# Patient Record
Sex: FEMALE | Race: WHITE | NOT HISPANIC OR LATINO | Employment: OTHER | URBAN - METROPOLITAN AREA
[De-identification: names, ages, dates, MRNs, and addresses within clinical notes are randomized per-mention and may not be internally consistent; named-entity substitution may affect disease eponyms.]

---

## 2017-01-06 ENCOUNTER — TELEPHONE (OUTPATIENT)
Dept: ENDOCRINOLOGY | Age: 77
End: 2017-01-06

## 2017-01-06 RX ORDER — DONEPEZIL HYDROCHLORIDE 10 MG/1
10 TABLET, FILM COATED ORAL DAILY
Qty: 90 TABLET | Refills: 0 | Status: SHIPPED | OUTPATIENT
Start: 2017-01-06 | End: 2017-04-10 | Stop reason: SDUPTHER

## 2017-01-06 NOTE — TELEPHONE ENCOUNTER
----- Message from Monserrat Washington sent at 1/6/2017 11:35 AM EST -----  Contact: patient  Patient needs a  Refill of   donepezil (ARICEPT) 10 MG tablet, one tablet daily, 90 day supply  Send to   [facility]  [phone] (Phone)  [phone] (Fax)    Sent to pharmacy 01/06/2017 11:47AM

## 2017-01-19 ENCOUNTER — TELEPHONE (OUTPATIENT)
Dept: ENDOCRINOLOGY | Age: 77
End: 2017-01-19

## 2017-01-19 DIAGNOSIS — I10 ESSENTIAL HYPERTENSION: ICD-10-CM

## 2017-01-19 DIAGNOSIS — IMO0002 UNCONTROLLED TYPE 2 DIABETES MELLITUS WITH OTHER SPECIFIED COMPLICATION: ICD-10-CM

## 2017-01-19 DIAGNOSIS — E78.5 DYSLIPIDEMIA: ICD-10-CM

## 2017-01-19 DIAGNOSIS — E55.9 VITAMIN D DEFICIENCY: ICD-10-CM

## 2017-01-19 DIAGNOSIS — E79.0 HYPERURICEMIA: ICD-10-CM

## 2017-01-19 DIAGNOSIS — R41.3 IMPAIRED MEMORY: ICD-10-CM

## 2017-01-19 RX ORDER — ERGOCALCIFEROL 1.25 MG/1
50000 CAPSULE ORAL WEEKLY
Qty: 13 CAPSULE | Refills: 2 | Status: SHIPPED | OUTPATIENT
Start: 2017-01-19 | End: 2018-01-19

## 2017-01-19 NOTE — TELEPHONE ENCOUNTER
----- Message from Monserrat Washington sent at 1/19/2017 12:43 PM EST -----  Contact: patient  Patient needs a refill of   Vitamin D, one tablet once per week, 90 day supply  Lantus solo star, 25 units per night, 90 day supply  Send to [facility]  [phone] (Phone)  [phone] (Fax)    Sent to pharmacy 01/19/2017 1:07PM

## 2017-01-30 RX ORDER — BLOOD SUGAR DIAGNOSTIC
STRIP MISCELLANEOUS
Qty: 100 EACH | Refills: 5 | Status: SHIPPED | OUTPATIENT
Start: 2017-01-30 | End: 2019-03-05

## 2017-01-31 ENCOUNTER — OFFICE VISIT (OUTPATIENT)
Dept: INTERNAL MEDICINE | Facility: CLINIC | Age: 77
End: 2017-01-31

## 2017-01-31 VITALS
DIASTOLIC BLOOD PRESSURE: 71 MMHG | TEMPERATURE: 96.9 F | BODY MASS INDEX: 33.13 KG/M2 | OXYGEN SATURATION: 96 % | HEIGHT: 63 IN | SYSTOLIC BLOOD PRESSURE: 139 MMHG | WEIGHT: 187 LBS | HEART RATE: 66 BPM

## 2017-01-31 DIAGNOSIS — E78.5 DYSLIPIDEMIA: ICD-10-CM

## 2017-01-31 DIAGNOSIS — M25.562 CHRONIC PAIN OF LEFT KNEE: ICD-10-CM

## 2017-01-31 DIAGNOSIS — K59.01 SLOW TRANSIT CONSTIPATION: Primary | ICD-10-CM

## 2017-01-31 DIAGNOSIS — R41.3 IMPAIRED MEMORY: ICD-10-CM

## 2017-01-31 DIAGNOSIS — E55.9 VITAMIN D DEFICIENCY: ICD-10-CM

## 2017-01-31 DIAGNOSIS — I10 ESSENTIAL HYPERTENSION: ICD-10-CM

## 2017-01-31 DIAGNOSIS — IMO0001 IDDM (INSULIN DEPENDENT DIABETES MELLITUS): ICD-10-CM

## 2017-01-31 DIAGNOSIS — G89.29 CHRONIC PAIN OF LEFT KNEE: ICD-10-CM

## 2017-01-31 PROCEDURE — 99214 OFFICE O/P EST MOD 30 MIN: CPT | Performed by: INTERNAL MEDICINE

## 2017-01-31 PROCEDURE — G0009 ADMIN PNEUMOCOCCAL VACCINE: HCPCS | Performed by: INTERNAL MEDICINE

## 2017-01-31 PROCEDURE — 90670 PCV13 VACCINE IM: CPT | Performed by: INTERNAL MEDICINE

## 2017-01-31 NOTE — PROGRESS NOTES
Subjective   Di Macario is a 76 y.o. female.   She is here today for slow transit constipation along with chronic pain of left knee ID DM dyslipidemia hypertension vitamin D deficiency and impaired memory  History of Present Illness   She is here today for slow transit constipation along with chronic pain of left knee ID DM dyslipidemia hypertension vitamin D deficiency and impaired memory  The following portions of the patient's history were reviewed and updated as appropriate: allergies, current medications, past family history, past medical history, past social history, past surgical history and problem list.    Review of Systems   Gastrointestinal: Positive for constipation.   Musculoskeletal: Positive for arthralgias (left knee).   All other systems reviewed and are negative.      Objective   Physical Exam   Constitutional: She is oriented to person, place, and time. She appears well-developed and well-nourished. She is cooperative.   HENT:   Head: Normocephalic and atraumatic.   Right Ear: Hearing, tympanic membrane, external ear and ear canal normal.   Left Ear: Hearing, tympanic membrane, external ear and ear canal normal.   Nose: Nose normal.   Mouth/Throat: Oropharynx is clear and moist.   Eyes: Conjunctivae, EOM and lids are normal. Pupils are equal, round, and reactive to light.   Neck: Phonation normal. Neck supple. Carotid bruit is not present.   Cardiovascular: Normal rate, regular rhythm and normal heart sounds.  Exam reveals no gallop and no friction rub.    No murmur heard.  Pulmonary/Chest: Effort normal and breath sounds normal. No respiratory distress.   Abdominal: Soft. Bowel sounds are normal. She exhibits no distension and no mass. There is no hepatosplenomegaly. There is no tenderness. There is no rebound and no guarding. No hernia.   Musculoskeletal: She exhibits no edema.        Left knee: Tenderness found.   Neurological: She is alert and oriented to person, place, and time.  Coordination and gait normal.   Skin: Skin is warm and dry.   Psychiatric: She has a normal mood and affect. Her speech is normal and behavior is normal. Judgment and thought content normal.   Nursing note and vitals reviewed.      Assessment/Plan   Diagnoses and all orders for this visit:    Slow transit constipation    Chronic pain of left knee  -     Ambulatory Referral to Orthopedic Surgery    IDDM (insulin dependent diabetes mellitus)    Dyslipidemia    Essential hypertension    Vitamin D deficiency    Impaired memory    Other orders  -     Pneumococcal Conjugate Vaccine 13-Valent All      Slow transit constipation Linzess samples today  Chronic pain of left knee referral to orthopedic surgery  IDD M follow hemoglobin A 1C with yearly ophthalmology visits  Dyslipidemia follow cholesterol panel and keep LDL less than 70 with proper diet exercise medication  Hypertension well-controlled on current medication  Impaired memory follow closely  Vitamin D deficiency supplementation as needed    linzess samples

## 2017-01-31 NOTE — MR AVS SNAPSHOT
"                        Di Macario   1/31/2017 10:15 AM   Office Visit    Dept Phone:  [phone]   Encounter #:  [id number]    Provider:  Deangelo Brown MD   Department:  [facility] INTERNAL MEDICINE                Your Full Care Plan              Your Updated Medication List          This list is accurate as of: 1/31/17 11:00 AM.  Always use your most recent med list.                allopurinol 300 MG tablet   Commonly known as:  ZYLOPRIM   Take 1 tablet by mouth daily.       aspirin 81 MG tablet       benazepril 20 MG tablet   Commonly known as:  LOTENSIN   Take 1 tablet by mouth daily.       diltiaZEM 240 MG 24 hr capsule   Commonly known as:  TIAZAC   Take 1 capsule by mouth daily.       donepezil 10 MG tablet   Commonly known as:  ARICEPT   Take 1 tablet by mouth Daily.       ergocalciferol 30165 UNITS capsule   Commonly known as:  DRISDOL   Take 1 capsule by mouth 1 (One) Time Per Week.       freestyle lancets   Use 4 times a day       FREESTYLE LITE test strip   Generic drug:  glucose blood   USE 4 TIMES A DAY       hydrochlorothiazide 12.5 MG capsule   Commonly known as:  MICROZIDE   Take 1 capsule by mouth daily.       Insulin Glargine 100 UNIT/ML injection pen   Commonly known as:  LANTUS SOLOSTAR   Inject 15 Units under the skin Daily.       insulin lispro 100 UNIT/ML injection   Commonly known as:  humaLOG   Inject up to 30 units daily in 3 divided doses       Insulin Syringe-Needle U-100 31G X 5/16\" 0.3 ML misc   Use with insulin injection.       linagliptin 5 MG tablet tablet   Commonly known as:  TRADJENTA   Take 1 tablet by mouth daily.       lovastatin 20 MG tablet   Commonly known as:  MEVACOR   Take 1 tablet by mouth daily.       zonisamide 100 MG capsule   Commonly known as:  ZONEGRAN               We Performed the Following     Ambulatory Referral to Orthopedic Surgery     Pneumococcal Conjugate Vaccine 13-Valent All       You Were Diagnosed With        Codes " Comments    Slow transit constipation    -  Primary ICD-10-CM: K59.01  ICD-9-CM: 564.01     Chronic pain of left knee     ICD-10-CM: M25.562, G89.29  ICD-9-CM: 719.46, 338.29     IDDM (insulin dependent diabetes mellitus)     ICD-10-CM: E11.9, Z79.4  ICD-9-CM: 250.00, V58.67     Dyslipidemia     ICD-10-CM: E78.5  ICD-9-CM: 272.4     Essential hypertension     ICD-10-CM: I10  ICD-9-CM: 401.9     Vitamin D deficiency     ICD-10-CM: E55.9  ICD-9-CM: 268.9     Impaired memory     ICD-10-CM: R41.3  ICD-9-CM: 780.93       Instructions     None    Patient Instructions History      Upcoming Appointments     Visit Type Date Time Department    OFFICE VISIT 1/31/2017 10:15 AM [facility]    [facility] 3/7/2017 11:00 AM [facility]    OFFICE VISIT 3/21/2017  2:20 PM [facility]    [facility] 7/7/2017  9:30 AM [facility]    OFFICE VISIT 7/21/2017  9:40 AM [facility]      MyChart Signup     Our records indicate that you have declined [facility] MyChart signup. If you would like to sign up for [employer], please email [email or web address] or call [phone] to obtain an activation code.             Other Info from Your Visit           Your Appointments     Mar 07, 2017 11:00 AM EST   [facility] with [facility], [facility]   [facility] ENDOCRINOLOGY (--)    [address] [facility]. [address] [place] [id number] [phone]            Mar 21, 2017  2:20 PM EDT   Office Visit with IAIN Coombs   [facility] ENDOCRINOLOGY (--)    [address] [place] [id number] [phone]           Arrive 15 minutes prior to appointment.            Jul 07, 2017  9:30 AM EDT   [facility] with [facility], [facility]   [facility] ENDOCRINOLOGY (--)    [address]  [facility] [address]   [phone]            Jul 21, 2017  9:40 AM EDT   Office Visit with Andrews Murcia MD   [facility]  "ENDOCRINOLOGY (--)    [address] [facility]. [address] [place] [address] [phone]           Arrive 15 minutes prior to appointment.              Allergies     Other        Vital Signs     Blood Pressure Pulse Temperature Height Weight Oxygen Saturation    139/71 (BP Location: Left arm, Patient Position: Sitting, Cuff Size: Adult) 66 96.9 °F (36.1 °C) (Tympanic) 63\" (160 cm) 187 lb (84.8 kg) 96%    Body Mass Index Smoking Status                33.13 kg/m2 Never Smoker          Problems and Diagnoses Noted     Chronic pain of left knee    Dyslipidemia    High blood pressure    IDDM (insulin dependent diabetes mellitus)    Impaired memory    Constipation due to slow movement through bowels    Vitamin D deficiency      Immunizations Administered     Name Date    Pneumococcal Conjugate 13-Valent         "

## 2017-02-03 ENCOUNTER — OFFICE VISIT (OUTPATIENT)
Dept: ORTHOPEDIC SURGERY | Facility: CLINIC | Age: 77
End: 2017-02-03

## 2017-02-03 VITALS — WEIGHT: 185 LBS | HEIGHT: 63 IN | TEMPERATURE: 98.2 F | BODY MASS INDEX: 32.78 KG/M2

## 2017-02-03 DIAGNOSIS — M25.562 ACUTE PAIN OF LEFT KNEE: Primary | ICD-10-CM
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
## 2017-02-03 PROCEDURE — 20610 DRAIN/INJ JOINT/BURSA W/O US: CPT | Performed by: ORTHOPAEDIC SURGERY

## 2017-02-03 PROCEDURE — 73562 X-RAY EXAM OF KNEE 3: CPT | Performed by: ORTHOPAEDIC SURGERY

## 2017-02-03 PROCEDURE — 99204 OFFICE O/P NEW MOD 45 MIN: CPT | Performed by: ORTHOPAEDIC SURGERY

## 2017-02-03 RX ORDER — METHYLPREDNISOLONE ACETATE 80 MG/ML
80 INJECTION, SUSPENSION INTRA-ARTICULAR; INTRALESIONAL; INTRAMUSCULAR; SOFT TISSUE
Status: COMPLETED | OUTPATIENT
Start: 2017-02-03 | End: 2017-02-03

## 2017-02-03 RX ORDER — BUPIVACAINE HYDROCHLORIDE 5 MG/ML
4 INJECTION, SOLUTION PERINEURAL
Status: COMPLETED | OUTPATIENT
Start: 2017-02-03 | End: 2017-02-03

## 2017-02-03 RX ADMIN — METHYLPREDNISOLONE ACETATE 80 MG: 80 INJECTION, SUSPENSION INTRA-ARTICULAR; INTRALESIONAL; INTRAMUSCULAR; SOFT TISSUE at 14:45

## 2017-02-03 RX ADMIN — BUPIVACAINE HYDROCHLORIDE 4 ML: 5 INJECTION, SOLUTION PERINEURAL at 14:45

## 2017-02-03 NOTE — PROGRESS NOTES
"New Knee      Patient: Di Macario        YOB: 1940    Medical Record Number: [id number]        Chief Complaints: left knee pain      History of Present Illness: This is a  76 y.o. female with a history left knee pain.  She states she fell in October right onto the anterior aspect of both knees the right one really resolved the left one is still bothering her.  She has no night pain she does have swelling pain is anterior she is tried some ibuprofen.  She her pain is described as moderate intermittent aching she is pain with standing walking and running her past medical history marked for insulin dependent diabetes depression anxiety and seizures all of which are stable at present        Allergies:   Allergies   Allergen Reactions   • Other        Medications:   Home Medications:  Current Outpatient Prescriptions on File Prior to Visit   Medication Sig   • allopurinol (ZYLOPRIM) 300 MG tablet Take 1 tablet by mouth daily.   • aspirin 81 MG tablet Take  by mouth.   • benazepril (LOTENSIN) 20 MG tablet Take 1 tablet by mouth daily.   • diltiazem (TIAZAC) 240 MG 24 hr capsule Take 1 capsule by mouth daily.   • donepezil (ARICEPT) 10 MG tablet Take 1 tablet by mouth Daily.   • ergocalciferol (DRISDOL) 33618 UNITS capsule Take 1 capsule by mouth 1 (One) Time Per Week.   • FREESTYLE LITE test strip USE 4 TIMES A DAY   • hydrochlorothiazide (MICROZIDE) 12.5 MG capsule Take 1 capsule by mouth daily.   • Insulin Glargine (LANTUS SOLOSTAR) 100 UNIT/ML injection pen Inject 15 Units under the skin Daily.   • insulin lispro (HumaLOG) 100 UNIT/ML injection Inject up to 30 units daily in 3 divided doses   • Insulin Pen Needle (BD PEN NEEDLE ALYSSA U/F) 32G X 4 MM misc Use once a day   • Insulin Syringe-Needle U-100 31G X 5/16\" 0.3 ML misc Use with insulin injection.   • Lancets (FREESTYLE) lancets Use 4 times a day   • linagliptin (TRADJENTA) 5 MG tablet tablet Take 1 tablet by mouth daily.   • lovastatin " "(MEVACOR) 20 MG tablet Take 1 tablet by mouth daily.   • zonisamide (ZONEGRAN) 100 MG capsule Take  by mouth.     No current facility-administered medications on file prior to visit.      Current Medications:  Scheduled Meds:  Continuous Infusions:  No current facility-administered medications for this visit.   PRN Meds:.    Past Medical History   Diagnosis Date   • Hypertension    • Type 2 diabetes mellitus    • Vitamin D deficiency         Past Surgical History   Procedure Laterality Date   • Hysterectomy     • Bladder surgery       Prolapsed   • Breast biopsy          Social History     Occupational History   • Not on file.     Social History Main Topics   • Smoking status: Former Smoker   • Smokeless tobacco: Not on file   • Alcohol use No   • Drug use: No   • Sexual activity: Defer    Social History     Social History Narrative      History reviewed. No pertinent family history.          Review of Systems: 14 point review of system remarkable for the pertinent positives listed in the chart by the patient the remainder are negative per the patient    Review of Systems      Physical Exam: 76 y.o. female  General Appearance:    Alert, cooperative, in no acute distress                 Vitals:    02/03/17 1426   Temp: 98.2 °F (36.8 °C)   Weight: 185 lb (83.9 kg)   Height: 63\" (160 cm)      Patient is alert and read ×3 no acute distress appears her above-listed at height weight and age.  Affect is normal respiratory rate is normal unlabored. Heart rate regular rate rhythm, sclera, dentition and hearing are normal for the purpose of this exam.        Ortho Exam Physical exam of the left knee reveals no effusion, no erythema.  It mild loss of extension and full flexion  Patient has mild varus alignment.  They have mild tenderness to palpation about the medial compartment, no tenderness laterally..  The patient has a negative bounce home, negative Brenda and a stable ligamentous exam.  Quad tone is reasonable and " symmetric.  There are no overlying skin changes no lymphedema no lymphadenopathy.  There is good hip range of motion which is full symmetric and asymptomatic and a normal ankle exam.    Physical exam of the right knee reveals no effusion, no erythema.  It mild loss of extension and full flexion  Patient has mild varus alignment.  They have mild tenderness to palpation about the medial compartment, no tenderness laterally..  The patient has a negative bounce home, negative Brenda and a stable ligamentous exam.  Quad tone is reasonable and symmetric.  There are no overlying skin changes no lymphedema no lymphadenopathy.  There is good hip range of motion which is full symmetric and asymptomatic and a normal ankle exam.      Large Joint Arthrocentesis  Date/Time: 2/3/2017 2:45 PM  Consent given by: patient  Site marked: site marked  Timeout: Immediately prior to procedure a time out was called to verify the correct patient, procedure, equipment, support staff and site/side marked as required   Supporting Documentation  Indications: pain   Procedure Details  Location: knee - L knee  Needle size: 25 G  Approach: anteromedial  Medications administered: 80 mg methylPREDNISolone acetate 80 MG/ML; 4 mL bupivacaine                     Radiology:   AP, Lateral and merchant views of the left knee  were ordered/reviewed to evauateknee pain.       I have no comp films. She has medial OA bilaterally, R greater than left.  She is severe narrowing on the right moderate to severe on the left.    Assessment/Plan:  Left knee pain with degenerative changes.  I think she just irritated when she fell.  Plan is proceed with the injection as a diagnostic and therapeutic tool she fails to improve we could consider other means of testing in view the fact this was a fall.  If the right knee flares up we can certainly inject that as well.  She is an insulin-dependent diabetic we did discuss her potential for increased blood sugars and how to  address those

## 2017-02-11 DIAGNOSIS — IMO0002 UNCONTROLLED TYPE 2 DIABETES MELLITUS: ICD-10-CM

## 2017-02-11 DIAGNOSIS — I10 ESSENTIAL HYPERTENSION: ICD-10-CM

## 2017-02-11 DIAGNOSIS — E55.9 VITAMIN D DEFICIENCY: ICD-10-CM

## 2017-02-11 DIAGNOSIS — R41.3 IMPAIRED MEMORY: ICD-10-CM

## 2017-02-11 DIAGNOSIS — E78.5 DYSLIPIDEMIA: ICD-10-CM

## 2017-02-13 RX ORDER — HYDROCHLOROTHIAZIDE 12.5 MG/1
CAPSULE, GELATIN COATED ORAL
Qty: 90 CAPSULE | Refills: 2 | Status: SHIPPED | OUTPATIENT
Start: 2017-02-13 | End: 2017-11-10 | Stop reason: SDUPTHER

## 2017-02-13 RX ORDER — DILTIAZEM HYDROCHLORIDE 240 MG/1
CAPSULE, EXTENDED RELEASE ORAL
Qty: 90 CAPSULE | Refills: 2 | Status: SHIPPED | OUTPATIENT
Start: 2017-02-13 | End: 2017-10-11 | Stop reason: SDUPTHER

## 2017-03-07 ENCOUNTER — LAB (OUTPATIENT)
Dept: ENDOCRINOLOGY | Age: 77
End: 2017-03-07

## 2017-03-07 DIAGNOSIS — I10 ESSENTIAL HYPERTENSION: ICD-10-CM

## 2017-03-07 DIAGNOSIS — E55.9 VITAMIN D DEFICIENCY: ICD-10-CM

## 2017-03-07 DIAGNOSIS — E55.9 VITAMIN D DEFICIENCY: Primary | ICD-10-CM

## 2017-03-07 DIAGNOSIS — IMO0001 UNCONTROLLED TYPE 2 DIABETES MELLITUS WITHOUT COMPLICATION, WITH LONG-TERM CURRENT USE OF INSULIN: ICD-10-CM

## 2017-03-07 DIAGNOSIS — E78.5 DYSLIPIDEMIA: ICD-10-CM

## 2017-03-07 DIAGNOSIS — E79.0 HYPERURICEMIA: ICD-10-CM

## 2017-03-08 LAB
25(OH)D3+25(OH)D2 SERPL-MCNC: 43.4 NG/ML (ref 30–100)
ALBUMIN SERPL-MCNC: 4.2 G/DL (ref 3.5–5.2)
ALBUMIN/GLOB SERPL: 1.5 G/DL
ALP SERPL-CCNC: 96 U/L (ref 39–117)
ALT SERPL-CCNC: 17 U/L (ref 1–33)
AST SERPL-CCNC: 13 U/L (ref 1–32)
BILIRUB SERPL-MCNC: 0.2 MG/DL (ref 0.1–1.2)
BUN SERPL-MCNC: 32 MG/DL (ref 8–23)
BUN/CREAT SERPL: 37.2 (ref 7–25)
C PEPTIDE SERPL-MCNC: 2.7 NG/ML (ref 1.1–4.4)
CALCIUM SERPL-MCNC: 10.3 MG/DL (ref 8.6–10.5)
CHLORIDE SERPL-SCNC: 99 MMOL/L (ref 98–107)
CHOLEST SERPL-MCNC: 205 MG/DL (ref 0–200)
CO2 SERPL-SCNC: 29.7 MMOL/L (ref 22–29)
CREAT SERPL-MCNC: 0.86 MG/DL (ref 0.57–1)
GLOBULIN SER CALC-MCNC: 2.8 GM/DL
GLUCOSE SERPL-MCNC: 269 MG/DL (ref 65–99)
HBA1C MFR BLD: 8.13 % (ref 4.8–5.6)
HDLC SERPL-MCNC: 102 MG/DL (ref 40–60)
LDLC SERPL CALC-MCNC: 72 MG/DL (ref 0–100)
MICROALBUMIN UR-MCNC: 4.3 UG/ML
POTASSIUM SERPL-SCNC: 4.1 MMOL/L (ref 3.5–5.2)
PROT SERPL-MCNC: 7 G/DL (ref 6–8.5)
SODIUM SERPL-SCNC: 142 MMOL/L (ref 136–145)
T4 SERPL-MCNC: 5.86 MCG/DL (ref 4.5–11.7)
TRIGL SERPL-MCNC: 153 MG/DL (ref 0–150)
TSH SERPL DL<=0.005 MIU/L-ACNC: 2.38 MIU/ML (ref 0.27–4.2)
URATE SERPL-MCNC: 6 MG/DL (ref 2.4–5.7)
VLDLC SERPL CALC-MCNC: 30.6 MG/DL (ref 5–40)

## 2017-03-21 ENCOUNTER — OFFICE VISIT (OUTPATIENT)
Dept: ENDOCRINOLOGY | Age: 77
End: 2017-03-21

## 2017-03-21 VITALS
HEIGHT: 63 IN | WEIGHT: 190.2 LBS | DIASTOLIC BLOOD PRESSURE: 72 MMHG | SYSTOLIC BLOOD PRESSURE: 130 MMHG | BODY MASS INDEX: 33.7 KG/M2

## 2017-03-21 DIAGNOSIS — I10 ESSENTIAL HYPERTENSION: ICD-10-CM

## 2017-03-21 DIAGNOSIS — E55.9 VITAMIN D DEFICIENCY: ICD-10-CM

## 2017-03-21 DIAGNOSIS — IMO0001 IDDM (INSULIN DEPENDENT DIABETES MELLITUS): Primary | ICD-10-CM

## 2017-03-21 DIAGNOSIS — R41.3 IMPAIRED MEMORY: ICD-10-CM

## 2017-03-21 DIAGNOSIS — E78.5 DYSLIPIDEMIA: ICD-10-CM

## 2017-03-21 DIAGNOSIS — IMO0002 UNCONTROLLED TYPE 2 DIABETES MELLITUS WITH OTHER SPECIFIED COMPLICATION: ICD-10-CM

## 2017-03-21 DIAGNOSIS — E79.0 HYPERURICEMIA: ICD-10-CM

## 2017-03-21 PROCEDURE — 99214 OFFICE O/P EST MOD 30 MIN: CPT | Performed by: NURSE PRACTITIONER

## 2017-03-21 RX ORDER — SITAGLIPTIN 100 MG/1
100 TABLET, FILM COATED ORAL DAILY
Qty: 90 TABLET | Refills: 1 | Status: SHIPPED | OUTPATIENT
Start: 2017-03-21 | End: 2017-03-29 | Stop reason: SDUPTHER

## 2017-03-21 RX ORDER — PREDNISOLONE ACETATE 10 MG/ML
SUSPENSION/ DROPS OPHTHALMIC
COMMUNITY
Start: 2017-03-13 | End: 2017-12-21

## 2017-03-21 RX ORDER — OFLOXACIN 3 MG/ML
SOLUTION/ DROPS OPHTHALMIC
COMMUNITY
Start: 2017-03-13 | End: 2017-12-21

## 2017-03-21 RX ORDER — DICLOFENAC SODIUM 1 MG/ML
SOLUTION/ DROPS OPHTHALMIC
COMMUNITY
Start: 2017-03-13 | End: 2017-12-21

## 2017-03-21 RX ORDER — BENAZEPRIL HYDROCHLORIDE 20 MG/1
20 TABLET ORAL DAILY
Qty: 90 TABLET | Refills: 3 | Status: SHIPPED | OUTPATIENT
Start: 2017-03-21 | End: 2018-08-14 | Stop reason: SDUPTHER

## 2017-03-21 NOTE — PROGRESS NOTES
"Subjective   Di Macario is a 76 y.o. female is here today for follow-up.  Chief Complaint   Patient presents with   • Diabetes     has recent labs; brought BG logs; test BG 4-5XD   • hyperuricemia     Pt states is having cataract surgery in the near future   • dyslipidemia     pt poss needs a refill for tradjenta wants to discuss w you   • Vitamin D Deficiency     pt states numbness in both legs in am   • Hypertension     Visit Vitals   • /72   • Ht 63\" (160 cm)   • Wt 190 lb 3.2 oz (86.3 kg)   • BMI 33.69 kg/m2     Current Outpatient Prescriptions on File Prior to Visit   Medication Sig   • allopurinol (ZYLOPRIM) 300 MG tablet Take 1 tablet by mouth daily.   • aspirin 81 MG tablet Take  by mouth.   • benazepril (LOTENSIN) 20 MG tablet Take 1 tablet by mouth daily.   • diltiaZEM (TIAZAC) 240 MG 24 hr capsule TAKE 1 CAPSULE DAILY   • donepezil (ARICEPT) 10 MG tablet Take 1 tablet by mouth Daily.   • ergocalciferol (DRISDOL) 85807 UNITS capsule Take 1 capsule by mouth 1 (One) Time Per Week.   • FREESTYLE LITE test strip USE 4 TIMES A DAY   • hydrochlorothiazide (MICROZIDE) 12.5 MG capsule TAKE 1 CAPSULE DAILY   • Insulin Glargine (LANTUS SOLOSTAR) 100 UNIT/ML injection pen Inject 15 Units under the skin Daily.   • insulin lispro (HumaLOG) 100 UNIT/ML injection Inject up to 30 units daily in 3 divided doses   • Insulin Pen Needle (BD PEN NEEDLE ALYSSA U/F) 32G X 4 MM misc Use once a day   • Insulin Syringe-Needle U-100 31G X 5/16\" 0.3 ML misc Use with insulin injection.   • Lancets (FREESTYLE) lancets Use 4 times a day   • linagliptin (TRADJENTA) 5 MG tablet tablet Take 1 tablet by mouth daily.   • lovastatin (MEVACOR) 20 MG tablet Take 1 tablet by mouth daily.   • zonisamide (ZONEGRAN) 100 MG capsule Take  by mouth.     No current facility-administered medications on file prior to visit.      History reviewed. No pertinent family history.  Social History   Substance Use Topics   • Smoking status: Former " Smoker   • Smokeless tobacco: None   • Alcohol use No     No Active Allergies      History of Present Illness  Encounter Diagnoses   Name Primary?   • IDDM (insulin dependent diabetes mellitus) Yes   • Dyslipidemia    • Hyperuricemia    • Vitamin D deficiency    • Essential hypertension      This is a 76-year-old female patient here today for routine follow-up visit.  She is accompanied by her .  She uses a cane for mobility.  She had recent labs done which were reviewed and she was provided a copy.  She is altering her dose of Lantus on a daily basis she is either taking 15 or 20 units.  We discussed staying at the same dose of Lantus and not changing on a daily basis.  She states her insurance company is no longer wanted to cover Tradjenta.  A prescription for Januvia will be sent.  She has had no hypoglycemic reactions.  She has had no reason which to go the emergency room.  She is getting rate have cataract surgery and we discussed not taking insulin on the day of surgery.  She does have some neuropathy in both legs however she does not feel it is bad enough to be treated at this time.  The following portions of the patient's history were reviewed and updated as appropriate: allergies, current medications, past family history, past medical history, past social history, past surgical history and problem list.    Review of Systems   Constitutional: Negative for fatigue.   HENT: Negative for trouble swallowing.    Eyes: Negative for visual disturbance.   Respiratory: Negative for shortness of breath.    Cardiovascular: Negative for leg swelling.   Gastrointestinal: Negative for nausea.   Endocrine: Negative for polyphagia.   Genitourinary: Negative for urgency.   Musculoskeletal: Negative for joint swelling.   Skin: Negative for wound.   Allergic/Immunologic: Negative for immunocompromised state.   Neurological: Positive for numbness.   Hematological: Negative for adenopathy.   Psychiatric/Behavioral: The  patient is not nervous/anxious.        Objective   Physical Exam   Constitutional: She is oriented to person, place, and time. She appears well-developed and well-nourished. No distress.   HENT:   Head: Normocephalic and atraumatic.   Left Ear: External ear normal.   Nose: Nose normal.   Mouth/Throat: Oropharynx is clear and moist. No oropharyngeal exudate.   Eyes: Conjunctivae and EOM are normal. Pupils are equal, round, and reactive to light. Right eye exhibits no discharge. Left eye exhibits no discharge. No scleral icterus.   Neck: Normal range of motion. Neck supple. No JVD present. No tracheal deviation present. No thyromegaly present.   Cardiovascular: Normal rate, regular rhythm, normal heart sounds and intact distal pulses.  Exam reveals no gallop and no friction rub.    No murmur heard.  Pulmonary/Chest: Effort normal and breath sounds normal. No stridor. No respiratory distress. She has no wheezes. She has no rales. She exhibits no tenderness.   Abdominal: Soft. Bowel sounds are normal. She exhibits no distension and no mass. There is no tenderness. There is no rebound and no guarding. No hernia.   Musculoskeletal: Normal range of motion. She exhibits edema. She exhibits no tenderness or deformity.   1 + edema in lower ext.  Skin dry   Lymphadenopathy:     She has no cervical adenopathy.   Neurological: She is alert and oriented to person, place, and time. She has normal reflexes. She displays normal reflexes. No cranial nerve deficit. She exhibits normal muscle tone. Coordination normal.   Skin: Skin is warm and dry. No rash noted. She is not diaphoretic. No erythema. No pallor.   Psychiatric: She has a normal mood and affect. Her behavior is normal. Judgment and thought content normal.   Nursing note and vitals reviewed.      Results for orders placed or performed in visit on 03/07/17   T4 & TSH ([facility])   Result Value Ref Range    TSH 2.380 0.270 - 4.200 mIU/mL    T4, Total 5.86 4.50 - 11.70 mcg/dL    Uric Acid   Result Value Ref Range    Uric Acid 6.0 (H) 2.4 - 5.7 mg/dL   Comprehensive Metabolic Panel   Result Value Ref Range    Glucose 269 (H) 65 - 99 mg/dL    BUN 32 (H) 8 - 23 mg/dL    Creatinine 0.86 0.57 - 1.00 mg/dL    eGFR Non African Am 64 >60 mL/min/1.73    eGFR African Am 78 >60 mL/min/1.73    BUN/Creatinine Ratio 37.2 (H) 7.0 - 25.0    Sodium 142 136 - 145 mmol/L    Potassium 4.1 3.5 - 5.2 mmol/L    Chloride 99 98 - 107 mmol/L    Total CO2 29.7 (H) 22.0 - 29.0 mmol/L    Calcium 10.3 8.6 - 10.5 mg/dL    Total Protein 7.0 6.0 - 8.5 g/dL    Albumin 4.20 3.50 - 5.20 g/dL    Globulin 2.8 gm/dL    A/G Ratio 1.5 g/dL    Total Bilirubin 0.2 0.1 - 1.2 mg/dL    Alkaline Phosphatase 96 39 - 117 U/L    AST (SGOT) 13 1 - 32 U/L    ALT (SGPT) 17 1 - 33 U/L   C-Peptide   Result Value Ref Range    C-Peptide 2.7 1.1 - 4.4 ng/mL   Hemoglobin A1c   Result Value Ref Range    Hemoglobin A1C 8.13 (H) 4.80 - 5.60 %   Lipid Panel   Result Value Ref Range    Total Cholesterol 205 (H) 0 - 200 mg/dL    Triglycerides 153 (H) 0 - 150 mg/dL    HDL Cholesterol 102 (H) 40 - 60 mg/dL    VLDL Cholesterol 30.6 5 - 40 mg/dL    LDL Cholesterol  72 0 - 100 mg/dL   Vitamin D 25 Hydroxy   Result Value Ref Range    25 Hydroxy, Vitamin D 43.4 30.0 - 100.0 ng/mL   MicroAlbumin, Urine, Random   Result Value Ref Range    Microalbumin, Urine 4.3 Not Estab. ug/mL       Assessment/Plan   Di was seen today for diabetes, hyperuricemia, dyslipidemia, vitamin d deficiency and hypertension.    Diagnoses and all orders for this visit:    IDDM (insulin dependent diabetes mellitus)    Dyslipidemia    Hyperuricemia    Vitamin D deficiency    Essential hypertension      In summary, patient was seen and examined.  She will continue her current medications unchanged her Lantus to 20 units once daily and be consistent on that dose.  She will stop Tradjenta when she is finished with that and start Januvia 100 mg once daily.  A prescription was sent to  her mail order pharmacy.  Her labs were reviewed and she was provided a copy.  Her hemoglobin A1c is greater than 8 is not goal.   lantus 20 units once daily  Do not alter doses of lantus  Call blood sugars to office if you have any bs less than 70 or if you are waking up greater than 120 mg/dl consistently.   Continue all meds the same  Finish tradjenta 5 mg daily then start on  januvia 100 mg once daily in its place

## 2017-03-21 NOTE — PATIENT INSTRUCTIONS
lantus 20 units once daily  Do not alter doses of lantus  Call blood sugars to office if you have any bs less than 70 or if you are waking up greater than 120 mg/dl consistently.   Continue all meds the same  Finish tradjenta 5 mg daily then start on  januvia 100 mg once daily in its place

## 2017-03-29 RX ORDER — SITAGLIPTIN 100 MG/1
100 TABLET, FILM COATED ORAL DAILY
Qty: 90 TABLET | Refills: 0 | Status: SHIPPED | OUTPATIENT
Start: 2017-03-29 | End: 2017-07-21

## 2017-04-11 RX ORDER — DONEPEZIL HYDROCHLORIDE 10 MG/1
TABLET, FILM COATED ORAL
Qty: 90 TABLET | Refills: 1 | Status: SHIPPED | OUTPATIENT
Start: 2017-04-11 | End: 2017-09-14 | Stop reason: SDUPTHER

## 2017-05-03 DIAGNOSIS — E78.5 DYSLIPIDEMIA: ICD-10-CM

## 2017-05-03 DIAGNOSIS — R41.3 IMPAIRED MEMORY: ICD-10-CM

## 2017-05-03 DIAGNOSIS — I10 ESSENTIAL HYPERTENSION: ICD-10-CM

## 2017-05-03 DIAGNOSIS — E55.9 VITAMIN D DEFICIENCY: ICD-10-CM

## 2017-05-03 DIAGNOSIS — IMO0002 UNCONTROLLED TYPE 2 DIABETES MELLITUS: ICD-10-CM

## 2017-05-03 RX ORDER — LOVASTATIN 20 MG/1
TABLET ORAL
Qty: 90 TABLET | Refills: 2 | Status: SHIPPED | OUTPATIENT
Start: 2017-05-03 | End: 2017-07-21

## 2017-06-21 ENCOUNTER — APPOINTMENT (OUTPATIENT)
Dept: GENERAL RADIOLOGY | Facility: HOSPITAL | Age: 77
End: 2017-06-21

## 2017-06-21 ENCOUNTER — HOSPITAL ENCOUNTER (EMERGENCY)
Facility: HOSPITAL | Age: 77
Discharge: HOME | End: 2017-06-21
Attending: EMERGENCY MEDICINE | Admitting: EMERGENCY MEDICINE

## 2017-06-21 ENCOUNTER — APPOINTMENT (OUTPATIENT)
Dept: CT IMAGING | Facility: HOSPITAL | Age: 77
End: 2017-06-21

## 2017-06-21 VITALS
HEIGHT: 64 IN | OXYGEN SATURATION: 98 % | TEMPERATURE: 97.6 F | SYSTOLIC BLOOD PRESSURE: 167 MMHG | HEART RATE: 62 BPM | BODY MASS INDEX: 32.1 KG/M2 | RESPIRATION RATE: 16 BRPM | WEIGHT: 188 LBS | DIASTOLIC BLOOD PRESSURE: 77 MMHG

## 2017-06-21 DIAGNOSIS — S02.2XXA CLOSED FRACTURE OF NASAL BONE, INITIAL ENCOUNTER: Primary | ICD-10-CM
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
## 2017-06-21 DIAGNOSIS — S01.21XA NASAL LACERATION, INITIAL ENCOUNTER: ICD-10-CM

## 2017-06-21 DIAGNOSIS — S80.01XA CONTUSION OF RIGHT KNEE, INITIAL ENCOUNTER: ICD-10-CM

## 2017-06-21 LAB — GLUCOSE BLDC GLUCOMTR-MCNC: 157 MG/DL (ref 70–130)

## 2017-06-21 PROCEDURE — 82962 GLUCOSE BLOOD TEST: CPT

## 2017-06-21 PROCEDURE — 70486 CT MAXILLOFACIAL W/O DYE: CPT

## 2017-06-21 PROCEDURE — 99284 EMERGENCY DEPT VISIT MOD MDM: CPT

## 2017-06-21 PROCEDURE — 73562 X-RAY EXAM OF KNEE 3: CPT

## 2017-06-21 PROCEDURE — 70450 CT HEAD/BRAIN W/O DYE: CPT

## 2017-06-21 NOTE — ED PROVIDER NOTES
EMERGENCY DEPARTMENT ENCOUNTER    CHIEF COMPLAINT  Chief Complaint: Fall  History given by: Patient  History limited by: Nothing  Room Number: 29/29  PMD: Deangelo Brown MD      HPI:  Pt is a 77 y.o. female who presents to the ED via EMS s/p mechanical fall forwards while she was attempting to take her slippers off in the bathroom around 07:30AM this morning. The patient reports that she hit the bridge of her nose and right knee upon impact with the bathroom floor. She denies sustaining any LOC and she currently denies any neck pain, back pain, numbness /tingling of her extremities, nausea, vomiting or dizziness. Her Spouse notes that he was unable to assist her off of the floor so he called EMS to bring her to the ED for further evaluation. No other complaints at this time.      Duration:  Seconds  Onset: Sudden  Timing: Episodic  Location: Bridge of nose / Right knee  Radiation: None  Quality: Dull  Intensity/Severity: Moderate  Progression: No Change  Associated Symptoms: Arthralgias, laceration  Aggravating Factors: Palpation  Alleviating Factors: Rest  Previous Episodes: None  Treatment before arrival: None mentioned    PAST MEDICAL HISTORY  Active Ambulatory Problems     Diagnosis Date Noted   • IDDM (insulin dependent diabetes mellitus) 03/04/2016   • Essential hypertension 03/04/2016   • Dyslipidemia 03/04/2016   • Impaired memory 03/04/2016   • Vitamin D deficiency 03/04/2016   • Hyperuricemia 07/20/2016   • Slow transit constipation 01/31/2017   • Chronic pain of left knee 01/31/2017   • Acute pain of left knee 02/03/2017   • Fall 02/03/2017     Resolved Ambulatory Problems     Diagnosis Date Noted   • No Resolved Ambulatory Problems     Past Medical History:   Diagnosis Date   • Hypertension    • Type 2 diabetes mellitus    • Vitamin D deficiency        PAST SURGICAL HISTORY  Past Surgical History:   Procedure Laterality Date   • BLADDER SURGERY      Prolapsed   • BREAST BIOPSY     • HYSTERECTOMY          FAMILY HISTORY  History reviewed. No pertinent family history.    SOCIAL HISTORY  Social History     Social History   • Marital status:      Spouse name: N/A   • Number of children: N/A   • Years of education: N/A     Occupational History   • Not on file.     Social History Main Topics   • Smoking status: Former Smoker   • Smokeless tobacco: Not on file   • Alcohol use No   • Drug use: No   • Sexual activity: Defer     Other Topics Concern   • Not on file     Social History Narrative       ALLERGIES  Review of patient's allergies indicates no known allergies.    REVIEW OF SYSTEMS  Review of Systems   Constitutional: Negative for chills.   HENT: Negative for congestion, rhinorrhea and sore throat.    Eyes: Negative for pain.   Respiratory: Negative for cough and shortness of breath.    Cardiovascular: Negative for chest pain, palpitations and leg swelling.   Gastrointestinal: Negative for abdominal pain, diarrhea, nausea and vomiting.   Genitourinary: Negative for difficulty urinating, dysuria, flank pain and frequency.   Musculoskeletal: Positive for arthralgias (Right knee). Negative for myalgias, neck pain and neck stiffness.   Skin: Negative for rash.        Laceration   Neurological: Negative for dizziness, speech difficulty, weakness, light-headedness, numbness and headaches.   Psychiatric/Behavioral: Negative.    All other systems reviewed and are negative.      PHYSICAL EXAM  ED Triage Vitals   Temp Heart Rate Resp BP SpO2   06/21/17 0820 06/21/17 0820 06/21/17 0820 06/21/17 0820 06/21/17 0820   96.3 °F (35.7 °C) 60 18 178/71 100 %      Temp src Heart Rate Source Patient Position BP Location FiO2 (%)   06/21/17 0820 -- -- -- --   Tympanic           Physical Exam   Constitutional: She is oriented to person, place, and time and well-developed, well-nourished, and in no distress. No distress.   HENT:   Head: Normocephalic.   Very small abrasion to the bridge of her nose with tenderness and swelling  to the nose. Approximately 3mm superficial laceration.    Eyes: EOM are normal. Pupils are equal, round, and reactive to light.   Neck: Normal range of motion.   Cardiovascular: Normal rate and regular rhythm.    No murmur heard.  Pulmonary/Chest: Effort normal and breath sounds normal. No respiratory distress.   Abdominal: Soft. There is no tenderness. There is no rebound and no guarding.   Musculoskeletal: Normal range of motion. She exhibits no edema.        Right knee: She exhibits ecchymosis. Tenderness found. Medial joint line tenderness noted.        Cervical back: She exhibits no tenderness.        Thoracic back: She exhibits no tenderness.        Lumbar back: She exhibits no tenderness.   Pelvis stable.    Neurological: She is alert and oriented to person, place, and time.   Skin: Skin is warm and dry. No rash noted.   Psychiatric: Mood and affect normal.   Nursing note and vitals reviewed.      LAB RESULTS  Lab Results (last 24 hours)     Procedure Component Value Units Date/Time    POC Glucose Fingerstick [[id number]]  (Abnormal) Collected:  06/21/17 0911    Specimen:  Blood Updated:  06/21/17 0925     Glucose 157 (H) mg/dL     Narrative:       MD Notified R and V Meter: [id number] : [id number] Richard Pollack I ordered the above labs and reviewed the results    RADIOLOGY  CT Head Without Contrast   Preliminary Result       No evidence for acute traumatic intracranial pathology.       Extensive opacification of the left nasal cavity and ethmoid air cells   is seen. This presumably represents hematoma although the possibility of   underlying soft tissue lesion such as a polyp cannot be excluded.   Further evaluation could be performed with direct visual inspection of   this area. Additionally, if further evaluation for a maxillofacial   fracture is indicated, one could obtain a CT scan of the maxillofacial   region for further evaluation.       There is an air-fluid level appreciated within  the left maxillary sinus   and a deformity is seen within the left orbital floor. However, this   deformity appeared to have been present on the prior examination of   11/06/2014. Again, this could be further evaluated at time of the   maxillofacial CT.       There is some soft tissue swelling surrounding the nose.       Atrophy within the left muscles of mastication.       Right frontal convexity scalp hematoma.       These findings were discussed with Dr. Flowers on 06/21/2017 at   approximately 9:54 AM.          XR Knee 3 View Right   Final Result      CT Facial Bones Without Contrast    (Results Pending)      09:59  Reviewed CT head - Negative fracture, negative bleed. Independently viewed by me. Interpreted by radiologist. Discussed with .    10:20  Reviewed XR R Knee - AP lateral and sunrise views were obtained. There is marked narrowing of the medial compartment of the knee joint. The patellofemoral compartment also appears mildly narrowed. There is no evidence of acute fracture or subluxation. There is prominent soft tissue swelling along the anterior  aspect of the knee joint. Independently viewed by me. Interpreted by radiologist.     10:52  Reviewed CT Facial Bones - Bilateral non-displaced nasal bone fractures. Left nasal bone polyp. Independently viewed by me. Interpreted by radiologist. Discussed with .      I ordered the above noted radiological studies. Interpreted by radiologist. Discussed with radiologist ( and ). Reviewed by me in PACS.       PROCEDURES  Laceration Repair  Date/Time: 6/21/2017 10:54 AM  Performed by: LISSETTE FLOWERS  Authorized by: LISSETTE FLOWERS   Consent: Verbal consent obtained.  Consent given by: patient  Patient understanding: patient states understanding of the procedure being performed  Patient consent: the patient's understanding of the procedure matches consent given  Patient identity confirmed: verbally with patient  Body area:  head/neck  Location details: nose  Foreign bodies: no foreign bodies  Tendon involvement: none  Nerve involvement: none  Vascular damage: no  Preparation: Patient was prepped and draped in the usual sterile fashion.  Amount of cleaning: Cholra Prep.  Debridement: none  Degree of undermining: none  Skin closure: glue  Technique: simple  Approximation: close  Approximation difficulty: simple  Patient tolerance: Patient tolerated the procedure well with no immediate complications            PROGRESS AND CONSULTS  ED Course     09:07  Ordered CT head and XR R knee for further evaluation.    09:59  Ordered CT facial bones for further evaluation.     11:03  Rechecked patient and they are resting after I repaired the small laceration. Discussed the patient's pertinent imaging results, including CT Head and XR R knee both show nothing acute although the CT facial bones show a bilateral non-displaced nasal bone fracture. Discussed plan to discharge the patient home and recommended follow up with the PCP as directed. Patient agrees with the plan and all questions were addressed.     Latest vital signs   BP- 128/70 HR- 55 Temp- 96.3 °F (35.7 °C) (Tympanic) O2 sat- 100%      MEDICAL DECISION MAKING  Results were reviewed/discussed with the patient and they were also made aware of online access. Pt also made aware that some labs, such as cultures, will not be resulted during ER visit and follow up with PMD is necessary.     MDM  Number of Diagnoses or Management Options     Amount and/or Complexity of Data Reviewed  Tests in the radiology section of CPT®: reviewed and ordered (CT head - Negative fracture, negative bleed.    XR R Knee - AP lateral and sunrise views were obtained. There is marked narrowing of the medial compartment of the knee joint. The patellofemoral compartment also appears mildly narrowed. There is no evidence of acute fracture or subluxation. There is prominent soft tissue swelling along the anterior aspect  of the knee joint.    CT Facial Bones - Bilateral non-displaced nasal bone fractures. Left nasal bone polyp.    )  Discussion of test results with the performing providers: yes           DIAGNOSIS  Final diagnoses:   Closed fracture of nasal bone, initial encounter   Contusion of right knee, initial encounter   Nasal laceration, initial encounter   Fall, initial encounter       DISPOSITION  DISCHARGE    Patient discharged in stable condition.    Reviewed implications of results, diagnosis, meds, responsibility to follow up, warning signs and symptoms of possible worsening, potential complications and reasons to return to ER, including any nausea, vomiting or unusual HA.    Patient/Family voiced understanding of above instructions.    Discussed plan for discharge, as there is no emergent indication for admission.  Pt/family is agreeable and understands need for follow up and repeat testing.  Pt is aware that discharge does not mean that nothing is wrong but it indicates no emergency is present that requires admission and they must continue care with follow-up as given below or physician of their choice.     FOLLOW-UP  Deangelo Brown MD  [address] [address]  [phone]    Call in 3 days           Medication List      Notice     No changes were made to your prescriptions during this visit.        Latest Documented Vital Signs:  As of 11:11 AM  BP- 128/70 HR- 55 Temp- 96.3 °F (35.7 °C) (Tympanic) O2 sat- 100%    --  Documentation assistance provided by aaron Carroll for .  Information recorded by the scribe was done at my direction and has been verified and validated by me.        Saad Carroll  06/21/17 [id number]       Nas Sheikh MD  06/21/17 1825

## 2017-06-21 NOTE — DISCHARGE INSTRUCTIONS
Apply ice to your right knee and nose for 10-15 minutes at a time for next 24 hours.  Return to the emergency department for headache, vomiting, dizziness, vision problems, confusion, or other concern.  Follow-up with your primary care doctor in the next several days as needed.

## 2017-06-22 ENCOUNTER — TELEPHONE (OUTPATIENT)
Dept: SOCIAL WORK | Facility: HOSPITAL | Age: 77
End: 2017-06-22

## 2017-06-24 DIAGNOSIS — IMO0001 IDDM (INSULIN DEPENDENT DIABETES MELLITUS): ICD-10-CM

## 2017-06-24 DIAGNOSIS — E55.9 VITAMIN D DEFICIENCY: Primary | ICD-10-CM

## 2017-06-24 DIAGNOSIS — E79.0 HYPERURICEMIA: ICD-10-CM

## 2017-06-24 DIAGNOSIS — E78.5 DYSLIPIDEMIA: ICD-10-CM

## 2017-06-26 DIAGNOSIS — S02.2XXA CLOSED FRACTURE OF NASAL BONE, INITIAL ENCOUNTER: Primary | ICD-10-CM

## 2017-06-26 RX ORDER — BLOOD-GLUCOSE METER
KIT MISCELLANEOUS
Qty: 400 EACH | Refills: 0 | Status: SHIPPED | OUTPATIENT
Start: 2017-06-26 | End: 2017-09-24 | Stop reason: SDUPTHER

## 2017-06-30 DIAGNOSIS — I10 ESSENTIAL HYPERTENSION: ICD-10-CM

## 2017-06-30 DIAGNOSIS — IMO0002 UNCONTROLLED TYPE 2 DIABETES MELLITUS: ICD-10-CM

## 2017-06-30 DIAGNOSIS — E79.0 HYPERURICEMIA: ICD-10-CM

## 2017-06-30 DIAGNOSIS — R41.3 IMPAIRED MEMORY: ICD-10-CM

## 2017-06-30 DIAGNOSIS — E55.9 VITAMIN D DEFICIENCY: ICD-10-CM

## 2017-06-30 DIAGNOSIS — E78.5 DYSLIPIDEMIA: ICD-10-CM

## 2017-06-30 RX ORDER — ALLOPURINOL 300 MG/1
TABLET ORAL
Qty: 90 TABLET | Refills: 2 | Status: SHIPPED | OUTPATIENT
Start: 2017-06-30 | End: 2018-10-25

## 2017-07-07 ENCOUNTER — LAB (OUTPATIENT)
Dept: ENDOCRINOLOGY | Age: 77
End: 2017-07-07

## 2017-07-07 DIAGNOSIS — E79.0 HYPERURICEMIA: ICD-10-CM

## 2017-07-07 DIAGNOSIS — IMO0001 IDDM (INSULIN DEPENDENT DIABETES MELLITUS): ICD-10-CM

## 2017-07-07 DIAGNOSIS — E78.5 DYSLIPIDEMIA: ICD-10-CM

## 2017-07-07 DIAGNOSIS — E55.9 VITAMIN D DEFICIENCY: ICD-10-CM

## 2017-07-08 LAB
25(OH)D3+25(OH)D2 SERPL-MCNC: 31 NG/ML (ref 30–100)
ALBUMIN SERPL-MCNC: 4.1 G/DL (ref 3.5–5.2)
ALBUMIN/GLOB SERPL: 1.5 G/DL
ALP SERPL-CCNC: 91 U/L (ref 39–117)
ALT SERPL-CCNC: 15 U/L (ref 1–33)
AST SERPL-CCNC: 15 U/L (ref 1–32)
BILIRUB SERPL-MCNC: 0.2 MG/DL (ref 0.1–1.2)
BUN SERPL-MCNC: 23 MG/DL (ref 8–23)
BUN/CREAT SERPL: 27.4 (ref 7–25)
C PEPTIDE SERPL-MCNC: 1.2 NG/ML (ref 1.1–4.4)
CALCIUM SERPL-MCNC: 10.6 MG/DL (ref 8.6–10.5)
CHLORIDE SERPL-SCNC: 100 MMOL/L (ref 98–107)
CHOLEST SERPL-MCNC: 172 MG/DL (ref 0–200)
CO2 SERPL-SCNC: 28.3 MMOL/L (ref 22–29)
CREAT SERPL-MCNC: 0.84 MG/DL (ref 0.57–1)
GLOBULIN SER CALC-MCNC: 2.8 GM/DL
GLUCOSE SERPL-MCNC: 147 MG/DL (ref 65–99)
HBA1C MFR BLD: 8.6 % (ref 4.8–5.6)
HDLC SERPL-MCNC: 69 MG/DL (ref 40–60)
LDLC SERPL CALC-MCNC: 57 MG/DL (ref 0–100)
MICROALBUMIN UR-MCNC: 22.8 UG/ML
POTASSIUM SERPL-SCNC: 3.7 MMOL/L (ref 3.5–5.2)
PROT SERPL-MCNC: 6.9 G/DL (ref 6–8.5)
SODIUM SERPL-SCNC: 143 MMOL/L (ref 136–145)
T4 SERPL-MCNC: 6.32 MCG/DL (ref 4.5–11.7)
TRIGL SERPL-MCNC: 230 MG/DL (ref 0–150)
TSH SERPL DL<=0.005 MIU/L-ACNC: 3.47 MIU/ML (ref 0.27–4.2)
URATE SERPL-MCNC: 6.2 MG/DL (ref 2.4–5.7)
VLDLC SERPL CALC-MCNC: 46 MG/DL (ref 5–40)

## 2017-07-21 ENCOUNTER — OFFICE VISIT (OUTPATIENT)
Dept: ENDOCRINOLOGY | Age: 77
End: 2017-07-21

## 2017-07-21 VITALS
RESPIRATION RATE: 16 BRPM | HEIGHT: 63 IN | SYSTOLIC BLOOD PRESSURE: 122 MMHG | BODY MASS INDEX: 33.81 KG/M2 | WEIGHT: 190.8 LBS | DIASTOLIC BLOOD PRESSURE: 60 MMHG

## 2017-07-21 DIAGNOSIS — E78.5 DYSLIPIDEMIA: ICD-10-CM

## 2017-07-21 DIAGNOSIS — E55.9 VITAMIN D DEFICIENCY: ICD-10-CM

## 2017-07-21 DIAGNOSIS — E79.0 HYPERURICEMIA: ICD-10-CM

## 2017-07-21 DIAGNOSIS — IMO0002 UNCONTROLLED TYPE 2 DIABETES MELLITUS WITH OTHER SPECIFIED COMPLICATION: ICD-10-CM

## 2017-07-21 DIAGNOSIS — IMO0001 IDDM (INSULIN DEPENDENT DIABETES MELLITUS): Primary | ICD-10-CM

## 2017-07-21 DIAGNOSIS — R41.3 IMPAIRED MEMORY: ICD-10-CM

## 2017-07-21 DIAGNOSIS — I10 ESSENTIAL HYPERTENSION: ICD-10-CM

## 2017-07-21 PROCEDURE — 99214 OFFICE O/P EST MOD 30 MIN: CPT | Performed by: INTERNAL MEDICINE

## 2017-07-21 RX ORDER — INSULIN GLARGINE 300 U/ML
50 INJECTION, SOLUTION SUBCUTANEOUS DAILY
Qty: 15 PEN | Refills: 3 | Status: SHIPPED | OUTPATIENT
Start: 2017-07-21 | End: 2017-08-16 | Stop reason: SDUPTHER

## 2017-07-21 RX ORDER — EMPAGLIFLOZIN AND LINAGLIPTIN 25; 5 MG/1; MG/1
1 TABLET, FILM COATED ORAL DAILY
Qty: 90 TABLET | Refills: 3 | Status: SHIPPED | OUTPATIENT
Start: 2017-07-21 | End: 2017-07-26 | Stop reason: CLARIF

## 2017-07-21 RX ORDER — PRAVASTATIN SODIUM 40 MG
40 TABLET ORAL EVERY EVENING
Qty: 90 TABLET | Refills: 3 | Status: SHIPPED | OUTPATIENT
Start: 2017-07-21 | End: 2020-10-29

## 2017-07-21 NOTE — PROGRESS NOTES
"Subjective   Di Macario is a 77 y.o. female seen for follow up for DM2, HTN, vit d deficiency, lab review. Patient is checking BG 4-5 times a day. No BG readings. She has been having swelling in her right leg. She states that she was seen in the ER on 06/21 for a fall and broken nose.     History of Present Illness this is a 77-year-old female known patient with type II diabetes hypertension and dyslipidemia as well as hyperuricemia and vitamin D deficiency.  Over the course of last 6 months she has had one ER visit on June 21 of 2017 due to sustaining a fall.    /60  Resp 16  Ht 63\" (160 cm)  Wt 190 lb 12.8 oz (86.5 kg)  BMI 33.8 kg/m2     No Known Allergies    Current Outpatient Prescriptions:   •  allopurinol (ZYLOPRIM) 300 MG tablet, TAKE 1 TABLET DAILY, Disp: 90 tablet, Rfl: 2  •  aspirin 81 MG tablet, Take  by mouth., Disp: , Rfl:   •  benazepril (LOTENSIN) 20 MG tablet, Take 1 tablet by mouth Daily., Disp: 90 tablet, Rfl: 3  •  diclofenac (VOLTAREN) 0.1 % ophthalmic solution, , Disp: , Rfl:   •  diltiaZEM (TIAZAC) 240 MG 24 hr capsule, TAKE 1 CAPSULE DAILY, Disp: 90 capsule, Rfl: 2  •  donepezil (ARICEPT) 10 MG tablet, TAKE 1 TABLET DAILY, Disp: 90 tablet, Rfl: 1  •  ergocalciferol (DRISDOL) 57997 UNITS capsule, Take 1 capsule by mouth 1 (One) Time Per Week., Disp: 13 capsule, Rfl: 2  •  FREESTYLE LITE test strip, USE 4 TIMES DAILY, Disp: 400 each, Rfl: 0  •  hydrochlorothiazide (MICROZIDE) 12.5 MG capsule, TAKE 1 CAPSULE DAILY, Disp: 90 capsule, Rfl: 2  •  Insulin Glargine (LANTUS SOLOSTAR) 100 UNIT/ML injection pen, Inject 20 Units under the skin Daily., Disp: 30 mL, Rfl: 2  •  insulin lispro (humaLOG) 100 UNIT/ML injection, Inject up to 30 units daily in 3 divided doses, Disp: 9 each, Rfl: 2  •  Insulin Pen Needle (BD PEN NEEDLE ALYSSA U/F) 32G X 4 MM misc, Use once a day, Disp: 100 each, Rfl: 1  •  Insulin Syringe-Needle U-100 31G X 5/16\" 0.3 ML misc, Use with insulin injection., Disp: 100 " each, Rfl: 5  •  JANUVIA 100 MG tablet, Take 1 tablet by mouth Daily. PA has been approved, please run again, Disp: 90 tablet, Rfl: 0  •  Lancets (FREESTYLE) lancets, Use 4 times a day, Disp: 400 each, Rfl: 2  •  lovastatin (MEVACOR) 20 MG tablet, TAKE 1 TABLET DAILY, Disp: 90 tablet, Rfl: 2  •  ofloxacin (OCUFLOX) 0.3 % ophthalmic solution, , Disp: , Rfl:   •  prednisoLONE acetate (PRED FORTE) 1 % ophthalmic suspension, , Disp: , Rfl:   •  zonisamide (ZONEGRAN) 100 MG capsule, Take  by mouth., Disp: , Rfl:       The following portions of the patient's history were reviewed and updated as appropriate: allergies, current medications, past family history, past medical history, past social history, past surgical history and problem list.    Review of Systems   Constitutional: Negative.    HENT: Negative.    Eyes: Negative.    Respiratory: Negative.    Cardiovascular: Positive for leg swelling.   Gastrointestinal: Negative.    Endocrine: Negative.    Genitourinary: Negative.    Musculoskeletal: Negative.    Skin: Negative.    Allergic/Immunologic: Negative.    Neurological: Negative.    Hematological: Negative.    Psychiatric/Behavioral: Negative.        Objective   Physical Exam   Constitutional: She is oriented to person, place, and time. She appears well-developed and well-nourished. No distress.   HENT:   Head: Normocephalic and atraumatic.   Left Ear: External ear normal.   Nose: Nose normal.   Mouth/Throat: Oropharynx is clear and moist. No oropharyngeal exudate.   Eyes: Conjunctivae and EOM are normal. Pupils are equal, round, and reactive to light. Right eye exhibits no discharge. Left eye exhibits no discharge. No scleral icterus.   Neck: Normal range of motion. Neck supple. No JVD present. No tracheal deviation present. No thyromegaly present.   Cardiovascular: Normal rate, regular rhythm, normal heart sounds and intact distal pulses.  Exam reveals no gallop and no friction rub.    No murmur  heard.  Pulmonary/Chest: Effort normal and breath sounds normal. No stridor. No respiratory distress. She has no wheezes. She has no rales. She exhibits no tenderness.   Abdominal: Soft. Bowel sounds are normal. She exhibits no distension and no mass. There is no tenderness. There is no rebound and no guarding. No hernia.   Musculoskeletal: Normal range of motion. She exhibits no edema, tenderness or deformity.   Lymphadenopathy:     She has no cervical adenopathy.   Neurological: She is alert and oriented to person, place, and time. She has normal reflexes. She displays normal reflexes. No cranial nerve deficit. She exhibits normal muscle tone. Coordination normal.   Skin: Skin is warm and dry. No rash noted. She is not diaphoretic. No erythema. No pallor.   Psychiatric: She has a normal mood and affect. Her behavior is normal. Judgment and thought content normal.   Nursing note and vitals reviewed.     Results for orders placed or performed in visit on 07/07/17   Comprehensive Metabolic Panel   Result Value Ref Range    Glucose 147 (H) 65 - 99 mg/dL    BUN 23 8 - 23 mg/dL    Creatinine 0.84 0.57 - 1.00 mg/dL    eGFR Non African Am 66 >60 mL/min/1.73    eGFR African Am 79 >60 mL/min/1.73    BUN/Creatinine Ratio 27.4 (H) 7.0 - 25.0    Sodium 143 136 - 145 mmol/L    Potassium 3.7 3.5 - 5.2 mmol/L    Chloride 100 98 - 107 mmol/L    Total CO2 28.3 22.0 - 29.0 mmol/L    Calcium 10.6 (H) 8.6 - 10.5 mg/dL    Total Protein 6.9 6.0 - 8.5 g/dL    Albumin 4.10 3.50 - 5.20 g/dL    Globulin 2.8 gm/dL    A/G Ratio 1.5 g/dL    Total Bilirubin 0.2 0.1 - 1.2 mg/dL    Alkaline Phosphatase 91 39 - 117 U/L    AST (SGOT) 15 1 - 32 U/L    ALT (SGPT) 15 1 - 33 U/L   C-Peptide   Result Value Ref Range    C-Peptide 1.2 1.1 - 4.4 ng/mL   Hemoglobin A1c   Result Value Ref Range    Hemoglobin A1C 8.60 (H) 4.80 - 5.60 %   Lipid Panel   Result Value Ref Range    Total Cholesterol 172 0 - 200 mg/dL    Triglycerides 230 (H) 0 - 150 mg/dL    HDL  Cholesterol 69 (H) 40 - 60 mg/dL    VLDL Cholesterol 46 (H) 5 - 40 mg/dL    LDL Cholesterol  57 0 - 100 mg/dL   T4 & TSH ([facility])   Result Value Ref Range    TSH 3.470 0.270 - 4.200 mIU/mL    T4, Total 6.32 4.50 - 11.70 mcg/dL   Uric Acid   Result Value Ref Range    Uric Acid 6.2 (H) 2.4 - 5.7 mg/dL   Vitamin D 25 Hydroxy   Result Value Ref Range    25 Hydroxy, Vitamin D 31.0 30.0 - 100.0 ng/mL   MicroAlbumin, Urine, Random   Result Value Ref Range    Microalbumin, Urine 22.8 Not Estab. ug/mL           Assessment/Plan   Diagnoses and all orders for this visit:    IDDM (insulin dependent diabetes mellitus)  -     pravastatin (PRAVACHOL) 40 MG tablet; Take 1 tablet by mouth Every Evening.  -     GLYXAMBI 25-5 MG tablet; Take 1 tablet by mouth Daily.  -     TOUJEO SOLOSTAR 300 UNIT/ML solution pen-injector; Inject 50 Units under the skin Daily. 30 units Q AM, every 3 days if FBS  greater than 120 add2 units up to 50 units  -     T3, Free; Future  -     T4 & TSH ([facility]); Future  -     T4, Free; Future  -     Uric Acid; Future  -     Vitamin D 25 Hydroxy; Future  -     Comprehensive Metabolic Panel; Future  -     C-Peptide; Future  -     Hemoglobin A1c; Future  -     Lipid Panel; Future  -     MicroAlbumin, Urine, Random; Future    Vitamin D deficiency  -     pravastatin (PRAVACHOL) 40 MG tablet; Take 1 tablet by mouth Every Evening.  -     GLYXAMBI 25-5 MG tablet; Take 1 tablet by mouth Daily.  -     TOUJEO SOLOSTAR 300 UNIT/ML solution pen-injector; Inject 50 Units under the skin Daily. 30 units Q AM, every 3 days if FBS  greater than 120 add2 units up to 50 units  -     T3, Free; Future  -     T4 & TSH ([facility]); Future  -     T4, Free; Future  -     Uric Acid; Future  -     Vitamin D 25 Hydroxy; Future  -     Comprehensive Metabolic Panel; Future  -     C-Peptide; Future  -     Hemoglobin A1c; Future  -     Lipid Panel; Future  -     MicroAlbumin, Urine, Random; Future    Essential hypertension  -     pravastatin  (PRAVACHOL) 40 MG tablet; Take 1 tablet by mouth Every Evening.  -     GLYXAMBI 25-5 MG tablet; Take 1 tablet by mouth Daily.  -     TOUJEO SOLOSTAR 300 UNIT/ML solution pen-injector; Inject 50 Units under the skin Daily. 30 units Q AM, every 3 days if FBS  greater than 120 add2 units up to 50 units  -     T3, Free; Future  -     T4 & TSH ([facility]); Future  -     T4, Free; Future  -     Uric Acid; Future  -     Vitamin D 25 Hydroxy; Future  -     Comprehensive Metabolic Panel; Future  -     C-Peptide; Future  -     Hemoglobin A1c; Future  -     Lipid Panel; Future  -     MicroAlbumin, Urine, Random; Future    Dyslipidemia  -     pravastatin (PRAVACHOL) 40 MG tablet; Take 1 tablet by mouth Every Evening.  -     GLYXAMBI 25-5 MG tablet; Take 1 tablet by mouth Daily.  -     TOUJEO SOLOSTAR 300 UNIT/ML solution pen-injector; Inject 50 Units under the skin Daily. 30 units Q AM, every 3 days if FBS  greater than 120 add2 units up to 50 units  -     T3, Free; Future  -     T4 & TSH ([facility]); Future  -     T4, Free; Future  -     Uric Acid; Future  -     Vitamin D 25 Hydroxy; Future  -     Comprehensive Metabolic Panel; Future  -     C-Peptide; Future  -     Hemoglobin A1c; Future  -     Lipid Panel; Future  -     MicroAlbumin, Urine, Random; Future    Hyperuricemia  -     pravastatin (PRAVACHOL) 40 MG tablet; Take 1 tablet by mouth Every Evening.  -     GLYXAMBI 25-5 MG tablet; Take 1 tablet by mouth Daily.  -     TOUJEO SOLOSTAR 300 UNIT/ML solution pen-injector; Inject 50 Units under the skin Daily. 30 units Q AM, every 3 days if FBS  greater than 120 add2 units up to 50 units  -     T3, Free; Future  -     T4 & TSH ([facility]); Future  -     T4, Free; Future  -     Uric Acid; Future  -     Vitamin D 25 Hydroxy; Future  -     Comprehensive Metabolic Panel; Future  -     C-Peptide; Future  -     Hemoglobin A1c; Future  -     Lipid Panel; Future  -     MicroAlbumin, Urine, Random; Future    Uncontrolled type 2 diabetes  mellitus with other specified complication  -     pravastatin (PRAVACHOL) 40 MG tablet; Take 1 tablet by mouth Every Evening.  -     GLYXAMBI 25-5 MG tablet; Take 1 tablet by mouth Daily.  -     TOUJEO SOLOSTAR 300 UNIT/ML solution pen-injector; Inject 50 Units under the skin Daily. 30 units Q AM, every 3 days if FBS  greater than 120 add2 units up to 50 units  -     T3, Free; Future  -     T4 & TSH ([facility]); Future  -     T4, Free; Future  -     Uric Acid; Future  -     Vitamin D 25 Hydroxy; Future  -     Comprehensive Metabolic Panel; Future  -     C-Peptide; Future  -     Hemoglobin A1c; Future  -     Lipid Panel; Future  -     MicroAlbumin, Urine, Random; Future    Impaired memory  -     pravastatin (PRAVACHOL) 40 MG tablet; Take 1 tablet by mouth Every Evening.  -     GLYXAMBI 25-5 MG tablet; Take 1 tablet by mouth Daily.  -     TOUJEO SOLOSTAR 300 UNIT/ML solution pen-injector; Inject 50 Units under the skin Daily. 30 units Q AM, every 3 days if FBS  greater than 120 add2 units up to 50 units  -     T3, Free; Future  -     T4 & TSH ([facility]); Future  -     T4, Free; Future  -     Uric Acid; Future  -     Vitamin D 25 Hydroxy; Future  -     Comprehensive Metabolic Panel; Future  -     C-Peptide; Future  -     Hemoglobin A1c; Future  -     Lipid Panel; Future  -     MicroAlbumin, Urine, Random; Future             in summary I saw and examined this 77-year-old female for above-mentioned problems.  I reviewed her laboratory evaluation of 07/07/2017 and provided her and her  who was present during this office visit with a hard copy of it.  She seems to be uncomfortable and confused about her regimen and having to take 4 shots of insulin daily plus Januvia and therefore I am going to consolidate and simplify her regimen by starting care on Toujeo 30 units every morning and every 3 days if her fasting blood glucose is greater than 120 she will add 2 units to the existing dose up to 50 units daily.  Also we  will stop Januvia and NovoLog meal time and will start her on Glyxambi 10/5 mg every morning for 4 weeks and if she had no trouble will move up to 25/5 mg every morning.  I cautioned and instructed her and her  that she make sure to take plenty of liquids and cleans herself compulsively after the use of toilet.  She will continue rest of her prescriptions and will see Ms. Nida Gray in 4 months or sooner if needed with laboratory evaluation prior to each office visit.

## 2017-07-26 RX ORDER — NATEGLINIDE 120 MG/1
120 TABLET ORAL
Qty: 270 TABLET | Refills: 3 | Status: SHIPPED | OUTPATIENT
Start: 2017-07-26 | End: 2017-12-21

## 2017-07-27 ENCOUNTER — TELEPHONE (OUTPATIENT)
Dept: ENDOCRINOLOGY | Age: 77
End: 2017-07-27

## 2017-07-27 NOTE — TELEPHONE ENCOUNTER
----- Message from Andrews Murcia MD sent at 7/26/2017  4:52 PM EDT -----  None of those are alternatives they are simply cheap.  I am switching her to Starlix generic 120 mg before each meal.  ----- Message -----     From: Ailyn Alcantara MA     Sent: 7/24/2017  11:01 AM       To: Andrews Murcia MD    Patient's insurance does not cover Glyxambi. They are requesting a change to metformin and another generic medication (glimepiride, glyburide, glipizide, glipizide XL, repaglinide)

## 2017-08-16 ENCOUNTER — TELEPHONE (OUTPATIENT)
Dept: ENDOCRINOLOGY | Age: 77
End: 2017-08-16

## 2017-08-16 ENCOUNTER — OFFICE VISIT (OUTPATIENT)
Dept: INTERNAL MEDICINE | Facility: CLINIC | Age: 77
End: 2017-08-16

## 2017-08-16 VITALS
SYSTOLIC BLOOD PRESSURE: 125 MMHG | OXYGEN SATURATION: 96 % | TEMPERATURE: 97.5 F | WEIGHT: 186 LBS | HEIGHT: 63 IN | DIASTOLIC BLOOD PRESSURE: 71 MMHG | BODY MASS INDEX: 32.96 KG/M2 | HEART RATE: 65 BPM

## 2017-08-16 DIAGNOSIS — R41.3 IMPAIRED MEMORY: ICD-10-CM

## 2017-08-16 DIAGNOSIS — E55.9 VITAMIN D DEFICIENCY: ICD-10-CM

## 2017-08-16 DIAGNOSIS — IMO0001 IDDM (INSULIN DEPENDENT DIABETES MELLITUS): ICD-10-CM

## 2017-08-16 DIAGNOSIS — E79.0 HYPERURICEMIA: ICD-10-CM

## 2017-08-16 DIAGNOSIS — K59.01 SLOW TRANSIT CONSTIPATION: ICD-10-CM

## 2017-08-16 DIAGNOSIS — IMO0002 UNCONTROLLED TYPE 2 DIABETES MELLITUS WITH OTHER SPECIFIED COMPLICATION: ICD-10-CM

## 2017-08-16 DIAGNOSIS — I10 ESSENTIAL HYPERTENSION: ICD-10-CM

## 2017-08-16 DIAGNOSIS — Z00.00 MEDICARE ANNUAL WELLNESS VISIT, SUBSEQUENT: Primary | ICD-10-CM

## 2017-08-16 DIAGNOSIS — E78.5 DYSLIPIDEMIA: ICD-10-CM

## 2017-08-16 PROCEDURE — G0439 PPPS, SUBSEQ VISIT: HCPCS | Performed by: INTERNAL MEDICINE

## 2017-08-16 PROCEDURE — 99214 OFFICE O/P EST MOD 30 MIN: CPT | Performed by: INTERNAL MEDICINE

## 2017-08-16 RX ORDER — INSULIN GLARGINE 300 U/ML
50 INJECTION, SOLUTION SUBCUTANEOUS DAILY
Qty: 15 PEN | Refills: 3 | Status: SHIPPED | OUTPATIENT
Start: 2017-08-16 | End: 2017-08-17 | Stop reason: SDUPTHER

## 2017-08-16 NOTE — PROGRESS NOTES
QUICK REFERENCE INFORMATION:  The ABCs of the Annual Wellness Visit    Subsequent Medicare Wellness Visit    HEALTH RISK ASSESSMENT    1940    Recent Hospitalizations:  No hospitalization(s) within the last year..        Current Medical Providers:  Patient Care Team:  Deangelo Brown MD as PCP - General (Internal Medicine)  IAIN Coombs as PCP - Claims Attributed        Smoking Status:  History   Smoking Status   • Former Smoker   Smokeless Tobacco   • Not on file       Alcohol Consumption:  History   Alcohol Use No       Depression Screen:   PHQ-9 Depression Screening 8/16/2017   Little interest or pleasure in doing things 0   Feeling down, depressed, or hopeless 0   PHQ-9 Total Score 0       Health Habits and Functional and Cognitive Screening:  Functional & Cognitive Status 8/16/2017   Do you have difficulty preparing food and eating? No   Do you have difficulty bathing yourself? No   Do you have difficulty getting dressed? No   Do you have difficulty using the toilet? No   Do you have difficulty moving around from place to place? No   In the past year have you fallen or experienced a near fall? Yes   Do you need help using the phone?  No   Are you deaf or do you have serious difficulty hearing?  No   Do you need help with transportation? No   Do you need help shopping? No   Do you need help preparing meals?  No   Do you need help with housework?  No   Do you need help with laundry? No   Do you need help taking your medications? No   Do you need help managing money? No   Do you have difficulty concentrating, remembering or making decisions? No       Health Habits  Current Diet: Well Balanced Diet  Dental Exam: Up to date  Eye Exam: Up to date  Exercise (times per week): 1 times per week  Current Exercise Activities Include: Dancing      Does the patient have evidence of cognitive impairment? No    Aspirin use counseling: Taking ASA appropriately as indicated      Recent Lab Results:  CMP:  Lab  Results   Component Value Date     (H) 07/07/2017    BUN 23 07/07/2017    CREATININE 0.84 07/07/2017    EGFRIFNONA 66 07/07/2017    EGFRIFAFRI 79 07/07/2017    BCR 27.4 (H) 07/07/2017     07/07/2017    K 3.7 07/07/2017    CO2 28.3 07/07/2017    CALCIUM 10.6 (H) 07/07/2017    PROTENTOTREF 6.9 07/07/2017    ALBUMIN 4.10 07/07/2017    LABGLOBREF 2.8 07/07/2017    LABIL2 1.5 07/07/2017    BILITOT 0.2 07/07/2017    ALKPHOS 91 07/07/2017    AST 15 07/07/2017    ALT 15 07/07/2017     Lipid Panel:  Lab Results   Component Value Date    TRIG 230 (H) 07/07/2017    HDL 69 (H) 07/07/2017    VLDL 46 (H) 07/07/2017     HbA1c:  Lab Results   Component Value Date    HGBA1C 8.60 (H) 07/07/2017       Visual Acuity:  No exam data present    Age-appropriate Screening Schedule:  Refer to the list below for future screening recommendations based on patient's age, sex and/or medical conditions. Orders for these recommended tests are listed in the plan section. The patient has been provided with a written plan.    Health Maintenance   Topic Date Due   • TDAP/TD VACCINES (1 - Tdap) 05/18/1959   • ZOSTER VACCINE  07/21/2016   • INFLUENZA VACCINE  09/01/2017   • HEMOGLOBIN A1C  01/07/2018   • PNEUMOCOCCAL VACCINES (65+ LOW/MEDIUM RISK) (2 of 2 - PPSV23) 01/31/2018   • DIABETIC EYE EXAM  03/06/2018   • DIABETIC FOOT EXAM  07/21/2018   • MAMMOGRAM  09/28/2018        Subjective   History of Present Illness    Di Macario is a 77 y.o. female who presents for an Subsequent Wellness Visit.    The following portions of the patient's history were reviewed and updated as appropriate: allergies, current medications, past family history, past medical history, past social history, past surgical history and problem list.    Outpatient Medications Prior to Visit   Medication Sig Dispense Refill   • allopurinol (ZYLOPRIM) 300 MG tablet TAKE 1 TABLET DAILY 90 tablet 2   • aspirin 81 MG tablet Take  by mouth.     • benazepril (LOTENSIN) 20  "MG tablet Take 1 tablet by mouth Daily. 90 tablet 3   • diclofenac (VOLTAREN) 0.1 % ophthalmic solution      • diltiaZEM (TIAZAC) 240 MG 24 hr capsule TAKE 1 CAPSULE DAILY 90 capsule 2   • donepezil (ARICEPT) 10 MG tablet TAKE 1 TABLET DAILY 90 tablet 1   • ergocalciferol (DRISDOL) 92529 UNITS capsule Take 1 capsule by mouth 1 (One) Time Per Week. 13 capsule 2   • FREESTYLE LITE test strip USE 4 TIMES DAILY 400 each 0   • hydrochlorothiazide (MICROZIDE) 12.5 MG capsule TAKE 1 CAPSULE DAILY 90 capsule 2   • Insulin Pen Needle (BD PEN NEEDLE ALYSSA U/F) 32G X 4 MM misc Use once a day 100 each 1   • Insulin Syringe-Needle U-100 31G X 5/16\" 0.3 ML misc Use with insulin injection. 100 each 5   • Lancets (FREESTYLE) lancets Use 4 times a day 400 each 2   • nateglinide (STARLIX) 120 MG tablet Take 1 tablet by mouth 3 (Three) Times a Day Before Meals. 270 tablet 3   • ofloxacin (OCUFLOX) 0.3 % ophthalmic solution      • pravastatin (PRAVACHOL) 40 MG tablet Take 1 tablet by mouth Every Evening. 90 tablet 3   • prednisoLONE acetate (PRED FORTE) 1 % ophthalmic suspension      • zonisamide (ZONEGRAN) 100 MG capsule Take  by mouth.     • TOUJEO SOLOSTAR 300 UNIT/ML solution pen-injector Inject 50 Units under the skin Daily. 30 units Q AM, every 3 days if FBS  greater than 120 add2 units up to 50 units 15 pen 3     No facility-administered medications prior to visit.        Patient Active Problem List   Diagnosis   • IDDM (insulin dependent diabetes mellitus)   • Essential hypertension   • Dyslipidemia   • Impaired memory   • Vitamin D deficiency   • Hyperuricemia   • Slow transit constipation   • Chronic pain of left knee   • Acute pain of left knee   • Fall   • Medicare annual wellness visit, subsequent       Advance Care Planning:  has an advance directive - a copy has been provided and is in file    Identification of Risk Factors:  Risk factors include: weight .    Review of Systems    Compared to one year ago, the patient " "feels her physical health is worse.  Compared to one year ago, the patient feels her mental health is better.    Objective     Physical Exam    Vitals:    08/16/17 1118   BP: 125/71   BP Location: Left arm   Patient Position: Sitting   Cuff Size: Adult   Pulse: 65   Temp: 97.5 °F (36.4 °C)   TempSrc: Tympanic   SpO2: 96%   Weight: 186 lb (84.4 kg)   Height: 63\" (160 cm)   PainSc: 0-No pain       Body mass index is 32.95 kg/(m^2).  Discussed the patient's BMI with her. The BMI is above average; BMI management plan is completed.    Assessment/Plan   Patient Self-Management and Personalized Health Advice  The patient has been provided with information about: weight management and preventive services including:   · Exercise counseling provided, Nutrition counseling provided.    Visit Diagnoses:    ICD-10-CM ICD-9-CM   1. Medicare annual wellness visit, subsequent Z00.00 V70.0   2. IDDM (insulin dependent diabetes mellitus) E11.9 250.00    Z79.4 V58.67   3. Vitamin D deficiency E55.9 268.9   4. Essential hypertension I10 401.9   5. Dyslipidemia E78.5 272.4   6. Hyperuricemia E79.0 790.6   7. Uncontrolled type 2 diabetes mellitus with other specified complication E11.69     E11.65    8. Impaired memory R41.3 780.93       No orders of the defined types were placed in this encounter.      Outpatient Encounter Prescriptions as of 8/16/2017   Medication Sig Dispense Refill   • allopurinol (ZYLOPRIM) 300 MG tablet TAKE 1 TABLET DAILY 90 tablet 2   • aspirin 81 MG tablet Take  by mouth.     • benazepril (LOTENSIN) 20 MG tablet Take 1 tablet by mouth Daily. 90 tablet 3   • diclofenac (VOLTAREN) 0.1 % ophthalmic solution      • diltiaZEM (TIAZAC) 240 MG 24 hr capsule TAKE 1 CAPSULE DAILY 90 capsule 2   • donepezil (ARICEPT) 10 MG tablet TAKE 1 TABLET DAILY 90 tablet 1   • ergocalciferol (DRISDOL) 95484 UNITS capsule Take 1 capsule by mouth 1 (One) Time Per Week. 13 capsule 2   • FREESTYLE LITE test strip USE 4 TIMES DAILY 400 " "each 0   • hydrochlorothiazide (MICROZIDE) 12.5 MG capsule TAKE 1 CAPSULE DAILY 90 capsule 2   • Insulin Pen Needle (BD PEN NEEDLE ALYSSA U/F) 32G X 4 MM misc Use once a day 100 each 1   • Insulin Syringe-Needle U-100 31G X 5/16\" 0.3 ML misc Use with insulin injection. 100 each 5   • Lancets (FREESTYLE) lancets Use 4 times a day 400 each 2   • nateglinide (STARLIX) 120 MG tablet Take 1 tablet by mouth 3 (Three) Times a Day Before Meals. 270 tablet 3   • ofloxacin (OCUFLOX) 0.3 % ophthalmic solution      • pravastatin (PRAVACHOL) 40 MG tablet Take 1 tablet by mouth Every Evening. 90 tablet 3   • prednisoLONE acetate (PRED FORTE) 1 % ophthalmic suspension      • TOUJEO SOLOSTAR 300 UNIT/ML solution pen-injector Inject 50 Units under the skin Daily. 30 units Q AM, every 3 days if FBS  greater than 120 add2 units up to 50 units 15 pen 3   • zonisamide (ZONEGRAN) 100 MG capsule Take  by mouth.     • [DISCONTINUED] TOUJEO SOLOSTAR 300 UNIT/ML solution pen-injector Inject 50 Units under the skin Daily. 30 units Q AM, every 3 days if FBS  greater than 120 add2 units up to 50 units 15 pen 3     No facility-administered encounter medications on file as of 8/16/2017.        Reviewed use of high risk medication in the elderly: yes  Reviewed for potential of harmful drug interactions in the elderly: yes    Follow Up:  No Follow-up on file.     An After Visit Summary and PPPS with all of these plans were given to the patient.        "

## 2017-08-16 NOTE — PATIENT INSTRUCTIONS
Medicare Wellness  Personal Prevention Plan of Service     Date of Office Visit:  2017  Encounter Provider:  Deangelo Brown MD  Place of Service:  [facility]  Patient Name: Di Macario  :  1940    As part of the Medicare Wellness portion of your visit today, we are providing you with this personalized preventive plan of services (PPPS). This plan is based upon recommendations of the [place] Preventive Services Task Force (USPSTF) and the Advisory Committee on Immunization Practices (ACIP).    This lists the preventive care services that should be considered, and provides dates of when you are due. Items listed as completed are up-to-date and do not require any further intervention.    Health Maintenance   Topic Date Due   • TDAP/TD VACCINES (1 - Tdap) 1958   • ZOSTER VACCINE  2016   • INFLUENZA VACCINE  2017   • HEMOGLOBIN A1C  2018   • PNEUMOCOCCAL VACCINES (65+ LOW/MEDIUM RISK) (2 of 2 - PPSV23) 2018   • DIABETIC EYE EXAM  2018   • DIABETIC FOOT EXAM  2018   • MEDICARE ANNUAL WELLNESS  2018   • MAMMOGRAM  2018       No orders of the defined types were placed in this encounter.      Return in about 6 months (around 2018).

## 2017-08-17 DIAGNOSIS — IMO0002 UNCONTROLLED TYPE 2 DIABETES MELLITUS WITH OTHER SPECIFIED COMPLICATION: ICD-10-CM

## 2017-08-17 DIAGNOSIS — E55.9 VITAMIN D DEFICIENCY: ICD-10-CM

## 2017-08-17 DIAGNOSIS — E79.0 HYPERURICEMIA: ICD-10-CM

## 2017-08-17 DIAGNOSIS — R41.3 IMPAIRED MEMORY: ICD-10-CM

## 2017-08-17 DIAGNOSIS — E78.5 DYSLIPIDEMIA: ICD-10-CM

## 2017-08-17 DIAGNOSIS — I10 ESSENTIAL HYPERTENSION: ICD-10-CM

## 2017-08-17 DIAGNOSIS — IMO0001 IDDM (INSULIN DEPENDENT DIABETES MELLITUS): ICD-10-CM

## 2017-08-17 RX ORDER — INSULIN GLARGINE 300 U/ML
50 INJECTION, SOLUTION SUBCUTANEOUS DAILY
Qty: 15 PEN | Refills: 3 | Status: SHIPPED | OUTPATIENT
Start: 2017-08-17 | End: 2017-08-22 | Stop reason: SDUPTHER

## 2017-08-22 DIAGNOSIS — E78.5 DYSLIPIDEMIA: ICD-10-CM

## 2017-08-22 DIAGNOSIS — E79.0 HYPERURICEMIA: ICD-10-CM

## 2017-08-22 DIAGNOSIS — IMO0001 IDDM (INSULIN DEPENDENT DIABETES MELLITUS): ICD-10-CM

## 2017-08-22 DIAGNOSIS — I10 ESSENTIAL HYPERTENSION: ICD-10-CM

## 2017-08-22 DIAGNOSIS — IMO0002 UNCONTROLLED TYPE 2 DIABETES MELLITUS WITH OTHER SPECIFIED COMPLICATION: ICD-10-CM

## 2017-08-22 DIAGNOSIS — R41.3 IMPAIRED MEMORY: ICD-10-CM

## 2017-08-22 DIAGNOSIS — E55.9 VITAMIN D DEFICIENCY: ICD-10-CM

## 2017-08-22 RX ORDER — INSULIN GLARGINE 300 U/ML
50 INJECTION, SOLUTION SUBCUTANEOUS DAILY
Qty: 15 PEN | Refills: 3 | Status: SHIPPED | OUTPATIENT
Start: 2017-08-22 | End: 2017-09-18 | Stop reason: SDUPTHER

## 2017-08-30 NOTE — PROGRESS NOTES
Subjective   Di Macario is a 77 y.o. female.   She is here today for Medicare annual wellness visit subsequent along with ID DM vitamin D deficiency hypertension dyslipidemia hyperuricemia impaired memory and slow transit constipation  History of Present Illness   She is here today for Medicare annual visit subsequent along with NIDDM vitamin D deficiency hypertension dyslipidemia hyperuricemia impaired memory and slow transit constipation  The following portions of the patient's history were reviewed and updated as appropriate: allergies, current medications, past family history, past medical history, past social history, past surgical history and problem list.    Review of Systems   Gastrointestinal: Positive for constipation.   Psychiatric/Behavioral: Positive for decreased concentration.   All other systems reviewed and are negative.      Objective   Physical Exam   Constitutional: She is oriented to person, place, and time. Vital signs are normal. She appears well-developed and well-nourished. She is active.   HENT:   Head: Normocephalic and atraumatic.   Right Ear: Hearing, tympanic membrane, external ear and ear canal normal.   Left Ear: Hearing, tympanic membrane, external ear and ear canal normal.   Nose: Nose normal.   Mouth/Throat: Uvula is midline, oropharynx is clear and moist and mucous membranes are normal.   Eyes: Conjunctivae, EOM and lids are normal. Pupils are equal, round, and reactive to light. Right eye exhibits no discharge. Left eye exhibits no discharge.   Neck: Trachea normal, normal range of motion, full passive range of motion without pain and phonation normal. Neck supple. Carotid bruit is not present. No edema present. No thyroid mass and no thyromegaly present.   Cardiovascular: Normal rate, regular rhythm, normal heart sounds, intact distal pulses and normal pulses.  Exam reveals no gallop and no friction rub.    No murmur heard.  Pulmonary/Chest: Effort normal and breath  sounds normal. No respiratory distress. She has no wheezes. She has no rales.   Abdominal: Soft. Normal appearance, normal aorta and bowel sounds are normal. She exhibits no distension, no abdominal bruit and no mass. There is no hepatosplenomegaly. There is no tenderness. There is no rebound, no guarding and no CVA tenderness. No hernia. Hernia confirmed negative in the right inguinal area and confirmed negative in the left inguinal area.   Musculoskeletal: Normal range of motion. She exhibits no edema or tenderness.    Di had a diabetic foot exam performed today.   During the foot exam she had a monofilament test performed (no neuropathy).    Vascular Status -  Her exam exhibits right foot vasculature normal. Her exam exhibits no right foot edema. Her exam exhibits left foot vasculature normal. Her exam exhibits no left foot edema.   Skin Integrity  -  Her right foot skin is intact.     Di 's left foot skin is intact. .  Lymphadenopathy:     She has no cervical adenopathy.     She has no axillary adenopathy.        Right: No inguinal and no supraclavicular adenopathy present.        Left: No inguinal and no supraclavicular adenopathy present.   Neurological: She is alert and oriented to person, place, and time. She has normal strength. No cranial nerve deficit or sensory deficit. She exhibits normal muscle tone. She displays a negative Romberg sign. Coordination normal.   Skin: Skin is warm, dry and intact. No cyanosis. Nails show no clubbing.   Psychiatric: She has a normal mood and affect. Her speech is normal and behavior is normal. Judgment and thought content normal. Cognition and memory are normal.   Nursing note and vitals reviewed.      Assessment/Plan   Diagnoses and all orders for this visit:    Medicare annual wellness visit, subsequent    IDDM (insulin dependent diabetes mellitus)  -     Discontinue: TOUJEO SOLOSTAR 300 UNIT/ML solution pen-injector; Inject 50 Units under the skin Daily. 30  units Q AM, every 3 days if FBS  greater than 120 add2 units up to 50 units    Vitamin D deficiency  -     Discontinue: TOUJEO SOLOSTAR 300 UNIT/ML solution pen-injector; Inject 50 Units under the skin Daily. 30 units Q AM, every 3 days if FBS  greater than 120 add2 units up to 50 units    Essential hypertension  -     Discontinue: TOUJEO SOLOSTAR 300 UNIT/ML solution pen-injector; Inject 50 Units under the skin Daily. 30 units Q AM, every 3 days if FBS  greater than 120 add2 units up to 50 units    Dyslipidemia  -     Discontinue: TOUJEO SOLOSTAR 300 UNIT/ML solution pen-injector; Inject 50 Units under the skin Daily. 30 units Q AM, every 3 days if FBS  greater than 120 add2 units up to 50 units    Hyperuricemia  -     Discontinue: TOUJEO SOLOSTAR 300 UNIT/ML solution pen-injector; Inject 50 Units under the skin Daily. 30 units Q AM, every 3 days if FBS  greater than 120 add2 units up to 50 units    Uncontrolled type 2 diabetes mellitus with other specified complication  -     Discontinue: TOUJEO SOLOSTAR 300 UNIT/ML solution pen-injector; Inject 50 Units under the skin Daily. 30 units Q AM, every 3 days if FBS  greater than 120 add2 units up to 50 units    Impaired memory  -     Discontinue: TOUJEO SOLOSTAR 300 UNIT/ML solution pen-injector; Inject 50 Units under the skin Daily. 30 units Q AM, every 3 days if FBS  greater than 120 add2 units up to 50 units    Slow transit constipation      Medicare annual wellness visit subsequent follow recommendations  IDD M follow hemoglobin A1c with yearly of multiple visits  Vitamin D deficiency supplementation as needed  Hypertension well-controlled on current medication  Hyperuricemia follow uric acid level  Impaired memory supportive meds for this as well  Slow transit constipation we will try Linzess

## 2017-09-13 RX ORDER — DONEPEZIL HYDROCHLORIDE 10 MG/1
TABLET, FILM COATED ORAL
Qty: 90 TABLET | Refills: 1 | Status: CANCELLED | OUTPATIENT
Start: 2017-09-13

## 2017-09-14 RX ORDER — LANCETS 28 GAUGE
EACH MISCELLANEOUS
Qty: 400 EACH | Refills: 2 | Status: SHIPPED | OUTPATIENT
Start: 2017-09-14 | End: 2018-11-21 | Stop reason: SDUPTHER

## 2017-09-14 RX ORDER — DONEPEZIL HYDROCHLORIDE 10 MG/1
10 TABLET, FILM COATED ORAL NIGHTLY
Qty: 90 TABLET | Refills: 1 | Status: SHIPPED | OUTPATIENT
Start: 2017-09-14 | End: 2018-03-09 | Stop reason: SDUPTHER

## 2017-09-18 DIAGNOSIS — E79.0 HYPERURICEMIA: ICD-10-CM

## 2017-09-18 DIAGNOSIS — E78.5 DYSLIPIDEMIA: ICD-10-CM

## 2017-09-18 DIAGNOSIS — E55.9 VITAMIN D DEFICIENCY: ICD-10-CM

## 2017-09-18 DIAGNOSIS — R41.3 IMPAIRED MEMORY: ICD-10-CM

## 2017-09-18 DIAGNOSIS — IMO0001 IDDM (INSULIN DEPENDENT DIABETES MELLITUS): ICD-10-CM

## 2017-09-18 DIAGNOSIS — I10 ESSENTIAL HYPERTENSION: ICD-10-CM

## 2017-09-18 DIAGNOSIS — IMO0002 UNCONTROLLED TYPE 2 DIABETES MELLITUS WITH OTHER SPECIFIED COMPLICATION: ICD-10-CM

## 2017-09-18 RX ORDER — INSULIN GLARGINE 300 U/ML
30 INJECTION, SOLUTION SUBCUTANEOUS EVERY MORNING
Qty: 15 PEN | Refills: 3 | Status: SHIPPED | OUTPATIENT
Start: 2017-09-18 | End: 2018-07-03 | Stop reason: SDUPTHER

## 2017-09-25 RX ORDER — BLOOD-GLUCOSE METER
KIT MISCELLANEOUS
Qty: 400 EACH | Refills: 0 | Status: SHIPPED | OUTPATIENT
Start: 2017-09-25 | End: 2018-06-11 | Stop reason: SDUPTHER

## 2017-10-11 DIAGNOSIS — E78.5 DYSLIPIDEMIA: ICD-10-CM

## 2017-10-11 DIAGNOSIS — E55.9 VITAMIN D DEFICIENCY: ICD-10-CM

## 2017-10-11 DIAGNOSIS — R41.3 IMPAIRED MEMORY: ICD-10-CM

## 2017-10-11 DIAGNOSIS — I10 ESSENTIAL HYPERTENSION: ICD-10-CM

## 2017-10-11 DIAGNOSIS — IMO0002 UNCONTROLLED TYPE 2 DIABETES MELLITUS: ICD-10-CM

## 2017-10-12 ENCOUNTER — TRANSCRIBE ORDERS (OUTPATIENT)
Dept: ADMINISTRATIVE | Facility: HOSPITAL | Age: 77
End: 2017-10-12

## 2017-10-12 DIAGNOSIS — Z12.31 VISIT FOR SCREENING MAMMOGRAM: Primary | ICD-10-CM

## 2017-10-12 RX ORDER — DILTIAZEM HYDROCHLORIDE 240 MG/1
CAPSULE, EXTENDED RELEASE ORAL
Qty: 90 CAPSULE | Refills: 1 | Status: SHIPPED | OUTPATIENT
Start: 2017-10-12 | End: 2018-06-08 | Stop reason: SDUPTHER

## 2017-11-03 ENCOUNTER — HOSPITAL ENCOUNTER (OUTPATIENT)
Dept: MAMMOGRAPHY | Facility: HOSPITAL | Age: 77
Discharge: HOME | End: 2017-11-03
Attending: INTERNAL MEDICINE | Admitting: INTERNAL MEDICINE

## 2017-11-03 DIAGNOSIS — Z12.31 VISIT FOR SCREENING MAMMOGRAM: ICD-10-CM

## 2017-11-03 PROCEDURE — 77063 BREAST TOMOSYNTHESIS BI: CPT

## 2017-11-03 PROCEDURE — G0202 SCR MAMMO BI INCL CAD: HCPCS

## 2017-11-09 ENCOUNTER — RESULTS ENCOUNTER (OUTPATIENT)
Dept: ENDOCRINOLOGY | Age: 77
End: 2017-11-09

## 2017-11-09 DIAGNOSIS — IMO0002 UNCONTROLLED TYPE 2 DIABETES MELLITUS WITH OTHER SPECIFIED COMPLICATION: ICD-10-CM

## 2017-11-09 DIAGNOSIS — I10 ESSENTIAL HYPERTENSION: ICD-10-CM

## 2017-11-09 DIAGNOSIS — E78.5 DYSLIPIDEMIA: ICD-10-CM

## 2017-11-09 DIAGNOSIS — E79.0 HYPERURICEMIA: ICD-10-CM

## 2017-11-09 DIAGNOSIS — IMO0001 IDDM (INSULIN DEPENDENT DIABETES MELLITUS): ICD-10-CM

## 2017-11-09 DIAGNOSIS — E55.9 VITAMIN D DEFICIENCY: ICD-10-CM

## 2017-11-09 DIAGNOSIS — R41.3 IMPAIRED MEMORY: ICD-10-CM

## 2017-11-10 DIAGNOSIS — I10 ESSENTIAL HYPERTENSION: ICD-10-CM

## 2017-11-10 DIAGNOSIS — R41.3 IMPAIRED MEMORY: ICD-10-CM

## 2017-11-10 DIAGNOSIS — E78.5 DYSLIPIDEMIA: ICD-10-CM

## 2017-11-10 DIAGNOSIS — E55.9 VITAMIN D DEFICIENCY: ICD-10-CM

## 2017-11-10 DIAGNOSIS — IMO0002 UNCONTROLLED TYPE 2 DIABETES MELLITUS: ICD-10-CM

## 2017-11-10 RX ORDER — HYDROCHLOROTHIAZIDE 12.5 MG/1
CAPSULE, GELATIN COATED ORAL
Qty: 90 CAPSULE | Refills: 2 | Status: SHIPPED | OUTPATIENT
Start: 2017-11-10 | End: 2018-10-16 | Stop reason: SDUPTHER

## 2017-12-06 DIAGNOSIS — IMO0001 IDDM (INSULIN DEPENDENT DIABETES MELLITUS): Primary | ICD-10-CM

## 2017-12-06 DIAGNOSIS — E55.9 VITAMIN D DEFICIENCY: ICD-10-CM

## 2017-12-07 ENCOUNTER — LAB (OUTPATIENT)
Dept: ENDOCRINOLOGY | Age: 77
End: 2017-12-07

## 2017-12-07 DIAGNOSIS — IMO0002 UNCONTROLLED TYPE 2 DIABETES MELLITUS WITH OTHER SPECIFIED COMPLICATION: ICD-10-CM

## 2017-12-07 DIAGNOSIS — IMO0001 IDDM (INSULIN DEPENDENT DIABETES MELLITUS): ICD-10-CM

## 2017-12-07 DIAGNOSIS — E78.5 DYSLIPIDEMIA: ICD-10-CM

## 2017-12-07 DIAGNOSIS — R41.3 IMPAIRED MEMORY: ICD-10-CM

## 2017-12-07 DIAGNOSIS — E79.0 HYPERURICEMIA: ICD-10-CM

## 2017-12-07 DIAGNOSIS — E55.9 VITAMIN D DEFICIENCY: ICD-10-CM

## 2017-12-07 DIAGNOSIS — I10 ESSENTIAL HYPERTENSION: ICD-10-CM

## 2017-12-08 LAB
25(OH)D3+25(OH)D2 SERPL-MCNC: 35.4 NG/ML (ref 30–100)
ALBUMIN SERPL-MCNC: 4.6 G/DL (ref 3.5–5.2)
ALBUMIN/GLOB SERPL: 1.5 G/DL
ALP SERPL-CCNC: 110 U/L (ref 39–117)
ALT SERPL-CCNC: 18 U/L (ref 1–33)
AST SERPL-CCNC: 17 U/L (ref 1–32)
BILIRUB SERPL-MCNC: 0.2 MG/DL (ref 0.1–1.2)
BUN SERPL-MCNC: 34 MG/DL (ref 8–23)
BUN/CREAT SERPL: 34.7 (ref 7–25)
C PEPTIDE SERPL-MCNC: 2 NG/ML (ref 1.1–4.4)
CALCIUM SERPL-MCNC: 10.2 MG/DL (ref 8.6–10.5)
CHLORIDE SERPL-SCNC: 99 MMOL/L (ref 98–107)
CHOLEST SERPL-MCNC: 186 MG/DL (ref 0–200)
CO2 SERPL-SCNC: 28.7 MMOL/L (ref 22–29)
CREAT SERPL-MCNC: 0.98 MG/DL (ref 0.57–1)
GFR SERPLBLD CREATININE-BSD FMLA CKD-EPI: 55 ML/MIN/1.73
GFR SERPLBLD CREATININE-BSD FMLA CKD-EPI: 67 ML/MIN/1.73
GLOBULIN SER CALC-MCNC: 3.1 GM/DL
GLUCOSE SERPL-MCNC: 228 MG/DL (ref 65–99)
HBA1C MFR BLD: 8.4 % (ref 4.8–5.6)
HDLC SERPL-MCNC: 79 MG/DL (ref 40–60)
INTERPRETATION: NORMAL
LDLC SERPL CALC-MCNC: 67 MG/DL (ref 0–100)
Lab: NORMAL
MICROALBUMIN UR-MCNC: 24.4 UG/ML
POTASSIUM SERPL-SCNC: 4.1 MMOL/L (ref 3.5–5.2)
PROT SERPL-MCNC: 7.7 G/DL (ref 6–8.5)
SODIUM SERPL-SCNC: 142 MMOL/L (ref 136–145)
TRIGL SERPL-MCNC: 199 MG/DL (ref 0–150)
URATE SERPL-MCNC: 7.7 MG/DL (ref 2.4–5.7)
VLDLC SERPL CALC-MCNC: 39.8 MG/DL (ref 5–40)

## 2017-12-21 ENCOUNTER — OFFICE VISIT (OUTPATIENT)
Dept: ENDOCRINOLOGY | Age: 77
End: 2017-12-21

## 2017-12-21 VITALS
SYSTOLIC BLOOD PRESSURE: 130 MMHG | DIASTOLIC BLOOD PRESSURE: 68 MMHG | BODY MASS INDEX: 33.75 KG/M2 | WEIGHT: 190.5 LBS | HEIGHT: 63 IN

## 2017-12-21 DIAGNOSIS — E78.5 DYSLIPIDEMIA: ICD-10-CM

## 2017-12-21 DIAGNOSIS — E79.0 HYPERURICEMIA: ICD-10-CM

## 2017-12-21 DIAGNOSIS — R41.3 IMPAIRED MEMORY: ICD-10-CM

## 2017-12-21 DIAGNOSIS — E55.9 VITAMIN D DEFICIENCY: ICD-10-CM

## 2017-12-21 DIAGNOSIS — IMO0002 UNCONTROLLED TYPE 2 DIABETES MELLITUS WITH COMPLICATION, WITH LONG-TERM CURRENT USE OF INSULIN: Primary | ICD-10-CM

## 2017-12-21 DIAGNOSIS — I10 ESSENTIAL HYPERTENSION: ICD-10-CM

## 2017-12-21 PROCEDURE — 99214 OFFICE O/P EST MOD 30 MIN: CPT | Performed by: NURSE PRACTITIONER

## 2017-12-21 RX ORDER — GLIMEPIRIDE 4 MG/1
8 TABLET ORAL
Qty: 60 TABLET | Refills: 5 | Status: SHIPPED | OUTPATIENT
Start: 2017-12-21 | End: 2019-03-05 | Stop reason: SDDI

## 2017-12-21 NOTE — PATIENT INSTRUCTIONS
Stop nateglinide prior to each meal. Instead we are going to do glimepiride 4 mg 2 pills once daily with first meal of the day  Don't skip meals or snacks  Continue allopurinol for high uric acid  Continue insulin  Continue to monitor blood sugars. If you continue in 2-300 range please call office with readings.

## 2017-12-21 NOTE — PROGRESS NOTES
"Subjective   Di Macario is a 77 y.o. female is here today for follow-up.  Chief Complaint   Patient presents with   • Diabetes     recent labs, pt test BG 3x daily, pt brought meter   • Hyperlipidemia   • Hypertension   • Vitamin D Deficiency   • hyperuricemia     /68  Ht 160 cm (63\")  Wt 86.4 kg (190 lb 8 oz)  BMI 33.75 kg/m2  Current Outpatient Prescriptions on File Prior to Visit   Medication Sig   • allopurinol (ZYLOPRIM) 300 MG tablet TAKE 1 TABLET DAILY   • aspirin 81 MG tablet Take  by mouth.   • benazepril (LOTENSIN) 20 MG tablet Take 1 tablet by mouth Daily.   • diltiaZEM (TIAZAC) 240 MG 24 hr capsule TAKE 1 CAPSULE DAILY   • donepezil (ARICEPT) 10 MG tablet Take 1 tablet by mouth Every Night.   • ergocalciferol (DRISDOL) 39603 UNITS capsule Take 1 capsule by mouth 1 (One) Time Per Week.   • FREESTYLE LITE test strip USE FOUR TIMES A DAY   • hydrochlorothiazide (MICROZIDE) 12.5 MG capsule TAKE 1 CAPSULE DAILY   • Insulin Pen Needle (BD PEN NEEDLE ALYSSA U/F) 32G X 4 MM misc Use once a day   • Insulin Syringe-Needle U-100 31G X 5/16\" 0.3 ML misc Use with insulin injection.   • Lancets (FREESTYLE) lancets USE FOUR TIMES A DAY   • pravastatin (PRAVACHOL) 40 MG tablet Take 1 tablet by mouth Every Evening.   • TOUJEO SOLOSTAR 300 UNIT/ML solution pen-injector Inject 30 Units under the skin Every Morning. and every 3 days if FBS is greater than 120 add 2 units up to a max daily dose of 50 units   • zonisamide (ZONEGRAN) 100 MG capsule Take  by mouth.   • [DISCONTINUED] nateglinide (STARLIX) 120 MG tablet Take 1 tablet by mouth 3 (Three) Times a Day Before Meals.   • [DISCONTINUED] diclofenac (VOLTAREN) 0.1 % ophthalmic solution    • [DISCONTINUED] ofloxacin (OCUFLOX) 0.3 % ophthalmic solution    • [DISCONTINUED] prednisoLONE acetate (PRED FORTE) 1 % ophthalmic suspension      No current facility-administered medications on file prior to visit.      History reviewed. No pertinent family " history.  Social History   Substance Use Topics   • Smoking status: Former Smoker   • Smokeless tobacco: None   • Alcohol use No     No Known Allergies      History of Present Illness   Chief Complaint   Patient presents with   • Diabetes     recent labs, pt test BG 3x daily, pt brought meter   • Hyperlipidemia   • Hypertension   • Vitamin D Deficiency   • hyperuricemia     77-year-old female patient here today for routine follow-up visit for uncontrolled type 2 diabetes.  She brought her blood glucose meter which was reviewed.  She has wide blood glucose ranges from .  She states her blood sugars in the morning tend to be better in the mornings.  She is taking her insulin consistently.  She is not taking her Starlix consistently.  She states she is having difficulty remembering it.  Her meter download was reviewed and does not reflect the correct time.  Her meter was changed to reflect the current date and time.  She is a poor historian regarding her medication and she does have some memory issues.  She states she took a flu shot in September.  We discussed getting her diabetes under better control and states she cannot remember to take her Starlix regularly am going to start her on glimepiride prior to her first meal of each day.  She has been cautioned to monitor her blood sugars and not skip meals or snacks.  She does not want to change allopurinol for her elevated uric acid.  She states she is on automatic medication refill the pharmacy has sent her too many medications and she has got a surplus now of some of her medications.      The following portions of the patient's history were reviewed and updated as appropriate: allergies, current medications, past family history, past medical history, past social history, past surgical history and problem list.    Review of Systems   Constitutional: Negative for fatigue.   HENT: Negative for trouble swallowing.    Eyes: Negative for visual disturbance.    Respiratory: Negative for shortness of breath.    Cardiovascular: Negative for leg swelling.   Endocrine: Negative for polyuria.   Skin: Negative for wound.   Neurological: Negative for numbness.       Objective   Physical Exam   Constitutional: She is oriented to person, place, and time. She appears well-developed and well-nourished. No distress.   HENT:   Head: Normocephalic and atraumatic.   Left Ear: External ear normal.   Nose: Nose normal.   Mouth/Throat: Oropharynx is clear and moist. No oropharyngeal exudate.   Eyes: Conjunctivae and EOM are normal. Pupils are equal, round, and reactive to light. Right eye exhibits no discharge. Left eye exhibits no discharge. No scleral icterus.   Neck: Normal range of motion. Neck supple. No JVD present. No tracheal deviation present. No thyromegaly present.   Cardiovascular: Normal rate, regular rhythm, normal heart sounds and intact distal pulses.  Exam reveals no gallop and no friction rub.    No murmur heard.  Pulmonary/Chest: Effort normal and breath sounds normal. No stridor. No respiratory distress. She has no wheezes. She has no rales. She exhibits no tenderness.   Abdominal: Soft. Bowel sounds are normal. She exhibits no distension and no mass. There is no tenderness. There is no rebound and no guarding. No hernia.   Musculoskeletal: Normal range of motion. She exhibits edema. She exhibits no tenderness or deformity.   1 + edema in lower ext.  Skin dry   Lymphadenopathy:     She has no cervical adenopathy.   Neurological: She is alert and oriented to person, place, and time. She has normal reflexes. She displays normal reflexes. No cranial nerve deficit. She exhibits normal muscle tone. Coordination normal.   Skin: Skin is warm and dry. No rash noted. She is not diaphoretic. No erythema. No pallor.   Psychiatric: She has a normal mood and affect. Her behavior is normal. Judgment and thought content normal.   Nursing note and vitals reviewed.      Results for  orders placed or performed in visit on 12/07/17   Comprehensive Metabolic Panel   Result Value Ref Range    Glucose 228 (H) 65 - 99 mg/dL    BUN 34 (H) 8 - 23 mg/dL    Creatinine 0.98 0.57 - 1.00 mg/dL    eGFR Non African Am 55 (L) >60 mL/min/1.73    eGFR African Am 67 >60 mL/min/1.73    BUN/Creatinine Ratio 34.7 (H) 7.0 - 25.0    Sodium 142 136 - 145 mmol/L    Potassium 4.1 3.5 - 5.2 mmol/L    Chloride 99 98 - 107 mmol/L    Total CO2 28.7 22.0 - 29.0 mmol/L    Calcium 10.2 8.6 - 10.5 mg/dL    Total Protein 7.7 6.0 - 8.5 g/dL    Albumin 4.60 3.50 - 5.20 g/dL    Globulin 3.1 gm/dL    A/G Ratio 1.5 g/dL    Total Bilirubin 0.2 0.1 - 1.2 mg/dL    Alkaline Phosphatase 110 39 - 117 U/L    AST (SGOT) 17 1 - 32 U/L    ALT (SGPT) 18 1 - 33 U/L   C-Peptide   Result Value Ref Range    C-Peptide 2.0 1.1 - 4.4 ng/mL   Hemoglobin A1c   Result Value Ref Range    Hemoglobin A1C 8.40 (H) 4.80 - 5.60 %   Lipid Panel   Result Value Ref Range    Total Cholesterol 186 0 - 200 mg/dL    Triglycerides 199 (H) 0 - 150 mg/dL    HDL Cholesterol 79 (H) 40 - 60 mg/dL    VLDL Cholesterol 39.8 5 - 40 mg/dL    LDL Cholesterol  67 0 - 100 mg/dL   Uric Acid   Result Value Ref Range    Uric Acid 7.7 (H) 2.4 - 5.7 mg/dL   Vitamin D 25 Hydroxy   Result Value Ref Range    25 Hydroxy, Vitamin D 35.4 30.0 - 100.0 ng/mL   MicroAlbumin, Urine, Random   Result Value Ref Range    Microalbumin, Urine 24.4 Not Estab. ug/mL   Cardiovascular Risk Assessment   Result Value Ref Range    Interpretation Note    Diabetes Patient Education   Result Value Ref Range    PDF Image Not applicable        Assessment/Plan   Problems Addressed this Visit        Cardiovascular and Mediastinum    Essential hypertension       Digestive    Vitamin D deficiency       Other    Dyslipidemia    Impaired memory    Hyperuricemia      Other Visit Diagnoses     Uncontrolled type 2 diabetes mellitus with complication, with long-term current use of insulin    -  Primary    Relevant  Medications    glimepiride (AMARYL) 4 MG tablet        In summary, patient was seen and examined.  Her uric acid is uncontrolled.  Her hemoglobin A1c reflects poor diabetes control.  She is at risk for hypoglycemia due to her mental deficit.  She is not remembering to take her nateglinide prior to each meal.  I am going to try her on glimepiride to see if this will help get her blood sugars under better control.  She has been cautioned to monitor her blood sugars closely and to contact the office for blood sugars continue to stay too high.  She is up-to-date with her flu vaccination.  She has had no significant hypoglycemic event.  She does not want to change her allopurinol to get her uric acid under control.  Stop nateglinide prior to each meal. Instead we are going to do glimepiride 4 mg 2 pills once daily with first meal of the day  Don't skip meals or snacks  Continue allopurinol for high uric acid  Continue insulin  Continue to monitor blood sugars. If you continue in 2-300 range please call office with readings.       Current Outpatient Prescriptions:   •  allopurinol (ZYLOPRIM) 300 MG tablet, TAKE 1 TABLET DAILY, Disp: 90 tablet, Rfl: 2  •  aspirin 81 MG tablet, Take  by mouth., Disp: , Rfl:   •  benazepril (LOTENSIN) 20 MG tablet, Take 1 tablet by mouth Daily., Disp: 90 tablet, Rfl: 3  •  diltiaZEM (TIAZAC) 240 MG 24 hr capsule, TAKE 1 CAPSULE DAILY, Disp: 90 capsule, Rfl: 1  •  donepezil (ARICEPT) 10 MG tablet, Take 1 tablet by mouth Every Night., Disp: 90 tablet, Rfl: 1  •  ergocalciferol (DRISDOL) 77912 UNITS capsule, Take 1 capsule by mouth 1 (One) Time Per Week., Disp: 13 capsule, Rfl: 2  •  FREESTYLE LITE test strip, USE FOUR TIMES A DAY, Disp: 400 each, Rfl: 0  •  glimepiride (AMARYL) 4 MG tablet, Take 2 tablets by mouth Every Morning Before Breakfast., Disp: 60 tablet, Rfl: 5  •  hydrochlorothiazide (MICROZIDE) 12.5 MG capsule, TAKE 1 CAPSULE DAILY, Disp: 90 capsule, Rfl: 2  •  Insulin Pen Needle  "(BD PEN NEEDLE ALYSSA U/F) 32G X 4 MM misc, Use once a day, Disp: 100 each, Rfl: 1  •  Insulin Syringe-Needle U-100 31G X 5/16\" 0.3 ML misc, Use with insulin injection., Disp: 100 each, Rfl: 5  •  Lancets (FREESTYLE) lancets, USE FOUR TIMES A DAY, Disp: 400 each, Rfl: 2  •  Mirabegron ER (MYRBETRIQ) 25 MG tablet sustained-release 24 hour 24 hr tablet, Take 25 mg by mouth Daily., Disp: , Rfl:   •  pravastatin (PRAVACHOL) 40 MG tablet, Take 1 tablet by mouth Every Evening., Disp: 90 tablet, Rfl: 3  •  TOUJEO SOLOSTAR 300 UNIT/ML solution pen-injector, Inject 30 Units under the skin Every Morning. and every 3 days if FBS is greater than 120 add 2 units up to a max daily dose of 50 units, Disp: 15 pen, Rfl: 3  •  zonisamide (ZONEGRAN) 100 MG capsule, Take  by mouth., Disp: , Rfl:          "

## 2018-02-01 ENCOUNTER — OFFICE VISIT (OUTPATIENT)
Dept: INTERNAL MEDICINE | Facility: CLINIC | Age: 78
End: 2018-02-01

## 2018-02-01 VITALS
OXYGEN SATURATION: 94 % | WEIGHT: 192.8 LBS | SYSTOLIC BLOOD PRESSURE: 163 MMHG | BODY MASS INDEX: 34.16 KG/M2 | HEIGHT: 63 IN | TEMPERATURE: 97.8 F | HEART RATE: 77 BPM | DIASTOLIC BLOOD PRESSURE: 75 MMHG

## 2018-02-01 DIAGNOSIS — I10 ESSENTIAL HYPERTENSION: ICD-10-CM

## 2018-02-01 DIAGNOSIS — E78.5 DYSLIPIDEMIA: ICD-10-CM

## 2018-02-01 DIAGNOSIS — IMO0001 IDDM (INSULIN DEPENDENT DIABETES MELLITUS): Primary | ICD-10-CM

## 2018-02-01 DIAGNOSIS — E55.9 VITAMIN D DEFICIENCY: ICD-10-CM

## 2018-02-01 PROCEDURE — G0009 ADMIN PNEUMOCOCCAL VACCINE: HCPCS | Performed by: INTERNAL MEDICINE

## 2018-02-01 PROCEDURE — 99213 OFFICE O/P EST LOW 20 MIN: CPT | Performed by: INTERNAL MEDICINE

## 2018-02-01 PROCEDURE — 90732 PPSV23 VACC 2 YRS+ SUBQ/IM: CPT | Performed by: INTERNAL MEDICINE

## 2018-02-01 RX ORDER — ESTRADIOL 0.1 MG/G
CREAM VAGINAL
COMMUNITY
Start: 2018-01-03 | End: 2020-10-29

## 2018-02-02 LAB
ALBUMIN SERPL-MCNC: 4.3 G/DL (ref 3.5–5.2)
ALBUMIN/GLOB SERPL: 1.6 G/DL
ALP SERPL-CCNC: 107 U/L (ref 39–117)
ALT SERPL-CCNC: 14 U/L (ref 1–33)
APPEARANCE UR: CLEAR
AST SERPL-CCNC: 16 U/L (ref 1–32)
BACTERIA #/AREA URNS HPF: ABNORMAL /HPF
BASOPHILS # BLD AUTO: 0.04 10*3/MM3 (ref 0–0.2)
BASOPHILS NFR BLD AUTO: 0.5 % (ref 0–1.5)
BILIRUB SERPL-MCNC: <0.2 MG/DL (ref 0.1–1.2)
BILIRUB UR QL STRIP: NEGATIVE
BUN SERPL-MCNC: 25 MG/DL (ref 8–23)
BUN/CREAT SERPL: 28.7 (ref 7–25)
CALCIUM SERPL-MCNC: 10.2 MG/DL (ref 8.6–10.5)
CASTS URNS MICRO: ABNORMAL
CHLORIDE SERPL-SCNC: 99 MMOL/L (ref 98–107)
CO2 SERPL-SCNC: 30.6 MMOL/L (ref 22–29)
COLOR UR: YELLOW
CREAT SERPL-MCNC: 0.87 MG/DL (ref 0.57–1)
EOSINOPHIL # BLD AUTO: 0.1 10*3/MM3 (ref 0–0.7)
EOSINOPHIL NFR BLD AUTO: 1.2 % (ref 0.3–6.2)
EPI CELLS #/AREA URNS HPF: ABNORMAL /HPF
ERYTHROCYTE [DISTWIDTH] IN BLOOD BY AUTOMATED COUNT: 13.6 % (ref 11.7–13)
GFR SERPLBLD CREATININE-BSD FMLA CKD-EPI: 63 ML/MIN/1.73
GFR SERPLBLD CREATININE-BSD FMLA CKD-EPI: 76 ML/MIN/1.73
GLOBULIN SER CALC-MCNC: 2.7 GM/DL
GLUCOSE SERPL-MCNC: 261 MG/DL (ref 65–99)
GLUCOSE UR QL: (no result)
HBA1C MFR BLD: 8.3 % (ref 4.8–5.6)
HCT VFR BLD AUTO: 37 % (ref 35.6–45.5)
HGB BLD-MCNC: 11.5 G/DL (ref 11.9–15.5)
HGB UR QL STRIP: NEGATIVE
IMM GRANULOCYTES # BLD: 0.02 10*3/MM3 (ref 0–0.03)
IMM GRANULOCYTES NFR BLD: 0.2 % (ref 0–0.5)
KETONES UR QL STRIP: NEGATIVE
LEUKOCYTE ESTERASE UR QL STRIP: (no result)
LYMPHOCYTES # BLD AUTO: 1.78 10*3/MM3 (ref 0.9–4.8)
LYMPHOCYTES NFR BLD AUTO: 21.2 % (ref 19.6–45.3)
MCH RBC QN AUTO: 28.7 PG (ref 26.9–32)
MCHC RBC AUTO-ENTMCNC: 31.1 G/DL (ref 32.4–36.3)
MCV RBC AUTO: 92.3 FL (ref 80.5–98.2)
MICROALBUMIN UR-MCNC: 7.6 UG/ML
MONOCYTES # BLD AUTO: 0.54 10*3/MM3 (ref 0.2–1.2)
MONOCYTES NFR BLD AUTO: 6.4 % (ref 5–12)
NEUTROPHILS # BLD AUTO: 5.93 10*3/MM3 (ref 1.9–8.1)
NEUTROPHILS NFR BLD AUTO: 70.5 % (ref 42.7–76)
NITRITE UR QL STRIP: NEGATIVE
PH UR STRIP: 6.5 [PH] (ref 5–8)
PLATELET # BLD AUTO: 266 10*3/MM3 (ref 140–500)
POTASSIUM SERPL-SCNC: 4.3 MMOL/L (ref 3.5–5.2)
PROT SERPL-MCNC: 7 G/DL (ref 6–8.5)
PROT UR QL STRIP: NEGATIVE
RBC # BLD AUTO: 4.01 10*6/MM3 (ref 3.9–5.2)
RBC #/AREA URNS HPF: ABNORMAL /HPF
SODIUM SERPL-SCNC: 140 MMOL/L (ref 136–145)
SP GR UR: 1.02 (ref 1–1.03)
T4 FREE SERPL-MCNC: 1.08 NG/DL (ref 0.93–1.7)
TSH SERPL DL<=0.005 MIU/L-ACNC: 2.02 MIU/ML (ref 0.27–4.2)
UROBILINOGEN UR STRIP-MCNC: (no result) MG/DL
WBC # BLD AUTO: 8.41 10*3/MM3 (ref 4.5–10.7)
WBC #/AREA URNS HPF: ABNORMAL /HPF

## 2018-02-14 NOTE — PROGRESS NOTES
Subjective   Di Macario is a 77 y.o. female.   She is here today for regular follow-up for ID DM along with hypertension vitamin D deficiency dyslipidemia and she has no new complaints and everything is stable  History of Present Illness   She is here to follow-up for ID DM which is been stable on current medication along with hypertension which is stable on current medication normally and vitamin D deficiency which has been stable on supplementation as noted and dyslipidemia which is been well-controlled and tolerated on pravastatin and she has no new complaints at this time  The following portions of the patient's history were reviewed and updated as appropriate: allergies, current medications, past family history, past medical history, past social history, past surgical history and problem list.    Review of Systems   All other systems reviewed and are negative.      Objective   Physical Exam   Constitutional: She is oriented to person, place, and time. She appears well-developed and well-nourished. She is cooperative.   HENT:   Head: Normocephalic and atraumatic.   Right Ear: Hearing, tympanic membrane, external ear and ear canal normal.   Left Ear: Hearing, tympanic membrane, external ear and ear canal normal.   Nose: Nose normal.   Mouth/Throat: Uvula is midline, oropharynx is clear and moist and mucous membranes are normal.   Eyes: Conjunctivae, EOM and lids are normal. Pupils are equal, round, and reactive to light.   Neck: Phonation normal. Neck supple. Carotid bruit is not present.   Cardiovascular: Normal rate, regular rhythm and normal heart sounds.  Exam reveals no gallop and no friction rub.    No murmur heard.  Pulmonary/Chest: Effort normal and breath sounds normal. No respiratory distress.   Abdominal: Soft. Bowel sounds are normal. She exhibits no distension and no mass. There is no hepatosplenomegaly. There is no tenderness. There is no rebound and no guarding. No hernia.    Musculoskeletal: She exhibits no edema.   Neurological: She is alert and oriented to person, place, and time. Coordination and gait normal.   Skin: Skin is warm and dry.   Psychiatric: She has a normal mood and affect. Her speech is normal and behavior is normal. Judgment and thought content normal.   Nursing note and vitals reviewed.      Assessment/Plan   Diagnoses and all orders for this visit:    IDDM (insulin dependent diabetes mellitus)  -     Hemoglobin A1c  -     CBC & Differential  -     Comprehensive Metabolic Panel  -     T4, Free  -     TSH  -     Urinalysis With Microscopic - Urine, Clean Catch  -     MicroAlbumin, Urine, Random - Urine, Clean Catch    Essential hypertension  -     Hemoglobin A1c  -     CBC & Differential  -     Comprehensive Metabolic Panel  -     T4, Free  -     TSH  -     Urinalysis With Microscopic - Urine, Clean Catch  -     MicroAlbumin, Urine, Random - Urine, Clean Catch    Vitamin D deficiency  -     Hemoglobin A1c  -     CBC & Differential  -     Comprehensive Metabolic Panel  -     T4, Free  -     TSH  -     Urinalysis With Microscopic - Urine, Clean Catch  -     MicroAlbumin, Urine, Random - Urine, Clean Catch    Dyslipidemia  -     Hemoglobin A1c  -     CBC & Differential  -     Comprehensive Metabolic Panel  -     T4, Free  -     TSH  -     Urinalysis With Microscopic - Urine, Clean Catch  -     MicroAlbumin, Urine, Random - Urine, Clean Catch    Other orders  -     Pneumococcal Polysaccharide Vaccine 23-Valent Greater Than or Equal To 1yo Subcutaneous / IM  -     Microscopic Examination      Dyslipidemia stable on pravastatin at this time with no changes needed  Vitamin D deficiency actually not a problem at this time and she was barely above normal at this time so we will not count this today  Hypertension well-controlled on current medication no changes needed  Insulin-dependent diabetes has been stable on current medication we will checking the A1c with microalbumin  and regular ophthalmology visits and follow from there

## 2018-03-12 RX ORDER — DONEPEZIL HYDROCHLORIDE 10 MG/1
TABLET, FILM COATED ORAL
Qty: 90 TABLET | Refills: 1 | Status: SHIPPED | OUTPATIENT
Start: 2018-03-12 | End: 2018-10-16 | Stop reason: SDUPTHER

## 2018-06-06 DIAGNOSIS — IMO0001 IDDM (INSULIN DEPENDENT DIABETES MELLITUS): ICD-10-CM

## 2018-06-06 DIAGNOSIS — E79.0 HYPERURICEMIA: ICD-10-CM

## 2018-06-06 DIAGNOSIS — E78.5 DYSLIPIDEMIA: ICD-10-CM

## 2018-06-06 DIAGNOSIS — E55.9 VITAMIN D DEFICIENCY: Primary | ICD-10-CM

## 2018-06-08 DIAGNOSIS — IMO0002 UNCONTROLLED TYPE 2 DIABETES MELLITUS: ICD-10-CM

## 2018-06-08 DIAGNOSIS — R41.3 IMPAIRED MEMORY: ICD-10-CM

## 2018-06-08 DIAGNOSIS — E55.9 VITAMIN D DEFICIENCY: ICD-10-CM

## 2018-06-08 DIAGNOSIS — E78.5 DYSLIPIDEMIA: ICD-10-CM

## 2018-06-08 DIAGNOSIS — I10 ESSENTIAL HYPERTENSION: ICD-10-CM

## 2018-06-11 RX ORDER — DILTIAZEM HYDROCHLORIDE 240 MG/1
CAPSULE, EXTENDED RELEASE ORAL
Qty: 90 CAPSULE | Refills: 1 | Status: SHIPPED | OUTPATIENT
Start: 2018-06-11 | End: 2018-12-17 | Stop reason: SDUPTHER

## 2018-06-19 ENCOUNTER — LAB (OUTPATIENT)
Dept: ENDOCRINOLOGY | Age: 78
End: 2018-06-19

## 2018-06-19 DIAGNOSIS — E78.5 DYSLIPIDEMIA: ICD-10-CM

## 2018-06-19 DIAGNOSIS — E55.9 VITAMIN D DEFICIENCY: ICD-10-CM

## 2018-06-19 DIAGNOSIS — IMO0001 IDDM (INSULIN DEPENDENT DIABETES MELLITUS): ICD-10-CM

## 2018-06-19 DIAGNOSIS — E79.0 HYPERURICEMIA: ICD-10-CM

## 2018-06-20 LAB
25(OH)D3+25(OH)D2 SERPL-MCNC: 26.8 NG/ML (ref 30–100)
ALBUMIN SERPL-MCNC: 4.1 G/DL (ref 3.5–5.2)
ALBUMIN/GLOB SERPL: 1.4 G/DL
ALP SERPL-CCNC: 110 U/L (ref 39–117)
ALT SERPL-CCNC: 14 U/L (ref 1–33)
AST SERPL-CCNC: 14 U/L (ref 1–32)
BILIRUB SERPL-MCNC: <0.2 MG/DL (ref 0.1–1.2)
BUN SERPL-MCNC: 24 MG/DL (ref 8–23)
BUN/CREAT SERPL: 26.1 (ref 7–25)
C PEPTIDE SERPL-MCNC: 2.9 NG/ML (ref 1.1–4.4)
CALCIUM SERPL-MCNC: 10.1 MG/DL (ref 8.6–10.5)
CHLORIDE SERPL-SCNC: 98 MMOL/L (ref 98–107)
CHOLEST SERPL-MCNC: 164 MG/DL (ref 0–200)
CO2 SERPL-SCNC: 29 MMOL/L (ref 22–29)
CREAT SERPL-MCNC: 0.92 MG/DL (ref 0.57–1)
GFR SERPLBLD CREATININE-BSD FMLA CKD-EPI: 59 ML/MIN/1.73
GFR SERPLBLD CREATININE-BSD FMLA CKD-EPI: 71 ML/MIN/1.73
GLOBULIN SER CALC-MCNC: 2.9 GM/DL
GLUCOSE SERPL-MCNC: 308 MG/DL (ref 65–99)
HBA1C MFR BLD: 9 % (ref 4.8–5.6)
HDLC SERPL-MCNC: 63 MG/DL (ref 40–60)
INTERPRETATION: NORMAL
LDLC SERPL CALC-MCNC: 36 MG/DL (ref 0–100)
Lab: NORMAL
MICROALBUMIN UR-MCNC: 52.2 UG/ML
POTASSIUM SERPL-SCNC: 4.3 MMOL/L (ref 3.5–5.2)
PROT SERPL-MCNC: 7 G/DL (ref 6–8.5)
SODIUM SERPL-SCNC: 140 MMOL/L (ref 136–145)
T4 SERPL-MCNC: 6.69 MCG/DL (ref 4.5–11.7)
TRIGL SERPL-MCNC: 324 MG/DL (ref 0–150)
TSH SERPL DL<=0.005 MIU/L-ACNC: 2.54 MIU/ML (ref 0.27–4.2)
URATE SERPL-MCNC: 6.6 MG/DL (ref 2.4–5.7)
VLDLC SERPL CALC-MCNC: 64.8 MG/DL (ref 5–40)

## 2018-07-03 ENCOUNTER — OFFICE VISIT (OUTPATIENT)
Dept: ENDOCRINOLOGY | Age: 78
End: 2018-07-03

## 2018-07-03 VITALS
RESPIRATION RATE: 16 BRPM | BODY MASS INDEX: 33.59 KG/M2 | SYSTOLIC BLOOD PRESSURE: 126 MMHG | WEIGHT: 189.6 LBS | DIASTOLIC BLOOD PRESSURE: 82 MMHG

## 2018-07-03 DIAGNOSIS — IMO0001 IDDM (INSULIN DEPENDENT DIABETES MELLITUS): Primary | ICD-10-CM

## 2018-07-03 DIAGNOSIS — R41.3 IMPAIRED MEMORY: ICD-10-CM

## 2018-07-03 DIAGNOSIS — I10 ESSENTIAL HYPERTENSION: ICD-10-CM

## 2018-07-03 DIAGNOSIS — E79.0 HYPERURICEMIA: ICD-10-CM

## 2018-07-03 DIAGNOSIS — E78.5 DYSLIPIDEMIA: ICD-10-CM

## 2018-07-03 DIAGNOSIS — E55.9 VITAMIN D DEFICIENCY: ICD-10-CM

## 2018-07-03 PROCEDURE — 99214 OFFICE O/P EST MOD 30 MIN: CPT | Performed by: INTERNAL MEDICINE

## 2018-07-03 RX ORDER — INSULIN GLARGINE 300 U/ML
55 INJECTION, SOLUTION SUBCUTANEOUS EVERY MORNING
Qty: 15 PEN | Refills: 3
Start: 2018-07-03 | End: 2019-03-05

## 2018-07-03 RX ORDER — LINAGLIPTIN 5 MG/1
5 TABLET, FILM COATED ORAL DAILY
Qty: 90 TABLET | Refills: 3 | Status: SHIPPED | OUTPATIENT
Start: 2018-07-03 | End: 2019-03-05

## 2018-07-03 NOTE — PROGRESS NOTES
"Subjective   Di Macario is a 78 y.o. female seen for follow up for DM2, HTN, vit d deficiency, lab review. Patient is checking BG 3-4 times a day. No BG readings. She denies hypoglycemic episodes. She would like a diabetic foot exam due to foot pain and bunions. She denies any problems or concerns.    History of Present Illness this is a 78-year-old female known patient with type II diabetes hypertension and dyslipidemia as well as vitamin D deficiency and hyperuricemia.  Over the course of last 6 months she has had no significant health problems for which to go to the emergency room or hospital.    /82   Resp 16   Wt 86 kg (189 lb 9.6 oz)   BMI 33.59 kg/m²      No Known Allergies    Current Outpatient Prescriptions:   •  allopurinol (ZYLOPRIM) 300 MG tablet, TAKE 1 TABLET DAILY, Disp: 90 tablet, Rfl: 2  •  aspirin 81 MG tablet, Take  by mouth., Disp: , Rfl:   •  benazepril (LOTENSIN) 20 MG tablet, Take 1 tablet by mouth Daily., Disp: 90 tablet, Rfl: 3  •  diltiaZEM (TIAZAC) 240 MG 24 hr capsule, TAKE 1 CAPSULE DAILY, Disp: 90 capsule, Rfl: 1  •  donepezil (ARICEPT) 10 MG tablet, TAKE 1 TABLET EVERY NIGHT, Disp: 90 tablet, Rfl: 1  •  ESTRACE VAGINAL 0.1 MG/GM vaginal cream, , Disp: , Rfl:   •  glimepiride (AMARYL) 4 MG tablet, Take 2 tablets by mouth Every Morning Before Breakfast., Disp: 60 tablet, Rfl: 5  •  glucose blood (FREESTYLE LITE) test strip, Use to test BG 4x daily, Disp: 400 each, Rfl: 0  •  hydrochlorothiazide (MICROZIDE) 12.5 MG capsule, TAKE 1 CAPSULE DAILY, Disp: 90 capsule, Rfl: 2  •  Insulin Pen Needle (BD PEN NEEDLE ALYSSA U/F) 32G X 4 MM misc, Use once a day, Disp: 100 each, Rfl: 1  •  Insulin Syringe-Needle U-100 31G X 5/16\" 0.3 ML misc, Use with insulin injection., Disp: 100 each, Rfl: 5  •  Lancets (FREESTYLE) lancets, USE FOUR TIMES A DAY, Disp: 400 each, Rfl: 2  •  Mirabegron ER (MYRBETRIQ) 25 MG tablet sustained-release 24 hour 24 hr tablet, Take 25 mg by mouth Daily., Disp: " , Rfl:   •  pravastatin (PRAVACHOL) 40 MG tablet, Take 1 tablet by mouth Every Evening., Disp: 90 tablet, Rfl: 3  •  TOUJEO SOLOSTAR 300 UNIT/ML solution pen-injector, Inject 30 Units under the skin Every Morning. and every 3 days if FBS is greater than 120 add 2 units up to a max daily dose of 50 units, Disp: 15 pen, Rfl: 3  •  zonisamide (ZONEGRAN) 100 MG capsule, Take  by mouth., Disp: , Rfl:       The following portions of the patient's history were reviewed and updated as appropriate: allergies, current medications, past family history, past medical history, past social history, past surgical history and problem list.    Review of Systems   Constitutional: Negative.    HENT: Negative.    Eyes: Negative.    Respiratory: Negative.    Cardiovascular: Negative.    Gastrointestinal: Negative.    Endocrine: Negative.    Genitourinary: Negative.    Musculoskeletal: Negative.    Skin: Negative.    Allergic/Immunologic: Negative.    Neurological: Negative.    Hematological: Negative.    Psychiatric/Behavioral: Negative.        Objective   Physical Exam   Constitutional: She is oriented to person, place, and time. She appears well-developed and well-nourished. No distress.   HENT:   Head: Normocephalic and atraumatic.   Left Ear: External ear normal.   Nose: Nose normal.   Mouth/Throat: Oropharynx is clear and moist. No oropharyngeal exudate.   Eyes: Conjunctivae and EOM are normal. Pupils are equal, round, and reactive to light. Right eye exhibits no discharge. Left eye exhibits no discharge. No scleral icterus.   Neck: Normal range of motion. Neck supple. No JVD present. No tracheal deviation present. No thyromegaly present.   Cardiovascular: Normal rate, regular rhythm, normal heart sounds and intact distal pulses.  Exam reveals no gallop and no friction rub.    No murmur heard.  Pulmonary/Chest: Effort normal and breath sounds normal. No stridor. No respiratory distress. She has no wheezes. She has no rales. She  exhibits no tenderness.   Abdominal: Soft. Bowel sounds are normal. She exhibits no distension and no mass. There is no tenderness. There is no rebound and no guarding. No hernia.   Musculoskeletal: Normal range of motion. She exhibits no edema, tenderness or deformity.   Lymphadenopathy:     She has no cervical adenopathy.   Neurological: She is alert and oriented to person, place, and time. She has normal reflexes. She displays normal reflexes. No cranial nerve deficit or sensory deficit. She exhibits normal muscle tone. Coordination normal.   Skin: Skin is warm and dry. No rash noted. She is not diaphoretic. No erythema. No pallor.   Psychiatric: She has a normal mood and affect. Her behavior is normal. Judgment and thought content normal.   Nursing note and vitals reviewed.       Results for orders placed or performed in visit on 06/19/18   Comprehensive Metabolic Panel   Result Value Ref Range    Glucose 308 (H) 65 - 99 mg/dL    BUN 24 (H) 8 - 23 mg/dL    Creatinine 0.92 0.57 - 1.00 mg/dL    eGFR Non African Am 59 (L) >60 mL/min/1.73    eGFR African Am 71 >60 mL/min/1.73    BUN/Creatinine Ratio 26.1 (H) 7.0 - 25.0    Sodium 140 136 - 145 mmol/L    Potassium 4.3 3.5 - 5.2 mmol/L    Chloride 98 98 - 107 mmol/L    Total CO2 29.0 22.0 - 29.0 mmol/L    Calcium 10.1 8.6 - 10.5 mg/dL    Total Protein 7.0 6.0 - 8.5 g/dL    Albumin 4.10 3.50 - 5.20 g/dL    Globulin 2.9 gm/dL    A/G Ratio 1.4 g/dL    Total Bilirubin <0.2 0.1 - 1.2 mg/dL    Alkaline Phosphatase 110 39 - 117 U/L    AST (SGOT) 14 1 - 32 U/L    ALT (SGPT) 14 1 - 33 U/L   C-Peptide   Result Value Ref Range    C-Peptide 2.9 1.1 - 4.4 ng/mL   Hemoglobin A1c   Result Value Ref Range    Hemoglobin A1C 9.00 (H) 4.80 - 5.60 %   Lipid Panel   Result Value Ref Range    Total Cholesterol 164 0 - 200 mg/dL    Triglycerides 324 (H) 0 - 150 mg/dL    HDL Cholesterol 63 (H) 40 - 60 mg/dL    VLDL Cholesterol 64.8 (H) 5 - 40 mg/dL    LDL Cholesterol  36 0 - 100 mg/dL   Uric  Acid   Result Value Ref Range    Uric Acid 6.6 (H) 2.4 - 5.7 mg/dL   Vitamin D 25 Hydroxy   Result Value Ref Range    25 Hydroxy, Vitamin D 26.8 (L) 30.0 - 100.0 ng/ml   T4 & TSH ([facility])   Result Value Ref Range    TSH 2.540 0.270 - 4.200 mIU/mL    T4, Total 6.69 4.50 - 11.70 mcg/dL   MicroAlbumin, Urine, Random   Result Value Ref Range    Microalbumin, Urine 52.2 Not Estab. ug/mL   Cardiovascular Risk Assessment   Result Value Ref Range    Interpretation Note    Diabetes Patient Education   Result Value Ref Range    PDF Image Not applicable          Assessment/Plan   Diagnoses and all orders for this visit:    IDDM (insulin dependent diabetes mellitus) (CMS/[facility])  -     TOUJEO SOLOSTAR 300 UNIT/ML solution pen-injector; Inject 55 Units under the skin Every Morning. and every 3 days if FBS is greater than 120 add 2 units up to a max daily dose of 50 units  -     T4 & TSH ([facility]); Future  -     Uric Acid; Future  -     Vitamin D 25 Hydroxy; Future  -     Comprehensive Metabolic Panel; Future  -     C-Peptide; Future  -     Hemoglobin A1c; Future  -     Lipid Panel; Future  -     MicroAlbumin, Urine, Random - Urine, Clean Catch; Future  -     Ambulatory Referral to Podiatry    Essential hypertension  -     TOUJEO SOLOSTAR 300 UNIT/ML solution pen-injector; Inject 55 Units under the skin Every Morning. and every 3 days if FBS is greater than 120 add 2 units up to a max daily dose of 50 units  -     T4 & TSH ([facility]); Future  -     Uric Acid; Future  -     Vitamin D 25 Hydroxy; Future  -     Comprehensive Metabolic Panel; Future  -     C-Peptide; Future  -     Hemoglobin A1c; Future  -     Lipid Panel; Future  -     MicroAlbumin, Urine, Random - Urine, Clean Catch; Future    Vitamin D deficiency  -     TOUJEO SOLOSTAR 300 UNIT/ML solution pen-injector; Inject 55 Units under the skin Every Morning. and every 3 days if FBS is greater than 120 add 2 units up to a max daily dose of 50 units  -     T4 & TSH ([facility]);  Future  -     Uric Acid; Future  -     Vitamin D 25 Hydroxy; Future  -     Comprehensive Metabolic Panel; Future  -     C-Peptide; Future  -     Hemoglobin A1c; Future  -     Lipid Panel; Future  -     MicroAlbumin, Urine, Random - Urine, Clean Catch; Future    Dyslipidemia  -     TOUJEO SOLOSTAR 300 UNIT/ML solution pen-injector; Inject 55 Units under the skin Every Morning. and every 3 days if FBS is greater than 120 add 2 units up to a max daily dose of 50 units  -     T4 & TSH ([facility]); Future  -     Uric Acid; Future  -     Vitamin D 25 Hydroxy; Future  -     Comprehensive Metabolic Panel; Future  -     C-Peptide; Future  -     Hemoglobin A1c; Future  -     Lipid Panel; Future  -     MicroAlbumin, Urine, Random - Urine, Clean Catch; Future    Hyperuricemia  -     TOUJEO SOLOSTAR 300 UNIT/ML solution pen-injector; Inject 55 Units under the skin Every Morning. and every 3 days if FBS is greater than 120 add 2 units up to a max daily dose of 50 units  -     T4 & TSH ([facility]); Future  -     Uric Acid; Future  -     Vitamin D 25 Hydroxy; Future  -     Comprehensive Metabolic Panel; Future  -     C-Peptide; Future  -     Hemoglobin A1c; Future  -     Lipid Panel; Future  -     MicroAlbumin, Urine, Random - Urine, Clean Catch; Future    Impaired memory  -     TOUJEO SOLOSTAR 300 UNIT/ML solution pen-injector; Inject 55 Units under the skin Every Morning. and every 3 days if FBS is greater than 120 add 2 units up to a max daily dose of 50 units  -     T4 & TSH ([facility]); Future  -     Uric Acid; Future  -     Vitamin D 25 Hydroxy; Future  -     Comprehensive Metabolic Panel; Future  -     C-Peptide; Future  -     Hemoglobin A1c; Future  -     Lipid Panel; Future  -     MicroAlbumin, Urine, Random - Urine, Clean Catch; Future    Other orders  -     TRADJENTA 5 MG tablet tablet; Take 1 tablet by mouth Daily.             in summary I saw and examined this 78-year-old female for above-mentioned problems.  I reviewed her  laboratory evaluation of 06/19/2018 and provided her and her son who was present during this office visit with a hard copy of it.  Her hemoglobin A1c is 9.0 as well as high triglycerides and low vitamin D.  As her insurance is not paying for her vitamin D I recommended to purchased over-the-counter D3 5000 units and take 2 daily.  We will also refer her to see a podiatrist because of her foot problem.  We increased her insulin to 55 units every a.m. and added Tradjenta 5 mg every morning.  She will continue rest of her prescriptions and will see Ms. Nida Gray in 4 months or sooner if needed with laboratory evaluation prior to each office visit.

## 2018-08-10 ENCOUNTER — OFFICE VISIT (OUTPATIENT)
Dept: INTERNAL MEDICINE | Facility: CLINIC | Age: 78
End: 2018-08-10

## 2018-08-10 VITALS
HEART RATE: 57 BPM | TEMPERATURE: 97.8 F | DIASTOLIC BLOOD PRESSURE: 77 MMHG | RESPIRATION RATE: 15 BRPM | OXYGEN SATURATION: 95 % | WEIGHT: 188 LBS | SYSTOLIC BLOOD PRESSURE: 146 MMHG | HEIGHT: 63 IN | BODY MASS INDEX: 33.31 KG/M2

## 2018-08-10 DIAGNOSIS — E55.9 VITAMIN D DEFICIENCY: ICD-10-CM

## 2018-08-10 DIAGNOSIS — Z00.00 MEDICARE ANNUAL WELLNESS VISIT, SUBSEQUENT: Primary | ICD-10-CM

## 2018-08-10 DIAGNOSIS — I10 ESSENTIAL HYPERTENSION: ICD-10-CM

## 2018-08-10 DIAGNOSIS — E78.5 DYSLIPIDEMIA: ICD-10-CM

## 2018-08-10 DIAGNOSIS — IMO0001 IDDM (INSULIN DEPENDENT DIABETES MELLITUS): ICD-10-CM

## 2018-08-10 DIAGNOSIS — K59.01 SLOW TRANSIT CONSTIPATION: ICD-10-CM

## 2018-08-10 DIAGNOSIS — Z91.81 AT HIGH RISK FOR FALLS: ICD-10-CM

## 2018-08-10 PROCEDURE — G0439 PPPS, SUBSEQ VISIT: HCPCS | Performed by: INTERNAL MEDICINE

## 2018-08-10 PROCEDURE — 99213 OFFICE O/P EST LOW 20 MIN: CPT | Performed by: INTERNAL MEDICINE

## 2018-08-10 NOTE — PROGRESS NOTES
QUICK REFERENCE INFORMATION:  The ABCs of the Annual Wellness Visit    Subsequent Medicare Wellness Visit    HEALTH RISK ASSESSMENT    1940    Recent Hospitalizations:  No hospitalization(s) within the last year..        Current Medical Providers:  Patient Care Team:  Deangelo Brown MD as PCP - General (Internal Medicine)  Shani Nix MD as PCP - Claims Attributed        Smoking Status:  History   Smoking Status   • Former Smoker   Smokeless Tobacco   • Not on file       Alcohol Consumption:  History   Alcohol Use No       Depression Screen:   PHQ-2/PHQ-9 Depression Screening 8/10/2018   Little interest or pleasure in doing things 1   Feeling down, depressed, or hopeless 1   Trouble falling or staying asleep, or sleeping too much 0   Feeling tired or having little energy 1   Poor appetite or overeating 0   Feeling bad about yourself - or that you are a failure or have let yourself or your family down 0   Trouble concentrating on things, such as reading the newspaper or watching television 0   Moving or speaking so slowly that other people could have noticed. Or the opposite - being so fidgety or restless that you have been moving around a lot more than usual 3   Thoughts that you would be better off dead, or of hurting yourself in some way 0   Total Score 6   If you checked off any problems, how difficult have these problems made it for you to do your work, take care of things at home, or get along with other people? Somewhat difficult       Health Habits and Functional and Cognitive Screening:  Functional & Cognitive Status 8/10/2018   Do you have difficulty preparing food and eating? No   Do you have difficulty bathing yourself, getting dressed or grooming yourself? No   Do you have difficulty using the toilet? No   Do you have difficulty moving around from place to place? No   Do you have trouble with steps or getting out of a bed or a chair? No   In the past year have you fallen or experienced a  near fall? Yes   Current Diet Well Balanced Diet   Dental Exam Up to date   Eye Exam Up to date   Exercise (times per week) 1 times per week   Current Exercise Activities Include Walking   Do you need help using the phone?  No   Are you deaf or do you have serious difficulty hearing?  No   Do you need help with transportation? No   Do you need help shopping? No   Do you need help preparing meals?  Yes   Do you need help with housework?  Yes   Do you need help with laundry? Yes   Do you need help taking your medications? No   Do you need help managing money? No   Do you ever drive or ride in a car without wearing a seat belt? No   Have you felt unusual stress, anger or loneliness in the last month? Yes   Who do you live with? Spouse   If you need help, do you have trouble finding someone available to you? No   Have you been bothered in the last four weeks by sexual problems? No   Do you have difficulty concentrating, remembering or making decisions? No           Does the patient have evidence of cognitive impairment? No    Aspirin use counseling: Taking ASA appropriately as indicated      Recent Lab Results:  CMP:  Lab Results   Component Value Date     (H) 06/19/2018    BUN 24 (H) 06/19/2018    CREATININE 0.92 06/19/2018    EGFRIFNONA 59 (L) 06/19/2018    EGFRIFAFRI 71 06/19/2018    BCR 26.1 (H) 06/19/2018     06/19/2018    K 4.3 06/19/2018    CO2 29.0 06/19/2018    CALCIUM 10.1 06/19/2018    PROTENTOTREF 7.0 06/19/2018    ALBUMIN 4.10 06/19/2018    LABGLOBREF 2.9 06/19/2018    LABIL2 1.4 06/19/2018    BILITOT <0.2 06/19/2018    ALKPHOS 110 06/19/2018    AST 14 06/19/2018    ALT 14 06/19/2018     Lipid Panel:  Lab Results   Component Value Date    TRIG 324 (H) 06/19/2018    HDL 63 (H) 06/19/2018    VLDL 64.8 (H) 06/19/2018     HbA1c:  Lab Results   Component Value Date    HGBA1C 9.00 (H) 06/19/2018       Visual Acuity:  No exam data present    Age-appropriate Screening Schedule:  Refer to the list below  "for future screening recommendations based on patient's age, sex and/or medical conditions. Orders for these recommended tests are listed in the plan section. The patient has been provided with a written plan.    Health Maintenance   Topic Date Due   • TDAP/TD VACCINES (1 - Tdap) 05/18/1958   • ZOSTER VACCINE (1 of 2) 05/18/1989   • INFLUENZA VACCINE  08/01/2018   • HEMOGLOBIN A1C  12/19/2018   • URINE MICROALBUMIN  06/19/2019   • MAMMOGRAM  11/03/2019   • PNEUMOCOCCAL VACCINES (65+ LOW/MEDIUM RISK)  Completed        Subjective   History of Present Illness    Di Macario is a 78 y.o. female who presents for an Subsequent Wellness Visit.    The following portions of the patient's history were reviewed and updated as appropriate: allergies, current medications, past family history, past medical history, past social history, past surgical history and problem list.    Outpatient Medications Prior to Visit   Medication Sig Dispense Refill   • allopurinol (ZYLOPRIM) 300 MG tablet TAKE 1 TABLET DAILY 90 tablet 2   • aspirin 81 MG tablet Take  by mouth.     • benazepril (LOTENSIN) 20 MG tablet Take 1 tablet by mouth Daily. 90 tablet 3   • diltiaZEM (TIAZAC) 240 MG 24 hr capsule TAKE 1 CAPSULE DAILY 90 capsule 1   • donepezil (ARICEPT) 10 MG tablet TAKE 1 TABLET EVERY NIGHT 90 tablet 1   • ESTRACE VAGINAL 0.1 MG/GM vaginal cream      • glimepiride (AMARYL) 4 MG tablet Take 2 tablets by mouth Every Morning Before Breakfast. 60 tablet 5   • glucose blood (FREESTYLE LITE) test strip Use to test BG 4x daily 400 each 0   • hydrochlorothiazide (MICROZIDE) 12.5 MG capsule TAKE 1 CAPSULE DAILY 90 capsule 2   • Insulin Pen Needle (BD PEN NEEDLE ALYSSA U/F) 32G X 4 MM misc Use once a day 100 each 1   • Insulin Syringe-Needle U-100 31G X 5/16\" 0.3 ML misc Use with insulin injection. 100 each 5   • Lancets (FREESTYLE) lancets USE FOUR TIMES A  each 2   • Mirabegron ER (MYRBETRIQ) 25 MG tablet sustained-release 24 hour 24 hr " "tablet Take 25 mg by mouth Daily.     • TOUJEO SOLOSTAR 300 UNIT/ML solution pen-injector Inject 55 Units under the skin Every Morning. and every 3 days if FBS is greater than 120 add 2 units up to a max daily dose of 50 units 15 pen 3   • TRADJENTA 5 MG tablet tablet Take 1 tablet by mouth Daily. 90 tablet 3   • zonisamide (ZONEGRAN) 100 MG capsule Take  by mouth.       No facility-administered medications prior to visit.        Patient Active Problem List   Diagnosis   • IDDM (insulin dependent diabetes mellitus) (CMS/[facility])   • Essential hypertension   • Dyslipidemia   • Impaired memory   • Vitamin D deficiency   • Hyperuricemia   • Slow transit constipation   • Chronic pain of left knee   • Acute pain of left knee   • Fall   • Medicare annual wellness visit, subsequent       Advance Care Planning:  has an advance directive - a copy has been provided and is in file    Identification of Risk Factors:  Risk factors include: increased fall risk.    Review of Systems    Compared to one year ago, the patient feels her physical health is worse.  Compared to one year ago, the patient feels her mental health is the same.    Objective     Physical Exam    Vitals:    08/10/18 1059   BP: 146/77   BP Location: Right arm   Patient Position: Sitting   Cuff Size: Large Adult   Pulse: 57   Resp: 15   Temp: 97.8 °F (36.6 °C)   TempSrc: Oral   SpO2: 95%   Weight: 85.3 kg (188 lb)   Height: 160 cm (62.99\")   PainSc: 0-No pain       Patient's Body mass index is 33.31 kg/m². BMI is above normal parameters. Recommendations include: foot pain too much.      Assessment/Plan   Patient Self-Management and Personalized Health Advice  The patient has been provided with information about: fall prevention and preventive services including:   · Fall Risk plan of care done.    Visit Diagnoses:  No diagnosis found.    No orders of the defined types were placed in this encounter.      Outpatient Encounter Prescriptions as of 8/10/2018   Medication " "Sig Dispense Refill   • allopurinol (ZYLOPRIM) 300 MG tablet TAKE 1 TABLET DAILY 90 tablet 2   • aspirin 81 MG tablet Take  by mouth.     • benazepril (LOTENSIN) 20 MG tablet Take 1 tablet by mouth Daily. 90 tablet 3   • diltiaZEM (TIAZAC) 240 MG 24 hr capsule TAKE 1 CAPSULE DAILY 90 capsule 1   • donepezil (ARICEPT) 10 MG tablet TAKE 1 TABLET EVERY NIGHT 90 tablet 1   • ESTRACE VAGINAL 0.1 MG/GM vaginal cream      • glimepiride (AMARYL) 4 MG tablet Take 2 tablets by mouth Every Morning Before Breakfast. 60 tablet 5   • glucose blood (FREESTYLE LITE) test strip Use to test BG 4x daily 400 each 0   • hydrochlorothiazide (MICROZIDE) 12.5 MG capsule TAKE 1 CAPSULE DAILY 90 capsule 2   • Insulin Pen Needle (BD PEN NEEDLE ALYSSA U/F) 32G X 4 MM misc Use once a day 100 each 1   • Insulin Syringe-Needle U-100 31G X 5/16\" 0.3 ML misc Use with insulin injection. 100 each 5   • Lancets (FREESTYLE) lancets USE FOUR TIMES A  each 2   • Mirabegron ER (MYRBETRIQ) 25 MG tablet sustained-release 24 hour 24 hr tablet Take 25 mg by mouth Daily.     • TOUJEO SOLOSTAR 300 UNIT/ML solution pen-injector Inject 55 Units under the skin Every Morning. and every 3 days if FBS is greater than 120 add 2 units up to a max daily dose of 50 units 15 pen 3   • TRADJENTA 5 MG tablet tablet Take 1 tablet by mouth Daily. 90 tablet 3   • zonisamide (ZONEGRAN) 100 MG capsule Take  by mouth.       No facility-administered encounter medications on file as of 8/10/2018.        Reviewed use of high risk medication in the elderly: yes  Reviewed for potential of harmful drug interactions in the elderly: yes    Follow Up:  No Follow-up on file.     An After Visit Summary and PPPS with all of these plans were given to the patient.         "

## 2018-08-14 DIAGNOSIS — E78.5 DYSLIPIDEMIA: ICD-10-CM

## 2018-08-14 DIAGNOSIS — I10 ESSENTIAL HYPERTENSION: ICD-10-CM

## 2018-08-14 DIAGNOSIS — R41.3 IMPAIRED MEMORY: ICD-10-CM

## 2018-08-14 DIAGNOSIS — E55.9 VITAMIN D DEFICIENCY: ICD-10-CM

## 2018-08-15 RX ORDER — BENAZEPRIL HYDROCHLORIDE 20 MG/1
TABLET ORAL
Qty: 90 TABLET | Refills: 3 | Status: SHIPPED | OUTPATIENT
Start: 2018-08-15 | End: 2019-09-26 | Stop reason: SDUPTHER

## 2018-08-15 RX ORDER — PRAVASTATIN SODIUM 40 MG
TABLET ORAL
Qty: 90 TABLET | Refills: 3 | Status: SHIPPED | OUTPATIENT
Start: 2018-08-15 | End: 2019-09-26 | Stop reason: SDUPTHER

## 2018-08-24 NOTE — PATIENT INSTRUCTIONS
Medicare Wellness  Personal Prevention Plan of Service     Date of Office Visit:  08/10/2018  Encounter Provider:  Deangelo Brown MD  Place of Service:  [facility] INTERNAL MEDICINE  Patient Name: Di Macario  :  1940    As part of the Medicare Wellness portion of your visit today, we are providing you with this personalized preventive plan of services (PPPS). This plan is based upon recommendations of the [place] Preventive Services Task Force (USPSTF) and the Advisory Committee on Immunization Practices (ACIP).    This lists the preventive care services that should be considered, and provides dates of when you are due. Items listed as completed are up-to-date and do not require any further intervention.    Health Maintenance   Topic Date Due   • TDAP/TD VACCINES (1 - Tdap) 1958   • ZOSTER VACCINE (1 of 2) 1989   • INFLUENZA VACCINE  2018   • HEMOGLOBIN A1C  2018   • URINE MICROALBUMIN  2019   • MEDICARE ANNUAL WELLNESS  08/10/2019   • MAMMOGRAM  2019   • PNEUMOCOCCAL VACCINES (65+ LOW/MEDIUM RISK)  Completed       No orders of the defined types were placed in this encounter.      Return in about 6 months (around 2/10/2019).

## 2018-08-24 NOTE — PROGRESS NOTES
Subjective   Di Macario is a 78 y.o. female.   She is here today for Medicare annual wellness visit subsequent and she does have assistance with those things she needs help with that and she is exercising 3 days a week but she needs to reduce fall risk and she is also here for a high risk for falls hypertension NIDDM vitamin D deficiency slow transit constipation and dyslipidemia  History of Present Illness   She is here today for Medicare annual wellness visit subsequent please see recommendations and she is also here for at high risk for falls which she will work on with physical therapy and hypertension which is well-controlled on current medication with no evidence of orthostatic hypotension and NIDDM which is been fairly stable up to this point and vitamin D deficiency which has been stable on supplementation and slow transit constipation which has been stable now and dyslipidemia as well which is stable on current medication  The following portions of the patient's history were reviewed and updated as appropriate: allergies, current medications, past family history, past medical history, past social history, past surgical history and problem list.    Review of Systems   Musculoskeletal: Positive for gait problem.        She is at high risk of fall   All other systems reviewed and are negative.      Objective   Physical Exam   Constitutional: She is oriented to person, place, and time. She appears well-developed and well-nourished. She is cooperative.   HENT:   Head: Normocephalic and atraumatic.   Right Ear: Hearing, tympanic membrane, external ear and ear canal normal.   Left Ear: Hearing, tympanic membrane, external ear and ear canal normal.   Nose: Nose normal.   Mouth/Throat: Uvula is midline, oropharynx is clear and moist and mucous membranes are normal.   Eyes: Pupils are equal, round, and reactive to light. Conjunctivae, EOM and lids are normal.   Neck: Phonation normal. Neck supple. Carotid  bruit is not present.   Cardiovascular: Normal rate, regular rhythm and normal heart sounds.  Exam reveals no gallop and no friction rub.    No murmur heard.  Pulmonary/Chest: Effort normal and breath sounds normal. No respiratory distress.   Abdominal: Soft. Bowel sounds are normal. She exhibits no distension and no mass. There is no hepatosplenomegaly. There is no tenderness. There is no rebound and no guarding. No hernia.   Musculoskeletal: She exhibits no edema.   Neurological: She is alert and oriented to person, place, and time. Gait abnormal. Coordination normal.   Skin: Skin is warm and dry.   Psychiatric: She has a normal mood and affect. Her speech is normal and behavior is normal. Judgment and thought content normal.   Nursing note and vitals reviewed.      Assessment/Plan   Diagnoses and all orders for this visit:    Medicare annual wellness visit, subsequent    At high risk for falls    Essential hypertension    IDDM (insulin dependent diabetes mellitus) (CMS/[facility])    Vitamin D deficiency    Slow transit constipation    Dyslipidemia      Medicare annual wellness visit subsequent please see recommendations and her main concern is high fall risk and she will work with physical therapy on this when she is ready  At high risk for falls noted and she will work on this with proper people to help train her  Hypertension well-controlled on current medication and I do not want to tighten the numbers for risk of orthostasis  IDDM has been fairly stable and we will check labs on a regular basis  Vitamin D deficiency stable on supplementation  Slow transit constipation stable on current medication  Dyslipidemia stable on current medication

## 2018-08-28 PROBLEM — G62.9 PERIPHERAL NEUROPATHY: Status: ACTIVE | Noted: 2018-08-28

## 2018-10-16 DIAGNOSIS — R41.3 IMPAIRED MEMORY: ICD-10-CM

## 2018-10-16 DIAGNOSIS — E55.9 VITAMIN D DEFICIENCY: ICD-10-CM

## 2018-10-16 DIAGNOSIS — I10 ESSENTIAL HYPERTENSION: ICD-10-CM

## 2018-10-16 DIAGNOSIS — E78.5 DYSLIPIDEMIA: ICD-10-CM

## 2018-10-16 DIAGNOSIS — IMO0002 UNCONTROLLED TYPE 2 DIABETES MELLITUS: ICD-10-CM

## 2018-10-17 RX ORDER — HYDROCHLOROTHIAZIDE 12.5 MG/1
CAPSULE, GELATIN COATED ORAL
Qty: 90 CAPSULE | Refills: 2 | Status: SHIPPED | OUTPATIENT
Start: 2018-10-17 | End: 2019-02-12 | Stop reason: SDUPTHER

## 2018-10-17 RX ORDER — DONEPEZIL HYDROCHLORIDE 10 MG/1
TABLET, FILM COATED ORAL
Qty: 90 TABLET | Refills: 1 | Status: SHIPPED | OUTPATIENT
Start: 2018-10-17 | End: 2018-12-17 | Stop reason: SDUPTHER

## 2018-10-25 ENCOUNTER — OFFICE VISIT (OUTPATIENT)
Dept: ENDOCRINOLOGY | Age: 78
End: 2018-10-25

## 2018-10-25 VITALS
DIASTOLIC BLOOD PRESSURE: 64 MMHG | WEIGHT: 189 LBS | HEIGHT: 63 IN | SYSTOLIC BLOOD PRESSURE: 110 MMHG | BODY MASS INDEX: 33.49 KG/M2

## 2018-10-25 DIAGNOSIS — I10 ESSENTIAL HYPERTENSION: ICD-10-CM

## 2018-10-25 DIAGNOSIS — E79.0 HYPERURICEMIA: ICD-10-CM

## 2018-10-25 DIAGNOSIS — E78.5 DYSLIPIDEMIA: ICD-10-CM

## 2018-10-25 DIAGNOSIS — E11.65 UNCONTROLLED TYPE 2 DIABETES MELLITUS WITH HYPERGLYCEMIA (HCC): Primary | ICD-10-CM

## 2018-10-25 DIAGNOSIS — E55.9 VITAMIN D DEFICIENCY: ICD-10-CM

## 2018-10-25 DIAGNOSIS — G62.9 PERIPHERAL POLYNEUROPATHY: ICD-10-CM

## 2018-10-25 PROCEDURE — 99214 OFFICE O/P EST MOD 30 MIN: CPT | Performed by: NURSE PRACTITIONER

## 2018-10-25 NOTE — PROGRESS NOTES
"Subjective   Di Macario is a 78 y.o. female is here today for follow-up.  Chief Complaint   Patient presents with   • Diabetes     No recent labs, pt tests BG 3x daily, pt brought BG log   • Hyperlipidemia   • Hypertension   • Vitamin D Deficiency   • hyperuricemia     /64   Ht 160 cm (63\")   Wt 85.7 kg (189 lb)   BMI 33.48 kg/m²   Current Outpatient Prescriptions on File Prior to Visit   Medication Sig   • aspirin 81 MG tablet Take  by mouth.   • benazepril (LOTENSIN) 20 MG tablet TAKE 1 TABLET DAILY   • diltiaZEM (TIAZAC) 240 MG 24 hr capsule TAKE 1 CAPSULE DAILY   • donepezil (ARICEPT) 10 MG tablet TAKE 1 TABLET EVERY NIGHT   • ESTRACE VAGINAL 0.1 MG/GM vaginal cream    • glimepiride (AMARYL) 4 MG tablet Take 2 tablets by mouth Every Morning Before Breakfast.   • glucose blood (FREESTYLE LITE) test strip Use to test BG 4x daily   • hydrochlorothiazide (MICROZIDE) 12.5 MG capsule TAKE 1 CAPSULE DAILY   • Insulin Pen Needle (BD PEN NEEDLE ALYSSA U/F) 32G X 4 MM misc Use once a day   • Insulin Syringe-Needle U-100 31G X 5/16\" 0.3 ML misc Use with insulin injection.   • Lancets (FREESTYLE) lancets USE FOUR TIMES A DAY   • Mirabegron ER (MYRBETRIQ) 25 MG tablet sustained-release 24 hour 24 hr tablet Take 25 mg by mouth Daily.   • pravastatin (PRAVACHOL) 40 MG tablet TAKE 1 TABLET EVERY EVENING   • TOUJEO SOLOSTAR 300 UNIT/ML solution pen-injector Inject 55 Units under the skin Every Morning. and every 3 days if FBS is greater than 120 add 2 units up to a max daily dose of 50 units   • TRADJENTA 5 MG tablet tablet Take 1 tablet by mouth Daily.   • zonisamide (ZONEGRAN) 100 MG capsule Take 100 mg by mouth 2 (Two) Times a Day.   • [DISCONTINUED] allopurinol (ZYLOPRIM) 300 MG tablet TAKE 1 TABLET DAILY     No current facility-administered medications on file prior to visit.      No family history on file.  Social History   Substance Use Topics   • Smoking status: Former Smoker   • Smokeless tobacco: Not on " file   • Alcohol use No         History of Present Illness  Encounter Diagnoses   Name Primary?   • Essential hypertension    • Vitamin D deficiency    • Uncontrolled type 2 diabetes mellitus with hyperglycemia (CMS/[facility]) Yes   • Hyperuricemia    • Dyslipidemia    • Peripheral polyneuropathy    This is a 78-year-old female patient who is here today for routine follow-up visit.  She has been seen for the above diagnoses.  She is a copy by her .  She has some memory issues.  According to her  she sets up medications.  She states she is taking vitamin D and a multivitamin however she is not sure of the amount.  She was prescribed 10,000 units by Dr. Murcia at her last visit daily.  Her insulin was increased at her last visit and she was started on Tradjenta.  She has had one hypoglycemic event over the past several days a 57 at 5 AM.  She is also have a high blood sugars in the 300 range in the evenings.  Overall her blood sugars in the morning and are better than her evening blood sugars.  She does have wide fluctuations.  She has increased her insulins from 50-55.  Has not had any recent labs.  Her last glasses were reviewed and reflects her A1c was at 9.    The following portions of the patient's history were reviewed and updated as appropriate: allergies, current medications, past family history, past medical history, past social history, past surgical history and problem list.    Review of Systems   Constitutional: Negative for fatigue.   HENT: Negative for trouble swallowing.    Eyes: Negative for visual disturbance.   Respiratory: Negative for shortness of breath.    Endocrine: Negative for polyuria.   Skin: Negative for wound.   Neurological: Negative for numbness.       Objective   Physical Exam   Constitutional: She is oriented to person, place, and time. She appears well-developed and well-nourished. No distress.   HENT:   Head: Normocephalic and atraumatic.   Left Ear: External ear normal.    Nose: Nose normal.   Mouth/Throat: Oropharynx is clear and moist. No oropharyngeal exudate.   Eyes: Pupils are equal, round, and reactive to light. Conjunctivae and EOM are normal. Right eye exhibits no discharge. Left eye exhibits no discharge. No scleral icterus.   Neck: Normal range of motion. Neck supple. No JVD present. No tracheal deviation present. No thyromegaly present.   Cardiovascular: Normal rate, regular rhythm, normal heart sounds and intact distal pulses.  Exam reveals no gallop and no friction rub.    No murmur heard.  Pulmonary/Chest: Effort normal and breath sounds normal. No stridor. No respiratory distress. She has no wheezes. She has no rales. She exhibits no tenderness.   Abdominal: Soft. Bowel sounds are normal. She exhibits no distension and no mass. There is no tenderness. There is no rebound and no guarding. No hernia.   Musculoskeletal: Normal range of motion. She exhibits edema. She exhibits no tenderness or deformity.   1 + edema in lower ext.  Skin dry   Lymphadenopathy:     She has no cervical adenopathy.   Neurological: She is alert and oriented to person, place, and time. She has normal reflexes. She displays normal reflexes. No cranial nerve deficit. She exhibits normal muscle tone. Coordination normal.   Skin: Skin is warm and dry. No rash noted. She is not diaphoretic. No erythema. No pallor.   Psychiatric: She has a normal mood and affect. Her behavior is normal. Judgment and thought content normal.   Nursing note and vitals reviewed.    Results for orders placed or performed in visit on 06/19/18   Comprehensive Metabolic Panel   Result Value Ref Range    Glucose 308 (H) 65 - 99 mg/dL    BUN 24 (H) 8 - 23 mg/dL    Creatinine 0.92 0.57 - 1.00 mg/dL    eGFR Non African Am 59 (L) >60 mL/min/1.73    eGFR African Am 71 >60 mL/min/1.73    BUN/Creatinine Ratio 26.1 (H) 7.0 - 25.0    Sodium 140 136 - 145 mmol/L    Potassium 4.3 3.5 - 5.2 mmol/L    Chloride 98 98 - 107 mmol/L    Total  CO2 29.0 22.0 - 29.0 mmol/L    Calcium 10.1 8.6 - 10.5 mg/dL    Total Protein 7.0 6.0 - 8.5 g/dL    Albumin 4.10 3.50 - 5.20 g/dL    Globulin 2.9 gm/dL    A/G Ratio 1.4 g/dL    Total Bilirubin <0.2 0.1 - 1.2 mg/dL    Alkaline Phosphatase 110 39 - 117 U/L    AST (SGOT) 14 1 - 32 U/L    ALT (SGPT) 14 1 - 33 U/L   C-Peptide   Result Value Ref Range    C-Peptide 2.9 1.1 - 4.4 ng/mL   Hemoglobin A1c   Result Value Ref Range    Hemoglobin A1C 9.00 (H) 4.80 - 5.60 %   Lipid Panel   Result Value Ref Range    Total Cholesterol 164 0 - 200 mg/dL    Triglycerides 324 (H) 0 - 150 mg/dL    HDL Cholesterol 63 (H) 40 - 60 mg/dL    VLDL Cholesterol 64.8 (H) 5 - 40 mg/dL    LDL Cholesterol  36 0 - 100 mg/dL   Uric Acid   Result Value Ref Range    Uric Acid 6.6 (H) 2.4 - 5.7 mg/dL   Vitamin D 25 Hydroxy   Result Value Ref Range    25 Hydroxy, Vitamin D 26.8 (L) 30.0 - 100.0 ng/ml   T4 & TSH ([facility])   Result Value Ref Range    TSH 2.540 0.270 - 4.200 mIU/mL    T4, Total 6.69 4.50 - 11.70 mcg/dL   MicroAlbumin, Urine, Random   Result Value Ref Range    Microalbumin, Urine 52.2 Not Estab. ug/mL   Cardiovascular Risk Assessment   Result Value Ref Range    Interpretation Note    Diabetes Patient Education   Result Value Ref Range    PDF Image Not applicable          Assessment/Plan   Problems Addressed this Visit        Cardiovascular and Mediastinum    Essential hypertension    Relevant Medications    Multiple Vitamins-Minerals (MULTIVITAMIN MEN 50+ PO)    CALCIUM PO    Other Relevant Orders    Comprehensive Metabolic Panel    C-Peptide    Hemoglobin A1c    Lipid Panel    MicroAlbumin, Urine, Random - Urine, Clean Catch    T3, Free    T4, Free    Thyroid Panel With TSH    Vitamin D 25 Hydroxy       Digestive    Vitamin D deficiency    Relevant Medications    Multiple Vitamins-Minerals (MULTIVITAMIN MEN 50+ PO)    CALCIUM PO    Other Relevant Orders    Comprehensive Metabolic Panel    C-Peptide    Hemoglobin A1c    Lipid Panel     MicroAlbumin, Urine, Random - Urine, Clean Catch    T3, Free    T4, Free    Thyroid Panel With TSH    Vitamin D 25 Hydroxy       Endocrine    Uncontrolled type 2 diabetes mellitus with hyperglycemia (CMS/[facility]) - Primary    Relevant Medications    Multiple Vitamins-Minerals (MULTIVITAMIN MEN 50+ PO)    CALCIUM PO    Other Relevant Orders    Comprehensive Metabolic Panel    C-Peptide    Hemoglobin A1c    Lipid Panel    MicroAlbumin, Urine, Random - Urine, Clean Catch    T3, Free    T4, Free    Thyroid Panel With TSH    Vitamin D 25 Hydroxy       Nervous and Auditory    Peripheral neuropathy    Relevant Medications    Multiple Vitamins-Minerals (MULTIVITAMIN MEN 50+ PO)    CALCIUM PO    Other Relevant Orders    Comprehensive Metabolic Panel    C-Peptide    Hemoglobin A1c    Lipid Panel    MicroAlbumin, Urine, Random - Urine, Clean Catch    T3, Free    T4, Free    Thyroid Panel With TSH    Vitamin D 25 Hydroxy       Other    Dyslipidemia    Relevant Medications    Multiple Vitamins-Minerals (MULTIVITAMIN MEN 50+ PO)    CALCIUM PO    Other Relevant Orders    Comprehensive Metabolic Panel    C-Peptide    Hemoglobin A1c    Lipid Panel    MicroAlbumin, Urine, Random - Urine, Clean Catch    T3, Free    T4, Free    Thyroid Panel With TSH    Vitamin D 25 Hydroxy    Hyperuricemia    Relevant Medications    Multiple Vitamins-Minerals (MULTIVITAMIN MEN 50+ PO)    CALCIUM PO    Other Relevant Orders    Comprehensive Metabolic Panel    C-Peptide    Hemoglobin A1c    Lipid Panel    MicroAlbumin, Urine, Random - Urine, Clean Catch    T3, Free    T4, Free    Thyroid Panel With TSH    Vitamin D 25 Hydroxy          In summary, patient was seen and examined.  Metabolically she is stable.  She will have extensive labs done at today's visit will be notified of the results.  Her blood pressure is an satisfactory range.  She does have memory issues and is at risk for hypoglycemia.  She assures.  She is eating adequate meals.  She does have by  fluctuations with her blood sugars and has had one hypoglycemic event.  She uses a walker for mobility.  She has no complaints at today's visit.  Any changes to her medications will be pending her lab results.  She will follow-up with Dr. Murcia or myself in 4 months with labs.

## 2018-10-26 ENCOUNTER — RESULTS ENCOUNTER (OUTPATIENT)
Dept: ENDOCRINOLOGY | Age: 78
End: 2018-10-26

## 2018-10-26 DIAGNOSIS — E78.5 DYSLIPIDEMIA: ICD-10-CM

## 2018-10-26 DIAGNOSIS — R41.3 IMPAIRED MEMORY: ICD-10-CM

## 2018-10-26 DIAGNOSIS — IMO0001 IDDM (INSULIN DEPENDENT DIABETES MELLITUS): ICD-10-CM

## 2018-10-26 DIAGNOSIS — E55.9 VITAMIN D DEFICIENCY: ICD-10-CM

## 2018-10-26 DIAGNOSIS — E79.0 HYPERURICEMIA: ICD-10-CM

## 2018-10-26 DIAGNOSIS — I10 ESSENTIAL HYPERTENSION: ICD-10-CM

## 2018-10-26 LAB
25(OH)D3+25(OH)D2 SERPL-MCNC: 27.1 NG/ML (ref 30–100)
ALBUMIN SERPL-MCNC: 4.7 G/DL (ref 3.5–5.2)
ALBUMIN/GLOB SERPL: 1.5 G/DL
ALP SERPL-CCNC: 130 U/L (ref 39–117)
ALT SERPL-CCNC: 17 U/L (ref 1–33)
AST SERPL-CCNC: 18 U/L (ref 1–32)
BILIRUB SERPL-MCNC: 0.2 MG/DL (ref 0.1–1.2)
BUN SERPL-MCNC: 22 MG/DL (ref 8–23)
BUN/CREAT SERPL: 27.2 (ref 7–25)
C PEPTIDE SERPL-MCNC: 1.3 NG/ML (ref 1.1–4.4)
CALCIUM SERPL-MCNC: 11 MG/DL (ref 8.6–10.5)
CHLORIDE SERPL-SCNC: 99 MMOL/L (ref 98–107)
CHOLEST SERPL-MCNC: 181 MG/DL (ref 0–200)
CO2 SERPL-SCNC: 27.5 MMOL/L (ref 22–29)
CREAT SERPL-MCNC: 0.81 MG/DL (ref 0.57–1)
FT4I SERPL CALC-MCNC: 2.2 (ref 1.2–4.9)
GLOBULIN SER CALC-MCNC: 3.2 GM/DL
GLUCOSE SERPL-MCNC: 168 MG/DL (ref 65–99)
HBA1C MFR BLD: 9.29 % (ref 4.8–5.6)
HDLC SERPL-MCNC: 78 MG/DL (ref 40–60)
INTERPRETATION: NORMAL
LDLC SERPL CALC-MCNC: 54 MG/DL (ref 0–100)
Lab: NORMAL
MICROALBUMIN UR-MCNC: 11.1 UG/ML
POTASSIUM SERPL-SCNC: 4 MMOL/L (ref 3.5–5.2)
PROT SERPL-MCNC: 7.9 G/DL (ref 6–8.5)
SODIUM SERPL-SCNC: 141 MMOL/L (ref 136–145)
T3FREE SERPL-MCNC: 3.3 PG/ML (ref 2–4.4)
T3RU NFR SERPL: 27 % (ref 24–39)
T4 FREE SERPL-MCNC: 1.35 NG/DL (ref 0.93–1.7)
T4 SERPL-MCNC: 8.1 UG/DL (ref 4.5–12)
TRIGL SERPL-MCNC: 245 MG/DL (ref 0–150)
TSH SERPL DL<=0.005 MIU/L-ACNC: 1.94 UIU/ML (ref 0.45–4.5)
VLDLC SERPL CALC-MCNC: 49 MG/DL (ref 5–40)

## 2018-10-29 PROBLEM — R74.8 ALKALINE PHOSPHATASE ELEVATION: Status: ACTIVE | Noted: 2018-10-29

## 2018-10-29 PROBLEM — E83.52 CALCIUM BLOOD INCREASED: Status: ACTIVE | Noted: 2018-10-29

## 2018-10-29 RX ORDER — EMPAGLIFLOZIN 10 MG/1
1 TABLET, FILM COATED ORAL DAILY
Qty: 30 TABLET | Refills: 5 | Status: SHIPPED | OUTPATIENT
Start: 2018-10-29 | End: 2019-10-10

## 2018-10-29 RX ORDER — ERGOCALCIFEROL 1.25 MG/1
CAPSULE ORAL
Qty: 12 CAPSULE | Refills: 1 | Status: SHIPPED | OUTPATIENT
Start: 2018-10-29 | End: 2019-02-12 | Stop reason: SDUPTHER

## 2018-10-30 ENCOUNTER — TELEPHONE (OUTPATIENT)
Dept: ENDOCRINOLOGY | Age: 78
End: 2018-10-30

## 2018-10-30 NOTE — TELEPHONE ENCOUNTER
----- Message from IAIN Coombs sent at 10/29/2018 11:05 AM EDT -----  Med changes      Called patient on multiple occasions and left messages for patient to call the office. No return call has been received yet. Lab letter sent to patient.

## 2018-11-23 RX ORDER — LANCETS 28 GAUGE
EACH MISCELLANEOUS
Qty: 400 EACH | Refills: 1 | Status: SHIPPED | OUTPATIENT
Start: 2018-11-23 | End: 2020-09-22 | Stop reason: SDUPTHER

## 2018-12-17 DIAGNOSIS — E55.9 VITAMIN D DEFICIENCY: ICD-10-CM

## 2018-12-17 DIAGNOSIS — E78.5 DYSLIPIDEMIA: ICD-10-CM

## 2018-12-17 DIAGNOSIS — IMO0002 UNCONTROLLED TYPE 2 DIABETES MELLITUS: ICD-10-CM

## 2018-12-17 DIAGNOSIS — I10 ESSENTIAL HYPERTENSION: ICD-10-CM

## 2018-12-17 DIAGNOSIS — R41.3 IMPAIRED MEMORY: ICD-10-CM

## 2018-12-17 RX ORDER — DONEPEZIL HYDROCHLORIDE 10 MG/1
10 TABLET, FILM COATED ORAL NIGHTLY
Qty: 30 TABLET | Refills: 0 | Status: SHIPPED | OUTPATIENT
Start: 2018-12-17 | End: 2019-02-12 | Stop reason: SDUPTHER

## 2018-12-17 RX ORDER — DILTIAZEM HYDROCHLORIDE 240 MG/1
240 CAPSULE, EXTENDED RELEASE ORAL DAILY
Qty: 30 CAPSULE | Refills: 0 | Status: SHIPPED | OUTPATIENT
Start: 2018-12-17 | End: 2018-12-17 | Stop reason: SDUPTHER

## 2018-12-19 RX ORDER — DILTIAZEM HYDROCHLORIDE 240 MG/1
CAPSULE, EXTENDED RELEASE ORAL
Qty: 90 CAPSULE | Refills: 0 | Status: SHIPPED | OUTPATIENT
Start: 2018-12-19 | End: 2019-04-19 | Stop reason: SDUPTHER

## 2019-02-07 DIAGNOSIS — E79.0 HYPERURICEMIA: ICD-10-CM

## 2019-02-07 DIAGNOSIS — E55.9 VITAMIN D DEFICIENCY: Primary | ICD-10-CM

## 2019-02-07 DIAGNOSIS — E11.65 UNCONTROLLED TYPE 2 DIABETES MELLITUS WITH HYPERGLYCEMIA (HCC): ICD-10-CM

## 2019-02-12 ENCOUNTER — OFFICE VISIT (OUTPATIENT)
Dept: INTERNAL MEDICINE | Facility: CLINIC | Age: 79
End: 2019-02-12

## 2019-02-12 VITALS
HEART RATE: 53 BPM | OXYGEN SATURATION: 99 % | HEIGHT: 63 IN | DIASTOLIC BLOOD PRESSURE: 53 MMHG | SYSTOLIC BLOOD PRESSURE: 142 MMHG | TEMPERATURE: 96.5 F | WEIGHT: 167 LBS | BODY MASS INDEX: 29.59 KG/M2 | RESPIRATION RATE: 16 BRPM

## 2019-02-12 DIAGNOSIS — E55.9 VITAMIN D DEFICIENCY: ICD-10-CM

## 2019-02-12 DIAGNOSIS — I10 ESSENTIAL HYPERTENSION: ICD-10-CM

## 2019-02-12 DIAGNOSIS — E78.5 DYSLIPIDEMIA: ICD-10-CM

## 2019-02-12 DIAGNOSIS — I10 ESSENTIAL HYPERTENSION: Primary | ICD-10-CM

## 2019-02-12 DIAGNOSIS — IMO0002 UNCONTROLLED TYPE 2 DIABETES MELLITUS: ICD-10-CM

## 2019-02-12 DIAGNOSIS — M25.562 CHRONIC PAIN OF LEFT KNEE: ICD-10-CM

## 2019-02-12 DIAGNOSIS — R41.3 IMPAIRED MEMORY: ICD-10-CM

## 2019-02-12 DIAGNOSIS — Z99.89 WALKER AS AMBULATION AID: ICD-10-CM

## 2019-02-12 DIAGNOSIS — E11.65 UNCONTROLLED TYPE 2 DIABETES MELLITUS WITH HYPERGLYCEMIA (HCC): ICD-10-CM

## 2019-02-12 DIAGNOSIS — G89.29 CHRONIC PAIN OF LEFT KNEE: ICD-10-CM

## 2019-02-12 PROCEDURE — 99214 OFFICE O/P EST MOD 30 MIN: CPT | Performed by: INTERNAL MEDICINE

## 2019-02-12 RX ORDER — ERGOCALCIFEROL 1.25 MG/1
CAPSULE ORAL
Qty: 12 CAPSULE | Refills: 0 | Status: SHIPPED | OUTPATIENT
Start: 2019-02-12 | End: 2019-05-14 | Stop reason: SDUPTHER

## 2019-02-12 RX ORDER — DONEPEZIL HYDROCHLORIDE 10 MG/1
10 TABLET, FILM COATED ORAL NIGHTLY
Qty: 90 TABLET | Refills: 0 | Status: SHIPPED | OUTPATIENT
Start: 2019-02-12 | End: 2019-05-07 | Stop reason: SDUPTHER

## 2019-02-12 RX ORDER — HYDROCHLOROTHIAZIDE 12.5 MG/1
12.5 CAPSULE, GELATIN COATED ORAL DAILY
Qty: 90 CAPSULE | Refills: 0 | Status: SHIPPED | OUTPATIENT
Start: 2019-02-12 | End: 2019-05-07 | Stop reason: SDUPTHER

## 2019-02-19 ENCOUNTER — LAB (OUTPATIENT)
Dept: ENDOCRINOLOGY | Age: 79
End: 2019-02-19

## 2019-02-19 DIAGNOSIS — E79.0 HYPERURICEMIA: ICD-10-CM

## 2019-02-19 DIAGNOSIS — E11.65 UNCONTROLLED TYPE 2 DIABETES MELLITUS WITH HYPERGLYCEMIA (HCC): ICD-10-CM

## 2019-02-19 DIAGNOSIS — E55.9 VITAMIN D DEFICIENCY: ICD-10-CM

## 2019-02-20 LAB
25(OH)D3+25(OH)D2 SERPL-MCNC: 34 NG/ML (ref 30–100)
ALBUMIN SERPL-MCNC: 4.3 G/DL (ref 3.5–4.8)
ALBUMIN/GLOB SERPL: 1.5 {RATIO} (ref 1.2–2.2)
ALP SERPL-CCNC: 145 IU/L (ref 39–117)
ALT SERPL-CCNC: 15 IU/L (ref 0–32)
AST SERPL-CCNC: 16 IU/L (ref 0–40)
BILIRUB SERPL-MCNC: <0.2 MG/DL (ref 0–1.2)
BUN SERPL-MCNC: 22 MG/DL (ref 8–27)
BUN/CREAT SERPL: 26 (ref 12–28)
C PEPTIDE SERPL-MCNC: 1.3 NG/ML (ref 1.1–4.4)
CALCIUM SERPL-MCNC: 10 MG/DL (ref 8.7–10.3)
CHLORIDE SERPL-SCNC: 103 MMOL/L (ref 96–106)
CHOLEST SERPL-MCNC: 154 MG/DL (ref 100–199)
CO2 SERPL-SCNC: 24 MMOL/L (ref 20–29)
CREAT SERPL-MCNC: 0.86 MG/DL (ref 0.57–1)
GLOBULIN SER CALC-MCNC: 2.8 G/DL (ref 1.5–4.5)
GLUCOSE SERPL-MCNC: 172 MG/DL (ref 65–99)
HBA1C MFR BLD: 10.4 % (ref 4.8–5.6)
HDLC SERPL-MCNC: 76 MG/DL
INTERPRETATION: NORMAL
LDLC SERPL CALC-MCNC: 49 MG/DL (ref 0–99)
Lab: NORMAL
POTASSIUM SERPL-SCNC: 4.7 MMOL/L (ref 3.5–5.2)
PROT SERPL-MCNC: 7.1 G/DL (ref 6–8.5)
SODIUM SERPL-SCNC: 143 MMOL/L (ref 134–144)
TRIGL SERPL-MCNC: 147 MG/DL (ref 0–149)
URATE SERPL-MCNC: 5.3 MG/DL (ref 2.5–7.1)
VLDLC SERPL CALC-MCNC: 29 MG/DL (ref 5–40)

## 2019-03-01 NOTE — PROGRESS NOTES
Subjective   Di Macario is a 78 y.o. female.   She is here for regular follow-up for hypertension uncontrolled type 2 diabetes impaired memory dyslipidemia chronic pain of left knee and walker as ambulation aid  History of Present Illness   She is here for regular follow-up for hypertension which is stable on current medication uncontrolled type 2 diabetes which is getting better impaired memory which is stable at this time dyslipidemia which is stable on current medication chronic pain of left knee which is not getting better and walker as ambulation aid which she needs for her knee  The following portions of the patient's history were reviewed and updated as appropriate: allergies, current medications, past family history, past medical history, past social history, past surgical history and problem list.    Review of Systems   Constitutional: Negative for fatigue.   Genitourinary: Negative for difficulty urinating.   Musculoskeletal: Positive for arthralgias and gait problem.   Psychiatric/Behavioral: Positive for decreased concentration. Negative for dysphoric mood.   All other systems reviewed and are negative.      Objective   Physical Exam   Constitutional: She is oriented to person, place, and time. She appears well-developed and well-nourished. She is cooperative.   HENT:   Head: Normocephalic and atraumatic.   Right Ear: Hearing, tympanic membrane, external ear and ear canal normal.   Left Ear: Hearing, tympanic membrane, external ear and ear canal normal.   Nose: Nose normal.   Mouth/Throat: Uvula is midline, oropharynx is clear and moist and mucous membranes are normal.   Eyes: Conjunctivae, EOM and lids are normal. Pupils are equal, round, and reactive to light.   Neck: Phonation normal. Neck supple. Carotid bruit is not present.   Cardiovascular: Normal rate, regular rhythm and normal heart sounds. Exam reveals no gallop and no friction rub.   No murmur heard.  Pulmonary/Chest: Effort normal and  breath sounds normal. No respiratory distress.   Abdominal: Soft. Bowel sounds are normal. She exhibits no distension and no mass. There is no hepatosplenomegaly. There is no tenderness. There is no rebound and no guarding. No hernia.   Musculoskeletal: She exhibits no edema.        Left knee: Tenderness found.   Neurological: She is alert and oriented to person, place, and time. Gait abnormal. Coordination normal.   She requires a walker to assist her left knee from pain   Skin: Skin is warm and dry.   Psychiatric: She has a normal mood and affect. Her speech is normal and behavior is normal. Judgment and thought content normal. Cognition and memory are impaired.   Nursing note and vitals reviewed.      Assessment/Plan   Diagnoses and all orders for this visit:    Essential hypertension    Uncontrolled type 2 diabetes mellitus with hyperglycemia (CMS/HCC)    Impaired memory    Dyslipidemia    Chronic pain of left knee    Walker as ambulation aid      Hypertension well-controlled on current medication no changes  Uncontrolled type 2 diabetes getting better we will follow with endocrinology  Impaired memory about the same as before  Dyslipidemia stable on current medication  Chronic pain of left knee no better and she requires a walker  Walker is ambulation aid she has a gait disturbance because of her left knee pain

## 2019-03-05 ENCOUNTER — OFFICE VISIT (OUTPATIENT)
Dept: ENDOCRINOLOGY | Age: 79
End: 2019-03-05

## 2019-03-05 VITALS
SYSTOLIC BLOOD PRESSURE: 142 MMHG | WEIGHT: 189 LBS | DIASTOLIC BLOOD PRESSURE: 84 MMHG | HEIGHT: 63 IN | BODY MASS INDEX: 33.49 KG/M2

## 2019-03-05 DIAGNOSIS — R41.3 IMPAIRED MEMORY: ICD-10-CM

## 2019-03-05 DIAGNOSIS — E55.9 VITAMIN D DEFICIENCY: ICD-10-CM

## 2019-03-05 DIAGNOSIS — E78.5 DYSLIPIDEMIA: ICD-10-CM

## 2019-03-05 DIAGNOSIS — E83.52 CALCIUM BLOOD INCREASED: ICD-10-CM

## 2019-03-05 DIAGNOSIS — IMO0001 IDDM (INSULIN DEPENDENT DIABETES MELLITUS): ICD-10-CM

## 2019-03-05 DIAGNOSIS — G62.9 PERIPHERAL POLYNEUROPATHY: ICD-10-CM

## 2019-03-05 DIAGNOSIS — E11.65 UNCONTROLLED TYPE 2 DIABETES MELLITUS WITH HYPERGLYCEMIA (HCC): Primary | ICD-10-CM

## 2019-03-05 DIAGNOSIS — E79.0 HYPERURICEMIA: ICD-10-CM

## 2019-03-05 DIAGNOSIS — I10 ESSENTIAL HYPERTENSION: ICD-10-CM

## 2019-03-05 PROCEDURE — 99214 OFFICE O/P EST MOD 30 MIN: CPT | Performed by: NURSE PRACTITIONER

## 2019-03-05 RX ORDER — INSULIN GLARGINE 300 U/ML
50 INJECTION, SOLUTION SUBCUTANEOUS EVERY MORNING
Qty: 15 PEN | Refills: 3
Start: 2019-03-05 | End: 2019-10-10 | Stop reason: SDUPTHER

## 2019-03-05 NOTE — PATIENT INSTRUCTIONS
Continue toujeo 50 units and take once daily in morning  Stop glipizide   Start trulicity 0.75 mg once weekly  Stop tradjenta  Start metformin 500 mg 1 pill twice daily with food  Follow up with podiatrist for toenail trim  Call office if you have any blood sugars go less than 70 mg/dl

## 2019-03-05 NOTE — PROGRESS NOTES
"Subjective   Di Macario is a 78 y.o. female is here today for follow-up.  Chief Complaint   Patient presents with   • Diabetes     recent labs, testing BG 4 times daily, pt brought meter   • Hyperlipidemia     pt is not taking glimepiride   • Hypertension   • Vitamin D Deficiency   • dyslipidemia     /84   Ht 160 cm (63\")   Wt 85.7 kg (189 lb)   BMI 33.48 kg/m²   Current Outpatient Medications on File Prior to Visit   Medication Sig   • aspirin 81 MG tablet Take  by mouth.   • benazepril (LOTENSIN) 20 MG tablet TAKE 1 TABLET DAILY   • diltiaZEM (TIAZAC) 240 MG 24 hr capsule TAKE 1 CAPSULE DAILY   • donepezil (ARICEPT) 10 MG tablet Take 1 tablet by mouth Every Night.   • ergocalciferol (DRISDOL) 77069 units capsule Take 1 po q week   • ESTRACE VAGINAL 0.1 MG/GM vaginal cream    • glucose blood (FREESTYLE LITE) test strip Use to test BG 4x daily   • hydrochlorothiazide (MICROZIDE) 12.5 MG capsule Take 1 capsule by mouth Daily.   • Insulin Pen Needle (BD PEN NEEDLE ALYSSA U/F) 32G X 4 MM misc Use once a day   • JARDIANCE 10 MG tablet Take 1 tablet by mouth Daily.   • Lancets (FREESTYLE) lancets USE FOUR TIMES A DAY   • Multiple Vitamins-Minerals (MULTIVITAMIN MEN 50+ PO) Take 1 tablet by mouth Daily.   • pravastatin (PRAVACHOL) 40 MG tablet TAKE 1 TABLET EVERY EVENING   • TOUJEO SOLOSTAR 300 UNIT/ML solution pen-injector Inject 55 Units under the skin Every Morning. and every 3 days if FBS is greater than 120 add 2 units up to a max daily dose of 50 units   • TRADJENTA 5 MG tablet tablet Take 1 tablet by mouth Daily.   • zonisamide (ZONEGRAN) 100 MG capsule Take 100 mg by mouth 2 (Two) Times a Day.   • glimepiride (AMARYL) 4 MG tablet Take 2 tablets by mouth Every Morning Before Breakfast.   • Mirabegron ER (MYRBETRIQ) 25 MG tablet sustained-release 24 hour 24 hr tablet Take 25 mg by mouth Daily.   • [DISCONTINUED] Insulin Syringe-Needle U-100 31G X 5/16\" 0.3 ML misc Use with insulin injection.     No " current facility-administered medications on file prior to visit.      History reviewed. No pertinent family history.   Social History     Tobacco Use   • Smoking status: Former Smoker   Substance Use Topics   • Alcohol use: No   • Drug use: No     No Known Allergies      History of Present Illness  Encounter Diagnoses   Name Primary?   • Essential hypertension    • Vitamin D deficiency    • Uncontrolled type 2 diabetes mellitus with hyperglycemia (CMS/[facility]) Yes   • Peripheral polyneuropathy    • Calcium blood increased    • Hyperuricemia    78-year-old female patient here today for routine follow-up visit.  She has been seen for the above mentioned problems.  She had recent labs which were reviewed and she was provided a copy.  Her hemoglobin A1c has gone up and continues to go up each visit.  She is accompanied by her daughter.  She is a poor historian regarding her current therapies and previous medications prescribed.  She thinks she has been on metformin in the past however she is not sure.  Her medication list was reviewed and updated at today's visit.  She is stopped taking sulfonylureas on her own.  She is complaining of low blood sugars in the mornings sometimes as low as in the 60 range.  She is currently on Toujeo 55 units once daily.  She sees a podiatrist for routine foot care.  She does eat out a lot at restaurants with her  and states when she does eat out she will typically eat chicken with veggies.  She does sometimes have dessert.  She drinks diet Coke with her meals.  She is symptomatic with hyperglycemia based on her reports of increased urination as well as increased thirst.  Her calcium level is back down to normal from her last visit.  Her blood glucose download was reviewed.  Her lowest blood sugar reading according to her meter is 80 and her highest blood sugar reading has been 417.  Her levels tend to be lowest in the mornings when she wakes up and tend to be in the 2-300 range in the  afternoons and evenings.      The following portions of the patient's history were reviewed and updated as appropriate: allergies, current medications, past family history, past medical history, past social history, past surgical history and problem list.    Review of Systems   Constitutional: Negative for fatigue.   HENT: Negative for trouble swallowing.    Eyes: Negative for visual disturbance.   Cardiovascular: Negative for leg swelling.   Endocrine: Negative for polyuria.   Skin: Negative for wound.   Neurological: Negative for numbness.       Objective   Physical Exam   Constitutional: She is oriented to person, place, and time. She appears well-developed and well-nourished. No distress.   HENT:   Head: Normocephalic and atraumatic.   Left Ear: External ear normal.   Nose: Nose normal.   Mouth/Throat: Oropharynx is clear and moist. No oropharyngeal exudate.   Eyes: Conjunctivae and EOM are normal. Pupils are equal, round, and reactive to light. Right eye exhibits no discharge. Left eye exhibits no discharge. No scleral icterus.   Neck: Normal range of motion. Neck supple. No JVD present. No tracheal deviation present. No thyromegaly present.   Cardiovascular: Normal rate, regular rhythm, normal heart sounds and intact distal pulses. Exam reveals no gallop and no friction rub.   No murmur heard.  Pulmonary/Chest: Effort normal and breath sounds normal. No stridor. No respiratory distress. She has no wheezes. She has no rales. She exhibits no tenderness.   Abdominal: Soft. Bowel sounds are normal. She exhibits no distension and no mass. There is no tenderness. There is no rebound and no guarding. No hernia.   Musculoskeletal: Normal range of motion. She exhibits edema. She exhibits no tenderness or deformity.   1 + edema in lower ext.  Skin dry    Diabetic foot exam performed: Onychomycosis both feet, neuropathy present, dry flaky skin.   During the foot exam she had a monofilament test performed (Monofilament  test performed neuropathy present).  Vascular Status -  Her right foot exhibits abnormal foot edema. Her left foot exhibits abnormal foot edema.  Skin Integrity  -  Her right foot skin is intact.Her left foot skin is intact..  Lymphadenopathy:     She has no cervical adenopathy.   Neurological: She is alert and oriented to person, place, and time. She has normal reflexes. She displays normal reflexes. No cranial nerve deficit. She exhibits normal muscle tone. Coordination normal.   Skin: Skin is warm and dry. No rash noted. She is not diaphoretic. No erythema. No pallor.   Psychiatric: She has a normal mood and affect. Her behavior is normal. Judgment and thought content normal.   Nursing note and vitals reviewed.    Results for orders placed or performed in visit on 02/19/19   C-Peptide   Result Value Ref Range    C-Peptide 1.3 1.1 - 4.4 ng/mL   Uric Acid   Result Value Ref Range    Uric Acid 5.3 2.5 - 7.1 mg/dL   Hemoglobin A1c   Result Value Ref Range    Hemoglobin A1C 10.4 (H) 4.8 - 5.6 %   Vitamin D 25 Hydroxy   Result Value Ref Range    25 Hydroxy, Vitamin D 34.0 30.0 - 100.0 ng/mL   Lipid Panel   Result Value Ref Range    Total Cholesterol 154 100 - 199 mg/dL    Triglycerides 147 0 - 149 mg/dL    HDL Cholesterol 76 >39 mg/dL    VLDL Cholesterol 29 5 - 40 mg/dL    LDL Cholesterol  49 0 - 99 mg/dL   Comprehensive Metabolic Panel   Result Value Ref Range    Glucose 172 (H) 65 - 99 mg/dL    BUN 22 8 - 27 mg/dL    Creatinine 0.86 0.57 - 1.00 mg/dL    eGFR Non African Am 64 >59 mL/min/1.73    eGFR African Am 74 >59 mL/min/1.73    BUN/Creatinine Ratio 26 12 - 28    Sodium 143 134 - 144 mmol/L    Potassium 4.7 3.5 - 5.2 mmol/L    Chloride 103 96 - 106 mmol/L    Total CO2 24 20 - 29 mmol/L    Calcium 10.0 8.7 - 10.3 mg/dL    Total Protein 7.1 6.0 - 8.5 g/dL    Albumin 4.3 3.5 - 4.8 g/dL    Globulin 2.8 1.5 - 4.5 g/dL    A/G Ratio 1.5 1.2 - 2.2    Total Bilirubin <0.2 0.0 - 1.2 mg/dL    Alkaline Phosphatase 145 (H) 39  "- 117 IU/L    AST (SGOT) 16 0 - 40 IU/L    ALT (SGPT) 15 0 - 32 IU/L   Cardiovascular Risk Assessment   Result Value Ref Range    Interpretation Note    Diabetes Patient Education   Result Value Ref Range    PDF Image Not applicable          Assessment/Plan   Problems Addressed this Visit        Cardiovascular and Mediastinum    Essential hypertension       Digestive    Vitamin D deficiency       Endocrine    Uncontrolled type 2 diabetes mellitus with hyperglycemia (CMS/HCC) - Primary       Nervous and Auditory    Peripheral neuropathy       Other    Hyperuricemia    Calcium blood increased        In summary, patient was seen and examined.  Her medication list was reviewed and updated.  She has been prescribed Trulicity once weekly in place of Tradjenta which has been discontinued.  She is to stop her glipizide and start metformin 500 mg 1 pill twice daily.  She has been advised to decrease her insulin to 50 units because of symptomatic low blood sugars of 60 and 70 range in the mornings on occasion.  She is to follow-up with her podiatrist for routine nail care.  I have asked that they contact the office if she has any hypoglycemic events of blood sugars less than 70.  They were provided information regarding her current therapies and mechanism of action of the drugs.  She is to follow-up with Dr. Murcia next visit  Continue toujeo 50 units and take once daily in morning  Stop glipizide   Start trulicity 0.75 mg once weekly  Stop tradjenta  Start metformin 500 mg 1 pill twice daily with food  Follow up with podiatrist for toenail trim  Call office if you have any blood sugars go less than 70 mg/dl       Medications Discontinued During This Encounter   Medication Reason   • Insulin Syringe-Needle U-100 31G X 5/16\" 0.3 ML misc *Therapy completed   • TRADJENTA 5 MG tablet tablet    • glimepiride (AMARYL) 4 MG tablet Non-compliance   • TOUJEO SOLOSTAR 300 UNIT/ML solution pen-injector        "

## 2019-03-15 DIAGNOSIS — IMO0001 IDDM (INSULIN DEPENDENT DIABETES MELLITUS): ICD-10-CM

## 2019-03-15 DIAGNOSIS — R41.3 IMPAIRED MEMORY: ICD-10-CM

## 2019-03-15 DIAGNOSIS — E78.5 DYSLIPIDEMIA: ICD-10-CM

## 2019-03-15 DIAGNOSIS — I10 ESSENTIAL HYPERTENSION: ICD-10-CM

## 2019-03-15 DIAGNOSIS — E55.9 VITAMIN D DEFICIENCY: ICD-10-CM

## 2019-03-15 DIAGNOSIS — E79.0 HYPERURICEMIA: ICD-10-CM

## 2019-03-15 RX ORDER — INSULIN GLARGINE 300 U/ML
INJECTION, SOLUTION SUBCUTANEOUS
Qty: 22.5 ML | Refills: 3 | Status: SHIPPED | OUTPATIENT
Start: 2019-03-15 | End: 2019-10-10 | Stop reason: SDUPTHER

## 2019-04-19 ENCOUNTER — TELEPHONE (OUTPATIENT)
Dept: ENDOCRINOLOGY | Age: 79
End: 2019-04-19

## 2019-04-19 DIAGNOSIS — I10 ESSENTIAL HYPERTENSION: ICD-10-CM

## 2019-04-19 DIAGNOSIS — E78.5 DYSLIPIDEMIA: ICD-10-CM

## 2019-04-19 DIAGNOSIS — R41.3 IMPAIRED MEMORY: ICD-10-CM

## 2019-04-19 DIAGNOSIS — IMO0002 UNCONTROLLED TYPE 2 DIABETES MELLITUS: ICD-10-CM

## 2019-04-19 DIAGNOSIS — E55.9 VITAMIN D DEFICIENCY: ICD-10-CM

## 2019-04-19 RX ORDER — DILTIAZEM HYDROCHLORIDE 240 MG/1
240 CAPSULE, EXTENDED RELEASE ORAL DAILY
Qty: 90 CAPSULE | Refills: 0 | Status: SHIPPED | OUTPATIENT
Start: 2019-04-19 | End: 2019-04-19 | Stop reason: SDUPTHER

## 2019-04-19 RX ORDER — DILTIAZEM HYDROCHLORIDE 240 MG/1
240 CAPSULE, EXTENDED RELEASE ORAL DAILY
Qty: 10 CAPSULE | Refills: 0 | Status: SHIPPED | OUTPATIENT
Start: 2019-04-19 | End: 2019-04-19 | Stop reason: SDUPTHER

## 2019-04-19 RX ORDER — DILTIAZEM HYDROCHLORIDE 240 MG/1
240 CAPSULE, EXTENDED RELEASE ORAL DAILY
Qty: 10 CAPSULE | Refills: 0 | Status: SHIPPED | OUTPATIENT
Start: 2019-04-19 | End: 2019-12-16 | Stop reason: SDUPTHER

## 2019-04-19 NOTE — TELEPHONE ENCOUNTER
rx has been sent    ----- Message from Martha Farmer sent at 4/19/2019  2:05 PM EDT -----  Contact: patient   Patient said she is out of Diltiazem HCL caps, 240 mg, 1 capsule daily and requested you send script to [facility] for 90 day supply and 10 day supply sent to [facility] [phone]     Pt - [phone]

## 2019-05-07 DIAGNOSIS — R41.3 IMPAIRED MEMORY: ICD-10-CM

## 2019-05-07 DIAGNOSIS — E55.9 VITAMIN D DEFICIENCY: ICD-10-CM

## 2019-05-07 DIAGNOSIS — IMO0002 UNCONTROLLED TYPE 2 DIABETES MELLITUS: ICD-10-CM

## 2019-05-07 DIAGNOSIS — E78.5 DYSLIPIDEMIA: ICD-10-CM

## 2019-05-07 DIAGNOSIS — I10 ESSENTIAL HYPERTENSION: ICD-10-CM

## 2019-05-08 RX ORDER — DONEPEZIL HYDROCHLORIDE 10 MG/1
TABLET, FILM COATED ORAL
Qty: 90 TABLET | Refills: 0 | Status: SHIPPED | OUTPATIENT
Start: 2019-05-08 | End: 2019-07-15 | Stop reason: SDUPTHER

## 2019-05-08 RX ORDER — HYDROCHLOROTHIAZIDE 12.5 MG/1
CAPSULE, GELATIN COATED ORAL
Qty: 90 CAPSULE | Refills: 0 | Status: SHIPPED | OUTPATIENT
Start: 2019-05-08 | End: 2019-07-15 | Stop reason: SDUPTHER

## 2019-05-14 RX ORDER — ERGOCALCIFEROL 1.25 MG/1
CAPSULE ORAL
Qty: 12 CAPSULE | Refills: 0 | Status: SHIPPED | OUTPATIENT
Start: 2019-05-14 | End: 2019-10-09 | Stop reason: SDUPTHER

## 2019-06-25 DIAGNOSIS — IMO0002 UNCONTROLLED TYPE 2 DIABETES MELLITUS: ICD-10-CM

## 2019-06-25 DIAGNOSIS — R41.3 IMPAIRED MEMORY: ICD-10-CM

## 2019-06-25 DIAGNOSIS — E55.9 VITAMIN D DEFICIENCY: ICD-10-CM

## 2019-06-25 DIAGNOSIS — E78.5 DYSLIPIDEMIA: ICD-10-CM

## 2019-06-25 DIAGNOSIS — I10 ESSENTIAL HYPERTENSION: ICD-10-CM

## 2019-06-25 RX ORDER — DILTIAZEM HYDROCHLORIDE 240 MG/1
CAPSULE, EXTENDED RELEASE ORAL
Qty: 90 CAPSULE | Refills: 0 | Status: SHIPPED | OUTPATIENT
Start: 2019-06-25 | End: 2019-10-10 | Stop reason: SDUPTHER

## 2019-07-15 DIAGNOSIS — R41.3 IMPAIRED MEMORY: ICD-10-CM

## 2019-07-15 DIAGNOSIS — E55.9 VITAMIN D DEFICIENCY: ICD-10-CM

## 2019-07-15 DIAGNOSIS — E78.5 DYSLIPIDEMIA: ICD-10-CM

## 2019-07-15 DIAGNOSIS — I10 ESSENTIAL HYPERTENSION: ICD-10-CM

## 2019-07-15 DIAGNOSIS — IMO0002 UNCONTROLLED TYPE 2 DIABETES MELLITUS: ICD-10-CM

## 2019-07-15 RX ORDER — DONEPEZIL HYDROCHLORIDE 10 MG/1
TABLET, FILM COATED ORAL
Qty: 90 TABLET | Refills: 0 | Status: SHIPPED | OUTPATIENT
Start: 2019-07-15 | End: 2019-10-13 | Stop reason: SDUPTHER

## 2019-07-15 RX ORDER — HYDROCHLOROTHIAZIDE 12.5 MG/1
CAPSULE, GELATIN COATED ORAL
Qty: 90 CAPSULE | Refills: 0 | Status: SHIPPED | OUTPATIENT
Start: 2019-07-15 | End: 2019-10-13 | Stop reason: SDUPTHER

## 2019-09-26 DIAGNOSIS — E55.9 VITAMIN D DEFICIENCY: ICD-10-CM

## 2019-09-26 DIAGNOSIS — E78.5 DYSLIPIDEMIA: ICD-10-CM

## 2019-09-26 DIAGNOSIS — I10 ESSENTIAL HYPERTENSION: ICD-10-CM

## 2019-09-26 DIAGNOSIS — R41.3 IMPAIRED MEMORY: ICD-10-CM

## 2019-09-26 RX ORDER — BENAZEPRIL HYDROCHLORIDE 20 MG/1
20 TABLET ORAL DAILY
Qty: 90 TABLET | Refills: 0 | Status: SHIPPED | OUTPATIENT
Start: 2019-09-26 | End: 2020-03-27 | Stop reason: SDUPTHER

## 2019-09-26 RX ORDER — PRAVASTATIN SODIUM 40 MG
40 TABLET ORAL EVERY EVENING
Qty: 90 TABLET | Refills: 0 | Status: SHIPPED | OUTPATIENT
Start: 2019-09-26 | End: 2019-10-10 | Stop reason: SDUPTHER

## 2019-09-27 LAB
25(OH)D3+25(OH)D2 SERPL-MCNC: 22.3 NG/ML (ref 30–100)
ALBUMIN SERPL-MCNC: 4.1 G/DL (ref 3.5–5.2)
ALBUMIN/GLOB SERPL: 1.5 G/DL
ALP SERPL-CCNC: 120 U/L (ref 39–117)
ALT SERPL-CCNC: 12 U/L (ref 1–33)
AST SERPL-CCNC: 11 U/L (ref 1–32)
BILIRUB SERPL-MCNC: 0.2 MG/DL (ref 0.2–1.2)
BUN SERPL-MCNC: 22 MG/DL (ref 8–23)
BUN/CREAT SERPL: 27.2 (ref 7–25)
C PEPTIDE SERPL-MCNC: 1.7 NG/ML (ref 1.1–4.4)
CALCIUM SERPL-MCNC: 9.7 MG/DL (ref 8.6–10.5)
CHLORIDE SERPL-SCNC: 101 MMOL/L (ref 98–107)
CHOLEST SERPL-MCNC: 194 MG/DL (ref 0–200)
CO2 SERPL-SCNC: 28 MMOL/L (ref 22–29)
CREAT SERPL-MCNC: 0.81 MG/DL (ref 0.57–1)
GLOBULIN SER CALC-MCNC: 2.8 GM/DL
GLUCOSE SERPL-MCNC: 256 MG/DL (ref 65–99)
HBA1C MFR BLD: 10.4 % (ref 4.8–5.6)
HDLC SERPL-MCNC: 62 MG/DL (ref 40–60)
INTERPRETATION: NORMAL
LDLC SERPL CALC-MCNC: 100 MG/DL (ref 0–100)
Lab: NORMAL
MICROALBUMIN UR-MCNC: 69.8 UG/ML
POTASSIUM SERPL-SCNC: 4.4 MMOL/L (ref 3.5–5.2)
PROT SERPL-MCNC: 6.9 G/DL (ref 6–8.5)
SODIUM SERPL-SCNC: 141 MMOL/L (ref 136–145)
T4 SERPL-MCNC: 6.59 MCG/DL (ref 4.5–11.7)
TRIGL SERPL-MCNC: 162 MG/DL (ref 0–150)
TSH SERPL DL<=0.005 MIU/L-ACNC: 2.23 UIU/ML (ref 0.27–4.2)
URATE SERPL-MCNC: 5.9 MG/DL (ref 2.4–5.7)
VLDLC SERPL CALC-MCNC: 32.4 MG/DL

## 2019-10-08 ENCOUNTER — TELEPHONE (OUTPATIENT)
Dept: ENDOCRINOLOGY | Age: 79
End: 2019-10-08

## 2019-10-08 NOTE — TELEPHONE ENCOUNTER
LOOKING FOR THE PRESCRIPTION FOR THE NEEDLES SENT TO [facility]    [facility] SAYS IT NEEDS A  PA FOR THE NEEDLES FOR HER INSULIN

## 2019-10-09 ENCOUNTER — TELEPHONE (OUTPATIENT)
Dept: ENDOCRINOLOGY | Age: 79
End: 2019-10-09

## 2019-10-09 RX ORDER — ERGOCALCIFEROL 1.25 MG/1
CAPSULE ORAL
Qty: 12 CAPSULE | Refills: 4 | Status: CANCELLED | OUTPATIENT
Start: 2019-10-09

## 2019-10-09 RX ORDER — ERGOCALCIFEROL 1.25 MG/1
CAPSULE ORAL
Qty: 12 CAPSULE | Refills: 0 | Status: SHIPPED | OUTPATIENT
Start: 2019-10-09 | End: 2019-10-10

## 2019-10-09 NOTE — TELEPHONE ENCOUNTER
Monserrat from  and [facility]  Office called requesting last office visit notes to be faxed to them at [phone]

## 2019-10-10 ENCOUNTER — OFFICE VISIT (OUTPATIENT)
Dept: ENDOCRINOLOGY | Age: 79
End: 2019-10-10

## 2019-10-10 VITALS
SYSTOLIC BLOOD PRESSURE: 126 MMHG | BODY MASS INDEX: 32.92 KG/M2 | HEIGHT: 63 IN | WEIGHT: 185.8 LBS | DIASTOLIC BLOOD PRESSURE: 60 MMHG

## 2019-10-10 DIAGNOSIS — IMO0001 IDDM (INSULIN DEPENDENT DIABETES MELLITUS): ICD-10-CM

## 2019-10-10 DIAGNOSIS — E11.65 UNCONTROLLED TYPE 2 DIABETES MELLITUS WITH HYPERGLYCEMIA (HCC): Primary | ICD-10-CM

## 2019-10-10 DIAGNOSIS — E78.5 DYSLIPIDEMIA: ICD-10-CM

## 2019-10-10 DIAGNOSIS — E55.9 VITAMIN D DEFICIENCY: ICD-10-CM

## 2019-10-10 DIAGNOSIS — I10 ESSENTIAL HYPERTENSION: ICD-10-CM

## 2019-10-10 DIAGNOSIS — R41.3 IMPAIRED MEMORY: ICD-10-CM

## 2019-10-10 DIAGNOSIS — E79.0 HYPERURICEMIA: ICD-10-CM

## 2019-10-10 PROCEDURE — 99215 OFFICE O/P EST HI 40 MIN: CPT | Performed by: INTERNAL MEDICINE

## 2019-10-10 RX ORDER — PRAVASTATIN SODIUM 40 MG
40 TABLET ORAL EVERY EVENING
Qty: 90 TABLET | Refills: 3 | Status: SHIPPED | OUTPATIENT
Start: 2019-10-10 | End: 2020-10-29 | Stop reason: SDUPTHER

## 2019-10-10 RX ORDER — DULAGLUTIDE 1.5 MG/.5ML
1.5 INJECTION, SOLUTION SUBCUTANEOUS WEEKLY
Qty: 12 PEN | Refills: 3 | Status: SHIPPED | OUTPATIENT
Start: 2019-10-10 | End: 2020-10-29 | Stop reason: SDUPTHER

## 2019-10-10 RX ORDER — EMPAGLIFLOZIN 25 MG/1
1 TABLET, FILM COATED ORAL DAILY
Qty: 90 TABLET | Refills: 3 | Status: SHIPPED | OUTPATIENT
Start: 2019-10-10 | End: 2020-09-22 | Stop reason: SDUPTHER

## 2019-10-10 RX ORDER — INSULIN GLARGINE 300 U/ML
75 INJECTION, SOLUTION SUBCUTANEOUS EVERY MORNING
Qty: 15 PEN | Refills: 3
Start: 2019-10-10 | End: 2020-10-29

## 2019-10-10 RX ORDER — ERGOCALCIFEROL 1.25 MG/1
50000 CAPSULE ORAL 2 TIMES WEEKLY
Qty: 26 CAPSULE | Refills: 3 | Status: SHIPPED | OUTPATIENT
Start: 2019-10-10 | End: 2020-10-09

## 2019-10-10 RX ORDER — INFLUENZA A VIRUS A/MICHIGAN/45/2015 X-275 (H1N1) ANTIGEN (FORMALDEHYDE INACTIVATED), INFLUENZA A VIRUS A/SINGAPORE/INFIMH-16-0019/2016 IVR-186 (H3N2) ANTIGEN (FORMALDEHYDE INACTIVATED), AND INFLUENZA B VIRUS B/MARYLAND/15/2016 BX-69A (A B/COLORADO/6/2017-LIKE VIRUS) ANTIGEN (FORMALDEHYDE INACTIVATED) 60; 60; 60 UG/.5ML; UG/.5ML; UG/.5ML
INJECTION, SUSPENSION INTRAMUSCULAR
COMMUNITY
Start: 2019-09-30 | End: 2021-01-20

## 2019-10-10 NOTE — PROGRESS NOTES
"Subjective   Di Macario is a 79 y.o. female for follow up for DM2, HTN, vit d deficiency, lab review. Patient is checking BG 3 times a day. No BG readings. She denies hypoglycemic episode.    /60   Ht 160 cm (63\")   Wt 84.3 kg (185 lb 12.8 oz)   BMI 32.91 kg/m²     No Known Allergies      Current Outpatient Medications:   •  aspirin 81 MG tablet, Take  by mouth., Disp: , Rfl:   •  benazepril (LOTENSIN) 20 MG tablet, Take 1 tablet by mouth Daily., Disp: 90 tablet, Rfl: 0  •  diltiaZEM (TIAZAC) 240 MG 24 hr capsule, Take 1 capsule by mouth Daily., Disp: 10 capsule, Rfl: 0  •  donepezil (ARICEPT) 10 MG tablet, TAKE 1 TABLET EVERY NIGHT, Disp: 90 tablet, Rfl: 0  •  Dulaglutide (TRULICITY) 0.75 MG/0.5ML solution pen-injector, Inject 0.75 mg under the skin into the appropriate area as directed 1 (One) Time Per Week., Disp: 4 pen, Rfl: 5  •  ESTRACE VAGINAL 0.1 MG/GM vaginal cream, , Disp: , Rfl:   •  FLUZONE HIGH-DOSE 0.5 ML suspension prefilled syringe injection, , Disp: , Rfl:   •  glucose blood (FREESTYLE LITE) test strip, Use to test BG 4x daily, Disp: 400 each, Rfl: 0  •  hydrochlorothiazide (MICROZIDE) 12.5 MG capsule, TAKE 1 CAPSULE DAILY, Disp: 90 capsule, Rfl: 0  •  Insulin Pen Needle (BD PEN NEEDLE ALYSSA U/F) 32G X 4 MM misc, Use once a day, Disp: 100 each, Rfl: 0  •  JARDIANCE 10 MG tablet, Take 1 tablet by mouth Daily., Disp: 30 tablet, Rfl: 5  •  Lancets (FREESTYLE) lancets, USE FOUR TIMES A DAY, Disp: 400 each, Rfl: 1  •  metFORMIN (GLUCOPHAGE) 500 MG tablet, Take 1 tablet by mouth 2 (Two) Times a Day With Meals., Disp: 180 tablet, Rfl: 0  •  Mirabegron ER (MYRBETRIQ) 25 MG tablet sustained-release 24 hour 24 hr tablet, Take 25 mg by mouth Daily., Disp: , Rfl:   •  Multiple Vitamins-Minerals (MULTIVITAMIN MEN 50+ PO), Take 1 tablet by mouth Daily., Disp: , Rfl:   •  pravastatin (PRAVACHOL) 40 MG tablet, Take 1 tablet by mouth Every Evening., Disp: 90 tablet, Rfl: 0  •  TOUJEO SOLOSTAR 300 " UNIT/ML solution pen-injector, Inject 50 Units under the skin into the appropriate area as directed Every Morning., Disp: 15 pen, Rfl: 3  •  vitamin D (ERGOCALCIFEROL) 08531 units capsule capsule, TAKE 1 CAPSULE EVERY WEEK, Disp: 12 capsule, Rfl: 0  •  zonisamide (ZONEGRAN) 100 MG capsule, Take 100 mg by mouth 2 (Two) Times a Day., Disp: , Rfl:       History of Present Illness this is an 79-year-old female known patient with type 2 diabetes hypertension and dyslipidemia as well as vitamin D deficiency.  Over the course of last 6 months she has had no significant health problem for which to go to the ER or hospital.  She is accompanied today by her daughter who provide part of the information.  She has not tried Trulicity nor has she tried Jardiance    The following portions of the patient's history were reviewed and updated as appropriate: allergies, current medications, past family history, past medical history, past social history, past surgical history and problem list.    Review of Systems   Constitutional: Negative.    HENT: Negative.    Eyes: Negative.    Respiratory: Negative.    Cardiovascular: Negative.    Gastrointestinal: Negative.    Endocrine: Negative.    Genitourinary: Negative.    Musculoskeletal: Negative.    Skin: Negative.    Allergic/Immunologic: Negative.    Neurological: Negative.    Hematological: Negative.    Psychiatric/Behavioral: Negative.    The above review of system was reviewed and corroborated and accepted    Objective      Lab Results   Component Value Date    BUN 22 09/26/2019    CREATININE 0.81 09/26/2019    EGFRIFNONA 68 09/26/2019    EGFRIFAFRI 82 09/26/2019    BCR 27.2 (H) 09/26/2019    K 4.4 09/26/2019    CO2 28.0 09/26/2019    CALCIUM 9.7 09/26/2019    PROTENTOTREF 6.9 09/26/2019    ALBUMIN 4.10 09/26/2019    LABIL2 1.5 09/26/2019    AST 11 09/26/2019    ALT 12 09/26/2019     Lab Results   Component Value Date    HGBA1C 10.40 (H) 09/26/2019       Lab Results   Component Value  Date    CHLPL 194 09/26/2019    CHLPL 154 02/19/2019    CHLPL 181 10/25/2018     Lab Results   Component Value Date    TRIG 162 (H) 09/26/2019    TRIG 147 02/19/2019    TRIG 245 (H) 10/25/2018     Lab Results   Component Value Date    HDL 62 (H) 09/26/2019    HDL 76 02/19/2019    HDL 78 (H) 10/25/2018     Lab Results   Component Value Date     09/26/2019    LDL 49 02/19/2019    LDL 54 10/25/2018       Lab Results   Component Value Date    TSH 2.230 09/26/2019       Physical Exam   Constitutional: She is oriented to person, place, and time. She appears well-developed and well-nourished. No distress.   HENT:   Head: Normocephalic and atraumatic.   Left Ear: External ear normal.   Nose: Nose normal.   Mouth/Throat: Oropharynx is clear and moist. No oropharyngeal exudate.   Eyes: Conjunctivae and EOM are normal. Pupils are equal, round, and reactive to light. Right eye exhibits no discharge. Left eye exhibits no discharge. No scleral icterus.   Neck: Normal range of motion. Neck supple. No JVD present. No tracheal deviation present. No thyromegaly present.   Cardiovascular: Normal rate, regular rhythm, normal heart sounds and intact distal pulses. Exam reveals no gallop and no friction rub.   No murmur heard.  Pulmonary/Chest: Effort normal and breath sounds normal. No stridor. No respiratory distress. She has no wheezes. She has no rales. She exhibits no tenderness.   Abdominal: Soft. Bowel sounds are normal. She exhibits no distension and no mass. There is no tenderness. There is no rebound and no guarding. No hernia.   Musculoskeletal: Normal range of motion. She exhibits no edema, tenderness or deformity.   Lymphadenopathy:     She has no cervical adenopathy.   Neurological: She is alert and oriented to person, place, and time. She has normal reflexes. She displays normal reflexes. No cranial nerve deficit or sensory deficit. She exhibits normal muscle tone. Coordination normal.   Skin: Skin is warm and dry.  No rash noted. She is not diaphoretic. No erythema. No pallor.   Psychiatric: She has a normal mood and affect. Her behavior is normal. Judgment and thought content normal.   Nursing note and vitals reviewed.    No change in physical examination since 7/13/2018 office visit.    Assessment/Plan   Diagnoses and all orders for this visit:    Uncontrolled type 2 diabetes mellitus with hyperglycemia (CMS/[facility])  -     T4 & TSH ([facility]); Future  -     Uric Acid; Future  -     Vitamin D 25 Hydroxy; Future  -     Comprehensive Metabolic Panel; Future  -     C-Peptide; Future  -     Hemoglobin A1c; Future  -     MicroAlbumin, Urine, Random - Urine, Clean Catch; Future  -     NMR LipoProfile; Future    Essential hypertension  -     TOUJEO SOLOSTAR 300 UNIT/ML solution pen-injector injection; Inject 75 Units under the skin into the appropriate area as directed Every Morning.  -     T4 & TSH ([facility]); Future  -     Uric Acid; Future  -     Vitamin D 25 Hydroxy; Future  -     Comprehensive Metabolic Panel; Future  -     C-Peptide; Future  -     Hemoglobin A1c; Future  -     MicroAlbumin, Urine, Random - Urine, Clean Catch; Future  -     NMR LipoProfile; Future    Vitamin D deficiency  -     TOUJEO SOLOSTAR 300 UNIT/ML solution pen-injector injection; Inject 75 Units under the skin into the appropriate area as directed Every Morning.  -     T4 & TSH ([facility]); Future  -     Uric Acid; Future  -     Vitamin D 25 Hydroxy; Future  -     Comprehensive Metabolic Panel; Future  -     C-Peptide; Future  -     Hemoglobin A1c; Future  -     MicroAlbumin, Urine, Random - Urine, Clean Catch; Future  -     NMR LipoProfile; Future    Dyslipidemia  -     TOUJEO SOLOSTAR 300 UNIT/ML solution pen-injector injection; Inject 75 Units under the skin into the appropriate area as directed Every Morning.  -     T4 & TSH ([facility]); Future  -     Uric Acid; Future  -     Vitamin D 25 Hydroxy; Future  -     Comprehensive Metabolic Panel; Future  -      C-Peptide; Future  -     Hemoglobin A1c; Future  -     MicroAlbumin, Urine, Random - Urine, Clean Catch; Future  -     NMR LipoProfile; Future    Hyperuricemia  -     TOUJEO SOLOSTAR 300 UNIT/ML solution pen-injector injection; Inject 75 Units under the skin into the appropriate area as directed Every Morning.  -     T4 & TSH ([facility]); Future  -     Uric Acid; Future  -     Vitamin D 25 Hydroxy; Future  -     Comprehensive Metabolic Panel; Future  -     C-Peptide; Future  -     Hemoglobin A1c; Future  -     MicroAlbumin, Urine, Random - Urine, Clean Catch; Future  -     NMR LipoProfile; Future    IDDM (insulin dependent diabetes mellitus) (CMS/[facility])  -     TOUJEO SOLOSTAR 300 UNIT/ML solution pen-injector injection; Inject 75 Units under the skin into the appropriate area as directed Every Morning.  -     T4 & TSH ([facility]); Future  -     Uric Acid; Future  -     Vitamin D 25 Hydroxy; Future  -     Comprehensive Metabolic Panel; Future  -     C-Peptide; Future  -     Hemoglobin A1c; Future  -     MicroAlbumin, Urine, Random - Urine, Clean Catch; Future  -     NMR LipoProfile; Future    Impaired memory  -     TOUJEO SOLOSTAR 300 UNIT/ML solution pen-injector injection; Inject 75 Units under the skin into the appropriate area as directed Every Morning.  -     T4 & TSH ([facility]); Future  -     Uric Acid; Future  -     Vitamin D 25 Hydroxy; Future  -     Comprehensive Metabolic Panel; Future  -     C-Peptide; Future  -     Hemoglobin A1c; Future  -     MicroAlbumin, Urine, Random - Urine, Clean Catch; Future  -     NMR LipoProfile; Future    Other orders  -     TRULICITY 1.5 MG/0.5ML solution pen-injector; Inject 1.5 mg under the skin into the appropriate area as directed 1 (One) Time Per Week.  -     JARDIANCE 25 MG tablet; Take 25 mg by mouth Daily.  -     metFORMIN (GLUCOPHAGE) 500 MG tablet; Take 1 tablet by mouth 2 (Two) Times a Day With Meals.  -     ergocalciferol (DRISDOL) 56208 units capsule; Take 1 capsule by  mouth 2 (Two) Times a Week.  -     pravastatin (PRAVACHOL) 40 MG tablet; Take 1 tablet by mouth Every Evening.      In summary I saw and examined this 79-year-old female for above-mentioned problems.  I reviewed her laboratory evaluation of 9/26/2019 and provided her with a hard copy of it.  Her hemoglobin A1c continues to be high at 10.4 which was exactly the same as office visit of 2/19/2019.  Her vitamin D also remains low and therefore I emphasized the importance of going back to starting Trulicity at 0.75 mg weekly for 2 weeks and Jardiance 10 mg every morning for 4 weeks and then I will go ahead and increase her Trulicity to 1.5 mg once weekly and her Jardiance to 25 mg every morning and increase the dose of Toujeo to 60 units every morning and if fasting blood glucose is greater than 120 increase that dose by 2 units every 3 days up to 75 units every morning.  I assured her and her daughter that the medications that were prescribed to her on 3/5/2019 are mostly safe with rare side effects.  And finally increase the vitamin D to 50,000 units twice weekly.  This office visit lasted 40 minutes of which 25 minutes was a spent on face-to-face counseling and education as well as planning her future care moving forward.  She will see Ms. Nida Gray in 6 months or sooner if needed with laboratory evaluation prior to each office visit.

## 2019-10-13 DIAGNOSIS — I10 ESSENTIAL HYPERTENSION: ICD-10-CM

## 2019-10-13 DIAGNOSIS — R41.3 IMPAIRED MEMORY: ICD-10-CM

## 2019-10-13 DIAGNOSIS — IMO0002 UNCONTROLLED TYPE 2 DIABETES MELLITUS: ICD-10-CM

## 2019-10-13 DIAGNOSIS — E78.5 DYSLIPIDEMIA: ICD-10-CM

## 2019-10-13 DIAGNOSIS — E55.9 VITAMIN D DEFICIENCY: ICD-10-CM

## 2019-10-13 RX ORDER — HYDROCHLOROTHIAZIDE 12.5 MG/1
CAPSULE, GELATIN COATED ORAL
Qty: 90 CAPSULE | Refills: 4 | Status: SHIPPED | OUTPATIENT
Start: 2019-10-13 | End: 2020-10-29 | Stop reason: SDUPTHER

## 2019-10-13 RX ORDER — DONEPEZIL HYDROCHLORIDE 10 MG/1
TABLET, FILM COATED ORAL
Qty: 90 TABLET | Refills: 4 | Status: SHIPPED | OUTPATIENT
Start: 2019-10-13 | End: 2021-02-17

## 2019-10-14 ENCOUNTER — TELEPHONE (OUTPATIENT)
Dept: ENDOCRINOLOGY | Age: 79
End: 2019-10-14

## 2019-10-14 NOTE — TELEPHONE ENCOUNTER
Patient requested office note 10-10-19 to be  faxed to [facility], [address] [address], fax [phone], ph. [phone] for diabetic shoes    Pt - [phone]

## 2019-10-18 RX ORDER — BLOOD-GLUCOSE METER
KIT MISCELLANEOUS
Qty: 400 EACH | Refills: 0 | Status: SHIPPED | OUTPATIENT
Start: 2019-10-18 | End: 2020-03-27 | Stop reason: SDUPTHER

## 2019-10-18 RX ORDER — BLOOD-GLUCOSE METER
KIT MISCELLANEOUS
Refills: 3 | Status: CANCELLED | OUTPATIENT
Start: 2019-10-18

## 2019-12-13 ENCOUNTER — TELEPHONE (OUTPATIENT)
Dept: ENDOCRINOLOGY | Age: 79
End: 2019-12-13

## 2019-12-16 DIAGNOSIS — I10 ESSENTIAL HYPERTENSION: ICD-10-CM

## 2019-12-16 DIAGNOSIS — IMO0002 UNCONTROLLED TYPE 2 DIABETES MELLITUS: ICD-10-CM

## 2019-12-16 DIAGNOSIS — R41.3 IMPAIRED MEMORY: ICD-10-CM

## 2019-12-16 DIAGNOSIS — E55.9 VITAMIN D DEFICIENCY: ICD-10-CM

## 2019-12-16 DIAGNOSIS — E78.5 DYSLIPIDEMIA: ICD-10-CM

## 2019-12-16 RX ORDER — DILTIAZEM HYDROCHLORIDE 240 MG/1
240 CAPSULE, EXTENDED RELEASE ORAL DAILY
Qty: 10 CAPSULE | Refills: 0 | Status: SHIPPED | OUTPATIENT
Start: 2019-12-16 | End: 2020-01-10 | Stop reason: SDUPTHER

## 2020-01-09 ENCOUNTER — TELEPHONE (OUTPATIENT)
Dept: ENDOCRINOLOGY | Age: 80
End: 2020-01-09

## 2020-01-10 DIAGNOSIS — R41.3 IMPAIRED MEMORY: ICD-10-CM

## 2020-01-10 DIAGNOSIS — I10 ESSENTIAL HYPERTENSION: ICD-10-CM

## 2020-01-10 DIAGNOSIS — E78.5 DYSLIPIDEMIA: ICD-10-CM

## 2020-01-10 DIAGNOSIS — IMO0002 UNCONTROLLED TYPE 2 DIABETES MELLITUS: ICD-10-CM

## 2020-01-10 DIAGNOSIS — E55.9 VITAMIN D DEFICIENCY: ICD-10-CM

## 2020-01-10 RX ORDER — DILTIAZEM HYDROCHLORIDE 240 MG/1
240 CAPSULE, EXTENDED RELEASE ORAL DAILY
Qty: 30 CAPSULE | Refills: 0 | Status: SHIPPED | OUTPATIENT
Start: 2020-01-10 | End: 2020-03-27 | Stop reason: SDUPTHER

## 2020-03-27 DIAGNOSIS — E55.9 VITAMIN D DEFICIENCY: ICD-10-CM

## 2020-03-27 DIAGNOSIS — IMO0002 UNCONTROLLED TYPE 2 DIABETES MELLITUS: ICD-10-CM

## 2020-03-27 DIAGNOSIS — I10 ESSENTIAL HYPERTENSION: ICD-10-CM

## 2020-03-27 DIAGNOSIS — E78.5 DYSLIPIDEMIA: ICD-10-CM

## 2020-03-27 DIAGNOSIS — R41.3 IMPAIRED MEMORY: ICD-10-CM

## 2020-03-27 RX ORDER — BENAZEPRIL HYDROCHLORIDE 20 MG/1
20 TABLET ORAL DAILY
Qty: 90 TABLET | Refills: 0 | Status: SHIPPED | OUTPATIENT
Start: 2020-03-27 | End: 2020-09-22 | Stop reason: SDUPTHER

## 2020-03-27 RX ORDER — DILTIAZEM HYDROCHLORIDE 240 MG/1
240 CAPSULE, EXTENDED RELEASE ORAL DAILY
Qty: 90 CAPSULE | Refills: 0 | Status: SHIPPED | OUTPATIENT
Start: 2020-03-27 | End: 2020-10-15 | Stop reason: SDUPTHER

## 2020-03-27 RX ORDER — BLOOD-GLUCOSE METER
KIT MISCELLANEOUS
Qty: 400 EACH | Refills: 0 | Status: SHIPPED | OUTPATIENT
Start: 2020-03-27 | End: 2020-09-22 | Stop reason: SDUPTHER

## 2020-03-31 ENCOUNTER — RESULTS ENCOUNTER (OUTPATIENT)
Dept: ENDOCRINOLOGY | Age: 80
End: 2020-03-31

## 2020-03-31 DIAGNOSIS — IMO0001 IDDM (INSULIN DEPENDENT DIABETES MELLITUS): ICD-10-CM

## 2020-03-31 DIAGNOSIS — E79.0 HYPERURICEMIA: ICD-10-CM

## 2020-03-31 DIAGNOSIS — E11.65 UNCONTROLLED TYPE 2 DIABETES MELLITUS WITH HYPERGLYCEMIA (HCC): ICD-10-CM

## 2020-03-31 DIAGNOSIS — E55.9 VITAMIN D DEFICIENCY: ICD-10-CM

## 2020-03-31 DIAGNOSIS — R41.3 IMPAIRED MEMORY: ICD-10-CM

## 2020-03-31 DIAGNOSIS — E78.5 DYSLIPIDEMIA: ICD-10-CM

## 2020-03-31 DIAGNOSIS — I10 ESSENTIAL HYPERTENSION: ICD-10-CM

## 2020-03-31 LAB
25(OH)D3+25(OH)D2 SERPL-MCNC: 49.1 NG/ML (ref 30–100)
ALBUMIN SERPL-MCNC: 4.3 G/DL (ref 3.5–5.2)
ALBUMIN/GLOB SERPL: 1.4 G/DL
ALP SERPL-CCNC: 122 U/L (ref 39–117)
ALT SERPL-CCNC: 17 U/L (ref 1–33)
AST SERPL-CCNC: 14 U/L (ref 1–32)
BILIRUB SERPL-MCNC: 0.3 MG/DL (ref 0.2–1.2)
BUN SERPL-MCNC: 25 MG/DL (ref 8–23)
BUN/CREAT SERPL: 25.5 (ref 7–25)
C PEPTIDE SERPL-MCNC: 1.1 NG/ML (ref 1.1–4.4)
CALCIUM SERPL-MCNC: 10.7 MG/DL (ref 8.6–10.5)
CHLORIDE SERPL-SCNC: 99 MMOL/L (ref 98–107)
CHOLEST SERPL-MCNC: 199 MG/DL (ref 100–199)
CO2 SERPL-SCNC: 28.8 MMOL/L (ref 22–29)
CREAT SERPL-MCNC: 0.98 MG/DL (ref 0.57–1)
GLOBULIN SER CALC-MCNC: 3.1 GM/DL
GLUCOSE SERPL-MCNC: 215 MG/DL (ref 65–99)
HBA1C MFR BLD: 9.3 % (ref 4.8–5.6)
HDL SERPL-SCNC: 47.9 UMOL/L
HDLC SERPL-MCNC: 84 MG/DL
LDL SERPL QN: 21 NM
LDL SERPL-SCNC: 1013 NMOL/L
LDL SMALL SERPL-SCNC: 292 NMOL/L
LDLC SERPL CALC-MCNC: 87 MG/DL (ref 0–99)
MICROALBUMIN UR-MCNC: 26.8 UG/ML
POTASSIUM SERPL-SCNC: 4 MMOL/L (ref 3.5–5.2)
PROT SERPL-MCNC: 7.4 G/DL (ref 6–8.5)
SODIUM SERPL-SCNC: 143 MMOL/L (ref 136–145)
T4 SERPL-MCNC: 7.78 MCG/DL (ref 4.5–11.7)
TRIGL SERPL-MCNC: 139 MG/DL (ref 0–149)
TSH SERPL DL<=0.005 MIU/L-ACNC: 2.26 UIU/ML (ref 0.27–4.2)
URATE SERPL-MCNC: 7.2 MG/DL (ref 2.4–5.7)

## 2020-04-13 ENCOUNTER — TELEMEDICINE (OUTPATIENT)
Dept: ENDOCRINOLOGY | Age: 80
End: 2020-04-13

## 2020-04-13 DIAGNOSIS — I10 ESSENTIAL HYPERTENSION: ICD-10-CM

## 2020-04-13 DIAGNOSIS — E83.52 CALCIUM BLOOD INCREASED: ICD-10-CM

## 2020-04-13 DIAGNOSIS — E79.0 HYPERURICEMIA: ICD-10-CM

## 2020-04-13 DIAGNOSIS — E55.9 VITAMIN D DEFICIENCY: ICD-10-CM

## 2020-04-13 DIAGNOSIS — E11.65 UNCONTROLLED TYPE 2 DIABETES MELLITUS WITH HYPERGLYCEMIA (HCC): Primary | ICD-10-CM

## 2020-04-13 DIAGNOSIS — G62.9 PERIPHERAL POLYNEUROPATHY: ICD-10-CM

## 2020-04-13 DIAGNOSIS — E78.5 DYSLIPIDEMIA: ICD-10-CM

## 2020-04-13 PROCEDURE — 99214 OFFICE O/P EST MOD 30 MIN: CPT | Performed by: NURSE PRACTITIONER

## 2020-04-13 NOTE — PATIENT INSTRUCTIONS
jory siddiqui appt to have urine done  Follow up in 6 mo with labs   Start allopurinol 100 mg once daily to treat uric acid  Check blood sugars 2-3 times daily.  Be sure you are recording your blood sugars for review so that we can determine how to adjust your medications if needed  If you have any blood sugars less than 70 please contact the office  Low-Purine Eating Plan  A low-purine eating plan involves making food choices to limit your intake of purine. Purine is a kind of uric acid. Too much uric acid in your blood can cause certain conditions, such as gout and kidney stones. Eating a low-purine diet can help control these conditions.  What are tips for following this plan?  Reading food labels    · Avoid foods with saturated or Trans fat.  · Check the ingredient list of grains-based foods, such as bread and cereal, to make sure that they contain whole grains.  · Check the ingredient list of sauces or soups to make sure they do not contain meat or fish.  · When choosing soft drinks, check the ingredient list to make sure they do not contain high-fructose corn syrup.  Shopping  · Buy plenty of fresh fruits and vegetables.  · Avoid buying canned or fresh fish.  · Buy dairy products labeled as low-fat or nonfat.  · Avoid buying premade or processed foods. These foods are often high in fat, salt (sodium), and added sugar.  Cooking  · Use olive oil instead of butter when cooking. Oils like olive oil, canola oil, and sunflower oil contain healthy fats.  Meal planning  · Learn which foods do or do not affect you. If you find out that a food tends to cause your gout symptoms to flare up, avoid eating that food. You can enjoy foods that do not cause problems. If you have any questions about a food item, talk with your dietitian or health care provider.  · Limit foods high in fat, especially saturated fat. Fat makes it harder for your body to get rid of uric acid.  · Choose foods that are lower in fat and are lean  sources of protein.  General guidelines  · Limit alcohol intake to no more than 1 drink a day for nonpregnant women and 2 drinks a day for men. One drink equals 12 oz of beer, 5 oz of wine, or 1½ oz of hard liquor. Alcohol can affect the way your body gets rid of uric acid.  · Drink plenty of water to keep your urine clear or pale yellow. Fluids can help remove uric acid from your body.  · If directed by your health care provider, take a vitamin C supplement.  · Work with your health care provider and dietitian to develop a plan to achieve or maintain a healthy weight. Losing weight can help reduce uric acid in your blood.  What foods are recommended?  The items listed may not be a complete list. Talk with your dietitian about what dietary choices are best for you.  Foods low in purines  Foods low in purines do not need to be limited. These include:  · All fruits.  · All low-purine vegetables, pickles, and olives.  · Breads, pasta, rice, cornbread, and popcorn. Cake and other baked goods.  · All dairy foods.  · Eggs, nuts, and nut butters.  · Spices and condiments, such as salt, herbs, and vinegar.  · Plant oils, butter, and margarine.  · Water, sugar-free soft drinks, tea, coffee, and cocoa.  · Vegetable-based soups, broths, sauces, and gravies.  Foods moderate in purines  Foods moderate in purines should be limited to the amounts listed.  · ½ cup of asparagus, cauliflower, spinach, mushrooms, or green peas, each day.  · 2/3 cup uncooked oatmeal, each day.  · ¼ cup dry wheat bran or wheat germ, each day.  · 2-3 ounces of meat or poultry, each day.  · 4-6 ounces of shellfish, such as crab, lobster, oysters, or shrimp, each day.  · 1 cup cooked beans, peas, or lentils, each day.  · Soup, broths, or bouillon made from meat or fish. Limit these foods as much as possible.  What foods are not recommended?  The items listed may not be a complete list. Talk with your dietitian about what dietary choices are best for  you.  Limit your intake of foods high in purines, including:  · Beer and other alcohol.  · Meat-based gravy or sauce.  · Canned or fresh fish, such as:  ? Anchovies, sardines, herring, and tuna.  ? Mussels and scallops.  ? Codfish, trout, and anabel.  · [place]shaw.  · Organ meats, such as:  ? Liver or kidney.  ? Tripe.  ? Sweetbreads (thymus gland or pancreas).  · Wild game or goose.  · Yeast or yeast extract supplements.  · Drinks sweetened with high-fructose corn syrup.  Summary  · Eating a low-purine diet can help control conditions caused by too much uric acid in the body, such as gout or kidney stones.  · Choose low-purine foods, limit alcohol, and limit foods high in fat.  · You will learn over time which foods do or do not affect you. If you find out that a food tends to cause your gout symptoms to flare up, avoid eating that food.  This information is not intended to replace advice given to you by your health care provider. Make sure you discuss any questions you have with your health care provider.  Document Released: 04/13/2012 Document Revised: 01/31/2018 Document Reviewed: 01/31/2018  [employer] Patient Education © 2020 [employer]    Uric Acid Nephropathy    Uric acid is a chemical compound that is made when your body digests some kinds of food and also when your body breaks down dead cells. It is a waste product that is normally removed from your body by your kidneys. If you have too much uric acid in your blood, it can build up in your kidneys and cause damage (nephropathy).  There are two types of uric acid nephropathy:  · Sudden (acute) uric acid nephropathy results from a sudden buildup of uric acid.  · Long-term (chronic) uric acid nephropathy results from a slow buildup of uric acid over a long period of time.  What are the causes?  The exact cause of this condition may depend on the type of uric acid nephropathy:  · Acute uric acid nephropathy may be caused by:  ? Receiving medicines for  the treatment of cancer (chemotherapy). Use of these medicines causes rapid breakdown of cells. The cell breakdown produces excess uric acid. As uric acid builds up in your kidneys, it causes an increase of pressure and a loss of blood supply. This makes your kidneys less able to filter blood and make urine.  ? A tumor (cancer) in the body.  ? Seizures.  ? Taking medicines that can cause excess uric acid. Examples are aspirin, water pills (diuretics), and medicines that are prescribed after an organ transplant.  ? Severe diarrhea, which causes fluid loss (dehydration).  · Chronic uric acid nephropathy may happen if you have high levels of uric acid in your body on a regular basis. One reason you may have high levels of uric acid is gout. With gout, excess uric acid forms into crystals. These crystals can get stuck inside joints and cause painful swelling. They may also build up in your kidneys and cause long-term damage.  What increases the risk?  You may be more likely to develop this condition if you:  · Are male.  · Are 50 years old or older.  · Have gout.  · Eat a lot of foods that are high in certain natural chemical compounds (purines). Shellfish and red meat contain a lot of purines.  · Drink alcohol.  · Have recently had heart surgery.  What are the signs or symptoms?  Signs and symptoms depend on the type of nephropathy that you have. They may include:  · Decreased urine output.  · Nausea and vomiting.  · Lack of energy.  · Seizures.  · Blood-tinged urine.  · Pain when passing urine.  · Pain in the sides of the lower back (flank pain).  In some cases, there are no symptoms.  How is this diagnosed?  Your health care provider may suspect uric acid nephropathy from your signs and symptoms, especially if you have gout. He or she may:  · Do a physical exam.  · Order tests to confirm the diagnosis. Tests may include:  ? Blood and urine tests. This is the best way to measure high levels of uric acid.  ? Imaging  studies to check for kidney stones or kidney damage. These may include:  § X-rays.  § Ultrasound.  § CT scan.  § MRI.  How is this treated?  The goal of treatment is to lower the level of uric acid in your body and prevent kidney damage. This can be done by:  · Taking medicines that block the production of uric acid. The most commonly used medicine is allopurinol. If you are starting chemotherapy, ask your health care provider if you should start taking a medicine to prevent high uric acid.  · Starting a diet plan that lowers your intake of purines. Work with a diet and nutrition specialist (dietitian) to limit your intake of foods and drinks that increase uric acid.  · Preventing uric acid buildup. Drink plenty of water to maintain a good flow of urine and to lower the acidity of your urine. You may also need to take a medicine called bicarbonate.  · Resting the kidneys. This can be done by using a machine to clean your blood (hemodialysis), if necessary. In hemodialysis, your blood is removed, passed through a filtering machine, and then returned to your body. Several sessions of hemodialysis usually improve kidney function by removing uric acid.  Follow these instructions at home:  Eating and drinking         · Drink enough fluid to keep your urine pale yellow.  · Do not drink alcohol.  · Do not drink beverages that contain a type of sugar called fructose.  · Limit how much red meat and shellfish you eat.  · Include plenty of low-fat dairy foods in your diet.  General instructions  · Take over-the-counter and prescription medicines only as told by your health care provider.  · Maintain a healthy weight. Lose weight as directed by your health care provider.  · Keep all follow-up visits as told by your health care provider. This is important.  Contact a health care provider if you:  · Feel tired and have low energy, even when you get enough sleep.  · Have pain when passing urine.  · Have nausea or vomiting.  Get  help right away if you:  · Produce very little urine, even when you drink enough fluids.  · Have blood in your urine.  · Have a seizure.  Summary  · Uric acid is a waste product that is normally removed from your body by your kidneys. If you have too much uric acid in your blood, it can build up in your kidneys and cause damage (nephropathy).  · Sudden (acute) uric acid nephropathy results from a sudden buildup of uric acid. Long-term (chronic) uric acid nephropathy results from a slow buildup of uric acid over a long period of time. This may happen if you have gout.  · The goal of treatment is to lower the level of uric acid in your body and prevent kidney damage.  This information is not intended to replace advice given to you by your health care provider. Make sure you discuss any questions you have with your health care provider.  Document Released: 10/14/2008 Document Revised: 01/08/2019 Document Reviewed: 01/08/2019  [employer] Patient Education © 2020 [employer]  Allopurinol injection  What is this medicine?  ALLOPURINOL (al oh PURE i nole) reduces the amount of uric acid the body makes during chemotherapy. Too much uric acid in the blood can cause damage to your kidneys.  This medicine may be used for other purposes; ask your health care provider or pharmacist if you have questions.  COMMON BRAND NAME(S): Aloprim  What should I tell my health care provider before I take this medicine?  They need to know if you have any of these conditions:  -kidney disease  -liver disease  -an unusual or allergic reaction to allopurinol, other medicines, foods, dyes, or preservatives  -pregnant or trying to get pregnant  -breast feeding  How should I use this medicine?  The medicine is for infusion into a vein. It is given by a health care professional in a hospital or clinic setting.  Talk to your pediatrician regarding the use of this medicine in children. Special care may be needed.  Overdosage: If you think  you have taken too much of this medicine contact a poison control center or emergency room at once.  NOTE: This medicine is only for you. Do not share this medicine with others.  What if I miss a dose?  This does not apply.  What may interact with this medicine?  Do not take this medicine with the following medication:  -didanosine, ddI  This medicine may also interact with the following medications:  -certain antibiotics like amoxicillin, ampicillin  -certain medicines for cancer  -certain medicines for immunosuppression like azathioprine, cyclosporine, mercaptopurine  -chlorpropamide  -probenecid  -thiazide diuretics, like hydrochlorothiazide  -sulfinpyrazone  -warfarin  This list may not describe all possible interactions. Give your health care provider a list of all the medicines, herbs, non-prescription drugs, or dietary supplements you use. Also tell them if you smoke, drink alcohol, or use illegal drugs. Some items may interact with your medicine.  What should I watch for while using this medicine?  Your condition will be monitored carefully while you are receiving this medicine.  You may get drowsy or dizzy. Do not drive, use machinery, or do anything that needs mental alertness until you know how this medicine affects you. Do not stand or sit up quickly, especially if you are an older patient. This reduces the risk of dizzy or fainting spells.  Drink plenty of water while you are taking this medicine. This will help to reduce the risk of getting gout or kidney stones.  What side effects may I notice from receiving this medicine?  Side effects that you should report to your doctor or health care professional as soon as possible:  -allergic reactions like skin rash, itching or hives, swelling of the face, lips, or tongue  -breathing problems  -fever with rash, swollen lymph nodes, or swelling of the face  -joint pain  -muscle pain  -redness, blistering, peeling or loosening of the skin, including inside the  mouth  -signs and symptoms of infection like fever or chills; cough; sore throat  -signs and symptoms of kidney injury like trouble passing urine or change in the amount of urine, flank pain  -tingling, numbness in the hands or feet  -unusual bleeding or bruising  -unusually weak or tired  Side effects that usually do not require medical attention (report to your doctor or health care professional if they continue or are bothersome):  -changes in taste  -diarrhea  -drowsiness  -headache  -nausea, vomiting  -stomach upset  This list may not describe all possible side effects. Call your doctor for medical advice about side effects. You may report side effects to FDA at [phone].  Where should I keep my medicine?  This drug is given in a hospital or clinic and will not be stored at home.  NOTE: This sheet is a summary. It may not cover all possible information. If you have questions about this medicine, talk to your doctor, pharmacist, or health care provider.  © 2020 [employer] (2018-05-22 15:45:27)

## 2020-04-13 NOTE — PROGRESS NOTES
Subjective   Di Macario is a 79 y.o. female is here today for follow-up.  Chief Complaint   Patient presents with   • Diabetes     check BG3 times daily lab review   • Hypertension   • Hyperlipidemia     There were no vitals taken for this visit.  Current Outpatient Medications on File Prior to Visit   Medication Sig   • aspirin 81 MG tablet Take  by mouth.   • benazepril (LOTENSIN) 20 MG tablet Take 1 tablet by mouth Daily.   • dilTIAZem (TIAZAC) 240 MG 24 hr capsule Take 1 capsule by mouth Daily.   • donepezil (ARICEPT) 10 MG tablet TAKE 1 TABLET EVERY NIGHT   • ergocalciferol (DRISDOL) 37451 units capsule Take 1 capsule by mouth 2 (Two) Times a Week.   • ESTRACE VAGINAL 0.1 MG/GM vaginal cream    • FLUZONE HIGH-DOSE 0.5 ML suspension prefilled syringe injection    • FREESTYLE LITE test strip Use to test BG 4x daily   • hydroCHLOROthiazide (MICROZIDE) 12.5 MG capsule TAKE 1 CAPSULE DAILY   • Insulin Pen Needle (BD Pen Needle Norma U/F) 32G X 4 MM misc Use once a day   • JARDIANCE 25 MG tablet Take 25 mg by mouth Daily.   • Lancets (FREESTYLE) lancets USE FOUR TIMES A DAY   • metFORMIN (GLUCOPHAGE) 500 MG tablet Take 1 tablet by mouth 2 (Two) Times a Day With Meals.   • Mirabegron ER (MYRBETRIQ) 25 MG tablet sustained-release 24 hour 24 hr tablet Take 25 mg by mouth Daily.   • Multiple Vitamins-Minerals (MULTIVITAMIN MEN 50+ PO) Take 1 tablet by mouth Daily.   • pravastatin (PRAVACHOL) 40 MG tablet Take 1 tablet by mouth Every Evening.   • TOUJEO SOLOSTAR 300 UNIT/ML solution pen-injector injection Inject 75 Units under the skin into the appropriate area as directed Every Morning.   • TRULICITY 1.5 MG/0.5ML solution pen-injector Inject 1.5 mg under the skin into the appropriate area as directed 1 (One) Time Per Week.   • zonisamide (ZONEGRAN) 100 MG capsule Take 100 mg by mouth 2 (Two) Times a Day.     No current facility-administered medications on file prior to visit.      History reviewed. No pertinent  family history.  Social History     Tobacco Use   • Smoking status: Former Smoker   Substance Use Topics   • Alcohol use: No   • Drug use: No     No Known Allergies      History of Present Illness   Encounter Diagnoses   Name Primary?   • Essential hypertension    • Vitamin D deficiency    • Uncontrolled type 2 diabetes mellitus with hyperglycemia (CMS/[facility]) Yes   • Peripheral polyneuropathy    • Dyslipidemia    • Hyperuricemia    • Calcium blood increased    79-year-old female patient being seen today via video for the above diagnoses.  She had recent labs which were reviewed and she will be mailed a copy.  She appears physically well on video.  She is taking her medications as prescribed.  She has had blood sugar in the 300 range due to dietary indiscretions.  Her lowest blood sugar has been in the 90 range.  She states her blood sugar was 300 last night she took 35 units of NovoLog before going to bed.  She celebrated Easter yesterday and may have had some sweets.  She does not currently get any exercise.  She is incontinent of urine and is complaining of increased urination.  She will be scheduled for a lab appointment to evaluate for UTI.  She does have high uric acid we discussed starting allopurinol 100 mg once daily.  She will be given information regarding uric acid, purine, and allopurinol      The following portions of the patient's history were reviewed and updated as appropriate: allergies, current medications, past family history, past medical history, past social history, past surgical history and problem list.    Review of Systems   Constitutional: Negative for appetite change and fatigue.   Eyes: Negative for visual disturbance.   Respiratory: Negative for cough.    Cardiovascular: Negative for leg swelling.   Gastrointestinal: Negative for constipation and diarrhea.   Endocrine: Negative for cold intolerance, heat intolerance, polydipsia, polyphagia and polyuria.   Genitourinary: Negative for  frequency.   Neurological: Negative for numbness.       Objective   Physical Exam   Constitutional: She is oriented to person, place, and time. She appears well-developed and well-nourished. No distress.   HENT:   Head: Normocephalic.   Neck: Normal range of motion.   Cardiovascular: Normal rate and regular rhythm.   Pulmonary/Chest: Effort normal. No respiratory distress.   Abdominal: Soft.   Musculoskeletal: Normal range of motion. She exhibits no edema.   Unsteady gait is currently not getting any exercise is observing social distance guidelines   Neurological: She is alert and oriented to person, place, and time.   Skin: Skin is warm and dry.   Psychiatric: She has a normal mood and affect. Her behavior is normal. Judgment and thought content normal.         Assessment/Plan   Di was seen today for diabetes, hypertension and hyperlipidemia.    Diagnoses and all orders for this visit:    Uncontrolled type 2 diabetes mellitus with hyperglycemia (CMS/HCC)  -     UA / M With / Rflx Culture([facility] ONLY) - Urine, Clean Catch  -     UA / M With / Rflx Culture([facility] ONLY) - Urine, Clean Catch; Future    Essential hypertension  -     UA / M With / Rflx Culture([facility] ONLY) - Urine, Clean Catch  -     UA / M With / Rflx Culture([facility] ONLY) - Urine, Clean Catch; Future    Vitamin D deficiency  -     UA / M With / Rflx Culture([facility] ONLY) - Urine, Clean Catch  -     UA / M With / Rflx Culture([facility] ONLY) - Urine, Clean Catch; Future    Peripheral polyneuropathy  -     UA / M With / Rflx Culture([facility] ONLY) - Urine, Clean Catch  -     UA / M With / Rflx Culture([facility] ONLY) - Urine, Clean Catch; Future    Dyslipidemia  -     UA / M With / Rflx Culture([facility] ONLY) - Urine, Clean Catch  -     UA / M With / Rflx Culture([facility] ONLY) - Urine, Clean Catch; Future    Hyperuricemia  -     UA / M With / Rflx Culture([facility] ONLY) - Urine, Clean Catch  -     UA / M With / Rflx Culture([facility] ONLY) - Urine, Clean  Catch; Future    Calcium blood increased  -     UA / M With / Rflx Culture([facility] ONLY) - Urine, Clean Catch  -     UA / M With / Rflx Culture([facility] ONLY) - Urine, Clean Catch; Future      In summary patient was evaluated via video.  She will continue all her current medications with the addition of allopurinol 100 mg once daily.  Her labs were reviewed and she will be mailed a copy.  She was educated today regarding hyperuricemia.  Her A1c has improved however is not at goal.  She is having increased urination will schedule a lab appointment to have a urinalysis done.  She will follow-up in 6 months with Dr. Murcia or myself with labs prior.  She is been encouraged to reach out to the office should she have any concerns prior to the      scheudule an appt to have urine done  Follow up in 6 mo with labs   Start allopurinol 100 mg once daily to treat uric acid  Check blood sugars 2-3 times daily.  Be sure you are recording your blood sugars for review so that we can determine how to adjust your medications if needed  If you have any blood sugars less than 70 please contact the office  Low-Purine Eating Plan  A low-purine eating plan involves making food choices to limit your intake of purine. Purine is a kind of uric acid. Too much uric acid in your blood can cause certain conditions, such as gout and kidney stones. Eating a low-purine diet can help control these conditions.  What are tips for following this plan?  Reading food labels    · Avoid foods with saturated or Trans fat.  · Check the ingredient list of grains-based foods, such as bread and cereal, to make sure that they contain whole grains.  · Check the ingredient list of sauces or soups to make sure they do not contain meat or fish.  · When choosing soft drinks, check the ingredient list to make sure they do not contain high-fructose corn syrup.  Shopping  · Buy plenty of fresh fruits and vegetables.  · Avoid buying canned or fresh fish.  · Buy dairy  products labeled as low-fat or nonfat.  · Avoid buying premade or processed foods. These foods are often high in fat, salt (sodium), and added sugar.  Cooking  · Use olive oil instead of butter when cooking. Oils like olive oil, canola oil, and sunflower oil contain healthy fats.  Meal planning  · Learn which foods do or do not affect you. If you find out that a food tends to cause your gout symptoms to flare up, avoid eating that food. You can enjoy foods that do not cause problems. If you have any questions about a food item, talk with your dietitian or health care provider.  · Limit foods high in fat, especially saturated fat. Fat makes it harder for your body to get rid of uric acid.  · Choose foods that are lower in fat and are lean sources of protein.  General guidelines  · Limit alcohol intake to no more than 1 drink a day for nonpregnant women and 2 drinks a day for men. One drink equals 12 oz of beer, 5 oz of wine, or 1½ oz of hard liquor. Alcohol can affect the way your body gets rid of uric acid.  · Drink plenty of water to keep your urine clear or pale yellow. Fluids can help remove uric acid from your body.  · If directed by your health care provider, take a vitamin C supplement.  · Work with your health care provider and dietitian to develop a plan to achieve or maintain a healthy weight. Losing weight can help reduce uric acid in your blood.  What foods are recommended?  The items listed may not be a complete list. Talk with your dietitian about what dietary choices are best for you.  Foods low in purines  Foods low in purines do not need to be limited. These include:  · All fruits.  · All low-purine vegetables, pickles, and olives.  · Breads, pasta, rice, cornbread, and popcorn. Cake and other baked goods.  · All dairy foods.  · Eggs, nuts, and nut butters.  · Spices and condiments, such as salt, herbs, and vinegar.  · Plant oils, butter, and margarine.  · Water, sugar-free soft drinks, tea, coffee,  and cocoa.  · Vegetable-based soups, broths, sauces, and gravies.  Foods moderate in purines  Foods moderate in purines should be limited to the amounts listed.  · ½ cup of asparagus, cauliflower, spinach, mushrooms, or green peas, each day.  · 2/3 cup uncooked oatmeal, each day.  · ¼ cup dry wheat bran or wheat germ, each day.  · 2-3 ounces of meat or poultry, each day.  · 4-6 ounces of shellfish, such as crab, lobster, oysters, or shrimp, each day.  · 1 cup cooked beans, peas, or lentils, each day.  · Soup, broths, or bouillon made from meat or fish. Limit these foods as much as possible.  What foods are not recommended?  The items listed may not be a complete list. Talk with your dietitian about what dietary choices are best for you.  Limit your intake of foods high in purines, including:  · Beer and other alcohol.  · Meat-based gravy or sauce.  · Canned or fresh fish, such as:  ? Anchovies, sardines, herring, and tuna.  ? Mussels and scallops.  ? Codfish, trout, and anabel.  · [place]shaw.  · Organ meats, such as:  ? Liver or kidney.  ? Tripe.  ? Sweetbreads (thymus gland or pancreas).  · Wild game or goose.  · Yeast or yeast extract supplements.  · Drinks sweetened with high-fructose corn syrup.  Summary  · Eating a low-purine diet can help control conditions caused by too much uric acid in the body, such as gout or kidney stones.  · Choose low-purine foods, limit alcohol, and limit foods high in fat.  · You will learn over time which foods do or do not affect you. If you find out that a food tends to cause your gout symptoms to flare up, avoid eating that food.  This information is not intended to replace advice given to you by your health care provider. Make sure you discuss any questions you have with your health care provider.  Document Released: 04/13/2012 Document Revised: 01/31/2018 Document Reviewed: 01/31/2018  [facility] Interactive Patient Education © 2020 [employer]    Uric Acid Nephropathy    Uric acid  is a chemical compound that is made when your body digests some kinds of food and also when your body breaks down dead cells. It is a waste product that is normally removed from your body by your kidneys. If you have too much uric acid in your blood, it can build up in your kidneys and cause damage (nephropathy).  There are two types of uric acid nephropathy:  · Sudden (acute) uric acid nephropathy results from a sudden buildup of uric acid.  · Long-term (chronic) uric acid nephropathy results from a slow buildup of uric acid over a long period of time.  What are the causes?  The exact cause of this condition may depend on the type of uric acid nephropathy:  · Acute uric acid nephropathy may be caused by:  ? Receiving medicines for the treatment of cancer (chemotherapy). Use of these medicines causes rapid breakdown of cells. The cell breakdown produces excess uric acid. As uric acid builds up in your kidneys, it causes an increase of pressure and a loss of blood supply. This makes your kidneys less able to filter blood and make urine.  ? A tumor (cancer) in the body.  ? Seizures.  ? Taking medicines that can cause excess uric acid. Examples are aspirin, water pills (diuretics), and medicines that are prescribed after an organ transplant.  ? Severe diarrhea, which causes fluid loss (dehydration).  · Chronic uric acid nephropathy may happen if you have high levels of uric acid in your body on a regular basis. One reason you may have high levels of uric acid is gout. With gout, excess uric acid forms into crystals. These crystals can get stuck inside joints and cause painful swelling. They may also build up in your kidneys and cause long-term damage.  What increases the risk?  You may be more likely to develop this condition if you:  · Are male.  · Are 50 years old or older.  · Have gout.  · Eat a lot of foods that are high in certain natural chemical compounds (purines). Shellfish and red meat contain a lot of  purines.  · Drink alcohol.  · Have recently had heart surgery.  What are the signs or symptoms?  Signs and symptoms depend on the type of nephropathy that you have. They may include:  · Decreased urine output.  · Nausea and vomiting.  · Lack of energy.  · Seizures.  · Blood-tinged urine.  · Pain when passing urine.  · Pain in the sides of the lower back (flank pain).  In some cases, there are no symptoms.  How is this diagnosed?  Your health care provider may suspect uric acid nephropathy from your signs and symptoms, especially if you have gout. He or she may:  · Do a physical exam.  · Order tests to confirm the diagnosis. Tests may include:  ? Blood and urine tests. This is the best way to measure high levels of uric acid.  ? Imaging studies to check for kidney stones or kidney damage. These may include:  § X-rays.  § Ultrasound.  § CT scan.  § MRI.  How is this treated?  The goal of treatment is to lower the level of uric acid in your body and prevent kidney damage. This can be done by:  · Taking medicines that block the production of uric acid. The most commonly used medicine is allopurinol. If you are starting chemotherapy, ask your health care provider if you should start taking a medicine to prevent high uric acid.  · Starting a diet plan that lowers your intake of purines. Work with a diet and nutrition specialist (dietitian) to limit your intake of foods and drinks that increase uric acid.  · Preventing uric acid buildup. Drink plenty of water to maintain a good flow of urine and to lower the acidity of your urine. You may also need to take a medicine called bicarbonate.  · Resting the kidneys. This can be done by using a machine to clean your blood (hemodialysis), if necessary. In hemodialysis, your blood is removed, passed through a filtering machine, and then returned to your body. Several sessions of hemodialysis usually improve kidney function by removing uric acid.  Follow these instructions at  home:  Eating and drinking         · Drink enough fluid to keep your urine pale yellow.  · Do not drink alcohol.  · Do not drink beverages that contain a type of sugar called fructose.  · Limit how much red meat and shellfish you eat.  · Include plenty of low-fat dairy foods in your diet.  General instructions  · Take over-the-counter and prescription medicines only as told by your health care provider.  · Maintain a healthy weight. Lose weight as directed by your health care provider.  · Keep all follow-up visits as told by your health care provider. This is important.  Contact a health care provider if you:  · Feel tired and have low energy, even when you get enough sleep.  · Have pain when passing urine.  · Have nausea or vomiting.  Get help right away if you:  · Produce very little urine, even when you drink enough fluids.  · Have blood in your urine.  · Have a seizure.  Summary  · Uric acid is a waste product that is normally removed from your body by your kidneys. If you have too much uric acid in your blood, it can build up in your kidneys and cause damage (nephropathy).  · Sudden (acute) uric acid nephropathy results from a sudden buildup of uric acid. Long-term (chronic) uric acid nephropathy results from a slow buildup of uric acid over a long period of time. This may happen if you have gout.  · The goal of treatment is to lower the level of uric acid in your body and prevent kidney damage.  This information is not intended to replace advice given to you by your health care provider. Make sure you discuss any questions you have with your health care provider.  Document Released: 10/14/2008 Document Revised: 01/08/2019 Document Reviewed: 01/08/2019  [employer] Interactive Patient Education © 2020 [employer]  Allopurinol injection  What is this medicine?  ALLOPURINOL (al oh PURE i nole) reduces the amount of uric acid the body makes during chemotherapy. Too much uric acid in the blood can cause damage to  your kidneys.  This medicine may be used for other purposes; ask your health care provider or pharmacist if you have questions.  COMMON BRAND NAME(S): Aloprim  What should I tell my health care provider before I take this medicine?  They need to know if you have any of these conditions:  -kidney disease  -liver disease  -an unusual or allergic reaction to allopurinol, other medicines, foods, dyes, or preservatives  -pregnant or trying to get pregnant  -breast feeding  How should I use this medicine?  The medicine is for infusion into a vein. It is given by a health care professional in a hospital or clinic setting.  Talk to your pediatrician regarding the use of this medicine in children. Special care may be needed.  Overdosage: If you think you have taken too much of this medicine contact a poison control center or emergency room at once.  NOTE: This medicine is only for you. Do not share this medicine with others.  What if I miss a dose?  This does not apply.  What may interact with this medicine?  Do not take this medicine with the following medication:  -didanosine, ddI  This medicine may also interact with the following medications:  -certain antibiotics like amoxicillin, ampicillin  -certain medicines for cancer  -certain medicines for immunosuppression like azathioprine, cyclosporine, mercaptopurine  -chlorpropamide  -probenecid  -thiazide diuretics, like hydrochlorothiazide  -sulfinpyrazone  -warfarin  This list may not describe all possible interactions. Give your health care provider a list of all the medicines, herbs, non-prescription drugs, or dietary supplements you use. Also tell them if you smoke, drink alcohol, or use illegal drugs. Some items may interact with your medicine.  What should I watch for while using this medicine?  Your condition will be monitored carefully while you are receiving this medicine.  You may get drowsy or dizzy. Do not drive, use machinery, or do anything that needs mental  alertness until you know how this medicine affects you. Do not stand or sit up quickly, especially if you are an older patient. This reduces the risk of dizzy or fainting spells.  Drink plenty of water while you are taking this medicine. This will help to reduce the risk of getting gout or kidney stones.  What side effects may I notice from receiving this medicine?  Side effects that you should report to your doctor or health care professional as soon as possible:  -allergic reactions like skin rash, itching or hives, swelling of the face, lips, or tongue  -breathing problems  -fever with rash, swollen lymph nodes, or swelling of the face  -joint pain  -muscle pain  -redness, blistering, peeling or loosening of the skin, including inside the mouth  -signs and symptoms of infection like fever or chills; cough; sore throat  -signs and symptoms of kidney injury like trouble passing urine or change in the amount of urine, flank pain  -tingling, numbness in the hands or feet  -unusual bleeding or bruising  -unusually weak or tired  Side effects that usually do not require medical attention (report to your doctor or health care professional if they continue or are bothersome):  -changes in taste  -diarrhea  -drowsiness  -headache  -nausea, vomiting  -stomach upset  This list may not describe all possible side effects. Call your doctor for medical advice about side effects. You may report side effects to FDA at 4-906-FDA-7725.  Where should I keep my medicine?  This drug is given in a hospital or clinic and will not be stored at home.  NOTE: This sheet is a summary. It may not cover all possible information. If you have questions about this medicine, talk to your doctor, pharmacist, or health care provider.  © 2020 [employer] (2018-05-22 15:45:27)

## 2020-05-13 ENCOUNTER — RESULTS ENCOUNTER (OUTPATIENT)
Dept: ENDOCRINOLOGY | Age: 80
End: 2020-05-13

## 2020-05-13 DIAGNOSIS — E55.9 VITAMIN D DEFICIENCY: ICD-10-CM

## 2020-05-13 DIAGNOSIS — G62.9 PERIPHERAL POLYNEUROPATHY: ICD-10-CM

## 2020-05-13 DIAGNOSIS — E83.52 CALCIUM BLOOD INCREASED: ICD-10-CM

## 2020-05-13 DIAGNOSIS — E78.5 DYSLIPIDEMIA: ICD-10-CM

## 2020-05-13 DIAGNOSIS — E79.0 HYPERURICEMIA: ICD-10-CM

## 2020-05-13 DIAGNOSIS — E11.65 UNCONTROLLED TYPE 2 DIABETES MELLITUS WITH HYPERGLYCEMIA (HCC): ICD-10-CM

## 2020-05-13 DIAGNOSIS — I10 ESSENTIAL HYPERTENSION: ICD-10-CM

## 2020-09-22 ENCOUNTER — TELEPHONE (OUTPATIENT)
Dept: ENDOCRINOLOGY | Age: 80
End: 2020-09-22

## 2020-09-22 DIAGNOSIS — E55.9 VITAMIN D DEFICIENCY: ICD-10-CM

## 2020-09-22 DIAGNOSIS — I10 ESSENTIAL HYPERTENSION: ICD-10-CM

## 2020-09-22 DIAGNOSIS — E78.5 DYSLIPIDEMIA: ICD-10-CM

## 2020-09-22 DIAGNOSIS — R41.3 IMPAIRED MEMORY: ICD-10-CM

## 2020-09-22 RX ORDER — LANCETS 28 GAUGE
1 EACH MISCELLANEOUS 4 TIMES DAILY
Qty: 400 EACH | Refills: 1 | Status: SHIPPED | OUTPATIENT
Start: 2020-09-22 | End: 2020-10-29 | Stop reason: SDUPTHER

## 2020-09-22 RX ORDER — EMPAGLIFLOZIN 25 MG/1
1 TABLET, FILM COATED ORAL DAILY
Qty: 90 TABLET | Refills: 3 | Status: SHIPPED | OUTPATIENT
Start: 2020-09-22 | End: 2020-10-29 | Stop reason: SDUPTHER

## 2020-09-22 RX ORDER — PEN NEEDLE, DIABETIC 32GX 5/32"
NEEDLE, DISPOSABLE MISCELLANEOUS
Qty: 100 EACH | Refills: 0 | Status: SHIPPED | OUTPATIENT
Start: 2020-09-22 | End: 2020-10-29 | Stop reason: SDUPTHER

## 2020-09-22 RX ORDER — BLOOD-GLUCOSE METER
KIT MISCELLANEOUS
Qty: 400 EACH | Refills: 0 | Status: SHIPPED | OUTPATIENT
Start: 2020-09-22 | End: 2020-10-29 | Stop reason: SDUPTHER

## 2020-09-22 RX ORDER — BENAZEPRIL HYDROCHLORIDE 20 MG/1
20 TABLET ORAL DAILY
Qty: 90 TABLET | Refills: 0 | Status: SHIPPED | OUTPATIENT
Start: 2020-09-22 | End: 2020-10-29 | Stop reason: SDUPTHER

## 2020-09-22 NOTE — TELEPHONE ENCOUNTER
Patient needs refills    On tiazac cap 1 x daily  benavepill  20 mg 1 daily  jardiance 25 mg  Pen needles  Bd pen needles  Free style lite strips   Lancets      [facility]

## 2020-10-15 ENCOUNTER — LAB (OUTPATIENT)
Dept: ENDOCRINOLOGY | Age: 80
End: 2020-10-15

## 2020-10-15 DIAGNOSIS — I10 ESSENTIAL HYPERTENSION: Primary | ICD-10-CM

## 2020-10-15 DIAGNOSIS — E78.5 DYSLIPIDEMIA: ICD-10-CM

## 2020-10-15 DIAGNOSIS — E11.65 UNCONTROLLED TYPE 2 DIABETES MELLITUS WITH HYPERGLYCEMIA (HCC): ICD-10-CM

## 2020-10-15 DIAGNOSIS — E55.9 VITAMIN D DEFICIENCY: ICD-10-CM

## 2020-10-15 DIAGNOSIS — IMO0002 UNCONTROLLED TYPE 2 DIABETES MELLITUS: ICD-10-CM

## 2020-10-15 DIAGNOSIS — I10 ESSENTIAL HYPERTENSION: ICD-10-CM

## 2020-10-15 DIAGNOSIS — R41.3 IMPAIRED MEMORY: ICD-10-CM

## 2020-10-15 RX ORDER — DILTIAZEM HYDROCHLORIDE 240 MG/1
240 CAPSULE, EXTENDED RELEASE ORAL DAILY
Qty: 90 CAPSULE | Refills: 0 | Status: SHIPPED | OUTPATIENT
Start: 2020-10-15 | End: 2021-01-13 | Stop reason: ALTCHOICE

## 2020-10-16 LAB
25(OH)D3+25(OH)D2 SERPL-MCNC: 65.7 NG/ML (ref 30–100)
ALBUMIN SERPL-MCNC: 4.5 G/DL (ref 3.5–5.2)
ALBUMIN/GLOB SERPL: 1.7 G/DL
ALP SERPL-CCNC: 117 U/L (ref 39–117)
ALT SERPL-CCNC: 15 U/L (ref 1–33)
AST SERPL-CCNC: 17 U/L (ref 1–32)
BILIRUB SERPL-MCNC: 0.2 MG/DL (ref 0–1.2)
BUN SERPL-MCNC: 28 MG/DL (ref 8–23)
BUN/CREAT SERPL: 28 (ref 7–25)
C PEPTIDE SERPL-MCNC: 3.2 NG/ML (ref 1.1–4.4)
CALCIUM SERPL-MCNC: 9.9 MG/DL (ref 8.6–10.5)
CHLORIDE SERPL-SCNC: 99 MMOL/L (ref 98–107)
CHOLEST SERPL-MCNC: 180 MG/DL (ref 0–200)
CO2 SERPL-SCNC: 29.4 MMOL/L (ref 22–29)
CREAT SERPL-MCNC: 1 MG/DL (ref 0.57–1)
FT4I SERPL CALC-MCNC: 2.1 (ref 1.2–4.9)
GLOBULIN SER CALC-MCNC: 2.7 GM/DL
GLUCOSE SERPL-MCNC: 339 MG/DL (ref 65–99)
HBA1C MFR BLD: 9.7 % (ref 4.8–5.6)
HDLC SERPL-MCNC: 81 MG/DL (ref 40–60)
INTERPRETATION: NORMAL
LDLC SERPL CALC-MCNC: 74 MG/DL (ref 0–100)
Lab: NORMAL
MICROALBUMIN UR-MCNC: 23.6 UG/ML
POTASSIUM SERPL-SCNC: 4.2 MMOL/L (ref 3.5–5.2)
PROT SERPL-MCNC: 7.2 G/DL (ref 6–8.5)
SODIUM SERPL-SCNC: 141 MMOL/L (ref 136–145)
T3RU NFR SERPL: 27 % (ref 24–39)
T4 SERPL-MCNC: 7.7 UG/DL (ref 4.5–12)
TRIGL SERPL-MCNC: 147 MG/DL (ref 0–150)
TSH SERPL DL<=0.005 MIU/L-ACNC: 1.95 UIU/ML (ref 0.45–4.5)
VLDLC SERPL CALC-MCNC: 25 MG/DL (ref 5–40)

## 2020-10-29 ENCOUNTER — OFFICE VISIT (OUTPATIENT)
Dept: ENDOCRINOLOGY | Age: 80
End: 2020-10-29

## 2020-10-29 VITALS
HEIGHT: 63 IN | BODY MASS INDEX: 29.16 KG/M2 | DIASTOLIC BLOOD PRESSURE: 66 MMHG | SYSTOLIC BLOOD PRESSURE: 144 MMHG | WEIGHT: 164.6 LBS

## 2020-10-29 DIAGNOSIS — E55.9 VITAMIN D DEFICIENCY: ICD-10-CM

## 2020-10-29 DIAGNOSIS — E78.5 DYSLIPIDEMIA: ICD-10-CM

## 2020-10-29 DIAGNOSIS — I10 ESSENTIAL HYPERTENSION: ICD-10-CM

## 2020-10-29 DIAGNOSIS — E11.65 UNCONTROLLED TYPE 2 DIABETES MELLITUS WITH HYPERGLYCEMIA (HCC): Primary | ICD-10-CM

## 2020-10-29 PROBLEM — E83.52 CALCIUM BLOOD INCREASED: Status: RESOLVED | Noted: 2018-10-29 | Resolved: 2020-10-29

## 2020-10-29 PROCEDURE — 99214 OFFICE O/P EST MOD 30 MIN: CPT | Performed by: NURSE PRACTITIONER

## 2020-10-29 RX ORDER — DULAGLUTIDE 1.5 MG/.5ML
1.5 INJECTION, SOLUTION SUBCUTANEOUS WEEKLY
Qty: 12 PEN | Refills: 1 | Status: SHIPPED | OUTPATIENT
Start: 2020-10-29 | End: 2021-08-10 | Stop reason: SDUPTHER

## 2020-10-29 RX ORDER — LANCETS 28 GAUGE
1 EACH MISCELLANEOUS 4 TIMES DAILY
Qty: 400 EACH | Refills: 1 | Status: SHIPPED | OUTPATIENT
Start: 2020-10-29 | End: 2021-08-11 | Stop reason: SDUPTHER

## 2020-10-29 RX ORDER — PRAVASTATIN SODIUM 40 MG
40 TABLET ORAL EVERY EVENING
Qty: 90 TABLET | Refills: 1 | Status: SHIPPED | OUTPATIENT
Start: 2020-10-29 | End: 2021-08-27 | Stop reason: SDUPTHER

## 2020-10-29 RX ORDER — EMPAGLIFLOZIN 25 MG/1
1 TABLET, FILM COATED ORAL DAILY
Qty: 90 TABLET | Refills: 1 | Status: SHIPPED | OUTPATIENT
Start: 2020-10-29 | End: 2021-08-10 | Stop reason: SDUPTHER

## 2020-10-29 RX ORDER — BENAZEPRIL HYDROCHLORIDE 20 MG/1
20 TABLET ORAL DAILY
Qty: 90 TABLET | Refills: 1 | Status: SHIPPED | OUTPATIENT
Start: 2020-10-29 | End: 2021-01-13

## 2020-10-29 RX ORDER — INFLUENZA A VIRUS A/MICHIGAN/45/2015 X-275 (H1N1) ANTIGEN (FORMALDEHYDE INACTIVATED), INFLUENZA A VIRUS A/SINGAPORE/INFIMH-16-0019/2016 IVR-186 (H3N2) ANTIGEN (FORMALDEHYDE INACTIVATED), INFLUENZA B VIRUS B/PHUKET/3073/2013 ANTIGEN (FORMALDEHYDE INACTIVATED), AND INFLUENZA B VIRUS B/MARYLAND/15/2016 BX-69A ANTIGEN (FORMALDEHYDE INACTIVATED) 60; 60; 60; 60 UG/.7ML; UG/.7ML; UG/.7ML; UG/.7ML
INJECTION, SUSPENSION INTRAMUSCULAR
COMMUNITY
Start: 2020-10-08 | End: 2021-01-20

## 2020-10-29 RX ORDER — BLOOD-GLUCOSE METER
KIT MISCELLANEOUS
Qty: 400 EACH | Refills: 0 | Status: SHIPPED | OUTPATIENT
Start: 2020-10-29 | End: 2021-07-09 | Stop reason: SDUPTHER

## 2020-10-29 RX ORDER — PEN NEEDLE, DIABETIC 32GX 5/32"
NEEDLE, DISPOSABLE MISCELLANEOUS
Qty: 100 EACH | Refills: 0 | Status: SHIPPED | OUTPATIENT
Start: 2020-10-29 | End: 2022-02-22 | Stop reason: SDUPTHER

## 2020-10-29 RX ORDER — HYDROCHLOROTHIAZIDE 12.5 MG/1
12.5 CAPSULE, GELATIN COATED ORAL DAILY
Qty: 90 CAPSULE | Refills: 1 | Status: SHIPPED | OUTPATIENT
Start: 2020-10-29 | End: 2021-01-13

## 2020-10-29 RX ORDER — PIOGLITAZONEHYDROCHLORIDE 30 MG/1
30 TABLET ORAL DAILY
Qty: 90 TABLET | Refills: 1 | Status: SHIPPED | OUTPATIENT
Start: 2020-10-29 | End: 2021-01-08 | Stop reason: ALTCHOICE

## 2020-10-29 RX ORDER — INSULIN GLARGINE 300 U/ML
50 INJECTION, SOLUTION SUBCUTANEOUS DAILY
Qty: 15 ML | Refills: 1 | Status: SHIPPED | OUTPATIENT
Start: 2020-10-29 | End: 2021-04-27 | Stop reason: SDUPTHER

## 2020-10-29 NOTE — PROGRESS NOTES
"  Subjective    Di Macario is a 80 y.o. female. she is here today for follow-up.  Chief Complaint   Patient presents with   • Diabetes     F/U type 2 diabetes, recent labs,  checks BS BID, has readings, eye exam in dec 2020   • Hypertension   • Urinary Incontinence     getting worse     /66   Ht 160 cm (63\")   Wt 74.7 kg (164 lb 9.6 oz)   BMI 29.16 kg/m²       History of Present Illness   Encounter Diagnoses   Name Primary?   • Uncontrolled type 2 diabetes mellitus with hyperglycemia (CMS/HCC) Yes   • Peripheral polyneuropathy    • Calcium blood increased    • Hyperuricemia    • Essential hypertension    • Vitamin D deficiency      80-year-old female patient here today for uncontrolled type 2 diabetes.  She denies any problems with peripheral neuropathy.  She has a history of hypercalcemia however her recent calcium level is in normal range.  Her medication list was reviewed and updated for accuracy.  Currently she is taking 50 units of Toujeo insulin daily.  Her blood sugars vary from the low 100s to the 300s.  She denies eating any sweets or regular soft drinks.  She has reported occasional blood sugars in the 60-70 range.  Based on her age and her frailness we discussed adding pioglitazone to her regimen to help improve her overall glycemic control.    The following portions of the patient's history were reviewed and updated as appropriate:   Past Medical History:   Diagnosis Date   • Hypertension    • Peripheral neuropathy 2018   • Type 2 diabetes mellitus (CMS/HCC)    • Vitamin D deficiency      Past Surgical History:   Procedure Laterality Date   • BLADDER SURGERY      Prolapsed   • BREAST BIOPSY     • HYSTERECTOMY       No family history on file.  OB History        5    Para   5    Term   5            AB        Living           SAB        TAB        Ectopic        Molar        Multiple        Live Births                  Current Outpatient Medications   Medication Sig " Dispense Refill   • aspirin 81 MG tablet Take  by mouth.     • benazepril (LOTENSIN) 20 MG tablet Take 1 tablet by mouth Daily. 90 tablet 0   • dilTIAZem (TIAZAC) 240 MG 24 hr capsule Take 1 capsule by mouth Daily. 90 capsule 0   • donepezil (ARICEPT) 10 MG tablet TAKE 1 TABLET EVERY NIGHT 90 tablet 4   • ESTRACE VAGINAL 0.1 MG/GM vaginal cream      • FLUZONE HIGH-DOSE 0.5 ML suspension prefilled syringe injection      • FREESTYLE LITE test strip Use to test BG 4x daily 400 each 0   • hydroCHLOROthiazide (MICROZIDE) 12.5 MG capsule TAKE 1 CAPSULE DAILY 90 capsule 4   • Insulin Pen Needle (BD Pen Needle Norma U/F) 32G X 4 MM misc Use once a day 100 each 0   • Jardiance 25 MG tablet Take 25 mg by mouth Daily. 90 tablet 3   • Lancets (freestyle) lancets 1 each by Other route 4 (Four) Times a Day. Use as instructed 400 each 1   • Mirabegron ER (MYRBETRIQ) 25 MG tablet sustained-release 24 hour 24 hr tablet Take 25 mg by mouth Daily.     • Multiple Vitamins-Minerals (MULTIVITAMIN MEN 50+ PO) Take 1 tablet by mouth Daily.     • pravastatin (PRAVACHOL) 40 MG tablet Take 1 tablet by mouth Every Evening. 90 tablet 3   • TOUJEO SOLOSTAR 300 UNIT/ML solution pen-injector injection Inject 75 Units under the skin into the appropriate area as directed Every Morning. 15 pen 3   • TRULICITY 1.5 MG/0.5ML solution pen-injector Inject 1.5 mg under the skin into the appropriate area as directed 1 (One) Time Per Week. 12 pen 3   • zonisamide (ZONEGRAN) 100 MG capsule Take 100 mg by mouth 2 (Two) Times a Day.       No current facility-administered medications for this visit.      No Known Allergies  Social History     Socioeconomic History   • Marital status:      Spouse name: Not on file   • Number of children: Not on file   • Years of education: Not on file   • Highest education level: Not on file   Tobacco Use   • Smoking status: Former Smoker   Substance and Sexual Activity   • Alcohol use: No   • Drug use: No   • Sexual  "activity: Defer       Review of Systems  Review of Systems   Constitutional: Negative.    Cardiovascular: Negative.    Gastrointestinal: Positive for constipation.   Endocrine: Negative.    Neurological: Negative.    Psychiatric/Behavioral: Negative for sleep disturbance.        Objective    /66   Ht 160 cm (63\")   Wt 74.7 kg (164 lb 9.6 oz)   BMI 29.16 kg/m²     Physical Exam  Vitals signs and nursing note reviewed.   Constitutional:       General: She is not in acute distress.     Appearance: Normal appearance. She is well-developed. She is obese. She is not diaphoretic.   HENT:      Head: Normocephalic and atraumatic.      Right Ear: External ear normal.      Left Ear: External ear normal.      Nose: Nose normal.      Mouth/Throat:      Pharynx: No oropharyngeal exudate.   Eyes:      General:         Right eye: No discharge.         Left eye: No discharge.      Pupils: Pupils are equal, round, and reactive to light.   Neck:      Musculoskeletal: Full passive range of motion without pain, normal range of motion and neck supple. No edema or erythema.      Thyroid: No thyroid mass or thyromegaly.      Vascular: No carotid bruit.      Trachea: Trachea normal. No tracheal tenderness or tracheal deviation.   Cardiovascular:      Rate and Rhythm: Normal rate and regular rhythm.      Heart sounds: Normal heart sounds. No murmur. No friction rub. No gallop.    Pulmonary:      Effort: Pulmonary effort is normal. No respiratory distress.      Breath sounds: Normal breath sounds. No stridor. No wheezing or rales.   Abdominal:      General: Bowel sounds are normal. There is no distension.      Palpations: Abdomen is soft.   Musculoskeletal:         General: Swelling present. No deformity.      Comments: Trace edema bilateral lower extremities  Uses rolling walker for ambulation    Lymphadenopathy:      Cervical: No cervical adenopathy.   Skin:     General: Skin is warm and dry.      Coloration: Skin is not pale.      " Findings: No erythema or rash.      Comments: Dry flaky skin both legs   Neurological:      Mental Status: She is alert and oriented to person, place, and time.      Sensory: No sensory deficit.   Psychiatric:         Behavior: Behavior normal.         Thought Content: Thought content normal.         Judgment: Judgment normal.      Comments: Answers questions appropriately does have some memory issues         Lab Review  Sodium (mmol/L)   Date Value   10/15/2020 141   03/30/2020 143   09/26/2019 141     Potassium (mmol/L)   Date Value   10/15/2020 4.2   03/30/2020 4.0   09/26/2019 4.4     Chloride (mmol/L)   Date Value   10/15/2020 99   03/30/2020 99   09/26/2019 101     Total CO2 (mmol/L)   Date Value   10/15/2020 29.4 (H)   03/30/2020 28.8   09/26/2019 28.0     BUN (mg/dL)   Date Value   10/15/2020 28 (H)   03/30/2020 25 (H)   09/26/2019 22     Creatinine (mg/dL)   Date Value   10/15/2020 1.00   03/30/2020 0.98   09/26/2019 0.81     Hemoglobin A1C (%)   Date Value   10/15/2020 9.70 (H)   03/30/2020 9.30 (H)   09/26/2019 10.40 (H)     Triglycerides (mg/dL)   Date Value   10/15/2020 147   03/30/2020 139   09/26/2019 162 (H)     LDL Cholesterol  (mg/dL)   Date Value   09/26/2019 100   02/19/2019 49   10/25/2018 54     LDL Chol Calc (NIH) (mg/dL)   Date Value   10/15/2020 74       Assessment/Plan      1. Uncontrolled type 2 diabetes mellitus with hyperglycemia (CMS/[facility])    2. Peripheral polyneuropathy    3. Calcium blood increased    4. Hyperuricemia    5. Essential hypertension    6. Vitamin D deficiency    .    Medications prescribed:  Outpatient Encounter Medications as of 10/29/2020   Medication Sig Dispense Refill   • aspirin 81 MG tablet Take  by mouth.     • benazepril (LOTENSIN) 20 MG tablet Take 1 tablet by mouth Daily. 90 tablet 0   • dilTIAZem (TIAZAC) 240 MG 24 hr capsule Take 1 capsule by mouth Daily. 90 capsule 0   • donepezil (ARICEPT) 10 MG tablet TAKE 1 TABLET EVERY NIGHT 90 tablet 4   • ESTRACE  VAGINAL 0.1 MG/GM vaginal cream      • FLUZONE HIGH-DOSE 0.5 ML suspension prefilled syringe injection      • FREESTYLE LITE test strip Use to test BG 4x daily 400 each 0   • hydroCHLOROthiazide (MICROZIDE) 12.5 MG capsule TAKE 1 CAPSULE DAILY 90 capsule 4   • Insulin Pen Needle (BD Pen Needle Norma U/F) 32G X 4 MM misc Use once a day 100 each 0   • Jardiance 25 MG tablet Take 25 mg by mouth Daily. 90 tablet 3   • Lancets (freestyle) lancets 1 each by Other route 4 (Four) Times a Day. Use as instructed 400 each 1   • Mirabegron ER (MYRBETRIQ) 25 MG tablet sustained-release 24 hour 24 hr tablet Take 25 mg by mouth Daily.     • Multiple Vitamins-Minerals (MULTIVITAMIN MEN 50+ PO) Take 1 tablet by mouth Daily.     • pravastatin (PRAVACHOL) 40 MG tablet Take 1 tablet by mouth Every Evening. 90 tablet 3   • TOUJEO SOLOSTAR 300 UNIT/ML solution pen-injector injection Inject 75 Units under the skin into the appropriate area as directed Every Morning. 15 pen 3   • TRULICITY 1.5 MG/0.5ML solution pen-injector Inject 1.5 mg under the skin into the appropriate area as directed 1 (One) Time Per Week. 12 pen 3   • zonisamide (ZONEGRAN) 100 MG capsule Take 100 mg by mouth 2 (Two) Times a Day.       No facility-administered encounter medications on file as of 10/29/2020.        Orders placed during this encounter include:    In summary patient was seen and evaluated.  Metabolically clinically she presents stable.  She does have some memory issues.  She reports occasional hypoglycemic events.  She is currently checking her blood sugars 3-4 times daily.  Her highest blood sugars have been in the 300s her lowest was in the 100 range according to her documents.  Her blood sugar on the date of her labs was over 300.  We discussed adding pioglitazone 30 mg once daily.  She denies any history of congestive heart failure.  She does have a trace of edema in her lower extremities.  She is up-to-date on her eye exam.  She is had the flu  vaccination.  She is taking her medications as prescribed.  Her refills have been sent to her pharmacy for 90 days.  She was educated today regarding Pioglitizone asked to continue her Toujeo 50 units once daily.  She is at risk for hypoglycemia.  She will follow-up with the endocrinologist her next visit with labs.  piogliitozone 30 mg once daily  Monitor blood sugars closely  Continue all other meds the same     Pioglitazone tablets  What is this medicine?  PIOGLITAZONE (gini oh GLI ta zone) helps to treat type 2 diabetes. It helps to control blood sugar. Treatment is combined with diet and exercise.  This medicine may be used for other purposes; ask your health care provider or pharmacist if you have questions.  COMMON BRAND NAME(S): Actos  What should I tell my health care provider before I take this medicine?  They need to know if you have any of these conditions:  · bladder cancer  · diabetic ketoacidosis  · eye disease called macular edema  · heart disease  · heart failure  · kidney disease  · liver disease  · polycystic ovary syndrome  · premenopausal  · swelling of the arms, legs, or feet  · type 1 diabetes  · an unusual or allergic reaction to pioglitazone, other medicines, foods, dyes, or preservatives  · pregnant or trying to get pregnant  · breast-feeding  How should I use this medicine?  Take this medicine by mouth with a glass of water. Follow the directions on the prescription label. Take your medicine at the same time each day. Do not take more often than directed.  A special MedGuide will be given to you by the pharmacist with each prescription and refill. Be sure to read this information carefully each time.  Talk to your pediatrician regarding the use of this medicine in children. Special care may be needed.  Overdosage: If you think you have taken too much of this medicine contact a poison control center or emergency room at once.  NOTE: This medicine is only for you. Do not share this medicine  with others.  What if I miss a dose?  If you miss a dose, take it as soon as you can. If it is almost time for your next dose, take only that dose. Do not take double or extra doses.  What may interact with this medicine?  · atorvastatin  · birth control pills or other hormonal methods of birth control  · bosentan  · itraconazole  · ketoconazole  · midazolam  · nifedipine  · other medicines for diabetes, including insulin  · topiramate  Many medications may cause an increase or decrease in blood sugar, these include:  · alcohol containing beverages  · aspirin and aspirin-like drugs  · chloramphenicol  · chromium  · diuretics  · female hormones, like estrogens or progestins and birth control pills  · heart medicines  · isoniazid  · male hormones or anabolic steroids  · medicines for weight loss  · medicines for allergies, asthma, cold, or cough  · medicines for mental problems  · medicines called MAO Inhibitors like Nardil, Parnate, Marplan, Eldepryl  · niacin  · NSAIDs, medicines for pain and inflammation, like ibuprofen or naproxen  · pentamidine  · phenytoin  · probenecid  · quinolone antibiotics like ciprofloxacin, levofloxacin, ofloxacin  · some herbal dietary supplements  · steroid medicines like prednisone or cortisone  · thyroid medicine  This list may not describe all possible interactions. Give your health care provider a list of all the medicines, herbs, non-prescription drugs, or dietary supplements you use. Also tell them if you smoke, drink alcohol, or use illegal drugs. Some items may interact with your medicine.  What should I watch for while using this medicine?  Visit your doctor or health care professional for regular checks on your progress. You may need regular tests to make sure your liver is working properly.  A test called the HbA1C (A1C) will be monitored. This is a simple blood test. It measures your blood sugar control over the last 2 to 3 months. You will receive this test every 3 to 6  months.  Learn how to check your blood sugar. Learn the symptoms of low and high blood sugar and how to manage them.  Always carry a quick-source of sugar with you in case you have symptoms of low blood sugar. Examples include hard sugar candy or glucose tablets. Make sure others know that you can choke if you eat or drink when you develop serious symptoms of low blood sugar, such as seizures or unconsciousness. They must get medical help at once.  Tell your doctor or health care professional if you have high blood sugar. You might need to change the dose of your medicine. If you are sick or exercising more than usual, you might need to change the dose of your medicine.  Do not skip meals. Ask your doctor or health care professional if you should avoid alcohol. Many nonprescription cough and cold products contain sugar or alcohol. These can affect blood sugar.  This medicine may increase your risk of having certain heart problems. Get medical help right away if you have any chest pain or tightness, or pain that radiates to the jaw or down the arm, and shortness of breath. These may be signs of a serious medical condition.  This medicine may cause ovulation in premenopausal women who do not have regular monthly periods. This may increase your chances of becoming pregnant. You should not take this medicine if you become pregnant or think you may be pregnant. Talk with your doctor or health care professional about your birth control options while taking this medicine. Contact your doctor or health care professional right away if think you are pregnant.  Wear a medical ID bracelet or chain, and carry a card that describes your disease and details of your medicine and dosage times.  What side effects may I notice from receiving this medicine?  Side effects that you should report to your doctor or health care professional as soon as possible:  · allergic reactions like skin rash, itching or hives, swelling of the face,  lips, or tongue  · blood in the urine  · bone fractures  · breathing problems  · changes in vision  · dark urine  · fever, chills  · increased urination  · pain when urinating  · signs and symptoms of low blood sugar such as feeling anxious, confusion, dizziness, increased hunger, unusually weak or tired, sweating, shakiness, cold, irritable, headache, blurred vision, fast heartbeat, loss of consciousness  · sudden weight gain  · swelling of the ankles, feet, hands  · yellowing of the eyes or skin  Side effects that usually do not require medical attention (report to your doctor or health care professional if they continue or are bothersome):  · headache  · mild joint or muscle pain  · stuffy or runny nose  · sore throat  This list may not describe all possible side effects. Call your doctor for medical advice about side effects. You may report side effects to FDA at 2-379-FDA-3417.  Where should I keep my medicine?  Keep out of the reach of children.  Store at room temperature between 15 and 30 degrees C (59 and 86 degrees F). Keep container tightly closed and protect from moisture and humidity. Throw away any unused medicine after the expiration date.  NOTE: This sheet is a summary. It may not cover all possible information. If you have questions about this medicine, talk to your doctor, pharmacist, or health care provider.  ©  [employer] (2014 14:51:48)        1. Treatment changes: Pioglitazone 30 mg once daily continue same dose of Toujeo insulin.  Continue Jardiance 25 and Trulicity 1.5 weekly    2.  Education: blood sugar goals    3.  Compliance at present is estimated to be fair. Efforts to improve compliance (if necessary) will be directed at regular blood sugar monitorin times daily.    4. Follow-up: 6 months with  with labs prior     Patient was not counseled about the need to test their blood sugar prior to driving/operating a motor vehicle.

## 2020-10-29 NOTE — PATIENT INSTRUCTIONS
piogliitozone 30 mg once daily  Monitor blood sugars closely  Continue all other meds the same     Pioglitazone tablets  What is this medicine?  PIOGLITAZONE (gini oh GLI ta zone) helps to treat type 2 diabetes. It helps to control blood sugar. Treatment is combined with diet and exercise.  This medicine may be used for other purposes; ask your health care provider or pharmacist if you have questions.  COMMON BRAND NAME(S): Actos  What should I tell my health care provider before I take this medicine?  They need to know if you have any of these conditions:  · bladder cancer  · diabetic ketoacidosis  · eye disease called macular edema  · heart disease  · heart failure  · kidney disease  · liver disease  · polycystic ovary syndrome  · premenopausal  · swelling of the arms, legs, or feet  · type 1 diabetes  · an unusual or allergic reaction to pioglitazone, other medicines, foods, dyes, or preservatives  · pregnant or trying to get pregnant  · breast-feeding  How should I use this medicine?  Take this medicine by mouth with a glass of water. Follow the directions on the prescription label. Take your medicine at the same time each day. Do not take more often than directed.  A special MedGuide will be given to you by the pharmacist with each prescription and refill. Be sure to read this information carefully each time.  Talk to your pediatrician regarding the use of this medicine in children. Special care may be needed.  Overdosage: If you think you have taken too much of this medicine contact a poison control center or emergency room at once.  NOTE: This medicine is only for you. Do not share this medicine with others.  What if I miss a dose?  If you miss a dose, take it as soon as you can. If it is almost time for your next dose, take only that dose. Do not take double or extra doses.  What may interact with this medicine?  · atorvastatin  · birth control pills or other hormonal methods of birth  control  · bosentan  · itraconazole  · ketoconazole  · midazolam  · nifedipine  · other medicines for diabetes, including insulin  · topiramate  Many medications may cause an increase or decrease in blood sugar, these include:  · alcohol containing beverages  · aspirin and aspirin-like drugs  · chloramphenicol  · chromium  · diuretics  · female hormones, like estrogens or progestins and birth control pills  · heart medicines  · isoniazid  · male hormones or anabolic steroids  · medicines for weight loss  · medicines for allergies, asthma, cold, or cough  · medicines for mental problems  · medicines called MAO Inhibitors like Nardil, Parnate, Marplan, Eldepryl  · niacin  · NSAIDs, medicines for pain and inflammation, like ibuprofen or naproxen  · pentamidine  · phenytoin  · probenecid  · quinolone antibiotics like ciprofloxacin, levofloxacin, ofloxacin  · some herbal dietary supplements  · steroid medicines like prednisone or cortisone  · thyroid medicine  This list may not describe all possible interactions. Give your health care provider a list of all the medicines, herbs, non-prescription drugs, or dietary supplements you use. Also tell them if you smoke, drink alcohol, or use illegal drugs. Some items may interact with your medicine.  What should I watch for while using this medicine?  Visit your doctor or health care professional for regular checks on your progress. You may need regular tests to make sure your liver is working properly.  A test called the HbA1C (A1C) will be monitored. This is a simple blood test. It measures your blood sugar control over the last 2 to 3 months. You will receive this test every 3 to 6 months.  Learn how to check your blood sugar. Learn the symptoms of low and high blood sugar and how to manage them.  Always carry a quick-source of sugar with you in case you have symptoms of low blood sugar. Examples include hard sugar candy or glucose tablets. Make sure others know that you can  choke if you eat or drink when you develop serious symptoms of low blood sugar, such as seizures or unconsciousness. They must get medical help at once.  Tell your doctor or health care professional if you have high blood sugar. You might need to change the dose of your medicine. If you are sick or exercising more than usual, you might need to change the dose of your medicine.  Do not skip meals. Ask your doctor or health care professional if you should avoid alcohol. Many nonprescription cough and cold products contain sugar or alcohol. These can affect blood sugar.  This medicine may increase your risk of having certain heart problems. Get medical help right away if you have any chest pain or tightness, or pain that radiates to the jaw or down the arm, and shortness of breath. These may be signs of a serious medical condition.  This medicine may cause ovulation in premenopausal women who do not have regular monthly periods. This may increase your chances of becoming pregnant. You should not take this medicine if you become pregnant or think you may be pregnant. Talk with your doctor or health care professional about your birth control options while taking this medicine. Contact your doctor or health care professional right away if think you are pregnant.  Wear a medical ID bracelet or chain, and carry a card that describes your disease and details of your medicine and dosage times.  What side effects may I notice from receiving this medicine?  Side effects that you should report to your doctor or health care professional as soon as possible:  · allergic reactions like skin rash, itching or hives, swelling of the face, lips, or tongue  · blood in the urine  · bone fractures  · breathing problems  · changes in vision  · dark urine  · fever, chills  · increased urination  · pain when urinating  · signs and symptoms of low blood sugar such as feeling anxious, confusion, dizziness, increased hunger, unusually weak or  tired, sweating, shakiness, cold, irritable, headache, blurred vision, fast heartbeat, loss of consciousness  · sudden weight gain  · swelling of the ankles, feet, hands  · yellowing of the eyes or skin  Side effects that usually do not require medical attention (report to your doctor or health care professional if they continue or are bothersome):  · headache  · mild joint or muscle pain  · stuffy or runny nose  · sore throat  This list may not describe all possible side effects. Call your doctor for medical advice about side effects. You may report side effects to FDA at 8-192-FDA-0095.  Where should I keep my medicine?  Keep out of the reach of children.  Store at room temperature between 15 and 30 degrees C (59 and 86 degrees F). Keep container tightly closed and protect from moisture and humidity. Throw away any unused medicine after the expiration date.  NOTE: This sheet is a summary. It may not cover all possible information. If you have questions about this medicine, talk to your doctor, pharmacist, or health care provider.  © 2020 [employer] (2014-04-01 14:51:48)

## 2021-01-07 DIAGNOSIS — R00.9 ABNORMAL HEART RATE: Primary | ICD-10-CM

## 2021-01-08 ENCOUNTER — TELEPHONE (OUTPATIENT)
Dept: ENDOCRINOLOGY | Age: 81
End: 2021-01-08

## 2021-01-08 NOTE — TELEPHONE ENCOUNTER
SPOKE WITH PT WENT TO [facility] CALLED FOR RECORDS [phone]  PT INFORMED OF CARDIOLOGY REFERRAL

## 2021-01-08 NOTE — TELEPHONE ENCOUNTER
----- Message from IAIN Coombs sent at 1/7/2021  8:07 PM EST -----  Please find out what ER patient has gone too and see if we can get records for me to review. In the meantime I do want her to see a cardiologist and have put a referral for Dr Aguilera for eval. Please let her know.   ----- Message -----  From: Estrella Vargas MA  Sent: 1/7/2021   4:02 PM EST  To: IAIN Coombs      ----- Message -----  From: Ailyn Mitchell MA  Sent: 1/7/2021   3:32 PM EST  To: Estrella Vargas MA    Patient left a voicemail that she was seen in the ER for a fall. While at the ER she was told by the doctors that she should not be taking some of her medications with her BP medications. She was also told that while laying down her pulse was 44 for over 4 hours while in the ER and was told she needs to see someone about her heart. When she sat up to leave her pulse went to 60's but she does not know what that means. The doctor she was referred to is at [facility] and she does not want to go there. She would like to discuss all of this with Nida and see if thinks she needs to be seen by a heart doctor and if so can she refer her to someone else.

## 2021-01-08 NOTE — TELEPHONE ENCOUNTER
SPOKE WITH PT INFORMED TO STOP ACTOS NAD SEND LIST OF BS TO MAKE ANY CHANGES TO INSULIN IF NEEDED

## 2021-01-13 ENCOUNTER — OFFICE VISIT (OUTPATIENT)
Dept: CARDIOLOGY | Age: 81
End: 2021-01-13

## 2021-01-13 VITALS
HEIGHT: 63 IN | DIASTOLIC BLOOD PRESSURE: 70 MMHG | HEART RATE: 62 BPM | BODY MASS INDEX: 28.35 KG/M2 | WEIGHT: 160 LBS | SYSTOLIC BLOOD PRESSURE: 154 MMHG

## 2021-01-13 DIAGNOSIS — E78.5 HYPERLIPIDEMIA, UNSPECIFIED HYPERLIPIDEMIA TYPE: ICD-10-CM

## 2021-01-13 DIAGNOSIS — M79.89 LEG SWELLING: ICD-10-CM

## 2021-01-13 DIAGNOSIS — R00.1 BRADYCARDIA, SINUS: Primary | ICD-10-CM

## 2021-01-13 DIAGNOSIS — I10 ESSENTIAL HYPERTENSION: ICD-10-CM

## 2021-01-13 DIAGNOSIS — R06.02 SOB (SHORTNESS OF BREATH): ICD-10-CM

## 2021-01-13 PROCEDURE — 93000 ELECTROCARDIOGRAM COMPLETE: CPT | Performed by: INTERNAL MEDICINE

## 2021-01-13 PROCEDURE — 99203 OFFICE O/P NEW LOW 30 MIN: CPT | Performed by: INTERNAL MEDICINE

## 2021-01-13 RX ORDER — AMLODIPINE BESYLATE AND BENAZEPRIL HYDROCHLORIDE 5; 20 MG/1; MG/1
1 CAPSULE ORAL DAILY
Qty: 30 CAPSULE | Refills: 6 | Status: SHIPPED | OUTPATIENT
Start: 2021-01-13 | End: 2021-02-22 | Stop reason: SDUPTHER

## 2021-01-13 NOTE — PROGRESS NOTES
Subjective:        [facility]  Cardiology Consult Note    Patient Identification:  Name: Di Macario  Age: 80 y.o.  Sex: female  :  1940  MRN: [id number]             CC  In ER pt had bradycardia  Dm  htn  hyperlipidemia  Sob on exercise, walking few yards  Ankle swelling  History of Present Illness:   80-year-old female here for the cardiac evaluation as well as establishment of the care as the patient recently was seen in emergency room with the patient was found to have the bradycardia patient went to the emergency room because of a fall but no syncope    Patient has a history of the diabetes, benign essential arterial hypertension as well as hyperlipidemia well-controlled    Patient continues to complains of shortness of breath on exertion    Di Macario has been complaining of the shortness of breath mild-to-moderate in intensity with mild-to-moderate usually relieved with rest associated with anxiety and fatigue    Patient also has bilateral ankle swelling    Comorbid cardiac risk factors:     Past Medical History:  Past Medical History:   Diagnosis Date   • Hypertension    • Peripheral neuropathy 2018   • Type 2 diabetes mellitus (CMS/HCC)    • Vitamin D deficiency      Past Surgical History:  Past Surgical History:   Procedure Laterality Date   • BLADDER SURGERY      Prolapsed   • BREAST BIOPSY     • HYSTERECTOMY        Allergies:  No Known Allergies  Home Meds:  (Not in a hospital admission)    Current Meds:   [unfilled]  Social History:   Social History     Tobacco Use   • Smoking status: Former Smoker   Substance Use Topics   • Alcohol use: No      Family History:  Family History   Problem Relation Age of Onset   • Hypertension Mother    • Hypertension Father         Review of Systems    Constitutional: No weakness,fatigue, fever, rigors, chills   Eyes: No vision changes, eye pain   ENT/oropharynx: No difficulty swallowing, sore throat, epistaxis, changes in hearing    Cardiovascular: No chest pain, chest tightness, palpitations, paroxysmal nocturnal dyspnea, orthopnea, diaphoresis, dizziness / syncopal episode   Respiratory: No shortness of breath, dyspnea on exertion, cough, wheezing hemoptysis   Gastrointestinal: No abdominal pain, nausea, vomiting, diarrhea, bloody stools   Genitourinary: No hematuria, dysuria   Neurological: No headache, tremors, numbness,  one-sided weakness    Musculoskeletal:  Leg swelling   Integument: No rash, edema           Constitutional:  Heart Rate:  [62] 62  BP: (154)/(70) 154/70    Physical Exam   General:  Appears in no acute distress  Eyes: PERTL,  HEENT:  No JVD. Thyroid not visibly enlarged. No mucosal pallor or cyanosis  Respiratory: Respirations regular and unlabored at rest. BBS with good air entry in all fields. No crackles, rubs or wheezes auscultated  Cardiovascular: S1S2 Regular rate and rhythm. No murmur, rub or gallop auscultated. No carotid bruits. DP/PT pulses    . No pretibial pitting edema  Gastrointestinal: Abdomen soft, flat, non tender. Bowel sounds present. No hepatosplenomegaly. No ascites  Musculoskeletal: GARDNER x4. No abnormal movements  Extremities: No digital clubbing or cyanosis, 1+ pedal edema  Skin: Color pink. Skin warm and dry to touch. No rashes  No xanthoma  Neuro: AAO x3 CN II-XII grossly intact            ECG 12 Lead    Date/Time: 1/13/2021 2:40 PM  Performed by: Madelin Ferguson MD  Authorized by: Madelin Ferguson MD   Comparison: not compared with previous ECG   Previous ECG: no previous ECG available  Rhythm: sinus rhythm  ST Flattening: all    Clinical impression: non-specific ECG                Cardiographics  ECG:     Telemetry:    Echocardiogram:     Imaging  Chest X-ray:     Lab Review               @[email or web address]@              Assessment:/ Recommendations / Plan:   Patient Active Problem List   Diagnosis   • Essential hypertension   • Dyslipidemia   • Impaired memory   • Vitamin D deficiency    • Hyperuricemia   • Slow transit constipation   • Chronic pain of left knee   • Acute pain of left knee   • Fall   • Medicare annual wellness visit, subsequent   • At high risk for falls   • Peripheral neuropathy   • Uncontrolled type 2 diabetes mellitus with hyperglycemia (CMS/[facility])   • Alkaline phosphatase elevation   • Walker as ambulation aid                    ICD-10-CM ICD-9-CM   1. Bradycardia, sinus  R00.1 427.89   2. SOB (shortness of breath)  R06.02 786.05   3. Leg swelling  M79.89 729.81   4. Essential hypertension  I10 401.9   5. Hyperlipidemia, unspecified hyperlipidemia type  E78.5 272.4     1. Bradycardia, sinus  We will have a long-term monitoring with Zio patch    2. SOB (shortness of breath)  Multifactorial    3. Leg swelling  Di Macario has been complaining of the leg swelling, appears dependent pedal edema, significantly contributed with a venous insufficiency.    Strong recommendations of elevating the legs as well as using support hoses, along with the control of fluids and salt restriction has been explained in details      4. Essential hypertension  Blood pressure controlled    5. Hyperlipidemia, unspecified hyperlipidemia type  Continue current treatment    zio, echo, jd  Dc cardiazem  Start ;lotrel 5/20 mg po daily  See in 1 month  With bmp    Labs/tests ordered for am      Madelin Ferguson MD  1/13/2021, 14:39 EST      EMR Jania/Transcription:   Dictated utilizing Dragon dictation

## 2021-01-18 ENCOUNTER — TRANSCRIBE ORDERS (OUTPATIENT)
Dept: SLEEP MEDICINE | Facility: HOSPITAL | Age: 81
End: 2021-01-18

## 2021-01-18 ENCOUNTER — LAB (OUTPATIENT)
Dept: LAB | Facility: HOSPITAL | Age: 81
End: 2021-01-18

## 2021-01-18 DIAGNOSIS — E78.5 DYSLIPIDEMIA: ICD-10-CM

## 2021-01-18 DIAGNOSIS — E55.9 VITAMIN D DEFICIENCY: ICD-10-CM

## 2021-01-18 DIAGNOSIS — G89.29 CHRONIC PAIN OF LEFT KNEE: ICD-10-CM

## 2021-01-18 DIAGNOSIS — E79.0 HYPERURICEMIA: ICD-10-CM

## 2021-01-18 DIAGNOSIS — K59.01 SLOW TRANSIT CONSTIPATION: ICD-10-CM

## 2021-01-18 DIAGNOSIS — I10 ESSENTIAL HYPERTENSION: Primary | ICD-10-CM

## 2021-01-18 DIAGNOSIS — R41.3 IMPAIRED MEMORY: ICD-10-CM

## 2021-01-18 DIAGNOSIS — M25.562 CHRONIC PAIN OF LEFT KNEE: ICD-10-CM

## 2021-01-18 DIAGNOSIS — M25.562 ACUTE PAIN OF LEFT KNEE: ICD-10-CM

## 2021-01-18 DIAGNOSIS — Z01.818 OTHER SPECIFIED PRE-OPERATIVE EXAMINATION: ICD-10-CM

## 2021-01-18 DIAGNOSIS — Z01.818 OTHER SPECIFIED PRE-OPERATIVE EXAMINATION: Primary | ICD-10-CM

## 2021-01-18 PROCEDURE — U0004 COV-19 TEST NON-CDC HGH THRU: HCPCS

## 2021-01-18 PROCEDURE — C9803 HOPD COVID-19 SPEC COLLECT: HCPCS

## 2021-01-19 LAB — SARS-COV-2 RNA RESP QL NAA+PROBE: NOT DETECTED

## 2021-01-20 ENCOUNTER — HOSPITAL ENCOUNTER (OUTPATIENT)
Dept: CARDIOLOGY | Facility: HOSPITAL | Age: 81
Discharge: HOME | End: 2021-01-20

## 2021-01-20 VITALS
BODY MASS INDEX: 28.35 KG/M2 | DIASTOLIC BLOOD PRESSURE: 60 MMHG | HEIGHT: 63 IN | WEIGHT: 160 LBS | SYSTOLIC BLOOD PRESSURE: 132 MMHG | OXYGEN SATURATION: 100 % | HEART RATE: 44 BPM | RESPIRATION RATE: 18 BRPM

## 2021-01-20 VITALS
SYSTOLIC BLOOD PRESSURE: 177 MMHG | WEIGHT: 160 LBS | HEART RATE: 55 BPM | HEIGHT: 63 IN | BODY MASS INDEX: 28.35 KG/M2 | DIASTOLIC BLOOD PRESSURE: 70 MMHG

## 2021-01-20 DIAGNOSIS — R00.1 BRADYCARDIA, SINUS: ICD-10-CM

## 2021-01-20 DIAGNOSIS — R06.02 SOB (SHORTNESS OF BREATH): ICD-10-CM

## 2021-01-20 DIAGNOSIS — I10 ESSENTIAL HYPERTENSION: ICD-10-CM

## 2021-01-20 DIAGNOSIS — E78.5 HYPERLIPIDEMIA, UNSPECIFIED HYPERLIPIDEMIA TYPE: ICD-10-CM

## 2021-01-20 DIAGNOSIS — M79.89 LEG SWELLING: ICD-10-CM

## 2021-01-20 LAB
ANION GAP SERPL CALCULATED.3IONS-SCNC: 7.2 MMOL/L (ref 5–15)
AORTIC ARCH: 2.5 CM
ASCENDING AORTA: 2.8 CM
BH CV ECHO MEAS - ACS: 1.2 CM
BH CV ECHO MEAS - AO ARCH DIAM (PROXIMAL TRANS.): 2.5 CM
BH CV ECHO MEAS - AO MAX PG (FULL): 8.4 MMHG
BH CV ECHO MEAS - AO MAX PG: 11.3 MMHG
BH CV ECHO MEAS - AO MEAN PG (FULL): 4.1 MMHG
BH CV ECHO MEAS - AO MEAN PG: 5.7 MMHG
BH CV ECHO MEAS - AO ROOT AREA (BSA CORRECTED): 1.5
BH CV ECHO MEAS - AO ROOT AREA: 5.5 CM^2
BH CV ECHO MEAS - AO ROOT DIAM: 2.6 CM
BH CV ECHO MEAS - AO V2 MAX: 168.3 CM/SEC
BH CV ECHO MEAS - AO V2 MEAN: 111.1 CM/SEC
BH CV ECHO MEAS - AO V2 VTI: 47.5 CM
BH CV ECHO MEAS - ASC AORTA: 2.8 CM
BH CV ECHO MEAS - AVA(I,A): 1.8 CM^2
BH CV ECHO MEAS - AVA(I,D): 1.8 CM^2
BH CV ECHO MEAS - AVA(V,A): 1.6 CM^2
BH CV ECHO MEAS - AVA(V,D): 1.6 CM^2
BH CV ECHO MEAS - BSA(HAYCOCK): 1.8 M^2
BH CV ECHO MEAS - BSA: 1.8 M^2
BH CV ECHO MEAS - BZI_BMI: 28.3 KILOGRAMS/M^2
BH CV ECHO MEAS - BZI_METRIC_HEIGHT: 160 CM
BH CV ECHO MEAS - BZI_METRIC_WEIGHT: 72.6 KG
BH CV ECHO MEAS - EDV(CUBED): 34.9 ML
BH CV ECHO MEAS - EDV(MOD-SP2): 70 ML
BH CV ECHO MEAS - EDV(MOD-SP4): 63 ML
BH CV ECHO MEAS - EDV(TEICH): 43.1 ML
BH CV ECHO MEAS - EF(CUBED): 81.2 %
BH CV ECHO MEAS - EF(MOD-BP): 66.8 %
BH CV ECHO MEAS - EF(MOD-SP2): 62.9 %
BH CV ECHO MEAS - EF(MOD-SP4): 71.4 %
BH CV ECHO MEAS - EF(TEICH): 75.1 %
BH CV ECHO MEAS - ESV(CUBED): 6.6 ML
BH CV ECHO MEAS - ESV(MOD-SP2): 26 ML
BH CV ECHO MEAS - ESV(MOD-SP4): 18 ML
BH CV ECHO MEAS - ESV(TEICH): 10.7 ML
BH CV ECHO MEAS - FS: 42.7 %
BH CV ECHO MEAS - IVS/LVPW: 0.9
BH CV ECHO MEAS - IVSD: 0.88 CM
BH CV ECHO MEAS - LA DIMENSION: 4.1 CM
BH CV ECHO MEAS - LA/AO: 1.5
BH CV ECHO MEAS - LAT PEAK E' VEL: 11.7 CM/SEC
BH CV ECHO MEAS - LV DIASTOLIC VOL/BSA (35-75): 35.8 ML/M^2
BH CV ECHO MEAS - LV MASS(C)D: 84 GRAMS
BH CV ECHO MEAS - LV MASS(C)DI: 47.8 GRAMS/M^2
BH CV ECHO MEAS - LV MAX PG: 3 MMHG
BH CV ECHO MEAS - LV MEAN PG: 1.6 MMHG
BH CV ECHO MEAS - LV SYSTOLIC VOL/BSA (12-30): 10.2 ML/M^2
BH CV ECHO MEAS - LV V1 MAX: 86.3 CM/SEC
BH CV ECHO MEAS - LV V1 MEAN: 58.2 CM/SEC
BH CV ECHO MEAS - LV V1 VTI: 27.9 CM
BH CV ECHO MEAS - LVIDD: 3.3 CM
BH CV ECHO MEAS - LVIDS: 1.9 CM
BH CV ECHO MEAS - LVLD AP2: 7.6 CM
BH CV ECHO MEAS - LVLD AP4: 7.7 CM
BH CV ECHO MEAS - LVLS AP2: 6.4 CM
BH CV ECHO MEAS - LVLS AP4: 6.2 CM
BH CV ECHO MEAS - LVOT AREA (M): 3.1 CM^2
BH CV ECHO MEAS - LVOT AREA: 3.1 CM^2
BH CV ECHO MEAS - LVOT DIAM: 2 CM
BH CV ECHO MEAS - LVPWD: 0.98 CM
BH CV ECHO MEAS - MED PEAK E' VEL: 7.6 CM/SEC
BH CV ECHO MEAS - MV A DUR: 0.2 SEC
BH CV ECHO MEAS - MV A MAX VEL: 93.6 CM/SEC
BH CV ECHO MEAS - MV DEC SLOPE: 346.2 CM/SEC^2
BH CV ECHO MEAS - MV DEC TIME: 0.2 SEC
BH CV ECHO MEAS - MV E MAX VEL: 108.2 CM/SEC
BH CV ECHO MEAS - MV E/A: 1.2
BH CV ECHO MEAS - MV MAX PG: 3.8 MMHG
BH CV ECHO MEAS - MV MEAN PG: 1.2 MMHG
BH CV ECHO MEAS - MV P1/2T MAX VEL: 97.7 CM/SEC
BH CV ECHO MEAS - MV P1/2T: 82.7 MSEC
BH CV ECHO MEAS - MV V2 MAX: 97.7 CM/SEC
BH CV ECHO MEAS - MV V2 MEAN: 49.2 CM/SEC
BH CV ECHO MEAS - MV V2 VTI: 35.1 CM
BH CV ECHO MEAS - MVA P1/2T LCG: 2.3 CM^2
BH CV ECHO MEAS - MVA(P1/2T): 2.7 CM^2
BH CV ECHO MEAS - MVA(VTI): 2.5 CM^2
BH CV ECHO MEAS - PA ACC TIME: 0.11 SEC
BH CV ECHO MEAS - PA MAX PG (FULL): 2.6 MMHG
BH CV ECHO MEAS - PA MAX PG: 3.5 MMHG
BH CV ECHO MEAS - PA PR(ACCEL): 29.1 MMHG
BH CV ECHO MEAS - PA V2 MAX: 93.7 CM/SEC
BH CV ECHO MEAS - PULM A REVS DUR: 0.17 SEC
BH CV ECHO MEAS - PULM A REVS VEL: 32.6 CM/SEC
BH CV ECHO MEAS - PULM DIAS VEL: 43.5 CM/SEC
BH CV ECHO MEAS - PULM S/D: 1.3
BH CV ECHO MEAS - PULM SYS VEL: 55.9 CM/SEC
BH CV ECHO MEAS - PVA(V,A): 1.5 CM^2
BH CV ECHO MEAS - PVA(V,D): 1.5 CM^2
BH CV ECHO MEAS - QP/QS: 0.49
BH CV ECHO MEAS - RAP SYSTOLE: 3 MMHG
BH CV ECHO MEAS - RV MAX PG: 0.87 MMHG
BH CV ECHO MEAS - RV MEAN PG: 0.56 MMHG
BH CV ECHO MEAS - RV V1 MAX: 46.6 CM/SEC
BH CV ECHO MEAS - RV V1 MEAN: 35.1 CM/SEC
BH CV ECHO MEAS - RV V1 VTI: 14.4 CM
BH CV ECHO MEAS - RVOT AREA: 3 CM^2
BH CV ECHO MEAS - RVOT DIAM: 1.9 CM
BH CV ECHO MEAS - RVSP: 31 MMHG
BH CV ECHO MEAS - SI(AO): 148.4 ML/M^2
BH CV ECHO MEAS - SI(CUBED): 16.1 ML/M^2
BH CV ECHO MEAS - SI(LVOT): 49.1 ML/M^2
BH CV ECHO MEAS - SI(MOD-SP2): 25 ML/M^2
BH CV ECHO MEAS - SI(MOD-SP4): 25.6 ML/M^2
BH CV ECHO MEAS - SI(TEICH): 18.4 ML/M^2
BH CV ECHO MEAS - SV(AO): 261 ML
BH CV ECHO MEAS - SV(CUBED): 28.3 ML
BH CV ECHO MEAS - SV(LVOT): 86.3 ML
BH CV ECHO MEAS - SV(MOD-SP2): 44 ML
BH CV ECHO MEAS - SV(MOD-SP4): 45 ML
BH CV ECHO MEAS - SV(RVOT): 42.5 ML
BH CV ECHO MEAS - SV(TEICH): 32.3 ML
BH CV ECHO MEAS - TAPSE (>1.6): 2.7 CM
BH CV ECHO MEAS - TR MAX VEL: 264.7 CM/SEC
BH CV ECHO MEASUREMENTS AVERAGE E/E' RATIO: 11.21
BH CV XLRA - RV BASE: 3.2 CM
BH CV XLRA - RV LENGTH: 6 CM
BH CV XLRA - RV MID: 2.7 CM
BH CV XLRA - TDI S': 9.6 CM/SEC
BUN SERPL-MCNC: 26 MG/DL (ref 8–23)
BUN/CREAT SERPL: 37.1 (ref 7–25)
CALCIUM SPEC-SCNC: 9.5 MG/DL (ref 8.6–10.5)
CHLORIDE SERPL-SCNC: 104 MMOL/L (ref 98–107)
CO2 SERPL-SCNC: 27.8 MMOL/L (ref 22–29)
CREAT SERPL-MCNC: 0.7 MG/DL (ref 0.57–1)
GFR SERPL CREATININE-BSD FRML MDRD: 80 ML/MIN/1.73
GLUCOSE SERPL-MCNC: 248 MG/DL (ref 65–99)
LEFT ATRIUM VOLUME INDEX: 34 ML/M2
MAXIMAL PREDICTED HEART RATE: 139 BPM
POTASSIUM SERPL-SCNC: 4 MMOL/L (ref 3.5–5.2)
SINUS: 2.7 CM
SODIUM SERPL-SCNC: 139 MMOL/L (ref 136–145)
STJ: 2.3 CM
STRESS TARGET HR: 118 BPM

## 2021-01-20 PROCEDURE — 25010000002 REGADENOSON 0.4 MG/5ML SOLUTION: Performed by: INTERNAL MEDICINE

## 2021-01-20 PROCEDURE — 78452 HT MUSCLE IMAGE SPECT MULT: CPT

## 2021-01-20 PROCEDURE — 0 TECHNETIUM SESTAMIBI: Performed by: INTERNAL MEDICINE

## 2021-01-20 PROCEDURE — 93306 TTE W/DOPPLER COMPLETE: CPT

## 2021-01-20 PROCEDURE — 80048 BASIC METABOLIC PNL TOTAL CA: CPT | Performed by: INTERNAL MEDICINE

## 2021-01-20 PROCEDURE — 93018 CV STRESS TEST I&R ONLY: CPT | Performed by: INTERNAL MEDICINE

## 2021-01-20 PROCEDURE — 78452 HT MUSCLE IMAGE SPECT MULT: CPT | Performed by: INTERNAL MEDICINE

## 2021-01-20 PROCEDURE — 36415 COLL VENOUS BLD VENIPUNCTURE: CPT | Performed by: INTERNAL MEDICINE

## 2021-01-20 PROCEDURE — A9500 TC99M SESTAMIBI: HCPCS | Performed by: INTERNAL MEDICINE

## 2021-01-20 PROCEDURE — 93017 CV STRESS TEST TRACING ONLY: CPT

## 2021-01-20 PROCEDURE — 93016 CV STRESS TEST SUPVJ ONLY: CPT | Performed by: NURSE PRACTITIONER

## 2021-01-20 PROCEDURE — 93306 TTE W/DOPPLER COMPLETE: CPT | Performed by: INTERNAL MEDICINE

## 2021-01-20 RX ADMIN — TECHNETIUM TC 99M SESTAMIBI 1 DOSE: 1 INJECTION INTRAVENOUS at 08:50

## 2021-01-20 RX ADMIN — REGADENOSON 0.4 MG: 0.08 INJECTION, SOLUTION INTRAVENOUS at 10:41

## 2021-01-20 RX ADMIN — TECHNETIUM TC 99M SESTAMIBI 1 DOSE: 1 INJECTION INTRAVENOUS at 10:41

## 2021-01-21 ENCOUNTER — APPOINTMENT (OUTPATIENT)
Dept: CARDIOLOGY | Facility: HOSPITAL | Age: 81
End: 2021-01-21

## 2021-01-21 LAB
BH CV STRESS BP STAGE 1: NORMAL
BH CV STRESS COMMENTS STAGE 1: NORMAL
BH CV STRESS DOSE REGADENOSON STAGE 1: 0.4
BH CV STRESS DURATION MIN STAGE 1: 1
BH CV STRESS DURATION SEC STAGE 1: 0
BH CV STRESS HR STAGE 1: 67
BH CV STRESS O2 STAGE 1: 100
BH CV STRESS PROTOCOL 1: NORMAL
BH CV STRESS RECOVERY BP: NORMAL MMHG
BH CV STRESS RECOVERY HR: 67 BPM
BH CV STRESS RECOVERY O2: 100 %
BH CV STRESS STAGE 1: 1
MAXIMAL PREDICTED HEART RATE: 139 BPM
PERCENT MAX PREDICTED HR: 48.2 %
STRESS BASELINE BP: NORMAL MMHG
STRESS BASELINE HR: 45 BPM
STRESS O2 SAT REST: 100 %
STRESS PERCENT HR: 57 %
STRESS POST ESTIMATED WORKLOAD: 1 METS
STRESS POST EXERCISE DUR MIN: 1 MIN
STRESS POST EXERCISE DUR SEC: 0 SEC
STRESS POST O2 SAT PEAK: 100 %
STRESS POST PEAK BP: NORMAL MMHG
STRESS POST PEAK HR: 67 BPM
STRESS TARGET HR: 118 BPM

## 2021-02-12 ENCOUNTER — HOSPITAL ENCOUNTER (EMERGENCY)
Facility: HOSPITAL | Age: 81
Discharge: HOME | End: 2021-02-12
Attending: EMERGENCY MEDICINE | Admitting: EMERGENCY MEDICINE

## 2021-02-12 ENCOUNTER — APPOINTMENT (OUTPATIENT)
Dept: CT IMAGING | Facility: HOSPITAL | Age: 81
End: 2021-02-12

## 2021-02-12 VITALS
RESPIRATION RATE: 18 BRPM | HEIGHT: 63 IN | BODY MASS INDEX: 28.34 KG/M2 | TEMPERATURE: 96.6 F | DIASTOLIC BLOOD PRESSURE: 71 MMHG | OXYGEN SATURATION: 98 % | HEART RATE: 51 BPM | SYSTOLIC BLOOD PRESSURE: 159 MMHG
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
## 2021-02-12 DIAGNOSIS — S01.01XA LACERATION OF SCALP, INITIAL ENCOUNTER: ICD-10-CM

## 2021-02-12 DIAGNOSIS — S16.1XXA STRAIN OF NECK MUSCLE, INITIAL ENCOUNTER: ICD-10-CM

## 2021-02-12 DIAGNOSIS — S09.90XA INJURY OF HEAD, INITIAL ENCOUNTER: ICD-10-CM

## 2021-02-12 LAB — GLUCOSE BLDC GLUCOMTR-MCNC: 206 MG/DL (ref 70–130)

## 2021-02-12 PROCEDURE — 82962 GLUCOSE BLOOD TEST: CPT

## 2021-02-12 PROCEDURE — 25010000002 TDAP 5-2.5-18.5 LF-MCG/0.5 SUSPENSION: Performed by: PHYSICIAN ASSISTANT

## 2021-02-12 PROCEDURE — 90715 TDAP VACCINE 7 YRS/> IM: CPT | Performed by: PHYSICIAN ASSISTANT

## 2021-02-12 PROCEDURE — 90471 IMMUNIZATION ADMIN: CPT | Performed by: PHYSICIAN ASSISTANT

## 2021-02-12 PROCEDURE — 72125 CT NECK SPINE W/O DYE: CPT

## 2021-02-12 PROCEDURE — 70450 CT HEAD/BRAIN W/O DYE: CPT

## 2021-02-12 PROCEDURE — 99283 EMERGENCY DEPT VISIT LOW MDM: CPT

## 2021-02-12 RX ADMIN — TETANUS TOXOID, REDUCED DIPHTHERIA TOXOID AND ACELLULAR PERTUSSIS VACCINE, ADSORBED 0.5 ML: 5; 2.5; 8; 8; 2.5 SUSPENSION INTRAMUSCULAR at 04:15

## 2021-02-12 NOTE — ED PROVIDER NOTES
EMERGENCY DEPARTMENT ENCOUNTER    Room Number:  07/07  Date of encounter:  2/12/2021  PCP: Nida Gray APRN  Historian: Patient      HPI:  Chief Complaint: Fall with head injury and neck pain  A complete HPI/ROS/PMH/PSH/SH/FH are unobtainable due to: Nothing    Context: Di Macario is a 80 y.o. female who presents to the ED c/o fall with head injury and neck pain that occurred approximately 2-1/2 hours prior to arrival.  Patient states she had just gotten up out of her chair where she had been sleeping in her living room.  She states on the way she tripped over a threshold of the doorway causing her to fall and hit her head wall.  She denies LOC, nausea, vomiting associated with the injury, but does report she hit the wall with a significant force.  She does endorse some mild neck pain associated with the injury denies any numbness or tingling in her upper extremities.  She denies associated chest pain, palpitations, syncope, near syncope associated with the fall.  She denies being on anticoagulant or antiplatelet therapy.  The daughter states she thinks the patient's Tdap is within 5 years.    Reviewing the patient's chart she has a history of essential hypertension, dementia, dyslipidemia, sinus bradycardia, uncontrolled diabetes mellitus type 2, and unsteady gait requiring a walker as an aid.    PAST MEDICAL HISTORY  Active Ambulatory Problems     Diagnosis Date Noted   • Essential hypertension 03/04/2016   • Dyslipidemia 03/04/2016   • Impaired memory 03/04/2016   • Vitamin D deficiency 03/04/2016   • Hyperuricemia 07/20/2016   • Slow transit constipation 01/31/2017   • Chronic pain of left knee 01/31/2017   • Acute pain of left knee 02/03/2017   • Fall 02/03/2017   • Medicare annual wellness visit, subsequent 08/16/2017   • At high risk for falls 08/10/2018   • Peripheral neuropathy 08/28/2018   • Uncontrolled type 2 diabetes mellitus with hyperglycemia (CMS/[facility]) 10/25/2018   • Alkaline phosphatase  elevation 10/29/2018   • Walker as ambulation aid 02/12/2019   • Bradycardia, sinus 01/13/2021   • SOB (shortness of breath) 01/13/2021   • Leg swelling 01/13/2021   • Hyperlipidemia 01/13/2021     Resolved Ambulatory Problems     Diagnosis Date Noted   • Calcium blood increased 10/29/2018     Past Medical History:   Diagnosis Date   • Dementia (CMS/HCC)    • Hypertension    • Type 2 diabetes mellitus (CMS/HCC)          PAST SURGICAL HISTORY  Past Surgical History:   Procedure Laterality Date   • BLADDER SURGERY      Prolapsed   • BREAST BIOPSY     • HYSTERECTOMY           FAMILY HISTORY  Family History   Problem Relation Age of Onset   • Hypertension Mother    • Hypertension Father          SOCIAL HISTORY  Social History     Socioeconomic History   • Marital status:      Spouse name: Not on file   • Number of children: Not on file   • Years of education: Not on file   • Highest education level: Not on file   Tobacco Use   • Smoking status: Former Smoker   • Smokeless tobacco: Never Used   Substance and Sexual Activity   • Alcohol use: No   • Drug use: No   • Sexual activity: Defer     Birth control/protection: Surgical         ALLERGIES  Patient has no known allergies.        REVIEW OF SYSTEMS  Review of Systems   Constitutional: Negative for chills, fatigue and fever.   Eyes: Negative.    Respiratory: Negative for cough and shortness of breath.    Cardiovascular: Negative for chest pain, palpitations and leg swelling.   Gastrointestinal: Negative for abdominal pain.   Genitourinary: Negative for dysuria and flank pain.   Musculoskeletal: Negative.    Skin:        Scalp laceration   Neurological: Positive for headaches. Negative for dizziness and numbness.        All systems reviewed and negative except for those discussed in HPI.       PHYSICAL EXAM    I have reviewed the triage vital signs and nursing notes.    ED Triage Vitals [02/12/21 0223]   Temp Heart Rate Resp BP SpO2   96.6 °F (35.9 °C) 70 16 -- 99  %      Temp src Heart Rate Source Patient Position BP Location FiO2 (%)   Tympanic Monitor -- -- --       Physical Exam  GENERAL: No acute distress  HENT: nares patent, 2 large hematomas right parietal and right occipital regions of the scalp.  There is a small 2 centimeter  laceration that appears to be bleeding  EYES: no scleral icterus, PERRL   CV: regular rhythm, regular rate, no rubs murmurs gallops, no unilateral pitting edema or swelling, palpable pedal pulses bilaterally  RESPIRATORY: normal effort  ABDOMEN: soft  MUSCULOSKELETAL: no deformity, no bony tenderness.  There is a small bruise in the fatty tissue surrounding the right tricep  NEURO: alert and oriented x3, moves all extremities, follows commands  SKIN: Centimeter laceration to the scalp        LAB RESULTS  Recent Results (from the past 24 hour(s))   POC Glucose Once    Collection Time: 02/12/21  3:00 AM    Specimen: Blood   Result Value Ref Range    Glucose 206 (H) 70 - 130 mg/dL       Ordered the above labs and independently reviewed the results.        RADIOLOGY  Ct Head Without Contrast    Result Date: 2/12/2021  CT HEAD WITHOUT CONTRAST  HISTORY: Trauma. Pain  COMPARISON: 06/21/2017  TECHNIQUE: Axial CT imaging was obtained through the brain. No IV contrast was administered.  FINDINGS: No acute intracranial hemorrhage is seen. There is diffuse atrophy. There is periventricular and deep white matter microangiopathic change. There is no midline shift or mass effect. No calvarial fracture is seen. Mucosal thickening is noted within the ethmoid sinuses. There is a right posterior parietal soft tissue hematoma. This measures at least 4.1 x 1.3 cm. Atrophy of the left muscles of mastication is again noted.      No acute intracranial findings.  Radiation dose reduction techniques were utilized, including automated exposure control and exposure modulation based on body size.  This report was finalized on 2/12/2021 3:10 AM by Dr. Elham Valdez M.D.       Ct Cervical Spine Without Contrast    Result Date: 2/12/2021  CT OF THE CERVICAL SPINE  HISTORY: Fall with neck pain  COMPARISON: None available.  TECHNIQUE: Axial CT imaging was obtained through the cervical spine. Coronal and sagittal reformatted images were obtained.  FINDINGS: No acute fracture or subluxation of the cervical spine is identified. There is retrolisthesis of C4 on C5. There is near bony ankylosis at C5-C6. Additional severe intervertebral disc space narrowing is noted at C4-C5.  C2-C3: There is no significant canal stenosis or neural foraminal narrowing. C3-C4: Disc osteophyte complex results in some narrowing of the canal. There is bilateral neural foraminal narrowing. C4-C5: Disc osteophyte complex results in mild narrowing of the canal. There is bilateral neural foraminal narrowing. C5-C6: Left paracentral disc osteophyte complex results in severe canal narrowing. There is moderate neural foraminal narrowing on the right and severe narrowing on the left. C6-C7: Disc osteophyte complex results in mild narrowing of the canal. There is mild bilateral neural foraminal narrowing. C7-T1: There is no significant neural foraminal narrowing or canal stenosis.  Images through the lung apices demonstrate biapical scarring. Small nodule is seen arising from the posterior aspect of the left lobe of the thyroid gland. There is no prevertebral soft tissue swelling.      No acute fracture or subluxation identified.  Radiation dose reduction techniques were utilized, including automated exposure control and exposure modulation based on body size.  This report was finalized on 2/12/2021 3:16 AM by Dr. Elham Valdez M.D.        I ordered the above noted radiological studies. Reviewed by me and discussed with radiologist.  See dictation for official radiology interpretation.      PROCEDURES    Laceration Repair    Date/Time: 2/12/2021 6:31 AM  Performed by: Dave Cooley III, PA  Authorized by:  Antonio Hernandez MD     Consent:     Consent obtained:  Verbal    Consent given by:  Patient    Risks discussed:  Infection and pain    Alternatives discussed:  No treatment  Anesthesia (see MAR for exact dosages):     Anesthesia method:  Local infiltration    Local anesthetic:  Lidocaine 1% WITH epi  Laceration details:     Location:  Scalp    Length (cm):  2  Repair type:     Repair type:  Simple  Pre-procedure details:     Preparation:  Patient was prepped and draped in usual sterile fashion and imaging obtained to evaluate for foreign bodies  Treatment:     Area cleansed with:  Hibiclens and saline    Amount of cleaning:  Standard    Irrigation solution:  Sterile saline    Irrigation method:  Pressure wash  Skin repair:     Repair method:  Sutures    Suture size:  4-0    Suture material:  Nylon    Suture technique:  Simple interrupted    Number of sutures:  3  Approximation:     Approximation:  Close  Post-procedure details:     Patient tolerance of procedure:  Tolerated well, no immediate complications          MEDICATIONS GIVEN IN ER    Medications   Tdap (BOOSTRIX) injection 0.5 mL (0.5 mL Intramuscular Given 2/12/21 0415)         PROGRESS, DATA ANALYSIS, CONSULTS, AND MEDICAL DECISION MAKING    All labs have been independently reviewed by me.  All radiology studies have been reviewed by me and discussed with radiologist dictating the report.   EKG's independently viewed and interpreted by me.  Discussion below represents my analysis of pertinent findings related to patient's condition, differential diagnosis, treatment plan and final disposition.    DDx includes but is not limited to: Head injury with intercranial hemorrhage, head injury without intercranial hemorrhage, skull fracture, C-spine fracture, cervical strain, laceration.  CT head without contrast and CT C-spine without contrast show no acute findings.  Patient ambulates well prior to discharge.  Her 2 cm scalp laceration has been closed with  sutures.  We will have her to follow-up with her PCP in 7 days to have those removed.  Give her close head injury instructions and have her return to the ER with any further concerns.  Please see procedures for suturing details         Patient was placed in face mask in first look. Patient was wearing facemask when I entered the room and throughout our encounter. I wore full protective equipment throughout this patient encounter including a face mask, eye shield, gown and gloves. Hand hygiene was performed before donning protective equipment and after removal when leaving the room.    AS OF 06:32 EST VITALS:    BP - 159/71  HR - 51  TEMP - 96.6 °F (35.9 °C) (Tympanic)  O2 SATS - 98%        DIAGNOSIS  Final diagnoses:   Fall, initial encounter   Injury of head, initial encounter   Strain of neck muscle, initial encounter   Laceration of scalp, initial encounter         DISPOSITION  DISCHARGE    Patient discharged in stable condition.    Reviewed implications of results, diagnosis, meds, responsibility to follow up, warning signs and symptoms of possible worsening, potential complications and reasons to return to ER.    Patient/Family voiced understanding of above instructions.    Discussed plan for discharge, as there is no emergent indication for admission. Patient referred to primary care provider for BP management due to today's BP. Pt/family is agreeable and understands need for follow up and repeat testing.  Pt is aware that discharge does not mean that nothing is wrong but it indicates no emergency is present that requires admission and they must continue care with follow-up as given below or physician of their choice.     FOLLOW-UP  No follow-up provider specified.       Medication List      No changes were made to your prescriptions during this visit.                Dave Cooley III, PA  02/12/21 9172

## 2021-02-12 NOTE — DISCHARGE INSTRUCTIONS
Keep laceration clean and dry, apply antibiotic ointment once or twice daily, watch carefully for signs of infection, sutures need to be removed in 7 days. Return to care if any complications.     Other reasons to return to the ER would be should you develop a headache, unexplained nausea, vomiting, visual disturbances, or should you just not feel yourself, or have any further concerns.

## 2021-02-12 NOTE — ED NOTES
Pt to triage from home with c/o fall 1 hr prior to arrival - tripped over threshold.  Hit head when she fell - pt denies blood thinners.  Denies LOC. Pt provided with mask in triage.  Triage personnel wore appropriate PPE, mask, gloves and goggles         Marie Ivy, RN  02/12/21 9006

## 2021-02-12 NOTE — ED PROVIDER NOTES
MD ATTESTATION NOTE    The CHELSEA and I have discussed this patient's history, physical exam, and treatment plan.  I have reviewed the documentation and personally had a face to face interaction with the patient. I affirm the documentation and agree with the treatment and plan.  The attached note describes my personal findings.      Di Macario is a 80 y.o. female who presents to the ED c/o tripping over the threshold in her house and striking her head.  No loss of consciousness.      On exam:  No acute distress, normocephalic contusion to posterior scalp, supple nontender, regular rate and rhythm, clear to auscultation bilaterally, soft nontender nondistended, moving all extremities well    Labs  Recent Results (from the past 24 hour(s))   POC Glucose Once    Collection Time: 02/12/21  3:00 AM    Specimen: Blood   Result Value Ref Range    Glucose 206 (H) 70 - 130 mg/dL       Radiology  Ct Head Without Contrast    Result Date: 2/12/2021  CT HEAD WITHOUT CONTRAST  HISTORY: Trauma. Pain  COMPARISON: 06/21/2017  TECHNIQUE: Axial CT imaging was obtained through the brain. No IV contrast was administered.  FINDINGS: No acute intracranial hemorrhage is seen. There is diffuse atrophy. There is periventricular and deep white matter microangiopathic change. There is no midline shift or mass effect. No calvarial fracture is seen. Mucosal thickening is noted within the ethmoid sinuses. There is a right posterior parietal soft tissue hematoma. This measures at least 4.1 x 1.3 cm. Atrophy of the left muscles of mastication is again noted.      No acute intracranial findings.  Radiation dose reduction techniques were utilized, including automated exposure control and exposure modulation based on body size.  This report was finalized on 2/12/2021 3:10 AM by Dr. Elham Valdez M.D.      Ct Cervical Spine Without Contrast    Result Date: 2/12/2021  CT OF THE CERVICAL SPINE  HISTORY: Fall with neck pain  COMPARISON: None  available.  TECHNIQUE: Axial CT imaging was obtained through the cervical spine. Coronal and sagittal reformatted images were obtained.  FINDINGS: No acute fracture or subluxation of the cervical spine is identified. There is retrolisthesis of C4 on C5. There is near bony ankylosis at C5-C6. Additional severe intervertebral disc space narrowing is noted at C4-C5.  C2-C3: There is no significant canal stenosis or neural foraminal narrowing. C3-C4: Disc osteophyte complex results in some narrowing of the canal. There is bilateral neural foraminal narrowing. C4-C5: Disc osteophyte complex results in mild narrowing of the canal. There is bilateral neural foraminal narrowing. C5-C6: Left paracentral disc osteophyte complex results in severe canal narrowing. There is moderate neural foraminal narrowing on the right and severe narrowing on the left. C6-C7: Disc osteophyte complex results in mild narrowing of the canal. There is mild bilateral neural foraminal narrowing. C7-T1: There is no significant neural foraminal narrowing or canal stenosis.  Images through the lung apices demonstrate biapical scarring. Small nodule is seen arising from the posterior aspect of the left lobe of the thyroid gland. There is no prevertebral soft tissue swelling.      No acute fracture or subluxation identified.  Radiation dose reduction techniques were utilized, including automated exposure control and exposure modulation based on body size.  This report was finalized on 2/12/2021 3:16 AM by Dr. Elham Valdez M.D.        Medical Decision Making:           PPE: Both the patient and I wore a surgical mask throughout the entire patient encounter. I wore protective goggles.     Diagnosis  Final diagnoses:   Fall, initial encounter   Injury of head, initial encounter   Strain of neck muscle, initial encounter   Laceration of scalp, initial encounter        Antonio Hernandez MD  02/12/21 0677

## 2021-02-17 RX ORDER — DONEPEZIL HYDROCHLORIDE 10 MG/1
TABLET, FILM COATED ORAL
Qty: 90 TABLET | Refills: 0 | Status: SHIPPED | OUTPATIENT
Start: 2021-02-17 | End: 2021-08-27 | Stop reason: SDUPTHER

## 2021-02-18 ENCOUNTER — TELEMEDICINE (OUTPATIENT)
Dept: CARDIOLOGY | Facility: CLINIC | Age: 81
End: 2021-02-18

## 2021-02-18 VITALS — BODY MASS INDEX: 28.17 KG/M2 | WEIGHT: 159 LBS | HEIGHT: 63 IN

## 2021-02-18 DIAGNOSIS — E78.5 HYPERLIPIDEMIA, UNSPECIFIED HYPERLIPIDEMIA TYPE: ICD-10-CM

## 2021-02-18 DIAGNOSIS — E78.5 DYSLIPIDEMIA: ICD-10-CM

## 2021-02-18 DIAGNOSIS — R00.1 BRADYCARDIA, SINUS: ICD-10-CM

## 2021-02-18 DIAGNOSIS — I10 ESSENTIAL HYPERTENSION: Primary | ICD-10-CM

## 2021-02-18 PROCEDURE — 99213 OFFICE O/P EST LOW 20 MIN: CPT | Performed by: INTERNAL MEDICINE

## 2021-02-18 NOTE — PROGRESS NOTES
RESULTS   Subjective:        Di Macario is a 80 y.o. female who here for follow up    CC  sss  HPI  80-year-old female with a sick sinus syndrome with a history of the benign essential arterial hypertension dyslipidemia here for the follow-up had a Holter monitor done shows severe bradycardia and significant pauses continues to complains of the weakness and fatigue     Problems Addressed this Visit        Cardiac and Vasculature    Essential hypertension - Primary    Dyslipidemia    Bradycardia, sinus    Hyperlipidemia      Diagnoses       Codes Comments    Essential hypertension    -  Primary ICD-10-CM: I10  ICD-9-CM: 401.9     Dyslipidemia     ICD-10-CM: E78.5  ICD-9-CM: 272.4     Bradycardia, sinus     ICD-10-CM: R00.1  ICD-9-CM: 427.89     Hyperlipidemia, unspecified hyperlipidemia type     ICD-10-CM: E78.5  ICD-9-CM: 272.4       Interpretation Summary       · Findings consistent with a normal ECG stress test.  · Myocardial perfusion imaging indicates a normal myocardial perfusion study with no evidence of ischemia.  · Impressions are consistent with a low risk study.     Interpretation Summary    · Calculated left ventricular EF = 66.8% Estimated left ventricular EF was in agreement with the calculated left ventricular EF.  · Left ventricular diastolic function was normal.  · Calculated left ventricular EF = 66.8%  · There is no evidence of pericardial effusion     Interpretation Summary    · An abnormal monitor study.  · SEVER BRADYCARDIA WITH PAUSE UPTO 5 SECONDS       .    The following portions of the patient's history were reviewed and updated as appropriate: allergies, current medications, past family history, past medical history, past social history, past surgical history and problem list.    Past Medical History:   Diagnosis Date   • Dementia (CMS/[facility])     states better with medication    • Hyperlipidemia 1/13/2021   • Hypertension    • Peripheral neuropathy 8/28/2018   • Type 2 diabetes  "mellitus (CMS/HCC)    • Vitamin D deficiency      reports that she has quit smoking. She has never used smokeless tobacco. She reports that she does not drink alcohol or use drugs.   Family History   Problem Relation Age of Onset   • Hypertension Mother    • Hypertension Father        Review of Systems  Constitutional: No wt loss, fever, fatigue  Gastrointestinal: No nausea, abdominal pain  Behavioral/Psych: No insomnia or anxiety   Cardiovascular weakness and fatigue  Objective:       Physical Exam  video  Ht 160 cm (63\")   Wt 72.1 kg (159 lb)   BMI 28.17 kg/m²   General appearance: No acute changes   Neck: Trachea midline; NECK, supple, no thyromegaly or lymphadenopathy   Lungs: Breathing normally  CV: No obvious abnormality  Abdomen: Not distended  Extremities: No deformity , normal color , no peripheral edema   Skin: Normal temperature, turgor and texture; no rash, ulcers          Procedures      Echocardiogram:        Current Outpatient Medications:   •  amLODIPine-benazepril (Lotrel) 5-20 MG per capsule, Take 1 capsule by mouth Daily., Disp: 30 capsule, Rfl: 6  •  donepezil (ARICEPT) 10 MG tablet, TAKE 1 TABLET EVERY NIGHT, Disp: 90 tablet, Rfl: 0  •  FREESTYLE LITE test strip, Use to test BG 4x daily, Disp: 400 each, Rfl: 0  •  Insulin Pen Needle (BD Pen Needle Norma U/F) 32G X 4 MM misc, Use once a day, Disp: 100 each, Rfl: 0  •  Jardiance 25 MG tablet, Take 25 mg by mouth Daily., Disp: 90 tablet, Rfl: 1  •  Lancets (freestyle) lancets, 1 each by Other route 4 (Four) Times a Day. Use as instructed, Disp: 400 each, Rfl: 1  •  pravastatin (PRAVACHOL) 40 MG tablet, Take 1 tablet by mouth Every Evening., Disp: 90 tablet, Rfl: 1  •  Trulicity 1.5 MG/0.5ML solution pen-injector, Inject 1.5 mg under the skin into the appropriate area as directed 1 (One) Time Per Week., Disp: 12 pen, Rfl: 1  •  zonisamide (ZONEGRAN) 100 MG capsule, Take 100 mg by mouth 2 (Two) Times a Day., Disp: , Rfl:   •  Insulin Glargine, 2 " Unit Dial, (Harpal Scruggs) 300 UNIT/ML solution pen-injector injection, Inject 50 Units under the skin into the appropriate area as directed Daily for 90 days., Disp: 15 mL, Rfl: 1   Assessment:        Patient Active Problem List   Diagnosis   • Essential hypertension   • Dyslipidemia   • Impaired memory   • Vitamin D deficiency   • Hyperuricemia   • Slow transit constipation   • Chronic pain of left knee   • Acute pain of left knee   • Fall   • Medicare annual wellness visit, subsequent   • At high risk for falls   • Peripheral neuropathy   • Uncontrolled type 2 diabetes mellitus with hyperglycemia (CMS/[facility])   • Alkaline phosphatase elevation   • Walker as ambulation aid   • Bradycardia, sinus   • SOB (shortness of breath)   • Leg swelling   • Hyperlipidemia               Plan:            ICD-10-CM ICD-9-CM   1. Essential hypertension  I10 401.9   2. Dyslipidemia  E78.5 272.4   3. Bradycardia, sinus  R00.1 427.89   4. Hyperlipidemia, unspecified hyperlipidemia type  E78.5 272.4     1. Essential hypertension  The blood pressures under control    2. Dyslipidemia  Continue current treatment    3. Bradycardia, sinus  Long-term monitoring showed severe bradycardia and significant pauses will need pacemaker implantation    4. Hyperlipidemia, unspecified hyperlipidemia type  Continue current treatment     Needs pacemaker, bring pt to office to discuss    This patient has consented to a telehealth visit via video. The visit was scheduled as a video visit to comply with patient safety concerns in accordance with CDC recommendations.  All vitals recorded within this visit are reported by the patient.  I spent  15 minutes in total including but not limited to the 15 minutes spent in direct conversation with this patient.     COUNSELING:    Di Sorto was given to patient for the following topics: diagnostic results, risk factor reductions, impressions, risks and benefits of treatment options and  importance of treatment compliance .       SMOKING COUNSELING:    [unfilled]    Dictated using Dragon dictation

## 2021-02-19 ENCOUNTER — TELEPHONE (OUTPATIENT)
Dept: ENDOCRINOLOGY | Age: 81
End: 2021-02-19

## 2021-02-22 ENCOUNTER — OFFICE VISIT (OUTPATIENT)
Dept: CARDIOLOGY | Facility: CLINIC | Age: 81
End: 2021-02-22

## 2021-02-22 VITALS
HEART RATE: 56 BPM | WEIGHT: 162 LBS | SYSTOLIC BLOOD PRESSURE: 153 MMHG | BODY MASS INDEX: 28.7 KG/M2 | DIASTOLIC BLOOD PRESSURE: 71 MMHG | HEIGHT: 63 IN

## 2021-02-22 DIAGNOSIS — R00.1 BRADYCARDIA, SINUS: Primary | ICD-10-CM

## 2021-02-22 DIAGNOSIS — R06.02 SHORTNESS OF BREATH: ICD-10-CM

## 2021-02-22 PROCEDURE — 99214 OFFICE O/P EST MOD 30 MIN: CPT | Performed by: INTERNAL MEDICINE

## 2021-02-22 RX ORDER — AMLODIPINE BESYLATE AND BENAZEPRIL HYDROCHLORIDE 5; 20 MG/1; MG/1
1 CAPSULE ORAL DAILY
Qty: 90 CAPSULE | Refills: 3 | Status: SHIPPED | OUTPATIENT
Start: 2021-02-22 | End: 2022-06-09 | Stop reason: HOSPADM

## 2021-02-22 NOTE — PROGRESS NOTES
"FOLLOW UP TO DISCUSS PACEMAKER   Subjective:        Di Macario is a 80 y.o. female who here for follow up    CC  SSS  HPI  80-year-old male with sick sinus syndrome recently monitor showed severe bradycardia continues to complains of weakness dizziness gradually worsening     Problems Addressed this Visit        Other    Bradycardia, sinus - Primary    Relevant Orders    Case Request Cath Lab: Pacemaker DC new (Completed)    aPTT    Protime-INR    CBC & Differential    Basic Metabolic Panel    CBC Auto Differential    Shortness of breath    Relevant Orders    aPTT      Diagnoses       Codes Comments    Bradycardia, sinus    -  Primary ICD-10-CM: R00.1  ICD-9-CM: 427.89     Shortness of breath      ICD-10-CM: R06.02  ICD-9-CM: 786.05         .    The following portions of the patient's history were reviewed and updated as appropriate: allergies, current medications, past family history, past medical history, past social history, past surgical history and problem list.    Past Medical History:   Diagnosis Date   • Dementia (CMS/[facility])     states better with medication    • Hyperlipidemia 1/13/2021   • Hypertension    • Peripheral neuropathy 8/28/2018   • Type 2 diabetes mellitus (CMS/[facility])    • Vitamin D deficiency      reports that she has quit smoking. She has never used smokeless tobacco. She reports that she does not drink alcohol or use drugs.   Family History   Problem Relation Age of Onset   • Hypertension Mother    • Hypertension Father        Review of Systems  Constitutional: No wt loss, fever, fatigue  Gastrointestinal: No nausea, abdominal pain  Behavioral/Psych: No insomnia or anxiety   Cardiovascular weakness and dizziness  Objective:       Physical Exam  /71 (BP Location: Left arm, Patient Position: Sitting)   Pulse 56   Ht 160 cm (63\")   Wt 73.5 kg (162 lb)   BMI 28.70 kg/m²   General appearance: No acute changes   Neck: Trachea midline; NECK, supple, no thyromegaly or lymphadenopathy "   Lungs: Normal size and shape, normal breath sounds, equal distribution of air, no rales and rhonchi   CV: S1-S2 regular, no murmurs, no rub, no gallop   Abdomen: Soft, non-tender; no masses , no abnormal abdominal sounds   Extremities: No deformity , normal color , no peripheral edema   Skin: Normal temperature, turgor and texture; no rash, ulcers            Procedures      Echocardiogram:        Current Outpatient Medications:   •  amLODIPine-benazepril (Lotrel) 5-20 MG per capsule, Take 1 capsule by mouth Daily., Disp: 30 capsule, Rfl: 6  •  donepezil (ARICEPT) 10 MG tablet, TAKE 1 TABLET EVERY NIGHT, Disp: 90 tablet, Rfl: 0  •  FREESTYLE LITE test strip, Use to test BG 4x daily, Disp: 400 each, Rfl: 0  •  Insulin Pen Needle (BD Pen Needle Norma U/F) 32G X 4 MM misc, Use once a day, Disp: 100 each, Rfl: 0  •  Jardiance 25 MG tablet, Take 25 mg by mouth Daily., Disp: 90 tablet, Rfl: 1  •  Lancets (freestyle) lancets, 1 each by Other route 4 (Four) Times a Day. Use as instructed, Disp: 400 each, Rfl: 1  •  pravastatin (PRAVACHOL) 40 MG tablet, Take 1 tablet by mouth Every Evening., Disp: 90 tablet, Rfl: 1  •  Trulicity 1.5 MG/0.5ML solution pen-injector, Inject 1.5 mg under the skin into the appropriate area as directed 1 (One) Time Per Week., Disp: 12 pen, Rfl: 1  •  zonisamide (ZONEGRAN) 100 MG capsule, Take 100 mg by mouth 2 (Two) Times a Day., Disp: , Rfl:   •  Insulin Glargine, 2 Unit Dial, (Toujeo Max SoloStar) 300 UNIT/ML solution pen-injector injection, Inject 50 Units under the skin into the appropriate area as directed Daily for 90 days., Disp: 15 mL, Rfl: 1   Assessment:        Patient Active Problem List   Diagnosis   • Essential hypertension   • Dyslipidemia   • Impaired memory   • Vitamin D deficiency   • Hyperuricemia   • Slow transit constipation   • Chronic pain of left knee   • Acute pain of left knee   • Fall   • Medicare annual wellness visit, subsequent   • At high risk for falls   • Peripheral  neuropathy   • Uncontrolled type 2 diabetes mellitus with hyperglycemia (CMS/HCC)   • Alkaline phosphatase elevation   • Walker as ambulation aid   • Bradycardia, sinus   • SOB (shortness of breath)   • Leg swelling   • Hyperlipidemia               Plan:            ICD-10-CM ICD-9-CM   1. Bradycardia, sinus  R00.1 427.89   2. Shortness of breath   R06.02 786.05     1. Bradycardia, sinus  We will proceed with a pacemaker implantation, procedure risk and options have been explained  - Case Request Cath Lab: Pacemaker DC new  - aPTT  - Protime-INR  - CBC & Differential  - Basic Metabolic Panel  - CBC Auto Differential    2. Shortness of breath   Multifactorial  - aPTT       PROCEDURE, OF PACEMAKER SCHEDULED  COUNSELING:    Di Sorto was given to patient for the following topics: diagnostic results, risk factor reductions, impressions, risks and benefits of treatment options and importance of treatment compliance .       SMOKING COUNSELING:    [unfilled]    Dictated using Dragon dictation

## 2021-02-25 ENCOUNTER — TRANSCRIBE ORDERS (OUTPATIENT)
Dept: SLEEP MEDICINE | Facility: HOSPITAL | Age: 81
End: 2021-02-25

## 2021-02-25 DIAGNOSIS — Z01.818 OTHER SPECIFIED PRE-OPERATIVE EXAMINATION: Primary | ICD-10-CM

## 2021-02-25 RX ORDER — DILTIAZEM HYDROCHLORIDE 240 MG/1
240 CAPSULE, COATED, EXTENDED RELEASE ORAL DAILY
Qty: 90 CAPSULE | Refills: 1 | Status: SHIPPED | OUTPATIENT
Start: 2021-02-25 | End: 2021-03-03

## 2021-02-26 ENCOUNTER — TELEPHONE (OUTPATIENT)
Dept: ENDOCRINOLOGY | Age: 81
End: 2021-02-26

## 2021-02-26 ENCOUNTER — HOSPITAL ENCOUNTER (OUTPATIENT)
Dept: CARDIOLOGY | Facility: HOSPITAL | Age: 81
Discharge: HOME | End: 2021-02-26
Admitting: INTERNAL MEDICINE

## 2021-02-26 LAB
ANION GAP SERPL CALCULATED.3IONS-SCNC: 11.7 MMOL/L (ref 5–15)
APTT PPP: 37.3 SECONDS (ref 22.7–35.4)
BASOPHILS # BLD AUTO: 0.03 10*3/MM3 (ref 0–0.2)
BASOPHILS NFR BLD AUTO: 0.5 % (ref 0–1.5)
BUN SERPL-MCNC: 23 MG/DL (ref 8–23)
BUN/CREAT SERPL: 34.3 (ref 7–25)
CALCIUM SPEC-SCNC: 9.4 MG/DL (ref 8.6–10.5)
CHLORIDE SERPL-SCNC: 101 MMOL/L (ref 98–107)
CO2 SERPL-SCNC: 29.3 MMOL/L (ref 22–29)
CREAT SERPL-MCNC: 0.67 MG/DL (ref 0.57–1)
DEPRECATED RDW RBC AUTO: 43.3 FL (ref 37–54)
EOSINOPHIL # BLD AUTO: 0.04 10*3/MM3 (ref 0–0.4)
EOSINOPHIL NFR BLD AUTO: 0.7 % (ref 0.3–6.2)
ERYTHROCYTE [DISTWIDTH] IN BLOOD BY AUTOMATED COUNT: 13.9 % (ref 12.3–15.4)
GFR SERPL CREATININE-BSD FRML MDRD: 84 ML/MIN/1.73
GLUCOSE SERPL-MCNC: 233 MG/DL (ref 65–99)
HCT VFR BLD AUTO: 42.3 % (ref 34–46.6)
HGB BLD-MCNC: 13.6 G/DL (ref 12–15.9)
IMM GRANULOCYTES # BLD AUTO: 0.01 10*3/MM3 (ref 0–0.05)
IMM GRANULOCYTES NFR BLD AUTO: 0.2 % (ref 0–0.5)
INR PPP: 0.98 (ref 0.9–1.1)
LYMPHOCYTES # BLD AUTO: 1.22 10*3/MM3 (ref 0.7–3.1)
LYMPHOCYTES NFR BLD AUTO: 21 % (ref 19.6–45.3)
MCH RBC QN AUTO: 28.8 PG (ref 26.6–33)
MCHC RBC AUTO-ENTMCNC: 32.2 G/DL (ref 31.5–35.7)
MCV RBC AUTO: 89.6 FL (ref 79–97)
MONOCYTES # BLD AUTO: 0.4 10*3/MM3 (ref 0.1–0.9)
MONOCYTES NFR BLD AUTO: 6.9 % (ref 5–12)
NEUTROPHILS NFR BLD AUTO: 4.12 10*3/MM3 (ref 1.7–7)
NEUTROPHILS NFR BLD AUTO: 70.7 % (ref 42.7–76)
NRBC BLD AUTO-RTO: 0 /100 WBC (ref 0–0.2)
PLATELET # BLD AUTO: 227 10*3/MM3 (ref 140–450)
PMV BLD AUTO: 11.7 FL (ref 6–12)
POTASSIUM SERPL-SCNC: 4.4 MMOL/L (ref 3.5–5.2)
PROTHROMBIN TIME: 12.8 SECONDS (ref 11.7–14.2)
RBC # BLD AUTO: 4.72 10*6/MM3 (ref 3.77–5.28)
SODIUM SERPL-SCNC: 142 MMOL/L (ref 136–145)
WBC # BLD AUTO: 5.82 10*3/MM3 (ref 3.4–10.8)

## 2021-02-26 PROCEDURE — 85025 COMPLETE CBC W/AUTO DIFF WBC: CPT | Performed by: INTERNAL MEDICINE

## 2021-02-26 PROCEDURE — 80048 BASIC METABOLIC PNL TOTAL CA: CPT | Performed by: INTERNAL MEDICINE

## 2021-02-26 PROCEDURE — 36415 COLL VENOUS BLD VENIPUNCTURE: CPT | Performed by: INTERNAL MEDICINE

## 2021-02-26 PROCEDURE — 85610 PROTHROMBIN TIME: CPT | Performed by: INTERNAL MEDICINE

## 2021-02-26 PROCEDURE — 85730 THROMBOPLASTIN TIME PARTIAL: CPT | Performed by: INTERNAL MEDICINE

## 2021-03-01 ENCOUNTER — APPOINTMENT (OUTPATIENT)
Dept: CT IMAGING | Facility: HOSPITAL | Age: 81
End: 2021-03-01

## 2021-03-01 ENCOUNTER — HOSPITAL ENCOUNTER (EMERGENCY)
Facility: HOSPITAL | Age: 81
Discharge: HOME | End: 2021-03-01
Attending: EMERGENCY MEDICINE | Admitting: EMERGENCY MEDICINE

## 2021-03-01 VITALS
TEMPERATURE: 96.5 F | RESPIRATION RATE: 18 BRPM | SYSTOLIC BLOOD PRESSURE: 137 MMHG | DIASTOLIC BLOOD PRESSURE: 82 MMHG | HEART RATE: 65 BPM | OXYGEN SATURATION: 100 %

## 2021-03-01 DIAGNOSIS — K57.90 DIVERTICULOSIS: ICD-10-CM

## 2021-03-01 DIAGNOSIS — K59.00 CONSTIPATION, UNSPECIFIED CONSTIPATION TYPE: Primary | ICD-10-CM

## 2021-03-01 DIAGNOSIS — R10.30 LOWER ABDOMINAL PAIN: ICD-10-CM

## 2021-03-01 LAB
ALBUMIN SERPL-MCNC: 4.1 G/DL (ref 3.5–5.2)
ALBUMIN/GLOB SERPL: 1.1 G/DL
ALP SERPL-CCNC: 157 U/L (ref 39–117)
ALT SERPL W P-5'-P-CCNC: 21 U/L (ref 1–33)
ANION GAP SERPL CALCULATED.3IONS-SCNC: 13.2 MMOL/L (ref 5–15)
AST SERPL-CCNC: 23 U/L (ref 1–32)
BASOPHILS # BLD AUTO: 0.05 10*3/MM3 (ref 0–0.2)
BASOPHILS NFR BLD AUTO: 0.6 % (ref 0–1.5)
BILIRUB SERPL-MCNC: 0.2 MG/DL (ref 0–1.2)
BILIRUB UR QL STRIP: NEGATIVE
BUN SERPL-MCNC: 26 MG/DL (ref 8–23)
BUN/CREAT SERPL: 38.8 (ref 7–25)
CALCIUM SPEC-SCNC: 9.8 MG/DL (ref 8.6–10.5)
CHLORIDE SERPL-SCNC: 105 MMOL/L (ref 98–107)
CLARITY UR: CLEAR
CO2 SERPL-SCNC: 23.8 MMOL/L (ref 22–29)
COLOR UR: YELLOW
CREAT SERPL-MCNC: 0.67 MG/DL (ref 0.57–1)
DEPRECATED RDW RBC AUTO: 43.9 FL (ref 37–54)
EOSINOPHIL # BLD AUTO: 0.03 10*3/MM3 (ref 0–0.4)
EOSINOPHIL NFR BLD AUTO: 0.4 % (ref 0.3–6.2)
ERYTHROCYTE [DISTWIDTH] IN BLOOD BY AUTOMATED COUNT: 13.8 % (ref 12.3–15.4)
GFR SERPL CREATININE-BSD FRML MDRD: 84 ML/MIN/1.73
GLOBULIN UR ELPH-MCNC: 3.7 GM/DL
GLUCOSE BLDC GLUCOMTR-MCNC: 134 MG/DL (ref 70–130)
GLUCOSE SERPL-MCNC: 139 MG/DL (ref 65–99)
GLUCOSE UR STRIP-MCNC: ABNORMAL MG/DL
HCT VFR BLD AUTO: 43.1 % (ref 34–46.6)
HGB BLD-MCNC: 14 G/DL (ref 12–15.9)
HGB UR QL STRIP.AUTO: NEGATIVE
IMM GRANULOCYTES # BLD AUTO: 0.02 10*3/MM3 (ref 0–0.05)
IMM GRANULOCYTES NFR BLD AUTO: 0.2 % (ref 0–0.5)
KETONES UR QL STRIP: ABNORMAL
LEUKOCYTE ESTERASE UR QL STRIP.AUTO: NEGATIVE
LIPASE SERPL-CCNC: 163 U/L (ref 13–60)
LYMPHOCYTES # BLD AUTO: 2.16 10*3/MM3 (ref 0.7–3.1)
LYMPHOCYTES NFR BLD AUTO: 25.8 % (ref 19.6–45.3)
MCH RBC QN AUTO: 28.5 PG (ref 26.6–33)
MCHC RBC AUTO-ENTMCNC: 32.5 G/DL (ref 31.5–35.7)
MCV RBC AUTO: 87.6 FL (ref 79–97)
MONOCYTES # BLD AUTO: 0.56 10*3/MM3 (ref 0.1–0.9)
MONOCYTES NFR BLD AUTO: 6.7 % (ref 5–12)
NEUTROPHILS NFR BLD AUTO: 5.55 10*3/MM3 (ref 1.7–7)
NEUTROPHILS NFR BLD AUTO: 66.3 % (ref 42.7–76)
NITRITE UR QL STRIP: NEGATIVE
NRBC BLD AUTO-RTO: 0 /100 WBC (ref 0–0.2)
PH UR STRIP.AUTO: 5.5 [PH] (ref 5–8)
PLATELET # BLD AUTO: 279 10*3/MM3 (ref 140–450)
PMV BLD AUTO: 11.5 FL (ref 6–12)
POTASSIUM SERPL-SCNC: 4.2 MMOL/L (ref 3.5–5.2)
PROT SERPL-MCNC: 7.8 G/DL (ref 6–8.5)
PROT UR QL STRIP: NEGATIVE
QT INTERVAL: 478 MS
RBC # BLD AUTO: 4.92 10*6/MM3 (ref 3.77–5.28)
SODIUM SERPL-SCNC: 142 MMOL/L (ref 136–145)
SP GR UR STRIP: 1.03 (ref 1–1.03)
UROBILINOGEN UR QL STRIP: ABNORMAL
WBC # BLD AUTO: 8.37 10*3/MM3 (ref 3.4–10.8)

## 2021-03-01 PROCEDURE — 74177 CT ABD & PELVIS W/CONTRAST: CPT

## 2021-03-01 PROCEDURE — 93010 ELECTROCARDIOGRAM REPORT: CPT | Performed by: INTERNAL MEDICINE

## 2021-03-01 PROCEDURE — 25010000002 IOPAMIDOL 61 % SOLUTION: Performed by: EMERGENCY MEDICINE

## 2021-03-01 PROCEDURE — 25010000002 ONDANSETRON PER 1 MG: Performed by: EMERGENCY MEDICINE

## 2021-03-01 PROCEDURE — 85025 COMPLETE CBC W/AUTO DIFF WBC: CPT | Performed by: EMERGENCY MEDICINE

## 2021-03-01 PROCEDURE — 96375 TX/PRO/DX INJ NEW DRUG ADDON: CPT

## 2021-03-01 PROCEDURE — 82962 GLUCOSE BLOOD TEST: CPT

## 2021-03-01 PROCEDURE — 25010000002 MORPHINE PER 10 MG: Performed by: EMERGENCY MEDICINE

## 2021-03-01 PROCEDURE — 83690 ASSAY OF LIPASE: CPT | Performed by: EMERGENCY MEDICINE

## 2021-03-01 PROCEDURE — 96361 HYDRATE IV INFUSION ADD-ON: CPT

## 2021-03-01 PROCEDURE — 96376 TX/PRO/DX INJ SAME DRUG ADON: CPT

## 2021-03-01 PROCEDURE — 81003 URINALYSIS AUTO W/O SCOPE: CPT | Performed by: EMERGENCY MEDICINE

## 2021-03-01 PROCEDURE — 93005 ELECTROCARDIOGRAM TRACING: CPT | Performed by: EMERGENCY MEDICINE

## 2021-03-01 PROCEDURE — 96374 THER/PROPH/DIAG INJ IV PUSH: CPT

## 2021-03-01 PROCEDURE — 99285 EMERGENCY DEPT VISIT HI MDM: CPT

## 2021-03-01 PROCEDURE — 80053 COMPREHEN METABOLIC PANEL: CPT | Performed by: EMERGENCY MEDICINE

## 2021-03-01 RX ORDER — AMOXICILLIN 250 MG
1 CAPSULE ORAL DAILY
Qty: 30 TABLET | Refills: 0 | Status: SHIPPED | OUTPATIENT
Start: 2021-03-01 | End: 2021-03-31

## 2021-03-01 RX ORDER — MORPHINE SULFATE 2 MG/ML
2 INJECTION, SOLUTION INTRAMUSCULAR; INTRAVENOUS ONCE AS NEEDED
Status: COMPLETED | OUTPATIENT
Start: 2021-03-01 | End: 2021-03-01

## 2021-03-01 RX ORDER — ONDANSETRON 2 MG/ML
8 INJECTION INTRAMUSCULAR; INTRAVENOUS ONCE
Status: COMPLETED | OUTPATIENT
Start: 2021-03-01 | End: 2021-03-01

## 2021-03-01 RX ORDER — POLYETHYLENE GLYCOL 3350 17 G/17G
17 POWDER, FOR SOLUTION ORAL DAILY
Qty: 507 G | Refills: 0 | Status: SHIPPED | OUTPATIENT
Start: 2021-03-01 | End: 2021-09-23

## 2021-03-01 RX ORDER — MORPHINE SULFATE 2 MG/ML
2 INJECTION, SOLUTION INTRAMUSCULAR; INTRAVENOUS ONCE
Status: COMPLETED | OUTPATIENT
Start: 2021-03-01 | End: 2021-03-01

## 2021-03-01 RX ORDER — BISACODYL 10 MG
10 SUPPOSITORY, RECTAL RECTAL DAILY PRN
Qty: 28 SUPPOSITORY | Refills: 0 | Status: SHIPPED | OUTPATIENT
Start: 2021-03-01 | End: 2021-09-23

## 2021-03-01 RX ADMIN — MORPHINE SULFATE 2 MG: 2 INJECTION, SOLUTION INTRAMUSCULAR; INTRAVENOUS at 14:09

## 2021-03-01 RX ADMIN — MORPHINE SULFATE 2 MG: 2 INJECTION, SOLUTION INTRAMUSCULAR; INTRAVENOUS at 12:29

## 2021-03-01 RX ADMIN — ONDANSETRON 8 MG: 2 INJECTION INTRAMUSCULAR; INTRAVENOUS at 12:29

## 2021-03-01 RX ADMIN — SODIUM CHLORIDE 500 ML: 9 INJECTION, SOLUTION INTRAVENOUS at 12:32

## 2021-03-01 RX ADMIN — MINERAL OIL 200 ML: 1000 SOLUTION ORAL at 17:44

## 2021-03-01 RX ADMIN — IOPAMIDOL 85 ML: 612 INJECTION, SOLUTION INTRAVENOUS at 14:25

## 2021-03-01 NOTE — ED PROVIDER NOTES
EMERGENCY DEPARTMENT ENCOUNTER  Patient was placed in face mask in first look and the following protective measures were taken unless additional measures were taken and documented below in the ED course. Patient was wearing facemask when I entered the room and throughout our encounter. I wore full protective equipment throughout this patient encounter including a face mask, and gloves. Hand hygiene was performed before donning protective equipment and after removal when leaving the room.    Room Number:  30/30  Date of encounter:  3/1/2021  PCP: Nida Gray APRN    HPI:  Context: Di Macario is a 80 y.o. female who presents to the ED c/o chief complaint of abdominal pain.  Patient complains of abdominal pain that began this morning, unable to say a time but patient reports that she woke up and had symptom-free interval.  Patient complains of generalized abdominal pain, dull, no radiation of pain, no aggravating or ameliorating factors.  Patient reports that she has been nauseous but had no emesis.  Patient reports that she has not ate anything today but has taken some glucose pills as she is a diabetic.  Patient reports that she felt the urge to defecate this morning but was unable to, actually had to manually remove some stool.  Patient reports history of chronic constipation, for the last several years.  Patient reports last bowel movement prior to this was yesterday.  Patient reports she has been having regular bowel movements.  Patient does complain of abdominal distention, believes that she is not passing flatus.  No urinary symptoms, no fever or systemic symptoms.  Patient denies any prior abdominal surgeries, no history of bowel obstructions.    MEDICAL HISTORY REVIEW  Reviewed in EPIC    PAST MEDICAL HISTORY  Active Ambulatory Problems     Diagnosis Date Noted   • Essential hypertension 03/04/2016   • Dyslipidemia 03/04/2016   • Impaired memory 03/04/2016   • Vitamin D deficiency 03/04/2016   •  Hyperuricemia 07/20/2016   • Slow transit constipation 01/31/2017   • Chronic pain of left knee 01/31/2017   • Acute pain of left knee 02/03/2017   • Fall 02/03/2017   • Medicare annual wellness visit, subsequent 08/16/2017   • At high risk for falls 08/10/2018   • Peripheral neuropathy 08/28/2018   • Uncontrolled type 2 diabetes mellitus with hyperglycemia (CMS/HCC) 10/25/2018   • Alkaline phosphatase elevation 10/29/2018   • Walker as ambulation aid 02/12/2019   • Bradycardia, sinus 01/13/2021   • Shortness of breath 01/13/2021   • Leg swelling 01/13/2021   • Hyperlipidemia 01/13/2021     Resolved Ambulatory Problems     Diagnosis Date Noted   • Calcium blood increased 10/29/2018     Past Medical History:   Diagnosis Date   • Dementia (CMS/[facility])    • Hypertension    • Type 2 diabetes mellitus (CMS/[facility])        PAST SURGICAL HISTORY  Past Surgical History:   Procedure Laterality Date   • BLADDER SURGERY      Prolapsed   • BREAST BIOPSY     • HYSTERECTOMY         FAMILY HISTORY  Family History   Problem Relation Age of Onset   • Hypertension Mother    • Hypertension Father        SOCIAL HISTORY  Social History     Socioeconomic History   • Marital status:      Spouse name: Not on file   • Number of children: Not on file   • Years of education: Not on file   • Highest education level: Not on file   Tobacco Use   • Smoking status: Former Smoker   • Smokeless tobacco: Never Used   Substance and Sexual Activity   • Alcohol use: No   • Drug use: No   • Sexual activity: Defer     Birth control/protection: Surgical       ALLERGIES  Patient has no known allergies.    The patient's allergies have been reviewed    REVIEW OF SYSTEMS  All systems reviewed and negative except for those discussed in HPI.     PHYSICAL EXAM  I have reviewed the triage vital signs and nursing notes.  ED Triage Vitals [03/01/21 1207]   Temp Heart Rate Resp BP SpO2   96.5 °F (35.8 °C) 62 16 136/78 100 %      Temp src Heart Rate Source Patient  Position BP Location FiO2 (%)   Tympanic Monitor Lying -- --       General: No acute distress  HENT: NCAT, PERRL, Nares patent  Eyes: no scleral icterus  Neck: trachea midline, no ROM limitations  CV: regular rhythm, regular rate  Respiratory: normal effort, CTAB  Abdomen: soft, nondistended, hypoactive bowel sounds, suprapubic tenderness to palpation, no rebound tenderness, no guarding or rigidity  : deferred  Musculoskeletal: no deformity  Neuro: alert, moves all extremities, follows commands  Skin: warm, dry    LAB RESULTS  Recent Results (from the past 24 hour(s))   Comprehensive Metabolic Panel    Collection Time: 03/01/21 12:25 PM    Specimen: Blood   Result Value Ref Range    Glucose 139 (H) 65 - 99 mg/dL    BUN 26 (H) 8 - 23 mg/dL    Creatinine 0.67 0.57 - 1.00 mg/dL    Sodium 142 136 - 145 mmol/L    Potassium 4.2 3.5 - 5.2 mmol/L    Chloride 105 98 - 107 mmol/L    CO2 23.8 22.0 - 29.0 mmol/L    Calcium 9.8 8.6 - 10.5 mg/dL    Total Protein 7.8 6.0 - 8.5 g/dL    Albumin 4.10 3.50 - 5.20 g/dL    ALT (SGPT) 21 1 - 33 U/L    AST (SGOT) 23 1 - 32 U/L    Alkaline Phosphatase 157 (H) 39 - 117 U/L    Total Bilirubin 0.2 0.0 - 1.2 mg/dL    eGFR Non African Amer 84 >60 mL/min/1.73    Globulin 3.7 gm/dL    A/G Ratio 1.1 g/dL    BUN/Creatinine Ratio 38.8 (H) 7.0 - 25.0    Anion Gap 13.2 5.0 - 15.0 mmol/L   Lipase    Collection Time: 03/01/21 12:25 PM    Specimen: Blood   Result Value Ref Range    Lipase 163 (H) 13 - 60 U/L   CBC Auto Differential    Collection Time: 03/01/21 12:25 PM    Specimen: Blood   Result Value Ref Range    WBC 8.37 3.40 - 10.80 10*3/mm3    RBC 4.92 3.77 - 5.28 10*6/mm3    Hemoglobin 14.0 12.0 - 15.9 g/dL    Hematocrit 43.1 34.0 - 46.6 %    MCV 87.6 79.0 - 97.0 fL    MCH 28.5 26.6 - 33.0 pg    MCHC 32.5 31.5 - 35.7 g/dL    RDW 13.8 12.3 - 15.4 %    RDW-SD 43.9 37.0 - 54.0 fl    MPV 11.5 6.0 - 12.0 fL    Platelets 279 140 - 450 10*3/mm3    Neutrophil % 66.3 42.7 - 76.0 %    Lymphocyte % 25.8  19.6 - 45.3 %    Monocyte % 6.7 5.0 - 12.0 %    Eosinophil % 0.4 0.3 - 6.2 %    Basophil % 0.6 0.0 - 1.5 %    Immature Grans % 0.2 0.0 - 0.5 %    Neutrophils, Absolute 5.55 1.70 - 7.00 10*3/mm3    Lymphocytes, Absolute 2.16 0.70 - 3.10 10*3/mm3    Monocytes, Absolute 0.56 0.10 - 0.90 10*3/mm3    Eosinophils, Absolute 0.03 0.00 - 0.40 10*3/mm3    Basophils, Absolute 0.05 0.00 - 0.20 10*3/mm3    Immature Grans, Absolute 0.02 0.00 - 0.05 10*3/mm3    nRBC 0.0 0.0 - 0.2 /100 WBC   ECG 12 Lead    Collection Time: 03/01/21 12:50 PM   Result Value Ref Range    QT Interval 478 ms   POC Glucose Once    Collection Time: 03/01/21 12:56 PM    Specimen: Blood   Result Value Ref Range    Glucose 134 (H) 70 - 130 mg/dL   Urinalysis With Microscopic If Indicated (No Culture) - Urine, Clean Catch    Collection Time: 03/01/21  1:45 PM    Specimen: Urine, Clean Catch   Result Value Ref Range    Color, UA Yellow Yellow, Straw    Appearance, UA Clear Clear    pH, UA 5.5 5.0 - 8.0    Specific Gravity, UA 1.028 1.005 - 1.030    Glucose, UA >=1000 mg/dL (3+) (A) Negative    Ketones, UA 15 mg/dL (1+) (A) Negative    Bilirubin, UA Negative Negative    Blood, UA Negative Negative    Protein, UA Negative Negative    Leuk Esterase, UA Negative Negative    Nitrite, UA Negative Negative    Urobilinogen, UA 0.2 E.U./dL 0.2 - 1.0 E.U./dL       I ordered the above labs and reviewed the results.    RADIOLOGY  Ct Abdomen Pelvis With Contrast    Result Date: 3/1/2021  CT ABDOMEN PELVIS W CONTRAST-  Radiation dose reduction techniques were utilized, including automated exposure control and exposure modulation based on body size.  CLINICAL INFORMATION: Generalized abdominal pain with nausea, evaluate for bowel obstruction, 80-year-old female.  FINDINGS: Extensive diverticulosis of the colon without overt diverticulitis. Small bowel caliber is normal. Overall small bowel appearance within normal limits. The appendix is normal. Stomach is collapsed, overall  contour within normal limits.  There is a tiny amount of sludge or tiny gallstones within the dependent portion of an otherwise normal-appearing gallbladder. No biliary duct dilatation or pancreatic abnormality. The liver and spleen have a satisfactory appearance, no adrenal abnormality seen. 2 mm calculus within the lower pole of the right kidney. Renal cortical pattern of enhancement is symmetric and satisfactory. No suspicious mass, no obstructive uropathy. Ureters are normal in course and caliber to the urinary bladder which is typical in appearance. Vaginal cuff is typical post hysterectomy. Diameter of the aorta is within normal limits.  CONCLUSION: 1. Extensive diverticulosis of the colon. 2. Tiny nonobstructing right renal calculus. 3. Sludge versus tiny gallstones along the dependent portion of the gallbladder.  Findings of this report called to Dr. Pickens in the emergency room at the time of completion 3:05 PM.   This report was finalized on 3/1/2021 3:15 PM by Dr. Teja Mckeon M.D.        I ordered the above noted radiological studies. I reviewed the images and results. I agree with the radiologist interpretation.    PROCEDURES  Procedures    MEDICATIONS GIVEN IN ER  Medications   morphine injection 2 mg (2 mg Intravenous Given 3/1/21 1229)   ondansetron (ZOFRAN) injection 8 mg (8 mg Intravenous Given 3/1/21 1229)   sodium chloride 0.9 % bolus 500 mL (0 mL Intravenous Stopped 3/1/21 1532)   morphine injection 2 mg (2 mg Intravenous Given 3/1/21 1409)   iopamidol (ISOVUE-300) 61 % injection 100 mL (85 mL Intravenous Given by Other 3/1/21 1425)   mineral oil-magnesium hydroxide-glycerin-sorbitol (MMGS) enema (200 mL Rectal Given 3/1/21 7474)       PROGRESS, DATA ANALYSIS, CONSULTS, AND MEDICAL DECISION MAKING  A complete history and physical exam have been performed.  All available laboratory and imaging results have been reviewed by myself prior to disposition.    MDM  After the initial H&P, I discussed  pertinent information from history and physical exam with patient/family.  Discussed differential diagnosis.  Discussed plan for ED evaluation/work-up/treatment.  All questions answered.  Patient/family is agreeable with plan.  ED Course as of Mar 01 1820   Mon Mar 01, 2021   1218 My differential diagnosis for abdominal pain includes but is not limited to:  Gastritis, gastroenteritis, peptic ulcer disease, GERD, esophageal perforation, acute appendicitis, mesenteric adenitis, Meckel's diverticulum, epiploic appendagitis, diverticulitis, colon cancer, ulcerative colitis, Crohn's disease, intussusception, small bowel obstruction, adhesions, ischemic bowel, perforated viscus, ileus, obstipation, biliary colic, cholecystitis, cholelithiasis, Bulmaro-Raúl Lexx, hepatitis, pancreatitis, common bile duct obstruction, cholangitis, bile leak, splenic trauma, splenic rupture, splenic infarction, splenic abscess, abdominal abscess, ascites, spontaneous bacterial peritonitis, hernia, UTI, cystitis, prostatitis, ureterolithiasis, urinary obstruction, ovarian cyst, torsion, pregnancy, ectopic pregnancy, PID, pelvic abscess, mittelschmerz, endometriosis, AAA, myocardial infarction, pneumonia, cancer, porphyria, DKA, medications, sickle cell, viral syndrome, zoster        [JG]   1218 Obtain lab work, giving IV fluids, morphine for pain, Zofran for nausea, obtaining CT imaging to rule out bowel obstruction and other intra-abdominal pathology.    [JG]   1252 EKG independently viewed and contemporaneously interpreted by ED physician. Time: 1250.  Rate 60.  Interpretation: Normal sinus rhythm, normal axis, first-degree AV block, normal QRS, no acute ST change.    [JG]   1312 EKG independently viewed and contemporaneously interpreted by ED physician. Time: 1304.  Rate 50.  Interpretation: Atrial fibrillation, normal axis, inferior Q waves, no ST elevation, no ST depression, T wave inversion and high lateral and anterior lateral leads.  T  wave inversion is unchanged from prior, only new finding from prior is new inferior Q waves, compared to EKG performed on 9/4/2018.    [JG]   1504 Phone call with radiologist.  I had previously reviewed the images. I discussed the patient, imaging, and their interpretation.  CT abdomen pelvis shows diverticulosis but no signs of diverticulitis, no signs of colitis.  See dictated report for final interpretation.        [JG]   1522 Patient reassessed, patient reports resolution of abdominal pain.  Patient current complaint is that she is uncomfortable in bed, adjusted her bed several times until we found a comfortable position.  Discussed ED work-up and results to present.  Discussed unremarkable CT imaging other than diverticulosis, discussed plan for enema.  Patient is agreeable with plan, no questions or concerns.    [JG]   1655 Family now at bedside, came and updated them on patient ED work-up and results to present.  Discussed plan for enema, currently pending enema from pharmacy.  Patient denies any abdominal pain at present, currently requesting something to eat and drink.  Discussed she is fine to eat and drink, plan for enema and then discharged home.  Patient and family agreeable with plan.    [JG]   1819 Patient received smog enema with subsequent bowel movement and symptom relief.  Patient is well-appearing and currently without complaint.  Discharged with primary care and GI follow-up.    [JG]   1820 The patient was reexamined.  They have had symptomatic improvement during their ED stay.  I discussed today's findings with the patient, explaining the pertinent positives and negatives from today's visit, and the plan of care.  Discussed plan for discharge as there is no emergent indication for admission.  Discussed limitation of the ED work-up and that this is to rule out life-threatening emergencies but that they could require further testing as determined by their primary care and or any referred  specialist patient is agreeable and understands need for follow-up and repeat exam/testing.  Patient is aware that discharge does not mean there is nothing wrong, indicates no emergency is present, and that they must continue their care with their primary care physician and/or any referred specialist.  They were given appropriate follow-up with their primary care physician and/or specialist.  I had an extensive discussion on the expected clinical course and return precautions.  Patient understands to return to the emergency department for continuation, worsening, or new symptoms.  I answered any of the patient's questions. Patient was discharged home in a stable condition.        [JG]      ED Course User Index  [JG] Ryan Pickens MD       AS OF 18:20 EST VITALS:    BP - 122/83  HR - 62  TEMP - 96.5 °F (35.8 °C) (Tympanic)  O2 SATS - 100%    DIAGNOSIS  Final diagnoses:   Constipation, unspecified constipation type   Lower abdominal pain   Diverticulosis         DISPOSITION  DISCHARGE    Patient discharged in stable condition.    Reviewed implications of results, diagnosis, meds, responsibility to follow up, warning signs and symptoms of possible worsening, potential complications and reasons to return to ER.    Patient/Family voiced understanding of above instructions.    Discussed plan for discharge, as there is no emergent indication for admission. Patient referred to primary care provider for BP management due to today's BP. Pt/family is agreeable and understands need for follow up and repeat testing.  Pt is aware that discharge does not mean that nothing is wrong but it indicates no emergency is present that requires admission and they must continue care with follow-up as given below or physician of their choice.     FOLLOW-UP  Nida Gray, APRN  [address] [id number] [phone]    Schedule an appointment as soon as possible for a visit in 2 days  even if well    [facility]  [facility]  [address]  [address]  [facility] [address] [phone]  Schedule an appointment as soon as possible for a visit in 2 days  for further evaluation/management of your constipation         Medication List      New Prescriptions    bisacodyl 10 MG suppository  Commonly known as: Dulcolax  Insert 1 suppository into the rectum Daily As Needed for Constipation.     polyethylene glycol 17 GM/SCOOP powder  Commonly known as: MIRALAX  Take 17 g by mouth Daily.     sennosides-docusate 8.6-50 MG per tablet  Commonly known as: PERICOLACE  Take 1 tablet by mouth Daily for 30 days.           Where to Get Your Medications      These medications were sent to [place] [address] [address] [facility] [address]  [address]  - [phone]  - [phone] [address] [place] [id number]    Phone: [phone]   · bisacodyl 10 MG suppository  · polyethylene glycol 17 GM/SCOOP powder  · sennosides-docusate 8.6-50 MG per tablet          Ryan Pickens MD  03/01/21 4705

## 2021-03-01 NOTE — ED NOTES
Called pharmacy for update on enema, still making it. MD aware of delay.      Laura Blanchard, JOSÉ MIGUEL  03/01/21 9914       Laura Blanchard RN  03/01/21 5281

## 2021-03-01 NOTE — ED TRIAGE NOTES
"Pt arrives via EMS from home. States that 1 hour ago she started to have lower abdominal pain. Reports no BM for the last 2 days. States that she \"pulled some stool out\" this morning. Pt complains of nausea with no vomiting. Patient masked at arrival and triage staff wore all appropriate PPE during entire encounter with patient.     "

## 2021-03-01 NOTE — ED NOTES
"Pt states she had a BM yesterday and it was normal but today she started having generalized abdominal pain. Pt reports she \"dug some out\" this morning. Pain states she has not had anything to eat today. Also reports some nausea today. This rn wearing mask and goggles. Pt wearing mask during encounter.        Laura Blanchard, RN  03/01/21 1218    "

## 2021-03-01 NOTE — ED NOTES
Pt has commode in room and call light within reach.      Laura Blanchard, JOSÉ MIGUEL  03/01/21 8343

## 2021-03-03 ENCOUNTER — LAB (OUTPATIENT)
Dept: LAB | Facility: HOSPITAL | Age: 81
End: 2021-03-03

## 2021-03-03 ENCOUNTER — TELEPHONE (OUTPATIENT)
Dept: ENDOCRINOLOGY | Age: 81
End: 2021-03-03

## 2021-03-03 DIAGNOSIS — Z23 IMMUNIZATION DUE: ICD-10-CM

## 2021-03-03 DIAGNOSIS — Z01.818 OTHER SPECIFIED PRE-OPERATIVE EXAMINATION: ICD-10-CM

## 2021-03-03 PROCEDURE — U0004 COV-19 TEST NON-CDC HGH THRU: HCPCS

## 2021-03-03 PROCEDURE — C9803 HOPD COVID-19 SPEC COLLECT: HCPCS

## 2021-03-03 PROCEDURE — U0005 INFEC AGEN DETEC AMPLI PROBE: HCPCS

## 2021-03-04 ENCOUNTER — TELEPHONE (OUTPATIENT)
Dept: CARDIOLOGY | Facility: CLINIC | Age: 81
End: 2021-03-04

## 2021-03-04 LAB — SARS-COV-2 RNA RESP QL NAA+PROBE: NOT DETECTED

## 2021-03-04 NOTE — TELEPHONE ENCOUNTER
----- Message from Di Macario sent at 3/3/2021 12:51 PM EST -----  Regarding: Non-Urgent Medical Question  Contact: [phone]  Greetings Dr. Ferguson,  I was told to stop taking multiple vitamins with minerals on 13 Jan 2021.   Are there any brands of non mineral vitamins you can recommend?     Are there certain minerals I need to avoid in my diet, such as iodine, iron, zinc, calcium, and potassium?     I want to optimize my immune system with vitamin supplements but haven't taken anything since 13 Jan 2021 per your instructions.

## 2021-03-05 ENCOUNTER — APPOINTMENT (OUTPATIENT)
Dept: GENERAL RADIOLOGY | Facility: HOSPITAL | Age: 81
End: 2021-03-05

## 2021-03-05 ENCOUNTER — HOSPITAL ENCOUNTER (OUTPATIENT)
Facility: HOSPITAL | Age: 81
Setting detail: HOSPITAL OUTPATIENT SURGERY
Discharge: HOME | End: 2021-03-05
Attending: INTERNAL MEDICINE | Admitting: INTERNAL MEDICINE

## 2021-03-05 VITALS
HEIGHT: 63 IN | BODY MASS INDEX: 28.35 KG/M2 | RESPIRATION RATE: 16 BRPM | WEIGHT: 160 LBS | TEMPERATURE: 97.9 F | HEART RATE: 60 BPM | OXYGEN SATURATION: 95 % | DIASTOLIC BLOOD PRESSURE: 60 MMHG | SYSTOLIC BLOOD PRESSURE: 145 MMHG

## 2021-03-05 DIAGNOSIS — R00.1 BRADYCARDIA, SINUS: ICD-10-CM

## 2021-03-05 LAB
GLUCOSE BLDC GLUCOMTR-MCNC: 164 MG/DL (ref 70–130)
GLUCOSE BLDC GLUCOMTR-MCNC: 188 MG/DL (ref 70–130)

## 2021-03-05 PROCEDURE — 25010000002 VANCOMYCIN PER 500 MG: Performed by: INTERNAL MEDICINE

## 2021-03-05 PROCEDURE — 99152 MOD SED SAME PHYS/QHP 5/>YRS: CPT | Performed by: INTERNAL MEDICINE

## 2021-03-05 PROCEDURE — 99153 MOD SED SAME PHYS/QHP EA: CPT | Performed by: INTERNAL MEDICINE

## 2021-03-05 PROCEDURE — 25010000003 LIDOCAINE 1 % SOLUTION: Performed by: INTERNAL MEDICINE

## 2021-03-05 PROCEDURE — 25010000002 MIDAZOLAM PER 1 MG: Performed by: INTERNAL MEDICINE

## 2021-03-05 PROCEDURE — 71045 X-RAY EXAM CHEST 1 VIEW: CPT

## 2021-03-05 PROCEDURE — C1898 LEAD, PMKR, OTHER THAN TRANS: HCPCS | Performed by: INTERNAL MEDICINE

## 2021-03-05 PROCEDURE — 82962 GLUCOSE BLOOD TEST: CPT

## 2021-03-05 PROCEDURE — 25010000003 CEFAZOLIN IN DEXTROSE 2-4 GM/100ML-% SOLUTION: Performed by: INTERNAL MEDICINE

## 2021-03-05 PROCEDURE — 33208 INSRT HEART PM ATRIAL & VENT: CPT | Performed by: INTERNAL MEDICINE

## 2021-03-05 PROCEDURE — 0 IOPAMIDOL PER 1 ML: Performed by: INTERNAL MEDICINE

## 2021-03-05 PROCEDURE — C1785 PMKR, DUAL, RATE-RESP: HCPCS | Performed by: INTERNAL MEDICINE

## 2021-03-05 PROCEDURE — 25010000002 FENTANYL CITRATE (PF) 100 MCG/2ML SOLUTION: Performed by: INTERNAL MEDICINE

## 2021-03-05 PROCEDURE — C1894 INTRO/SHEATH, NON-LASER: HCPCS | Performed by: INTERNAL MEDICINE

## 2021-03-05 DEVICE — PACE/SENSE LEAD
Type: IMPLANTABLE DEVICE | Status: FUNCTIONAL
Brand: INGEVITY™+

## 2021-03-05 DEVICE — IMPLANTABLE DEVICE: Type: IMPLANTABLE DEVICE | Status: FUNCTIONAL

## 2021-03-05 DEVICE — PACEMAKER
Type: IMPLANTABLE DEVICE | Status: FUNCTIONAL
Brand: ACCOLADE™ MRI DR

## 2021-03-05 RX ORDER — SODIUM CHLORIDE 0.9 % (FLUSH) 0.9 %
3 SYRINGE (ML) INJECTION EVERY 12 HOURS SCHEDULED
Status: DISCONTINUED | OUTPATIENT
Start: 2021-03-05 | End: 2021-03-05 | Stop reason: HOSPADM

## 2021-03-05 RX ORDER — SODIUM CHLORIDE 0.9 % (FLUSH) 0.9 %
3 SYRINGE (ML) INJECTION EVERY 12 HOURS SCHEDULED
Status: CANCELLED | OUTPATIENT
Start: 2021-03-05

## 2021-03-05 RX ORDER — LIDOCAINE HYDROCHLORIDE 10 MG/ML
0.1 INJECTION, SOLUTION EPIDURAL; INFILTRATION; INTRACAUDAL; PERINEURAL ONCE AS NEEDED
Status: DISCONTINUED | OUTPATIENT
Start: 2021-03-05 | End: 2021-03-05 | Stop reason: HOSPADM

## 2021-03-05 RX ORDER — SODIUM CHLORIDE 0.9 % (FLUSH) 0.9 %
10 SYRINGE (ML) INJECTION AS NEEDED
Status: DISCONTINUED | OUTPATIENT
Start: 2021-03-05 | End: 2021-03-05 | Stop reason: HOSPADM

## 2021-03-05 RX ORDER — CEFAZOLIN SODIUM 2 G/100ML
2 INJECTION, SOLUTION INTRAVENOUS ONCE
Status: COMPLETED | OUTPATIENT
Start: 2021-03-05 | End: 2021-03-05

## 2021-03-05 RX ORDER — MIDAZOLAM HYDROCHLORIDE 1 MG/ML
INJECTION INTRAMUSCULAR; INTRAVENOUS AS NEEDED
Status: DISCONTINUED | OUTPATIENT
Start: 2021-03-05 | End: 2021-03-05 | Stop reason: HOSPADM

## 2021-03-05 RX ORDER — CEFAZOLIN SODIUM 2 G/100ML
INJECTION, SOLUTION INTRAVENOUS CONTINUOUS PRN
Status: COMPLETED | OUTPATIENT
Start: 2021-03-05 | End: 2021-03-05

## 2021-03-05 RX ORDER — FENTANYL CITRATE 50 UG/ML
INJECTION, SOLUTION INTRAMUSCULAR; INTRAVENOUS AS NEEDED
Status: DISCONTINUED | OUTPATIENT
Start: 2021-03-05 | End: 2021-03-05 | Stop reason: HOSPADM

## 2021-03-05 RX ORDER — VANCOMYCIN HYDROCHLORIDE 1 G/200ML
INJECTION, SOLUTION INTRAVENOUS CONTINUOUS PRN
Status: COMPLETED | OUTPATIENT
Start: 2021-03-05 | End: 2021-03-05

## 2021-03-05 RX ORDER — SODIUM CHLORIDE 0.9 % (FLUSH) 0.9 %
10 SYRINGE (ML) INJECTION AS NEEDED
Status: CANCELLED | OUTPATIENT
Start: 2021-03-05

## 2021-03-05 RX ORDER — SODIUM CHLORIDE 9 MG/ML
75 INJECTION, SOLUTION INTRAVENOUS CONTINUOUS
Status: DISCONTINUED | OUTPATIENT
Start: 2021-03-05 | End: 2021-03-05 | Stop reason: HOSPADM

## 2021-03-05 RX ORDER — LIDOCAINE HYDROCHLORIDE 10 MG/ML
INJECTION, SOLUTION INFILTRATION; PERINEURAL AS NEEDED
Status: DISCONTINUED | OUTPATIENT
Start: 2021-03-05 | End: 2021-03-05 | Stop reason: HOSPADM

## 2021-03-05 RX ADMIN — CEFAZOLIN SODIUM 2 G: 2 INJECTION, SOLUTION INTRAVENOUS at 14:38

## 2021-03-05 RX ADMIN — SODIUM CHLORIDE 75 ML/HR: 9 INJECTION, SOLUTION INTRAVENOUS at 08:38

## 2021-03-05 NOTE — PERIOPERATIVE NURSING NOTE
Dr Ferguson bedside speaking with pt & dtr. Order rec'd for sling to left arm & to have PPM see pt to check PPM prior to discharge.  Debra notified.

## 2021-03-24 ENCOUNTER — HOSPITAL ENCOUNTER (EMERGENCY)
Facility: HOSPITAL | Age: 81
Discharge: HOME | End: 2021-03-24
Attending: EMERGENCY MEDICINE | Admitting: EMERGENCY MEDICINE

## 2021-03-24 VITALS
HEART RATE: 61 BPM | DIASTOLIC BLOOD PRESSURE: 76 MMHG | OXYGEN SATURATION: 99 % | BODY MASS INDEX: 28.35 KG/M2 | WEIGHT: 160 LBS | RESPIRATION RATE: 18 BRPM | SYSTOLIC BLOOD PRESSURE: 144 MMHG | HEIGHT: 63 IN | TEMPERATURE: 98.3 F

## 2021-03-24 DIAGNOSIS — E11.649 HYPOGLYCEMIA ASSOCIATED WITH DIABETES (HCC): Primary | ICD-10-CM

## 2021-03-24 LAB
ALBUMIN SERPL-MCNC: 3.6 G/DL (ref 3.5–5.2)
ALBUMIN/GLOB SERPL: 1.4 G/DL
ALP SERPL-CCNC: 122 U/L (ref 39–117)
ALT SERPL W P-5'-P-CCNC: 20 U/L (ref 1–33)
ANION GAP SERPL CALCULATED.3IONS-SCNC: 6.5 MMOL/L (ref 5–15)
AST SERPL-CCNC: 19 U/L (ref 1–32)
BACTERIA UR QL AUTO: ABNORMAL /HPF
BASOPHILS # BLD AUTO: 0.03 10*3/MM3 (ref 0–0.2)
BASOPHILS NFR BLD AUTO: 0.6 % (ref 0–1.5)
BILIRUB SERPL-MCNC: <0.2 MG/DL (ref 0–1.2)
BILIRUB UR QL STRIP: NEGATIVE
BUN SERPL-MCNC: 22 MG/DL (ref 8–23)
BUN/CREAT SERPL: 35.5 (ref 7–25)
CALCIUM SPEC-SCNC: 8.6 MG/DL (ref 8.6–10.5)
CHLORIDE SERPL-SCNC: 105 MMOL/L (ref 98–107)
CLARITY UR: ABNORMAL
CO2 SERPL-SCNC: 28.5 MMOL/L (ref 22–29)
COLOR UR: ABNORMAL
CREAT SERPL-MCNC: 0.62 MG/DL (ref 0.57–1)
DEPRECATED RDW RBC AUTO: 43.3 FL (ref 37–54)
EOSINOPHIL # BLD AUTO: 0.04 10*3/MM3 (ref 0–0.4)
EOSINOPHIL NFR BLD AUTO: 0.7 % (ref 0.3–6.2)
ERYTHROCYTE [DISTWIDTH] IN BLOOD BY AUTOMATED COUNT: 13.6 % (ref 12.3–15.4)
GFR SERPL CREATININE-BSD FRML MDRD: 92 ML/MIN/1.73
GLOBULIN UR ELPH-MCNC: 2.5 GM/DL
GLUCOSE BLDC GLUCOMTR-MCNC: 152 MG/DL (ref 70–130)
GLUCOSE BLDC GLUCOMTR-MCNC: 184 MG/DL (ref 70–130)
GLUCOSE BLDC GLUCOMTR-MCNC: 74 MG/DL (ref 70–130)
GLUCOSE SERPL-MCNC: 139 MG/DL (ref 65–99)
GLUCOSE UR STRIP-MCNC: NEGATIVE MG/DL
HCT VFR BLD AUTO: 38.2 % (ref 34–46.6)
HGB BLD-MCNC: 12.1 G/DL (ref 12–15.9)
HGB UR QL STRIP.AUTO: NEGATIVE
HOLD SPECIMEN: NORMAL
HOLD SPECIMEN: NORMAL
HYALINE CASTS UR QL AUTO: ABNORMAL /LPF
IMM GRANULOCYTES # BLD AUTO: 0.02 10*3/MM3 (ref 0–0.05)
IMM GRANULOCYTES NFR BLD AUTO: 0.4 % (ref 0–0.5)
KETONES UR QL STRIP: ABNORMAL
LEUKOCYTE ESTERASE UR QL STRIP.AUTO: ABNORMAL
LYMPHOCYTES # BLD AUTO: 1.43 10*3/MM3 (ref 0.7–3.1)
LYMPHOCYTES NFR BLD AUTO: 26.6 % (ref 19.6–45.3)
MCH RBC QN AUTO: 27.7 PG (ref 26.6–33)
MCHC RBC AUTO-ENTMCNC: 31.7 G/DL (ref 31.5–35.7)
MCV RBC AUTO: 87.4 FL (ref 79–97)
MONOCYTES # BLD AUTO: 0.37 10*3/MM3 (ref 0.1–0.9)
MONOCYTES NFR BLD AUTO: 6.9 % (ref 5–12)
NEUTROPHILS NFR BLD AUTO: 3.48 10*3/MM3 (ref 1.7–7)
NEUTROPHILS NFR BLD AUTO: 64.8 % (ref 42.7–76)
NITRITE UR QL STRIP: NEGATIVE
NRBC BLD AUTO-RTO: 0 /100 WBC (ref 0–0.2)
PH UR STRIP.AUTO: <=5 [PH] (ref 5–8)
PLATELET # BLD AUTO: 206 10*3/MM3 (ref 140–450)
PMV BLD AUTO: 11.5 FL (ref 6–12)
POTASSIUM SERPL-SCNC: 4 MMOL/L (ref 3.5–5.2)
PROT SERPL-MCNC: 6.1 G/DL (ref 6–8.5)
PROT UR QL STRIP: NEGATIVE
RBC # BLD AUTO: 4.37 10*6/MM3 (ref 3.77–5.28)
RBC # UR: ABNORMAL /HPF
REF LAB TEST METHOD: ABNORMAL
SODIUM SERPL-SCNC: 140 MMOL/L (ref 136–145)
SP GR UR STRIP: 1.02 (ref 1–1.03)
SQUAMOUS #/AREA URNS HPF: ABNORMAL /HPF
UROBILINOGEN UR QL STRIP: ABNORMAL
WBC # BLD AUTO: 5.37 10*3/MM3 (ref 3.4–10.8)
WBC UR QL AUTO: ABNORMAL /HPF
WHOLE BLOOD HOLD SPECIMEN: NORMAL
WHOLE BLOOD HOLD SPECIMEN: NORMAL

## 2021-03-24 PROCEDURE — 81001 URINALYSIS AUTO W/SCOPE: CPT | Performed by: EMERGENCY MEDICINE

## 2021-03-24 PROCEDURE — 99284 EMERGENCY DEPT VISIT MOD MDM: CPT

## 2021-03-24 PROCEDURE — 80053 COMPREHEN METABOLIC PANEL: CPT | Performed by: EMERGENCY MEDICINE

## 2021-03-24 PROCEDURE — 82962 GLUCOSE BLOOD TEST: CPT

## 2021-03-24 PROCEDURE — 85025 COMPLETE CBC W/AUTO DIFF WBC: CPT | Performed by: EMERGENCY MEDICINE

## 2021-03-24 PROCEDURE — P9612 CATHETERIZE FOR URINE SPEC: HCPCS

## 2021-03-24 NOTE — ED TRIAGE NOTES
Pt arrived from home with complaints of a glucose of 30. Pt was found unconscious by . Pt was given 250ML of D10 and is now awake.     Pt was wearing a mask during assessment.  This RN wore appropriate PPE

## 2021-03-24 NOTE — DISCHARGE INSTRUCTIONS
Eat lunch when you get home.  Check your blood sugar every 2 hours until you go to bed tonight.  Return to the emergency department for worsening symptoms or other concern.

## 2021-03-24 NOTE — ED PROVIDER NOTES
EMERGENCY DEPARTMENT ENCOUNTER    Room Number:  15/15  Date of encounter:  3/24/2021  PCP: Nida Gray APRN  Historian: Patient and EMS    I used full protective equipment while examining this patient.  This includes face mask, gloves and protective eyewear.  I washed my hands before entering the room and immediately upon leaving the room.  Patient was wearing a surgical mask.      HPI:  Chief Complaint: Hypoglycemia  A complete HPI/ROS/PMH/PSH/SH/FH are unobtainable due to: None    Context: Di Macario is a 80 y.o. female who presents to the ED by EMS c/o hypoglycemia.  EMS states that the patient was found unconscious by her  just prior to arrival.  Glucose was 30.  Patient was given 250 mL of D10 by EMS and her symptoms improved.  She takes Toujeo every morning and Trulicity once a week.  Jardiance is listed on her medication list but she states she has no longer taking this.  She says she normally wakes up at 8:30 AM but did not wake up when her alarm went off this morning.  She reports not sleeping well last night.  Denies recent illness, cough, fever, shortness of breath, nausea, vomiting, diarrhea, or dysuria.      PAST MEDICAL HISTORY  Active Ambulatory Problems     Diagnosis Date Noted   • Essential hypertension 03/04/2016   • Dyslipidemia 03/04/2016   • Impaired memory 03/04/2016   • Vitamin D deficiency 03/04/2016   • Hyperuricemia 07/20/2016   • Slow transit constipation 01/31/2017   • Chronic pain of left knee 01/31/2017   • Acute pain of left knee 02/03/2017   • Fall 02/03/2017   • Medicare annual wellness visit, subsequent 08/16/2017   • At high risk for falls 08/10/2018   • Peripheral neuropathy 08/28/2018   • Uncontrolled type 2 diabetes mellitus with hyperglycemia (CMS/[facility]) 10/25/2018   • Alkaline phosphatase elevation 10/29/2018   • Walker as ambulation aid 02/12/2019   • Bradycardia, sinus 01/13/2021   • Shortness of breath 01/13/2021   • Leg swelling 01/13/2021   •  Hyperlipidemia 01/13/2021     Resolved Ambulatory Problems     Diagnosis Date Noted   • Calcium blood increased 10/29/2018     Past Medical History:   Diagnosis Date   • Dementia (CMS/HCC)    • Hypertension    • Type 2 diabetes mellitus (CMS/HCC)          PAST SURGICAL HISTORY  Past Surgical History:   Procedure Laterality Date   • ANKLE SURGERY Right    • BLADDER SURGERY      Prolapsed   • BREAST BIOPSY     • CARDIAC ELECTROPHYSIOLOGY PROCEDURE N/A 3/5/2021    Procedure: Pacemaker DC new BOSTON;  Surgeon: Madelin Ferguson MD;  Location: [facility] INVASIVE LOCATION;  Service: Cardiology;  Laterality: N/A;   • HYSTERECTOMY           FAMILY HISTORY  Family History   Problem Relation Age of Onset   • Hypertension Mother    • Hypertension Father          SOCIAL HISTORY  Social History     Socioeconomic History   • Marital status:      Spouse name: Not on file   • Number of children: Not on file   • Years of education: Not on file   • Highest education level: Not on file   Tobacco Use   • Smoking status: Former Smoker   • Smokeless tobacco: Never Used   Substance and Sexual Activity   • Alcohol use: Yes     Comment: RARELY   • Drug use: No   • Sexual activity: Defer     Birth control/protection: Surgical         ALLERGIES  Patient has no known allergies.       REVIEW OF SYSTEMS  Review of Systems      All systems have been reviewed and are negative except as as discussed in the HPI    PHYSICAL EXAM    I have reviewed the triage vital signs and nursing notes.    ED Triage Vitals   Temp Heart Rate Resp BP SpO2   03/24/21 0927 03/24/21 0911 03/24/21 0911 03/24/21 0928 03/24/21 0911   97 °F (36.1 °C) 60 16 162/68 99 %      Temp src Heart Rate Source Patient Position BP Location FiO2 (%)   -- 03/24/21 0911 -- -- --    Monitor          Physical Exam  GENERAL: Awake, alert, oriented x3  HENT: NCAT, nares patent, moist mucous membranes  NECK: supple  EYES: Extraocular muscles intact, no scleral icterus  CV:  regular rhythm, regular rate  RESPIRATORY: normal effort, clear to auscultation bilaterally  ABDOMEN: soft, nontender  MUSCULOSKELETAL: Extremities are nontender and without obvious deformity.  There is normal range of motion in all extremities.    NEURO: Strength, sensation, and coordination are grossly intact.  Speech and mentation are unremarkable.  No facial droop.  SKIN: warm, dry, no rash  PSYCH: Normal mood and affect      LAB RESULTS  Recent Results (from the past 24 hour(s))   POC Glucose Once    Collection Time: 03/24/21  9:20 AM    Specimen: Blood   Result Value Ref Range    Glucose 152 (H) 70 - 130 mg/dL   Light Blue Top    Collection Time: 03/24/21  9:28 AM   Result Value Ref Range    Extra Tube hold for add-on    Green Top (Gel)    Collection Time: 03/24/21  9:28 AM   Result Value Ref Range    Extra Tube Hold for add-ons.    Lavender Top    Collection Time: 03/24/21  9:28 AM   Result Value Ref Range    Extra Tube hold for add-on    Gold Top - SST    Collection Time: 03/24/21  9:28 AM   Result Value Ref Range    Extra Tube Hold for add-ons.    Comprehensive Metabolic Panel    Collection Time: 03/24/21  9:28 AM    Specimen: Blood   Result Value Ref Range    Glucose 139 (H) 65 - 99 mg/dL    BUN 22 8 - 23 mg/dL    Creatinine 0.62 0.57 - 1.00 mg/dL    Sodium 140 136 - 145 mmol/L    Potassium 4.0 3.5 - 5.2 mmol/L    Chloride 105 98 - 107 mmol/L    CO2 28.5 22.0 - 29.0 mmol/L    Calcium 8.6 8.6 - 10.5 mg/dL    Total Protein 6.1 6.0 - 8.5 g/dL    Albumin 3.60 3.50 - 5.20 g/dL    ALT (SGPT) 20 1 - 33 U/L    AST (SGOT) 19 1 - 32 U/L    Alkaline Phosphatase 122 (H) 39 - 117 U/L    Total Bilirubin <0.2 0.0 - 1.2 mg/dL    eGFR Non African Amer 92 >60 mL/min/1.73    Globulin 2.5 gm/dL    A/G Ratio 1.4 g/dL    BUN/Creatinine Ratio 35.5 (H) 7.0 - 25.0    Anion Gap 6.5 5.0 - 15.0 mmol/L   CBC Auto Differential    Collection Time: 03/24/21  9:28 AM    Specimen: Blood   Result Value Ref Range    WBC 5.37 3.40 - 10.80  10*3/mm3    RBC 4.37 3.77 - 5.28 10*6/mm3    Hemoglobin 12.1 12.0 - 15.9 g/dL    Hematocrit 38.2 34.0 - 46.6 %    MCV 87.4 79.0 - 97.0 fL    MCH 27.7 26.6 - 33.0 pg    MCHC 31.7 31.5 - 35.7 g/dL    RDW 13.6 12.3 - 15.4 %    RDW-SD 43.3 37.0 - 54.0 fl    MPV 11.5 6.0 - 12.0 fL    Platelets 206 140 - 450 10*3/mm3    Neutrophil % 64.8 42.7 - 76.0 %    Lymphocyte % 26.6 19.6 - 45.3 %    Monocyte % 6.9 5.0 - 12.0 %    Eosinophil % 0.7 0.3 - 6.2 %    Basophil % 0.6 0.0 - 1.5 %    Immature Grans % 0.4 0.0 - 0.5 %    Neutrophils, Absolute 3.48 1.70 - 7.00 10*3/mm3    Lymphocytes, Absolute 1.43 0.70 - 3.10 10*3/mm3    Monocytes, Absolute 0.37 0.10 - 0.90 10*3/mm3    Eosinophils, Absolute 0.04 0.00 - 0.40 10*3/mm3    Basophils, Absolute 0.03 0.00 - 0.20 10*3/mm3    Immature Grans, Absolute 0.02 0.00 - 0.05 10*3/mm3    nRBC 0.0 0.0 - 0.2 /100 WBC   Urinalysis With Microscopic If Indicated (No Culture) - Urine, Catheter    Collection Time: 03/24/21 10:01 AM    Specimen: Urine, Catheter   Result Value Ref Range    Color, UA Orange (A) Yellow, Straw    Appearance, UA Cloudy (A) Clear    pH, UA <=5.0 5.0 - 8.0    Specific Gravity, UA 1.025 1.005 - 1.030    Glucose, UA Negative Negative    Ketones, UA Trace (A) Negative    Bilirubin, UA Negative Negative    Blood, UA Negative Negative    Protein, UA Negative Negative    Leuk Esterase, UA Small (1+) (A) Negative    Nitrite, UA Negative Negative    Urobilinogen, UA 0.2 E.U./dL 0.2 - 1.0 E.U./dL   Urinalysis, Microscopic Only - Urine, Catheter    Collection Time: 03/24/21 10:01 AM    Specimen: Urine, Catheter   Result Value Ref Range    RBC, UA None Seen None Seen, 0-2 /HPF    WBC, UA 6-12 (A) None Seen, 0-2 /HPF    Bacteria, UA 2+ (A) None Seen /HPF    Squamous Epithelial Cells, UA 3-6 (A) None Seen, 0-2 /HPF    Hyaline Casts, UA 3-6 None Seen /LPF    Methodology Manual Light Microscopy    POC Glucose Once    Collection Time: 03/24/21 10:34 AM    Specimen: Blood   Result Value Ref  Range    Glucose 74 70 - 130 mg/dL   POC Glucose Once    Collection Time: 03/24/21 12:06 PM    Specimen: Blood   Result Value Ref Range    Glucose 184 (H) 70 - 130 mg/dL       Ordered the above labs and independently reviewed the results.      RADIOLOGY  No Radiology Exams Resulted Within Past 24 Hours    I ordered the above noted radiological studies. Reviewed by me and discussed with radiologist.  See dictation for official radiology interpretation.      PROCEDURES  Procedures      MEDICATIONS GIVEN IN ER    Medications - No data to display      PROGRESS, DATA ANALYSIS, CONSULTS, AND MEDICAL DECISION MAKING    All labs have been independently reviewed by me.  All radiology studies have been reviewed by me and discussed with radiologist dictating the report.   EKG's independently viewed and interpreted by me.  I have reviewed the nurse's notes, vital signs, past medical history, and medication list.  Discussion below represents my analysis of pertinent findings related to patient's condition, differential diagnosis, treatment plan and final disposition.      ED Course as of Mar 24 1505   Wed Mar 24, 2021   0933 Glucose(!): 152 [WH]   0935 Old records reviewed. Patient had a dual-chamber pacemaker placed on 3/5/2021 by Dr. Ferguson..  She was last seen here in the ED on 3/1/2021 for abdominal pain and constipation.    [WH]   1040 Patient has not yet been given anything to eat.  She will be given a sandwich.   Glucose: 74 [WH]   1228 Glucose(!): 184 [WH]   1331 Test results discussed with the patient.  She is resting comfortably.  Patient ate a sandwich.  Patient will be discharged.    [WH]      ED Course User Index  [] Nas Sheikh MD       AS OF 15:05 EDT VITALS:    BP - 144/76  HR - 61  TEMP - 98.3 °F (36.8 °C)  O2 SATS - 99%      DIAGNOSIS  Final diagnoses:   Hypoglycemia associated with diabetes (CMS/[facility])         DISPOSITION  Discharge    DISCHARGE    Patient discharged in stable  condition.    Reviewed implications of results, diagnosis, meds, responsibility to follow up, warning signs and symptoms of possible worsening, potential complications and reasons to return to ER, including low blood sugar or other concern..    Patient/Family voiced understanding of above instructions.    Discussed plan for discharge, as there is no emergent indication for admission. Patient referred to primary care provider for BP management due to today's BP. Pt/family is agreeable and understands need for follow up and repeat testing.  Pt is aware that discharge does not mean that nothing is wrong but it indicates no emergency is present that requires admission and they must continue care with follow-up as given below or physician of their choice.     FOLLOW-UP  Nida Gray, APRN  [address] [address]  [phone]    Schedule an appointment as soon as possible for a visit            Medication List      No changes were made to your prescriptions during this visit.           Dictated utilizing Dragon dictation:  Much of this encounter note is an electronic transcription/translation of spoken language to printed text. The electronic translation of spoken language may permit erroneous, or at times, nonsensical words or phrases to be inadvertently transcribed; Although I have reviewed the note for such errors, some may still exist.     Nas Sheikh MD  03/24/21 3226

## 2021-03-24 NOTE — PROGRESS NOTES
"HOSPITAL FOLLOW UP WITH DEVICE CHECK   Subjective:        Di Macario is a 80 y.o. female who here for follow up    CC  Post pacer  HPI  80-year-old female with a benign essential arterial hypertension, hyperlipidemia cardiac pacemaker here for the follow-up denies any chest pains or tightness in the chest no heaviness or the pressure sensation pacemaker has been healing well     Problems Addressed this Visit        Cardiac and Vasculature    Essential hypertension - Primary    Hyperlipidemia    Presence of cardiac pacemaker       Symptoms and Signs    Leg swelling      Diagnoses       Codes Comments    Essential hypertension    -  Primary ICD-10-CM: I10  ICD-9-CM: 401.9     Hyperlipidemia, unspecified hyperlipidemia type     ICD-10-CM: E78.5  ICD-9-CM: 272.4     Leg swelling     ICD-10-CM: M79.89  ICD-9-CM: 729.81     Presence of cardiac pacemaker     ICD-10-CM: Z95.0  ICD-9-CM: V45.01         .    The following portions of the patient's history were reviewed and updated as appropriate: allergies, current medications, past family history, past medical history, past social history, past surgical history and problem list.    Past Medical History:   Diagnosis Date   • Dementia (CMS/HCC)     states better with medication    • Hyperlipidemia 1/13/2021   • Hypertension    • Peripheral neuropathy 8/28/2018   • Type 2 diabetes mellitus (CMS/HCC)    • Vitamin D deficiency      reports that she has quit smoking. She has never used smokeless tobacco. She reports current alcohol use. She reports that she does not use drugs.   Family History   Problem Relation Age of Onset   • Hypertension Mother    • Hypertension Father        Review of Systems  Constitutional: No wt loss, fever, fatigue  Gastrointestinal: No nausea, abdominal pain  Behavioral/Psych: No insomnia or anxiety   Cardiovascular no chest pains or tightness in the chest  Objective:       Physical Exam  /71   Pulse 68   Ht 160 cm (63\")   Wt 71.2 kg " (157 lb)   BMI 27.81 kg/m²   General appearance: No acute changes   Neck: Trachea midline; NECK, supple, no thyromegaly or lymphadenopathy   Lungs: Normal size and shape, normal breath sounds, equal distribution of air, no rales and rhonchi   CV: S1-S2 regular, no murmurs, no rub, no gallop   Abdomen: Soft, non-tender; no masses , no abnormal abdominal sounds   Extremities: No deformity , normal color , no peripheral edema   Skin: Normal temperature, turgor and texture; no rash, ulcers          Procedures      Echocardiogram:        Current Outpatient Medications:   •  amLODIPine-benazepril (Lotrel) 5-20 MG per capsule, Take 1 capsule by mouth Daily., Disp: 90 capsule, Rfl: 3  •  bisacodyl (Dulcolax) 10 MG suppository, Insert 1 suppository into the rectum Daily As Needed for Constipation., Disp: 28 suppository, Rfl: 0  •  donepezil (ARICEPT) 10 MG tablet, TAKE 1 TABLET EVERY NIGHT, Disp: 90 tablet, Rfl: 0  •  Insulin Glargine, 2 Unit Dial, (Toujeo Max SoloStar) 300 UNIT/ML solution pen-injector injection, Inject 50 Units under the skin into the appropriate area as directed Daily for 90 days., Disp: 15 mL, Rfl: 1  •  Insulin Pen Needle (BD Pen Needle Norma U/F) 32G X 4 MM misc, Use once a day, Disp: 100 each, Rfl: 0  •  Jardiance 25 MG tablet, Take 25 mg by mouth Daily., Disp: 90 tablet, Rfl: 1  •  polyethylene glycol (MIRALAX) 17 GM/SCOOP powder, Take 17 g by mouth Daily., Disp: 507 g, Rfl: 0  •  pravastatin (PRAVACHOL) 40 MG tablet, Take 1 tablet by mouth Every Evening., Disp: 90 tablet, Rfl: 1  •  sennosides-docusate (senna-docusate sodium) 8.6-50 MG per tablet, Take 1 tablet by mouth Daily for 30 days., Disp: 30 tablet, Rfl: 0  •  Trulicity 1.5 MG/0.5ML solution pen-injector, Inject 1.5 mg under the skin into the appropriate area as directed 1 (One) Time Per Week., Disp: 12 pen, Rfl: 1  •  FREESTYLE LITE test strip, Use to test BG 4x daily, Disp: 400 each, Rfl: 0  •  Lancets (freestyle) lancets, 1 each by Other  route 4 (Four) Times a Day. Use as instructed, Disp: 400 each, Rfl: 1   Assessment:        Patient Active Problem List   Diagnosis   • Essential hypertension   • Dyslipidemia   • Impaired memory   • Vitamin D deficiency   • Hyperuricemia   • Slow transit constipation   • Chronic pain of left knee   • Acute pain of left knee   • Fall   • Medicare annual wellness visit, subsequent   • At high risk for falls   • Peripheral neuropathy   • Uncontrolled type 2 diabetes mellitus with hyperglycemia (CMS/[facility])   • Alkaline phosphatase elevation   • Walker as ambulation aid   • Bradycardia, sinus   • Shortness of breath   • Leg swelling   • Hyperlipidemia               Plan:            ICD-10-CM ICD-9-CM   1. Essential hypertension  I10 401.9   2. Hyperlipidemia, unspecified hyperlipidemia type  E78.5 272.4   3. Leg swelling  M79.89 729.81   4. Presence of cardiac pacemaker  Z95.0 V45.01     1. Essential hypertension  Blood pressure under control    2. Hyperlipidemia, unspecified hyperlipidemia type  Continue current treatment    3. Leg swelling  Di Macario has been complaining of the leg swelling, appears dependent pedal edema, significantly contributed with a venous insufficiency.    Strong recommendations of elevating the legs as well as using support hoses, along with the control of fluids and salt restriction has been explained in details      4. Presence of cardiac pacemaker  Pacemaker functioning normally       Pacer healing well    6 month  COUNSELING:    Di Sorto was given to patient for the following topics: diagnostic results, risk factor reductions, impressions, risks and benefits of treatment options and importance of treatment compliance .       SMOKING COUNSELING:    [unfilled]    Dictated using Dragon dictation

## 2021-03-25 ENCOUNTER — OFFICE VISIT (OUTPATIENT)
Dept: CARDIOLOGY | Facility: CLINIC | Age: 81
End: 2021-03-25

## 2021-03-25 ENCOUNTER — CLINICAL SUPPORT NO REQUIREMENTS (OUTPATIENT)
Dept: CARDIOLOGY | Facility: CLINIC | Age: 81
End: 2021-03-25

## 2021-03-25 VITALS
SYSTOLIC BLOOD PRESSURE: 127 MMHG | HEART RATE: 68 BPM | BODY MASS INDEX: 27.82 KG/M2 | HEIGHT: 63 IN | WEIGHT: 157 LBS | DIASTOLIC BLOOD PRESSURE: 71 MMHG

## 2021-03-25 DIAGNOSIS — Z95.0 PRESENCE OF CARDIAC PACEMAKER: ICD-10-CM

## 2021-03-25 DIAGNOSIS — I10 ESSENTIAL HYPERTENSION: Primary | ICD-10-CM

## 2021-03-25 DIAGNOSIS — E78.5 HYPERLIPIDEMIA, UNSPECIFIED HYPERLIPIDEMIA TYPE: ICD-10-CM

## 2021-03-25 DIAGNOSIS — M79.89 LEG SWELLING: ICD-10-CM

## 2021-03-25 DIAGNOSIS — Z95.0 PRESENCE OF CARDIAC PACEMAKER: Primary | ICD-10-CM

## 2021-03-25 PROCEDURE — 93288 INTERROG EVL PM/LDLS PM IP: CPT | Performed by: INTERNAL MEDICINE

## 2021-03-25 PROCEDURE — 99213 OFFICE O/P EST LOW 20 MIN: CPT | Performed by: INTERNAL MEDICINE

## 2021-03-26 PROBLEM — Z95.0 PRESENCE OF CARDIAC PACEMAKER: Status: ACTIVE | Noted: 2021-03-26

## 2021-04-08 DIAGNOSIS — E78.5 DYSLIPIDEMIA: ICD-10-CM

## 2021-04-08 DIAGNOSIS — E11.65 UNCONTROLLED TYPE 2 DIABETES MELLITUS WITH HYPERGLYCEMIA (HCC): ICD-10-CM

## 2021-04-08 DIAGNOSIS — I10 ESSENTIAL HYPERTENSION: Primary | ICD-10-CM

## 2021-04-22 ENCOUNTER — HOSPITAL ENCOUNTER (EMERGENCY)
Facility: HOSPITAL | Age: 81
Discharge: HOME | End: 2021-04-23
Attending: EMERGENCY MEDICINE | Admitting: EMERGENCY MEDICINE

## 2021-04-22 ENCOUNTER — APPOINTMENT (OUTPATIENT)
Dept: CT IMAGING | Facility: HOSPITAL | Age: 81
End: 2021-04-22

## 2021-04-22 DIAGNOSIS — S16.1XXA STRAIN OF NECK MUSCLE, INITIAL ENCOUNTER: ICD-10-CM

## 2021-04-22 DIAGNOSIS — L03.116 CELLULITIS OF LEFT LEG: ICD-10-CM

## 2021-04-22 DIAGNOSIS — S00.83XA CONTUSION OF FOREHEAD, INITIAL ENCOUNTER: ICD-10-CM
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
## 2021-04-22 DIAGNOSIS — S01.01XA LACERATION OF SCALP, INITIAL ENCOUNTER: ICD-10-CM

## 2021-04-22 PROCEDURE — 99283 EMERGENCY DEPT VISIT LOW MDM: CPT

## 2021-04-22 PROCEDURE — 72125 CT NECK SPINE W/O DYE: CPT

## 2021-04-22 PROCEDURE — 70450 CT HEAD/BRAIN W/O DYE: CPT

## 2021-04-22 RX ORDER — ACETAMINOPHEN 500 MG
1000 TABLET ORAL ONCE
Status: COMPLETED | OUTPATIENT
Start: 2021-04-22 | End: 2021-04-22

## 2021-04-22 RX ORDER — LIDOCAINE HYDROCHLORIDE AND EPINEPHRINE 10; 10 MG/ML; UG/ML
10 INJECTION, SOLUTION INFILTRATION; PERINEURAL ONCE
Status: COMPLETED | OUTPATIENT
Start: 2021-04-22 | End: 2021-04-22

## 2021-04-22 RX ADMIN — LIDOCAINE HYDROCHLORIDE,EPINEPHRINE BITARTRATE 10 ML: 10; .01 INJECTION, SOLUTION INFILTRATION; PERINEURAL at 22:42

## 2021-04-22 RX ADMIN — ACETAMINOPHEN 1000 MG: 500 TABLET ORAL at 22:34

## 2021-04-23 VITALS
SYSTOLIC BLOOD PRESSURE: 135 MMHG | DIASTOLIC BLOOD PRESSURE: 75 MMHG | RESPIRATION RATE: 16 BRPM | HEART RATE: 60 BPM | TEMPERATURE: 98.4 F | OXYGEN SATURATION: 100 %

## 2021-04-23 RX ORDER — CEPHALEXIN 500 MG/1
500 CAPSULE ORAL 3 TIMES DAILY
Qty: 21 CAPSULE | Refills: 0 | Status: SHIPPED | OUTPATIENT
Start: 2021-04-23 | End: 2021-04-27 | Stop reason: SDUPTHER

## 2021-04-23 NOTE — ED PROVIDER NOTES
EMERGENCY DEPARTMENT ENCOUNTER    Room Number:  02/02  Date of encounter:  4/23/2021  PCP: Provider, No Known  Historian: Patient, family      I used full protective equipment while examining this patient.  This includes face mask, gloves and protective eyewear.  I washed my hands before entering the room and immediately upon leaving the room      HPI:  Chief Complaint: Fall  A complete HPI/ROS/PMH/PSH/SH/FH are unobtainable due to: Nothing    Context: Di Macario is a 80 y.o. female who presents to the ED c/o injury sustained in a mechanical fall just prior to arrival.  Patient was reportedly walking without her walker, when she lost her balance and fell.  Patient hit her head on the floor.  Patient has a contusion to her left forehead, as well as a laceration to occipital scalp.  She denies any neck pain, loss of consciousness, nausea, vomiting.  Patient denies any prior dizziness, chest pain, palpitations to her fall.  Patient has been able to ambulate since the fall.  She states her tetanus shot is up-to-date.    In addition patient has wounds to the left lower leg which have been present for several weeks.  Family states actually look improved over the last week.  Patient is a diabetic.    Review of Medical Records  I reviewed patient's last ER visit from 3/24/2021.  Patient was seen in the ER for hypoglycemia.    PAST MEDICAL HISTORY  Active Ambulatory Problems     Diagnosis Date Noted   • Essential hypertension 03/04/2016   • Dyslipidemia 03/04/2016   • Impaired memory 03/04/2016   • Vitamin D deficiency 03/04/2016   • Hyperuricemia 07/20/2016   • Slow transit constipation 01/31/2017   • Chronic pain of left knee 01/31/2017   • Acute pain of left knee 02/03/2017   • Fall 02/03/2017   • Medicare annual wellness visit, subsequent 08/16/2017   • At high risk for falls 08/10/2018   • Peripheral neuropathy 08/28/2018   • Uncontrolled type 2 diabetes mellitus with hyperglycemia (CMS/[facility]) 10/25/2018   •  Alkaline phosphatase elevation 10/29/2018   • Walker as ambulation aid 02/12/2019   • Bradycardia, sinus 01/13/2021   • Shortness of breath 01/13/2021   • Leg swelling 01/13/2021   • Hyperlipidemia 01/13/2021   • Presence of cardiac pacemaker 03/26/2021     Resolved Ambulatory Problems     Diagnosis Date Noted   • Calcium blood increased 10/29/2018     Past Medical History:   Diagnosis Date   • Dementia (CMS/HCC)    • Hypertension    • Type 2 diabetes mellitus (CMS/HCC)          PAST SURGICAL HISTORY  Past Surgical History:   Procedure Laterality Date   • ANKLE SURGERY Right    • BLADDER SURGERY      Prolapsed   • BREAST BIOPSY     • CARDIAC ELECTROPHYSIOLOGY PROCEDURE N/A 3/5/2021    Procedure: Pacemaker DC jae CAMPBELL;  Surgeon: Madelin Ferguson MD;  Location: [facility] INVASIVE LOCATION;  Service: Cardiology;  Laterality: N/A;   • HYSTERECTOMY           FAMILY HISTORY  Family History   Problem Relation Age of Onset   • Hypertension Mother    • Hypertension Father          SOCIAL HISTORY  Social History     Socioeconomic History   • Marital status:      Spouse name: Not on file   • Number of children: Not on file   • Years of education: Not on file   • Highest education level: Not on file   Tobacco Use   • Smoking status: Former Smoker   • Smokeless tobacco: Never Used   Vaping Use   • Vaping Use: Never used   Substance and Sexual Activity   • Alcohol use: Yes     Comment: RARELY   • Drug use: No   • Sexual activity: Defer     Birth control/protection: Surgical         ALLERGIES  Patient has no known allergies.        REVIEW OF SYSTEMS  All systems reviewed and negative except for those discussed in HPI.       PHYSICAL EXAM    I have reviewed the triage vital signs and nursing notes.    ED Triage Vitals [04/22/21 2036]   Temp Heart Rate Resp BP SpO2   98.4 °F (36.9 °C) 60 16 -- 99 %      Temp src Heart Rate Source Patient Position BP Location FiO2 (%)   Tympanic Monitor -- -- --       Physical  Exam  GENERAL: Alert, oriented, not distressed  HENT: Hematoma to left forehead.  2.2 cm laceration to occipital scalp.  No vertebral tenderness or step-off.  EYES: no scleral icterus, EOMI  CV: regular rhythm, regular rate, no murmur  RESPIRATORY: normal effort, CTA  ABDOMEN: soft, nontender  MUSCULOSKELETAL: no deformity, FROM, no calf swelling or tenderness  NEURO: alert, moves all extremities, follows commands  SKIN: Multiple open wounds to left lateral leg.  Mild serous drainage.  No concerning erythema.  No lymphangitis.        RADIOLOGY  CT Head Without Contrast    Result Date: 4/22/2021  CT HEAD WITHOUT CONTRAST  HISTORY: Fall. Patient is on aspirin.  COMPARISON: 02/12/2021  TECHNIQUE: Axial CT imaging was obtained through the brain. No IV contrast was administered.  FINDINGS: No acute intracranial hemorrhage is seen. There is mild atrophy. There is periventricular and deep white matter microangiopathic change, again relatively mild. There is no midline shift or mass effect. There is a left frontal soft tissue hematoma, measuring at least 4.6 x 1.0 cm. No calvarial fracture is seen. There is mucosal thickening identified within the ethmoid sinuses. There is mild partial opacification of right mastoid air cells.      No acute intracranial findings.  Radiation dose reduction techniques were utilized, including automated exposure control and exposure modulation based on body size.  This report was finalized on 4/22/2021 11:37 PM by Dr. Elham Valdez M.D.      CT Cervical Spine Without Contrast    Result Date: 4/22/2021  CT OF THE CERVICAL SPINE  HISTORY: Fall with neck stiffness  COMPARISON: 02/12/2021  TECHNIQUE: Axial CT imaging was obtained through the cervical spine. Coronal and sagittal reformatted images were obtained.  FINDINGS: No acute fracture or subluxation of the cervical spine is identified. Intervertebral disc space narrowing is noted at multiple levels, but is most significant at C5-C6. There  is no prevertebral soft tissue swelling. There is retrolisthesis of C3 on C4 and C4 on C5, unchanged. There is mild partial opacification of the mastoid air cells bilaterally. This is stable on the left, but has increased on the right. Please see the report from 02/12/2021 for full description of degenerative findings. No acute abnormalities are seen at the lung apices.      No acute fracture or subluxation identified.  Radiation dose reduction techniques were utilized, including automated exposure control and exposure modulation based on body size.  This report was finalized on 4/22/2021 11:48 PM by Dr. Elham Valdez M.D.        I ordered the above noted radiological studies. Reviewed by me and discussed with radiologist.  See dictation for official radiology interpretation.      PROCEDURES    Laceration Repair    Date/Time: 4/23/2021 12:05 AM  Performed by: Varun Foster PA  Authorized by: Ryan Pickens MD     Consent:     Consent obtained:  Verbal    Consent given by:  Patient  Anesthesia (see MAR for exact dosages):     Anesthesia method:  Local infiltration    Local anesthetic:  Lidocaine 1% WITH epi  Laceration details:     Location:  Scalp    Scalp location:  Occipital    Length (cm):  2.2  Repair type:     Repair type:  Simple  Exploration:     Hemostasis achieved with:  Epinephrine  Treatment:     Area cleansed with:  Hibiclens    Amount of cleaning:  Standard    Irrigation solution:  Sterile saline  Skin repair:     Repair method:  Staples    Number of staples:  5  Approximation:     Approximation:  Close  Post-procedure details:     Dressing:  Antibiotic ointment    Patient tolerance of procedure:  Tolerated well, no immediate complications          MEDICATIONS GIVEN IN ER    Medications   lidocaine-EPINEPHrine (XYLOCAINE W/EPI) 1 %-1:031930 injection 10 mL (10 mL Injection Given by Other 4/22/21 2242)   acetaminophen (TYLENOL) tablet 1,000 mg (1,000 mg Oral Given 4/22/21 2234)         PROGRESS,  DATA ANALYSIS, CONSULTS, AND MEDICAL DECISION MAKING    All labs have been independently reviewed by me.  All radiology studies have been reviewed by me and discussed with radiologist dictating the report.   EKG's independently viewed and interpreted by me.  Discussion below represents my analysis of pertinent findings related to patient's condition, differential diagnosis, treatment plan and final disposition.    I have discussed case with Dr. Pickens, emergency room physician.  He has performed his own bedside examination and agrees with treatment plan.    ED Course as of Apr 23 0006   Thu Apr 22, 2021 2122 Patient presents with head injury after mechanical fall just prior to arrival.  Patient is not anticoagulated.  She has no neuro deficits.  Plan obtain head and cervical spine CT scan.  Patient will need wound closure to scalp laceration.    [EE]   Fri Apr 23, 2021   0005 No acute findings of the cervical head and C-spine.  Wound has been closed with staples.  Patient has stable vitals.  She has no neuro deficits.  Patient does have chronic appearing wounds to left leg.  We will cover with antibiotics and refer to outpatient wound care.  Patient and family are in agreement with this treatment plan.    [EE]      ED Course User Index  [EE] Varun Foster PA       AS OF 00:06 EDT VITALS:    BP -    HR - 60  TEMP - 98.4 °F (36.9 °C) (Tympanic)  O2 SATS - 99%        DIAGNOSIS  Final diagnoses:   Fall, initial encounter   Laceration of scalp, initial encounter   Strain of neck muscle, initial encounter   Contusion of forehead, initial encounter   Cellulitis of left leg         DISPOSITION  Discharged           Varun Foster PA  04/23/21 0006

## 2021-04-23 NOTE — ED NOTES
Reports standing level fall d/t losing balance. Denies LOC. Bruising noted to L forehead and hematoma to L back of head. Denies blood thinners.    Masked upon arrival. This nurse wore appropriate PPE during all interactions with this patient.       Nata Sim, RN  04/22/21 2033

## 2021-04-23 NOTE — ED PROVIDER NOTES
MD ATTESTATION NOTE  Patient was placed in face mask in first look and the following protective measures were taken unless additional measures were taken and documented below in the ED course. Patient was wearing facemask when I entered the room and throughout our encounter. I wore full protective equipment throughout this patient encounter including a face mask, and gloves. Hand hygiene was performed before donning protective equipment and after removal when leaving the room.    The CHELSEA and I have discussed this patient's history, physical exam, and treatment plan. I have reviewed the documentation and personally had a face to face interaction with the patient. I affirm the CHELSEA documentation and agree with their diagnostics, findings, treatment, plan, and disposition.  The attached note describes my personal findings.    Di Macario is a 80 y.o. female who presents to the ED c/o was head injury.  Patient was walking at home when she lost her balance, fell forward and struck her forehead on the ground, also struck the back of her head although she is unsure on what.  She complains of throbbing frontal headache, denies any loss consciousness, no visual disturbances, no nausea vomiting.  Patient denies any neck pain, no radicular pain, no weakness or numbness.  Patient did sustain small skin injury to the back of her head, reports was bleeding earlier, bleeding now controlled.  Patient denies any other injuries occurred in the fall.  Patient only other complaint is that she had loose bowel movement this morning but denies any nausea vomiting, no abdominal pain, no fever shakes chills or night sweats, no chest pain or shortness of breath.    On exam:  General: NAD  Head: Contusion on left forehead, small 1 cm linear laceration on occiput, no crepitus or deformity, no raccoon eyes, no khalil sign  ENT: Facial anesthesia, visual acuity grossly normal, extraocular motion intact, pupils equal and round reactive to  light, moist mucous membranes  Neck: Supple, trachea midline.Cervical spine: No step-offs or deformities, no midline tenderness, full range of motion.  Cardiac: regular rate and rhythm  Lungs: Clear to auscultation bilaterally  Abdomen: Soft, nontender, no rebound tenderness/guarding/rigidity  : Deferred to CHELSEA  Extremities: Moves all extremities well, no peripheral edema  Neuro: Alert and oriented, no facial droop, speech clear, no dysarthria or aphasia, moves all extremities well, sensation intact light touch all extremities, no focal deficits  Skin: Warm, dry.    Medical Decision Making:  After the initial H&P, I discussed pertinent information from history and physical exam with patient/family.  Discussed differential diagnosis.  Discussed plan for ED evaluation/work-up/treatment.  All questions answered.  Patient/family is agreeable with plan.    ED Course as of Apr 23 0251   Thu Apr 22, 2021 2122 Patient presents with head injury after mechanical fall just prior to arrival.  Patient is not anticoagulated.  She has no neuro deficits.  Plan obtain head and cervical spine CT scan.  Patient will need wound closure to scalp laceration.    [EE]   Fri Apr 23, 2021   0005 No acute findings of the cervical head and C-spine.  Wound has been closed with staples.  Patient has stable vitals.  She has no neuro deficits.  Patient does have chronic appearing wounds to left leg.  We will cover with antibiotics and refer to outpatient wound care.  Patient and family are in agreement with this treatment plan.    [EE]      ED Course User Index  [EE] Varun Foster, PA       Diagnosis  Final diagnoses:   Fall, initial encounter   Laceration of scalp, initial encounter   Strain of neck muscle, initial encounter   Contusion of forehead, initial encounter   Cellulitis of left leg        Ryan Pickens MD  04/23/21 0252

## 2021-04-27 ENCOUNTER — OFFICE VISIT (OUTPATIENT)
Dept: INTERNAL MEDICINE | Facility: CLINIC | Age: 81
End: 2021-04-27

## 2021-04-27 DIAGNOSIS — I10 ESSENTIAL HYPERTENSION: ICD-10-CM

## 2021-04-27 DIAGNOSIS — L03.90 CELLULITIS, UNSPECIFIED CELLULITIS SITE: ICD-10-CM

## 2021-04-27 DIAGNOSIS — E55.9 VITAMIN D DEFICIENCY: Primary | ICD-10-CM

## 2021-04-27 DIAGNOSIS — E11.65 UNCONTROLLED TYPE 2 DIABETES MELLITUS WITH HYPERGLYCEMIA (HCC): ICD-10-CM

## 2021-04-27 DIAGNOSIS — E78.5 HYPERLIPIDEMIA, UNSPECIFIED HYPERLIPIDEMIA TYPE: ICD-10-CM

## 2021-04-27 PROBLEM — Z00.00 MEDICARE ANNUAL WELLNESS VISIT, SUBSEQUENT: Status: RESOLVED | Noted: 2017-08-16 | Resolved: 2021-04-27

## 2021-04-27 PROCEDURE — 99214 OFFICE O/P EST MOD 30 MIN: CPT | Performed by: PHYSICIAN ASSISTANT

## 2021-04-27 RX ORDER — INSULIN GLARGINE 300 U/ML
35 INJECTION, SOLUTION SUBCUTANEOUS DAILY
Qty: 15 ML | Refills: 1 | Status: SHIPPED | OUTPATIENT
Start: 2021-04-27 | End: 2021-08-05 | Stop reason: SDUPTHER

## 2021-04-27 RX ORDER — CEPHALEXIN 500 MG/1
500 CAPSULE ORAL 3 TIMES DAILY
Qty: 9 CAPSULE | Refills: 0 | Status: SHIPPED | OUTPATIENT
Start: 2021-04-27 | End: 2021-04-30

## 2021-04-27 NOTE — PROGRESS NOTES
Subjective   Chief Complaint   Patient presents with   • Diabetes     est care        History of Present Illness     Pt is here today with her daughter to establish care as a new patient. She has DM and has been seeing endocrinology. Her sugars have been very low in the AM. They have been running in the 70s. A few days ago she had some numbers in the 50s. She had decreased her insulin from 50 units to 40 units a week ago and is still having hypoglycemia. She fell and hit her head 5 days ago and has 5 staples in the posterior aspect of her head.     She also has cellulitis in the left calf and is going to wound care in the next few days. She was discharged on keflex.     Patient Active Problem List   Diagnosis   • Essential hypertension   • Impaired memory   • Vitamin D deficiency   • Hyperuricemia   • Slow transit constipation   • Chronic pain of left knee   • Fall   • At high risk for falls   • Peripheral neuropathy   • Uncontrolled type 2 diabetes mellitus with hyperglycemia (CMS/HCC)   • Alkaline phosphatase elevation   • Walker as ambulation aid   • Bradycardia, sinus   • Shortness of breath   • Leg swelling   • Hyperlipidemia   • Presence of cardiac pacemaker       No Known Allergies    Current Outpatient Medications on File Prior to Visit   Medication Sig Dispense Refill   • amLODIPine-benazepril (Lotrel) 5-20 MG per capsule Take 1 capsule by mouth Daily. 90 capsule 3   • bisacodyl (Dulcolax) 10 MG suppository Insert 1 suppository into the rectum Daily As Needed for Constipation. 28 suppository 0   • donepezil (ARICEPT) 10 MG tablet TAKE 1 TABLET EVERY NIGHT 90 tablet 0   • FREESTYLE LITE test strip Use to test BG 4x daily 400 each 0   • Insulin Pen Needle (BD Pen Needle Norma U/F) 32G X 4 MM misc Use once a day 100 each 0   • Jardiance 25 MG tablet Take 25 mg by mouth Daily. 90 tablet 1   • Lancets (freestyle) lancets 1 each by Other route 4 (Four) Times a Day. Use as instructed 400 each 1   • polyethylene  glycol (MIRALAX) 17 GM/SCOOP powder Take 17 g by mouth Daily. 507 g 0   • pravastatin (PRAVACHOL) 40 MG tablet Take 1 tablet by mouth Every Evening. 90 tablet 1   • Trulicity 1.5 MG/0.5ML solution pen-injector Inject 1.5 mg under the skin into the appropriate area as directed 1 (One) Time Per Week. 12 pen 1     No current facility-administered medications on file prior to visit.       Past Medical History:   Diagnosis Date   • Dementia (CMS/HCC)     states better with medication    • Hyperlipidemia 1/13/2021   • Peripheral neuropathy 8/28/2018   • Vitamin D deficiency        Family History   Problem Relation Age of Onset   • Hypertension Mother    • Hypertension Father        Social History     Socioeconomic History   • Marital status:      Spouse name: Not on file   • Number of children: Not on file   • Years of education: Not on file   • Highest education level: Not on file   Tobacco Use   • Smoking status: Former Smoker   • Smokeless tobacco: Never Used   Vaping Use   • Vaping Use: Never used   Substance and Sexual Activity   • Alcohol use: Yes     Comment: RARELY   • Drug use: No   • Sexual activity: Defer     Birth control/protection: Surgical       Past Surgical History:   Procedure Laterality Date   • ANKLE SURGERY Right    • BLADDER SURGERY      Prolapsed   • BREAST BIOPSY     • CARDIAC ELECTROPHYSIOLOGY PROCEDURE N/A 3/5/2021    Procedure: Pacemaker DC new Victor;  Surgeon: Madelin Ferguson MD;  Location: [facility] INVASIVE LOCATION;  Service: Cardiology;  Laterality: N/A;   • HYSTERECTOMY           The following portions of the patient's history were reviewed and updated as appropriate: problem list, allergies, current medications, past medical history, past family history, past social history and past surgical history.    ROS    Immunization History   Administered Date(s) Administered   • COVID-19 (PFIZER) 02/02/2021, 02/23/2021   • Fluzone High Dose =>65 Years ([employer] ONLY) 11/07/2016,  "09/30/2019   • Influenza, Unspecified 09/16/2019, 10/08/2020   • Pneumococcal Conjugate 13-Valent (PCV13) 01/31/2017   • Pneumococcal Polysaccharide (PPSV23) 02/01/2018   • Tdap 02/12/2021       Objective   Vitals:    04/27/21 1527 04/30/21 1617   BP:  130/74   Temp: 97.2 °F (36.2 °C)    Weight: 73 kg (161 lb)    Height: 160 cm (63\")      Body mass index is 28.52 kg/m².  Physical Exam  Vitals reviewed.   Constitutional:       Appearance: She is well-developed.   HENT:      Head: Normocephalic and atraumatic.   Cardiovascular:      Rate and Rhythm: Normal rate and regular rhythm.      Heart sounds: Normal heart sounds, S1 normal and S2 normal.   Pulmonary:      Effort: Pulmonary effort is normal.      Breath sounds: Normal breath sounds.   Skin:     General: Skin is warm.      Comments: 5 staples in the posterior scalp. 1 cm stage 1 ulcer in LLE. No warmth.    Neurological:      Mental Status: She is alert.   Psychiatric:         Behavior: Behavior normal.         Assessment/Plan   Diagnoses and all orders for this visit:    1. Vitamin D deficiency (Primary)    2. Uncontrolled type 2 diabetes mellitus with hyperglycemia (CMS/[facility])    3. Hyperlipidemia, unspecified hyperlipidemia type    4. Essential hypertension    5. Cellulitis, unspecified cellulitis site  -     cephalexin (KEFLEX) 500 MG capsule; Take 1 capsule by mouth 3 (Three) Times a Day for 3 days.  Dispense: 9 capsule; Refill: 0    Other orders  -     Insulin Glargine, 2 Unit Dial, (Toujeo Max SoloStar) 300 UNIT/ML solution pen-injector injection; Inject 35 Units under the skin into the appropriate area as directed Daily for 90 days.  Dispense: 15 mL; Refill: 1    Decrease insulin to 35 units daily, has fup with endo in a week. Her staples are not ready to be removed, they can come out in the next 3 days. I have given her daughter the staple remover and demonstrated how to use it. Will extend her keflex until she sees wound care.     Return in about 4 months " (around 8/27/2021).

## 2021-04-29 ENCOUNTER — HOSPITAL ENCOUNTER (OUTPATIENT)
Dept: CARDIOLOGY | Facility: HOSPITAL | Age: 81
Discharge: HOME | End: 2021-04-29

## 2021-04-29 ENCOUNTER — LAB REQUISITION (OUTPATIENT)
Dept: LAB | Facility: HOSPITAL | Age: 81
End: 2021-04-29

## 2021-04-29 ENCOUNTER — OFFICE VISIT (OUTPATIENT)
Dept: WOUND CARE | Facility: HOSPITAL | Age: 81
End: 2021-04-29

## 2021-04-29 ENCOUNTER — TRANSCRIBE ORDERS (OUTPATIENT)
Dept: WOUND CARE | Facility: HOSPITAL | Age: 81
End: 2021-04-29

## 2021-04-29 DIAGNOSIS — M79.662 PAIN IN LEFT LOWER LEG: Primary | ICD-10-CM

## 2021-04-29 DIAGNOSIS — Z00.00 ENCOUNTER FOR GENERAL ADULT MEDICAL EXAMINATION WITHOUT ABNORMAL FINDINGS: ICD-10-CM

## 2021-04-29 DIAGNOSIS — M79.662 PAIN IN LEFT LOWER LEG: ICD-10-CM

## 2021-04-29 LAB
BH CV LOW VAS LEFT POPLITEAL SPONT: 1
BH CV LOWER VASCULAR LEFT COMMON FEMORAL AUGMENT: NORMAL
BH CV LOWER VASCULAR LEFT COMMON FEMORAL COMPETENT: NORMAL
BH CV LOWER VASCULAR LEFT COMMON FEMORAL COMPRESS: NORMAL
BH CV LOWER VASCULAR LEFT COMMON FEMORAL PHASIC: NORMAL
BH CV LOWER VASCULAR LEFT COMMON FEMORAL SPONT: NORMAL
BH CV LOWER VASCULAR LEFT DISTAL FEMORAL COMPRESS: NORMAL
BH CV LOWER VASCULAR LEFT GASTRONEMIUS COMPRESS: NORMAL
BH CV LOWER VASCULAR LEFT GREATER SAPH AK COMPRESS: NORMAL
BH CV LOWER VASCULAR LEFT GREATER SAPH BK COMPRESS: NORMAL
BH CV LOWER VASCULAR LEFT LESSER SAPH COMPRESS: NORMAL
BH CV LOWER VASCULAR LEFT MID FEMORAL AUGMENT: NORMAL
BH CV LOWER VASCULAR LEFT MID FEMORAL COMPETENT: NORMAL
BH CV LOWER VASCULAR LEFT MID FEMORAL COMPRESS: NORMAL
BH CV LOWER VASCULAR LEFT MID FEMORAL PHASIC: NORMAL
BH CV LOWER VASCULAR LEFT MID FEMORAL SPONT: NORMAL
BH CV LOWER VASCULAR LEFT PERONEAL COMPRESS: NORMAL
BH CV LOWER VASCULAR LEFT POPLITEAL AUGMENT: NORMAL
BH CV LOWER VASCULAR LEFT POPLITEAL COMPETENT: NORMAL
BH CV LOWER VASCULAR LEFT POPLITEAL COMPRESS: NORMAL
BH CV LOWER VASCULAR LEFT POPLITEAL PHASIC: NORMAL
BH CV LOWER VASCULAR LEFT POPLITEAL SPONT: NORMAL
BH CV LOWER VASCULAR LEFT POSTERIOR TIBIAL COMPRESS: NORMAL
BH CV LOWER VASCULAR LEFT PROFUNDA FEMORAL COMPRESS: NORMAL
BH CV LOWER VASCULAR LEFT PROXIMAL FEMORAL COMPRESS: NORMAL
BH CV LOWER VASCULAR LEFT SAPHENOFEMORAL JUNCTION COMPRESS: NORMAL
BH CV LOWER VASCULAR RIGHT COMMON FEMORAL AUGMENT: NORMAL
BH CV LOWER VASCULAR RIGHT COMMON FEMORAL COMPETENT: NORMAL
BH CV LOWER VASCULAR RIGHT COMMON FEMORAL COMPRESS: NORMAL
BH CV LOWER VASCULAR RIGHT COMMON FEMORAL PHASIC: NORMAL
BH CV LOWER VASCULAR RIGHT COMMON FEMORAL SPONT: NORMAL
BH CV POP FLUID COLLECT LEFT: 1
MAXIMAL PREDICTED HEART RATE: 139 BPM
STRESS TARGET HR: 118 BPM

## 2021-04-29 PROCEDURE — G0463 HOSPITAL OUTPT CLINIC VISIT: HCPCS

## 2021-04-29 PROCEDURE — 87205 SMEAR GRAM STAIN: CPT | Performed by: NURSE PRACTITIONER

## 2021-04-29 PROCEDURE — 87186 SC STD MICRODIL/AGAR DIL: CPT | Performed by: NURSE PRACTITIONER

## 2021-04-29 PROCEDURE — 87147 CULTURE TYPE IMMUNOLOGIC: CPT | Performed by: NURSE PRACTITIONER

## 2021-04-29 PROCEDURE — 87070 CULTURE OTHR SPECIMN AEROBIC: CPT | Performed by: NURSE PRACTITIONER

## 2021-04-29 PROCEDURE — 97602 WOUND(S) CARE NON-SELECTIVE: CPT

## 2021-04-29 PROCEDURE — 87075 CULTR BACTERIA EXCEPT BLOOD: CPT | Performed by: NURSE PRACTITIONER

## 2021-04-29 PROCEDURE — 93971 EXTREMITY STUDY: CPT

## 2021-04-30 VITALS
BODY MASS INDEX: 28.53 KG/M2 | WEIGHT: 161 LBS | SYSTOLIC BLOOD PRESSURE: 130 MMHG | DIASTOLIC BLOOD PRESSURE: 74 MMHG | TEMPERATURE: 97.2 F | HEIGHT: 63 IN

## 2021-05-02 LAB
BACTERIA SPEC AEROBE CULT: ABNORMAL
BACTERIA SPEC AEROBE CULT: ABNORMAL
GRAM STN SPEC: ABNORMAL

## 2021-05-04 LAB — BACTERIA SPEC ANAEROBE CULT: NORMAL

## 2021-05-06 ENCOUNTER — OFFICE VISIT (OUTPATIENT)
Dept: ENDOCRINOLOGY | Age: 81
End: 2021-05-06

## 2021-05-06 ENCOUNTER — MEDICATION THERAPY MANAGEMENT (OUTPATIENT)
Dept: PHARMACY | Facility: HOSPITAL | Age: 81
End: 2021-05-06

## 2021-05-06 ENCOUNTER — TELEPHONE (OUTPATIENT)
Dept: ENDOCRINOLOGY | Age: 81
End: 2021-05-06

## 2021-05-06 VITALS
DIASTOLIC BLOOD PRESSURE: 74 MMHG | HEIGHT: 63 IN | WEIGHT: 160.8 LBS | BODY MASS INDEX: 28.49 KG/M2 | OXYGEN SATURATION: 98 % | SYSTOLIC BLOOD PRESSURE: 138 MMHG

## 2021-05-06 DIAGNOSIS — E11.65 TYPE 2 DIABETES MELLITUS WITH HYPERGLYCEMIA, WITH LONG-TERM CURRENT USE OF INSULIN (HCC): Primary | ICD-10-CM

## 2021-05-06 DIAGNOSIS — E16.2 HYPOGLYCEMIA: ICD-10-CM

## 2021-05-06 DIAGNOSIS — Z79.4 TYPE 2 DIABETES MELLITUS WITH HYPERGLYCEMIA, WITH LONG-TERM CURRENT USE OF INSULIN (HCC): Primary | ICD-10-CM

## 2021-05-06 PROCEDURE — 99214 OFFICE O/P EST MOD 30 MIN: CPT | Performed by: NURSE PRACTITIONER

## 2021-05-06 RX ORDER — DOXYCYCLINE HYCLATE 100 MG/1
100 CAPSULE ORAL 2 TIMES DAILY
COMMUNITY
Start: 2021-05-03 | End: 2021-05-17

## 2021-05-06 RX ORDER — ACETAMINOPHEN 325 MG/1
650 TABLET ORAL EVERY 6 HOURS PRN
COMMUNITY

## 2021-05-06 RX ORDER — GLUCAGON INJECTION, SOLUTION 1 MG/.2ML
1 INJECTION, SOLUTION SUBCUTANEOUS AS NEEDED
Qty: 0.4 ML | Refills: 1 | Status: SHIPPED | OUTPATIENT
Start: 2021-05-06 | End: 2022-07-12 | Stop reason: SDUPTHER

## 2021-05-06 RX ORDER — MULTIPLE VITAMINS W/ MINERALS TAB 9MG-400MCG
1 TAB ORAL DAILY
COMMUNITY
End: 2022-08-15

## 2021-05-06 NOTE — PROGRESS NOTES
MTM-DSM Pharmacy Visit [facility] Endocrinology    Di Macario is a 80 y.o. female seen by Endocrinology and assessed by the [facility]ication Management Clinic Pharmacist at [facility].  This was an initial MTM visit for Di Macario.    Di Macario was introduced to the [facility] and her allergies, PMH, and medications were reviewed.       Past Medical History:   Diagnosis Date   • Dementia (CMS/HCC)     states better with medication    • Hyperlipidemia 1/13/2021   • Peripheral neuropathy 8/28/2018   • Vitamin D deficiency      Social History     Socioeconomic History   • Marital status:      Spouse name: Not on file   • Number of children: Not on file   • Years of education: Not on file   • Highest education level: Not on file   Tobacco Use   • Smoking status: Former Smoker   • Smokeless tobacco: Never Used   Vaping Use   • Vaping Use: Never used   Substance and Sexual Activity   • Alcohol use: Yes     Comment: RARELY   • Drug use: No   • Sexual activity: Defer     Birth control/protection: Surgical       Medication Allergies:  Patient has no known allergies.      Current Outpatient Medications:   •  acetaminophen (TYLENOL) 325 MG tablet, Take 650 mg by mouth Every 6 (Six) Hours As Needed for Mild Pain  or Moderate Pain ., Disp: , Rfl:   •  amLODIPine-benazepril (Lotrel) 5-20 MG per capsule, Take 1 capsule by mouth Daily., Disp: 90 capsule, Rfl: 3  •  bisacodyl (Dulcolax) 10 MG suppository, Insert 1 suppository into the rectum Daily As Needed for Constipation., Disp: 28 suppository, Rfl: 0  •  donepezil (ARICEPT) 10 MG tablet, TAKE 1 TABLET EVERY NIGHT, Disp: 90 tablet, Rfl: 0  •  doxycycline (VIBRAMYCIN) 100 MG capsule, Take 100 mg by mouth 2 (Two) Times a Day., Disp: , Rfl:   •  FREESTYLE LITE test strip, Use to test BG 4x daily, Disp: 400 each, Rfl: 0  •  Glucagon (Gvoke HypoPen 2-Pack) 1 MG/0.2ML solution auto-injector, Inject 1 mg under  the skin into the appropriate area as directed As Needed (hypoglycemia)., Disp: 0.4 mL, Rfl: 1  •  Insulin Glargine, 2 Unit Dial, (Toujeo Melvin SoloStar) 300 UNIT/ML solution pen-injector injection, Inject 35 Units under the skin into the appropriate area as directed Daily for 90 days., Disp: 15 mL, Rfl: 1  •  Insulin Pen Needle (BD Pen Needle Norma U/F) 32G X 4 MM misc, Use once a day, Disp: 100 each, Rfl: 0  •  Jardiance 25 MG tablet, Take 25 mg by mouth Daily., Disp: 90 tablet, Rfl: 1  •  Lancets (freestyle) lancets, 1 each by Other route 4 (Four) Times a Day. Use as instructed, Disp: 400 each, Rfl: 1  •  multivitamin with minerals (MULTIVITAMIN ADULT PO), Take 1 tablet by mouth Daily., Disp: , Rfl:   •  polyethylene glycol (MIRALAX) 17 GM/SCOOP powder, Take 17 g by mouth Daily., Disp: 507 g, Rfl: 0  •  pravastatin (PRAVACHOL) 40 MG tablet, Take 1 tablet by mouth Every Evening., Disp: 90 tablet, Rfl: 1  •  Trulicity 1.5 MG/0.5ML solution pen-injector, Inject 1.5 mg under the skin into the appropriate area as directed 1 (One) Time Per Week., Disp: 12 pen, Rfl: 1      Labs:  There were no vitals filed for this visit.  There were no vitals filed for this visit.  Lab Results   Component Value Date    HGBA1C 9.60 (H) 04/15/2021     Lab Results   Component Value Date    GLUCOSE 139 (H) 03/24/2021    CALCIUM 10.4 04/15/2021     04/15/2021    K 4.8 04/15/2021    CO2 30.9 (H) 04/15/2021     04/15/2021    BUN 30 (H) 04/15/2021    CREATININE 0.67 04/15/2021    EGFRIFAFRI 102 04/15/2021    EGFRIFNONA 84 04/15/2021    BCR 44.8 (H) 04/15/2021    ANIONGAP 6.5 03/24/2021     Lab Results   Component Value Date    CHLPL 188 04/15/2021    TRIG 92 04/15/2021     (H) 04/15/2021    LDL 50 04/15/2021         Drug-Drug Interactions:   No significant DDIs were noted        Assessment / Plan:  1. The patient was provided medication education via verbal information. Patient expressed understanding and had no further  questions at this time.  2. Patient recently reported hypoglycemia to her PCP and her insulin glargine was decreased to 35 units.  She also recently fell at home, but she denied symptoms on hypoglycemia at that time.  Discussed hypoglycemia with patient and daughter, especially since she is on long-acting insulin, SGLT-2i and GLP-1a.  3. Has MSSA cellulitis/wound from her fall for which she is on doxycycline, provided education regarding doxycycline as well ( from dairy and calcium-containing products, take with food on stomach to alleviate GI upset, and to use sunscreen/stay in shade when lamberto outside, etc.).  4. Contact information for the pharmacy team was provided to the patient.        Cassie De Luna, PharmD  5/6/2021  14:43 EDT

## 2021-05-06 NOTE — PROGRESS NOTES
"Chief Complaint  Diabetes (type2 diabetes: check blood sugar 3x times )    Subjective          Di Macario presents to [facility] ENDOCRINOLOGY  History of Present Illness  toujeo was decreased from 50u to 35u from her PCP r/t fasting hypoglycemia  It has improved some. Her blood sugar was 81 this morning  Reports Jardiance was stopped by her cardiologist when her pacemaker was placed  Recent falls but denies hypoglycemic events      Type 2 dm  Today in clinic pt reports being on   Checks BG - 4x/day, average   Dm retinopathy - ,Last eye exam - 11/2020  Dm nephropathy - denies  Dm neuropathy - yes, sees podiatry q4 months   CAD - sick sinus syndrome, has pacemaker   CVA - denies  Episodes of hypoglycemia - yes, can be as low as 40 to 50s in the morning  Pt uses a rollator. weight has been stable.   Pt tries to follow DM diet for most part.   On Ace inb.  Lab Results   Component Value Date    HGBA1C 9.60 (H) 04/15/2021               Objective   Vital Signs:   /74 (BP Location: Right arm, Patient Position: Sitting, Cuff Size: Adult)   Ht 160 cm (62.99\")   Wt 72.9 kg (160 lb 12.8 oz)   SpO2 98%   BMI 28.49 kg/m²     Physical Exam  Vitals reviewed.   Constitutional:       General: She is not in acute distress.  HENT:      Head: Normocephalic and atraumatic.   Cardiovascular:      Rate and Rhythm: Normal rate and regular rhythm.   Pulmonary:      Effort: Pulmonary effort is normal. No respiratory distress.   Musculoskeletal:         General: No signs of injury. Normal range of motion.      Cervical back: Normal range of motion and neck supple.   Skin:     General: Skin is warm and dry.   Neurological:      Mental Status: She is alert and oriented to person, place, and time. Mental status is at baseline.   Psychiatric:         Mood and Affect: Mood normal.         Behavior: Behavior normal.         Thought Content: Thought content normal.         Judgment: Judgment normal.      "   Result Review :   The following data was reviewed by: IAIN Smith on 05/06/2021:  Common labs    Common Labsle 3/1/21 3/1/21 3/24/21 3/24/21 4/15/21 4/15/21 4/15/21 4/15/21    1225 1225 0928 0928 1018 1018 1018 1018   Glucose  139 (A)  139 (A)       Glucose      100 (A)     BUN  26 (A)  22  30 (A)     Creatinine  0.67  0.62  0.67     eGFR Non  Am  84  92  84     eGFR African Am      102     Sodium  142  140  143     Potassium  4.2  4.0  4.8     Chloride  105  105  106     Calcium  9.8  8.6  10.4     Total Protein      6.9     Albumin  4.10  3.60  4.20     Total Bilirubin  0.2  <0.2  <0.2     Alkaline Phosphatase  157 (A)  122 (A)  167 (A)     AST (SGOT)  23  19  17     ALT (SGPT)  21  20  19     WBC 8.37  5.37        Hemoglobin 14.0  12.1        Hematocrit 43.1  38.2        Platelets 279  206        Total Cholesterol       188    Triglycerides       92    HDL Cholesterol       122 (A)    LDL Cholesterol        50    Hemoglobin A1C     9.60 (A)      Microalbumin, Urine        19.4   (A) Abnormal value       Comments are available for some flowsheets but are not being displayed.                     Assessment and Plan    Diagnoses and all orders for this visit:    1. Type 2 diabetes mellitus with hyperglycemia, with long-term current use of insulin (CMS/[facility]) (Primary)    2. Hypoglycemia    Other orders  -     Glucagon (Gvoke HypoPen 2-Pack) 1 MG/0.2ML solution auto-injector; Inject 1 mg under the skin into the appropriate area as directed As Needed (hypoglycemia).  Dispense: 0.4 mL; Refill: 1        Follow Up   Return in about 3 months (around 8/6/2021).   Place alejandra sensor, review in 2 weeks, consider rapid with meals and lower basal dose   Decrease toujeo to 25units since she is only in the 80s waking up and high risk og falls  High risk of complications given her hyper and hypoglycemia    Healthy bedtime snack  Avoid skipping meals  We will stay off Jardiance for now as it is unclear why the  cardiologist stopped it  Continue Trulicity once weekly  Labs same day as visit     Patient was given instructions and counseling regarding her condition or for health maintenance advice. Please see specific information pulled into the AVS if appropriate.     IAIN Smith

## 2021-05-07 ENCOUNTER — TELEPHONE (OUTPATIENT)
Dept: ENDOCRINOLOGY | Age: 81
End: 2021-05-07

## 2021-05-13 ENCOUNTER — TRANSCRIBE ORDERS (OUTPATIENT)
Dept: ADMINISTRATIVE | Facility: HOSPITAL | Age: 81
End: 2021-05-13

## 2021-05-13 ENCOUNTER — OFFICE VISIT (OUTPATIENT)
Dept: WOUND CARE | Facility: HOSPITAL | Age: 81
End: 2021-05-13

## 2021-05-13 DIAGNOSIS — E11.59 TYPE 2 DIABETES MELLITUS WITH OTHER CIRCULATORY COMPLICATIONS (HCC): Primary | ICD-10-CM

## 2021-05-13 PROCEDURE — G0463 HOSPITAL OUTPT CLINIC VISIT: HCPCS

## 2021-05-17 ENCOUNTER — APPOINTMENT (OUTPATIENT)
Dept: CARDIOLOGY | Facility: HOSPITAL | Age: 81
End: 2021-05-17

## 2021-05-20 ENCOUNTER — OFFICE VISIT (OUTPATIENT)
Dept: WOUND CARE | Facility: HOSPITAL | Age: 81
End: 2021-05-20

## 2021-05-20 PROCEDURE — G0463 HOSPITAL OUTPT CLINIC VISIT: HCPCS

## 2021-05-21 ENCOUNTER — HOSPITAL ENCOUNTER (OUTPATIENT)
Dept: CARDIOLOGY | Facility: HOSPITAL | Age: 81
Discharge: HOME | End: 2021-05-21
Admitting: NURSE PRACTITIONER

## 2021-05-21 DIAGNOSIS — E11.59 TYPE 2 DIABETES MELLITUS WITH OTHER CIRCULATORY COMPLICATIONS (HCC): ICD-10-CM

## 2021-05-21 LAB
BH CV LOWER ARTERIAL LEFT ABI RATIO: 1.14
BH CV LOWER ARTERIAL LEFT DORSALIS PEDIS SYS MAX: 159 MMHG
BH CV LOWER ARTERIAL RIGHT GREAT TOE SYS MAX: 157 MMHG
BH CV LOWER ARTERIAL RIGHT TBI RATIO: 1.13
UPPER ARTERIAL LEFT ARM BRACHIAL SYS MAX: 139 MMHG
UPPER ARTERIAL RIGHT ARM BRACHIAL SYS MAX: 137 MMHG

## 2021-05-21 PROCEDURE — 93922 UPR/L XTREMITY ART 2 LEVELS: CPT

## 2021-05-27 ENCOUNTER — OFFICE VISIT (OUTPATIENT)
Dept: WOUND CARE | Facility: HOSPITAL | Age: 81
End: 2021-05-27

## 2021-06-03 ENCOUNTER — OFFICE VISIT (OUTPATIENT)
Dept: WOUND CARE | Facility: HOSPITAL | Age: 81
End: 2021-06-03

## 2021-06-10 ENCOUNTER — OFFICE VISIT (OUTPATIENT)
Dept: WOUND CARE | Facility: HOSPITAL | Age: 81
End: 2021-06-10

## 2021-06-10 PROCEDURE — G0463 HOSPITAL OUTPT CLINIC VISIT: HCPCS

## 2021-06-14 PROCEDURE — 93294 REM INTERROG EVL PM/LDLS PM: CPT | Performed by: INTERNAL MEDICINE

## 2021-06-14 PROCEDURE — 93296 REM INTERROG EVL PM/IDS: CPT | Performed by: INTERNAL MEDICINE

## 2021-06-17 ENCOUNTER — OFFICE VISIT (OUTPATIENT)
Dept: WOUND CARE | Facility: HOSPITAL | Age: 81
End: 2021-06-17

## 2021-06-24 ENCOUNTER — APPOINTMENT (OUTPATIENT)
Dept: WOUND CARE | Facility: HOSPITAL | Age: 81
End: 2021-06-24

## 2021-07-01 ENCOUNTER — APPOINTMENT (OUTPATIENT)
Dept: WOUND CARE | Facility: HOSPITAL | Age: 81
End: 2021-07-01

## 2021-07-02 ENCOUNTER — OFFICE VISIT (OUTPATIENT)
Dept: WOUND CARE | Facility: HOSPITAL | Age: 81
End: 2021-07-02

## 2021-07-12 RX ORDER — BLOOD-GLUCOSE METER
KIT MISCELLANEOUS
Qty: 400 EACH | Refills: 0 | Status: SHIPPED | OUTPATIENT
Start: 2021-07-12 | End: 2021-11-02 | Stop reason: HOSPADM

## 2021-07-16 ENCOUNTER — OFFICE VISIT (OUTPATIENT)
Dept: WOUND CARE | Facility: HOSPITAL | Age: 81
End: 2021-07-16

## 2021-07-30 ENCOUNTER — OFFICE VISIT (OUTPATIENT)
Dept: WOUND CARE | Facility: HOSPITAL | Age: 81
End: 2021-07-30

## 2021-08-05 ENCOUNTER — OFFICE VISIT (OUTPATIENT)
Dept: ENDOCRINOLOGY | Age: 81
End: 2021-08-05

## 2021-08-05 VITALS
WEIGHT: 153.2 LBS | SYSTOLIC BLOOD PRESSURE: 126 MMHG | BODY MASS INDEX: 27.14 KG/M2 | HEIGHT: 63 IN | DIASTOLIC BLOOD PRESSURE: 68 MMHG

## 2021-08-05 DIAGNOSIS — E16.2 HYPOGLYCEMIA: ICD-10-CM

## 2021-08-05 DIAGNOSIS — E78.2 MIXED HYPERLIPIDEMIA: ICD-10-CM

## 2021-08-05 DIAGNOSIS — G62.9 PERIPHERAL POLYNEUROPATHY: ICD-10-CM

## 2021-08-05 DIAGNOSIS — Z79.4 TYPE 2 DIABETES MELLITUS WITH HYPERGLYCEMIA, WITH LONG-TERM CURRENT USE OF INSULIN (HCC): Primary | ICD-10-CM

## 2021-08-05 DIAGNOSIS — E11.65 TYPE 2 DIABETES MELLITUS WITH HYPERGLYCEMIA, WITH LONG-TERM CURRENT USE OF INSULIN (HCC): Primary | ICD-10-CM

## 2021-08-05 PROCEDURE — 99214 OFFICE O/P EST MOD 30 MIN: CPT | Performed by: NURSE PRACTITIONER

## 2021-08-05 RX ORDER — INSULIN GLARGINE 300 U/ML
45 INJECTION, SOLUTION SUBCUTANEOUS DAILY
Qty: 15 ML | Refills: 1 | Status: SHIPPED | OUTPATIENT
Start: 2021-08-05 | End: 2021-11-02 | Stop reason: HOSPADM

## 2021-08-05 RX ORDER — INSULIN GLARGINE 300 U/ML
35 INJECTION, SOLUTION SUBCUTANEOUS DAILY
Qty: 15 ML | Refills: 1 | Status: CANCELLED | OUTPATIENT
Start: 2021-08-05 | End: 2021-11-03

## 2021-08-05 RX ORDER — ZONISAMIDE 100 MG
CAPSULE ORAL
Status: ON HOLD | COMMUNITY
Start: 2021-07-21 | End: 2021-11-02 | Stop reason: SDUPTHER

## 2021-08-05 NOTE — PROGRESS NOTES
"Chief Complaint  Diabetes and Med Refill    Subjective          Di Macario presents to [facility] ENDOCRINOLOGY  History of Present Illness       this week so she is not able to focus very well during this visit  Daughter is here with patient today     Type 2 dm  Today in clinic pt reports being on Toujeo 40u daily, off Jardiance and trulcity bc \"it wasn't working\"   Checks BG - 4x/day, average   Dm retinopathy - ,Last eye exam - 2020  Dm nephropathy - proteinuria 2021  Dm neuropathy - yes, sees podiatry q4 months   CAD - sick sinus syndrome, has pacemaker   CVA - denies  Episodes of hypoglycemia - rare   Pt uses a rollator. weight has been stable.   Pt tries to follow DM diet for most part.   On Ace inb.    Hyperlipidemia  Pravastatin 40 mg daily  Lab Results   Component Value Date    CHLPL 188 04/15/2021    TRIG 92 04/15/2021     (H) 04/15/2021    LDL 50 04/15/2021         Objective   Vital Signs:   /68 (BP Location: Right arm, Patient Position: Sitting, Cuff Size: Adult)   Ht 160 cm (62.99\")   Wt 69.5 kg (153 lb 3.2 oz)   BMI 27.15 kg/m²     Physical Exam  Vitals reviewed.   Constitutional:       General: She is not in acute distress.  HENT:      Head: Normocephalic and atraumatic.   Cardiovascular:      Rate and Rhythm: Normal rate and regular rhythm.   Pulmonary:      Effort: Pulmonary effort is normal. No respiratory distress.   Musculoskeletal:         General: No signs of injury. Normal range of motion.      Cervical back: Normal range of motion and neck supple.   Skin:     General: Skin is warm and dry.   Neurological:      Mental Status: She is alert and oriented to person, place, and time. Mental status is at baseline.   Psychiatric:         Mood and Affect: Mood normal.         Behavior: Behavior normal.         Thought Content: Thought content normal.         Judgment: Judgment normal.          Result Review :   The following data was " reviewed by: IAIN Smith on 08/05/2021:  Common labs    Common Labsle 3/1/21 3/1/21 3/24/21 3/24/21 4/15/21 4/15/21 4/15/21 4/15/21    1225 1225 0928 0928 1018 1018 1018 1018   Glucose  139 (A)  139 (A)       Glucose      100 (A)     BUN  26 (A)  22  30 (A)     Creatinine  0.67  0.62  0.67     eGFR Non  Am  84  92  84     eGFR African Am      102     Sodium  142  140  143     Potassium  4.2  4.0  4.8     Chloride  105  105  106     Calcium  9.8  8.6  10.4     Total Protein      6.9     Albumin  4.10  3.60  4.20     Total Bilirubin  0.2  <0.2  <0.2     Alkaline Phosphatase  157 (A)  122 (A)  167 (A)     AST (SGOT)  23  19  17     ALT (SGPT)  21  20  19     WBC 8.37  5.37        Hemoglobin 14.0  12.1        Hematocrit 43.1  38.2        Platelets 279  206        Total Cholesterol       188    Triglycerides       92    HDL Cholesterol       122 (A)    LDL Cholesterol        50    Hemoglobin A1C     9.60 (A)      Microalbumin, Urine        19.4   (A) Abnormal value       Comments are available for some flowsheets but are not being displayed.                     Assessment and Plan {CC Problem List  Visit Diagnosis   ROS  Review (Popup)  BeFunky Maintenance  Quality  BestPractice  Medications  SmartSets  SnapShot Encounters  Media :23}   Diagnoses and all orders for this visit:    1. Type 2 diabetes mellitus with hyperglycemia, with long-term current use of insulin (CMS/[facility]) (Primary)  -     Hemoglobin A1c  -     Basic Metabolic Panel  -     Microalbumin / Creatinine Urine Ratio - Urine, Clean Catch    2. Hypoglycemia  -     Hemoglobin A1c  -     Basic Metabolic Panel  -     Microalbumin / Creatinine Urine Ratio - Urine, Clean Catch    3. Peripheral polyneuropathy  -     Hemoglobin A1c  -     Basic Metabolic Panel  -     Microalbumin / Creatinine Urine Ratio - Urine, Clean Catch    4. Mixed hyperlipidemia  -     Hemoglobin A1c  -     Basic Metabolic Panel  -     Microalbumin / Creatinine Urine  Ratio - Urine, Clean Catch    Other orders  -     Insulin Glargine, 2 Unit Dial, (Toujeo Max SoloStar) 300 UNIT/ML solution pen-injector injection; Inject 45 Units under the skin into the appropriate area as directed Daily.  Dispense: 15 mL; Refill: 1  -     Continuous Blood Gluc Sensor (FreeStyle Aguila 2 Sensor) misc; 1 Device Every 14 (Fourteen) Days.  Dispense: 2 each; Refill: 5        Follow Up   No follow-ups on file.   Will see Dr. Jacobs in December  She does not want to take jardiance bc it raised her blood sugars despite education and cardiac indications  Patient lost her  this week and she is overwhelmed today waiting for the  tomorrow   Gave sliding scale instructions as she has rapid insulin at home to use:  Sliding scale   120-140 1 units  140-160 2 units  160-200 3units  > 4 units   Continue toujeo at 40u daily  She is going to consider taking trulicity regularly  Daughter at visit today will touch base once the  is over and the patient able to focus on her diabetes management- currently she needs time to grieve her    Will try to continue the aguila  On statin and ace  BS this AM was >250.  Higher risk of complications given her history and hyperglycemia    Patient was given instructions and counseling regarding her condition or for health maintenance advice. Please see specific information pulled into the AVS if appropriate.     IAIN Smith

## 2021-08-06 LAB
ALBUMIN/CREAT UR: 61 MG/G CREAT (ref 0–29)
BUN SERPL-MCNC: 24 MG/DL (ref 8–23)
BUN/CREAT SERPL: 26.7 (ref 7–25)
CALCIUM SERPL-MCNC: 9.9 MG/DL (ref 8.6–10.5)
CHLORIDE SERPL-SCNC: 100 MMOL/L (ref 98–107)
CO2 SERPL-SCNC: 28 MMOL/L (ref 22–29)
CREAT SERPL-MCNC: 0.9 MG/DL (ref 0.57–1)
CREAT UR-MCNC: 106.6 MG/DL
GLUCOSE SERPL-MCNC: 430 MG/DL (ref 65–99)
HBA1C MFR BLD: 13 % (ref 4.8–5.6)
MICROALBUMIN UR-MCNC: 65.5 UG/ML
POTASSIUM SERPL-SCNC: 4.5 MMOL/L (ref 3.5–5.2)
SODIUM SERPL-SCNC: 137 MMOL/L (ref 136–145)

## 2021-08-06 NOTE — PROGRESS NOTES
Please call the patient next week- today is her husbands .    Glucose is very high or worrisome for complications. Would recommend restarting the trulicity and jardiance and continuing the sliding scale insulin

## 2021-08-10 ENCOUNTER — TELEPHONE (OUTPATIENT)
Dept: ENDOCRINOLOGY | Age: 81
End: 2021-08-10

## 2021-08-10 RX ORDER — DULAGLUTIDE 1.5 MG/.5ML
1.5 INJECTION, SOLUTION SUBCUTANEOUS WEEKLY
Qty: 12 PEN | Refills: 1 | Status: SHIPPED | OUTPATIENT
Start: 2021-08-10 | End: 2022-03-17 | Stop reason: SDUPTHER

## 2021-08-10 RX ORDER — INSULIN GLARGINE 300 U/ML
45 INJECTION, SOLUTION SUBCUTANEOUS DAILY
Qty: 15 ML | Refills: 1 | Status: CANCELLED | OUTPATIENT
Start: 2021-08-10

## 2021-08-10 RX ORDER — EMPAGLIFLOZIN 25 MG/1
25 TABLET, FILM COATED ORAL DAILY
Qty: 90 TABLET | Refills: 1 | Status: SHIPPED | OUTPATIENT
Start: 2021-08-10 | End: 2021-10-06 | Stop reason: SDUPTHER

## 2021-08-10 NOTE — TELEPHONE ENCOUNTER
[facility]    They need clarification on the Trujeo. The directions said to inject 45 units but it can only be used in even numbers.    Phone: [phone]    Reference: [id number]

## 2021-08-10 NOTE — TELEPHONE ENCOUNTER
Increase Toujeo max of 46 units daily.  Mimi mentioned a sliding scale using a short acting insulin during patient's last visit.  However, patient does not have any short-acting insulin on medication list and it may be too complicated for this patient.  Send in blood sugars next week for Mimi to review.

## 2021-08-10 NOTE — TELEPHONE ENCOUNTER
----- Message from Di Macario sent at 8/10/2021  4:00 PM EDT -----  Regarding: Prescription Question  Contact: [phone]  I don't see an order for insulin and syringes for new sliding scale application. Has that been sent to [facility]?

## 2021-08-12 RX ORDER — DONEPEZIL HYDROCHLORIDE 10 MG/1
10 TABLET, FILM COATED ORAL NIGHTLY
Qty: 90 TABLET | Refills: 0 | OUTPATIENT
Start: 2021-08-12

## 2021-08-12 RX ORDER — LANCETS 28 GAUGE
1 EACH MISCELLANEOUS 4 TIMES DAILY
Qty: 400 EACH | Refills: 1 | Status: SHIPPED | OUTPATIENT
Start: 2021-08-12

## 2021-08-13 ENCOUNTER — OFFICE VISIT (OUTPATIENT)
Dept: WOUND CARE | Facility: HOSPITAL | Age: 81
End: 2021-08-13

## 2021-08-27 ENCOUNTER — OFFICE VISIT (OUTPATIENT)
Dept: WOUND CARE | Facility: HOSPITAL | Age: 81
End: 2021-08-27

## 2021-08-27 ENCOUNTER — OFFICE VISIT (OUTPATIENT)
Dept: INTERNAL MEDICINE | Facility: CLINIC | Age: 81
End: 2021-08-27

## 2021-08-27 VITALS
DIASTOLIC BLOOD PRESSURE: 50 MMHG | WEIGHT: 153 LBS | HEIGHT: 63 IN | SYSTOLIC BLOOD PRESSURE: 100 MMHG | TEMPERATURE: 97.1 F | BODY MASS INDEX: 27.11 KG/M2

## 2021-08-27 DIAGNOSIS — E78.5 HYPERLIPIDEMIA, UNSPECIFIED HYPERLIPIDEMIA TYPE: ICD-10-CM

## 2021-08-27 DIAGNOSIS — R41.3 IMPAIRED MEMORY: Primary | ICD-10-CM

## 2021-08-27 PROCEDURE — G0463 HOSPITAL OUTPT CLINIC VISIT: HCPCS

## 2021-08-27 PROCEDURE — 99213 OFFICE O/P EST LOW 20 MIN: CPT | Performed by: PHYSICIAN ASSISTANT

## 2021-08-27 RX ORDER — DONEPEZIL HYDROCHLORIDE 10 MG/1
10 TABLET, FILM COATED ORAL NIGHTLY
Qty: 90 TABLET | Refills: 3 | Status: SHIPPED | OUTPATIENT
Start: 2021-08-27 | End: 2022-03-01 | Stop reason: SDUPTHER

## 2021-08-27 RX ORDER — PRAVASTATIN SODIUM 40 MG
40 TABLET ORAL EVERY EVENING
Qty: 90 TABLET | Refills: 3 | Status: SHIPPED | OUTPATIENT
Start: 2021-08-27 | End: 2022-03-01 | Stop reason: SDUPTHER

## 2021-08-27 NOTE — PROGRESS NOTES
Subjective   Chief Complaint   Patient presents with   • Hypertension     f/u        History of Present Illness     Pt here with her daughter for fup on her htn. They will be seeing endocrinology soon. She has not had any further episodes of hypoglycemia. No dizziness, cp, raquel, or falls.      Patient Active Problem List   Diagnosis   • Essential hypertension   • Impaired memory   • Vitamin D deficiency   • Hyperuricemia   • Slow transit constipation   • Chronic pain of left knee   • Fall   • At high risk for falls   • Peripheral neuropathy   • Uncontrolled type 2 diabetes mellitus with hyperglycemia (CMS/HCC)   • Alkaline phosphatase elevation   • Walker as ambulation aid   • Bradycardia, sinus   • Shortness of breath   • Leg swelling   • Hyperlipidemia   • Presence of cardiac pacemaker       No Known Allergies    Current Outpatient Medications on File Prior to Visit   Medication Sig Dispense Refill   • acetaminophen (TYLENOL) 325 MG tablet Take 650 mg by mouth Every 6 (Six) Hours As Needed for Mild Pain  or Moderate Pain .     • amLODIPine-benazepril (Lotrel) 5-20 MG per capsule Take 1 capsule by mouth Daily. 90 capsule 3   • bisacodyl (Dulcolax) 10 MG suppository Insert 1 suppository into the rectum Daily As Needed for Constipation. 28 suppository 0   • Continuous Blood Gluc Sensor (FreeStyle Aguila 2 Sensor) misc 1 Device Every 14 (Fourteen) Days. 2 each 5   • FREESTYLE LITE test strip Use to test BG 4x daily 400 each 0   • Glucagon (Gvoke HypoPen 2-Pack) 1 MG/0.2ML solution auto-injector Inject 1 mg under the skin into the appropriate area as directed As Needed (hypoglycemia). 0.4 mL 1   • Insulin Glargine, 2 Unit Dial, (Toujeo Max SoloStar) 300 UNIT/ML solution pen-injector injection Inject 45 Units under the skin into the appropriate area as directed Daily. 15 mL 1   • Insulin Pen Needle (BD Pen Needle Norma U/F) 32G X 4 MM misc Use once a day 100 each 0   • Jardiance 25 MG tablet tablet Take 1 tablet by mouth  Daily. 90 tablet 1   • Lancets (freestyle) lancets 1 each by Other route 4 (Four) Times a Day. Use as instructed 400 each 1   • multivitamin with minerals (MULTIVITAMIN ADULT PO) Take 1 tablet by mouth Daily.     • polyethylene glycol (MIRALAX) 17 GM/SCOOP powder Take 17 g by mouth Daily. 507 g 0   • Trulicity 1.5 MG/0.5ML solution pen-injector Inject 1.5 mg under the skin into the appropriate area as directed 1 (One) Time Per Week. 12 pen 1   • Zonegran 100 MG capsule        No current facility-administered medications on file prior to visit.       Past Medical History:   Diagnosis Date   • Dementia (CMS/HCC)     states better with medication    • Hyperlipidemia 1/13/2021   • Peripheral neuropathy 8/28/2018   • Vitamin D deficiency        Family History   Problem Relation Age of Onset   • Hypertension Mother    • Hypertension Father        Social History     Socioeconomic History   • Marital status:      Spouse name: Not on file   • Number of children: Not on file   • Years of education: Not on file   • Highest education level: Not on file   Tobacco Use   • Smoking status: Former Smoker   • Smokeless tobacco: Never Used   Vaping Use   • Vaping Use: Never used   Substance and Sexual Activity   • Alcohol use: Yes     Comment: RARELY   • Drug use: No   • Sexual activity: Defer     Birth control/protection: Surgical       Past Surgical History:   Procedure Laterality Date   • ANKLE SURGERY Right    • BLADDER SURGERY      Prolapsed   • BREAST BIOPSY     • CARDIAC ELECTROPHYSIOLOGY PROCEDURE N/A 3/5/2021    Procedure: Pacemaker RENÉ CAMPBELL;  Surgeon: Madelin Ferguson MD;  Location: [facility] INVASIVE LOCATION;  Service: Cardiology;  Laterality: N/A;   • HYSTERECTOMY           The following portions of the patient's history were reviewed and updated as appropriate: problem list, allergies, current medications, past medical history, past family history, past social history and past surgical  "history.    Review of Systems   Cardiovascular: Negative for chest pain and dyspnea on exertion.   Musculoskeletal: Negative for falls.   Neurological: Negative for dizziness.       Immunization History   Administered Date(s) Administered   • COVID-19 (PFIZER) 02/02/2021, 02/23/2021   • Fluzone High Dose =>65 Years ([employer] ONLY) 11/07/2016, 09/30/2019   • Influenza, Unspecified 09/16/2019, 10/08/2020   • Pneumococcal Conjugate 13-Valent (PCV13) 01/31/2017   • Pneumococcal Polysaccharide (PPSV23) 02/01/2018   • Tdap 02/12/2021       Objective   Vitals:    08/27/21 1107 08/27/21 1120   BP:  100/50   Temp: 97.1 °F (36.2 °C)    Weight: 69.4 kg (153 lb)    Height: 160 cm (63\")      Body mass index is 27.1 kg/m².  Physical Exam  Vitals reviewed.   Constitutional:       Appearance: She is well-developed.   HENT:      Head: Normocephalic and atraumatic.   Cardiovascular:      Rate and Rhythm: Normal rate and regular rhythm.      Heart sounds: Normal heart sounds, S1 normal and S2 normal.   Pulmonary:      Effort: Pulmonary effort is normal.      Breath sounds: Normal breath sounds.   Musculoskeletal:      Right lower leg: No edema.      Left lower leg: No edema.   Skin:     General: Skin is warm.   Neurological:      Mental Status: She is alert.   Psychiatric:         Behavior: Behavior normal.           Assessment/Plan   Diagnoses and all orders for this visit:    1. Impaired memory (Primary)  -     donepezil (ARICEPT) 10 MG tablet; Take 1 tablet by mouth Every Night.  Dispense: 90 tablet; Refill: 3    2. Hyperlipidemia, unspecified hyperlipidemia type  -     pravastatin (PRAVACHOL) 40 MG tablet; Take 1 tablet by mouth Every Evening.  Dispense: 90 tablet; Refill: 3    BP is low, would like to have it checked several times over the next week at home. Paperwork for VA benefits completed and given to patient and her daughter.     Return in about 6 months (around 2/27/2022).           Answers for HPI/ROS submitted by the " patient on 8/24/2021  Please describe your symptoms.: Following up from previous appointments.  Do need VA forms aid and attendance  we uploaded to the [employer] ask a question to be completed for my VA claims for benefits.  My  passed away at the [facility] on 28 July 2021.  Have you had these symptoms before?: No  How long have you been having these symptoms?: Greater than 2 weeks  Please list any medications you are currently taking for this condition.: VAForm aid and attendance evaluation  Please describe any probable cause for these symptoms. : I am entitled to va benefits and must have a Dr evaluation.  What is the primary reason for your visit?: Other

## 2021-09-23 ENCOUNTER — CLINICAL SUPPORT NO REQUIREMENTS (OUTPATIENT)
Dept: CARDIOLOGY | Facility: CLINIC | Age: 81
End: 2021-09-23

## 2021-09-23 ENCOUNTER — OFFICE VISIT (OUTPATIENT)
Dept: CARDIOLOGY | Facility: CLINIC | Age: 81
End: 2021-09-23

## 2021-09-23 VITALS
SYSTOLIC BLOOD PRESSURE: 106 MMHG | WEIGHT: 151 LBS | HEIGHT: 63 IN | DIASTOLIC BLOOD PRESSURE: 68 MMHG | HEART RATE: 68 BPM | BODY MASS INDEX: 26.75 KG/M2

## 2021-09-23 DIAGNOSIS — Z95.0 PRESENCE OF CARDIAC PACEMAKER: Primary | ICD-10-CM

## 2021-09-23 DIAGNOSIS — R00.1 BRADYCARDIA, SINUS: Primary | ICD-10-CM

## 2021-09-23 DIAGNOSIS — Z95.0 PRESENCE OF CARDIAC PACEMAKER: ICD-10-CM

## 2021-09-23 DIAGNOSIS — E78.5 HYPERLIPIDEMIA, UNSPECIFIED HYPERLIPIDEMIA TYPE: ICD-10-CM

## 2021-09-23 DIAGNOSIS — I10 ESSENTIAL HYPERTENSION: ICD-10-CM

## 2021-09-23 PROCEDURE — 99213 OFFICE O/P EST LOW 20 MIN: CPT | Performed by: INTERNAL MEDICINE

## 2021-09-23 PROCEDURE — 93280 PM DEVICE PROGR EVAL DUAL: CPT | Performed by: INTERNAL MEDICINE

## 2021-09-23 NOTE — PROGRESS NOTES
"6 MONTH FOLLOW UP   Subjective:        Di Macario is a 81 y.o. female who here for follow up    CC  bp follow up  Wt loose  HPI  81-year-old female has significant weight loss recently and the blood pressure is at the lower end denies any dizziness or lightheadedness denies any chest pains or tightness in the chest     Problems Addressed this Visit        Cardiac and Vasculature    Essential hypertension    Bradycardia, sinus - Primary    Hyperlipidemia    Presence of cardiac pacemaker      Diagnoses       Codes Comments    Bradycardia, sinus    -  Primary ICD-10-CM: R00.1  ICD-9-CM: 427.89     Hyperlipidemia, unspecified hyperlipidemia type     ICD-10-CM: E78.5  ICD-9-CM: 272.4     Presence of cardiac pacemaker     ICD-10-CM: Z95.0  ICD-9-CM: V45.01     Essential hypertension     ICD-10-CM: I10  ICD-9-CM: 401.9         .    The following portions of the patient's history were reviewed and updated as appropriate: allergies, current medications, past family history, past medical history, past social history, past surgical history and problem list.    Past Medical History:   Diagnosis Date   • Dementia (CMS/HCC)     states better with medication    • Hyperlipidemia 1/13/2021   • Peripheral neuropathy 8/28/2018   • Vitamin D deficiency      reports that she has quit smoking. She has never used smokeless tobacco. She reports current alcohol use. She reports that she does not use drugs.   Family History   Problem Relation Age of Onset   • Hypertension Mother    • Hypertension Father        Review of Systems  Constitutional: No wt loss, fever, fatigue  Gastrointestinal: No nausea, abdominal pain  Behavioral/Psych: No insomnia or anxiety   Cardiovascular no chest pains or tightness in the chest  Objective:       Physical Exam  /68   Pulse 68   Ht 160 cm (63\")   Wt 68.5 kg (151 lb)   BMI 26.75 kg/m²   General appearance: No acute changes   Neck: Trachea midline; NECK, supple, no thyromegaly or " lymphadenopathy   Lungs: Normal size and shape, normal breath sounds, equal distribution of air, no rales and rhonchi   CV: S1-S2 regular, no murmurs, no rub, no gallop   Abdomen: Soft, non-tender; no masses , no abnormal abdominal sounds   Extremities: No deformity , normal color , no peripheral edema   Skin: Normal temperature, turgor and texture; no rash, ulcers          Procedures      Echocardiogram:        Current Outpatient Medications:   •  acetaminophen (TYLENOL) 325 MG tablet, Take 650 mg by mouth Every 6 (Six) Hours As Needed for Mild Pain  or Moderate Pain ., Disp: , Rfl:   •  amLODIPine-benazepril (Lotrel) 5-20 MG per capsule, Take 1 capsule by mouth Daily., Disp: 90 capsule, Rfl: 3  •  Continuous Blood Gluc Sensor (FreeStyle Aguila 2 Sensor) misc, 1 Device Every 14 (Fourteen) Days., Disp: 2 each, Rfl: 5  •  donepezil (ARICEPT) 10 MG tablet, Take 1 tablet by mouth Every Night., Disp: 90 tablet, Rfl: 3  •  FREESTYLE LITE test strip, Use to test BG 4x daily, Disp: 400 each, Rfl: 0  •  Glucagon (Gvoke HypoPen 2-Pack) 1 MG/0.2ML solution auto-injector, Inject 1 mg under the skin into the appropriate area as directed As Needed (hypoglycemia)., Disp: 0.4 mL, Rfl: 1  •  Insulin Glargine, 2 Unit Dial, (Toujeo Max SoloStar) 300 UNIT/ML solution pen-injector injection, Inject 45 Units under the skin into the appropriate area as directed Daily., Disp: 15 mL, Rfl: 1  •  Insulin Pen Needle (BD Pen Needle Norma U/F) 32G X 4 MM misc, Use once a day, Disp: 100 each, Rfl: 0  •  Jardiance 25 MG tablet tablet, Take 1 tablet by mouth Daily., Disp: 90 tablet, Rfl: 1  •  Lancets (freestyle) lancets, 1 each by Other route 4 (Four) Times a Day. Use as instructed, Disp: 400 each, Rfl: 1  •  multivitamin with minerals (MULTIVITAMIN ADULT PO), Take 1 tablet by mouth Daily., Disp: , Rfl:   •  pravastatin (PRAVACHOL) 40 MG tablet, Take 1 tablet by mouth Every Evening., Disp: 90 tablet, Rfl: 3  •  Trulicity 1.5 MG/0.5ML solution  pen-injector, Inject 1.5 mg under the skin into the appropriate area as directed 1 (One) Time Per Week., Disp: 12 pen, Rfl: 1  •  Zonegran 100 MG capsule, , Disp: , Rfl:    Assessment:        Patient Active Problem List   Diagnosis   • Essential hypertension   • Impaired memory   • Vitamin D deficiency   • Hyperuricemia   • Slow transit constipation   • Chronic pain of left knee   • Fall   • At high risk for falls   • Peripheral neuropathy   • Uncontrolled type 2 diabetes mellitus with hyperglycemia (CMS/HCC)   • Alkaline phosphatase elevation   • Walker as ambulation aid   • Bradycardia, sinus   • Shortness of breath   • Leg swelling   • Hyperlipidemia   • Presence of cardiac pacemaker               Plan:            ICD-10-CM ICD-9-CM   1. Bradycardia, sinus  R00.1 427.89   2. Hyperlipidemia, unspecified hyperlipidemia type  E78.5 272.4   3. Presence of cardiac pacemaker  Z95.0 V45.01   4. Essential hypertension  I10 401.9     1. Bradycardia, sinus  Resolved with the pacemaker    2. Hyperlipidemia, unspecified hyperlipidemia type  Continue current treatment    3. Presence of cardiac pacemaker  Functioning normally    4. Essential hypertension  Blood pressure under control     1 yr  COUNSELING:    Di Sorto was given to patient for the following topics: diagnostic results, risk factor reductions, impressions, risks and benefits of treatment options and importance of treatment compliance .       SMOKING COUNSELING:    [unfilled]    Dictated using Dragon dictation

## 2021-09-29 ENCOUNTER — IMMUNIZATION (OUTPATIENT)
Dept: VACCINE CLINIC | Facility: HOSPITAL | Age: 81
End: 2021-09-29

## 2021-09-29 PROCEDURE — 0003A: CPT | Performed by: INTERNAL MEDICINE

## 2021-09-29 PROCEDURE — 0001A: CPT | Performed by: INTERNAL MEDICINE

## 2021-09-29 PROCEDURE — 91300 HC SARSCOV02 VAC 30MCG/0.3ML IM: CPT | Performed by: INTERNAL MEDICINE

## 2021-10-06 RX ORDER — EMPAGLIFLOZIN 25 MG/1
25 TABLET, FILM COATED ORAL DAILY
Qty: 90 TABLET | Refills: 1 | Status: SHIPPED | OUTPATIENT
Start: 2021-10-06 | End: 2022-03-01 | Stop reason: SDUPTHER

## 2021-10-19 ENCOUNTER — CLINICAL SUPPORT (OUTPATIENT)
Dept: INTERNAL MEDICINE | Facility: CLINIC | Age: 81
End: 2021-10-19

## 2021-10-19 DIAGNOSIS — Z23 NEEDS FLU SHOT: Primary | ICD-10-CM

## 2021-10-19 PROCEDURE — G0008 ADMIN INFLUENZA VIRUS VAC: HCPCS | Performed by: PHYSICIAN ASSISTANT

## 2021-10-19 PROCEDURE — 90662 IIV NO PRSV INCREASED AG IM: CPT | Performed by: PHYSICIAN ASSISTANT

## 2021-10-29 ENCOUNTER — APPOINTMENT (OUTPATIENT)
Dept: CT IMAGING | Facility: HOSPITAL | Age: 81
End: 2021-10-29

## 2021-10-29 ENCOUNTER — APPOINTMENT (OUTPATIENT)
Dept: GENERAL RADIOLOGY | Facility: HOSPITAL | Age: 81
End: 2021-10-29

## 2021-10-29 ENCOUNTER — HOSPITAL ENCOUNTER (OUTPATIENT)
Facility: HOSPITAL | Age: 81
Setting detail: OBSERVATION
Discharge: NURSING, REHAB OR LONG-TERM CARE | End: 2021-11-02
Attending: EMERGENCY MEDICINE | Admitting: HOSPITALIST

## 2021-10-29 DIAGNOSIS — Z91.81 AT HIGH RISK FOR FALLS: ICD-10-CM

## 2021-10-29 DIAGNOSIS — H91.93 BILATERAL HEARING LOSS, UNSPECIFIED HEARING LOSS TYPE: Primary | ICD-10-CM

## 2021-10-29 DIAGNOSIS — S01.512A LACERATION OF TONGUE, INITIAL ENCOUNTER: ICD-10-CM

## 2021-10-29 DIAGNOSIS — S42.291A HUMERAL HEAD FRACTURE, RIGHT, CLOSED, INITIAL ENCOUNTER: Primary | ICD-10-CM

## 2021-10-29 DIAGNOSIS — S09.90XA INJURY OF HEAD, INITIAL ENCOUNTER: ICD-10-CM

## 2021-10-29 PROBLEM — E11.9 TYPE 2 DIABETES MELLITUS, WITH LONG-TERM CURRENT USE OF INSULIN: Status: ACTIVE | Noted: 2021-10-29

## 2021-10-29 PROBLEM — Z79.4 TYPE 2 DIABETES MELLITUS, WITH LONG-TERM CURRENT USE OF INSULIN: Status: ACTIVE | Noted: 2021-10-29

## 2021-10-29 LAB
ALBUMIN SERPL-MCNC: 3.9 G/DL (ref 3.5–5.2)
ALBUMIN/GLOB SERPL: 1.3 G/DL
ALP SERPL-CCNC: 150 U/L (ref 39–117)
ALT SERPL W P-5'-P-CCNC: 21 U/L (ref 1–33)
ANION GAP SERPL CALCULATED.3IONS-SCNC: 8.9 MMOL/L (ref 5–15)
AST SERPL-CCNC: 23 U/L (ref 1–32)
BASOPHILS # BLD AUTO: 0.03 10*3/MM3 (ref 0–0.2)
BASOPHILS NFR BLD AUTO: 0.3 % (ref 0–1.5)
BILIRUB SERPL-MCNC: <0.2 MG/DL (ref 0–1.2)
BUN SERPL-MCNC: 27 MG/DL (ref 8–23)
BUN/CREAT SERPL: 37.5 (ref 7–25)
CALCIUM SPEC-SCNC: 9.4 MG/DL (ref 8.6–10.5)
CHLORIDE SERPL-SCNC: 107 MMOL/L (ref 98–107)
CO2 SERPL-SCNC: 24.1 MMOL/L (ref 22–29)
CREAT SERPL-MCNC: 0.72 MG/DL (ref 0.57–1)
DEPRECATED RDW RBC AUTO: 44.9 FL (ref 37–54)
EOSINOPHIL # BLD AUTO: 0.03 10*3/MM3 (ref 0–0.4)
EOSINOPHIL NFR BLD AUTO: 0.3 % (ref 0.3–6.2)
ERYTHROCYTE [DISTWIDTH] IN BLOOD BY AUTOMATED COUNT: 13.8 % (ref 12.3–15.4)
GFR SERPL CREATININE-BSD FRML MDRD: 78 ML/MIN/1.73
GLOBULIN UR ELPH-MCNC: 2.9 GM/DL
GLUCOSE BLDC GLUCOMTR-MCNC: 132 MG/DL (ref 70–130)
GLUCOSE BLDC GLUCOMTR-MCNC: 226 MG/DL (ref 70–130)
GLUCOSE BLDC GLUCOMTR-MCNC: 378 MG/DL (ref 70–130)
GLUCOSE SERPL-MCNC: 326 MG/DL (ref 65–99)
HCT VFR BLD AUTO: 36.7 % (ref 34–46.6)
HGB BLD-MCNC: 12.1 G/DL (ref 12–15.9)
IMM GRANULOCYTES # BLD AUTO: 0.05 10*3/MM3 (ref 0–0.05)
IMM GRANULOCYTES NFR BLD AUTO: 0.4 % (ref 0–0.5)
LYMPHOCYTES # BLD AUTO: 1.21 10*3/MM3 (ref 0.7–3.1)
LYMPHOCYTES NFR BLD AUTO: 10.4 % (ref 19.6–45.3)
MCH RBC QN AUTO: 29.4 PG (ref 26.6–33)
MCHC RBC AUTO-ENTMCNC: 33 G/DL (ref 31.5–35.7)
MCV RBC AUTO: 89.1 FL (ref 79–97)
MONOCYTES # BLD AUTO: 0.63 10*3/MM3 (ref 0.1–0.9)
MONOCYTES NFR BLD AUTO: 5.4 % (ref 5–12)
NEUTROPHILS NFR BLD AUTO: 83.2 % (ref 42.7–76)
NEUTROPHILS NFR BLD AUTO: 9.73 10*3/MM3 (ref 1.7–7)
NRBC BLD AUTO-RTO: 0 /100 WBC (ref 0–0.2)
PLATELET # BLD AUTO: 186 10*3/MM3 (ref 140–450)
PMV BLD AUTO: 11.7 FL (ref 6–12)
POTASSIUM SERPL-SCNC: 5.3 MMOL/L (ref 3.5–5.2)
PROT SERPL-MCNC: 6.8 G/DL (ref 6–8.5)
RBC # BLD AUTO: 4.12 10*6/MM3 (ref 3.77–5.28)
SARS-COV-2 RNA RESP QL NAA+PROBE: NOT DETECTED
SODIUM SERPL-SCNC: 140 MMOL/L (ref 136–145)
WBC # BLD AUTO: 11.68 10*3/MM3 (ref 3.4–10.8)

## 2021-10-29 PROCEDURE — 70486 CT MAXILLOFACIAL W/O DYE: CPT

## 2021-10-29 PROCEDURE — 80053 COMPREHEN METABOLIC PANEL: CPT | Performed by: EMERGENCY MEDICINE

## 2021-10-29 PROCEDURE — C9803 HOPD COVID-19 SPEC COLLECT: HCPCS

## 2021-10-29 PROCEDURE — G0378 HOSPITAL OBSERVATION PER HR: HCPCS

## 2021-10-29 PROCEDURE — 82962 GLUCOSE BLOOD TEST: CPT

## 2021-10-29 PROCEDURE — 85025 COMPLETE CBC W/AUTO DIFF WBC: CPT | Performed by: EMERGENCY MEDICINE

## 2021-10-29 PROCEDURE — 63710000001 INSULIN GLARGINE PER 5 UNITS: Performed by: HOSPITALIST

## 2021-10-29 PROCEDURE — 72125 CT NECK SPINE W/O DYE: CPT

## 2021-10-29 PROCEDURE — 73060 X-RAY EXAM OF HUMERUS: CPT

## 2021-10-29 PROCEDURE — 99284 EMERGENCY DEPT VISIT MOD MDM: CPT

## 2021-10-29 PROCEDURE — 63710000001 INSULIN LISPRO (HUMAN) PER 5 UNITS: Performed by: NURSE PRACTITIONER

## 2021-10-29 PROCEDURE — 70450 CT HEAD/BRAIN W/O DYE: CPT

## 2021-10-29 PROCEDURE — 73030 X-RAY EXAM OF SHOULDER: CPT

## 2021-10-29 PROCEDURE — U0005 INFEC AGEN DETEC AMPLI PROBE: HCPCS | Performed by: EMERGENCY MEDICINE

## 2021-10-29 PROCEDURE — 25010000002 ONDANSETRON PER 1 MG: Performed by: EMERGENCY MEDICINE

## 2021-10-29 PROCEDURE — 25010000002 MORPHINE PER 10 MG: Performed by: EMERGENCY MEDICINE

## 2021-10-29 PROCEDURE — 96375 TX/PRO/DX INJ NEW DRUG ADDON: CPT

## 2021-10-29 PROCEDURE — 96374 THER/PROPH/DIAG INJ IV PUSH: CPT

## 2021-10-29 PROCEDURE — U0003 INFECTIOUS AGENT DETECTION BY NUCLEIC ACID (DNA OR RNA); SEVERE ACUTE RESPIRATORY SYNDROME CORONAVIRUS 2 (SARS-COV-2) (CORONAVIRUS DISEASE [COVID-19]), AMPLIFIED PROBE TECHNIQUE, MAKING USE OF HIGH THROUGHPUT TECHNOLOGIES AS DESCRIBED BY CMS-2020-01-R: HCPCS | Performed by: EMERGENCY MEDICINE

## 2021-10-29 PROCEDURE — 96376 TX/PRO/DX INJ SAME DRUG ADON: CPT

## 2021-10-29 PROCEDURE — 73560 X-RAY EXAM OF KNEE 1 OR 2: CPT

## 2021-10-29 RX ORDER — ACETAMINOPHEN 650 MG/1
650 SUPPOSITORY RECTAL EVERY 4 HOURS PRN
Status: DISCONTINUED | OUTPATIENT
Start: 2021-10-29 | End: 2021-11-02 | Stop reason: HOSPADM

## 2021-10-29 RX ORDER — ONDANSETRON 2 MG/ML
4 INJECTION INTRAMUSCULAR; INTRAVENOUS EVERY 6 HOURS PRN
Status: DISCONTINUED | OUTPATIENT
Start: 2021-10-29 | End: 2021-11-02 | Stop reason: HOSPADM

## 2021-10-29 RX ORDER — ACETAMINOPHEN 160 MG/5ML
650 SOLUTION ORAL EVERY 4 HOURS PRN
Status: DISCONTINUED | OUTPATIENT
Start: 2021-10-29 | End: 2021-11-02 | Stop reason: HOSPADM

## 2021-10-29 RX ORDER — ONDANSETRON 4 MG/1
4 TABLET, FILM COATED ORAL EVERY 6 HOURS PRN
Status: DISCONTINUED | OUTPATIENT
Start: 2021-10-29 | End: 2021-11-02 | Stop reason: HOSPADM

## 2021-10-29 RX ORDER — CALCIUM CARBONATE 200(500)MG
2 TABLET,CHEWABLE ORAL 2 TIMES DAILY PRN
Status: DISCONTINUED | OUTPATIENT
Start: 2021-10-29 | End: 2021-11-02 | Stop reason: HOSPADM

## 2021-10-29 RX ORDER — MORPHINE SULFATE 2 MG/ML
4 INJECTION, SOLUTION INTRAMUSCULAR; INTRAVENOUS ONCE
Status: COMPLETED | OUTPATIENT
Start: 2021-10-29 | End: 2021-10-29

## 2021-10-29 RX ORDER — SODIUM CHLORIDE 0.9 % (FLUSH) 0.9 %
10 SYRINGE (ML) INJECTION EVERY 12 HOURS SCHEDULED
Status: DISCONTINUED | OUTPATIENT
Start: 2021-10-29 | End: 2021-11-02 | Stop reason: HOSPADM

## 2021-10-29 RX ORDER — ONDANSETRON 2 MG/ML
4 INJECTION INTRAMUSCULAR; INTRAVENOUS ONCE
Status: COMPLETED | OUTPATIENT
Start: 2021-10-29 | End: 2021-10-29

## 2021-10-29 RX ORDER — HYDROCODONE BITARTRATE AND ACETAMINOPHEN 7.5; 325 MG/1; MG/1
1 TABLET ORAL EVERY 6 HOURS PRN
Status: DISCONTINUED | OUTPATIENT
Start: 2021-10-29 | End: 2021-10-30

## 2021-10-29 RX ORDER — INSULIN LISPRO 100 [IU]/ML
0-9 INJECTION, SOLUTION INTRAVENOUS; SUBCUTANEOUS
Status: DISCONTINUED | OUTPATIENT
Start: 2021-10-29 | End: 2021-11-01

## 2021-10-29 RX ORDER — PRAVASTATIN SODIUM 40 MG
40 TABLET ORAL EVERY EVENING
Status: DISCONTINUED | OUTPATIENT
Start: 2021-10-29 | End: 2021-11-02 | Stop reason: HOSPADM

## 2021-10-29 RX ORDER — DONEPEZIL HYDROCHLORIDE 10 MG/1
10 TABLET, FILM COATED ORAL NIGHTLY
Status: DISCONTINUED | OUTPATIENT
Start: 2021-10-29 | End: 2021-11-02 | Stop reason: HOSPADM

## 2021-10-29 RX ORDER — SODIUM CHLORIDE 0.9 % (FLUSH) 0.9 %
10 SYRINGE (ML) INJECTION AS NEEDED
Status: DISCONTINUED | OUTPATIENT
Start: 2021-10-29 | End: 2021-11-02 | Stop reason: HOSPADM

## 2021-10-29 RX ORDER — OXYCODONE HYDROCHLORIDE AND ACETAMINOPHEN 5; 325 MG/1; MG/1
1 TABLET ORAL ONCE
Status: COMPLETED | OUTPATIENT
Start: 2021-10-29 | End: 2021-10-29

## 2021-10-29 RX ORDER — NICOTINE POLACRILEX 4 MG
15 LOZENGE BUCCAL
Status: DISCONTINUED | OUTPATIENT
Start: 2021-10-29 | End: 2021-11-02 | Stop reason: HOSPADM

## 2021-10-29 RX ORDER — DEXTROSE MONOHYDRATE 25 G/50ML
25 INJECTION, SOLUTION INTRAVENOUS
Status: DISCONTINUED | OUTPATIENT
Start: 2021-10-29 | End: 2021-11-02 | Stop reason: HOSPADM

## 2021-10-29 RX ORDER — INSULIN GLARGINE 100 [IU]/ML
20 INJECTION, SOLUTION SUBCUTANEOUS DAILY
Status: DISCONTINUED | OUTPATIENT
Start: 2021-10-29 | End: 2021-11-02 | Stop reason: HOSPADM

## 2021-10-29 RX ORDER — ACETAMINOPHEN 325 MG/1
650 TABLET ORAL EVERY 4 HOURS PRN
Status: DISCONTINUED | OUTPATIENT
Start: 2021-10-29 | End: 2021-11-02 | Stop reason: HOSPADM

## 2021-10-29 RX ORDER — BUPIVACAINE HYDROCHLORIDE 5 MG/ML
10 INJECTION, SOLUTION EPIDURAL; INTRACAUDAL ONCE
Status: COMPLETED | OUTPATIENT
Start: 2021-10-29 | End: 2021-10-29

## 2021-10-29 RX ADMIN — OXYCODONE AND ACETAMINOPHEN 1 TABLET: 5; 325 TABLET ORAL at 03:57

## 2021-10-29 RX ADMIN — DONEPEZIL HYDROCHLORIDE 10 MG: 10 TABLET, FILM COATED ORAL at 20:16

## 2021-10-29 RX ADMIN — HYDROCODONE BITARTRATE AND ACETAMINOPHEN 1 TABLET: 7.5; 325 TABLET ORAL at 20:21

## 2021-10-29 RX ADMIN — INSULIN LISPRO 7 UNITS: 100 INJECTION, SOLUTION INTRAVENOUS; SUBCUTANEOUS at 08:48

## 2021-10-29 RX ADMIN — INSULIN GLARGINE 20 UNITS: 100 INJECTION, SOLUTION SUBCUTANEOUS at 20:33

## 2021-10-29 RX ADMIN — MORPHINE SULFATE 4 MG: 2 INJECTION, SOLUTION INTRAMUSCULAR; INTRAVENOUS at 02:47

## 2021-10-29 RX ADMIN — EMPAGLIFLOZIN 25 MG: 25 TABLET, FILM COATED ORAL at 20:16

## 2021-10-29 RX ADMIN — BUPIVACAINE HYDROCHLORIDE 10 ML: 5 INJECTION, SOLUTION EPIDURAL; INTRACAUDAL at 03:25

## 2021-10-29 RX ADMIN — ONDANSETRON 4 MG: 2 INJECTION INTRAMUSCULAR; INTRAVENOUS at 00:54

## 2021-10-29 RX ADMIN — SODIUM CHLORIDE, PRESERVATIVE FREE 10 ML: 5 INJECTION INTRAVENOUS at 20:17

## 2021-10-29 RX ADMIN — INSULIN LISPRO 8 UNITS: 100 INJECTION, SOLUTION INTRAVENOUS; SUBCUTANEOUS at 11:45

## 2021-10-29 RX ADMIN — SODIUM CHLORIDE 1000 ML: 9 INJECTION, SOLUTION INTRAVENOUS at 04:42

## 2021-10-29 RX ADMIN — MORPHINE SULFATE 4 MG: 2 INJECTION, SOLUTION INTRAMUSCULAR; INTRAVENOUS at 00:54

## 2021-10-29 RX ADMIN — SODIUM CHLORIDE, PRESERVATIVE FREE 10 ML: 5 INJECTION INTRAVENOUS at 08:48

## 2021-10-29 RX ADMIN — PRAVASTATIN SODIUM 40 MG: 40 TABLET ORAL at 20:17

## 2021-10-29 RX ADMIN — HYDROCODONE BITARTRATE AND ACETAMINOPHEN 1 TABLET: 7.5; 325 TABLET ORAL at 13:41

## 2021-10-29 RX ADMIN — INSULIN LISPRO 4 UNITS: 100 INJECTION, SOLUTION INTRAVENOUS; SUBCUTANEOUS at 20:32

## 2021-10-30 LAB
ANION GAP SERPL CALCULATED.3IONS-SCNC: 9.6 MMOL/L (ref 5–15)
BUN SERPL-MCNC: 27 MG/DL (ref 8–23)
BUN/CREAT SERPL: 39.7 (ref 7–25)
CALCIUM SPEC-SCNC: 8.8 MG/DL (ref 8.6–10.5)
CHLORIDE SERPL-SCNC: 109 MMOL/L (ref 98–107)
CO2 SERPL-SCNC: 22.4 MMOL/L (ref 22–29)
CREAT SERPL-MCNC: 0.68 MG/DL (ref 0.57–1)
DEPRECATED RDW RBC AUTO: 43.9 FL (ref 37–54)
ERYTHROCYTE [DISTWIDTH] IN BLOOD BY AUTOMATED COUNT: 13.6 % (ref 12.3–15.4)
GFR SERPL CREATININE-BSD FRML MDRD: 83 ML/MIN/1.73
GLUCOSE BLDC GLUCOMTR-MCNC: 114 MG/DL (ref 70–130)
GLUCOSE BLDC GLUCOMTR-MCNC: 161 MG/DL (ref 70–130)
GLUCOSE BLDC GLUCOMTR-MCNC: 197 MG/DL (ref 70–130)
GLUCOSE BLDC GLUCOMTR-MCNC: 294 MG/DL (ref 70–130)
GLUCOSE SERPL-MCNC: 123 MG/DL (ref 65–99)
HBA1C MFR BLD: 10.33 % (ref 4.8–5.6)
HCT VFR BLD AUTO: 30.7 % (ref 34–46.6)
HGB BLD-MCNC: 10.1 G/DL (ref 12–15.9)
MCH RBC QN AUTO: 29.1 PG (ref 26.6–33)
MCHC RBC AUTO-ENTMCNC: 32.9 G/DL (ref 31.5–35.7)
MCV RBC AUTO: 88.5 FL (ref 79–97)
PLATELET # BLD AUTO: 166 10*3/MM3 (ref 140–450)
PMV BLD AUTO: 11.6 FL (ref 6–12)
POTASSIUM SERPL-SCNC: 4.1 MMOL/L (ref 3.5–5.2)
RBC # BLD AUTO: 3.47 10*6/MM3 (ref 3.77–5.28)
SODIUM SERPL-SCNC: 141 MMOL/L (ref 136–145)
WBC # BLD AUTO: 8.81 10*3/MM3 (ref 3.4–10.8)

## 2021-10-30 PROCEDURE — 97162 PT EVAL MOD COMPLEX 30 MIN: CPT

## 2021-10-30 PROCEDURE — G0378 HOSPITAL OBSERVATION PER HR: HCPCS

## 2021-10-30 PROCEDURE — 63710000001 INSULIN GLARGINE PER 5 UNITS: Performed by: HOSPITALIST

## 2021-10-30 PROCEDURE — 83036 HEMOGLOBIN GLYCOSYLATED A1C: CPT | Performed by: NURSE PRACTITIONER

## 2021-10-30 PROCEDURE — 97110 THERAPEUTIC EXERCISES: CPT

## 2021-10-30 PROCEDURE — 82962 GLUCOSE BLOOD TEST: CPT

## 2021-10-30 PROCEDURE — 63710000001 INSULIN LISPRO (HUMAN) PER 5 UNITS: Performed by: NURSE PRACTITIONER

## 2021-10-30 PROCEDURE — 80048 BASIC METABOLIC PNL TOTAL CA: CPT | Performed by: NURSE PRACTITIONER

## 2021-10-30 PROCEDURE — 97166 OT EVAL MOD COMPLEX 45 MIN: CPT

## 2021-10-30 PROCEDURE — 85027 COMPLETE CBC AUTOMATED: CPT | Performed by: NURSE PRACTITIONER

## 2021-10-30 RX ORDER — HYDROCODONE BITARTRATE AND ACETAMINOPHEN 5; 325 MG/1; MG/1
1 TABLET ORAL EVERY 6 HOURS PRN
Status: DISCONTINUED | OUTPATIENT
Start: 2021-10-30 | End: 2021-11-02 | Stop reason: HOSPADM

## 2021-10-30 RX ADMIN — HYDROCODONE BITARTRATE AND ACETAMINOPHEN 1 TABLET: 5; 325 TABLET ORAL at 15:20

## 2021-10-30 RX ADMIN — DONEPEZIL HYDROCHLORIDE 10 MG: 10 TABLET, FILM COATED ORAL at 21:22

## 2021-10-30 RX ADMIN — HYDROCODONE BITARTRATE AND ACETAMINOPHEN 1 TABLET: 7.5; 325 TABLET ORAL at 08:45

## 2021-10-30 RX ADMIN — SODIUM CHLORIDE, PRESERVATIVE FREE 10 ML: 5 INJECTION INTRAVENOUS at 08:46

## 2021-10-30 RX ADMIN — INSULIN LISPRO 2 UNITS: 100 INJECTION, SOLUTION INTRAVENOUS; SUBCUTANEOUS at 17:56

## 2021-10-30 RX ADMIN — HYDROCODONE BITARTRATE AND ACETAMINOPHEN 1 TABLET: 7.5; 325 TABLET ORAL at 02:23

## 2021-10-30 RX ADMIN — LISINOPRIL: 20 TABLET ORAL at 08:45

## 2021-10-30 RX ADMIN — PRAVASTATIN SODIUM 40 MG: 40 TABLET ORAL at 17:56

## 2021-10-30 RX ADMIN — INSULIN LISPRO 2 UNITS: 100 INJECTION, SOLUTION INTRAVENOUS; SUBCUTANEOUS at 21:22

## 2021-10-30 RX ADMIN — INSULIN LISPRO 6 UNITS: 100 INJECTION, SOLUTION INTRAVENOUS; SUBCUTANEOUS at 12:08

## 2021-10-30 RX ADMIN — EMPAGLIFLOZIN 25 MG: 25 TABLET, FILM COATED ORAL at 12:08

## 2021-10-30 RX ADMIN — INSULIN GLARGINE 20 UNITS: 100 INJECTION, SOLUTION SUBCUTANEOUS at 08:45

## 2021-10-31 LAB
GLUCOSE BLDC GLUCOMTR-MCNC: 104 MG/DL (ref 70–130)
GLUCOSE BLDC GLUCOMTR-MCNC: 170 MG/DL (ref 70–130)
GLUCOSE BLDC GLUCOMTR-MCNC: 219 MG/DL (ref 70–130)
GLUCOSE BLDC GLUCOMTR-MCNC: 234 MG/DL (ref 70–130)
GLUCOSE BLDC GLUCOMTR-MCNC: >599 MG/DL (ref 70–130)

## 2021-10-31 PROCEDURE — 82962 GLUCOSE BLOOD TEST: CPT

## 2021-10-31 PROCEDURE — 63710000001 INSULIN GLARGINE PER 5 UNITS: Performed by: HOSPITALIST

## 2021-10-31 PROCEDURE — G0378 HOSPITAL OBSERVATION PER HR: HCPCS

## 2021-10-31 PROCEDURE — 63710000001 INSULIN LISPRO (HUMAN) PER 5 UNITS: Performed by: NURSE PRACTITIONER

## 2021-10-31 PROCEDURE — 97110 THERAPEUTIC EXERCISES: CPT

## 2021-10-31 RX ADMIN — LISINOPRIL: 20 TABLET ORAL at 10:14

## 2021-10-31 RX ADMIN — INSULIN LISPRO 2 UNITS: 100 INJECTION, SOLUTION INTRAVENOUS; SUBCUTANEOUS at 17:52

## 2021-10-31 RX ADMIN — HYDROCODONE BITARTRATE AND ACETAMINOPHEN 1 TABLET: 5; 325 TABLET ORAL at 20:02

## 2021-10-31 RX ADMIN — INSULIN LISPRO 4 UNITS: 100 INJECTION, SOLUTION INTRAVENOUS; SUBCUTANEOUS at 13:17

## 2021-10-31 RX ADMIN — INSULIN GLARGINE 20 UNITS: 100 INJECTION, SOLUTION SUBCUTANEOUS at 10:14

## 2021-10-31 RX ADMIN — INSULIN LISPRO 4 UNITS: 100 INJECTION, SOLUTION INTRAVENOUS; SUBCUTANEOUS at 21:45

## 2021-10-31 RX ADMIN — PRAVASTATIN SODIUM 40 MG: 40 TABLET ORAL at 20:02

## 2021-10-31 RX ADMIN — HYDROCODONE BITARTRATE AND ACETAMINOPHEN 1 TABLET: 5; 325 TABLET ORAL at 10:14

## 2021-10-31 RX ADMIN — EMPAGLIFLOZIN 25 MG: 25 TABLET, FILM COATED ORAL at 10:14

## 2021-10-31 RX ADMIN — SODIUM CHLORIDE, PRESERVATIVE FREE 10 ML: 5 INJECTION INTRAVENOUS at 10:15

## 2021-10-31 RX ADMIN — DONEPEZIL HYDROCHLORIDE 10 MG: 10 TABLET, FILM COATED ORAL at 20:02

## 2021-10-31 RX ADMIN — SODIUM CHLORIDE, PRESERVATIVE FREE 10 ML: 5 INJECTION INTRAVENOUS at 20:02

## 2021-11-01 LAB
GLUCOSE BLDC GLUCOMTR-MCNC: 173 MG/DL (ref 70–130)
GLUCOSE BLDC GLUCOMTR-MCNC: 245 MG/DL (ref 70–130)
GLUCOSE BLDC GLUCOMTR-MCNC: 265 MG/DL (ref 70–130)
GLUCOSE BLDC GLUCOMTR-MCNC: 86 MG/DL (ref 70–130)

## 2021-11-01 PROCEDURE — 97110 THERAPEUTIC EXERCISES: CPT

## 2021-11-01 PROCEDURE — 82962 GLUCOSE BLOOD TEST: CPT

## 2021-11-01 PROCEDURE — 63710000001 INSULIN LISPRO (HUMAN) PER 5 UNITS: Performed by: HOSPITALIST

## 2021-11-01 PROCEDURE — 97535 SELF CARE MNGMENT TRAINING: CPT

## 2021-11-01 PROCEDURE — G0378 HOSPITAL OBSERVATION PER HR: HCPCS

## 2021-11-01 RX ORDER — INSULIN LISPRO 100 [IU]/ML
0-7 INJECTION, SOLUTION INTRAVENOUS; SUBCUTANEOUS
Status: DISCONTINUED | OUTPATIENT
Start: 2021-11-01 | End: 2021-11-02 | Stop reason: HOSPADM

## 2021-11-01 RX ADMIN — EMPAGLIFLOZIN 25 MG: 25 TABLET, FILM COATED ORAL at 12:21

## 2021-11-01 RX ADMIN — HYDROCODONE BITARTRATE AND ACETAMINOPHEN 1 TABLET: 5; 325 TABLET ORAL at 12:25

## 2021-11-01 RX ADMIN — PRAVASTATIN SODIUM 40 MG: 40 TABLET ORAL at 17:47

## 2021-11-01 RX ADMIN — HYDROCODONE BITARTRATE AND ACETAMINOPHEN 1 TABLET: 5; 325 TABLET ORAL at 06:33

## 2021-11-01 RX ADMIN — LISINOPRIL: 20 TABLET ORAL at 09:56

## 2021-11-01 RX ADMIN — HYDROCODONE BITARTRATE AND ACETAMINOPHEN 1 TABLET: 5; 325 TABLET ORAL at 17:47

## 2021-11-01 RX ADMIN — INSULIN LISPRO 2 UNITS: 100 INJECTION, SOLUTION INTRAVENOUS; SUBCUTANEOUS at 20:44

## 2021-11-01 RX ADMIN — INSULIN LISPRO 3 UNITS: 100 INJECTION, SOLUTION INTRAVENOUS; SUBCUTANEOUS at 17:47

## 2021-11-01 RX ADMIN — DONEPEZIL HYDROCHLORIDE 10 MG: 10 TABLET, FILM COATED ORAL at 20:37

## 2021-11-01 RX ADMIN — SODIUM CHLORIDE, PRESERVATIVE FREE 10 ML: 5 INJECTION INTRAVENOUS at 09:58

## 2021-11-01 NOTE — PLAN OF CARE
Goal Outcome Evaluation:  Plan of Care Reviewed With: (P) patient        Progress: (P) improving  Outcome Summary: (P) Pt demonstrated slight improvements in amb distance (100 ft), but required handheld assist x 2 because of significant unsteadines and LOB. Pt exhibited forward flexed posture, decreased gait speed, instances of gait scissoring and difficulty in sequencing, and impaired functional mobility, including decreased balance and coordination. Pt is appropriate candidate for skilled PT interventions at [facility] due amount of assistance needed and deficits addressed along with decreased strength (UE and LE) and endurance.    Pt and PT team wore appropriate PPE during treatment session.

## 2021-11-01 NOTE — CASE MANAGEMENT/SOCIAL WORK
Continued Stay Note  [facility]     Patient Name: Di Macario  MRN: [id number]  Today's Date: 11/1/2021    Admit Date: 10/29/2021     Discharge Plan     Row Name 11/01/21 1530       Plan    Plan [facility] SNF -- Referral Pending.    Patient/Family in Agreement with Plan yes    Plan Comments Spoke with [facility]en who is unable to accept due to no bed availability at this time. Spoke with Leilani/Toy who is unable to accept to [facility], but may have a SNF bed today at the [facility]. She will look into their availability and f/u with CCP.    Row Name 11/01/21 1313       Plan    Plan Pending SNF referrals    Plan Comments CCP met with patient at bedside. Patient states she is interested in [facility] for SNF but did not have any other SNF options. Patient gave CCP permission to speak with her daughter, Blossom. CCP made outbound call to patient's daughter and reviewed Medicare ratings. Patient's daughter requested referreals to [facility], [facility], and the [facility]. CCP placed referrals in [facility]. CCP will follow for pending SNF referrals and assist as needed. Patsy Quiles CSW               Discharge Codes    No documentation.               Expected Discharge Date and Time     Expected Discharge Date Expected Discharge Time    Nov 2, 2021             Carolyn Jaeger RN

## 2021-11-01 NOTE — NURSING NOTE
"Diabetes Education  Assessment/Teaching    Patient Name:  Di Macario  YOB: 1940  MRN: [id number]  Admit Date:  10/29/2021      Assessment Date:  11/1/2021    Most Recent Value   General Information     Referral From: Database. Meet with 82 y/o at bedside to attempt to assess needs for DM ed.    Height 160 cm (63\")   Height Method Stated   Weight 68.8 kg (151 lb 10.8 oz)   Weight Method Bed scale   Diabetes History    What type of diabetes do you have? Type 2   Length of Diabetes Diagnosis -- unknown. pt not able to tell me at this time.   Do you test your blood sugar at home? yes   Who performs the test? pt   Have you had low blood sugar? (<70mg/dl) yes   Education Preferences    Barriers to Learning -- pt presents w/poss memory impairment vs acuity post-op.   Assessment Topics    Problem Solving - Assessment Needs education -re increased risk for SSI w/high BGs post-op.   Healthy Coping - Assessment Competent   Monitoring - Assessment Competent   DM Goals             Most Recent Value   DM Education Needs    Meter Has own   Problem Solving Hyperglycemia -advise pt call MD for repeat high BGs upon dc d/t increased risk for SSI.   Healthy Coping Appropriate   Discharge Plan Facility   Patient Response Needs reinforcement      Other Comments:  Plan at this time is dc to Skilled nursing facility.   Electronically signed by:  Saray Silva RN, BSN, [facility]  11/01/21 14:22 EDT  "

## 2021-11-01 NOTE — CASE MANAGEMENT/SOCIAL WORK
Continued Stay Note  [facility]     Patient Name: Di Macario  MRN: [id number]  Today's Date: 11/1/2021    Admit Date: 10/29/2021     Discharge Plan     Row Name 11/01/21 1313       Plan    Plan Pending SNF referrals    Plan Comments CCP met with patient at bedside. Patient states she is interested in [facility] for SNF but did not have any other SNF options. Patient gave CCP permission to speak with her daughter, Blossom. CCP made outbound call to patient's daughter and reviewed Medicare ratings. Patient's daughter requested referreals to [facility], [facility], and the [facility]. CCP placed referrals in [facility]. CCP will follow for pending SNF referrals and assist as needed. Patsy HICKMAN               Discharge Codes    No documentation.               Expected Discharge Date and Time     Expected Discharge Date Expected Discharge Time    Nov 2, 2021             MARYLOU Trevino

## 2021-11-01 NOTE — PLAN OF CARE
Goal Outcome Evaluation:  Plan of Care Reviewed With: patient        Progress: improving  Outcome Summary: Patient had a stable night. Vitals stable. Pain controlled with pain meds. voiding per purewick. Will continue to monitor.

## 2021-11-01 NOTE — PLAN OF CARE
Goal Outcome Evaluation:  Plan of Care Reviewed With: patient        Progress: improving  Outcome Summary: up in chair today, oral pain meds still requested today, plans for rehab placement tomorrow, vss, assist x 1 to bathroom, BM today.

## 2021-11-01 NOTE — DISCHARGE PLACEMENT REQUEST
"Reji Macario (81 y.o. Female)             Date of Birth Social Security Number Address Home Phone MRN    1940  [address] [address] [phone] [id number]    L[employer]-day Marital Status             Orthodoxy        Admission Date Admission Type Admitting Provider Attending Provider Department, Room/Bed    10/29/21 Emergency Peewee Costa MD Nguyen, Minh Loc, MD [address], P794/1    Discharge Date Discharge Disposition Discharge Destination                         Attending Provider: Peewee Costa MD    Allergies: No Known Allergies    Isolation: None   Infection: None   Code Status: CPR   Advance Care Planning Activity    Ht: 160 cm (63\")   Wt: 68.8 kg (151 lb 10.8 oz)    Admission Cmt: None   Principal Problem: Humeral head fracture, right, closed, initial encounter [S42.291A]                 Active Insurance as of 10/29/2021     Primary Coverage     Payor Plan Insurance Group Employer/Plan Group    MEDICARE MEDICARE A & B      Payor Plan Address Payor Plan Phone Number Payor Plan Fax Number Effective Dates    [address] [phone]  5/1/2004 - None Entered    [facility] [id number]       Subscriber Name Subscriber Birth Date Member ID       REJI MACARIO 1940 [id number]           Secondary Coverage     Payor Plan Insurance Group Employer/Plan Group     FOR LIFE  FOR LIFE  SUP       Payor Plan Address Payor Plan Phone Number Payor Plan Fax Number Effective Dates    [address] [phone]  2/1/2018 - None Entered    [facility] [address]       Subscriber Name Subscriber Birth Date Member ID       REJI MACARIO 1940 [id number]                 Emergency Contacts      (Rel.) Home Phone Work Phone Mobile Phone    AMBER MACARIO (Daughter) [phone] -- [phone]    ANITHA MACARIO (Spouse) [phone] -- [phone]    MARIZOLPHIL (Daughter) [phone] -- [phone]    " DANISH MILLER (Daughter) [phone] -- [phone]

## 2021-11-01 NOTE — THERAPY TREATMENT NOTE
Patient Name: Di Macario  : 1940    MRN: [id number]                              Today's Date: 2021       Admit Date: 10/29/2021    Visit Dx:     ICD-10-CM ICD-9-CM   1. Humeral head fracture, right, closed, initial encounter  S42.291A 812.09   2. At high risk for falls  Z91.81 V15.88   3. Injury of head, initial encounter  S09.90XA 959.01   4. Laceration of tongue, initial encounter  S01.512A 873.64     Patient Active Problem List   Diagnosis   • Essential hypertension   • Impaired memory   • Vitamin D deficiency   • Hyperuricemia   • Slow transit constipation   • Chronic pain of left knee   • Fall   • At high risk for falls   • Peripheral neuropathy   • Uncontrolled type 2 diabetes mellitus with hyperglycemia (HCC)   • Alkaline phosphatase elevation   • Walker as ambulation aid   • Bradycardia, sinus   • Shortness of breath   • Leg swelling   • Hyperlipidemia   • Presence of cardiac pacemaker   • Humeral head fracture, right, closed, initial encounter   • Type 2 diabetes mellitus, with long-term current use of insulin (HCC)     Past Medical History:   Diagnosis Date   • Dementia (HCC)     states better with medication    • Diabetes mellitus (HCC)    • Hyperlipidemia 2021   • Peripheral neuropathy 2018   • Vitamin D deficiency      Past Surgical History:   Procedure Laterality Date   • ANKLE SURGERY Right    • BLADDER SURGERY      Prolapsed   • BREAST BIOPSY     • CARDIAC ELECTROPHYSIOLOGY PROCEDURE N/A 3/5/2021    Procedure: Pacemaker DC [place];  Surgeon: Madelin Ferguson MD;  Location: [facility] INVASIVE LOCATION;  Service: Cardiology;  Laterality: N/A;   • HYSTERECTOMY        General Information     Row Name 21 1130          Physical Therapy Time and Intention    Document Type therapy note (daily note) (P)   -CLYDE     Mode of Treatment individual therapy; physical therapy (P)   -CLYDE     Row Name 21 1130          General Information    Patient Profile Reviewed  yes (P)   -CLYDE     Existing Precautions/Restrictions fall (P)   -CLYDE     Row Name 11/01/21 1130          Cognition    Orientation Status (Cognition) oriented x 3 (P)   -CLYDE     Row Name 11/01/21 1130          Safety Issues, Functional Mobility    Safety Issues Affecting Function (Mobility) sequencing abilities; safety precaution awareness (P)   -CLYDE     Impairments Affecting Function (Mobility) balance; coordination; endurance/activity tolerance; strength; pain; postural/trunk control (P)   -CLYDE           User Key  (r) = Recorded By, (t) = Taken By, (c) = Cosigned By    Initials Name Provider Type    Christopher Perez, PT Student PT Student               Mobility     Row Name 11/01/21 1132          Bed Mobility    Comment (Bed Mobility) Pt in chair upon arrival (P)   -CLYDE     Row Name 11/01/21 1132          Sit-Stand Transfer    Sit-Stand Amite (Transfers) verbal cues; minimum assist (75% patient effort) (P)   -CLYDE     Assistive Device (Sit-Stand Transfers) -- (P)   Normally utilizes rollator  -CLYDE     Row Name 11/01/21 1132          Gait/Stairs (Locomotion)    Amite Level (Gait) verbal cues; minimum assist (75% patient effort); 2 person assist (P)   LOB, unsteady  -CLYDE     Assistive Device (Gait) -- (P)   Handheld assist x 2  -CLYDE     Distance in Feet (Gait) 100 ft (P)   -CLYDE     Deviations/Abnormal Patterns (Gait) base of support, narrow; festinating/shuffling; gait speed decreased; stride length decreased; scissoring (P)   -CLYDE     Comment (Gait/Stairs) Pt required HHA x 2 for balance during amb, LOB/unsteadiness noted (P)   -CLYDE           User Key  (r) = Recorded By, (t) = Taken By, (c) = Cosigned By    Initials Name Provider Type    Christopher Preez, PT Student PT Student               Obj/Interventions     Row Name 11/01/21 1136          Balance    Balance Assessment sitting static balance; sitting dynamic balance; standing static balance; standing dynamic balance (P)   -CLYDE     Static Sitting Balance mild  impairment (P)   -CLYDE     Dynamic Sitting Balance mild impairment (P)   -CLYDE     Static Standing Balance moderate impairment; supported (P)   -CLYDE     Dynamic Standing Balance moderate impairment; supported (P)   -CLYDE           User Key  (r) = Recorded By, (t) = Taken By, (c) = Cosigned By    Initials Name Provider Type    Christopher Perez, PT Student PT Student               Goals/Plan    No documentation.                Clinical Impression     Row Name 11/01/21 1137          Pain    Additional Documentation Pain Scale: Numbers Pre/Post-Treatment (Group) (P)   -CLYDE     Row Name 11/01/21 1137          Pain Scale: Numbers Pre/Post-Treatment    Pretreatment Pain Rating 8/10 (P)   -CLYDE     Pain Intervention(s) Medication (See MAR); Repositioned (P)   -CLYDE     Row Name 11/01/21 1137          Plan of Care Review    Plan of Care Reviewed With patient (P)   -CLYDE     Progress improving (P)   -CLYDE     Outcome Summary Pt demonstrated slight improvements in amb distance (100 ft), but required handheld assist x 2 because of significant unsteadines and LOB. Pt exhibited forward flexed posture, decreased gait speed, instances of gait scissoring and difficulty in sequencing, and impaired functional mobility, including decreased balance and coordination. Pt is appropriate candidate for skilled PT interventions at [facility] due amount of assistance needed and deficits addressed along with decreased strength (UE and LE) and endurance. (P)   -CLYDE     Row Name 11/01/21 1137          Positioning and Restraints    Pre-Treatment Position sitting in chair/recliner (P)   -CLYDE     Post Treatment Position chair (P)   -CLYDE     In Chair reclined; call light within reach; encouraged to call for assist; exit alarm on; RUE elevated (P)   -CLYDE           User Key  (r) = Recorded By, (t) = Taken By, (c) = Cosigned By    Initials Name Provider Type    Christopher Perez, PT Student PT Student               Outcome Measures     Row Name 11/01/21 1144          How much help  from another person do you currently need...    Turning from your back to your side while in flat bed without using bedrails? 3 (P)   -CLYDE     Moving from lying on back to sitting on the side of a flat bed without bedrails? 2 (P)   -CLYDE     Moving to and from a bed to a chair (including a wheelchair)? 2 (P)   -CLYDE     Standing up from a chair using your arms (e.g., wheelchair, bedside chair)? 3 (P)   -CLYDE     Climbing 3-5 steps with a railing? 2 (P)   -CLYDE     To walk in hospital room? 2 (P)   -CLYDE     AM-PAC 6 Clicks Score (PT) 14 (P)   -CLYDE     Row Name 11/01/21 1144          Functional Assessment    Outcome Measure Options AM-PAC 6 Clicks Basic Mobility (PT) (P)   -CLYDE           User Key  (r) = Recorded By, (t) = Taken By, (c) = Cosigned By    Initials Name Provider Type    CLYDE Christopher Danielle, PT Student PT Student                             Physical Therapy Education                 Title: PT OT SLP Therapies (In Progress)     Topic: Physical Therapy (In Progress)     Point: Mobility training (In Progress)     Learning Progress Summary           Patient Acceptance, E,D, NR by CLYDE at 11/1/2021 1145    Acceptance, E,D, NR by  at 10/31/2021 1111    Acceptance, E,D, NR by PC at 10/30/2021 1621                   Point: Home exercise program (Not Started)     Learner Progress:  Not documented in this visit.          Point: Body mechanics (In Progress)     Learning Progress Summary           Patient Acceptance, E,D, NR by CLYDE at 11/1/2021 1145    Acceptance, E,D, NR by PC at 10/31/2021 1111    Acceptance, E,D, NR by PC at 10/30/2021 1621                   Point: Precautions (In Progress)     Learning Progress Summary           Patient Acceptance, E,D, NR by PC at 10/31/2021 1111    Acceptance, E,D, NR by PC at 10/30/2021 1621                               User Key     Initials Effective Dates Name Provider Type Discipline    PC 06/16/21 -  Fabiola Boo, PT Physical Therapist PT    CLYDE 10/25/21 -  Christopher Danielle, PT Student PT  Student PT              PT Recommendation and Plan     Plan of Care Reviewed With: (P) patient  Progress: (P) improving  Outcome Summary: (P) Pt demonstrated slight improvements in amb distance (100 ft), but required handheld assist x 2 because of significant unsteadines and LOB. Pt exhibited forward flexed posture, decreased gait speed, instances of gait scissoring and difficulty in sequencing, and impaired functional mobility, including decreased balance and coordination. Pt is appropriate candidate for skilled PT interventions at [facility] due amount of assistance needed and deficits addressed along with decreased strength (UE and LE) and endurance.     Time Calculation:    PT Charges     Row Name 11/01/21 1149             Time Calculation    Start Time 1116 (P)   -CLYDE      Stop Time 1126 (P)   -CLYDE      Time Calculation (min) 10 min (P)   -CLYDE      PT Received On 11/01/21 (P)   -CLYDE      PT - Next Appointment 11/02/21 (P)   -CLYDE              Time Calculation- PT    Total Timed Code Minutes- PT 10 minute(s) (P)   -CLYDE              Timed Charges    88693 - PT Therapeutic Exercise Minutes 10 (P)   -CLYDE              Total Minutes    Timed Charges Total Minutes 10 (P)   -CLYDE       Total Minutes 10 (P)   -CLYDE            User Key  (r) = Recorded By, (t) = Taken By, (c) = Cosigned By    Initials Name Provider Type    Christopher Perez, PT Student PT Student              Therapy Charges for Today     Code Description Service Date Service Provider Modifiers Qty    14118495878 HC PT THER PROC EA 15 MIN 11/1/2021 Christopher Danielle, PT Student GP 1          PT G-Codes  Outcome Measure Options: (P) AM-PAC 6 Clicks Basic Mobility (PT)  AM-PAC 6 Clicks Score (PT): (P) 14  AM-PAC 6 Clicks Score (OT): 10    Christopher Danielle PT Student  11/1/2021

## 2021-11-01 NOTE — PROGRESS NOTES
Name: Di Macario ADMIT: 10/29/2021   : 1940  PCP: Lily Yepez PA-C    MRN: [id number] LOS: 0 days   AGE/SEX: 81 y.o. female  ROOM: [facility]     Subjective   Subjective   No new complaints. BM this morning.    Review of Systems   Constitutional: Negative for fever.   Respiratory: Negative for cough and shortness of breath.    Cardiovascular: Negative for chest pain.   Gastrointestinal: Negative for abdominal pain.   Musculoskeletal: Positive for arthralgias.      Objective   Objective   Vital Signs  Temp:  [96.9 °F (36.1 °C)-98.5 °F (36.9 °C)] 98.5 °F (36.9 °C)  Heart Rate:  [60-65] 60  Resp:  [16-18] 18  BP: (110-150)/(52-65) 119/62  SpO2:  [97 %-100 %] 97 %  on   ;   Device (Oxygen Therapy): room air  Body mass index is 26.87 kg/m².    Physical Exam  Constitutional:       General: She is not in acute distress.     Appearance: Normal appearance. She is not toxic-appearing.   HENT:      Head: Normocephalic and atraumatic.   Cardiovascular:      Rate and Rhythm: Normal rate and regular rhythm.   Pulmonary:      Effort: Pulmonary effort is normal. No respiratory distress.      Breath sounds: Normal breath sounds. No wheezing or rhonchi.   Abdominal:      General: Bowel sounds are normal. There is no distension.      Palpations: Abdomen is soft.      Tenderness: There is no abdominal tenderness. There is no guarding or rebound.   Musculoskeletal:         General: No swelling.      Right lower leg: No edema.      Left lower leg: No edema.      Comments: RUE sling   Skin:     General: Skin is warm and dry.   Neurological:      General: No focal deficit present.      Mental Status: She is alert and oriented to person, place, and time.   Psychiatric:         Mood and Affect: Mood normal.         Behavior: Behavior normal.     Results Review  I reviewed the patient's new clinical results.  Results from last 7 days   Lab Units 10/30/21  0530 10/29/21  0249   WBC 10*3/mm3 8.81 11.68*   HEMOGLOBIN g/dL  10.1* 12.1   PLATELETS 10*3/mm3 166 186     Results from last 7 days   Lab Units 10/30/21  0530 10/29/21  0249   SODIUM mmol/L 141 140   POTASSIUM mmol/L 4.1 5.3*   CHLORIDE mmol/L 109* 107   CO2 mmol/L 22.4 24.1   BUN mg/dL 27* 27*   CREATININE mg/dL 0.68 0.72   GLUCOSE mg/dL 123* 326*     Lab Results   Component Value Date    ANIONGAP 9.6 10/30/2021     Estimated Creatinine Clearance: 50.5 mL/min (by C-G formula based on SCr of 0.68 mg/dL).    Results from last 7 days   Lab Units 10/29/21  0249   ALBUMIN g/dL 3.90   BILIRUBIN mg/dL <0.2   ALK PHOS U/L 150*   AST (SGOT) U/L 23   ALT (SGPT) U/L 21     Results from last 7 days   Lab Units 10/30/21  0530 10/29/21  0249   CALCIUM mg/dL 8.8 9.4   ALBUMIN g/dL  --  3.90       Hemoglobin A1C   Date/Time Value Ref Range Status   10/30/2021 0530 10.33 (H) 4.80 - 5.60 % Final     Glucose   Date/Time Value Ref Range Status   11/01/2021 0602 86 70 - 130 mg/dL Final     Comment:     Meter: [id number] : [id number] Allan Leti NA   10/31/2021 2127 219 (H) 70 - 130 mg/dL Final     Comment:     Meter: [id number] : [id number] Allan Leti NA   10/31/2021 2111 >599 (C) 70 - 130 mg/dL Final     Comment:     RN Notified R and V Meter: [id number] : [id number] Allan Leti NA   10/31/2021 1617 170 (H) 70 - 130 mg/dL Final     Comment:     Meter: [id number] : [id number] [place] April NA   10/31/2021 1054 234 (H) 70 - 130 mg/dL Final     Comment:     Meter: [id number] : [id number] [place] April NA   10/31/2021 0604 104 70 - 130 mg/dL Final     Comment:     Meter: [id number] : [id number] Jocelyne BLUM   10/30/2021 2115 197 (H) 70 - 130 mg/dL Final     Comment:     Meter: [id number] : [id number] Jocelyne BLUM       Scheduled Meds  amLODIPine-lisinopril 5-20 mg combo dose, , Oral, Q24H  donepezil, 10 mg, Oral, Nightly  empagliflozin, 25 mg, Oral, Daily  insulin glargine, 20 Units, Subcutaneous, Daily  insulin lispro, 0-9 Units,  Subcutaneous, 4x Daily With Meals & Nightly  pravastatin, 40 mg, Oral, Q PM  sodium chloride, 10 mL, Intravenous, Q12H    Continuous Infusions   PRN Meds  •  acetaminophen **OR** acetaminophen **OR** acetaminophen  •  calcium carbonate  •  dextrose  •  dextrose  •  glucagon (human recombinant)  •  HYDROcodone-acetaminophen  •  ondansetron **OR** ondansetron  •  [COMPLETED] Insert peripheral IV **AND** sodium chloride  •  [COMPLETED] Insert peripheral IV **AND** sodium chloride  •  sodium chloride     Diet  Diet Regular     Assessment/Plan     Active Hospital Problems    Diagnosis  POA   • **Humeral head fracture, right, closed, initial encounter [S42.291A]  Yes   • Type 2 diabetes mellitus, with long-term current use of insulin (HCC) [E11.9, Z79.4]  Not Applicable   • Hyperlipidemia [E78.5]  Yes   • Essential hypertension [I10]  Yes      Resolved Hospital Problems   No resolved problems to display.     81 y.o. female admitted with Humeral head fracture, right, closed, initial encounter.    Right humeral head fracture  Nonoperative treatment  Nonweightbearing and pendulum exercises okay  PT evaluate for discharge needs  Follow-up orthopedic surgery 3 weeks  Grogginess improved after reduction in pain med  Hypertension controlled  Diabetes mellitus type 2  A1c 10 (13 two months ago)  Glucose okay this morning continue to monitor. She had high glucose yesterday. Switch to low-dose to avoid hypoglycemia  Hyperlipidemia  SCDs for DVT prophylaxis  Full code  Discussed with patient and CCP  Anticipate discharge to SNU facility timing yet to be determined. She lives at home by herself family stays with her at night.    Peewee Costa MD  [facility]  11/01/21  09:24 EDT    I wore protective equipment throughout this patient encounter including a face mask, gloves, and protective eyewear.  Hand hygiene was performed before donning protective equipment and after removal when leaving the room.

## 2021-11-01 NOTE — PLAN OF CARE
Goal Outcome Evaluation:  Plan of Care Reviewed With: patient        Progress: improving  Outcome Summary: Patient ambulating short distances with walker and x1 assist. VSS and voiding function is intact. Pain is managed with po meds. Plan for patient to d/c to snf, CCP following and awaiting facility choices from patient and family.

## 2021-11-01 NOTE — THERAPY TREATMENT NOTE
Patient Name: Di Macario  : 1940    MRN: [id number]                              Today's Date: 2021       Admit Date: 10/29/2021    Visit Dx:     ICD-10-CM ICD-9-CM   1. Humeral head fracture, right, closed, initial encounter  S42.291A 812.09   2. At high risk for falls  Z91.81 V15.88   3. Injury of head, initial encounter  S09.90XA 959.01   4. Laceration of tongue, initial encounter  S01.512A 873.64     Patient Active Problem List   Diagnosis   • Essential hypertension   • Impaired memory   • Vitamin D deficiency   • Hyperuricemia   • Slow transit constipation   • Chronic pain of left knee   • Fall   • At high risk for falls   • Peripheral neuropathy   • Uncontrolled type 2 diabetes mellitus with hyperglycemia (HCC)   • Alkaline phosphatase elevation   • Walker as ambulation aid   • Bradycardia, sinus   • Shortness of breath   • Leg swelling   • Hyperlipidemia   • Presence of cardiac pacemaker   • Humeral head fracture, right, closed, initial encounter   • Type 2 diabetes mellitus, with long-term current use of insulin (HCC)     Past Medical History:   Diagnosis Date   • Dementia (HCC)     states better with medication    • Diabetes mellitus (HCC)    • Hyperlipidemia 2021   • Peripheral neuropathy 2018   • Vitamin D deficiency      Past Surgical History:   Procedure Laterality Date   • ANKLE SURGERY Right    • BLADDER SURGERY      Prolapsed   • BREAST BIOPSY     • CARDIAC ELECTROPHYSIOLOGY PROCEDURE N/A 3/5/2021    Procedure: Pacemaker DC [place];  Surgeon: Madelin Ferguson MD;  Location: [facility] INVASIVE LOCATION;  Service: Cardiology;  Laterality: N/A;   • HYSTERECTOMY        General Information     Row Name 21 1443          OT Time and Intention    Document Type therapy note (daily note)  -LE     Mode of Treatment individual therapy; occupational therapy  -LE     Row Name 21 1443          General Information    Existing Precautions/Restrictions fall  per  ortho notes: pendulum ex OK  -     Row Name 11/01/21 1443          Cognition    Orientation Status (Cognition) oriented to; person; place; situation  -           User Key  (r) = Recorded By, (t) = Taken By, (c) = Cosigned By    Initials Name Provider Type    Stacey Snow OTR Occupational Therapist                 Mobility/ADL's     Row Name 11/01/21 1444          Bed Mobility    Supine-Sit [place] (Bed Mobility) minimum assist (75% patient effort); verbal cues  -LE     Assistive Device (Bed Mobility) bed rails; head of bed elevated  -     Row Name 11/01/21 1444          Transfers    Transfers toilet transfer; sit-stand transfer; bed-chair transfer  -LE     Comment (Transfers) HHA for xfers.  -LE     Bed-Chair [place] (Transfers) minimum assist (75% patient effort); verbal cues  -LE     Sit-Stand [place] (Transfers) minimum assist (75% patient effort); verbal cues  -LE     [place] Level (Toilet Transfer) minimum assist (75% patient effort); verbal cues  -LE     Assistive Device (Toilet Transfer) grab bars/safety frame  -     Row Name 11/01/21 1444          Toilet Transfer    Type (Toilet Transfer) stand pivot/stand step  -     Row Name 11/01/21 1444          Functional Mobility    Functional Mobility- Ind. Level minimum assist (75% patient effort)  -     Functional Mobility-Distance (Feet) --  bed to bathroom to chair.  -[facility] Name 11/01/21 1444          Activities of Daily Living    BADL Assessment/Intervention toileting; grooming; lower body dressing  -[facility] Name 11/01/21 1444          Mobility    Right Upper Extremity (Weight-bearing Status) non weight-bearing (NWB)  in sling. OT adjusts sling and applies swath which help keep sling from sliding and pt reports UE feels more supported.ed pt to adjsut sling if slides and presses at wrist crease.  -     Row Name 11/01/21 1444          Lower Body Dressing Assessment/Training    [place] Level (Lower Body Dressing)  don; socks; dependent (less than 25% patient effort)  -ISABELA     Position (Lower Body Dressing) edge of bed sitting  -ISABELA     Comment (Lower Body Dressing) pt unable to leg cross to reach feet and pain limits bending over to reach feet.  -     Row Name 11/01/21 1444          Upper Body Dressing Assessment/Training    Comment (Upper Body Dressing) OT adjust sling  -     Row Name 11/01/21 1444          Toileting Assessment/Training    [place] Level (Toileting) perform perineal hygiene; adjust/manage clothing; moderate assist (50% patient effort)  -ISABELA     Comment (Toileting) pt does hygeine with set up.  OT assist hike/lower brief.  -ISABELA           User Key  (r) = Recorded By, (t) = Taken By, (c) = Cosigned By    Initials Name Provider Type    Stacey Snow OTR Occupational Therapist               Obj/Interventions     Row Name 11/01/21 1447          Range of Motion Comprehensive    Comment, General Range of Motion R UE in sling.  fingers, wrist and forearm 2/3.  min/mod edema noted.  -     Row Name 11/01/21 1447          Shoulder (Therapeutic Exercise)    Shoulder Pendulum Exercises (Therapeutic Exercise) sitting; 15 seconds duration  attempt pendulum.  pt sits EOB, dangles R UE to side with OT support, unable to sway on hips for pendulum movement.  -     Row Name 11/01/21 1447          Elbow/Forearm (Therapeutic Exercise)    Elbow/Forearm (Therapeutic Exercise) AROM (active range of motion)  -ISABELA     Elbow/Forearm AROM (Therapeutic Exercise) right; 10 repetitions; extension; flexion  -ISABELA     Elbow/Forearm AAROM (Therapeutic Exercise) right; flexion; extension; 10 repetitions  -[facility] Name 11/01/21 1447          Wrist (Therapeutic Exercise)    Wrist (Therapeutic Exercise) AROM (active range of motion)  -ISABELA     Wrist AROM (Therapeutic Exercise) right; flexion; extension; 10 repetitions  -     Row Name 11/01/21 1447          Hand (Therapeutic Exercise)    Hand (Therapeutic Exercise) AROM (active range of  motion)  -LE     Hand AROM/AAROM (Therapeutic Exercise) right; finger flexion; finger extension; 10 repetitions  -     Row Name 11/01/21 1447          Balance    Balance Assessment sitting static balance; standing static balance; standing dynamic balance  -LE     Static Sitting Balance WFL  -LE     Dynamic Standing Balance mild impairment  close SBA/CGA.  mild posterior lean when first stand.  cues for posture and trunk activaiton.  -     Row Name 11/01/21 1447          Therapeutic Exercise    Therapeutic Exercise shoulder; elbow/forearm; wrist; hand  -LE           User Key  (r) = Recorded By, (t) = Taken By, (c) = Cosigned By    Initials Name Provider Type    Stacey Snow OTR Occupational Therapist               Goals/Plan    No documentation.                Clinical Impression     Row Name 11/01/21 1457          Pain Scale: Numbers Pre/Post-Treatment    Pretreatment Pain Rating 8/10  -LE     Posttreatment Pain Rating 8/10  -LE     Pain Location - Side Right  -LE     Pain Location - Orientation upper  -LE     Pain Location extremity  -LE     Pain Intervention(s) Repositioned; Rest; Emotional support  -     Row Name 11/01/21 1645          Plan of Care Review    Progress improving  -LE     Outcome Summary Pt seen by OT with focus on ambulation to bathroom with hand held assist and toileting with max A. Pt completes light R UE ex but pain prohibits pendulum ex.  -     Row Name 11/01/21 1450          Vital Signs    O2 Delivery Pre Treatment room air  -LE     O2 Delivery Intra Treatment room air  -LE     O2 Delivery Post Treatment room air  -LE     Pre Patient Position Supine  -LE     Intra Patient Position Standing  -LE     Post Patient Position Sitting  -LE     Row Name 11/01/21 7074          Positioning and Restraints    Pre-Treatment Position in bed  -LE     Post Treatment Position chair  -LE     In Chair reclined; call light within reach; encouraged to call for assist; exit alarm on; RUE elevated  -LE            User Key  (r) = Recorded By, (t) = Taken By, (c) = Cosigned By    Initials Name Provider Type    Stacey Snow OTR Occupational Therapist               Outcome Measures     Row Name 11/01/21 1451          How much help from another is currently needed...    Putting on and taking off regular lower body clothing? 1  -LE     Bathing (including washing, rinsing, and drying) 2  -LE     Toileting (which includes using toilet bed pan or urinal) 2  -LE     Putting on and taking off regular upper body clothing 2  -LE     Taking care of personal grooming (such as brushing teeth) 3  -LE     Eating meals 3  -LE     AM-PAC 6 Clicks Score (OT) 13  -LE     Row Name 11/01/21 1144          How much help from another person do you currently need...    Turning from your back to your side while in flat bed without using bedrails? 3  -EJ (r) CLYDE (t) EJ (c)     Moving from lying on back to sitting on the side of a flat bed without bedrails? 2  -EJ (r) CLYDE (t) EJ (c)     Moving to and from a bed to a chair (including a wheelchair)? 2  -EJ (r) CLYDE (t) EJ (c)     Standing up from a chair using your arms (e.g., wheelchair, bedside chair)? 3  -EJ (r) CLYDE (t) EJ (c)     Climbing 3-5 steps with a railing? 2  -EJ (r) CLYDE (t) EJ (c)     To walk in hospital room? 2  -EJ (r) CLYDE (t) EJ (c)     AM-PAC 6 Clicks Score (PT) 14  -EJ (r) CLYDE (t)     Row Name 11/01/21 1451 11/01/21 1144       Functional Assessment    Outcome Measure Options AM-PAC 6 Clicks Daily Activity (OT)  -LE AM-PAC 6 Clicks Basic Mobility (PT)  -EJ (r) CLYDE (t) EJ (c)          User Key  (r) = Recorded By, (t) = Taken By, (c) = Cosigned By    Initials Name Provider Type    Stacey Snow, OTR Occupational Therapist    Mile Valera, PT Physical Therapist    Christopher Perez, PT Student PT Student                Occupational Therapy Education                 Title: PT OT SLP Therapies (In Progress)     Topic: Occupational Therapy (In Progress)     Point: ADL training  (Done)     Description:   Instruct learner(s) on proper safety adaptation and remediation techniques during self care or transfers.   Instruct in proper use of assistive devices.              Learning Progress Summary           Patient Acceptance, E, VU,DU by  at 10/30/2021 1309    Comment: The role of OT, RUE precautions, sling wearing schedule, HEP                   Point: Home exercise program (Done)     Description:   Instruct learner(s) on appropriate technique for monitoring, assisting and/or progressing therapeutic exercises/activities.              Learning Progress Summary           Patient Acceptance, E, VU,DU by BL at 10/30/2021 1309    Comment: The role of OT, RUE precautions, sling wearing schedule, HEP                   Point: Precautions (Done)     Description:   Instruct learner(s) on prescribed precautions during self-care and functional transfers.              Learning Progress Summary           Patient Acceptance, E, VU,DU by  at 10/30/2021 1309    Comment: The role of OT, RUE precautions, sling wearing schedule, HEP                   Point: Body mechanics (Not Started)     Description:   Instruct learner(s) on proper positioning and spine alignment during self-care, functional mobility activities and/or exercises.              Learner Progress:  Not documented in this visit.                      User Key     Initials Effective Dates Name Provider Type Discipline     01/05/21 -  Alexa Garcia OT Occupational Therapist OT              OT Recommendation and Plan     Plan of Care Review  Progress: improving  Outcome Summary: Pt seen by OT with focus on ambulation to bathroom with hand held assist and toileting with max A. Pt completes light R UE ex but pain prohibits pendulum ex.     Time Calculation:    Time Calculation- OT     Row Name 11/01/21 1452             Time Calculation- OT    OT Start Time 1410  -LE      OT Stop Time 1433  -LE      OT Time Calculation (min) 23 min  -LE      Total  Timed Code Minutes- OT 23 minute(s)  -LE      OT Received On 11/01/21  -LE      OT - Next Appointment 11/02/21  -LE              Timed Charges    38886 - OT Therapeutic Exercise Minutes 10  -LE      47264 - OT Self Care/Mgmt Minutes 13  -LE              Total Minutes    Timed Charges Total Minutes 23  -LE       Total Minutes 23  -LE            User Key  (r) = Recorded By, (t) = Taken By, (c) = Cosigned By    Initials Name Provider Type    Stacey Snow OTR Occupational Therapist              Therapy Charges for Today     Code Description Service Date Service Provider Modifiers Qty    [id number]  OT THER PROC EA 15 MIN 11/1/2021 Stacey Foster OTR GO 1    [id number] HC OT SELF CARE/MGMT/TRAIN EA 15 MIN 11/1/2021 Stacey Foster OTR GO 1               PRISCILLA Nova  11/1/2021

## 2021-11-01 NOTE — PLAN OF CARE
Goal Outcome Evaluation:           Progress: improving  Outcome Summary: Pt seen by OT with focus on ambulation to bathroom with hand held assist and toileting with max A. Pt completes light R UE ex but pain prohibits pendulum ex.        Patient was placed in a face mask intermittently during this therapy encounter. Therapist used appropriate personal protective equipment including surgical mask, eye shield and gloves during the entire therapy session. Hand hygiene was completed before and after therapy session. Patient is not in enhanced droplet precautions.      ,epp;

## 2021-11-01 NOTE — CASE MANAGEMENT/SOCIAL WORK
Continued Stay Note  [facility]     Patient Name: Di Macario  MRN: [id number]  Today's Date: 11/1/2021    Admit Date: 10/29/2021     Discharge Plan     Row Name 11/01/21 1615       Plan    Plan [facility] -- Accepted.    Patient/Family in Agreement with Plan yes    Plan Comments Spoke with César who has accepted the pt and will have a SNF bed for the pt tomorrow at the [facility]. Updated the pt's daughter/Blossom who's agreeable. No other needs identified.    Row Name 11/01/21 1530       Plan    Plan [facility] SNF -- Referral Pending.    Patient/Family in Agreement with Plan yes    Plan Comments Spoke with Myke who is unable to accept due to no bed availability at this time. Spoke with César who is unable to accept to [facility], but may have a SNF bed today at the [facility]. She will look into their availability and f/u with CCP.               Discharge Codes    No documentation.               Expected Discharge Date and Time     Expected Discharge Date Expected Discharge Time    Nov 2, 2021             Carolyn Jaeger RN

## 2021-11-02 VITALS
SYSTOLIC BLOOD PRESSURE: 127 MMHG | OXYGEN SATURATION: 97 % | BODY MASS INDEX: 26.88 KG/M2 | WEIGHT: 151.68 LBS | DIASTOLIC BLOOD PRESSURE: 68 MMHG | HEIGHT: 63 IN | TEMPERATURE: 97.8 F | RESPIRATION RATE: 16 BRPM | HEART RATE: 67 BPM

## 2021-11-02 LAB
GLUCOSE BLDC GLUCOMTR-MCNC: 160 MG/DL (ref 70–130)
GLUCOSE BLDC GLUCOMTR-MCNC: 371 MG/DL (ref 70–130)

## 2021-11-02 PROCEDURE — 63710000001 INSULIN GLARGINE PER 5 UNITS: Performed by: HOSPITALIST

## 2021-11-02 PROCEDURE — 63710000001 INSULIN LISPRO (HUMAN) PER 5 UNITS: Performed by: HOSPITALIST

## 2021-11-02 PROCEDURE — G0378 HOSPITAL OBSERVATION PER HR: HCPCS

## 2021-11-02 PROCEDURE — 82962 GLUCOSE BLOOD TEST: CPT

## 2021-11-02 RX ORDER — HYDROCODONE BITARTRATE AND ACETAMINOPHEN 5; 325 MG/1; MG/1
1 TABLET ORAL EVERY 6 HOURS PRN
Qty: 10 TABLET | Refills: 0 | Status: SHIPPED | OUTPATIENT
Start: 2021-11-02 | End: 2021-11-06

## 2021-11-02 RX ORDER — ZONISAMIDE 100 MG
200 CAPSULE ORAL NIGHTLY
Start: 2021-11-02

## 2021-11-02 RX ORDER — INSULIN GLARGINE 100 [IU]/ML
20 INJECTION, SOLUTION SUBCUTANEOUS DAILY
Refills: 12
Start: 2021-11-03 | End: 2022-02-22 | Stop reason: SDUPTHER

## 2021-11-02 RX ADMIN — EMPAGLIFLOZIN 25 MG: 25 TABLET, FILM COATED ORAL at 09:07

## 2021-11-02 RX ADMIN — HYDROCODONE BITARTRATE AND ACETAMINOPHEN 1 TABLET: 5; 325 TABLET ORAL at 13:38

## 2021-11-02 RX ADMIN — INSULIN LISPRO 6 UNITS: 100 INJECTION, SOLUTION INTRAVENOUS; SUBCUTANEOUS at 11:53

## 2021-11-02 RX ADMIN — SODIUM CHLORIDE, PRESERVATIVE FREE 10 ML: 5 INJECTION INTRAVENOUS at 09:08

## 2021-11-02 RX ADMIN — LISINOPRIL: 20 TABLET ORAL at 09:07

## 2021-11-02 RX ADMIN — INSULIN GLARGINE 20 UNITS: 100 INJECTION, SOLUTION SUBCUTANEOUS at 09:04

## 2021-11-02 NOTE — PLAN OF CARE
Goal Outcome Evaluation:  Plan of Care Reviewed With: patient        Progress: improving  Outcome Summary: patient resting comfortably between care, voiding without difficulty, no complaints of pain at this time, educated on b/p monitoring

## 2021-11-02 NOTE — CASE MANAGEMENT/SOCIAL WORK
Continued Stay Note  [facility]     Patient Name: Di Macario  MRN: [id number]  Today's Date: 11/2/2021    Admit Date: 10/29/2021     Discharge Plan     Row Name 11/02/21 0950       Plan    Plan [facility] -- Accepted.    Patient/Family in Agreement with Plan yes    Plan Comments Spoke with César who has a SNF bed for the patient at the [facility] today. Updated Dr. Costa who plans to d/c today. Scheduled a [facility] for today at 1300 (spoke with Natividad). Updated the deandrenet, her daughter/Jacki Cerrato RN and César who are all agreeable. No other needs identified.    Final Discharge Disposition Code 03 - skilled nursing facility (SNF)    Final Note Patient's discharging to the [facility].               Discharge Codes    No documentation.               Expected Discharge Date and Time     Expected Discharge Date Expected Discharge Time    Nov 2, 2021             Carolyn Jaeger RN

## 2021-11-02 NOTE — DISCHARGE INSTRUCTIONS
Humerus Fracture Treated With Immobilization    The humerus is the large bone in the upper arm. A broken (fractured) humerus is often treated by wearing a cast, splint, or sling (immobilization). This holds the broken pieces in place so they can heal.  What are the causes?  This condition may be caused by:  · A fall.  · A hard, direct hit to the arm.  · A car accident.  What increases the risk?  You are more likely to develop this condition if:  · You are elderly.  · You have a disease that makes the bones thin and weak.  What are the signs or symptoms?  · Pain.  · Swelling.  · Bruising.  · Not being able to move your arm normally.  How is this treated?  Treatment involves wearing a cast, splint, or sling until your arm heals enough for you to begin range-of-motion exercises. You may also be prescribed pain medicine.  Follow these instructions at home:  If you have a cast:  · Do not stick anything inside the cast to scratch your skin.  · Check the skin around the cast every day. Tell your doctor if you have any concerns.  · You may put lotion on dry skin around the edges of the cast. Do not put lotion on the skin under the cast.  · Keep the cast clean and dry.  If you have a splint or sling:  · Wear the splint or sling as told by your doctor. Remove it only as told by your doctor.  · Loosen the splint or sling if your fingers:  ? Tingle.  ? Become numb.  ? Turn cold and blue.  · Keep the splint or sling clean and dry.  Bathing  · Do not take baths, swim, or use a hot tub until your doctor says that you can. Ask your doctor if you may take showers. You may only be allowed to take sponge baths.  · If your cast, splint, or sling is not waterproof:  ? Do not let it get wet.  ? Cover it with a watertight covering when you take a bath or shower.  · If you have a sling, remove it for bathing only if your doctor says this is okay.  Managing pain, stiffness, and swelling    · If told, put ice on the injured area.  ? If you  have a removable splint or sling, remove it as told by your doctor.  ? Put ice in a plastic bag.  ? Place a towel between your skin and the bag or between your cast and the bag.  ? Leave the ice on for 20 minutes, 2-3 times a day.  · Move your fingers often.  · Raise (elevate) the injured area above the level of your heart while you are sitting or lying down.    Driving  · Do not drive or use heavy machinery while taking prescription pain medicine.  · Do not drive while wearing a cast, splint, or sling on an arm that you use for driving.  Activity  · Return to your normal activities as told by your doctor. Ask your doctor what activities are safe for you.  · Do not lift anything until your doctor says that it is safe.  · Do range-of-motion exercises only as told by your doctor.  General instructions  · Do not put pressure on any part of the cast or splint until it is fully hardened. This may take many hours.  · Do not use any products that contain nicotine or tobacco, such as cigarettes, e-cigarettes, and chewing tobacco. These can delay bone healing. If you need help quitting, ask your doctor.  · Take over-the-counter and prescription medicines only as told by your doctor.  · Ask your doctor if the medicine you are taking can cause trouble pooping (constipation). You may need to take steps to prevent or treat trouble pooping:  ? Drink enough fluid to keep your pee (urine) pale yellow.  ? Take over-the-counter or prescription medicines.  ? Eat foods that are high in fiber. These include beans, whole grains, and fresh fruits and vegetables.  ? Limit foods that are high in fat and sugar. These include fried or sweet foods.  · Keep all follow-up visits as told by your doctor. This is important.  Contact a doctor if:  · You have any new pain, swelling, or bruising.  · Your pain, swelling, and bruising do not get better.  · Your cast, splint, or sling becomes loose or damaged.  Get help right away if:  · Your skin or  fingers on your injured arm turn blue or gray.  · Your arm is cold or numb.  · You have very bad pain in your injured arm.  Summary  · The humerus is the large bone in the upper arm.  · A broken humerus is often treated by wearing a cast, splint, or sling.  · Wear a splint or sling as told by your doctor. Remove it only as told by your doctor.  · Move your fingers often.  This information is not intended to replace advice given to you by your health care provider. Make sure you discuss any questions you have with your health care provider.  Document Revised: 08/19/2019 Document Reviewed: 08/19/2019  Elsevier Patient Education © 2021 [employer]

## 2021-11-02 NOTE — DISCHARGE SUMMARY
[facility]    Date of Discharge:  11/2/2021    PCP: Lily Yepez PA-C    Discharge Diagnosis:   Active Hospital Problems    Diagnosis  POA   • **Humeral head fracture, right, closed, initial encounter [S42.535A]  Yes   • Type 2 diabetes mellitus, with long-term current use of insulin (HCC) [E11.9, Z79.4]  Not Applicable   • Hyperlipidemia [E78.5]  Yes   • Essential hypertension [I10]  Yes      Resolved Hospital Problems   No resolved problems to display.          Consults     Date and Time Order Name Status Description    10/29/2021  7:10 AM Inpatient Orthopedic Surgery Consult Completed     10/29/2021  6:45 AM Inpatient Orthopedic Surgery Consult Completed     10/29/2021  2:29 AM [facility] (on-call MD unless specified) Details Completed         Hospital Course  81 y.o. female lives by herself tripped over her walker had a mechanical fall and right humerus fracture. Orthopedic surgery recommended nonoperative treatment. Nonweightbearing and pendulum exercises okay. They would like to see her in 3 weeks. Pain control was achieved. Patient was felt to be appropriate candidate for skilled PT at skilled nursing facility due to amount of assistance needed and deficits along with decreased strength and endurance.    She is on an attenuated dose of long-acting insulin as well as correctional insulin. Will need to be monitored closely and doses adjusted appropriately. Her A1c was 10 this admission. It was 13 two months ago.    Condition on Discharge: Stable for discharge. Patient would like to go to rehab today.     Temp:  [96.9 °F (36.1 °C)-98.4 °F (36.9 °C)] 97.8 °F (36.6 °C)  Heart Rate:  [60-63] 60  Resp:  [16-18] 16  BP: (105-126)/(59-67) 123/65  Body mass index is 26.87 kg/m².    Physical Exam  Constitutional:       General: She is not in acute distress.     Appearance: Normal appearance. She is not toxic-appearing.   HENT:      Head: Normocephalic and atraumatic.    Cardiovascular:      Rate and Rhythm: Normal rate and regular rhythm.   Pulmonary:      Effort: Pulmonary effort is normal. No respiratory distress.      Breath sounds: Normal breath sounds. No wheezing or rhonchi.   Abdominal:      General: Bowel sounds are normal. There is no distension.      Palpations: Abdomen is soft.      Tenderness: There is no abdominal tenderness. There is no guarding or rebound.   Musculoskeletal:         General: No swelling.      Right lower leg: No edema.      Left lower leg: No edema.      Comments: Right upper extremity sling   Skin:     General: Skin is warm and dry.   Neurological:      General: No focal deficit present.      Mental Status: She is alert and oriented to person, place, and time.   Psychiatric:         Mood and Affect: Mood normal.         Behavior: Behavior normal.     While in the room and during my examination of the patient I wore gloves, mask, eye protection.  I washed my hands before and after this patient encounter.     Disposition: Skilled Nursing Facility (DC - External)       Discharge Medications      New Medications      Instructions Start Date   HYDROcodone-acetaminophen 5-325 MG per tablet  Commonly known as: NORCO   1 tablet, Oral, Every 6 Hours PRN      insulin glargine 100 UNIT/ML injection  Commonly known as: LANWOODY SEMGLEE  Replaces: Toujeo Max SoloStar 300 UNIT/ML solution pen-injector injection   20 Units, Subcutaneous, Daily   Start Date: November 3, 2021        Changes to Medications      Instructions Start Date   Zonegran 100 MG capsule  Generic drug: zonisamide  What changed:   · how much to take  · how to take this  · when to take this   200 mg, Oral, Nightly         Continue These Medications      Instructions Start Date   acetaminophen 325 MG tablet  Commonly known as: TYLENOL   650 mg, Oral, Every 6 Hours PRN      amLODIPine-benazepril 5-20 MG per capsule  Commonly known as: Lotrel   1 capsule, Oral, Daily      BD Pen Needle Norma U/F 32G  X 4 MM misc  Generic drug: Insulin Pen Needle   Use once a day      donepezil 10 MG tablet  Commonly known as: ARICEPT   10 mg, Oral, Nightly      freestyle lancets   1 each, Other, 4 Times Daily, Use as instructed      FreeStyle Aguila 2 Sensor misc   1 Device, Does not apply, Every 14 Days      Gvoke HypoPen 2-Pack 1 MG/0.2ML solution auto-injector  Generic drug: Glucagon   1 mg, Subcutaneous, As Needed      Jardiance 25 MG tablet tablet  Generic drug: empagliflozin   25 mg, Oral, Daily      multivitamin with minerals tablet tablet   1 tablet, Oral, Daily      pravastatin 40 MG tablet  Commonly known as: PRAVACHOL   40 mg, Oral, Every Evening      Trulicity 1.5 MG/0.5ML solution pen-injector  Generic drug: Dulaglutide   1.5 mg, Subcutaneous, Weekly         Stop These Medications    FREESTYLE LITE test strip  Generic drug: glucose blood     Toujeo Max SoloStar 300 UNIT/ML solution pen-injector injection  Generic drug: Insulin Glargine (2 Unit Dial)  Replaced by: insulin glargine 100 UNIT/ML injection           Diet Instructions     Diet: Regular, Consistent Carbohydrate      Discharge Diet:  Regular  Consistent Carbohydrate            Activity Instructions     Activity as Tolerated      Right Upper Extremity: Non Weight Bearing and pendulum exercises okay         Additional Instructions for the Follow-ups that You Need to Schedule     Call MD for problems / concerns.   As directed         Contact information for follow-up providers     Pankaj Cohen MD Follow up in 3 week(s).    Specialty: Orthopedic Surgery  Contact information:  [address] [facility] [address]  [place] [address]  [phone]             Lily Yepez PAKlausC .    Specialties: Physician Assistant, Internal Medicine, Family Medicine  Contact information:  [address]  [facility] [address]  [place] [address]  [phone]                   Contact information for after-discharge care     Destination     [place] [facility] [place] .    Service: Skilled  Nursing  Contact information:  [address]   [facility] [address]  [phone]                               Peewee Costa MD  11/02/21  10:00 EDT    Discharge time spent greater than 30 minutes.

## 2021-11-02 NOTE — PLAN OF CARE
Administratively Closed by Health Information Management    Dishcarged to rehab today, iv removed.

## 2021-11-29 ENCOUNTER — TELEPHONE (OUTPATIENT)
Dept: INTERNAL MEDICINE | Facility: CLINIC | Age: 81
End: 2021-11-29

## 2021-11-29 NOTE — TELEPHONE ENCOUNTER
Patient is leaving skilled nursing and is needing a verbal home health order, please call Shellie Singh with [facility] at [phone]

## 2021-12-01 ENCOUNTER — TRANSCRIBE ORDERS (OUTPATIENT)
Dept: HOME HEALTH SERVICES | Facility: HOME HEALTHCARE | Age: 81
End: 2021-12-01

## 2021-12-01 ENCOUNTER — HOME HEALTH ADMISSION (OUTPATIENT)
Dept: HOME HEALTH SERVICES | Facility: HOME HEALTHCARE | Age: 81
End: 2021-12-01

## 2021-12-01 DIAGNOSIS — S42.411A CLOSED SUPRACONDYLAR FRACTURE OF RIGHT HUMERUS, INITIAL ENCOUNTER: Primary | ICD-10-CM

## 2021-12-03 ENCOUNTER — HOME CARE VISIT (OUTPATIENT)
Dept: HOME HEALTH SERVICES | Facility: HOME HEALTHCARE | Age: 81
End: 2021-12-03

## 2021-12-03 VITALS
SYSTOLIC BLOOD PRESSURE: 104 MMHG | RESPIRATION RATE: 18 BRPM | TEMPERATURE: 97 F | DIASTOLIC BLOOD PRESSURE: 64 MMHG | OXYGEN SATURATION: 98 % | HEART RATE: 68 BPM

## 2021-12-03 PROCEDURE — G0151 HHCP-SERV OF PT,EA 15 MIN: HCPCS

## 2021-12-03 NOTE — HOME HEALTH
"SUBJECTIVE: \"I'm doing ok. Glad to be home.\"    DIAGNOSIS: Right Humeral head fracture due to fall at home    PAST MEDICAL HX: Type 2 diabetes mellitus with long-term current use of insulin and neuropathies bilateral feet, Hyperlipidemia, HTN, Pacemaker, Mild dementia    PRIOR LEVEL OF FUNCTION: independent with rollator in her home; spouse had passed away in July so daughters had been staying with her at night as she didn't want to be alone      PLAN FOR NEXT VISIT: Continue 2wk2 next week with need for clarification from ortho what she is allowed to do with right arm in terms of ROM/exercises and if can use rollator vs quad cane with NWB right arm, gait safety with device as allowed by MD, HEP instruction and patient family education as needed"

## 2021-12-04 NOTE — CASE COMMUNICATION
[facility] note: Patient is 80y/o female with right closed humeral proximal fx [id number] and hx of DM with insulin use with bilateral leg neuropathies and hx of pacemaker who has just returned home from subacute rehab at [facility]. PT is needed 1wk1, 2wk2 for gait safety with devices as needed checking with MD if can return to using rollator yet as still NWB right arm, HEP training for strengthening and balance, check with MD what exercis es are allowed for right shoulder with PT or OT to teach as needed, and patient family education as needed. OT is also ordered for ADL assessment and treatment as needed, right shoulder exercises as allowed by MD as noted above for PT to check. Prior functional level was independent living at home with rollator past 2 years but became  back in July with daughters taking turns staying with her at night as she did not want to be toñito e after spouse passed away. Currently daughters staying 24/7 with patient. WIll not be homebound very long so will probably need outpatient therapy after home care discharge depending on what MD allows her to do for exercises with right shoulder as still healing and returns to Dr Choen in January. Dr Shannon Ordaz is primary MD/plan of care doctor per Shellie Caro as Lily ARMAS listed from same office is on maternity leave.

## 2021-12-06 ENCOUNTER — HOME CARE VISIT (OUTPATIENT)
Dept: HOME HEALTH SERVICES | Facility: HOME HEALTHCARE | Age: 81
End: 2021-12-06

## 2021-12-06 ENCOUNTER — TELEPHONE (OUTPATIENT)
Dept: INTERNAL MEDICINE | Facility: CLINIC | Age: 81
End: 2021-12-06

## 2021-12-06 VITALS
DIASTOLIC BLOOD PRESSURE: 64 MMHG | OXYGEN SATURATION: 99 % | SYSTOLIC BLOOD PRESSURE: 118 MMHG | TEMPERATURE: 97.1 F | HEART RATE: 70 BPM | RESPIRATION RATE: 18 BRPM

## 2021-12-06 DIAGNOSIS — E11.65 UNCONTROLLED TYPE 2 DIABETES MELLITUS WITH HYPERGLYCEMIA (HCC): ICD-10-CM

## 2021-12-06 DIAGNOSIS — M79.89 LEG SWELLING: ICD-10-CM

## 2021-12-06 DIAGNOSIS — E79.0 HYPERURICEMIA: Primary | ICD-10-CM

## 2021-12-06 DIAGNOSIS — I10 ESSENTIAL HYPERTENSION: ICD-10-CM

## 2021-12-06 DIAGNOSIS — E78.5 HYPERLIPIDEMIA, UNSPECIFIED HYPERLIPIDEMIA TYPE: ICD-10-CM

## 2021-12-06 DIAGNOSIS — Z79.4 TYPE 2 DIABETES MELLITUS WITH HYPERGLYCEMIA, WITH LONG-TERM CURRENT USE OF INSULIN (HCC): ICD-10-CM

## 2021-12-06 DIAGNOSIS — E11.65 TYPE 2 DIABETES MELLITUS WITH HYPERGLYCEMIA, WITH LONG-TERM CURRENT USE OF INSULIN (HCC): ICD-10-CM

## 2021-12-06 PROCEDURE — G0152 HHCP-SERV OF OT,EA 15 MIN: HCPCS

## 2021-12-06 NOTE — TELEPHONE ENCOUNTER
Caller: REGGIE    Relationship: [facility]     Best call back number: [phone]    What orders are you requesting (i.e. lab or imaging): OCCUPATIONAL THERAPY    In what timeframe would the patient need to come in: N/A    Where will you receive your lab/imaging services: AT HOME    Additional notes: REGGIE IS WANTING VERBAL ORDERS TO CONTINUE WORKING WITH THE PATIENT.   2 TIMES A WEEK FOR 2 WEEKS  1 TIME A WEEK FOR 1 WEEK

## 2021-12-07 ENCOUNTER — HOME CARE VISIT (OUTPATIENT)
Dept: HOME HEALTH SERVICES | Facility: HOME HEALTHCARE | Age: 81
End: 2021-12-07

## 2021-12-07 VITALS
OXYGEN SATURATION: 99 % | TEMPERATURE: 96.8 F | DIASTOLIC BLOOD PRESSURE: 66 MMHG | SYSTOLIC BLOOD PRESSURE: 120 MMHG | HEART RATE: 74 BPM | RESPIRATION RATE: 18 BRPM

## 2021-12-07 LAB
ALBUMIN SERPL-MCNC: 4 G/DL (ref 3.6–4.6)
ALBUMIN/CREAT UR: 19 MG/G CREAT (ref 0–29)
ALBUMIN/GLOB SERPL: 1.6 {RATIO} (ref 1.2–2.2)
ALP SERPL-CCNC: 155 IU/L (ref 44–121)
ALT SERPL-CCNC: 11 IU/L (ref 0–32)
AST SERPL-CCNC: 12 IU/L (ref 0–40)
BILIRUB SERPL-MCNC: 0.2 MG/DL (ref 0–1.2)
BUN SERPL-MCNC: 22 MG/DL (ref 8–27)
BUN/CREAT SERPL: 28 (ref 12–28)
CALCIUM SERPL-MCNC: 10 MG/DL (ref 8.7–10.3)
CHLORIDE SERPL-SCNC: 102 MMOL/L (ref 96–106)
CHOLEST SERPL-MCNC: 153 MG/DL (ref 100–199)
CO2 SERPL-SCNC: 25 MMOL/L (ref 20–29)
CREAT SERPL-MCNC: 0.78 MG/DL (ref 0.57–1)
CREAT UR-MCNC: 99.8 MG/DL
GLOBULIN SER CALC-MCNC: 2.5 G/DL (ref 1.5–4.5)
GLUCOSE SERPL-MCNC: 193 MG/DL (ref 65–99)
HBA1C MFR BLD: 8.4 % (ref 4.8–5.6)
HDLC SERPL-MCNC: 68 MG/DL
IMP & REVIEW OF LAB RESULTS: NORMAL
LDLC SERPL CALC-MCNC: 64 MG/DL (ref 0–99)
MICROALBUMIN UR-MCNC: 18.9 UG/ML
POTASSIUM SERPL-SCNC: 4.6 MMOL/L (ref 3.5–5.2)
PROT SERPL-MCNC: 6.5 G/DL (ref 6–8.5)
SODIUM SERPL-SCNC: 140 MMOL/L (ref 134–144)
T4 FREE SERPL-MCNC: 1.39 NG/DL (ref 0.82–1.77)
TRIGL SERPL-MCNC: 119 MG/DL (ref 0–149)
TSH SERPL DL<=0.005 MIU/L-ACNC: 2.49 UIU/ML (ref 0.45–4.5)
URATE SERPL-MCNC: 4.8 MG/DL (ref 3.1–7.9)
VLDLC SERPL CALC-MCNC: 21 MG/DL (ref 5–40)

## 2021-12-07 PROCEDURE — G0151 HHCP-SERV OF PT,EA 15 MIN: HCPCS

## 2021-12-07 NOTE — HOME HEALTH
"Subjective: \"I am doing pretty well.\"    Falls reported: none    Medication changes: none      Plan for next visit: review standing exercises for strengthening and add balance activities as tolerated, gait training with quad cane thru her home and assess on wheelchair ramp and reassess on 2 steps into laundry room, and patient family education as needed"

## 2021-12-07 NOTE — HOME HEALTH
"CURRENT SITUATION: 10-29-21, was at home walking w/rollator, fell and sustained a 3-part [facility] Was [facility] to [facility] and completed her rehab (x3 wks) and is now home w/HH PT/OT. Also, has DM-II, diabetic polyneuropathy, HTN.  PMHx: unspecified dementia, fall hx (>1 yr between most recent and last fall), hx of smoker, longer term use of insulin and oral hypoglycemic medications.    CHIEF COMPLAINT: arm pain  SUBJECTIVE: \"I'm not supposed to use my right arm.\" Agreeable to OT evaluation.    SOCIAL/ENVIRONMENT: Pt lives alone, recently , has 4 daughters, 3 live local, 1 in [place] but currently here to stay w/her 25/7 till after [place]. Hired help do IADLs: laundry (in garage w/large step to area), cleaning, meals/order in food.  PATIENT'S/CAREGIVER'S GOAL: \"Get back to using my arm\"    PLAN FOR NEXT VISIT: ADLs; shower, dressing."

## 2021-12-08 ENCOUNTER — HOME CARE VISIT (OUTPATIENT)
Dept: HOME HEALTH SERVICES | Facility: HOME HEALTHCARE | Age: 81
End: 2021-12-08

## 2021-12-08 VITALS
RESPIRATION RATE: 18 BRPM | HEART RATE: 62 BPM | TEMPERATURE: 97 F | DIASTOLIC BLOOD PRESSURE: 58 MMHG | OXYGEN SATURATION: 99 % | SYSTOLIC BLOOD PRESSURE: 120 MMHG

## 2021-12-08 PROCEDURE — G0151 HHCP-SERV OF PT,EA 15 MIN: HCPCS

## 2021-12-08 NOTE — HOME HEALTH
"Subjective: \"I'm doing better everyday, I think.\"    Falls reported: none    Medication changes: none      Plan for next visit: gait training with quad cane including ramp to get out of home, reinstruct HEP adding further balance activities as tolerated with HEP handout review, and patient family education as needed including diabetic feet management"

## 2021-12-10 ENCOUNTER — HOME CARE VISIT (OUTPATIENT)
Dept: HOME HEALTH SERVICES | Facility: HOME HEALTHCARE | Age: 81
End: 2021-12-10

## 2021-12-10 VITALS
TEMPERATURE: 97.9 F | SYSTOLIC BLOOD PRESSURE: 110 MMHG | DIASTOLIC BLOOD PRESSURE: 60 MMHG | OXYGEN SATURATION: 99 % | RESPIRATION RATE: 17 BRPM | HEART RATE: 61 BPM

## 2021-12-10 PROCEDURE — G0152 HHCP-SERV OF OT,EA 15 MIN: HCPCS

## 2021-12-10 NOTE — HOME HEALTH
"SUBJECTIVE: \"I remembered you were coming.\" No falls reported.     PLAN FOR NEXT VISIT: ADL training."

## 2021-12-14 ENCOUNTER — HOME CARE VISIT (OUTPATIENT)
Dept: HOME HEALTH SERVICES | Facility: HOME HEALTHCARE | Age: 81
End: 2021-12-14

## 2021-12-14 VITALS
HEART RATE: 60 BPM | SYSTOLIC BLOOD PRESSURE: 122 MMHG | DIASTOLIC BLOOD PRESSURE: 64 MMHG | RESPIRATION RATE: 18 BRPM | OXYGEN SATURATION: 98 % | TEMPERATURE: 97.4 F

## 2021-12-14 PROCEDURE — G0152 HHCP-SERV OF OT,EA 15 MIN: HCPCS

## 2021-12-14 PROCEDURE — G0151 HHCP-SERV OF PT,EA 15 MIN: HCPCS

## 2021-12-14 NOTE — HOME HEALTH
"Subjective: \"I'm doing good with the rollator or the quad cane.\"    Falls reported: none    Medication changes: none"

## 2021-12-15 ENCOUNTER — OFFICE VISIT (OUTPATIENT)
Dept: ENDOCRINOLOGY | Age: 81
End: 2021-12-15

## 2021-12-15 VITALS
OXYGEN SATURATION: 99 % | DIASTOLIC BLOOD PRESSURE: 60 MMHG | SYSTOLIC BLOOD PRESSURE: 120 MMHG | HEART RATE: 62 BPM | HEIGHT: 63 IN | BODY MASS INDEX: 26.87 KG/M2

## 2021-12-15 DIAGNOSIS — E11.65 TYPE 2 DIABETES MELLITUS WITH HYPERGLYCEMIA, WITH LONG-TERM CURRENT USE OF INSULIN (HCC): Primary | ICD-10-CM

## 2021-12-15 DIAGNOSIS — Z79.4 TYPE 2 DIABETES MELLITUS WITH HYPERGLYCEMIA, WITH LONG-TERM CURRENT USE OF INSULIN (HCC): Primary | ICD-10-CM

## 2021-12-15 PROCEDURE — 99214 OFFICE O/P EST MOD 30 MIN: CPT | Performed by: INTERNAL MEDICINE

## 2021-12-15 NOTE — PATIENT INSTRUCTIONS
Take insulin Lantus 20 units daily after breakfast   Decrease dose by 2 units if morning glucose is less than 80.  Repeat until morning glucose is 80-1 30  Take the Jardiance 25 mg daily bedtime  Etc. Trulicity 1.5 mg weekly    HemaGlobin A1c has improved consistently and significantly since infection in August has improved from hemoglobin A1c of 13% down to 8.4%.  Please continue eating 3 meals a day.  And taking your medications.  If you start having frequent urine infections then we will worried that the Jardiance might be causing that and we will need to stop it so let this office know

## 2021-12-15 NOTE — PROGRESS NOTES
"Chief Complaint  Diabetes (type 2) and Hyperglycemia    Subjective          Di Macario presents to [facility] ENDOCR[facility]  History of Present Illness  81-year-old  female presents for management of  type 2 diabetes with peripheral neuropathy vitamin D deficiency. She was at [facility] rehabilitation for a fractured right upper arm for about 3 weeks following a fall tripping over walker in the home mid October 2021.   #Type 2 diabetes:/has been historically severely uncontrolled with hemoglobin A1c as high as 13% summer 2021.  Patient recalls that she had venous leg ulcers on the left lateral inferior lower leg.Now healed.   Record review: 7/30/2021 at wound clinic patient has a venous leg ulcer on the left lateral inferior lower leg measuring 0.4 cm x 0.3 cm 5.1 cm.  There is a second wound on the left lateral superior lower leg.  It measures 1.7 cm x 1.3 cm x 0.1 cm.      Current insulin: Lantus 20 units daily after breakfast   Jardiance 25 mg daily bedtime [no SE of UTI or fungal infections]  Trulicity 1.5 mg weekly [no abdominal pain, no nausea, no rash, no fatigue on day of injection]  No CGM  Fingerstick glucose bid. In am glucose low 80 mg/dl and sometimes 70 .  On average 100-135    Past Medical History:   Diagnosis Date   • Dementia (HCC)     states better with medication    • Diabetes mellitus (HCC)    • Hyperlipidemia 1/13/2021   • Peripheral neuropathy 8/28/2018   • Vitamin D deficiency      Objective   Vital Signs:   /60   Pulse 62   Ht 160 cm (63\")   SpO2 99%   BMI 26.87 kg/m²     Physical Exam  Vitals and nursing note reviewed.   HENT:      Head: Normocephalic.      Mouth/Throat:      Mouth: Mucous membranes are moist.   Cardiovascular:      Rate and Rhythm: Normal rate.      Pulses: Normal pulses.      Heart sounds: Normal heart sounds.   Pulmonary:      Effort: Pulmonary effort is normal.      Breath sounds: Normal breath sounds. No wheezing or rhonchi. "   Abdominal:      General: Bowel sounds are normal.      Palpations: Abdomen is soft.   Musculoskeletal:         General: No swelling. Normal range of motion.      Cervical back: Normal range of motion and neck supple.   Skin:     General: Skin is warm and dry.   Neurological:      Mental Status: She is alert and oriented to person, place, and time. Mental status is at baseline.   Psychiatric:         Mood and Affect: Mood normal.         Behavior: Behavior normal.         Judgment: Judgment normal.        Result Review :   The following data was reviewed by: Lelo Pond MD on 12/15/2021:   0 Result Notes     1 HM Topic    Component   Ref Range & Units 10 d ago   (12/6/21) 1 mo ago   (10/30/21) 4 mo ago   (8/5/21) 8 mo ago   (4/15/21) 1 yr ago   (10/15/20) 1 yr ago   (3/30/20) 2 yr ago   (9/26/19)   Hemoglobin A1C   4.8 - 5.6 % 8.4 High   10.33 High  R  13.00 High  R, CM  9.60 High  R, CM  9.70 High  R, CM  9.30 High  R, CM  10.40 High  R, CM               Component      Latest Ref Rng & Units 12/6/2021   Triglycerides      0 - 149 mg/dL 119     Component      Latest Ref Rng & Units 12/6/2021   Glucose      65 - 99 mg/dL 193 (H)   BUN      8 - 27 mg/dL 22   Creatinine      0.57 - 1.00 mg/dL 0.78   eGFR Non African Am      >59 mL/min/1.73 71   eGFR African Am      >59 mL/min/1.73 82   BUN/Creatinine Ratio      12 - 28 28   Sodium      134 - 144 mmol/L 140   Potassium      3.5 - 5.2 mmol/L 4.6   Chloride      96 - 106 mmol/L 102   CO2      20 - 29 mmol/L 25   Calcium      8.7 - 10.3 mg/dL 10.0   Total Protein      6.0 - 8.5 g/dL 6.5   Albumin      3.6 - 4.6 g/dL 4.0   Globulin      1.5 - 4.5 g/dL 2.5   A/G Ratio      1.2 - 2.2 1.6   Total Bilirubin      0.0 - 1.2 mg/dL 0.2   Alkaline Phosphatase      44 - 121 IU/L 155 (H)   AST (SGOT)      0 - 40 IU/L 12   ALT (SGPT)      0 - 32 IU/L 11   Total Cholesterol      100 - 199 mg/dL 153   Triglycerides      0 - 149 mg/dL 119   HDL Cholesterol      >39 mg/dL 68    VLDL Cholesterol Bryan      5 - 40 mg/dL 21   LDL Cholesterol       0 - 99 mg/dL 64   Creatinine, Urine      Not Estab. mg/dL 99.8   Microalbumin, Urine      Not Estab. ug/mL 18.9   Microalbumin/Creatinine Ratio      0 - 29 mg/g creat 19   Hemoglobin A1C      4.8 - 5.6 % 8.4 (H)   TSH Baseline      0.450 - 4.500 uIU/mL 2.490   Free T4      0.82 - 1.77 ng/dL 1.39   Uric Acid      3.1 - 7.9 mg/dL 4.8        Assessment and Plan    Diagnoses and all orders for this visit:    1. Type 2 diabetes mellitus with hyperglycemia, with long-term current use of insulin ([facility]) (Primary)    PLAN    Take insulin Lantus 20 units daily after breakfast   Decrease dose by 2 units if morning glucose is less than 80.  Repeat until morning glucose is 80-1 30  Take the Jardiance 25 mg daily bedtime  Etc. Trulicity 1.5 mg weekly    HemaGlobin A1c has improved consistently and significantly since infection in August has improved from hemoglobin A1c of 13% down to 8.4%.  Please continue eating 3 meals a day.  And taking your medications.  If you start having frequent urine infections then we will worried that the Jardiance might be causing that and we will need to stop it so let this office know.      I spent 30  minutes caring for Di on this date of service. This time includes time spent by me in the following activities:reviewing tests, obtaining and/or reviewing a separately obtained history, performing a medically appropriate examination and/or evaluation , counseling and educating the patient/family/caregiver, ordering medications, tests, or procedures, referring and communicating with other health care professionals , documenting information in the medical record, independently interpreting results and communicating that information with the patient/family/caregiver and care coordination  Follow Up   Return in about 3 months (around 3/15/2022).  Patient was given instructions and counseling regarding her condition or for health  maintenance advice. Please see specific information pulled into the AVS if appropriate.

## 2021-12-16 VITALS
TEMPERATURE: 97.4 F | DIASTOLIC BLOOD PRESSURE: 60 MMHG | OXYGEN SATURATION: 98 % | HEART RATE: 70 BPM | RESPIRATION RATE: 18 BRPM | SYSTOLIC BLOOD PRESSURE: 124 MMHG

## 2021-12-16 NOTE — ASSESSMENT & PLAN NOTE
PLAN    Take insulin Lantus 20 units daily after breakfast   Decrease dose by 2 units if morning glucose is less than 80.  Repeat until morning glucose is 80-1 30  Take the Jardiance 25 mg daily bedtime  Etc. Trulicity 1.5 mg weekly    HemaGlobin A1c has improved consistently and significantly since infection in August has improved from hemoglobin A1c of 13% down to 8.4%.  Please continue eating 3 meals a day.  And taking your medications.  If you start having frequent urine infections then we will worried that the Jardiance might be causing that and we will need to stop it so let this office know

## 2021-12-16 NOTE — HOME HEALTH
"SUBJECTIVE: \"I don't know what happened to the home folder.\" \"My arm is feeling better.\" No falls reported.     PLAN FOR NEXT VISIT: shower assist."

## 2021-12-17 ENCOUNTER — HOME CARE VISIT (OUTPATIENT)
Dept: HOME HEALTH SERVICES | Facility: HOME HEALTHCARE | Age: 81
End: 2021-12-17

## 2021-12-17 VITALS
DIASTOLIC BLOOD PRESSURE: 62 MMHG | HEART RATE: 60 BPM | TEMPERATURE: 97.4 F | SYSTOLIC BLOOD PRESSURE: 118 MMHG | OXYGEN SATURATION: 99 % | RESPIRATION RATE: 18 BRPM

## 2021-12-17 PROCEDURE — G0152 HHCP-SERV OF OT,EA 15 MIN: HCPCS

## 2021-12-17 NOTE — HOME HEALTH
"SUBJECTIVE: \"My doctor appt yesterday went well. All my numbers, labs are good.\" No falls reported. Informed pt of agency d/c next week.    PLAN FOR NEXT VISIT: [facility] d/c"

## 2021-12-23 ENCOUNTER — HOME CARE VISIT (OUTPATIENT)
Dept: HOME HEALTH SERVICES | Facility: HOME HEALTHCARE | Age: 81
End: 2021-12-23

## 2021-12-23 VITALS
RESPIRATION RATE: 18 BRPM | OXYGEN SATURATION: 98 % | HEART RATE: 76 BPM | DIASTOLIC BLOOD PRESSURE: 64 MMHG | SYSTOLIC BLOOD PRESSURE: 112 MMHG | TEMPERATURE: 97.5 F

## 2021-12-23 PROCEDURE — G0152 HHCP-SERV OF OT,EA 15 MIN: HCPCS

## 2022-02-15 DIAGNOSIS — E11.65 UNCONTROLLED TYPE 2 DIABETES MELLITUS WITH HYPERGLYCEMIA: Primary | ICD-10-CM

## 2022-02-15 NOTE — TELEPHONE ENCOUNTER
----- Message from Di Macario sent at 2/15/2022  2:44 PM EST -----  Regarding: FreeStyle Lite Test Strips need a prescription  Greetings Team,  I should have called before I ran out but thought we had a current prescription online with [facility] to re-order.  I need someone to contact [facility] my mail order pharmacy to send an updated prescription for FreeStyle Lite Test Strips.  [phone]  Cost without a prescription is $58.00 plus sales tax for 50 testing strips.    90 day supply is $34.00 at [facility] and it is $114.00 at all other pharmacy stores (with a prescription) in [place] for 90 days.    Thank you in advance for your help!

## 2022-02-16 RX ORDER — BLOOD-GLUCOSE METER
KIT MISCELLANEOUS
Qty: 200 EACH | Refills: 3 | Status: SHIPPED | OUTPATIENT
Start: 2022-02-16

## 2022-02-22 RX ORDER — PEN NEEDLE, DIABETIC 32GX 5/32"
NEEDLE, DISPOSABLE MISCELLANEOUS
Qty: 100 EACH | Refills: 3 | Status: SHIPPED | OUTPATIENT
Start: 2022-02-22 | End: 2022-02-23 | Stop reason: SDUPTHER

## 2022-02-22 RX ORDER — INSULIN GLARGINE 100 [IU]/ML
20 INJECTION, SOLUTION SUBCUTANEOUS DAILY
Qty: 30 ML | Refills: 1 | Status: SHIPPED | OUTPATIENT
Start: 2022-02-22 | End: 2022-03-17 | Stop reason: SDUPTHER

## 2022-02-23 RX ORDER — PEN NEEDLE, DIABETIC 32GX 5/32"
NEEDLE, DISPOSABLE MISCELLANEOUS
Qty: 100 EACH | Refills: 3 | Status: SHIPPED | OUTPATIENT
Start: 2022-02-23 | End: 2023-03-21 | Stop reason: SDUPTHER

## 2022-03-01 ENCOUNTER — HOME HEALTH ADMISSION (OUTPATIENT)
Dept: HOME HEALTH SERVICES | Facility: HOME HEALTHCARE | Age: 82
End: 2022-03-01

## 2022-03-01 ENCOUNTER — OFFICE VISIT (OUTPATIENT)
Dept: INTERNAL MEDICINE | Facility: CLINIC | Age: 82
End: 2022-03-01

## 2022-03-01 VITALS
TEMPERATURE: 96.9 F | HEIGHT: 63 IN | SYSTOLIC BLOOD PRESSURE: 118 MMHG | WEIGHT: 147.2 LBS | DIASTOLIC BLOOD PRESSURE: 70 MMHG | BODY MASS INDEX: 26.08 KG/M2

## 2022-03-01 DIAGNOSIS — E78.5 HYPERLIPIDEMIA, UNSPECIFIED HYPERLIPIDEMIA TYPE: ICD-10-CM

## 2022-03-01 DIAGNOSIS — S42.309B: Primary | ICD-10-CM

## 2022-03-01 DIAGNOSIS — R41.3 IMPAIRED MEMORY: ICD-10-CM

## 2022-03-01 PROCEDURE — 99214 OFFICE O/P EST MOD 30 MIN: CPT | Performed by: PHYSICIAN ASSISTANT

## 2022-03-01 RX ORDER — DONEPEZIL HYDROCHLORIDE 10 MG/1
10 TABLET, FILM COATED ORAL NIGHTLY
Qty: 90 TABLET | Refills: 3 | Status: SHIPPED | OUTPATIENT
Start: 2022-03-01 | End: 2022-11-13 | Stop reason: SDUPTHER

## 2022-03-01 RX ORDER — EMPAGLIFLOZIN 25 MG/1
25 TABLET, FILM COATED ORAL DAILY
Qty: 90 TABLET | Refills: 1 | Status: SHIPPED | OUTPATIENT
Start: 2022-03-01 | End: 2022-03-17

## 2022-03-01 RX ORDER — PRAVASTATIN SODIUM 40 MG
40 TABLET ORAL EVERY EVENING
Qty: 90 TABLET | Refills: 3 | Status: SHIPPED | OUTPATIENT
Start: 2022-03-01 | End: 2022-11-13 | Stop reason: SDUPTHER

## 2022-03-01 NOTE — PROGRESS NOTES
Subjective   Chief Complaint   Patient presents with   • Hypertension       History of Present Illness     Pt is here today for fup on her HTN. Her memory is worsening per her daughters. She is continuing to live alone, her daughters are staying with her at night and on the weekends rotating. They are trying to come as often as possible during the day. . She has not had any hypoglycemic episodes. She has had a few falls, had humerus fracture.      Patient Active Problem List   Diagnosis   • Essential hypertension   • Impaired memory   • Vitamin D deficiency   • Hyperuricemia   • Slow transit constipation   • Chronic pain of left knee   • Fall   • At high risk for falls   • Peripheral neuropathy   • Uncontrolled type 2 diabetes mellitus with hyperglycemia (HCC)   • Alkaline phosphatase elevation   • Walker as ambulation aid   • Bradycardia, sinus   • Shortness of breath   • Leg swelling   • Hyperlipidemia   • Presence of cardiac pacemaker   • Humeral head fracture, right, closed, initial encounter   • Type 2 diabetes mellitus, with long-term current use of insulin (HCC)       No Known Allergies    Current Outpatient Medications on File Prior to Visit   Medication Sig Dispense Refill   • acetaminophen (TYLENOL) 325 MG tablet Take 650 mg by mouth Every 6 (Six) Hours As Needed for Mild Pain  or Moderate Pain .     • amLODIPine-benazepril (Lotrel) 5-20 MG per capsule Take 1 capsule by mouth Daily. 90 capsule 3   • Continuous Blood Gluc Sensor (FreeStyle Aguila 2 Sensor) misc 1 Device Every 14 (Fourteen) Days. 2 each 5   • Glucagon (Gvoke HypoPen 2-Pack) 1 MG/0.2ML solution auto-injector Inject 1 mg under the skin into the appropriate area as directed As Needed (hypoglycemia). 0.4 mL 1   • glucose blood (FREESTYLE LITE) test strip Bid e11.65 200 each 3   • insulin glargine (LANTUS, SEMGLEE) 100 UNIT/ML injection Inject 20 Units under the skin into the appropriate area as directed Daily. 30 mL 1   • Insulin Pen Needle (BD  Pen Needle Norma U/F) 32G X 4 MM misc Use once a day 100 each 3   • Lancets (freestyle) lancets 1 each by Other route 4 (Four) Times a Day. Use as instructed 400 each 1   • multivitamin with minerals (MULTIVITAMIN ADULT PO) Take 1 tablet by mouth Daily.     • Trulicity 1.5 MG/0.5ML solution pen-injector Inject 1.5 mg under the skin into the appropriate area as directed 1 (One) Time Per Week. 12 pen 1   • Zonegran 100 MG capsule Take 2 capsules by mouth Every Night.       No current facility-administered medications on file prior to visit.       Past Medical History:   Diagnosis Date   • Dementia (HCC)     states better with medication    • Diabetes mellitus (HCC)    • Hyperlipidemia 1/13/2021   • Peripheral neuropathy 8/28/2018   • Vitamin D deficiency        Family History   Problem Relation Age of Onset   • Hypertension Mother    • Hypertension Father        Social History     Socioeconomic History   • Marital status:    Tobacco Use   • Smoking status: Former Smoker   • Smokeless tobacco: Never Used   Vaping Use   • Vaping Use: Never used   Substance and Sexual Activity   • Alcohol use: Yes     Comment: RARELY   • Drug use: No   • Sexual activity: Defer     Birth control/protection: Surgical       Past Surgical History:   Procedure Laterality Date   • ANKLE SURGERY Right    • BLADDER SURGERY      Prolapsed   • BREAST BIOPSY     • CARDIAC ELECTROPHYSIOLOGY PROCEDURE N/A 3/5/2021    Procedure: Pacemaker DC jae Melstone;  Surgeon: Madelin Ferguson MD;  Location:  INVASIVE LOCATION;  Service: Cardiology;  Laterality: N/A;   • HYSTERECTOMY           The following portions of the patient's history were reviewed and updated as appropriate: problem list, allergies, current medications, past medical history, past family history, past social history and past surgical history.    ROS    See HPI    Immunization History   Administered Date(s) Administered   • COVID-19 (PFIZER) PURPLE CAP 02/02/2021,  "02/23/2021, 09/29/2021   • Fluzone High Dose =>65 Years ([facility] ONLY) 11/07/2016, 09/30/2019   • Fluzone High-Dose 65+yrs 10/19/2021   • Influenza, Unspecified 09/16/2019, 10/08/2020   • Pneumococcal Conjugate 13-Valent (PCV13) 01/31/2017   • Pneumococcal Polysaccharide (PPSV23) 02/01/2018   • Tdap 02/12/2021       Objective   Vitals:    03/01/22 1035 03/01/22 1058   BP:  118/70   Temp: 96.9 °F (36.1 °C)    Weight: 66.8 kg (147 lb 3.2 oz)    Height: 160 cm (62.99\")      Body mass index is 26.08 kg/m².  Physical Exam  Vitals reviewed.   Constitutional:       Appearance: She is well-developed.   HENT:      Head: Normocephalic and atraumatic.   Cardiovascular:      Rate and Rhythm: Normal rate and regular rhythm.      Heart sounds: Normal heart sounds, S1 normal and S2 normal.   Pulmonary:      Effort: Pulmonary effort is normal.      Breath sounds: Normal breath sounds.   Skin:     General: Skin is warm.   Neurological:      Mental Status: She is alert.   Psychiatric:         Behavior: Behavior normal.           Assessment/Plan   Diagnoses and all orders for this visit:    1. Hyperlipidemia, unspecified hyperlipidemia type  -     pravastatin (PRAVACHOL) 40 MG tablet; Take 1 tablet by mouth Every Evening.  Dispense: 90 tablet; Refill: 3    2. Impaired memory  -     donepezil (ARICEPT) 10 MG tablet; Take 1 tablet by mouth Every Night.  Dispense: 90 tablet; Refill: 3    Other orders  -     Jardiance 25 MG tablet tablet; Take 1 tablet by mouth Daily.  Dispense: 90 tablet; Refill: 1    BP is controlled. Refilled meds today. Discussed with patient that I would like to send out home health and PT to help with strengthening.     Return in about 4 months (around 7/1/2022) for Medicare Wellness.           Answers for HPI/ROS submitted by the patient on 3/1/2022  What is the primary reason for your visit?: Physical      "

## 2022-03-14 PROCEDURE — 93296 REM INTERROG EVL PM/IDS: CPT | Performed by: INTERNAL MEDICINE

## 2022-03-14 PROCEDURE — 93294 REM INTERROG EVL PM/LDLS PM: CPT | Performed by: INTERNAL MEDICINE

## 2022-03-17 ENCOUNTER — OFFICE VISIT (OUTPATIENT)
Dept: ENDOCRINOLOGY | Age: 82
End: 2022-03-17

## 2022-03-17 VITALS
RESPIRATION RATE: 20 BRPM | DIASTOLIC BLOOD PRESSURE: 69 MMHG | HEART RATE: 75 BPM | HEIGHT: 63 IN | OXYGEN SATURATION: 100 % | SYSTOLIC BLOOD PRESSURE: 121 MMHG | WEIGHT: 144.2 LBS | BODY MASS INDEX: 25.55 KG/M2

## 2022-03-17 DIAGNOSIS — R41.3 IMPAIRED MEMORY: ICD-10-CM

## 2022-03-17 DIAGNOSIS — E78.2 MIXED HYPERLIPIDEMIA: ICD-10-CM

## 2022-03-17 DIAGNOSIS — E11.65 UNCONTROLLED TYPE 2 DIABETES MELLITUS WITH HYPERGLYCEMIA: Primary | ICD-10-CM

## 2022-03-17 DIAGNOSIS — L60.9 NAIL ABNORMALITIES: ICD-10-CM

## 2022-03-17 PROCEDURE — 99215 OFFICE O/P EST HI 40 MIN: CPT | Performed by: INTERNAL MEDICINE

## 2022-03-17 RX ORDER — PROCHLORPERAZINE 25 MG/1
1 SUPPOSITORY RECTAL ONCE
Qty: 1 EACH | Refills: 0
Start: 2022-03-17 | End: 2022-03-17

## 2022-03-17 RX ORDER — INSULIN GLARGINE 100 [IU]/ML
26 INJECTION, SOLUTION SUBCUTANEOUS DAILY
Qty: 30 ML | Refills: 2 | Status: SHIPPED | OUTPATIENT
Start: 2022-03-17 | End: 2022-06-09 | Stop reason: HOSPADM

## 2022-03-17 RX ORDER — PROCHLORPERAZINE 25 MG/1
SUPPOSITORY RECTAL
Qty: 9 EACH | Refills: 4
Start: 2022-03-17

## 2022-03-17 RX ORDER — DULAGLUTIDE 3 MG/.5ML
3 INJECTION, SOLUTION SUBCUTANEOUS WEEKLY
Qty: 6 ML | Refills: 2 | Status: SHIPPED | OUTPATIENT
Start: 2022-03-17 | End: 2022-11-28 | Stop reason: DRUGHIGH

## 2022-03-17 RX ORDER — PROCHLORPERAZINE 25 MG/1
1 SUPPOSITORY RECTAL
Qty: 1 EACH | Refills: 4
Start: 2022-03-17 | End: 2022-08-15

## 2022-03-17 NOTE — PROGRESS NOTES
Chief Complaint  Diabetes (Type 2/) and Hyperglycemia    Subjective          Di MCKINLEY Renaldo presents with daughter Carolyn Vargas still present to [facility] END[facility]CR[facility]OLOG[facility]  History of Present Illness  Duration of diabetes:  Since last visit : over last 3 months glucose worse. She was in inpatient rehabilitation until last visit in December.. She was eating their food an exercise then.  Daughter suspects that is why hemoglobin A1c was so much better than.  Now at home. Eating her own breakfast waffle and berries and sausage and orange juice and applesauce and banana.  She cannot remember what she eats for lunch but she does frequently get solids.  Dinner specifics not recalled.  Monitoring glucose:  CGM use: A sample freestyle alejandra placed last visit.  After 3 days it fell off.  The ability to alarm when hypoglycemic or hyperglycemia was very accurate attractive to patient and her granddaughter.  Her abdomen does not have the thin frail skin of the extremities.  She is willing to try a Dexcom and to be placed in the abdomen.  Thus ordered today.  Glucometer use:  How often patient is testing: Twice  In the last month lowest glucose: 108  In the last month highest glucose: 409 mg/DL at time of blood draw.  Symptoms:  Hypoglycemia: Shaky sweaty no loss of consciousness.  No recent symptoms.  Hyperglycemia: Polyuria polydipsia.  She does not notice any changes.  Peripheral neuropathy symptoms of paresthesias burning numbness tingling:  Vision changes: None  Skin changes or ulcers: None  Polyuria: Present  Polydipsia: Present  Gastroparesis symptoms: No early satiety  Prevention:  Last eye examination March 14, 2022  Last podiatry visit scheduled for next week.    History of heart disease:  Has a pacemaker.  History of kidney disease: None  History of stroke: None    History of hypertension on  channel blocker and ACE inhibitor  History of hyperlipidemia on no agents.  No known drug allergies.   "Encourage limiting saturated fats.      Current Outpatient Medications on File Prior to Visit   Medication Sig Dispense Refill   • acetaminophen (TYLENOL) 325 MG tablet Take 650 mg by mouth Every 6 (Six) Hours As Needed for Mild Pain  or Moderate Pain .     • amLODIPine-benazepril (Lotrel) 5-20 MG per capsule Take 1 capsule by mouth Daily. 90 capsule 3   • donepezil (ARICEPT) 10 MG tablet Take 1 tablet by mouth Every Night. 90 tablet 3   • Glucagon (Gvoke HypoPen 2-Pack) 1 MG/0.2ML solution auto-injector Inject 1 mg under the skin into the appropriate area as directed As Needed (hypoglycemia). 0.4 mL 1   • glucose blood (FREESTYLE LITE) test strip Bid e11.65 200 each 3   • Insulin Pen Needle (BD Pen Needle Norma U/F) 32G X 4 MM misc Use once a day 100 each 3   • Lancets (freestyle) lancets 1 each by Other route 4 (Four) Times a Day. Use as instructed 400 each 1   • multivitamin with minerals tablet tablet Take 1 tablet by mouth Daily.     • pravastatin (PRAVACHOL) 40 MG tablet Take 1 tablet by mouth Every Evening. 90 tablet 3   • Zonegran 100 MG capsule Take 2 capsules by mouth Every Night.     • [DISCONTINUED] insulin glargine (LANTUS, SEMGLEE) 100 UNIT/ML injection Inject 20 Units under the skin into the appropriate area as directed Daily. 30 mL 1   • [DISCONTINUED] Jardiance 25 MG tablet tablet Take 1 tablet by mouth Daily. 90 tablet 1   • [DISCONTINUED] Trulicity 1.5 MG/0.5ML solution pen-injector Inject 1.5 mg under the skin into the appropriate area as directed 1 (One) Time Per Week. 12 pen 1   • [DISCONTINUED] Continuous Blood Gluc Sensor (FreeStyle Aguila 2 Sensor) misc 1 Device Every 14 (Fourteen) Days. 2 each 5     No current facility-administered medications on file prior to visit.     Objective   Vital Signs:   /69   Pulse 75   Resp 20   Ht 160 cm (63\")   Wt 65.4 kg (144 lb 3.2 oz)   SpO2 100%   BMI 25.54 kg/m²     Physical Exam  Vitals and nursing note reviewed.   HENT:      Head: " Normocephalic.      Mouth/Throat:      Mouth: Mucous membranes are moist.   Musculoskeletal:         General: No swelling. Normal range of motion.   Feet:      Right foot:      Toenail Condition: Right toenails are abnormally thick.      Comments: Great toes red. Seems shoe too tight. Will see podiatrist next week. Thick yellow toenails.   Skin:     General: Skin is warm and dry.   Neurological:      Mental Status: She is alert and oriented to person, place, and time. Mental status is at baseline.   Psychiatric:         Mood and Affect: Mood normal.         Behavior: Behavior normal.        Result Review :   The following data was reviewed by: Lelo Pond MD on 03/17/2022:        Component      Latest Ref Rng & Units 12/6/2021 3/8/2022   Glucose      65 - 99 mg/dL  409 (H)   BUN      8 - 27 mg/dL  28 (H)   Creatinine      0.57 - 1.00 mg/dL  0.97   EGFR Result      >59 mL/min/1.73  58 (L)   BUN/Creatinine Ratio      12 - 28  29 (H)   Sodium      134 - 144 mmol/L  138   Potassium      3.5 - 5.2 mmol/L  4.5   Chloride      96 - 106 mmol/L  97   CO2      20 - 29 mmol/L  28   Calcium      8.7 - 10.3 mg/dL  10.0   Total Protein      6.0 - 8.5 g/dL  7.3   Albumin      3.6 - 4.6 g/dL  4.4   Globulin      1.5 - 4.5 g/dL  2.9   A/G Ratio      1.2 - 2.2  1.5   Total Bilirubin      0.0 - 1.2 mg/dL  0.2   Alkaline Phosphatase      44 - 121 IU/L  153 (H)   AST (SGOT)      0 - 40 IU/L  10   ALT (SGPT)      0 - 32 IU/L  7   Total Cholesterol      100 - 199 mg/dL  205 (H)   Triglycerides      0 - 149 mg/dL  154 (H)   HDL Cholesterol      >39 mg/dL  78   VLDL Cholesterol Bryan      5 - 40 mg/dL  26   LDL Cholesterol       0 - 99 mg/dL  101 (H)   Creatinine, Urine      Not Estab. mg/dL  38.7   Microalbumin, Urine      Not Estab. ug/mL  6.1   Microalbumin/Creatinine Ratio      0 - 29 mg/g creat  16   Hemoglobin A1C      4.8 - 5.6 % 8.4 (H) 11.0 (H)   TSH Baseline      0.450 - 4.500 uIU/mL  1.910   Free T4      0.82 - 1.77  ng/dL  1.27   Uric Acid      3.1 - 7.9 mg/dL  6.3   Interpretation        Note     Assessment and Plan  Diagnoses and all orders for this visit:    1. Uncontrolled type 2 diabetes mellitus with hyperglycemia (HCC) (Primary)  -     Continuous Blood Gluc  (Dexcom G6 ) device; 1 Device 1 (One) Time for 1 dose. Dispense 1 Dexcom G6  (NDC #[address]-85). Check 6 times a day. Dx: E 11.65, sent through supply store  Dispense: 1 each; Refill: 0  -     Continuous Blood Gluc Sensor (Dexcom G6 Sensor); Dipsense Dexcom G6 Sensors, to replace every 10 days, 3 sensors per 30 day period (NDC #[address]-11). Check 6 times a day. Dx: E 11.65 sent to supply store  Dispense: 9 each; Refill: 4  -     Continuous Blood Gluc Transmit (Dexcom G6 Transmitter) misc; 1 each Every 3 (Three) Months. Dispense 1 Dexcom G6 Transmitter for each 3 month period (NDC #[address]-92). Check 6 times a day. Dx: E 11.65 sent to a supply store  Dispense: 1 each; Refill: 4  -     Dulaglutide (Trulicity) 3 MG/0.5ML solution pen-injector; Inject 0.5 mL under the skin into the appropriate area as directed 1 (One) Time Per Week. Replaces 1.5 mg weekly dose  Dispense: 6 mL; Refill: 2  -     insulin glargine (LANTUS, SEMGLEE) 100 UNIT/ML injection; Inject 26 Units under the skin into the appropriate area as directed Daily. Patient will call when needs.  Dispense: 30 mL; Refill: 2  -     Hemoglobin A1c; Future  -     Comprehensive Metabolic Panel; Future    2. Nail abnormalities  Assessment & Plan:  Bilateral severe nail thickening and yellowing of great toes and adjacent digits.  Daughter indicates they have a podiatry appointment next week.      3. Impaired memory    4. Mixed hyperlipidemia    I spent 45 minutes face to face caring for Di accompanied by daughter Carolyn on this date of service. This time includes time spent by me in the following activities:obtaining and/or reviewing a separately obtained history, performing a  medically appropriate examination and/or evaluation , counseling and educating the patient/family/caregiver and ordering medications, tests, or procedures.  Lifestyle modification options reviewed for bettered structured meals.  Limiting carbohydrates at each meal discussed.  Limit fruits to 1 per meal and 1 per snack x3 meals and 3 snacks.  Options of treating hyperglycemia reviewed.  Patient agreed to increasing GLP-1 agent and declined bolus insulin dosing premeals.  Risk and benefits of a continuous glucose monitor reviewed.  Options of CGM is reviewed.  Skin dryness and thin texture interfered with adhesion on the extremities.  However her abdomen has skin that would be more appropriate for placing sensors.  Questions regarding sensors answered.      Follow Up   Return in about 6 weeks (around 4/28/2022).  Patient was given instructions and counseling regarding her condition or for health maintenance advice. Please see specific information pulled into the AVS if appropriate.

## 2022-03-17 NOTE — PATIENT INSTRUCTIONS
81-year-old  female with type 2 diabetes uncontrolled and worse since December 2022.  HG A1c has increased from 8.4% to 11% likely due to change in environment with less structure from the rehabilitation center to   home.    Take Lantus 26 units daily, this is an increase from 20 mg  Take Trulicity 3 mg every 7 days, this is an increase from 1.5 mg every 7-day injections  Stop Jardiance 25 mg tablets as such high hemoglobin A1c of greater than 9% has significant increased risk of DKA.    Monitor glucose closely: A Dexcom continuous glucose monitor ordered from [employer].  MEN signed.  Staff members placed a sample Dexcom sensor on abdomen and discussed how it functions.    Lifestyle modifications reviewed.  Hypertriglyceridemia with mild LDL cholesterol ovation 101 and mild triglyceride elevation 154.  Low saturated fat diet recommended.  Example  Hypercholesterolemia  meal planning  Nutrition counseling  1. Improve glucose control  2. Limit saturated fats to less than 30% at each meal [Use Whit lowery to read product labels]  3. Limit or avoid the following fats: Joseph, butter, lard, coconut. Substitute with turkey joseph, other vegetable oils, margarine  Limit or avoid marbling/fat on beef. Avoid hamburgers. Substitute with turkey/chicken/venison/vegetarians/salmon burgers.  Limit dairy: Milk cheese butter.  Limit eggs to 3 a week. Substitute with unlimited egg whites.  Limit bagels, biscuits, croissants: Substitute with wheat bread, English muffin, ramona pockets, tortilla

## 2022-03-17 NOTE — ASSESSMENT & PLAN NOTE
Bilateral severe nail thickening and yellowing of great toes and adjacent digits.  Daughter indicates they have a podiatry appointment next week.

## 2022-03-24 ENCOUNTER — CLINICAL SUPPORT NO REQUIREMENTS (OUTPATIENT)
Dept: CARDIOLOGY | Facility: CLINIC | Age: 82
End: 2022-03-24

## 2022-03-24 DIAGNOSIS — Z95.0 PRESENCE OF CARDIAC PACEMAKER: Primary | ICD-10-CM

## 2022-03-24 PROCEDURE — 93280 PM DEVICE PROGR EVAL DUAL: CPT | Performed by: INTERNAL MEDICINE

## 2022-03-29 ENCOUNTER — PATIENT MESSAGE (OUTPATIENT)
Dept: ENDOCRINOLOGY | Age: 82
End: 2022-03-29

## 2022-04-01 ENCOUNTER — TELEPHONE (OUTPATIENT)
Dept: CARDIOLOGY | Facility: CLINIC | Age: 82
End: 2022-04-01

## 2022-04-01 NOTE — TELEPHONE ENCOUNTER
----- Message from Di Macario sent at 3/29/2022  6:08 PM EDT -----  Regarding: Di Macario consolidate Sept visits 22 and 29  [facility]  I just realized that you have me scheduled on two different days in the same week.  Can we move my visit to do both the Pacemaker in Office on the same day I see Dr Ferguson on the 22 Sept 2022 at 11:30?    My Family has to pick me up and drive me to all my appointments, it would be useful if they didn't have to miss 2 days of work when we can do the 2 visits on the same day.      Spoke with the patient moved her appointment to 9/29/2022 for pcm check and office visit.

## 2022-04-07 ENCOUNTER — OFFICE VISIT (OUTPATIENT)
Dept: ENDOCRINOLOGY | Age: 82
End: 2022-04-07

## 2022-04-07 ENCOUNTER — TELEPHONE (OUTPATIENT)
Dept: ENDOCRINOLOGY | Age: 82
End: 2022-04-07

## 2022-04-07 VITALS
WEIGHT: 145.8 LBS | OXYGEN SATURATION: 98 % | HEART RATE: 75 BPM | DIASTOLIC BLOOD PRESSURE: 49 MMHG | HEIGHT: 63 IN | BODY MASS INDEX: 25.83 KG/M2 | SYSTOLIC BLOOD PRESSURE: 95 MMHG

## 2022-04-07 DIAGNOSIS — Z79.4 TYPE 2 DIABETES MELLITUS WITH HYPERGLYCEMIA, WITH LONG-TERM CURRENT USE OF INSULIN: ICD-10-CM

## 2022-04-07 DIAGNOSIS — E11.65 TYPE 2 DIABETES MELLITUS WITH HYPERGLYCEMIA, WITH LONG-TERM CURRENT USE OF INSULIN: ICD-10-CM

## 2022-04-07 DIAGNOSIS — E11.65 UNCONTROLLED TYPE 2 DIABETES MELLITUS WITH HYPERGLYCEMIA: Primary | ICD-10-CM

## 2022-04-07 PROCEDURE — 99214 OFFICE O/P EST MOD 30 MIN: CPT | Performed by: INTERNAL MEDICINE

## 2022-04-07 RX ORDER — PROCHLORPERAZINE 25 MG/1
1 SUPPOSITORY RECTAL ONCE
Qty: 1 EACH | Refills: 1 | Status: SHIPPED | OUTPATIENT
Start: 2022-04-07 | End: 2022-04-07

## 2022-04-11 DIAGNOSIS — T07.XXXA MULTIPLE WOUNDS: Primary | ICD-10-CM

## 2022-04-11 NOTE — TELEPHONE ENCOUNTER
From: Di Macario  To: Lelo Pond MD  Sent: 3/29/2022 6:32 PM EDT  Subject: Di Macario podiatrist Office sent request for approval Diabetic shoes    [facility] Dr Wilson sent a request for approval for Diabetic shoes on 29 March 2022.  FYI we had issues in the past getting this coordinated with the Endocrinologist office.  Let us know if you need any information regarding the request from the Podiatrist.  They are located at [address] [place] [address]  Phone [phone]    Thank you so much!!

## 2022-04-22 ENCOUNTER — OFFICE VISIT (OUTPATIENT)
Dept: WOUND CARE | Facility: HOSPITAL | Age: 82
End: 2022-04-22

## 2022-04-22 PROCEDURE — G0463 HOSPITAL OUTPT CLINIC VISIT: HCPCS

## 2022-04-22 PROCEDURE — 97602 WOUND(S) CARE NON-SELECTIVE: CPT

## 2022-04-29 ENCOUNTER — OFFICE VISIT (OUTPATIENT)
Dept: WOUND CARE | Facility: HOSPITAL | Age: 82
End: 2022-04-29

## 2022-04-29 PROCEDURE — G0463 HOSPITAL OUTPT CLINIC VISIT: HCPCS

## 2022-05-10 ENCOUNTER — TELEPHONE (OUTPATIENT)
Dept: ENDOCRINOLOGY | Age: 82
End: 2022-05-10

## 2022-05-11 ENCOUNTER — TELEPHONE (OUTPATIENT)
Dept: ENDOCRINOLOGY | Age: 82
End: 2022-05-11

## 2022-05-11 NOTE — TELEPHONE ENCOUNTER
5/11 called and sw pt told her they have been trying to get in touch with her gave them her daughter Carson # to call [phone]

## 2022-05-11 NOTE — TELEPHONE ENCOUNTER
DONYA FROM [facility] CALLED NEEDS CHART NOTES FROM 4/11/2022 TO BE SIGNED AND DATED AND FAXED BACK [phone]

## 2022-05-12 ENCOUNTER — TELEPHONE (OUTPATIENT)
Dept: ENDOCRINOLOGY | Age: 82
End: 2022-05-12

## 2022-05-13 ENCOUNTER — OFFICE VISIT (OUTPATIENT)
Dept: WOUND CARE | Facility: HOSPITAL | Age: 82
End: 2022-05-13

## 2022-05-13 ENCOUNTER — TELEPHONE (OUTPATIENT)
Dept: ENDOCRINOLOGY | Age: 82
End: 2022-05-13

## 2022-05-13 PROCEDURE — G0463 HOSPITAL OUTPT CLINIC VISIT: HCPCS

## 2022-05-16 ENCOUNTER — TELEPHONE (OUTPATIENT)
Dept: ENDOCRINOLOGY | Age: 82
End: 2022-05-16

## 2022-05-23 ENCOUNTER — TELEPHONE (OUTPATIENT)
Dept: CARDIOLOGY | Facility: CLINIC | Age: 82
End: 2022-05-23

## 2022-05-23 NOTE — TELEPHONE ENCOUNTER
----- Message from Estrella Smith sent at 5/19/2022 10:55 AM EDT -----  Regarding: Weight gain  Had a 5 Lbs gain in the past week (149 lb). No swelling, lungs are clear. She will have another visit tomorrow and they will continue to monitor.  Andra - Rowan [phone]    [facility] to discuss further.

## 2022-05-26 ENCOUNTER — PATIENT MESSAGE (OUTPATIENT)
Dept: ENDOCRINOLOGY | Age: 82
End: 2022-05-26

## 2022-05-31 ENCOUNTER — TELEPHONE (OUTPATIENT)
Dept: ENDOCRINOLOGY | Age: 82
End: 2022-05-31

## 2022-06-01 ENCOUNTER — APPOINTMENT (OUTPATIENT)
Dept: CT IMAGING | Facility: HOSPITAL | Age: 82
End: 2022-06-01

## 2022-06-01 ENCOUNTER — HOSPITAL ENCOUNTER (INPATIENT)
Facility: HOSPITAL | Age: 82
LOS: 8 days | Discharge: NURSING, REHAB OR LONG-TERM CARE | End: 2022-06-09
Attending: EMERGENCY MEDICINE | Admitting: HOSPITALIST

## 2022-06-01 ENCOUNTER — APPOINTMENT (OUTPATIENT)
Dept: GENERAL RADIOLOGY | Facility: HOSPITAL | Age: 82
End: 2022-06-01

## 2022-06-01 DIAGNOSIS — J96.01 ACUTE HYPOXEMIC RESPIRATORY FAILURE: ICD-10-CM

## 2022-06-01 DIAGNOSIS — K22.9 ESOPHAGEAL ABNORMALITY: Primary | ICD-10-CM

## 2022-06-01 DIAGNOSIS — J06.9 VIRAL URI WITH COUGH: ICD-10-CM

## 2022-06-01 PROBLEM — J18.1 LUNG CONSOLIDATION (HCC): Status: ACTIVE | Noted: 2022-06-01

## 2022-06-01 PROBLEM — F03.90 DEMENTIA WITHOUT BEHAVIORAL DISTURBANCE (HCC): Status: ACTIVE | Noted: 2022-06-01

## 2022-06-01 LAB
ALBUMIN SERPL-MCNC: 3.6 G/DL (ref 3.5–5.2)
ALBUMIN/GLOB SERPL: 1.3 G/DL
ALP SERPL-CCNC: 128 U/L (ref 39–117)
ALT SERPL W P-5'-P-CCNC: 16 U/L (ref 1–33)
ANION GAP SERPL CALCULATED.3IONS-SCNC: 9 MMOL/L (ref 5–15)
AST SERPL-CCNC: 14 U/L (ref 1–32)
B PARAPERT DNA SPEC QL NAA+PROBE: NOT DETECTED
B PERT DNA SPEC QL NAA+PROBE: NOT DETECTED
BACTERIA UR QL AUTO: ABNORMAL /HPF
BASOPHILS # BLD AUTO: 0.02 10*3/MM3 (ref 0–0.2)
BASOPHILS NFR BLD AUTO: 0.4 % (ref 0–1.5)
BILIRUB SERPL-MCNC: <0.2 MG/DL (ref 0–1.2)
BILIRUB UR QL STRIP: NEGATIVE
BUN SERPL-MCNC: 21 MG/DL (ref 8–23)
BUN/CREAT SERPL: 36.8 (ref 7–25)
C PNEUM DNA NPH QL NAA+NON-PROBE: NOT DETECTED
CALCIUM SPEC-SCNC: 9.2 MG/DL (ref 8.6–10.5)
CHLORIDE SERPL-SCNC: 106 MMOL/L (ref 98–107)
CLARITY UR: ABNORMAL
CO2 SERPL-SCNC: 25 MMOL/L (ref 22–29)
COLOR UR: YELLOW
CREAT SERPL-MCNC: 0.57 MG/DL (ref 0.57–1)
DEPRECATED RDW RBC AUTO: 46.8 FL (ref 37–54)
EGFRCR SERPLBLD CKD-EPI 2021: 90.3 ML/MIN/1.73
EOSINOPHIL # BLD AUTO: 0.02 10*3/MM3 (ref 0–0.4)
EOSINOPHIL NFR BLD AUTO: 0.4 % (ref 0.3–6.2)
ERYTHROCYTE [DISTWIDTH] IN BLOOD BY AUTOMATED COUNT: 14.6 % (ref 12.3–15.4)
FLUAV SUBTYP SPEC NAA+PROBE: NOT DETECTED
FLUBV RNA ISLT QL NAA+PROBE: NOT DETECTED
GLOBULIN UR ELPH-MCNC: 2.7 GM/DL
GLUCOSE BLDC GLUCOMTR-MCNC: 126 MG/DL (ref 70–130)
GLUCOSE BLDC GLUCOMTR-MCNC: 138 MG/DL (ref 70–130)
GLUCOSE BLDC GLUCOMTR-MCNC: 185 MG/DL (ref 70–130)
GLUCOSE BLDC GLUCOMTR-MCNC: 78 MG/DL (ref 70–130)
GLUCOSE SERPL-MCNC: 124 MG/DL (ref 65–99)
GLUCOSE UR STRIP-MCNC: NEGATIVE MG/DL
HADV DNA SPEC NAA+PROBE: NOT DETECTED
HCOV 229E RNA SPEC QL NAA+PROBE: NOT DETECTED
HCOV HKU1 RNA SPEC QL NAA+PROBE: NOT DETECTED
HCOV NL63 RNA SPEC QL NAA+PROBE: NOT DETECTED
HCOV OC43 RNA SPEC QL NAA+PROBE: NOT DETECTED
HCT VFR BLD AUTO: 34.4 % (ref 34–46.6)
HGB BLD-MCNC: 11.2 G/DL (ref 12–15.9)
HGB UR QL STRIP.AUTO: ABNORMAL
HMPV RNA NPH QL NAA+NON-PROBE: NOT DETECTED
HPIV1 RNA ISLT QL NAA+PROBE: NOT DETECTED
HPIV2 RNA SPEC QL NAA+PROBE: NOT DETECTED
HPIV3 RNA NPH QL NAA+PROBE: NOT DETECTED
HPIV4 P GENE NPH QL NAA+PROBE: NOT DETECTED
HYALINE CASTS UR QL AUTO: ABNORMAL /LPF
IMM GRANULOCYTES # BLD AUTO: 0.01 10*3/MM3 (ref 0–0.05)
IMM GRANULOCYTES NFR BLD AUTO: 0.2 % (ref 0–0.5)
KETONES UR QL STRIP: NEGATIVE
LEUKOCYTE ESTERASE UR QL STRIP.AUTO: ABNORMAL
LIPASE SERPL-CCNC: 59 U/L (ref 13–60)
LYMPHOCYTES # BLD AUTO: 1.34 10*3/MM3 (ref 0.7–3.1)
LYMPHOCYTES NFR BLD AUTO: 27.4 % (ref 19.6–45.3)
M PNEUMO IGG SER IA-ACNC: NOT DETECTED
MCH RBC QN AUTO: 28.6 PG (ref 26.6–33)
MCHC RBC AUTO-ENTMCNC: 32.6 G/DL (ref 31.5–35.7)
MCV RBC AUTO: 87.8 FL (ref 79–97)
MONOCYTES # BLD AUTO: 0.26 10*3/MM3 (ref 0.1–0.9)
MONOCYTES NFR BLD AUTO: 5.3 % (ref 5–12)
NEUTROPHILS NFR BLD AUTO: 3.24 10*3/MM3 (ref 1.7–7)
NEUTROPHILS NFR BLD AUTO: 66.3 % (ref 42.7–76)
NITRITE UR QL STRIP: NEGATIVE
NRBC BLD AUTO-RTO: 0 /100 WBC (ref 0–0.2)
NT-PROBNP SERPL-MCNC: 178 PG/ML (ref 0–1800)
PH UR STRIP.AUTO: 8 [PH] (ref 5–8)
PLATELET # BLD AUTO: 214 10*3/MM3 (ref 140–450)
PMV BLD AUTO: 10.5 FL (ref 6–12)
POTASSIUM SERPL-SCNC: 3.9 MMOL/L (ref 3.5–5.2)
PROCALCITONIN SERPL-MCNC: 0.08 NG/ML (ref 0–0.25)
PROT SERPL-MCNC: 6.3 G/DL (ref 6–8.5)
PROT UR QL STRIP: NEGATIVE
QT INTERVAL: 493 MS
RBC # BLD AUTO: 3.92 10*6/MM3 (ref 3.77–5.28)
RBC # UR STRIP: ABNORMAL /HPF
REF LAB TEST METHOD: ABNORMAL
RHINOVIRUS RNA SPEC NAA+PROBE: NOT DETECTED
RSV RNA NPH QL NAA+NON-PROBE: NOT DETECTED
SARS-COV-2 RNA NPH QL NAA+NON-PROBE: NOT DETECTED
SODIUM SERPL-SCNC: 140 MMOL/L (ref 136–145)
SP GR UR STRIP: 1.02 (ref 1–1.03)
SQUAMOUS #/AREA URNS HPF: ABNORMAL /HPF
TROPONIN T SERPL-MCNC: <0.01 NG/ML (ref 0–0.03)
UROBILINOGEN UR QL STRIP: ABNORMAL
WBC # UR STRIP: ABNORMAL /HPF
WBC NRBC COR # BLD: 4.89 10*3/MM3 (ref 3.4–10.8)

## 2022-06-01 PROCEDURE — 63710000001 INSULIN LISPRO (HUMAN) PER 5 UNITS: Performed by: NURSE PRACTITIONER

## 2022-06-01 PROCEDURE — 94799 UNLISTED PULMONARY SVC/PX: CPT

## 2022-06-01 PROCEDURE — 71275 CT ANGIOGRAPHY CHEST: CPT

## 2022-06-01 PROCEDURE — 85025 COMPLETE CBC W/AUTO DIFF WBC: CPT | Performed by: PHYSICIAN ASSISTANT

## 2022-06-01 PROCEDURE — 93005 ELECTROCARDIOGRAM TRACING: CPT | Performed by: PHYSICIAN ASSISTANT

## 2022-06-01 PROCEDURE — 0202U NFCT DS 22 TRGT SARS-COV-2: CPT | Performed by: PHYSICIAN ASSISTANT

## 2022-06-01 PROCEDURE — 80053 COMPREHEN METABOLIC PANEL: CPT | Performed by: PHYSICIAN ASSISTANT

## 2022-06-01 PROCEDURE — 94640 AIRWAY INHALATION TREATMENT: CPT

## 2022-06-01 PROCEDURE — 99285 EMERGENCY DEPT VISIT HI MDM: CPT

## 2022-06-01 PROCEDURE — 84145 PROCALCITONIN (PCT): CPT | Performed by: NURSE PRACTITIONER

## 2022-06-01 PROCEDURE — 94664 DEMO&/EVAL PT USE INHALER: CPT

## 2022-06-01 PROCEDURE — 94760 N-INVAS EAR/PLS OXIMETRY 1: CPT

## 2022-06-01 PROCEDURE — 99221 1ST HOSP IP/OBS SF/LOW 40: CPT | Performed by: PHYSICIAN ASSISTANT

## 2022-06-01 PROCEDURE — 71045 X-RAY EXAM CHEST 1 VIEW: CPT

## 2022-06-01 PROCEDURE — 84484 ASSAY OF TROPONIN QUANT: CPT | Performed by: PHYSICIAN ASSISTANT

## 2022-06-01 PROCEDURE — 83880 ASSAY OF NATRIURETIC PEPTIDE: CPT | Performed by: PHYSICIAN ASSISTANT

## 2022-06-01 PROCEDURE — 25010000002 ENOXAPARIN PER 10 MG: Performed by: NURSE PRACTITIONER

## 2022-06-01 PROCEDURE — 82962 GLUCOSE BLOOD TEST: CPT

## 2022-06-01 PROCEDURE — 0 IOPAMIDOL PER 1 ML: Performed by: EMERGENCY MEDICINE

## 2022-06-01 PROCEDURE — 81001 URINALYSIS AUTO W/SCOPE: CPT | Performed by: PHYSICIAN ASSISTANT

## 2022-06-01 PROCEDURE — 93010 ELECTROCARDIOGRAM REPORT: CPT | Performed by: INTERNAL MEDICINE

## 2022-06-01 PROCEDURE — 83690 ASSAY OF LIPASE: CPT | Performed by: PHYSICIAN ASSISTANT

## 2022-06-01 PROCEDURE — 94761 N-INVAS EAR/PLS OXIMETRY MLT: CPT

## 2022-06-01 RX ORDER — FAMOTIDINE 20 MG/1
40 TABLET, FILM COATED ORAL
Status: DISCONTINUED | OUTPATIENT
Start: 2022-06-01 | End: 2022-06-06

## 2022-06-01 RX ORDER — ACETAMINOPHEN 325 MG/1
650 TABLET ORAL EVERY 4 HOURS PRN
Status: DISCONTINUED | OUTPATIENT
Start: 2022-06-01 | End: 2022-06-09 | Stop reason: HOSPADM

## 2022-06-01 RX ORDER — ENOXAPARIN SODIUM 100 MG/ML
40 INJECTION SUBCUTANEOUS EVERY 24 HOURS
Status: DISCONTINUED | OUTPATIENT
Start: 2022-06-01 | End: 2022-06-09 | Stop reason: HOSPADM

## 2022-06-01 RX ORDER — GUAIFENESIN 600 MG/1
1200 TABLET, EXTENDED RELEASE ORAL EVERY 12 HOURS SCHEDULED
Status: DISCONTINUED | OUTPATIENT
Start: 2022-06-01 | End: 2022-06-09 | Stop reason: HOSPADM

## 2022-06-01 RX ORDER — SODIUM CHLORIDE FOR INHALATION 7 %
4 VIAL, NEBULIZER (ML) INHALATION
Status: DISCONTINUED | OUTPATIENT
Start: 2022-06-01 | End: 2022-06-09 | Stop reason: HOSPADM

## 2022-06-01 RX ORDER — NICOTINE POLACRILEX 4 MG
15 LOZENGE BUCCAL
Status: DISCONTINUED | OUTPATIENT
Start: 2022-06-01 | End: 2022-06-09 | Stop reason: HOSPADM

## 2022-06-01 RX ORDER — ACETAMINOPHEN 650 MG/1
650 SUPPOSITORY RECTAL EVERY 4 HOURS PRN
Status: DISCONTINUED | OUTPATIENT
Start: 2022-06-01 | End: 2022-06-09 | Stop reason: HOSPADM

## 2022-06-01 RX ORDER — ONDANSETRON 2 MG/ML
4 INJECTION INTRAMUSCULAR; INTRAVENOUS EVERY 6 HOURS PRN
Status: DISCONTINUED | OUTPATIENT
Start: 2022-06-01 | End: 2022-06-09 | Stop reason: HOSPADM

## 2022-06-01 RX ORDER — DONEPEZIL HYDROCHLORIDE 10 MG/1
10 TABLET, FILM COATED ORAL NIGHTLY
Status: DISCONTINUED | OUTPATIENT
Start: 2022-06-01 | End: 2022-06-09 | Stop reason: HOSPADM

## 2022-06-01 RX ORDER — INSULIN LISPRO 100 [IU]/ML
0-9 INJECTION, SOLUTION INTRAVENOUS; SUBCUTANEOUS
Status: DISCONTINUED | OUTPATIENT
Start: 2022-06-01 | End: 2022-06-07

## 2022-06-01 RX ORDER — DEXTROSE MONOHYDRATE 25 G/50ML
25 INJECTION, SOLUTION INTRAVENOUS
Status: DISCONTINUED | OUTPATIENT
Start: 2022-06-01 | End: 2022-06-09 | Stop reason: HOSPADM

## 2022-06-01 RX ORDER — IPRATROPIUM BROMIDE AND ALBUTEROL SULFATE 2.5; .5 MG/3ML; MG/3ML
3 SOLUTION RESPIRATORY (INHALATION)
Status: DISCONTINUED | OUTPATIENT
Start: 2022-06-01 | End: 2022-06-09 | Stop reason: HOSPADM

## 2022-06-01 RX ORDER — ONDANSETRON 4 MG/1
4 TABLET, FILM COATED ORAL EVERY 6 HOURS PRN
Status: DISCONTINUED | OUTPATIENT
Start: 2022-06-01 | End: 2022-06-09 | Stop reason: HOSPADM

## 2022-06-01 RX ORDER — ACETAMINOPHEN 160 MG/5ML
650 SOLUTION ORAL EVERY 4 HOURS PRN
Status: DISCONTINUED | OUTPATIENT
Start: 2022-06-01 | End: 2022-06-09 | Stop reason: HOSPADM

## 2022-06-01 RX ORDER — ZONISAMIDE 100 MG/1
200 CAPSULE ORAL NIGHTLY
Status: DISCONTINUED | OUTPATIENT
Start: 2022-06-01 | End: 2022-06-09 | Stop reason: HOSPADM

## 2022-06-01 RX ORDER — PRAVASTATIN SODIUM 40 MG
40 TABLET ORAL EVERY EVENING
Status: DISCONTINUED | OUTPATIENT
Start: 2022-06-01 | End: 2022-06-09 | Stop reason: HOSPADM

## 2022-06-01 RX ORDER — NITROGLYCERIN 0.4 MG/1
0.4 TABLET SUBLINGUAL
Status: DISCONTINUED | OUTPATIENT
Start: 2022-06-01 | End: 2022-06-09 | Stop reason: HOSPADM

## 2022-06-01 RX ADMIN — LISINOPRIL: 20 TABLET ORAL at 19:06

## 2022-06-01 RX ADMIN — IPRATROPIUM BROMIDE AND ALBUTEROL SULFATE 3 ML: 2.5; .5 SOLUTION RESPIRATORY (INHALATION) at 20:10

## 2022-06-01 RX ADMIN — ENOXAPARIN SODIUM 40 MG: 100 INJECTION SUBCUTANEOUS at 16:32

## 2022-06-01 RX ADMIN — FAMOTIDINE 40 MG: 20 TABLET ORAL at 16:32

## 2022-06-01 RX ADMIN — GUAIFENESIN 1200 MG: 600 TABLET, EXTENDED RELEASE ORAL at 16:32

## 2022-06-01 RX ADMIN — PRAVASTATIN SODIUM 40 MG: 40 TABLET ORAL at 20:23

## 2022-06-01 RX ADMIN — SODIUM CHLORIDE SOLN NEBU 7% 4 ML: 7 NEBU SOLN at 20:11

## 2022-06-01 RX ADMIN — ZONISAMIDE 200 MG: 100 CAPSULE ORAL at 20:23

## 2022-06-01 RX ADMIN — IPRATROPIUM BROMIDE AND ALBUTEROL SULFATE 3 ML: 2.5; .5 SOLUTION RESPIRATORY (INHALATION) at 15:02

## 2022-06-01 RX ADMIN — INSULIN LISPRO 2 UNITS: 100 INJECTION, SOLUTION INTRAVENOUS; SUBCUTANEOUS at 16:36

## 2022-06-01 RX ADMIN — IOPAMIDOL 90 ML: 755 INJECTION, SOLUTION INTRAVENOUS at 10:27

## 2022-06-01 RX ADMIN — DONEPEZIL HYDROCHLORIDE 10 MG: 10 TABLET, FILM COATED ORAL at 20:22

## 2022-06-01 NOTE — ED NOTES
Nursing report ED to floor  Di Macario  82 y.o.  female    HPI :   Chief Complaint   Patient presents with   • Vomiting       Admitting doctor:   Suresh Wang MD    Admitting diagnosis:   The primary encounter diagnosis was Acute hypoxemic respiratory failure (HCC). A diagnosis of Viral URI with cough was also pertinent to this visit.    Code status:   Current Code Status     Date Active Code Status Order ID Comments User Context       6/1/2022 1241 CPR (Attempt to Resuscitate) [id number]  Cassie Roy APRN ED     Advance Care Planning Activity      Questions for Current Code Status     Question Answer    Code Status (Patient has no pulse and is not breathing) CPR (Attempt to Resuscitate)    Medical Interventions (Patient has pulse or is breathing) Full Support          Allergies:   Patient has no known allergies.    Intake and Output  No intake or output data in the 24 hours ending 06/01/22 1428    Weight:       06/01/22  0557   Weight: 65.8 kg (145 lb)       Most recent vitals:   Vitals:    06/01/22 1011 06/01/22 1041 06/01/22 1211 06/01/22 1341   BP: 122/59 141/60 140/64 142/81   Pulse: 61 69 63 74   Resp:       Temp:       SpO2: 92%  99% 96%   Weight:       Height:           Active LDAs/IV Access:   Lines, Drains & Airways     Active LDAs     Name Placement date Placement time Site Days    Peripheral IV 06/01/22 0607 Left Antecubital 06/01/22  0607  Antecubital  less than 1                Labs (abnormal labs have a star):   Labs Reviewed   COMPREHENSIVE METABOLIC PANEL - Abnormal; Notable for the following components:       Result Value    Glucose 124 (*)     Alkaline Phosphatase 128 (*)     BUN/Creatinine Ratio 36.8 (*)     All other components within normal limits    Narrative:     GFR Normal >60  Chronic Kidney Disease <60  Kidney Failure <15     URINALYSIS W/ MICROSCOPIC IF INDICATED (NO CULTURE) - Abnormal; Notable for the following components:    Appearance, UA Turbid (*)      Blood, UA Trace (*)     Leuk Esterase, UA Moderate (2+) (*)     All other components within normal limits   CBC WITH AUTO DIFFERENTIAL - Abnormal; Notable for the following components:    Hemoglobin 11.2 (*)     All other components within normal limits   URINALYSIS, MICROSCOPIC ONLY - Abnormal; Notable for the following components:    RBC, UA 6-12 (*)     WBC, UA Too Numerous to Count (*)     All other components within normal limits   RESPIRATORY PANEL PCR W/ COVID-19 (SARS-COV-2) KINGA/FLIP/ASHA/PAD/COR/MAD/SISI IN-HOUSE, NP SWAB IN UT/VTP, 3-4 HR TAT - Normal    Narrative:     In the setting of a positive respiratory panel with a viral infection PLUS a negative procalcitonin without other underlying concern for bacterial infection, consider observing off antibiotics or discontinuation of antibiotics and continue supportive care. If the respiratory panel is positive for atypical bacterial infection (Bordetella pertussis, Chlamydophila pneumoniae, or Mycoplasma pneumoniae), consider antibiotic de-escalation to target atypical bacterial infection.   LIPASE - Normal   TROPONIN (IN-HOUSE) - Normal    Narrative:     Troponin T Reference Range:  <= 0.03 ng/mL-   Negative for AMI  >0.03 ng/mL-     Abnormal for myocardial necrosis.  Clinicians would have to utilize clinical acumen, EKG, Troponin and serial changes to determine if it is an Acute Myocardial Infarction or myocardial injury due to an underlying chronic condition.       Results may be falsely decreased if patient taking Biotin.     BNP (IN-HOUSE) - Normal    Narrative:     Among patients with dyspnea, NT-proBNP is highly sensitive for the detection of acute congestive heart failure. In addition NT-proBNP of <300 pg/ml effectively rules out acute congestive heart failure with 99% negative predictive value.    Results may be falsely decreased if patient taking Biotin.     PROCALCITONIN - Normal    Narrative:     As a Marker for Sepsis (Non-Neonates):    1. <0.5  "ng/mL represents a low risk of severe sepsis and/or septic shock.  2. >2 ng/mL represents a high risk of severe sepsis and/or septic shock.    As a Marker for Lower Respiratory Tract Infections that require antibiotic therapy:    PCT on Admission    Antibiotic Therapy       6-12 Hrs later    >0.5                Strongly Recommended  >0.25 - <0.5        Recommended   0.1 - 0.25          Discouraged              Remeasure/reassess PCT  <0.1                Strongly Discouraged     Remeasure/reassess PCT    As 28 day mortality risk marker: \"Change in Procalcitonin Result\" (>80% or <=80%) if Day 0 (or Day 1) and Day 4 values are available. Refer to [email or web address]    Change in PCT <=80%  A decrease of PCT levels below or equal to 80% defines a positive change in PCT test result representing a higher risk for 28-day all-cause mortality of patients diagnosed with severe sepsis for septic shock.    Change in PCT >80%  A decrease of PCT levels of more than 80% defines a negative change in PCT result representing a lower risk for 28-day all-cause mortality of patients diagnosed with severe sepsis or septic shock.      POCT GLUCOSE FINGERSTICK - Normal   URINALYSIS W/ CULTURE IF INDICATED   POCT GLUCOSE FINGERSTICK   POCT GLUCOSE FINGERSTICK   POCT GLUCOSE FINGERSTICK   CBC AND DIFFERENTIAL    Narrative:     The following orders were created for panel order CBC & Differential.  Procedure                               Abnormality         Status                     ---------                               -----------         ------                     CBC Auto Differential[[id number]]        Abnormal            Final result                 Please view results for these tests on the individual orders.       EKG:   ECG 12 Lead   Final Result   HEART RATE= 63  bpm   RR Interval= 952  ms   WV Interval= 271  ms   P Horizontal Axis= 227  deg   P Front Axis= -64  deg   QRSD Interval= 160  ms   QT Interval= 493  ms "   QRS Axis= -62  deg   T Wave Axis= 104  deg   - ABNORMAL ECG -   Atrial-ventricular dual-paced rhythm   When compared with ECG of 01-Mar-2021 12:50:57,   Ventricular pacing is new   Electronically Signed By: Rj Duvall ([facility]) 01-Jun-2022 12:07:04   Date and Time of Study: 2022-06-01 06:11:49      ECG 12 Lead    (Results Pending)       Meds given in ED:   Medications   nitroglycerin (NITROSTAT) SL tablet 0.4 mg (has no administration in time range)   acetaminophen (TYLENOL) tablet 650 mg (has no administration in time range)     Or   acetaminophen (TYLENOL) 160 MG/5ML solution 650 mg (has no administration in time range)     Or   acetaminophen (TYLENOL) suppository 650 mg (has no administration in time range)   ondansetron (ZOFRAN) tablet 4 mg (has no administration in time range)     Or   ondansetron (ZOFRAN) injection 4 mg (has no administration in time range)   guaiFENesin (MUCINEX) 12 hr tablet 1,200 mg (has no administration in time range)   ipratropium-albuterol (DUO-NEB) nebulizer solution 3 mL (has no administration in time range)   dextrose (GLUTOSE) oral gel 15 g (has no administration in time range)   dextrose (D50W) (25 g/50 mL) IV injection 25 g (has no administration in time range)   glucagon (human recombinant) (GLUCAGEN DIAGNOSTIC) injection 1 mg (has no administration in time range)   insulin lispro (ADMELOG) injection 0-9 Units (has no administration in time range)   sodium chloride 7 % nebulizer solution nebulizer solution 4 mL (has no administration in time range)   Enoxaparin Sodium (LOVENOX) syringe 40 mg (has no administration in time range)   iopamidol (ISOVUE-370) 76 % injection 100 mL (90 mL Intravenous Given by Other 6/1/22 1027)       Imaging results:  No radiology results for the last day    Ambulatory status:   - Up x's 1 assist    Social issues:   Social History     Socioeconomic History   • Marital status:    Tobacco Use   • Smoking status: Former Smoker   • Smokeless  tobacco: Never Used   Vaping Use   • Vaping Use: Never used   Substance and Sexual Activity   • Alcohol use: Yes     Comment: RARELY   • Drug use: No   • Sexual activity: Defer     Birth control/protection: Surgical       NIH Stroke Scale:        Nursing report ED to floor:

## 2022-06-01 NOTE — ED NOTES
Patient from home via EMS reporting one episode of vomiting at 0400, followed by some coughing. Patient denies abdominal pain.

## 2022-06-01 NOTE — H&P
Patient Name:  Di Macario  YOB: 1940  MRN:  [id number]  Admit Date:  6/1/2022  Patient Care Team:  Lily Yepez PA-C as PCP - General (Physician Assistant)  Madelin Ferguson MD as Consulting Physician (Cardiology)  Gigi Kaiser DO as Consulting Physician (Neurology)      Subjective   History Present Illness     Chief Complaint   Patient presents with   • Vomiting       Ms. Macario is a 82 y.o. female former smoker with a history of polio, dementia, DM, HLD, pacemaker that presents to [facility] complaining of dyspnea. She woke up around 4 a.m. w/large amount of mucous in the back of her throat causing difficulty breathing. She began coughing and had posttusive emesis. She continued to feel short of breath and EMS was called. She had been feeling overall well prior except for intermittent diarrhea over the past couple of weeks, was supposed to see her PCP today. In ED she was hypoxic w/ambulation, sats down to 85% on room air. She denies recent fever, chest pain, wheezing, abd pain, dysuria. She reports she was diagnosed with polio at age 4 with symptoms of dysphagia that improved with therapy. She has had intermittent episodes over a couple of years where she has some difficulty swallowing/keeping food down.    Afebrile. HR controlled. BP stable. Sats stable on O2 2L NC. . Procal 0.08. WBC 4.89, Hgb 11.2. Trop neg. Lipase 59. Gluc 129,  Alk phos 128; remaining chem panel wnl. RVP neg. UA 2+ leuk est, neg nitrite, 6-12 RBC, TNTC wBC, neg bact. CXR: no active disease. CTA chest: bronchial thickening and areas of mucous plugging; no PE, AA, or dissection; cardiomegaly; abnormal esophagus with distal dysmotility vs obstructing lesion; consolidation mostly RLL    Review of Systems   Constitutional: Negative for chills and fever.   HENT: Positive for congestion and trouble swallowing.    Respiratory: Positive for cough and shortness of breath.    Cardiovascular:  Negative for chest pain.   Gastrointestinal: Positive for diarrhea.   Genitourinary: Negative for dysuria.   Musculoskeletal: Negative for arthralgias and myalgias.   Skin: Positive for wound.   Neurological: Negative for headaches.   Psychiatric/Behavioral: Negative for sleep disturbance.        Personal History     Past Medical History:   Diagnosis Date   • Dementia (HCC)     states better with medication    • Diabetes mellitus (HCC)    • Hyperlipidemia 1/13/2021   • Lung consolidation (HCC) 6/1/2022   • Peripheral neuropathy 8/28/2018   • Vitamin D deficiency      Past Surgical History:   Procedure Laterality Date   • ANKLE SURGERY Right    • BLADDER SURGERY      Prolapsed   • BREAST BIOPSY     • CARDIAC ELECTROPHYSIOLOGY PROCEDURE N/A 3/5/2021    Procedure: Pacemaker DC new [place];  Surgeon: Madelin Ferguson MD;  Location: [facility] INVASIVE LOCATION;  Service: Cardiology;  Laterality: N/A;   • HYSTERECTOMY       Family History   Problem Relation Age of Onset   • Hypertension Mother    • Hypertension Father      Social History     Tobacco Use   • Smoking status: Former Smoker   • Smokeless tobacco: Never Used   Vaping Use   • Vaping Use: Never used   Substance Use Topics   • Alcohol use: Yes     Comment: RARELY   • Drug use: No     No current facility-administered medications on file prior to encounter.     Current Outpatient Medications on File Prior to Encounter   Medication Sig Dispense Refill   • acetaminophen (TYLENOL) 325 MG tablet Take 650 mg by mouth Every 6 (Six) Hours As Needed for Mild Pain  or Moderate Pain .     • amLODIPine-benazepril (Lotrel) 5-20 MG per capsule Take 1 capsule by mouth Daily. 90 capsule 3   • Continuous Blood Gluc Sensor (Dexcom G6 Sensor) Dipsense Dexcom G6 Sensors, to replace every 10 days, 3 sensors per 30 day period (NDC #[address]-65). Check 6 times a day. Dx: E 11.65 sent to supply store 9 each 4   • Continuous Blood Gluc Transmit (Dexcom G6 Transmitter) misc 1 each  Every 3 (Three) Months. Dispense 1 Dexcom G6 Transmitter for each 3 month period (NDC #[address]-67). Check 6 times a day. Dx: E 11.65 sent to a supply store 1 each 4   • donepezil (ARICEPT) 10 MG tablet Take 1 tablet by mouth Every Night. 90 tablet 3   • Dulaglutide (Trulicity) 3 MG/0.5ML solution pen-injector Inject 0.5 mL under the skin into the appropriate area as directed 1 (One) Time Per Week. Replaces 1.5 mg weekly dose 6 mL 2   • Glucagon (Gvoke HypoPen 2-Pack) 1 MG/0.2ML solution auto-injector Inject 1 mg under the skin into the appropriate area as directed As Needed (hypoglycemia). 0.4 mL 1   • glucose blood (FREESTYLE LITE) test strip Bid e11.65 200 each 3   • insulin glargine (LANTUS, SEMGLEE) 100 UNIT/ML injection Inject 26 Units under the skin into the appropriate area as directed Daily. Patient will call when needs. 30 mL 2   • Insulin Pen Needle (BD Pen Needle Norma U/F) 32G X 4 MM misc Use once a day 100 each 3   • Lancets (freestyle) lancets 1 each by Other route 4 (Four) Times a Day. Use as instructed 400 each 1   • multivitamin with minerals tablet tablet Take 1 tablet by mouth Daily.     • pravastatin (PRAVACHOL) 40 MG tablet Take 1 tablet by mouth Every Evening. 90 tablet 3   • Zonegran 100 MG capsule Take 2 capsules by mouth Every Night.       No Known Allergies    Objective    Objective     Vital Signs  Temp:  [98 °F (36.7 °C)] 98 °F (36.7 °C)  Heart Rate:  [60-78] 78  Resp:  [20] 20  BP: (101-159)/(52-81) 127/52  SpO2:  [91 %-100 %] 100 %  on  Flow (L/min):  [2] 2;   Device (Oxygen Therapy): nasal cannula  Body mass index is 25.69 kg/m².    Physical Exam  Vitals and nursing note reviewed.   Constitutional:       General: She is not in acute distress.     Appearance: She is ill-appearing.   HENT:      Head: Normocephalic.      Mouth/Throat:      Mouth: Mucous membranes are dry.   Eyes:      Conjunctiva/sclera: Conjunctivae normal.   Cardiovascular:      Rate and Rhythm: Normal rate and  regular rhythm.   Pulmonary:      Effort: Pulmonary effort is normal. No respiratory distress.      Breath sounds: Rales present.   Abdominal:      General: Bowel sounds are normal.      Palpations: Abdomen is soft.   Musculoskeletal:      Cervical back: Neck supple.      Right lower leg: Edema present.      Left lower leg: Edema present.      Comments: 1+   Skin:     General: Skin is warm and dry.      Comments: Wound lower lt leg, dressing CDI; not assessed    Neurological:      Mental Status: She is alert. Mental status is at baseline.   Psychiatric:         Mood and Affect: Mood normal.         Behavior: Behavior normal.         Results Review:  I reviewed the patient's new clinical results.  I reviewed the patient's new imaging results and agree with the interpretation.  I reviewed the patient's other test results and agree with the interpretation  I personally viewed and interpreted the patient's EKG/Telemetry data  Discussed with ED provider.    Lab Results (last 24 hours)     Procedure Component Value Units Date/Time    CBC & Differential [[id number]]  (Abnormal) Collected: 06/01/22 0604    Specimen: Blood Updated: 06/01/22 0616    Narrative:      The following orders were created for panel order CBC & Differential.  Procedure                               Abnormality         Status                     ---------                               -----------         ------                     CBC Auto Differential[[id number]]        Abnormal            Final result                 Please view results for these tests on the individual orders.    Comprehensive Metabolic Panel [[id number]]  (Abnormal) Collected: 06/01/22 0604    Specimen: Blood Updated: 06/01/22 0633     Glucose 124 mg/dL      BUN 21 mg/dL      Creatinine 0.57 mg/dL      Sodium 140 mmol/L      Potassium 3.9 mmol/L      Chloride 106 mmol/L      CO2 25.0 mmol/L      Calcium 9.2 mg/dL      Total Protein 6.3 g/dL      Albumin 3.60 g/dL      ALT (SGPT) 16  U/L      AST (SGOT) 14 U/L      Alkaline Phosphatase 128 U/L      Total Bilirubin <0.2 mg/dL      Globulin 2.7 gm/dL      A/G Ratio 1.3 g/dL      BUN/Creatinine Ratio 36.8     Anion Gap 9.0 mmol/L      eGFR 90.3 mL/min/1.73      Comment: National Kidney Foundation and American Society of Nephrology (ASN) Task Force recommended calculation based on the Chronic Kidney Disease Epidemiology Collaboration (CKD-EPI) equation refit without adjustment for race.       Narrative:      GFR Normal >60  Chronic Kidney Disease <60  Kidney Failure <15      Lipase [[id number]]  (Normal) Collected: 06/01/22 0604    Specimen: Blood Updated: 06/01/22 0633     Lipase 59 U/L     Troponin [[id number]]  (Normal) Collected: 06/01/22 0604    Specimen: Blood Updated: 06/01/22 0633     Troponin T <0.010 ng/mL     Narrative:      Troponin T Reference Range:  <= 0.03 ng/mL-   Negative for AMI  >0.03 ng/mL-     Abnormal for myocardial necrosis.  Clinicians would have to utilize clinical acumen, EKG, Troponin and serial changes to determine if it is an Acute Myocardial Infarction or myocardial injury due to an underlying chronic condition.       Results may be falsely decreased if patient taking Biotin.      CBC Auto Differential [[id number]]  (Abnormal) Collected: 06/01/22 0604    Specimen: Blood Updated: 06/01/22 0616     WBC 4.89 10*3/mm3      RBC 3.92 10*6/mm3      Hemoglobin 11.2 g/dL      Hematocrit 34.4 %      MCV 87.8 fL      MCH 28.6 pg      MCHC 32.6 g/dL      RDW 14.6 %      RDW-SD 46.8 fl      MPV 10.5 fL      Platelets 214 10*3/mm3      Neutrophil % 66.3 %      Lymphocyte % 27.4 %      Monocyte % 5.3 %      Eosinophil % 0.4 %      Basophil % 0.4 %      Immature Grans % 0.2 %      Neutrophils, Absolute 3.24 10*3/mm3      Lymphocytes, Absolute 1.34 10*3/mm3      Monocytes, Absolute 0.26 10*3/mm3      Eosinophils, Absolute 0.02 10*3/mm3      Basophils, Absolute 0.02 10*3/mm3      Immature Grans, Absolute 0.01 10*3/mm3      nRBC 0.0 /100  "WBC     BNP [[id number]]  (Normal) Collected: 06/01/22 0604    Specimen: Blood Updated: 06/01/22 0709     proBNP 178.0 pg/mL     Narrative:      Among patients with dyspnea, NT-proBNP is highly sensitive for the detection of acute congestive heart failure. In addition NT-proBNP of <300 pg/ml effectively rules out acute congestive heart failure with 99% negative predictive value.    Results may be falsely decreased if patient taking Biotin.      Procalcitonin [[id number]]  (Normal) Collected: 06/01/22 0604    Specimen: Blood Updated: 06/01/22 1359     Procalcitonin 0.08 ng/mL     Narrative:      As a Marker for Sepsis (Non-Neonates):    1. <0.5 ng/mL represents a low risk of severe sepsis and/or septic shock.  2. >2 ng/mL represents a high risk of severe sepsis and/or septic shock.    As a Marker for Lower Respiratory Tract Infections that require antibiotic therapy:    PCT on Admission    Antibiotic Therapy       6-12 Hrs later    >0.5                Strongly Recommended  >0.25 - <0.5        Recommended   0.1 - 0.25          Discouraged              Remeasure/reassess PCT  <0.1                Strongly Discouraged     Remeasure/reassess PCT    As 28 day mortality risk marker: \"Change in Procalcitonin Result\" (>80% or <=80%) if Day 0 (or Day 1) and Day 4 values are available. Refer to [email or web address]://[email or web address]-pct-[email or web address]    Change in PCT <=80%  A decrease of PCT levels below or equal to 80% defines a positive change in PCT test result representing a higher risk for 28-day all-cause mortality of patients diagnosed with severe sepsis for septic shock.    Change in PCT >80%  A decrease of PCT levels of more than 80% defines a negative change in PCT result representing a lower risk for 28-day all-cause mortality of patients diagnosed with severe sepsis or septic shock.       Respiratory Panel PCR w/COVID-19(SARS-CoV-2) [facility]/FLIP/ASHA/PAD/COR/MAD/SISI In-House, NP Swab in UTM/VTM, 3-4 HR TAT - Swab, Nasopharynx [[id number]]  " (Normal) Collected: 06/01/22 0644    Specimen: Swab from Nasopharynx Updated: 06/01/22 0743     ADENOVIRUS, PCR Not Detected     Coronavirus 229E Not Detected     Coronavirus HKU1 Not Detected     Coronavirus NL63 Not Detected     Coronavirus OC43 Not Detected     COVID19 Not Detected     Human Metapneumovirus Not Detected     Human Rhinovirus/Enterovirus Not Detected     Influenza A PCR Not Detected     Influenza B PCR Not Detected     Parainfluenza Virus 1 Not Detected     Parainfluenza Virus 2 Not Detected     Parainfluenza Virus 3 Not Detected     Parainfluenza Virus 4 Not Detected     RSV, PCR Not Detected     Bordetella pertussis pcr Not Detected     Bordetella parapertussis PCR Not Detected     Chlamydophila pneumoniae PCR Not Detected     Mycoplasma pneumo by PCR Not Detected    Narrative:      In the setting of a positive respiratory panel with a viral infection PLUS a negative procalcitonin without other underlying concern for bacterial infection, consider observing off antibiotics or discontinuation of antibiotics and continue supportive care. If the respiratory panel is positive for atypical bacterial infection (Bordetella pertussis, Chlamydophila pneumoniae, or Mycoplasma pneumoniae), consider antibiotic de-escalation to target atypical bacterial infection.    Urinalysis With Microscopic If Indicated (No Culture) - Urine, Clean Catch [[id number]]  (Abnormal) Collected: 06/01/22 0816    Specimen: Urine, Clean Catch Updated: 06/01/22 0838     Color, UA Yellow     Appearance, UA Turbid     pH, UA 8.0     Specific Gravity, UA 1.018     Glucose, UA Negative     Ketones, UA Negative     Bilirubin, UA Negative     Blood, UA Trace     Protein, UA Negative     Leuk Esterase, UA Moderate (2+)     Nitrite, UA Negative     Urobilinogen, UA 0.2 E.U./dL    Urinalysis, Microscopic Only - Urine, Clean Catch [[id number]]  (Abnormal) Collected: 06/01/22 0816    Specimen: Urine, Clean Catch Updated: 06/01/22 0838     RBC,  UA 6-12 /HPF      WBC, UA Too Numerous to Count /HPF      Bacteria, UA None Seen /HPF      Squamous Epithelial Cells, UA 0-2 /HPF      Hyaline Casts, UA 3-6 /LPF      Methodology Automated Microscopy    POC Glucose Once [[id number]]  (Normal) Collected: 06/01/22 0921    Specimen: Blood Updated: 06/01/22 0928     Glucose 126 mg/dL      Comment: Meter: [id number] : [id number] Magan Cariner ERT       POC Glucose Once [[id number]]  (Abnormal) Collected: 06/01/22 1433    Specimen: Blood Updated: 06/01/22 1439     Glucose 138 mg/dL      Comment: RN Notified R and V Meter: [id number] : [id number] Magan Cariner ERT             Imaging Results (Last 24 Hours)     Procedure Component Value Units Date/Time    CT Angiogram Chest [[id number]] Collected: 06/01/22 1042     Updated: 06/01/22 1056    Narrative:      CT ANGIOGRAM CHEST-     Radiation dose reduction techniques were utilized, including automated  exposure control and exposure modulation based on body size.     Clinical: New onset shortness of breath, congestion, cough, rule out  pulmonary embolus     CT scan of the chest performed with intravenous contrast, pulmonary  embolus protocol to include coronal, sagittal and three-dimensional  reconstruction.     FINDINGS: No pulmonary embolus. Atherosclerotic calcification of a  normal diameter aorta. There is coronary artery calcification with  cardiac enlargement, pacer leads within the right heart. No pericardial  effusion is demonstrated.     Proximal and mid esophagus mildly distended, portion containing an  air-fluid level consistent with distal dysmotility or obstruction, the  distal esophagus is collapsed, wall cannot be well evaluated.     Multifocal areas of groundglass infiltrate demonstrated within both  lungs having a basilar predominance. Associated interstitial  infiltrates, with some nodular areas of consolidation, most pronounced  in the right lower lobe. Suspect atypical pneumonia or aspiration.  Covid  pneumonia not excluded.     Bilateral areas of bronchial wall thickening with mucous plugging most  pronounced in the right lower lobe.     No pleural effusion or pulmonary edema. Few mildly enlarged mediastinal  and hilar lymph nodes seen. There is an old right humerus fracture.     CONCLUSION:  1. Airspace disease with bronchial wall thickening and areas of mucous  plugging as described above.  2. No pulmonary embolus, aortic aneurysm or dissection.  3. Cardiomegaly.  4. Abnormal esophagus, the appearance suggests distal dysmotility or  obstructing lesion.              This report was finalized on 6/1/2022 10:53 AM by Dr. Teja Mckeon M.D.       XR Chest 1 View [[id number]] Collected: 06/01/22 0654     Updated: 06/01/22 0658    Narrative:      XR CHEST 1 VW-clinical: Shortness of breath     COMPARISON 03/05/2021     FINDINGS: The cardiomediastinal silhouette is stable, transvenous  pacemaker in position as before. Lungs clear. No effusion or edema  identified. Healing right shoulder fracture     CONCLUSION: No active disease of the chest     This report was finalized on 6/1/2022 6:55 AM by Dr. Teja Mckeon M.D.             Results for orders placed in visit on 01/13/21    Adult Transthoracic Echo Complete W/ Cont if Necessary Per Protocol    Interpretation Summary  · Calculated left ventricular EF = 66.8% Estimated left ventricular EF was in agreement with the calculated left ventricular EF.  · Left ventricular diastolic function was normal.  · Calculated left ventricular EF = 66.8%  · There is no evidence of pericardial effusion. .      ECG 12 Lead   Final Result   HEART RATE= 63  bpm   RR Interval= 952  ms   [place] Interval= 271  ms   P Horizontal Axis= 227  deg   P Front Axis= -64  deg   QRSD Interval= 160  ms   QT Interval= 493  ms   QRS Axis= -62  deg   T Wave Axis= 104  deg   - ABNORMAL ECG -   Atrial-ventricular dual-paced rhythm   When compared with ECG of 01-Mar-2021 12:50:57,   Ventricular pacing  is new   Electronically Signed By: Rj Duvall ([facility]) 01-Jun-2022 12:07:04   Date and Time of Study: 2022-06-01 06:11:49      ECG 12 Lead    (Results Pending)        Assessment/Plan     Active Hospital Problems    Diagnosis  POA   • **Acute hypoxemic respiratory failure (HCC) [J96.01]  Yes   • Esophageal abnormality [K22.9]  Yes   • Dementia without behavioral disturbance (HCC) [F03.90]  Yes   • Lung consolidation (HCC) [J18.1]  Yes   • Type 2 diabetes mellitus, with long-term current use of insulin (HCC) [E11.9, Z79.4]  Not Applicable   • Presence of cardiac pacemaker [Z95.0]  Yes   • Essential hypertension [I10]  Yes      Resolved Hospital Problems   No resolved problems to display.       Ms. Macario is a 82 y.o. female with a history of polio, dementia, DM, HLD, pacemaker who is admitted for acute respiratory failure w/hypoxia, esophagus abnormality on CT w/dysphagia    -Pulmonary toileting. Wean O2 as tolerated. No leukocytosis or fever, procal wnl. Noted UA; culture pending; denies urinary symptoms.  Monitor off antibiotics for now. Consider Pulmonology consult if no improvement in respiratory symptoms  -Abnormal esophagus on CT w/dysphagia. GI consult. Keep NPO for now  -Continue aricept for dementia; no behavioral disturbance  -Monitor BG trends. Correctional scale insulin. Hold hold regimen while NPO. Hypoglycemia protocol. Check A1C  -BP stable. Denies chest pain, CHF symptoms  -Wound POA lower lt leg. Wound RN consult  -Home meds pending reconciliation      · I discussed the patient's findings and my recommendations with patient and family.    VTE Prophylaxis - Pharmacy to dose Lovenox.  Code Status - Full code.       IAIN Acharya  [facility]  06/01/22  15:33 EDT

## 2022-06-01 NOTE — CONSULTS
"[facility]  Initial Inpatient Consult Note    Referring Provider: A     Reason for Consultation: Dysphagia for solids/liquids and Abnormal esophagus on CT     Subjective     History of present illness:      Thank you for allowing us to participate in the care of this patient.  82 y.o. female unknown to our service with hx of dementia, DM, HLP, Peripheral neuropathy who presented to the ED with sudden onset shortness of air and cough with 1 episode of posttussive emesis.  Emesis was \"purple\" in color.  She had just had a [place] peppermint armaan.  He is being admitted for acute hypoxemic respiratory failure when her O2 levels dropped particularly with movement.  Underwent CTA chest which showed abnormal esophagus, the appearance suggests distal dysmotility or obstructing lesion.  Patient reports occasional trouble swallowing of foods and even less occasional pills ongoing for years.  Typically no trouble with liquids.  Her daughter states that she will get choked on foods at time.  She is never had to vomit up food that got stuck.  She denies dyspepsia, GERD, abdominal pain, melena, hematochezia.  She does report loose stools about 4/day for the last 2 weeks.  Recently took Imodium which has helped.  She had a small bowel movement immediately prior to my arrival.    6/1 labs show CMP with elevated ALP at 128, this is improved from March ALP at 153.  Normal lipase and CBC with mildly low hemoglobin 11.2 otherwise normal.    She has a family history of colon cancer in her father.  Her daughter was at bedside reports that she had a large polyp that required surgical removal as it was too large to remove the abnormal biopsy.  She states it was benign.  Patient used to smoke but quit in the 70s.  She has no personal history of cancer.    The patient states that she never had a colonoscopy but her daughter believes she did have 1 in her 60s.      Past Medical History:  Past Medical History: "   Diagnosis Date   • Dementia (HCC)     states better with medication    • Diabetes mellitus (HCC)    • Hyperlipidemia 1/13/2021   • Peripheral neuropathy 8/28/2018   • Vitamin D deficiency      Past Surgical History:  Past Surgical History:   Procedure Laterality Date   • ANKLE SURGERY Right    • BLADDER SURGERY      Prolapsed   • BREAST BIOPSY     • CARDIAC ELECTROPHYSIOLOGY PROCEDURE N/A 3/5/2021    Procedure: Pacemaker DC new B[place];  Surgeon: Madelin Ferguson MD;  Location: [facility] INVASIVE LOCATION;  Service: Cardiology;  Laterality: N/A;   • HYSTERECTOMY        Social History:   Social History     Tobacco Use   • Smoking status: Former Smoker   • Smokeless tobacco: Never Used   Substance Use Topics   • Alcohol use: Yes     Comment: RARELY      Family History:  Family History   Problem Relation Age of Onset   • Hypertension Mother    • Hypertension Father        Home Meds:  (Not in a hospital admission)    Current Meds:   enoxaparin, 40 mg, Subcutaneous, Q24H  guaiFENesin, 1,200 mg, Oral, Q12H  insulin lispro, 0-9 Units, Subcutaneous, TID AC  ipratropium-albuterol, 3 mL, Nebulization, 4x Daily - RT  sodium chloride, 4 mL, Nebulization, BID - RT      Allergies:  No Known Allergies  Review of Systems  General ROS: positive for  - fatigue  negative for - fever  Psychological ROS: negative for - hallucinations or suicidal ideation  ENT ROS: negative for - nasal discharge or sore throat: Positive for congestion  Respiratory ROS: positive for - cough and shortness of breath  Cardiovascular ROS: negative for - chest pain   Gastrointestinal ROS: positive for - diarrhea and swallowing difficulty/pain  negative for - abdominal pain, blood in stools, heartburn or melena  Genito-Urinary ROS: no dysuria, trouble voiding, or hematuria  Musculoskeletal ROS: negative for - joint pain or muscle pain  Neurological ROS: no TIA or stroke symptoms  Dermatological ROS: negative for pruritus and rash    Objective     Vital  Signs  Temp:  [98 °F (36.7 °C)] 98 °F (36.7 °C)  Heart Rate:  [60-74] 74  Resp:  [20] 20  BP: (101-159)/(57-81) 142/81  Physical Exam:   Constitutional:    Alert, cooperative, in no acute distress    Eyes:          conjunctivae and sclerae normal, no icterus    HENT:   Atraumatic, normal hearing, mucosa moist    Neck:   Trachea midline, supple    Lungs:      Rales present bilaterally, on O2 via nasal cannula, receiving breathing treatment    Heart:    Regular rhythm and normal rate, no MGR, no abdominal bruits    Abdomen:     Soft, nondistended, nontender; normal bowel sounds , no organomegaly    Extremities:   no  erythema of bilateral lower extremities; 1+ edema bilaterally   Skin:   No bruising, rash, or pallor; bandages on left lower extremity   Neurologic:  No tremors, no asterixis    Psychiatric:  normal mood and affect; A&O x3--some memory problems with historical events.  Daughter assists with history.     Results Review:   I reviewed the patient's new clinical results.    Results from last 7 days   Lab Units 06/01/22  0604   WBC 10*3/mm3 4.89   HEMOGLOBIN g/dL 11.2*   HEMATOCRIT % 34.4   PLATELETS 10*3/mm3 214     Results from last 7 days   Lab Units 06/01/22  0604   SODIUM mmol/L 140   POTASSIUM mmol/L 3.9   CHLORIDE mmol/L 106   CO2 mmol/L 25.0   BUN mg/dL 21   CREATININE mg/dL 0.57   CALCIUM mg/dL 9.2   BILIRUBIN mg/dL <0.2   ALK PHOS U/L 128*   ALT (SGPT) U/L 16   AST (SGOT) U/L 14   GLUCOSE mg/dL 124*         Lab Results   Lab Value Date/Time    LIPASE 59 06/01/2022 0604    LIPASE 163 (H) 03/01/2021 1225       Radiology:  CT Angiogram Chest   Final Result      XR Chest 1 View   Final Result          Assessment & Plan   Patient Active Problem List   Diagnosis   • Essential hypertension   • Impaired memory   • Vitamin D deficiency   • Hyperuricemia   • Slow transit constipation   • Chronic pain of left knee   • Fall   • At high risk for falls   • Peripheral neuropathy   • Uncontrolled type 2 diabetes  mellitus with hyperglycemia (HCC)   • Alkaline phosphatase elevation   • Walker as ambulation aid   • Bradycardia, sinus   • Shortness of breath   • Leg swelling   • Hyperlipidemia   • Presence of cardiac pacemaker   • Humeral head fracture, right, closed, initial encounter   • Type 2 diabetes mellitus, with long-term current use of insulin (HCC)   • Nail abnormalities   • Acute hypoxemic respiratory failure (HCC)       Assessment:  1. Abnormal Esophagus on CTA of chest: distal dysmotility vs obstructive lesion  2. Dysphagia, intermittent to solid foods and pills for years  3. Choking on foods, intermittently  4. Acute hypoxemic respiratory failure  5. Elevated ALP since Dec 2021, some improvement since then    Plan:  · She will need EGD once improved clinically from a pulmonary standpoint.  Discussed with patient and her daughter.  · Start Pepcid 40 mg in the evening.  · Recommend RUQ US for further assessment of elevated ALP  · Would recommend speech evaluation for possible aspiration given current status and reports of intermittently choking on foods.  This may need to be done once respiratory status improves some.  · Thank you for the consult, our service will follow along.      I discussed the patients findings and my recommendations with patient and family.    Dragon dictation used throughout this note.            Shanita Hendrix PA-C  [facility]  [address] [place] [address]  Office: [phone]

## 2022-06-01 NOTE — ED PROVIDER NOTES
MD ATTESTATION NOTE    The CHELSEA and I have discussed this patient's history, physical exam, and treatment plan.  I have reviewed the documentation and personally had a face to face interaction with the patient. I affirm the documentation and agree with the treatment and plan.  The attached note describes my personal findings.      I provided a substantive portion of the care of the patient.  I personally performed the physical exam in its entirety, and below are my findings.  For this patient encounter, the patient wore surgical mask, I wore full protective PPE including N95 and eye protection.      Brief HPI: 82-year-old female presents from home for evaluation of new onset shortness of breath with some congestion and coughing.  This began around 4:00 in the morning.  She also has had some nausea symptoms this morning.  As I evaluate her, she is complaining of some chills.  Her daughter believes she may have had a fever earlier this morning also.    PHYSICAL EXAM  ED Triage Vitals   Temp Heart Rate Resp BP SpO2   06/01/22 0458 06/01/22 0456 06/01/22 0456 06/01/22 0456 06/01/22 0456   98 °F (36.7 °C) 60 20 159/72 93 %      Temp src Heart Rate Source Patient Position BP Location FiO2 (%)   -- -- -- -- --                GENERAL: Pleasant, elderly lady.  No acute distress  HENT: nares patent, normocephalic, atraumatic  EYES: no scleral icterus, EOMI  CV: regular rhythm, normal rate  RESPIRATORY: normal effort at rest.  Diminished breath sounds in the bases with some congestion and rhonchi noted scattered  ABDOMEN: soft, mild tenderness in all 4 quadrants.  No peritoneal features.  MUSCULOSKELETAL: no deformity  NEURO: alert, moves all extremities, follows commands  PSYCH:  calm, cooperative  SKIN: warm, dry    Vital signs and nursing notes reviewed.        Plan: Presumed viral process contributing to bronchitis and respiratory distress symptoms with hypoxia on exertion.  Plan to admit for continued supportive and  respiratory care.       Lui Russell MD  06/01/22 7604

## 2022-06-01 NOTE — ED PROVIDER NOTES
EMERGENCY DEPARTMENT ENCOUNTER    Room Number:  05/05  Date seen:  6/1/2022  Time seen: 06:35 EDT  PCP: Lily Yepez PA-C  Historian: patient      HPI:  Chief Complaint: shortness of breath    A complete HPI/ROS/PMH/PSH/SH/FH are unobtainable due to: none    Context: Di Macario is a 82 y.o. female who presents to the ED for evaluation of an episode of shortness of breath that woke her from sleep around 4 AM today.  She states she woke up with a lot of mucus in the back of her throat, had trouble clearing it, had a severe coughing episode with an episode of posttussive emesis.  Continues to feel short of breath with severe coughing and EMS was called.  She arrives via ambulance for further evaluation and treatment.  All of the symptoms started acutely about 2 hours ago.  She had not been feeling ill otherwise.  Denies feeling short of breath now, states she still feels that she is having some postnasal drainage.  Denies any nasal congestion rhinorrhea fever chills body aches sore throat or chest pain as well as any abdominal pain or urinary symptoms.  No history of lung disease.  She incidentally reports she has been having diarrhea intermittently for the last couple weeks, no diarrhea in the last 24 hours.  Has an appointment with her PCP today at 11 AM.        PAST MEDICAL HISTORY  Active Ambulatory Problems     Diagnosis Date Noted   • Essential hypertension 03/04/2016   • Impaired memory 03/04/2016   • Vitamin D deficiency 03/04/2016   • Hyperuricemia 07/20/2016   • Slow transit constipation 01/31/2017   • Chronic pain of left knee 01/31/2017   • Fall 02/03/2017   • At high risk for falls 08/10/2018   • Peripheral neuropathy 08/28/2018   • Uncontrolled type 2 diabetes mellitus with hyperglycemia (HCC) 10/25/2018   • Alkaline phosphatase elevation 10/29/2018   • Walker as ambulation aid 02/12/2019   • Bradycardia, sinus 01/13/2021   • Shortness of breath 01/13/2021   • Leg swelling 01/13/2021   •  Hyperlipidemia 01/13/2021   • Presence of cardiac pacemaker 03/26/2021   • Humeral head fracture, right, closed, initial encounter 10/29/2021   • Type 2 diabetes mellitus, with long-term current use of insulin (HCC) 10/29/2021   • Nail abnormalities 03/17/2022     Resolved Ambulatory Problems     Diagnosis Date Noted   • Medicare annual wellness visit, subsequent 08/16/2017   • Calcium blood increased 10/29/2018     Past Medical History:   Diagnosis Date   • Dementia (HCC)    • Diabetes mellitus (HCC)          PAST SURGICAL HISTORY  Past Surgical History:   Procedure Laterality Date   • ANKLE SURGERY Right    • BLADDER SURGERY      Prolapsed   • BREAST BIOPSY     • CARDIAC ELECTROPHYSIOLOGY PROCEDURE N/A 3/5/2021    Procedure: Pacemaker DC jae CAMPBELL;  Surgeon: Madelin Ferguson MD;  Location: [facility] INVASIVE LOCATION;  Service: Cardiology;  Laterality: N/A;   • HYSTERECTOMY           FAMILY HISTORY  Family History   Problem Relation Age of Onset   • Hypertension Mother    • Hypertension Father          SOCIAL HISTORY  Social History     Socioeconomic History   • Marital status:    Tobacco Use   • Smoking status: Former Smoker   • Smokeless tobacco: Never Used   Vaping Use   • Vaping Use: Never used   Substance and Sexual Activity   • Alcohol use: Yes     Comment: RARELY   • Drug use: No   • Sexual activity: Defer     Birth control/protection: Surgical         ALLERGIES  Patient has no known allergies.        REVIEW OF SYSTEMS  Review of Systems     All systems reviewed and negative except for those discussed in HPI.       PHYSICAL EXAM  ED Triage Vitals   Temp Heart Rate Resp BP SpO2   06/01/22 0458 06/01/22 0456 06/01/22 0456 06/01/22 0456 06/01/22 0456   98 °F (36.7 °C) 60 20 159/72 93 %      Temp src Heart Rate Source Patient Position BP Location FiO2 (%)   -- -- -- -- --                GENERAL: not distressed  HENT: atraumatic  EYES: no scleral icterus  CV: regular rhythm, regular rate, 1+  bilateral lower extremity edema.  RESPIRATORY: normal effort mild bibasilar crackles, oxygen is 95% on room air  ABDOMEN: soft, nontender nondistended normal bowel sounds no guarding or rigidity  MUSCULOSKELETAL: no deformity  NEURO: alert, moves all extremities, follows commands  SKIN: warm, dry    Vital signs and nursing notes reviewed.          LAB RESULTS  Recent Results (from the past 24 hour(s))   Comprehensive Metabolic Panel    Collection Time: 06/01/22  6:04 AM    Specimen: Blood   Result Value Ref Range    Glucose 124 (H) 65 - 99 mg/dL    BUN 21 8 - 23 mg/dL    Creatinine 0.57 0.57 - 1.00 mg/dL    Sodium 140 136 - 145 mmol/L    Potassium 3.9 3.5 - 5.2 mmol/L    Chloride 106 98 - 107 mmol/L    CO2 25.0 22.0 - 29.0 mmol/L    Calcium 9.2 8.6 - 10.5 mg/dL    Total Protein 6.3 6.0 - 8.5 g/dL    Albumin 3.60 3.50 - 5.20 g/dL    ALT (SGPT) 16 1 - 33 U/L    AST (SGOT) 14 1 - 32 U/L    Alkaline Phosphatase 128 (H) 39 - 117 U/L    Total Bilirubin <0.2 0.0 - 1.2 mg/dL    Globulin 2.7 gm/dL    A/G Ratio 1.3 g/dL    BUN/Creatinine Ratio 36.8 (H) 7.0 - 25.0    Anion Gap 9.0 5.0 - 15.0 mmol/L    eGFR 90.3 >60.0 mL/min/1.73   Lipase    Collection Time: 06/01/22  6:04 AM    Specimen: Blood   Result Value Ref Range    Lipase 59 13 - 60 U/L   Troponin    Collection Time: 06/01/22  6:04 AM    Specimen: Blood   Result Value Ref Range    Troponin T <0.010 0.000 - 0.030 ng/mL   CBC Auto Differential    Collection Time: 06/01/22  6:04 AM    Specimen: Blood   Result Value Ref Range    WBC 4.89 3.40 - 10.80 10*3/mm3    RBC 3.92 3.77 - 5.28 10*6/mm3    Hemoglobin 11.2 (L) 12.0 - 15.9 g/dL    Hematocrit 34.4 34.0 - 46.6 %    MCV 87.8 79.0 - 97.0 fL    MCH 28.6 26.6 - 33.0 pg    MCHC 32.6 31.5 - 35.7 g/dL    RDW 14.6 12.3 - 15.4 %    RDW-SD 46.8 37.0 - 54.0 fl    MPV 10.5 6.0 - 12.0 fL    Platelets 214 140 - 450 10*3/mm3    Neutrophil % 66.3 42.7 - 76.0 %    Lymphocyte % 27.4 19.6 - 45.3 %    Monocyte % 5.3 5.0 - 12.0 %    Eosinophil  % 0.4 0.3 - 6.2 %    Basophil % 0.4 0.0 - 1.5 %    Immature Grans % 0.2 0.0 - 0.5 %    Neutrophils, Absolute 3.24 1.70 - 7.00 10*3/mm3    Lymphocytes, Absolute 1.34 0.70 - 3.10 10*3/mm3    Monocytes, Absolute 0.26 0.10 - 0.90 10*3/mm3    Eosinophils, Absolute 0.02 0.00 - 0.40 10*3/mm3    Basophils, Absolute 0.02 0.00 - 0.20 10*3/mm3    Immature Grans, Absolute 0.01 0.00 - 0.05 10*3/mm3    nRBC 0.0 0.0 - 0.2 /100 WBC   BNP    Collection Time: 06/01/22  6:04 AM    Specimen: Blood   Result Value Ref Range    proBNP 178.0 0.0 - 1,800.0 pg/mL   ECG 12 Lead    Collection Time: 06/01/22  6:11 AM   Result Value Ref Range    QT Interval 493 ms   Respiratory Panel PCR w/COVID-19(SARS-CoV-2) [facility]/FLIP/ASHA/PAD/[facility]I In-House, NP Swab in UTM/VTM, 3-4 HR TAT - Swab, Nasopharynx    Collection Time: 06/01/22  6:44 AM    Specimen: Nasopharynx; Swab   Result Value Ref Range    ADENOVIRUS, PCR Not Detected Not Detected    Coronavirus 229E Not Detected Not Detected    Coronavirus HKU1 Not Detected Not Detected    Coronavirus NL63 Not Detected Not Detected    Coronavirus OC43 Not Detected Not Detected    COVID19 Not Detected Not Detected - Ref. Range    Human Metapneumovirus Not Detected Not Detected    Human Rhinovirus/Enterovirus Not Detected Not Detected    Influenza A PCR Not Detected Not Detected    Influenza B PCR Not Detected Not Detected    Parainfluenza Virus 1 Not Detected Not Detected    Parainfluenza Virus 2 Not Detected Not Detected    Parainfluenza Virus 3 Not Detected Not Detected    Parainfluenza Virus 4 Not Detected Not Detected    RSV, PCR Not Detected Not Detected    Bordetella pertussis pcr Not Detected Not Detected    Bordetella parapertussis PCR Not Detected Not Detected    Chlamydophila pneumoniae PCR Not Detected Not Detected    Mycoplasma pneumo by PCR Not Detected Not Detected   Urinalysis With Microscopic If Indicated (No Culture) - Urine, Clean Catch    Collection Time: 06/01/22  8:16 AM    Specimen:  Urine, Clean Catch   Result Value Ref Range    Color, UA Yellow Yellow, Straw    Appearance, UA Turbid (A) Clear    pH, UA 8.0 5.0 - 8.0    Specific Gravity, UA 1.018 1.005 - 1.030    Glucose, UA Negative Negative    Ketones, UA Negative Negative    Bilirubin, UA Negative Negative    Blood, UA Trace (A) Negative    Protein, UA Negative Negative    Leuk Esterase, UA Moderate (2+) (A) Negative    Nitrite, UA Negative Negative    Urobilinogen, UA 0.2 E.U./dL 0.2 - 1.0 E.U./dL   Urinalysis, Microscopic Only - Urine, Clean Catch    Collection Time: 06/01/22  8:16 AM    Specimen: Urine, Clean Catch   Result Value Ref Range    RBC, UA 6-12 (A) None Seen, 0-2 /HPF    WBC, UA Too Numerous to Count (A) None Seen, 0-2 /HPF    Bacteria, UA None Seen None Seen /HPF    Squamous Epithelial Cells, UA 0-2 None Seen, 0-2 /HPF    Hyaline Casts, UA 3-6 None Seen /LPF    Methodology Automated Microscopy    POC Glucose Once    Collection Time: 06/01/22  9:21 AM    Specimen: Blood   Result Value Ref Range    Glucose 126 70 - 130 mg/dL       Ordered the above labs and independently reviewed the results.        RADIOLOGY  XR Chest 1 View    Result Date: 6/1/2022  Narrative: XR CHEST 1 VW-clinical: Shortness of breath  COMPARISON 03/05/2021  FINDINGS: The cardiomediastinal silhouette is stable, transvenous pacemaker in position as before. Lungs clear. No effusion or edema identified. Healing right shoulder fracture  CONCLUSION: No active disease of the chest  This report was finalized on 6/1/2022 6:55 AM by Dr. Teja Mckeon M.D.      CT Angiogram Chest    Result Date: 6/1/2022  Narrative: CT ANGIOGRAM CHEST-  Radiation dose reduction techniques were utilized, including automated exposure control and exposure modulation based on body size.  Clinical: New onset shortness of breath, congestion, cough, rule out pulmonary embolus  CT scan of the chest performed with intravenous contrast, pulmonary embolus protocol to include coronal, sagittal  and three-dimensional reconstruction.  FINDINGS: No pulmonary embolus. Atherosclerotic calcification of a normal diameter aorta. There is coronary artery calcification with cardiac enlargement, pacer leads within the right heart. No pericardial effusion is demonstrated.  Proximal and mid esophagus mildly distended, portion containing an air-fluid level consistent with distal dysmotility or obstruction, the distal esophagus is collapsed, wall cannot be well evaluated.  Multifocal areas of groundglass infiltrate demonstrated within both lungs having a basilar predominance. Associated interstitial infiltrates, with some nodular areas of consolidation, most pronounced in the right lower lobe. Suspect atypical pneumonia or aspiration. Covid pneumonia not excluded.  Bilateral areas of bronchial wall thickening with mucous plugging most pronounced in the right lower lobe.  No pleural effusion or pulmonary edema. Few mildly enlarged mediastinal and hilar lymph nodes seen. There is an old right humerus fracture.  CONCLUSION: 1. Airspace disease with bronchial wall thickening and areas of mucous plugging as described above. 2. No pulmonary embolus, aortic aneurysm or dissection. 3. Cardiomegaly. 4. Abnormal esophagus, the appearance suggests distal dysmotility or obstructing lesion.     This report was finalized on 6/1/2022 10:53 AM by Dr. Teja Mckeon M.D.          I ordered the above noted radiological studies. Reviewed by me and discussed with radiologist.  See dictation for official radiology interpretation.    PROCEDURES  Procedures        MEDICATIONS GIVEN IN ER  Medications   iopamidol (ISOVUE-370) 76 % injection 100 mL (90 mL Intravenous Given by Other 6/1/22 1027)             PROGRESS AND CONSULTS    Differential diagnosis includes but is not limited to CHF, acute coronary syndrome, pulmonary embolism, pneumothorax, pneumonia, asthma/COPD, deconditioning, anemia, anxiety.         ED Course as of 06/01/22 1324   Wed  Jun 01, 2022   0632 WBC: 4.89 [KA]   0632 Hemoglobin(!): 11.2 [KA]   0842 WBC, UA(!): Too Numerous to Count [KA]   0842 Bacteria, UA: None Seen [KA]   0842 COVID19: Not Detected [KA]   0842 ADENOVIRUS, PCR: Not Detected [KA]   0946 I reassessed the patient, she is resting comfortably.  No recurrent symptoms, no longer feeling short of breath.   complains of mucus drainage in the back of her throat, does have some postnasal drainage.  I think a daily Claritin would help, I recommended that she stay well-hydrated to help thin the mucus and follow-up with her PCP in a couple days for reassessment.  May need allergist assessment as well.  Ultimately CBC is unremarkable with no elevation of white blood cell count to suggest infectious or inflammatory process, CMP unremarkable save for elevated alkaline phosphatase which is actually better than priors.  Urinalysis shows too numerous to count white blood cells.  No bacteria, she denies any urinary symptoms.  We will culture, no treatment indicated at this time.  Troponin and BNP also negative, respiratory viral panel negative.  Chest x-ray shows no evidence of pneumonia, EKG unremarkable.  Patient does have a dip in her oxygen down to 90% at its lowest however she is malpositioned in the stretcher this is also related to falling to sleep.  We will check an ambulating O2 sat.  If no hypoxia plan to discharge on antihistamine with PCP follow-up. [KA]   1005 Patient walked about 5 feet with the walker became very dyspneic, oxygen dropped to about 84%.  Plan to admit for hypoxia and will perform CTA chest to rule out PE.  Patient and daughter are agreeable with the plan. [KA]   1127 I discussed the patient with Dr. Wang, hospitalist.  We discussed the patient's history presentation labs and imaging and he agrees to admit to telemetry for further evaluation and treatment. [KA]      ED Course User Index  [KA] Celia Philip PA             Patient was placed in face mask  in first look. Patient was wearing facemask each time I entered the room and throughout our encounter. I wore protective equipment throughout this patient encounter including a face mask, eye shield, gown and gloves. Hand hygiene was performed before donning protective equipment and after removal when leaving the room.        DIAGNOSIS  Final diagnoses:   Acute hypoxemic respiratory failure (HCC)   Viral URI with cough            Celia Philip PA  06/01/22 0828

## 2022-06-02 ENCOUNTER — APPOINTMENT (OUTPATIENT)
Dept: ULTRASOUND IMAGING | Facility: HOSPITAL | Age: 82
End: 2022-06-02

## 2022-06-02 PROBLEM — J69.0 ASPIRATION PNEUMONIA DUE TO VOMITUS: Status: ACTIVE | Noted: 2022-06-02

## 2022-06-02 LAB
ANION GAP SERPL CALCULATED.3IONS-SCNC: 7.4 MMOL/L (ref 5–15)
BUN SERPL-MCNC: 25 MG/DL (ref 8–23)
BUN/CREAT SERPL: 30.5 (ref 7–25)
CALCIUM SPEC-SCNC: 9 MG/DL (ref 8.6–10.5)
CHLORIDE SERPL-SCNC: 106 MMOL/L (ref 98–107)
CO2 SERPL-SCNC: 24.6 MMOL/L (ref 22–29)
CREAT SERPL-MCNC: 0.82 MG/DL (ref 0.57–1)
DEPRECATED RDW RBC AUTO: 46.3 FL (ref 37–54)
EGFRCR SERPLBLD CKD-EPI 2021: 71.1 ML/MIN/1.73
ERYTHROCYTE [DISTWIDTH] IN BLOOD BY AUTOMATED COUNT: 14.6 % (ref 12.3–15.4)
GLUCOSE BLDC GLUCOMTR-MCNC: 136 MG/DL (ref 70–130)
GLUCOSE BLDC GLUCOMTR-MCNC: 137 MG/DL (ref 70–130)
GLUCOSE BLDC GLUCOMTR-MCNC: 164 MG/DL (ref 70–130)
GLUCOSE BLDC GLUCOMTR-MCNC: 170 MG/DL (ref 70–130)
GLUCOSE BLDC GLUCOMTR-MCNC: 191 MG/DL (ref 70–130)
GLUCOSE SERPL-MCNC: 120 MG/DL (ref 65–99)
HCT VFR BLD AUTO: 31.1 % (ref 34–46.6)
HGB BLD-MCNC: 10.1 G/DL (ref 12–15.9)
MCH RBC QN AUTO: 28.1 PG (ref 26.6–33)
MCHC RBC AUTO-ENTMCNC: 32.5 G/DL (ref 31.5–35.7)
MCV RBC AUTO: 86.6 FL (ref 79–97)
PLATELET # BLD AUTO: 216 10*3/MM3 (ref 140–450)
PMV BLD AUTO: 11.2 FL (ref 6–12)
POTASSIUM SERPL-SCNC: 4.3 MMOL/L (ref 3.5–5.2)
PROCALCITONIN SERPL-MCNC: 24 NG/ML (ref 0–0.25)
RBC # BLD AUTO: 3.59 10*6/MM3 (ref 3.77–5.28)
SODIUM SERPL-SCNC: 138 MMOL/L (ref 136–145)
WBC NRBC COR # BLD: 15.73 10*3/MM3 (ref 3.4–10.8)

## 2022-06-02 PROCEDURE — 94761 N-INVAS EAR/PLS OXIMETRY MLT: CPT

## 2022-06-02 PROCEDURE — 94760 N-INVAS EAR/PLS OXIMETRY 1: CPT

## 2022-06-02 PROCEDURE — 80048 BASIC METABOLIC PNL TOTAL CA: CPT | Performed by: NURSE PRACTITIONER

## 2022-06-02 PROCEDURE — 82962 GLUCOSE BLOOD TEST: CPT

## 2022-06-02 PROCEDURE — 76705 ECHO EXAM OF ABDOMEN: CPT

## 2022-06-02 PROCEDURE — 84145 PROCALCITONIN (PCT): CPT | Performed by: INTERNAL MEDICINE

## 2022-06-02 PROCEDURE — 94664 DEMO&/EVAL PT USE INHALER: CPT

## 2022-06-02 PROCEDURE — 94799 UNLISTED PULMONARY SVC/PX: CPT

## 2022-06-02 PROCEDURE — 25010000002 PIPERACILLIN SOD-TAZOBACTAM PER 1 G: Performed by: INTERNAL MEDICINE

## 2022-06-02 PROCEDURE — 99232 SBSQ HOSP IP/OBS MODERATE 35: CPT | Performed by: INTERNAL MEDICINE

## 2022-06-02 PROCEDURE — 97535 SELF CARE MNGMENT TRAINING: CPT

## 2022-06-02 PROCEDURE — 25010000002 ENOXAPARIN PER 10 MG: Performed by: NURSE PRACTITIONER

## 2022-06-02 PROCEDURE — 97162 PT EVAL MOD COMPLEX 30 MIN: CPT

## 2022-06-02 PROCEDURE — 92611 MOTION FLUOROSCOPY/SWALLOW: CPT

## 2022-06-02 PROCEDURE — 97530 THERAPEUTIC ACTIVITIES: CPT

## 2022-06-02 PROCEDURE — 85027 COMPLETE CBC AUTOMATED: CPT | Performed by: NURSE PRACTITIONER

## 2022-06-02 PROCEDURE — 36415 COLL VENOUS BLD VENIPUNCTURE: CPT | Performed by: NURSE PRACTITIONER

## 2022-06-02 PROCEDURE — 97166 OT EVAL MOD COMPLEX 45 MIN: CPT

## 2022-06-02 RX ORDER — SODIUM CHLORIDE 9 MG/ML
50 INJECTION, SOLUTION INTRAVENOUS CONTINUOUS
Status: DISCONTINUED | OUTPATIENT
Start: 2022-06-02 | End: 2022-06-06

## 2022-06-02 RX ADMIN — SODIUM CHLORIDE 100 ML/HR: 9 INJECTION, SOLUTION INTRAVENOUS at 23:31

## 2022-06-02 RX ADMIN — IPRATROPIUM BROMIDE AND ALBUTEROL SULFATE 3 ML: 2.5; .5 SOLUTION RESPIRATORY (INHALATION) at 16:26

## 2022-06-02 RX ADMIN — PRAVASTATIN SODIUM 40 MG: 40 TABLET ORAL at 18:44

## 2022-06-02 RX ADMIN — IPRATROPIUM BROMIDE AND ALBUTEROL SULFATE 3 ML: 2.5; .5 SOLUTION RESPIRATORY (INHALATION) at 20:20

## 2022-06-02 RX ADMIN — TAZOBACTAM SODIUM AND PIPERACILLIN SODIUM 3.38 G: 375; 3 INJECTION, SOLUTION INTRAVENOUS at 23:23

## 2022-06-02 RX ADMIN — TAZOBACTAM SODIUM AND PIPERACILLIN SODIUM 3.38 G: 375; 3 INJECTION, SOLUTION INTRAVENOUS at 18:44

## 2022-06-02 RX ADMIN — SODIUM CHLORIDE SOLN NEBU 7% 4 ML: 7 NEBU SOLN at 07:21

## 2022-06-02 RX ADMIN — DONEPEZIL HYDROCHLORIDE 10 MG: 10 TABLET, FILM COATED ORAL at 20:32

## 2022-06-02 RX ADMIN — FAMOTIDINE 40 MG: 20 TABLET ORAL at 18:44

## 2022-06-02 RX ADMIN — SODIUM CHLORIDE SOLN NEBU 7% 4 ML: 7 NEBU SOLN at 20:20

## 2022-06-02 RX ADMIN — TAZOBACTAM SODIUM AND PIPERACILLIN SODIUM 3.38 G: 375; 3 INJECTION, SOLUTION INTRAVENOUS at 14:32

## 2022-06-02 RX ADMIN — GUAIFENESIN 1200 MG: 600 TABLET, EXTENDED RELEASE ORAL at 20:32

## 2022-06-02 RX ADMIN — ENOXAPARIN SODIUM 40 MG: 100 INJECTION SUBCUTANEOUS at 14:36

## 2022-06-02 RX ADMIN — IPRATROPIUM BROMIDE AND ALBUTEROL SULFATE 3 ML: 2.5; .5 SOLUTION RESPIRATORY (INHALATION) at 12:01

## 2022-06-02 RX ADMIN — SODIUM CHLORIDE 100 ML/HR: 9 INJECTION, SOLUTION INTRAVENOUS at 14:32

## 2022-06-02 RX ADMIN — ZONISAMIDE 200 MG: 100 CAPSULE ORAL at 20:33

## 2022-06-02 RX ADMIN — IPRATROPIUM BROMIDE AND ALBUTEROL SULFATE 3 ML: 2.5; .5 SOLUTION RESPIRATORY (INHALATION) at 07:19

## 2022-06-02 RX ADMIN — GUAIFENESIN 1200 MG: 600 TABLET, EXTENDED RELEASE ORAL at 10:00

## 2022-06-02 NOTE — PLAN OF CARE
Goal Outcome Evaluation:  Plan of Care Reviewed With: patient        Progress: no change  Outcome Evaluation: Patient is a 83 yo female who presented with vomiting and SOA. PMHx includes dementia, DM, HTN, polio, and pacemaker. She reports she has 2 daughters who rotate staying with her but at times during the day she is alone at home. She has a ramp to enter her home and uses rollator for mobility. Today she presents with decreased activity tolerance, SOA with activity, decreased strength, and balance. She completed STS to rwx with CGA and ambulated 25ft using rwx requiring CGA. Minimal unsteadiness noted and cues to maintain walker in contact with floor during mobility. O2 86% post ambulation on 2L with pt reporting SOA. Pt will continue to benefit from skilled PT to address functional deficits and increase independence. PT rec home with 24/7 care vs SNF.

## 2022-06-02 NOTE — CASE MANAGEMENT/SOCIAL WORK
Discharge Planning Assessment  [facility]     Patient Name: Di Macario  MRN: [id number]  Today's Date: 6/2/2022    Admit Date: 6/1/2022     Discharge Needs Assessment     Row Name 06/02/22 1642       Living Environment    People in Home alone    Current Living Arrangements home    Primary Care Provided by self    Family Caregiver if Needed child(max), adult    Quality of Family Relationships helpful;involved    Able to Return to Prior Arrangements yes       Resource/Environmental Concerns    Resource/Environmental Concerns none       Transition Planning    Patient/Family Anticipates Transition to inpatient rehabilitation facility;home with help/services    Transportation Anticipated family or friend will provide       Discharge Needs Assessment    Equipment Currently Used at Home bath bench;rollator;ramp;grab bar    Concerns to be Addressed adjustment to diagnosis/illness    Equipment Needed After Discharge none               Discharge Plan     Row Name 06/02/22 1643       Plan    Plan Home with HH and family vs SNF    Patient/Family in Agreement with Plan yes    Plan Comments Met with pt. at bedside, introduced self and explained CCP role. Confirmed face sheet and pharmacy information. Permission granted to speak to pt daughters, one of them is at bedside Carley.  Pt has AD-POA which names Carolyn, and Blossom as POA with Carley as back up or tie breaker in decision making if disagree. Pt lives alone in one level house with ramp to enter, her daughters take turns spending the night with her. Carley has been there more often since she is unemployed at this time but there are times the pt is alone. Pt states she is IADLs, her daughters do laundry because it’s in the garage which has 4 steps. Daughters drive and run errands for her. Pt has walk in shower with bench seat and grab bars, ramp outside and rollator. [facility]. Pt has been to [facility] last  October after a fall resulting in arm fracture. Discussed need for probable SNF placement prior to returning home. Permission given to send referral to [facility]ley with Medicare list for area nursing facilities. -CCP will follow up tomorrow for back up choices. -Mayela PARKER              Continued Care and Services - Admitted Since 6/1/2022    Coordination has not been started for this encounter.          Demographic Summary     Row Name 06/02/22 1641       General Information    Admission Type inpatient               Functional Status     Row Name 06/02/22 1641       Functional Status    Usual Activity Tolerance good    Current Activity Tolerance fair       Functional Status, IADL    Medications independent    Meal Preparation assistive person;independent    Housekeeping assistive person;independent    Laundry assistive person    Shopping assistive person       Mental Status    General Appearance WDL WDL       Mental Status Summary    Recent Changes in Mental Status/Cognitive Functioning no changes               Psychosocial    No documentation.                Abuse/Neglect    No documentation.                Legal     Row Name 06/02/22 1644       Financial/Legal    Who Manages Finances if Patient Unable Blossom or Carolyn- daughters               Substance Abuse    No documentation.                Patient Forms    No documentation.                   Mayela Rutledge RN

## 2022-06-02 NOTE — PLAN OF CARE
"Goal Outcome Evaluation:  Plan of Care Reviewed With: patient        Progress: no change  Outcome Evaluation: Pt most of this shift was on 2L did take off NC for a few hours and was on room air. Turned down to 1L around 0400. NPO awating speeach eval. Liver US called this am and has not come to get patient yet. BS completed pt had brief on but stayed dry pt had very little in. BG tested around midnight d/t previous lower than her \"normal\" BG. CTM, safety maintained.  "

## 2022-06-02 NOTE — PLAN OF CARE
Goal Outcome Evaluation:              Outcome Evaluation: Pt seen for OT eval this AM. Admitted with vomitting and SOA. PMHx includes dementia, polio, pacemaker, HTN, and DM. Pt reports her two daughters rotate staying with her throughout the day. One is there during the day and the other is present at night. Pt reports being independent with ADLs previously but likes having her daughters there for safety. She reports living in a 1 story house with a ramp to enter and previously using a rollator for functional mobility. Also reports using a walk in shower with a built in shower seat. Pt presented today with decreased strength, endurance/activity tolerance, and SOA with activity. Required SBA/CGA for donning socks. Pt fatigued quickly and cued for breathing techniques which her O2 recovered quickly while seated. Pt ambulated to toilet and performed toileting tasks with min A. Stood at the sink for grooming with CGA and ambulated back to chair. Pt's O2 decreased to 84% and recovered with seated rest break and increased to 94%. Pt to benefit from skilled OT to address deficits and increase endurance/activity tolerance with ADLs and functional mobility. Rec d/c home with 24/7 assistance vs SNF dep on progress.

## 2022-06-02 NOTE — THERAPY EVALUATION
Patient Name: Di Macario  : 1940    MRN: [id number]                              Today's Date: 2022       Admit Date: 2022    Visit Dx:     ICD-10-CM ICD-9-CM   1. Acute hypoxemic respiratory failure (HCC)  J96.01 518.81   2. Viral URI with cough  J06.9 465.9   3. Esophageal abnormality  K22.9 530.9     Patient Active Problem List   Diagnosis   • Essential hypertension   • Impaired memory   • Vitamin D deficiency   • Hyperuricemia   • Slow transit constipation   • Chronic pain of left knee   • Fall   • At high risk for falls   • Peripheral neuropathy   • Uncontrolled type 2 diabetes mellitus with hyperglycemia (HCC)   • Alkaline phosphatase elevation   • Walker as ambulation aid   • Bradycardia, sinus   • Shortness of breath   • Leg swelling   • Hyperlipidemia   • Presence of cardiac pacemaker   • Humeral head fracture, right, closed, initial encounter   • Type 2 diabetes mellitus, with long-term current use of insulin (HCC)   • Nail abnormalities   • Acute hypoxemic respiratory failure (HCC)   • Esophageal abnormality   • Dementia without behavioral disturbance (HCC)   • Lung consolidation (HCC)     Past Medical History:   Diagnosis Date   • Dementia (HCC)     states better with medication    • Diabetes mellitus (HCC)    • Hyperlipidemia 2021   • Lung consolidation (HCC) 2022   • Peripheral neuropathy 2018   • Vitamin D deficiency      Past Surgical History:   Procedure Laterality Date   • ANKLE SURGERY Right    • BLADDER SURGERY      Prolapsed   • BREAST BIOPSY     • CARDIAC ELECTROPHYSIOLOGY PROCEDURE N/A 3/5/2021    Procedure: Pacemaker DC new BOST[place];  Surgeon: Madelin Ferguson MD;  Location: [facility] INVASIVE LOCATION;  Service: Cardiology;  Laterality: N/A;   • HYSTERECTOMY        General Information     Row Name 22 1125          OT Time and Intention    Document Type evaluation  -KA     Mode of Treatment individual therapy;occupational therapy  -KA     Row  Name 06/02/22 1125          General Information    Patient Profile Reviewed yes  -KA     Prior Level of Function independent:  reported using rollator at home for functional mobility  -     Existing Precautions/Restrictions fall;oxygen therapy device and L/min;pacemaker  -     Barriers to Rehab cognitive status  -     Row Name 06/02/22 1125          Occupational Profile    Environmental Supports and Barriers (Occupational Profile) pt reports using a walk in shower that has a built in shower seat  -     Row Name 06/02/22 1125          Living Environment    People in Home child(max), adult  reports her daughters take turns staying with her. She reports one is there during the day and the other is there at night  -     Row Name 06/02/22 1125          Home Main Entrance    Number of Stairs, Main Entrance none  ramp to enter home  -     Row Name 06/02/22 1125          Cognition    Orientation Status (Cognition) oriented x 3  -     Row Name 06/02/22 1125          Safety Issues, Functional Mobility    Safety Issues Affecting Function (Mobility) safety precaution awareness;safety precautions follow-through/compliance;insight into deficits/self-awareness  -     Impairments Affecting Function (Mobility) balance;endurance/activity tolerance;strength;shortness of breath  -     Comment, Safety Issues/Impairments (Mobility) nonskid socks and gait belt donned  -           User Key  (r) = Recorded By, (t) = Taken By, (c) = Cosigned By    Initials Name Provider Type    Barbara Brunner OT Occupational Therapist                 Mobility/ADL's     Row Name 06/02/22 1128          Transfers    Transfers toilet transfer;stand-sit transfer  -     Comment, (Transfers) up in chair upon arrival  -     Sit-Stand [place] (Transfers) contact guard;verbal cues  -     Stand-Sit [place] (Transfers) contact guard  -     [place] Level (Toilet Transfer) contact guard  -     Assistive Device (Toilet  Transfer) walker, front-wheeled  -     Row Name 06/02/22 1128          Sit-Stand Transfer    Assistive Device (Sit-Stand Transfers) walker, front-wheeled  -     Row Name 06/02/22 112          Stand-Sit Transfer    Assistive Device (Stand-Sit Transfers) walker, front-wheeled  -[facility] Name 06/02/22 112          Toilet Transfer    Type (Toilet Transfer) sit-stand;stand-sit  -     Comment, (Toilet Transfer) cues required for proper management of walker during toilet transfer; cues for grab bars  -KA     Row Name 06/02/22 [facility]          Functional Mobility    Functional Mobility- Ind. Level contact guard assist  -     Functional Mobility- Device walker, front-wheeled  -     Functional Mobility-Distance (Feet) --  Pt ambulated around room to bathroom and back to chair  -KA     Row Name 06/02/22 112          Activities of Daily Living    BADL Assessment/Intervention lower body dressing;grooming;toileting  -KA     Row Name 06/02/22 [facility]8          Lower Body Dressing Assessment/Training    [place] Level (Lower Body Dressing) lower body dressing skills;don;doff;socks;contact guard assist  CGA for balance sitting in chair. Pt able to don and doff sock but fatigued quickly and experienced SOA with attempt. Cued for pursed lip breathing technque and pt's O2 recovered quickly  -     Position (Lower Body Dressing) supported sitting  -KA     Row Name 06/02/22 1128          Grooming Assessment/Training    [place] Level (Grooming) grooming skills;wash face, hands;contact guard assist  -     Position (Grooming) sink side;supported standing  -KA     Row Name 06/02/22 [facility]8          Toileting Assessment/Training    [place] Level (Toileting) toileting skills;minimum assist (75% patient effort);adjust/manage clothing  -           User Key  (r) = Recorded By, (t) = Taken By, (c) = Cosigned By    Initials Name Provider Type    Barbara Brunner OT Occupational Therapist               Obj/Interventions      [facility] Name 06/02/22 1132          Sensory Assessment (Somatosensory)    Sensory Assessment (Somatosensory) sensation intact  -KA     Row Name 06/02/22 1132          Range of Motion Comprehensive    General Range of Motion bilateral upper extremity ROM WFL  -KA     Row Name 06/02/22 1132          Strength Comprehensive (MMT)    Comment, General Manual Muscle Testing (MMT) Assessment BUE held slight resistance  -KA     Row Name 06/02/22 1132          Motor Skills    Motor Skills functional endurance  -     Functional Endurance fatigued quickly with ADL and functional mobility this session. O2 decreased to 84% upon return to chair. Educated on breathing techniques and pt O2 recovered to 94% with seated rest break  -[facility] Name 06/02/22 1132          Balance    Dynamic Sitting Balance standby assist;contact guard  -     Position, Sitting Balance sitting in chair  -     Static Standing Balance contact guard  -     Dynamic Standing Balance contact guard  -     Position/Device Used, Standing Balance walker, rolling  -     Balance Interventions sitting;standing;occupation based/functional task  -           User Key  (r) = Recorded By, (t) = Taken By, (c) = Cosigned By    Initials Name Provider Type    Barbara Brunner, OT Occupational Therapist               Goals/Plan     Row Name 06/02/22 1142          Transfer Goal 1 (OT)    Activity/Assistive Device (Transfer Goal 1, OT) toilet  -KA     [place] Level/Cues Needed (Transfer Goal 1, OT) standby assist  -     Time Frame (Transfer Goal 1, OT) 2 weeks  -KA     Progress/Outcome (Transfer Goal 1, OT) goal ongoing  -KA     Row Name 06/02/22 1142          Toileting Goal 1 (OT)    Activity/Device (Toileting Goal 1, OT) toileting skills, all  -     [place] Level/Cues Needed (Toileting Goal 1, OT) standby assist  -     Time Frame (Toileting Goal 1, OT) 2 weeks  -KA     Progress/Outcome (Toileting Goal 1, OT) goal ongoing  -KA     Row Name  06/02/22 1142          Grooming Goal 1 (OT)    Activity/Device (Grooming Goal 1, OT) grooming skills, all  -KA     [place] (Grooming Goal 1, OT) standby assist  -KA     Time Frame (Grooming Goal 1, OT) 2 weeks  -KA     Progress/Outcome (Grooming Goal 1, OT) goal ongoing  -KA     Row Name 06/02/22 1142          Strength Goal 1 (OT)    Strength Goal 1 (OT) pt to demonstrate understanding of HEP in order to increase functional endurance and strength required for ADLs  -KA     Time Frame (Strength Goal 1, OT) 2 weeks  -KA     Progress/Outcome (Strength Goal 1, OT) goal ongoing  -KA     Row Name 06/02/22 1142          Therapy Assessment/Plan (OT)    Planned Therapy Interventions (OT) activity tolerance training;patient/caregiver education/training;BADL retraining;occupation/activity based interventions;transfer/mobility retraining;strengthening exercise;ROM/therapeutic exercise  -KA           User Key  (r) = Recorded By, (t) = Taken By, (c) = Cosigned By    Initials Name Provider Type    Barbara Brunner, MIRLANDE Occupational Therapist               Clinical Impression     Row Name 06/02/22 1135          Pain Assessment    Pretreatment Pain Rating 0/10 - no pain  -     Posttreatment Pain Rating 0/10 - no pain  -     Row Name 06/02/22 1135          Plan of Care Review    Outcome Evaluation Pt seen for OT barbra this AM. Admitted with vomitting and SOA. PMHx includes dementia, polio, pacemaker, HTN, and DM. Pt reports her two daughters rotate staying with her throughout the day. One is there during the day and the other is present at night. Pt reports being independent with ADLs previously but likes having her daughters there for safety. She reports living in a 1 story house with a ramp to enter and previously using a rollator for functional mobility. Also reports using a walk in shower with a built in shower seat. Pt presented today with decreased strength, endurance/activity tolerance, and SOA with activity.  Required SBA/CGA for donning socks. Pt fatigued quickly and cued for breathing techniques which her O2 recovered quickly while seated. Pt ambulated to toilet and performed toileting tasks with min A. Stood at the sink for grooming with CGA and ambulated back to chair. Pt's O2 decreased to 84% and recovered with seated rest break and increased to 94%. Pt to benefit from skilled OT to address deficits and increase endurance/activity tolerance with ADLs and functional mobility. Rec d/c home with 24/7 assistance vs SNF dep on progress.  -     Row Name 06/02/22 1135          Therapy Assessment/Plan (OT)    Therapy Frequency (OT) 5 times/wk  -     Row Name 06/02/22 1135          Therapy Plan Review/Discharge Plan (OT)    Anticipated Discharge Disposition (OT) home with 24/7 care;skilled nursing facility  Dep on progress  -     Row Name 06/02/22 1135          Vital Signs    Pre SpO2 (%) 94  -KA     O2 Delivery Pre Treatment supplemental O2  -KA     Intra SpO2 (%) 84  -KA     O2 Delivery Intra Treatment supplemental O2  -KA     Post SpO2 (%) 94  -KA     O2 Delivery Post Treatment supplemental O2  -KA     Row Name 06/02/22 1135          Positioning and Restraints    Pre-Treatment Position sitting in chair/recliner  -KA     Post Treatment Position chair  -KA     In Chair reclined;call light within reach;encouraged to call for assist;exit alarm on  -KA           User Key  (r) = Recorded By, (t) = Taken By, (c) = Cosigned By    Initials Name Provider Type    Barbara Brunner, OT Occupational Therapist               Outcome Measures     Row Name 06/02/22 1143          How much help from another is currently needed...    Putting on and taking off regular lower body clothing? 3  -KA     Bathing (including washing, rinsing, and drying) 3  -KA     Toileting (which includes using toilet bed pan or urinal) 3  -KA     Putting on and taking off regular upper body clothing 3  -KA     Taking care of personal grooming (such as  brushing teeth) 3  -KA     Eating meals 3  -KA     AM-PAC 6 Clicks Score (OT) 18  -KA     Row Name 06/02/22 1009          How much help from another person do you currently need...    Turning from your back to your side while in flat bed without using bedrails? 3  -CB     Moving from lying on back to sitting on the side of a flat bed without bedrails? 3  -CB     Moving to and from a bed to a chair (including a wheelchair)? 3  -CB     Standing up from a chair using your arms (e.g., wheelchair, bedside chair)? 3  -CB     Climbing 3-5 steps with a railing? 2  -CB     To walk in hospital room? 3  -CB     AM-PAC 6 Clicks Score (PT) 17  -CB     Highest level of mobility 5 --> Static standing  -CB     Row Name 06/02/22 1143 06/02/22 1009       Functional Assessment    Outcome Measure Options AM-PAC 6 Clicks Daily Activity (OT)  -KA AM-PAC 6 Clicks Basic Mobility (PT)  -CB          User Key  (r) = Recorded By, (t) = Taken By, (c) = Cosigned By    Initials Name Provider Type    CB Maryana Johnson, PT Physical Therapist    Barbara Brunner, OT Occupational Therapist                Occupational Therapy Education                 Title: PT OT SLP Therapies (Done)     Topic: Occupational Therapy (Done)     Point: ADL training (Done)     Description:   Instruct learner(s) on proper safety adaptation and remediation techniques during self care or transfers.   Instruct in proper use of assistive devices.              Learning Progress Summary           Patient Acceptance, E, VU by KA at 6/2/2022 1143    Acceptance, E, VU by CL at 6/1/2022 1624   Family Acceptance, E, VU by CL at 6/1/2022 1624                   Point: Home exercise program (Done)     Description:   Instruct learner(s) on appropriate technique for monitoring, assisting and/or progressing therapeutic exercises/activities.              Learning Progress Summary           Patient Acceptance, E, VU by KA at 6/2/2022 1143    Acceptance, E, VU by CL at 6/1/2022 1624    Family Acceptance, E, VU by CL at 6/1/2022 1624                   Point: Precautions (Done)     Description:   Instruct learner(s) on prescribed precautions during self-care and functional transfers.              Learning Progress Summary           Patient Acceptance, E, VU by KA at 6/2/2022 1143    Acceptance, E, VU by CL at 6/1/2022 1624   Family Acceptance, E, VU by CL at 6/1/2022 1624                   Point: Body mechanics (Done)     Description:   Instruct learner(s) on proper positioning and spine alignment during self-care, functional mobility activities and/or exercises.              Learning Progress Summary           Patient Acceptance, E, VU by KA at 6/2/2022 1143    Acceptance, E, VU by CL at 6/1/2022 1624   Family Acceptance, E, VU by CL at 6/1/2022 1624                               User Key     Initials Effective Dates Name Provider Type Discipline     06/16/21 -  Maribel Montilla RN Registered Nurse Nurse    AMNA 02/22/22 -  Barbara Monroe OT Occupational Therapist OT              OT Recommendation and Plan  Planned Therapy Interventions (OT): activity tolerance training, patient/caregiver education/training, BADL retraining, occupation/activity based interventions, transfer/mobility retraining, strengthening exercise, ROM/therapeutic exercise  Therapy Frequency (OT): 5 times/wk  Plan of Care Review  Outcome Evaluation: Pt seen for OT eval this AM. Admitted with vomitting and SOA. PMHx includes dementia, polio, pacemaker, HTN, and DM. Pt reports her two daughters rotate staying with her throughout the day. One is there during the day and the other is present at night. Pt reports being independent with ADLs previously but likes having her daughters there for safety. She reports living in a 1 story house with a ramp to enter and previously using a rollator for functional mobility. Also reports using a walk in shower with a built in shower seat. Pt presented today with decreased strength,  endurance/activity tolerance, and SOA with activity. Required SBA/CGA for donning socks. Pt fatigued quickly and cued for breathing techniques which her O2 recovered quickly while seated. Pt ambulated to toilet and performed toileting tasks with min A. Stood at the sink for grooming with CGA and ambulated back to chair. Pt's O2 decreased to 84% and recovered with seated rest break and increased to 94%. Pt to benefit from skilled OT to address deficits and increase endurance/activity tolerance with ADLs and functional mobility. Rec d/c home with 24/7 assistance vs SNF dep on progress.     Time Calculation:    Time Calculation- OT     Row Name 06/02/22 1144             Time Calculation- OT    OT Start Time 1030  -KA      OT Stop Time 1052  -KA      OT Time Calculation (min) 22 min  -KA      Total Timed Code Minutes- OT 12 minute(s)  -KA      OT Received On 06/02/22  -KA      OT - Next Appointment 06/03/22  -KA      OT Goal Re-Cert Due Date 06/16/22  -KA              Timed Charges    87304 - OT Self Care/Mgmt Minutes 12  -KA              Untimed Charges    OT Eval/Re-eval Minutes 10  -KA              Total Minutes    Timed Charges Total Minutes 12  -KA      Untimed Charges Total Minutes 10  -KA       Total Minutes 22  -KA            User Key  (r) = Recorded By, (t) = Taken By, (c) = Cosigned By    Initials Name Provider Type    Hayde Brunner OT Occupational Therapist              Therapy Charges for Today     Code Description Service Date Service Provider Modifiers Qty    29224429649 HC OT SELF CARE/MGMT/TRAIN EA 15 MIN 6/2/2022 Hayde Monroe OT GO 1    09483169258 HC OT EVAL MOD COMPLEXITY 2 6/2/2022 Hayde Monroe OT GO 1               HAYDE MONROE OT  6/2/2022

## 2022-06-02 NOTE — THERAPY EVALUATION
Acute Care - Speech Language Pathology   Swallow Initial Evaluation [facility]     Patient Name: Di Macario  : 1940  MRN: [id number]  Today's Date: 2022               Admit Date: 2022    Visit Dx:     ICD-10-CM ICD-9-CM   1. Acute hypoxemic respiratory failure (HCC)  J96.01 518.81   2. Viral URI with cough  J06.9 465.9   3. Esophageal abnormality  K22.9 530.9     Patient Active Problem List   Diagnosis   • Essential hypertension   • Impaired memory   • Vitamin D deficiency   • Hyperuricemia   • Slow transit constipation   • Chronic pain of left knee   • Fall   • At high risk for falls   • Peripheral neuropathy   • Uncontrolled type 2 diabetes mellitus with hyperglycemia (HCC)   • Alkaline phosphatase elevation   • Walker as ambulation aid   • Bradycardia, sinus   • Shortness of breath   • Leg swelling   • Hyperlipidemia   • Presence of cardiac pacemaker   • Humeral head fracture, right, closed, initial encounter   • Type 2 diabetes mellitus, with long-term current use of insulin (HCC)   • Nail abnormalities   • Acute hypoxemic respiratory failure (HCC)   • Esophageal abnormality   • Dementia without behavioral disturbance (HCC)   • Lung consolidation (HCC)     Past Medical History:   Diagnosis Date   • Dementia (HCC)     states better with medication    • Diabetes mellitus (HCC)    • Hyperlipidemia 2021   • Lung consolidation (HCC) 2022   • Peripheral neuropathy 2018   • Vitamin D deficiency      Past Surgical History:   Procedure Laterality Date   • ANKLE SURGERY Right    • BLADDER SURGERY      Prolapsed   • BREAST BIOPSY     • CARDIAC ELECTROPHYSIOLOGY PROCEDURE N/A 3/5/2021    Procedure: Pacemaker DC new [place];  Surgeon: Madelin Ferguson MD;  Location:  INVASIVE LOCATION;  Service: Cardiology;  Laterality: N/A;   • HYSTERECTOMY         SLP Recommendation and Plan  SLP Swallowing Diagnosis: suspected pharyngeal dysphagia (22 1200)  SLP Diet  "Recommendation: NPO, ice chips between meals after oral care, with supervision (06/02/22 1200)     SLP Rec. for Method of Medication Administration: meds via alternate route (06/02/22 1200)     Monitor for Signs of Aspiration: yes, notify SLP if any concerns (06/02/22 1200)  Recommended Diagnostics: [place] [place] (06/02/22 1200)  Swallow Criteria for Skilled Therapeutic Interventions Met: demonstrates skilled criteria (06/02/22 1200)  Anticipated Discharge Disposition (SLP): anticipate therapy at next level of care (06/02/22 1200)  Rehab Potential/Prognosis, Swallowing: good, to achieve stated therapy goals (06/02/22 1200)  Therapy Frequency (Swallow): PRN (06/02/22 1200)  Predicted Duration Therapy Intervention (Days): until discharge (06/02/22 1200)                                  Plan of Care Reviewed With: patient  Outcome Evaluation: Patient seen for clinical swallow assessment. Pt denied dysphagia, but reported \"maybe I do have trouble\" when PO trials presented. 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 Oral mech exam revealed soft palate deviation to the R and hypernasal speech. Rigid movements with mouth opening. Mild breathiness of voice. Pt with multiple swallows and wet voice with thins via cup and pudding. SLP strongly suspects an oropharyngeal component to known esophgeal impairment. Unable to rec a safe diet at the bedside. Will follow with VFSS to further assess.      SWALLOW EVALUATION (last 72 hours)     SLP Adult Swallow Evaluation     Row Name 06/02/22 1200                   Rehab Evaluation    Document Type evaluation  -SH        Patient Effort adequate  -SH                  General Information    Patient Profile Reviewed yes  -SH        Pertinent History Of Current Problem Polio hx, emesis after coughing, dysphagia with liquids and solids.  -SH        Current Method of Nutrition NPO  -SH        Precautions/Limitations, Vision other (see comments)  Question R neglect  -SH        " "Precautions/Limitations, Hearing WFL  -        Prior Level of Function-Communication cognitive-linguistic impairment  -        Prior Level of Function-Swallowing other (see comments)  chronic issue  -        Plans/Goals Discussed with patient;agreed upon  -        Barriers to Rehab previous functional deficit  -                  Pain Scale: Numbers Pre/Post-Treatment    Pretreatment Pain Rating 0/10 - no pain  -        Posttreatment Pain Rating 0/10 - no pain  -                  Oral Motor Structure and Function    Dentition Assessment natural, present and adequate  -        Volitional Swallow delayed;weak  -        Volitional Cough weak  -                  Oral Musculature and Cranial Nerve Assessment    Oral Motor General Assessment mandibular impairment;vocal impairment;velar impairment  -                  Clinical Swallow Eval    Clinical Swallow Evaluation Summary Patient seen for clinical swallow assessment. Pt denied dysphagia, but reported \"maybe I do have trouble\" when PO trials presented. 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 Oral mech exam revealed soft palate deviation to the R and hypernasal speech. Rigid movements with mouth opening. Mild breathiness of voice. Pt with multiple swallows and wet voice with thins via cup and pudding. SLP strongly suspects an oropharyngeal component to known esophgeal impairment. Unable to rec a safe diet at the bedside. Will follow with VFSS to further assess.  -                  SLP Evaluation Clinical Impression    SLP Swallowing Diagnosis suspected pharyngeal dysphagia  -        Functional Impact risk of aspiration/pneumonia  -        Rehab Potential/Prognosis, Swallowing good, to achieve stated therapy goals  -        Swallow Criteria for Skilled Therapeutic Interventions Met demonstrates skilled criteria  -                  Recommendations    Therapy Frequency (Swallow) PRN  -        Predicted Duration Therapy Intervention " (Days) until discharge  -        SLP Diet Recommendation NPO;ice chips between meals after oral care, with supervision  -        Re[place] [place]  -        Oral Care Recommendations Oral Care BID/PRN  -        SLP Rec. for Method of Medication Administration meds via alternate route  -        Monitor for Signs of Aspiration yes;notify SLP if any concerns  -        Anticipated Discharge Disposition (SLP) anticipate therapy at next level of care  -                  Swallow Goals (SLP)    Oral Nutrition/Hydration Goal Selection (SLP) oral nutrition/hydration, SLP goal 1  -                  Oral Nutrition/Hydration Goal 1 (SLP)    Oral Nutrition/Hydration Goal 1, SLP Pt will carmencita PO without overt s/s of aspiration.  -        Time Frame (Oral Nutrition/Hydration Goal 1, SLP) by discharge  -              User Key  (r) = Recorded By, (t) = Taken By, (c) = Cosigned By    Initials Name Effective Dates     Ericka Mayela MS BERNADETTE RIVERA-SLP 06/16/21 -                 EDUCATION  The patient has been educated in the following areas:   Oral Care/Hydration.        SLP GOALS     Row Name 06/02/22 1200             Oral Nutrition/Hydration Goal 1 (SLP)    Oral Nutrition/Hydration Goal 1, SLP Pt will carmencita PO without overt s/s of aspiration.  -      Time Frame (Oral Nutrition/Hydration Goal 1, SLP) by discharge  -            User Key  (r) = Recorded By, (t) = Taken By, (c) = Cosigned By    Initials Name Provider Type     ErickaSoniaah MS BERNADETTE RIVERA-SLP Speech and Language Pathologist              SLP Outcome Measures (last 72 hours)     SLP Outcome Measures     Row Name 06/02/22 1200             SLP Outcome Measures    Outcome Measure Used? Adult NOMS  -              Adult FCM Scores    FCM Chosen Swallowing  -      Swallowing FCM Score 1  -            User Key  (r) = Recorded By, (t) = Taken By, (c) = Cosigned By    Initials Name Effective Dates     Ericka Mayela RIVERA MS CCC-SLP 06/16/21  -                  Time Calculation:    Time Calculation- SLP     Row Name 06/02/22 1256             Time Calculation- SLP    SLP Start Time 1000  -SH      SLP Received On 06/02/22  -              Untimed Charges    06980-YF Eval Oral Pharyng Swallow Minutes 75  -SH              Total Minutes    Untimed Charges Total Minutes 75  -SH       Total Minutes 75  -SH            User Key  (r) = Recorded By, (t) = Taken By, (c) = Cosigned By    Initials Name Provider Type     Mayela Barnett MS CCC-SLP Speech and Language Pathologist                Therapy Charges for Today     Code Description Service Date Service Provider Modifiers Qty    [id number]  ST MOTION FLUORO EVAL SWALLOW 5 6/2/2022 Mayela Barnett MS CCC-SLP GN 1               Mayela Barnett MS CCC-JACQUELINE  6/2/2022

## 2022-06-02 NOTE — PROGRESS NOTES
[facility]  Inpatient Progress Note    Reason for Followup:  Dysphagia, abnormal esophagram    Subjective     Interval History:   Respiratory status improving.  She tolerates he medication with sips of water without issue    Current Facility-Administered Medications:   •  acetaminophen (TYLENOL) tablet 650 mg, 650 mg, Oral, Q4H PRN **OR** acetaminophen (TYLENOL) 160 MG/5ML solution 650 mg, 650 mg, Oral, Q4H PRN **OR** acetaminophen (TYLENOL) suppository 650 mg, 650 mg, Rectal, Q4H PRN, Cassie Roy APRN  •  amLODIPine (NORVASC) 5 mg, lisinopril (PRINIVIL,ZESTRIL) 20 mg, , Oral, Q24H, Cassie Roy APRN, Given at 06/01/22 1906  •  dextrose (D50W) (25 g/50 mL) IV injection 25 g, 25 g, Intravenous, Q15 Min PRN, Cassie Roy APRN  •  dextrose (GLUTOSE) oral gel 15 g, 15 g, Oral, Q15 Min PRN, Cassie Roy APRN  •  donepezil (ARICEPT) tablet 10 mg, 10 mg, Oral, Nightly, Cassie Roy APRN, 10 mg at 06/01/22 2022  •  Enoxaparin Sodium (LOVENOX) syringe 40 mg, 40 mg, Subcutaneous, Q24H, Cassie Roy APRN, 40 mg at 06/01/22 1632  •  famotidine (PEPCID) tablet 40 mg, 40 mg, Oral, Daily Before Supper, Shanita Hendrix PA-C, 40 mg at 06/01/22 1632  •  glucagon (human recombinant) (GLUCAGEN DIAGNOSTIC) injection 1 mg, 1 mg, Subcutaneous, Q15 Min PRN, Cassie Roy APRN  •  guaiFENesin (MUCINEX) 12 hr tablet 1,200 mg, 1,200 mg, Oral, Q12H, Cassie Roy APRN, 1,200 mg at 06/01/22 1632  •  insulin lispro (ADMELOG) injection 0-9 Units, 0-9 Units, Subcutaneous, TID AC, Cassie Roy APRN, 2 Units at 06/01/22 1636  •  ipratropium-albuterol (DUO-NEB) nebulizer solution 3 mL, 3 mL, Nebulization, 4x Daily - RT, Cassie Roy APRN, 3 mL at 06/02/22 0719  •  nitroglycerin (NITROSTAT) SL tablet 0.4 mg, 0.4 mg, Sublingual, Q5 Min PRN, Cassie Roy APRN  •  ondansetron (ZOFRAN) tablet 4 mg, 4 mg, Oral, Q6H PRN  **OR** ondansetron (ZOFRAN) injection 4 mg, 4 mg, Intravenous, Q6H PRN, Cassie Roy APRN  •  pravastatin (PRAVACHOL) tablet 40 mg, 40 mg, Oral, Q PM, Cassie Roy APRN, 40 mg at 06/01/22 2023  •  sodium chloride 7 % nebulizer solution nebulizer solution 4 mL, 4 mL, Nebulization, BID - RT, Suresh Wang MD, 4 mL at 06/02/22 0721  •  zonisamide (ZONEGRAN) capsule 200 mg, 200 mg, Oral, Nightly, Cassie Roy APRN, 200 mg at 06/01/22 2023  Review of Systems:    The following systems were reviewed and negative;  gastrointestinal    Objective     Vital Signs  Temp:  [97.6 °F (36.4 °C)-98.2 °F (36.8 °C)] 98.2 °F (36.8 °C)  Heart Rate:  [61-79] 69  Resp:  [16-20] 16  BP: ()/(42-81) 97/42  Body mass index is 25.46 kg/m².  No intake or output data in the 24 hours ending 06/02/22 0735  No intake/output data recorded.     Physical Exam:   General: patient awake, alert and cooperative   Abdomen: soft, nontender, nondistended; normal bowel sounds   Rectal: deferred   Extremities: no rash or edema   Psychiatric: Normal mood and behavior; memory intact     Results Review:     I reviewed the patient's new clinical results.  I reviewed the patient's new imaging results and agree with the interpretation.    Results from last 7 days   Lab Units 06/02/22  0438 06/01/22  0604   WBC 10*3/mm3 15.73* 4.89   HEMOGLOBIN g/dL 10.1* 11.2*   HEMATOCRIT % 31.1* 34.4   PLATELETS 10*3/mm3 216 214     Results from last 7 days   Lab Units 06/02/22  0438 06/01/22  0604   SODIUM mmol/L 138 140   POTASSIUM mmol/L 4.3 3.9   CHLORIDE mmol/L 106 106   CO2 mmol/L 24.6 25.0   BUN mg/dL 25* 21   CREATININE mg/dL 0.82 0.57   CALCIUM mg/dL 9.0 9.2   BILIRUBIN mg/dL  --  <0.2   ALK PHOS U/L  --  128*   ALT (SGPT) U/L  --  16   AST (SGOT) U/L  --  14   GLUCOSE mg/dL 120* 124*         Lab Results   Lab Value Date/Time    LIPASE 59 06/01/2022 0604    LIPASE 163 (H) 03/01/2021 1225         Assessment & Plan    Assessment:   1.  Dysphagia - long standing, intermittent  2.  Abnormal appearance of esophagus on CT scan  3.  Acute hypoxic respiratory failure    All problems new to me today    Plan:   Recommend EGD once cleared from primary team.  Speech pathology consult pending as well.   Continue Pepcid 40mg/day    I discussed the patient's findings and my recommendations with patient.          Rj Negro M.D.  [facility]  [address] [place] [address]  Office: [phone]

## 2022-06-02 NOTE — PLAN OF CARE
"Goal Outcome Evaluation:  Plan of Care Reviewed With: patient           Outcome Evaluation: Patient seen for clinical swallow assessment. Pt denied dysphagia, but reported \"maybe I do have trouble\" when PO trials presented. 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 Oral mech exam revealed soft palate deviation to the R and hypernasal speech. Rigid movements with mouth opening. Mild breathiness of voice. Pt with multiple swallows and wet voice with thins via cup and pudding. SLP strongly suspects an oropharyngeal component to known esophgeal impairment. Unable to rec a safe diet at the bedside. Will follow with VFSS to further assess.    Patient was not wearing a face mask during this therapy encounter. Therapist used appropriate personal protective equipment including mask, eye protection and gloves.  Mask used was standard procedure mask. Appropriate PPE was worn during the entire therapy session. Hand hygiene was completed before and after therapy session. Patient is not in enhanced droplet precautions.             "

## 2022-06-02 NOTE — THERAPY EVALUATION
Patient Name: Di Macario  : 1940    MRN: [id number]                              Today's Date: 2022       Admit Date: 2022    Visit Dx:     ICD-10-CM ICD-9-CM   1. Acute hypoxemic respiratory failure (HCC)  J96.01 518.81   2. Viral URI with cough  J06.9 465.9   3. Esophageal abnormality  K22.9 530.9     Patient Active Problem List   Diagnosis   • Essential hypertension   • Impaired memory   • Vitamin D deficiency   • Hyperuricemia   • Slow transit constipation   • Chronic pain of left knee   • Fall   • At high risk for falls   • Peripheral neuropathy   • Uncontrolled type 2 diabetes mellitus with hyperglycemia (HCC)   • Alkaline phosphatase elevation   • Walker as ambulation aid   • Bradycardia, sinus   • Shortness of breath   • Leg swelling   • Hyperlipidemia   • Presence of cardiac pacemaker   • Humeral head fracture, right, closed, initial encounter   • Type 2 diabetes mellitus, with long-term current use of insulin (HCC)   • Nail abnormalities   • Acute hypoxemic respiratory failure (HCC)   • Esophageal abnormality   • Dementia without behavioral disturbance (HCC)   • Lung consolidation (HCC)     Past Medical History:   Diagnosis Date   • Dementia (HCC)     states better with medication    • Diabetes mellitus (HCC)    • Hyperlipidemia 2021   • Lung consolidation (HCC) 2022   • Peripheral neuropathy 2018   • Vitamin D deficiency      Past Surgical History:   Procedure Laterality Date   • ANKLE SURGERY Right    • BLADDER SURGERY      Prolapsed   • BREAST BIOPSY     • CARDIAC ELECTROPHYSIOLOGY PROCEDURE N/A 3/5/2021    Procedure: Pacemaker DC new [place];  Surgeon: Madelin Ferguson MD;  Location: [facility] INVASIVE LOCATION;  Service: Cardiology;  Laterality: N/A;   • HYSTERECTOMY        General Information     Row Name 22 1002          Physical Therapy Time and Intention    Document Type evaluation  -CB     Mode of Treatment individual therapy;physical therapy   -CB     Row Name 06/02/22 1002          General Information    Patient Profile Reviewed yes  -CB     Prior Level of Function independent:;gait;transfer;bed mobility  rollator at baseline  -CB     Existing Precautions/Restrictions fall;oxygen therapy device and L/min;pacemaker  -CB     Barriers to Rehab cognitive status  -CB     Row Name 06/02/22 1002          Living Environment    People in Home child(max), adult  -CB     Row Name 06/02/22 1002          Home Main Entrance    Number of Stairs, Main Entrance none  ramp to enter home  -CB     Row Name 06/02/22 1002          Cognition    Orientation Status (Cognition) oriented x 3  -CB     Row Name 06/02/22 1002          Safety Issues, Functional Mobility    Safety Issues Affecting Function (Mobility) insight into deficits/self-awareness;safety precaution awareness;safety precautions follow-through/compliance  -CB     Impairments Affecting Function (Mobility) balance;endurance/activity tolerance;strength;shortness of breath  -CB           User Key  (r) = Recorded By, (t) = Taken By, (c) = Cosigned By    Initials Name Provider Type    CB Maryana Johnson, PT Physical Therapist               Mobility     Row Name 06/02/22 1003          Bed Mobility    Bed Mobility supine-sit  -CB     Supine-Sit Door (Bed Mobility) contact guard;verbal cues  -CB     Row Name 06/02/22 1003          Sit-Stand Transfer    Sit-Stand Door (Transfers) contact guard;verbal cues  -CB     Assistive Device (Sit-Stand Transfers) walker, front-wheeled  -CB     Row Name 06/02/22 1003          Gait/Stairs (Locomotion)    Door Level (Gait) contact guard  -CB     Assistive Device (Gait) walker, front-wheeled  -CB     Distance in Feet (Gait) 25ft  -CB     Deviations/Abnormal Patterns (Gait) gait speed decreased;stride length decreased  -CB     Comment, (Gait/Stairs) cues to not lift rwx during ambulation and SOA post ambulation with O2 86%; minimal unsteadiness on her feet but no  overt LOB  -CB           User Key  (r) = Recorded By, (t) = Taken By, (c) = Cosigned By    Initials Name Provider Type    Maryana Ledezma PT Physical Therapist               Obj/Interventions     Row Name 06/02/22 1004          Range of Motion Comprehensive    General Range of Motion bilateral lower extremity ROM WFL  -CB     Row Name 06/02/22 1004          Strength Comprehensive (MMT)    Comment, General Manual Muscle Testing (MMT) Assessment generalized LE weakness  -CB     Row Name 06/02/22 1004          Balance    Balance Assessment sitting static balance;sitting dynamic balance;standing static balance;standing dynamic balance  -CB     Static Sitting Balance standby assist  -CB     Dynamic Sitting Balance standby assist  -CB     Position, Sitting Balance sitting edge of bed  -CB     Static Standing Balance contact guard  -CB     Dynamic Standing Balance contact guard  -CB     Position/Device Used, Standing Balance supported;walker, rolling  -CB     Balance Interventions sitting;sit to stand;standing;supported;dynamic;static;minimal challenge  -CB     Row Name 06/02/22 1004          Sensory Assessment (Somatosensory)    Sensory Assessment (Somatosensory) sensation intact  -CB           User Key  (r) = Recorded By, (t) = Taken By, (c) = Cosigned By    Initials Name Provider Type    Maryana Ledezma PT Physical Therapist               Goals/Plan     Row Name 06/02/22 1008          Bed Mobility Goal 1 (PT)    Activity/Assistive Device (Bed Mobility Goal 1, PT) bed mobility activities, all  -CB     [place] Level/Cues Needed (Bed Mobility Goal 1, PT) modified independence  -CB     Time Frame (Bed Mobility Goal 1, PT) long term goal (LTG);1 week  -CB     Row Name 06/02/22 1008          Transfer Goal 1 (PT)    Activity/Assistive Device (Transfer Goal 1, PT) sit-to-stand/stand-to-sit;bed-to-chair/chair-to-bed  -CB     [place] Level/Cues Needed (Transfer Goal 1, PT) standby assist  -CB     Time Frame  (Transfer Goal 1, PT) long term goal (LTG);1 week  -CB     Row Name 06/02/22 1008          Gait Training Goal 1 (PT)    Activity/Assistive Device (Gait Training Goal 1, PT) gait (walking locomotion);walker, rolling  -CB     [place] Level (Gait Training Goal 1, PT) standby assist  -CB     Distance (Gait Training Goal 1, PT) 75ft  -CB     Time Frame (Gait Training Goal 1, PT) long term goal (LTG);1 week  -CB     Row Name 06/02/22 1008          Therapy Assessment/Plan (PT)    Planned Therapy Interventions (PT) balance training;bed mobility training;gait training;home exercise program;patient/family education;strengthening;transfer training  -CB           User Key  (r) = Recorded By, (t) = Taken By, (c) = Cosigned By    Initials Name Provider Type    Maryana Ledezma, PT Physical Therapist               Clinical Impression     Row Name 06/02/22 1005          Pain    Pretreatment Pain Rating 0/10 - no pain  -CB     Posttreatment Pain Rating 0/10 - no pain  -CB     Row Name 06/02/22 1005          Plan of Care Review    Plan of Care Reviewed With patient  -CB     Progress no change  -CB     Outcome Evaluation Patient is a 81 yo female who presented with vomiting and SOA. PMHx includes dementia, DM, HTN, polio, and pacemaker. She reports she has 2 daughters who rotate staying with her but at times during the day she is alone at home. She has a ramp to enter her home and uses rollator for mobility. Today she presents with decreased activity tolerance, SOA with activity, decreased strength, and balance. She completed STS to rwx with CGA and ambulated 25ft using rwx requiring CGA. Minimal unsteadiness noted and cues to maintain walker in contact with floor during mobility. O2 86% post ambulation on 2L with pt reporting SOA. Pt will continue to benefit from skilled PT to address functional deficits and increase independence. PT rec home with 24/7 care vs SNF.  -CB     Row Name 06/02/22 1005          Therapy  Assessment/Plan (PT)    Rehab Potential (PT) good, to achieve stated therapy goals  -CB     Criteria for Skilled Interventions Met (PT) yes  -CB     Therapy Frequency (PT) 5 times/wk  -CB     Row Name 06/02/22 1005          Vital Signs    Pre SpO2 (%) 94  -CB     O2 Delivery Pre Treatment supplemental O2  -CB     Intra SpO2 (%) 86  -CB     O2 Delivery Intra Treatment supplemental O2  -CB     Post SpO2 (%) 94  -CB     O2 Delivery Post Treatment supplemental O2  -CB     Recovery Time 30sec  -CB     Row Name 06/02/22 1005          Positioning and Restraints    Pre-Treatment Position in bed  -CB     Post Treatment Position chair  -CB     In Chair notified nsg;reclined;call light within reach;encouraged to call for assist;exit alarm on;legs elevated  -CB           User Key  (r) = Recorded By, (t) = Taken By, (c) = Cosigned By    Initials Name Provider Type    Maryana Ledezma PT Physical Therapist               Outcome Measures     Row Name 06/02/22 1009          How much help from another person do you currently need...    Turning from your back to your side while in flat bed without using bedrails? 3  -CB     Moving from lying on back to sitting on the side of a flat bed without bedrails? 3  -CB     Moving to and from a bed to a chair (including a wheelchair)? 3  -CB     Standing up from a chair using your arms (e.g., wheelchair, bedside chair)? 3  -CB     Climbing 3-5 steps with a railing? 2  -CB     To walk in hospital room? 3  -CB     AM-PAC 6 Clicks Score (PT) 17  -CB     Highest level of mobility 5 --> Static standing  -CB     Row Name 06/02/22 1009          Functional Assessment    Outcome Measure Options AM-PAC 6 Clicks Basic Mobility (PT)  -CB           User Key  (r) = Recorded By, (t) = Taken By, (c) = Cosigned By    Initials Name Provider Type    Maryana Ledezma PT Physical Therapist                             Physical Therapy Education                 Title: PT OT SLP Therapies (Done)     Topic:  Physical Therapy (Done)     Point: Mobility training (Done)     Learning Progress Summary           Patient Acceptance, E,TB, VU,NR by CB at 6/2/2022 1009    Acceptance, E, VU by CL at 6/1/2022 1624   Family Acceptance, E, VU by CL at 6/1/2022 1624                   Point: Home exercise program (Done)     Learning Progress Summary           Patient Acceptance, E, VU by CL at 6/1/2022 1624   Family Acceptance, E, VU by CL at 6/1/2022 1624                   Point: Body mechanics (Done)     Learning Progress Summary           Patient Acceptance, E, VU by CL at 6/1/2022 1624   Family Acceptance, E, VU by CL at 6/1/2022 1624                   Point: Precautions (Done)     Learning Progress Summary           Patient Acceptance, E,TB, VU,NR by CB at 6/2/2022 1009    Acceptance, E, VU by CL at 6/1/2022 1624   Family Acceptance, E, VU by CL at 6/1/2022 1624                               User Key     Initials Effective Dates Name Provider Type Discipline    CL 06/16/21 -  Maribel Montilla, RN Registered Nurse Nurse     10/22/21 -  Maryana Johnson, JOY Physical Therapist PT              PT Recommendation and Plan  Planned Therapy Interventions (PT): balance training, bed mobility training, gait training, home exercise program, patient/family education, strengthening, transfer training  Plan of Care Reviewed With: patient  Progress: no change  Outcome Evaluation: Patient is a 81 yo female who presented with vomiting and SOA. PMHx includes dementia, DM, HTN, polio, and pacemaker. She reports she has 2 daughters who rotate staying with her but at times during the day she is alone at home. She has a ramp to enter her home and uses rollator for mobility. Today she presents with decreased activity tolerance, SOA with activity, decreased strength, and balance. She completed STS to rwx with CGA and ambulated 25ft using rwx requiring CGA. Minimal unsteadiness noted and cues to maintain walker in contact with floor during mobility. O2  86% post ambulation on 2L with pt reporting SOA. Pt will continue to benefit from skilled PT to address functional deficits and increase independence. PT rec home with 24/7 care vs SNF.     Time Calculation:    PT Charges     Row Name 06/02/22 1010             Time Calculation    Start Time 0932  -CB      Stop Time 0948  -CB      Time Calculation (min) 16 min  -CB      PT Received On 06/02/22  -CB      PT - Next Appointment 06/03/22  -CB              Time Calculation- PT    Total Timed Code Minutes- PT 8 minute(s)  -CB              Timed Charges    27319 - PT Therapeutic Activity Minutes 8  -CB              Total Minutes    Timed Charges Total Minutes 8  -CB       Total Minutes 8  -CB            User Key  (r) = Recorded By, (t) = Taken By, (c) = Cosigned By    Initials Name Provider Type    CB Maryana Johnson, PT Physical Therapist              Therapy Charges for Today     Code Description Service Date Service Provider Modifiers Qty    79441895745 HC PT THERAPEUTIC ACT EA 15 MIN 6/2/2022 Maryana Johnson, PT GP 1    20734677464 HC PT EVAL MOD COMPLEXITY 3 6/2/2022 Maryana Johnson, PT GP 1          PT G-Codes  Outcome Measure Options: AM-PAC 6 Clicks Basic Mobility (PT)  AM-PAC 6 Clicks Score (PT): 17    Maryana Johnson PT  6/2/2022

## 2022-06-02 NOTE — DISCHARGE PLACEMENT REQUEST
"Reji Macario (82 y.o. Female)             Date of Birth   1940    Social Security Number       Address   [address] [place] [address]    Home Phone   [phone]    MRN   [id number]       Christian   Anglican    Marital Status                               Admission Date   6/1/22    Admission Type   Emergency    Admitting Provider   Suresh Wang MD    Attending Provider   Suresh Wang MD    Department, Room/Bed   [address], [address]       Discharge Date       Discharge Disposition       Discharge Destination                               Attending Provider: Suresh Wang MD    Allergies: No Known Allergies    Isolation: None   Infection: None   Code Status: CPR   Advance Care Planning Activity    Ht: 160 cm (63\")   Wt: 65.2 kg (143 lb 11.8 oz)    Admission Cmt: None   Principal Problem: Aspiration pneumonia due to vomitus (HCC) [J69.0]                 Active Insurance as of 6/1/2022     Primary Coverage     Payor Plan Insurance Group Employer/Plan Group    MEDICARE MEDICARE A & B      Payor Plan Address Payor Plan Phone Number Payor Plan Fax Number Effective Dates    [address] [phone]  5/1/2004 - None Entered    [facility] [id number]       Subscriber Name Subscriber Birth Date Member ID       REJI MACARIO 1940 [id number]           Secondary Coverage     Payor Plan Insurance Group Employer/Plan Group     FOR LIFE  FOR LIFE  SUP       Payor Plan Address Payor Plan Phone Number Payor Plan Fax Number Effective Dates    [address] [phone]  2/1/2018 - None Entered    [facility] [address]       Subscriber Name Subscriber Birth Date Member ID       REJI MACARIO 1940 [id number]                 Emergency Contacts      (Rel.) Home Phone Work Phone Mobile Phone    ANGELAKlausRANCHOPHIL (Daughter) [phone]    AMBER MACARIO " (Daughter) [phone] -- [phone]    DANISH MILLER (Daughter) [phone] -- [phone]

## 2022-06-03 ENCOUNTER — APPOINTMENT (OUTPATIENT)
Dept: GENERAL RADIOLOGY | Facility: HOSPITAL | Age: 82
End: 2022-06-03

## 2022-06-03 LAB
ANION GAP SERPL CALCULATED.3IONS-SCNC: 12 MMOL/L (ref 5–15)
BUN SERPL-MCNC: 24 MG/DL (ref 8–23)
BUN/CREAT SERPL: 34.3 (ref 7–25)
CALCIUM SPEC-SCNC: 8.9 MG/DL (ref 8.6–10.5)
CHLORIDE SERPL-SCNC: 107 MMOL/L (ref 98–107)
CO2 SERPL-SCNC: 22 MMOL/L (ref 22–29)
CREAT SERPL-MCNC: 0.7 MG/DL (ref 0.57–1)
DEPRECATED RDW RBC AUTO: 46.4 FL (ref 37–54)
EGFRCR SERPLBLD CKD-EPI 2021: 85.9 ML/MIN/1.73
ERYTHROCYTE [DISTWIDTH] IN BLOOD BY AUTOMATED COUNT: 14.5 % (ref 12.3–15.4)
GLUCOSE BLDC GLUCOMTR-MCNC: 144 MG/DL (ref 70–130)
GLUCOSE BLDC GLUCOMTR-MCNC: 152 MG/DL (ref 70–130)
GLUCOSE BLDC GLUCOMTR-MCNC: 154 MG/DL (ref 70–130)
GLUCOSE BLDC GLUCOMTR-MCNC: 225 MG/DL (ref 70–130)
GLUCOSE SERPL-MCNC: 134 MG/DL (ref 65–99)
HCT VFR BLD AUTO: 31.3 % (ref 34–46.6)
HGB BLD-MCNC: 10.2 G/DL (ref 12–15.9)
MCH RBC QN AUTO: 28.6 PG (ref 26.6–33)
MCHC RBC AUTO-ENTMCNC: 32.6 G/DL (ref 31.5–35.7)
MCV RBC AUTO: 87.7 FL (ref 79–97)
PLATELET # BLD AUTO: 211 10*3/MM3 (ref 140–450)
PMV BLD AUTO: 10.9 FL (ref 6–12)
POTASSIUM SERPL-SCNC: 3.6 MMOL/L (ref 3.5–5.2)
RBC # BLD AUTO: 3.57 10*6/MM3 (ref 3.77–5.28)
SODIUM SERPL-SCNC: 141 MMOL/L (ref 136–145)
WBC NRBC COR # BLD: 12.27 10*3/MM3 (ref 3.4–10.8)

## 2022-06-03 PROCEDURE — 97530 THERAPEUTIC ACTIVITIES: CPT

## 2022-06-03 PROCEDURE — 94761 N-INVAS EAR/PLS OXIMETRY MLT: CPT

## 2022-06-03 PROCEDURE — 97110 THERAPEUTIC EXERCISES: CPT

## 2022-06-03 PROCEDURE — 63710000001 INSULIN LISPRO (HUMAN) PER 5 UNITS: Performed by: NURSE PRACTITIONER

## 2022-06-03 PROCEDURE — 80048 BASIC METABOLIC PNL TOTAL CA: CPT | Performed by: INTERNAL MEDICINE

## 2022-06-03 PROCEDURE — 94799 UNLISTED PULMONARY SVC/PX: CPT

## 2022-06-03 PROCEDURE — 74230 X-RAY XM SWLNG FUNCJ C+: CPT

## 2022-06-03 PROCEDURE — 25010000002 ENOXAPARIN PER 10 MG: Performed by: NURSE PRACTITIONER

## 2022-06-03 PROCEDURE — 92611 MOTION FLUOROSCOPY/SWALLOW: CPT

## 2022-06-03 PROCEDURE — 94760 N-INVAS EAR/PLS OXIMETRY 1: CPT

## 2022-06-03 PROCEDURE — 99232 SBSQ HOSP IP/OBS MODERATE 35: CPT | Performed by: INTERNAL MEDICINE

## 2022-06-03 PROCEDURE — 63710000001 ONDANSETRON PER 8 MG: Performed by: NURSE PRACTITIONER

## 2022-06-03 PROCEDURE — 25010000002 PIPERACILLIN SOD-TAZOBACTAM PER 1 G: Performed by: INTERNAL MEDICINE

## 2022-06-03 PROCEDURE — 82962 GLUCOSE BLOOD TEST: CPT

## 2022-06-03 PROCEDURE — 85027 COMPLETE CBC AUTOMATED: CPT | Performed by: INTERNAL MEDICINE

## 2022-06-03 RX ADMIN — ZONISAMIDE 200 MG: 100 CAPSULE ORAL at 20:22

## 2022-06-03 RX ADMIN — GUAIFENESIN 1200 MG: 600 TABLET, EXTENDED RELEASE ORAL at 09:42

## 2022-06-03 RX ADMIN — SODIUM CHLORIDE 100 ML/HR: 9 INJECTION, SOLUTION INTRAVENOUS at 14:10

## 2022-06-03 RX ADMIN — IPRATROPIUM BROMIDE AND ALBUTEROL SULFATE 3 ML: 2.5; .5 SOLUTION RESPIRATORY (INHALATION) at 07:57

## 2022-06-03 RX ADMIN — FAMOTIDINE 40 MG: 20 TABLET ORAL at 18:41

## 2022-06-03 RX ADMIN — SODIUM CHLORIDE SOLN NEBU 7% 4 ML: 7 NEBU SOLN at 20:53

## 2022-06-03 RX ADMIN — GUAIFENESIN 1200 MG: 600 TABLET, EXTENDED RELEASE ORAL at 20:25

## 2022-06-03 RX ADMIN — ENOXAPARIN SODIUM 40 MG: 100 INJECTION SUBCUTANEOUS at 14:10

## 2022-06-03 RX ADMIN — INSULIN LISPRO 4 UNITS: 100 INJECTION, SOLUTION INTRAVENOUS; SUBCUTANEOUS at 18:41

## 2022-06-03 RX ADMIN — TAZOBACTAM SODIUM AND PIPERACILLIN SODIUM 3.38 G: 375; 3 INJECTION, SOLUTION INTRAVENOUS at 09:42

## 2022-06-03 RX ADMIN — IPRATROPIUM BROMIDE AND ALBUTEROL SULFATE 3 ML: 2.5; .5 SOLUTION RESPIRATORY (INHALATION) at 20:53

## 2022-06-03 RX ADMIN — BARIUM SULFATE 50 ML: 400 SUSPENSION ORAL at 10:27

## 2022-06-03 RX ADMIN — IPRATROPIUM BROMIDE AND ALBUTEROL SULFATE 3 ML: 2.5; .5 SOLUTION RESPIRATORY (INHALATION) at 15:29

## 2022-06-03 RX ADMIN — IPRATROPIUM BROMIDE AND ALBUTEROL SULFATE 3 ML: 2.5; .5 SOLUTION RESPIRATORY (INHALATION) at 11:01

## 2022-06-03 RX ADMIN — BARIUM SULFATE 55 ML: 0.81 POWDER, FOR SUSPENSION ORAL at 10:27

## 2022-06-03 RX ADMIN — ONDANSETRON HYDROCHLORIDE 4 MG: 4 TABLET, FILM COATED ORAL at 18:41

## 2022-06-03 RX ADMIN — SODIUM CHLORIDE SOLN NEBU 7% 4 ML: 7 NEBU SOLN at 08:01

## 2022-06-03 RX ADMIN — DONEPEZIL HYDROCHLORIDE 10 MG: 10 TABLET, FILM COATED ORAL at 20:25

## 2022-06-03 RX ADMIN — TAZOBACTAM SODIUM AND PIPERACILLIN SODIUM 3.38 G: 375; 3 INJECTION, SOLUTION INTRAVENOUS at 18:42

## 2022-06-03 RX ADMIN — BARIUM SULFATE 135 ML: 980 POWDER, FOR SUSPENSION ORAL at 10:27

## 2022-06-03 RX ADMIN — PRAVASTATIN SODIUM 40 MG: 40 TABLET ORAL at 18:42

## 2022-06-03 NOTE — CASE MANAGEMENT/SOCIAL WORK
Continued Stay Note  [facility]     Patient Name: Di Macario  MRN: [id number]  Today's Date: 6/3/2022    Admit Date: 6/1/2022     Discharge Plan     Row Name 06/03/22 1405       Plan    Plan SNF    Patient/Family in Agreement with Plan yes    Plan Comments Spoke with bartolo Howe and she gave secondary choices for SNF of [facility] and [facility], referrals sent. Spoke with [facility] and she thinks they will have a bed available for her Monday. CCP will f/u Monday.               Discharge Codes    No documentation.               Expected Discharge Date and Time     Expected Discharge Date Expected Discharge Time    Jun 6, 2022             Mayela Rutledge RN

## 2022-06-03 NOTE — MBS/VFSS/FEES
Acute Care - Speech Language Pathology   Swallow Initial Evaluation [facility]     Patient Name: Di Macario  : 1940  MRN: [id number]  Today's Date: 6/3/2022               Admit Date: 2022    Visit Dx:     ICD-10-CM ICD-9-CM   1. Acute hypoxemic respiratory failure (HCC)  J96.01 518.81   2. Viral URI with cough  J06.9 465.9   3. Esophageal abnormality  K22.9 530.9     Patient Active Problem List   Diagnosis   • Essential hypertension   • Impaired memory   • Vitamin D deficiency   • Hyperuricemia   • Slow transit constipation   • Chronic pain of left knee   • Fall   • At high risk for falls   • Peripheral neuropathy   • Uncontrolled type 2 diabetes mellitus with hyperglycemia (HCC)   • Alkaline phosphatase elevation   • Walker as ambulation aid   • Bradycardia, sinus   • Shortness of breath   • Leg swelling   • Hyperlipidemia   • Presence of cardiac pacemaker   • Humeral head fracture, right, closed, initial encounter   • Type 2 diabetes mellitus, with long-term current use of insulin (HCC)   • Nail abnormalities   • Acute hypoxemic respiratory failure (HCC)   • Esophageal abnormality   • Dementia without behavioral disturbance (HCC)   • Lung consolidation (HCC)   • Aspiration pneumonia due to vomitus (HCC)     Past Medical History:   Diagnosis Date   • Dementia (HCC)     states better with medication    • Diabetes mellitus (HCC)    • Hyperlipidemia 2021   • Lung consolidation (HCC) 2022   • Peripheral neuropathy 2018   • Vitamin D deficiency      Past Surgical History:   Procedure Laterality Date   • ANKLE SURGERY Right    • BLADDER SURGERY      Prolapsed   • BREAST BIOPSY     • CARDIAC ELECTROPHYSIOLOGY PROCEDURE N/A 3/5/2021    Procedure: Pacemaker RENÉ CAMPBELL;  Surgeon: Madelin Ferguson MD;  Location: [facility] INVASIVE LOCATION;  Service: Cardiology;  Laterality: N/A;   • HYSTERECTOMY         SLP Recommendation and Plan  SLP Swallowing Diagnosis: mod-severe,  "pharyngeal dysphagia (06/03/22 0930)  SLP Diet Recommendation: nectar thick liquids (06/03/22 0930)  Recommended Precautions and Strategies: upright posture during/after eating, small bites of food and sips of liquid (06/03/22 0930)  SLP Rec. for Method of Medication Administration: meds via alternate route (06/03/22 0930)     Monitor for Signs of Aspiration: yes, notify SLP if any concerns (06/03/22 0930)  Recommended Diagnostics: reassess via clinical swallow evaluation, reassess via [place]FSS [place] [place] reassess via FEES (06/03/22 0930)  Swallow Criteria for Skilled Therapeutic Interventions Met: demonstrates skilled criteria (06/03/22 0930)  Anticipated Discharge Disposition (SLP): anticipate therapy at next level of care (06/03/22 0930)  Rehab Potential/Prognosis, Swallowing: good, to achieve stated therapy goals (06/03/22 0930)  Therapy Frequency (Swallow): PRN (06/03/22 0930)  Predicted Duration Therapy Intervention (Days): until discharge (06/03/22 0930)                                  Plan of Care Reviewed With: patient  Outcome Evaluation: VFSS completed. Full report pending. Pt exhibited weak hyolaryngeal elevation, nearly absent epiglottic deflection, reduced soft palate elevation, and incomplete upper esophageal sphincter opening. Pt is not interested in a feeding tube at this time, but aspiration risk is high. SLP recs nectar clear liquids, straws okay, with multiple swallows. Pt exhibited significant pharyngeal residue with puree, it is not appropriate to advance diet at the bedside at this time. 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      SWALLOW EVALUATION (last 72 hours)     SLP Adult Swallow Evaluation     Row Name 06/03/22 0930 06/02/22 1200          Document Type evaluation  -SH evaluation  -    Patient Effort adequate  " "-SH adequate  -                Patient Profile Reviewed yes  -SH yes  -    Pertinent History Of Current Problem GI work up in progress, hx polio with residual paralysis/paresis  - Polio hx, emesis after coughing, dysphagia with liquids and solids.  -    Current Method of Nutrition NPO  -SH NPO  -SH    Precautions/Limitations, Vision -- other (see comments)  Question R neglect  -    Precautions/Limitations, Hearing WFL  -SH WFL  -    Prior Level of Function-Communication cognitive-linguistic impairment  -SH cognitive-linguistic impairment  -    Prior Level of Function-Swallowing -- other (see comments)  chronic issue  -    Plans/Goals Discussed with patient;agreed upon  - patient;agreed upon  -    Barriers to Rehab previous functional deficit  - previous functional deficit  -                Pretreatment Pain Rating 0/10 - no pain  -SH 0/10 - no pain  -    Posttreatment Pain Rating -- 0/10 - no pain  -                Dentition Assessment -- natural, present and adequate  -    Volitional Swallow -- delayed;weak  -    Volitional Cough -- weak  -                Oral Motor General Assessment -- mandibular impairment;vocal impairment;velar impairment  -                Clinical Swallow Evaluation Summary -- Patient seen for clinical swallow assessment. Pt denied dysphagia, but reported \"maybe I do have trouble\" when PO trials presented. 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 Oral mech exam revealed soft palate deviation to the R and hypernasal speech. Rigid movements with mouth opening. Mild breathiness of voice. Pt with multiple swallows and wet voice with thins via cup and pudding. SLP strongly suspects an oropharyngeal component to known esophgeal impairment. Unable to rec a safe diet at the bedside. Will follow with VFSS to further assess.  -                VFSS Summary Patient presents with moderate-severe pharyngeal dysphagia characterized by weak hyolaryngeal elevation, " nearly absent epiglottic deflection, reduced soft palate elevation, and incomplete upper esophageal sphincter opening. Baseline shadowing made it challenging to rule out trace posterior aspiration. Slight spill past the valleculae with nectar via spoon and cup without penetration. Transient, shallow penetration with nectar via straw. Mild pyriform residue post swallow, patient able to partially clear with a cued second swallow. Transient penetration during the swallow with thins via cup. Moderate residue post swallow at the valleculae. Patient unable to clear. Trace, non transient penetration during the swallow with thins via straw. Normal swallow initiation with puree, but moderate pharyngeal residue post swallow d/t poor pharyngeal constriction. Munching mastication with mech soft. Spill past the valleculae before the swallow. Moderate-severe pharyngeal residue post swallow with mech soft. Compensatory strategies not appropriate at this time d/t pt difficulty recalling novel information.  -SH --                Summary Statement Radiologist present: Dr. Mckeon. Penetration observed with thins and nectar. Moderate-severe pharyngeal residue post swallow with solids. Pt at risk for aspiration from residue.  - --                SLP Swallowing Diagnosis mod-severe;pharyngeal dysphagia  - suspected pharyngeal dysphagia  -    Functional Impact risk of aspiration/pneumonia  - risk of aspiration/pneumonia  -    Rehab Potential/Prognosis, Swallowing good, to achieve stated therapy goals  - good, to achieve stated therapy goals  -    Swallow Criteria for Skilled Therapeutic Interventions Met demonstrates skilled criteria  - demonstrates skilled criteria  -                Therapy Frequency (Swallow) PRN  -SH PRN  -    Predicted Duration Therapy Intervention (Days) until discharge  - until discharge  -    SLP Diet Recommendation nectar thick liquids  -SH NPO;ice chips between meals after oral care, with  supervision  -    Recommended Diagnostics reassess via clinical swallow evaluation;reassess via VFSS (MBS);reassess via FEES  - reassess via VFSS (MBS)  -    Recommended Precautions and Strategies upright posture during/after eating;small bites of food and sips of liquid  - --    Oral Care Recommendations Oral Care BID/PRN  - Oral Care BID/PRN  -    SLP Rec. for Method of Medication Administration meds via alternate route  - meds via alternate route  -    Monitor for Signs of Aspiration yes;notify SLP if any concerns  - yes;notify SLP if any concerns  -    Anticipated Discharge Disposition (SLP) anticipate therapy at next level of care  - anticipate therapy at next level of care  -                Oral Nutrition/Hydration Goal Selection (SLP) oral nutrition/hydration, SLP goal 1  - oral nutrition/hydration, SLP goal 1  -                Oral Nutrition/Hydration Goal 1, SLP Pt will carmencita PO without overt s/s of aspiration.  -SH Pt will carmencita PO without overt s/s of aspiration.  -    Time Frame (Oral Nutrition/Hydration Goal 1, SLP) by discharge  - by discharge  -    Progress/Outcomes (Oral Nutrition/Hydration Goal 1, SLP) progress slower than expected  - --          User Key  (r) = Recorded By, (t) = Taken By, (c) = Cosigned By    Initials Name Effective Dates     Mayela Barnett MS CCC-SLP 06/16/21 -                 EDUCATION  The patient has been educated in the following areas:   Dysphagia (Swallowing Impairment).        SLP GOALS     Row Name 06/03/22 0930 06/02/22 1200          Oral Nutrition/Hydration Goal 1 (SLP)    Oral Nutrition/Hydration Goal 1, SLP Pt will carmencita PO without overt s/s of aspiration.  -SH Pt will carmencita PO without overt s/s of aspiration.  -     Time Frame (Oral Nutrition/Hydration Goal 1, SLP) by discharge  - by discharge  -     Progress/Outcomes (Oral Nutrition/Hydration Goal 1, SLP) progress slower than expected  - --           User Key  (r) = Recorded By,  (t) = Taken By, (c) = Cosigned By    Initials Name Provider Type    Mayela Esparza, MS CCC-SLP Speech and Language Pathologist              SLP Outcome Measures (last 72 hours)     SLP Outcome Measures     Row Name 06/03/22 1300 06/02/22 1200          SLP Outcome Measures    Outcome Measure Used? Adult NOMS  -SH Adult NOMS  -SH            Adult FCM Scores    FCM Chosen Swallowing  -SH Swallowing  -SH     Swallowing FCM Score 3  -SH 1  -SH           User Key  (r) = Recorded By, (t) = Taken By, (c) = Cosigned By    Initials Name Effective Dates     Mayela Barnett, MS CCC-SLP 06/16/21 -                  Time Calculation:    Time Calculation- SLP     Row Name 06/03/22 1314             Time Calculation- SLP    SLP Start Time 0930  -      SLP Received On 06/03/22  -              Untimed Charges    82972-BA Motion Fluoro Eval Swallow Minutes 75  -SH              Total Minutes    Untimed Charges Total Minutes 75  -SH       Total Minutes 75  -SH            User Key  (r) = Recorded By, (t) = Taken By, (c) = Cosigned By    Initials Name Provider Type    Mayela Esparza, MS CCC-SLP Speech and Language Pathologist                Therapy Charges for Today     Code Description Service Date Service Provider Modifiers Qty    20374759415 HC ST MOTION FLUORO EVAL SWALLOW 5 6/2/2022 Ericka Mayelashay RIVERA MS CCC-SLP GN 1    10125714194 HC ST MOTION FLUORO EVAL SWALLOW 5 6/3/2022 Mayela Barnett MS CCC-SLP GN 1               Mayela Barnett MS CCC-SLP  6/3/2022

## 2022-06-03 NOTE — PLAN OF CARE
"Goal Outcome Evaluation:  Plan of Care Reviewed With: patient           Outcome Evaluation: VFSS completed. Full report pending. Pt exhibited weak hyolaryngeal elevation, nearly absent epiglottic deflection, reduced soft palate elevation, and incomplete upper esophageal sphincter opening. Pt is not interested in a feeding tube at this time, but aspiration risk is high. SLP recs nectar clear liquids, straws okay, with multiple swallows. Pt exhibited significant pharyngeal residue with puree, it is not appropriate to advance diet at the bedside at this time. 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     Discussed results with patient's daughter/POA, Carolyn, over the phone.     Patient was not wearing a face mask during this therapy encounter. Therapist used appropriate personal protective equipment including mask, eye protection and gloves.  Mask used was standard procedure mask. Appropriate PPE was worn during the entire therapy session. Hand hygiene was completed before and after therapy session. Patient is not in enhanced droplet precautions.             "

## 2022-06-03 NOTE — PLAN OF CARE
Goal Outcome Evaluation:  Plan of Care Reviewed With: patient        Progress: improving  Outcome Evaluation: Patient participated with PT and increased overall functioanl mobility. She completed multiple STS to rwx requiring CGA with max VC for UE placement for safety. She increased ambulation distance to 60ft using rwx requiring CGA-Alexia and cues to navigate obstacles in hallway as she has poor safety awareness. She also completed seated ther ex and standing marching in place x20 reps with CGA. Will continue to progress pt as she tolerates. PT continue to rec home with 24/7 care vs SNF as pt would really like to return to home setting.

## 2022-06-03 NOTE — THERAPY TREATMENT NOTE
Patient Name: Di Macario  : 1940    MRN: [id number]                              Today's Date: 6/3/2022       Admit Date: 2022    Visit Dx:     ICD-10-CM ICD-9-CM   1. Acute hypoxemic respiratory failure (HCC)  J96.01 518.81   2. Viral URI with cough  J06.9 465.9   3. Esophageal abnormality  K22.9 530.9     Patient Active Problem List   Diagnosis   • Essential hypertension   • Impaired memory   • Vitamin D deficiency   • Hyperuricemia   • Slow transit constipation   • Chronic pain of left knee   • Fall   • At high risk for falls   • Peripheral neuropathy   • Uncontrolled type 2 diabetes mellitus with hyperglycemia (HCC)   • Alkaline phosphatase elevation   • Walker as ambulation aid   • Bradycardia, sinus   • Shortness of breath   • Leg swelling   • Hyperlipidemia   • Presence of cardiac pacemaker   • Humeral head fracture, right, closed, initial encounter   • Type 2 diabetes mellitus, with long-term current use of insulin (HCC)   • Nail abnormalities   • Acute hypoxemic respiratory failure (HCC)   • Esophageal abnormality   • Dementia without behavioral disturbance (HCC)   • Lung consolidation (HCC)   • Aspiration pneumonia due to vomitus (HCC)     Past Medical History:   Diagnosis Date   • Dementia (HCC)     states better with medication    • Diabetes mellitus (HCC)    • Hyperlipidemia 2021   • Lung consolidation (HCC) 2022   • Peripheral neuropathy 2018   • Vitamin D deficiency      Past Surgical History:   Procedure Laterality Date   • ANKLE SURGERY Right    • BLADDER SURGERY      Prolapsed   • BREAST BIOPSY     • CARDIAC ELECTROPHYSIOLOGY PROCEDURE N/A 3/5/2021    Procedure: Pacemaker DC new [place];  Surgeon: Madelin Ferguson MD;  Location: [facility] INVASIVE LOCATION;  Service: Cardiology;  Laterality: N/A;   • HYSTERECTOMY        General Information     Row Name 22 1155          Physical Therapy Time and Intention    Document Type therapy note (daily note)  -DELVIN      Mode of Treatment individual therapy;physical therapy  -CB     Row Name 06/03/22 1155          General Information    Patient Profile Reviewed yes  -CB     Existing Precautions/Restrictions fall;oxygen therapy device and L/min;pacemaker  -CB     Row Name 06/03/22 1155          Cognition    Orientation Status (Cognition) oriented x 3  -CB           User Key  (r) = Recorded By, (t) = Taken By, (c) = Cosigned By    Initials Name Provider Type    CB Maryana Johnson, PT Physical Therapist               Mobility     Row Name 06/03/22 1156          Bed Mobility    Bed Mobility supine-sit  -CB     Supine-Sit [place] (Bed Mobility) contact guard;verbal cues  -CB     Row Name 06/03/22 1156          Sit-Stand Transfer    Sit-Stand [place] (Transfers) contact guard;verbal cues  -CB     Assistive Device (Sit-Stand Transfers) walker, front-wheeled  -CB     Comment, (Sit-Stand Transfer) STSx6 with max cues for UE placement  -CB     Row Name 06/03/22 1156          Gait/Stairs (Locomotion)    [place] Level (Gait) contact guard;minimum assist (75% patient effort)  -CB     Assistive Device (Gait) walker, front-wheeled  -CB     Distance in Feet (Gait) 60ft  -CB     Deviations/Abnormal Patterns (Gait) gait speed decreased;stride length decreased  -CB     Comment, (Gait/Stairs) pt with poor safety awareness during ambulation and requires cues to navigate obstacles in hallway and room. Pt ambulate don RA with O2 88% post ambulation  -CB           User Key  (r) = Recorded By, (t) = Taken By, (c) = Cosigned By    Initials Name Provider Type    CB Maryana Johnson, PT Physical Therapist               Obj/Interventions     Row Name 06/03/22 1157          Motor Skills    Therapeutic Exercise --  seated AP, LAQ, marching x20 reps with rest breaks as needed  -CB     Row Name 06/03/22 1157          Balance    Balance Assessment standing static balance;standing dynamic balance  -CB     Static Standing Balance contact guard  -CB      Dynamic Standing Balance contact guard;minimal assist  -CB     Position/Device Used, Standing Balance supported;walker, rolling  -CB     Balance Interventions sitting;standing;sit to stand;supported;static;dynamic;minimal challenge  -CB     Comment, Balance standing marching in place x20 reps with CGA  -CB           User Key  (r) = Recorded By, (t) = Taken By, (c) = Cosigned By    Initials Name Provider Type    CB Maryana Johnson, PT Physical Therapist               Goals/Plan    No documentation.                Clinical Impression     Row Name 06/03/22 1157          Pain    Pretreatment Pain Rating 0/10 - no pain  -CB     Posttreatment Pain Rating 0/10 - no pain  -CB     Row Name 06/03/22 1157          Plan of Care Review    Plan of Care Reviewed With patient  -CB     Progress improving  -CB     Outcome Evaluation Patient participated with PT and increased overall functioanl mobility. She completed multiple STS to rwx requiring CGA with max VC for UE placement for safety. She increased ambulation distance to 60ft using rwx requiring CGA-Alexia and cues to navigate obstacles in hallway as she has poor safety awareness. She also completed seated ther ex and standing marching in place x20 reps with CGA. Will continue to progress pt as she tolerates. PT continue to rec home with 24/7 care vs SNF as pt would really like to return to home setting.  -CB     Row Name 06/03/22 1157          Vital Signs    Pre SpO2 (%) 96  -CB     O2 Delivery Pre Treatment supplemental O2  -CB     Intra SpO2 (%) 88  -CB     O2 Delivery Intra Treatment room air  -CB     Post SpO2 (%) 95  -CB     O2 Delivery Post Treatment supplemental O2  -CB     Row Name 06/03/22 1157          Positioning and Restraints    Pre-Treatment Position in bed  -CB     Post Treatment Position chair  -CB     In Chair notified nsg;reclined;call light within reach;encouraged to call for assist;exit alarm on;legs elevated  -CB           User Key  (r) = Recorded By, (t) =  Taken By, (c) = Cosigned By    Initials Name Provider Type    Maryana Ledezma, PT Physical Therapist               Outcome Measures     Row Name 06/03/22 1200          How much help from another person do you currently need...    Turning from your back to your side while in flat bed without using bedrails? 3  -CB     Moving from lying on back to sitting on the side of a flat bed without bedrails? 3  -CB     Moving to and from a bed to a chair (including a wheelchair)? 3  -CB     Standing up from a chair using your arms (e.g., wheelchair, bedside chair)? 3  -CB     Climbing 3-5 steps with a railing? 2  -CB     To walk in hospital room? 3  -CB     AM-PAC 6 Clicks Score (PT) 17  -CB     Highest level of mobility 5 --> Static standing  -CB     Row Name 06/03/22 1200          Functional Assessment    Outcome Measure Options AM-PAC 6 Clicks Basic Mobility (PT)  -CB           User Key  (r) = Recorded By, (t) = Taken By, (c) = Cosigned By    Initials Name Provider Type    Maryana Ledezma, PT Physical Therapist                             Physical Therapy Education                 Title: PT OT SLP Therapies (Done)     Topic: Physical Therapy (Done)     Point: Mobility training (Done)     Learning Progress Summary           Patient Acceptance, E,TB, VU,NR by CB at 6/3/2022 1200    Acceptance, E,TB, VU,NR by CB at 6/2/2022 1009    Acceptance, E, VU by CL at 6/1/2022 1624   Family Acceptance, E, VU by CL at 6/1/2022 1624                   Point: Home exercise program (Done)     Learning Progress Summary           Patient Acceptance, E,TB, VU,NR by CB at 6/3/2022 1200    Acceptance, E, VU by CL at 6/1/2022 1624   Family Acceptance, E, VU by CL at 6/1/2022 1624                   Point: Body mechanics (Done)     Learning Progress Summary           Patient Acceptance, E,TB, VU,NR by CB at 6/3/2022 1200    Acceptance, E, VU by CL at 6/1/2022 1624   Family Acceptance, E, VU by CL at 6/1/2022 1624                   Point:  Precautions (Done)     Learning Progress Summary           Patient Acceptance, E,TB, VU,NR by CB at 6/2/2022 1009    Acceptance, E, VU by CL at 6/1/2022 1624   Family Acceptance, E, VU by CL at 6/1/2022 1624                               User Key     Initials Effective Dates Name Provider Type Discipline    CL 06/16/21 -  Maribel Montilla, RN Registered Nurse Nurse    CB 10/22/21 -  Maryana Johnson, PT Physical Therapist PT              PT Recommendation and Plan  Planned Therapy Interventions (PT): balance training, bed mobility training, gait training, home exercise program, patient/family education, strengthening, transfer training  Plan of Care Reviewed With: patient  Progress: improving  Outcome Evaluation: Patient participated with PT and increased overall functioanl mobility. She completed multiple STS to rwx requiring CGA with max VC for UE placement for safety. She increased ambulation distance to 60ft using rwx requiring CGA-Alexia and cues to navigate obstacles in hallway as she has poor safety awareness. She also completed seated ther ex and standing marching in place x20 reps with CGA. Will continue to progress pt as she tolerates. PT continue to rec home with 24/7 care vs SNF as pt would really like to return to home setting.     Time Calculation:    PT Charges     Row Name 06/03/22 1200             Time Calculation    Start Time 1108  -CB      Stop Time 1131  -CB      Time Calculation (min) 23 min  -CB      PT Received On 06/03/22  -CB      PT - Next Appointment 06/04/22  -CB              Time Calculation- PT    Total Timed Code Minutes- PT 23 minute(s)  -CB              Timed Charges    35187 - PT Therapeutic Exercise Minutes 8  -CB      59136 - Gait Training Minutes  7  -CB      76145 - PT Therapeutic Activity Minutes 8  -CB              Total Minutes    Timed Charges Total Minutes 23  -CB       Total Minutes 23  -CB            User Key  (r) = Recorded By, (t) = Taken By, (c) = Cosigned By    Initials  Name Provider Type    CB Maryana Johnson, PT Physical Therapist              Therapy Charges for Today     Code Description Service Date Service Provider Modifiers Qty    41143388800 HC PT THERAPEUTIC ACT EA 15 MIN 6/2/2022 Maryana Johnson, PT GP 1    83047885397 HC PT EVAL MOD COMPLEXITY 3 6/2/2022 Maryana Johnson, PT GP 1    42894226285 HC PT THER PROC EA 15 MIN 6/3/2022 Maryana Johnson, PT GP 1    30285992117 HC PT THERAPEUTIC ACT EA 15 MIN 6/3/2022 Maryana Johnson, PT GP 1          PT G-Codes  Outcome Measure Options: AM-PAC 6 Clicks Basic Mobility (PT)  AM-PAC 6 Clicks Score (PT): 17  AM-PAC 6 Clicks Score (OT): 18    Maryana Johnson PT  6/3/2022

## 2022-06-03 NOTE — PROGRESS NOTES
[facility]  Inpatient Progress Note    Reason for Followup:  Dysphagia, abnormal esophagram    Subjective     Interval History:   She feels more congested this morning    Current Facility-Administered Medications:   •  acetaminophen (TYLENOL) tablet 650 mg, 650 mg, Oral, Q4H PRN **OR** acetaminophen (TYLENOL) 160 MG/5ML solution 650 mg, 650 mg, Oral, Q4H PRN **OR** acetaminophen (TYLENOL) suppository 650 mg, 650 mg, Rectal, Q4H PRN, Cassie Roy APRN  •  dextrose (D50W) (25 g/50 mL) IV injection 25 g, 25 g, Intravenous, Q15 Min PRN, Cassie Roy APRN  •  dextrose (GLUTOSE) oral gel 15 g, 15 g, Oral, Q15 Min PRN, Cassie Roy APRN  •  donepezil (ARICEPT) tablet 10 mg, 10 mg, Oral, Nightly, Cassie Roy APRN, 10 mg at 06/02/22 2032  •  Enoxaparin Sodium (LOVENOX) syringe 40 mg, 40 mg, Subcutaneous, Q24H, Cassie Roy APRN, 40 mg at 06/02/22 1436  •  famotidine (PEPCID) tablet 40 mg, 40 mg, Oral, Daily Before Supper, Shanita Hendrix PA-C, 40 mg at 06/02/22 1844  •  glucagon (human recombinant) (GLUCAGEN DIAGNOSTIC) injection 1 mg, 1 mg, Subcutaneous, Q15 Min PRN, Cassie Roy APRN  •  guaiFENesin (MUCINEX) 12 hr tablet 1,200 mg, 1,200 mg, Oral, Q12H, Cassie Roy APRN, 1,200 mg at 06/02/22 2032  •  insulin lispro (ADMELOG) injection 0-9 Units, 0-9 Units, Subcutaneous, TID AC, Cassie Ryo APRN, 2 Units at 06/01/22 1636  •  ipratropium-albuterol (DUO-NEB) nebulizer solution 3 mL, 3 mL, Nebulization, 4x Daily - RT, Cassie Roy APRN, 3 mL at 06/03/22 0757  •  nitroglycerin (NITROSTAT) SL tablet 0.4 mg, 0.4 mg, Sublingual, Q5 Min PRN, Cassie Roy APRN  •  ondansetron (ZOFRAN) tablet 4 mg, 4 mg, Oral, Q6H PRN **OR** ondansetron (ZOFRAN) injection 4 mg, 4 mg, Intravenous, Q6H PRN, Cassie Roy APRN  •  piperacillin-tazobactam (ZOSYN) 3.375 g in iso-osmotic dextrose 50 ml (premix),  3.375 g, Intravenous, Q8H, Suresh Wang MD, 3.375 g at 06/02/22 2323  •  pravastatin (PRAVACHOL) tablet 40 mg, 40 mg, Oral, Q PM, Cassie Roy APRN, 40 mg at 06/02/22 1844  •  sodium chloride 0.9 % infusion, 100 mL/hr, Intravenous, Continuous, Suresh Wang MD, Last Rate: 100 mL/hr at 06/02/22 2331, 100 mL/hr at 06/02/22 2331  •  sodium chloride 7 % nebulizer solution nebulizer solution 4 mL, 4 mL, Nebulization, BID - RT, Suresh Wang MD, 4 mL at 06/03/22 0801  •  zonisamide (ZONEGRAN) capsule 200 mg, 200 mg, Oral, Nightly, Cassie Roy APRN, 200 mg at 06/02/22 2033  Review of Systems:   GI: negative  Respiratory: cough, congestion, SOA    Objective     Vital Signs  Temp:  [97.2 °F (36.2 °C)-98.8 °F (37.1 °C)] 98.3 °F (36.8 °C)  Heart Rate:  [63-87] 76  Resp:  [16-20] 20  BP: ()/(42-69) 126/54  Body mass index is 25.9 kg/m².    Intake/Output Summary (Last 24 hours) at 6/3/2022 0928  Last data filed at 6/3/2022 0905  Gross per 24 hour   Intake 1010 ml   Output 0 ml   Net 1010 ml     No intake/output data recorded.     Physical Exam:   General: patient awake, alert and cooperative   Pulm: coarse BS with some expiratory wheezing   Abdomen: soft, nontender, nondistended; normal bowel sounds   Rectal: deferred   Extremities: no rash or edema   Psychiatric: Normal mood and behavior; memory intact     Results Review:     I reviewed the patient's new clinical results.  I reviewed the patient's new imaging results and agree with the interpretation.    Results from last 7 days   Lab Units 06/03/22  0404 06/02/22  0438 06/01/22  0604   WBC 10*3/mm3 12.27* 15.73* 4.89   HEMOGLOBIN g/dL 10.2* 10.1* 11.2*   HEMATOCRIT % 31.3* 31.1* 34.4   PLATELETS 10*3/mm3 211 216 214     Results from last 7 days   Lab Units 06/03/22  0404 06/02/22  0438 06/01/22  0604   SODIUM mmol/L 141 138 140   POTASSIUM mmol/L 3.6 4.3 3.9   CHLORIDE mmol/L 107 106 106   CO2 mmol/L 22.0 24.6  25.0   BUN mg/dL 24* 25* 21   CREATININE mg/dL 0.70 0.82 0.57   CALCIUM mg/dL 8.9 9.0 9.2   BILIRUBIN mg/dL  --   --  <0.2   ALK PHOS U/L  --   --  128*   ALT (SGPT) U/L  --   --  16   AST (SGOT) U/L  --   --  14   GLUCOSE mg/dL 134* 120* 124*         Lab Results   Lab Value Date/Time    LIPASE 59 06/01/2022 0604    LIPASE 163 (H) 03/01/2021 1225         Assessment & Plan   Assessment:   1.  Dysphagia - long standing, intermittent  2.  Abnormal appearance of esophagus on CT scan  3.  Acute hypoxic respiratory failure  4.  Pneuomonia    Plan:   Recommend EGD once cleared from primary team.  She seems a little worse today with regards to pulmonary status, probably not ready for EGD.  Plans for VFSS per speech, can probably start some form of liquid diet later today based on those results  Continue Pepcid 40mg/day    I discussed the patient's findings and my recommendations with patient.          Rj Negro M.D.  [facility]  [address] [place] [address]  Office: [phone]

## 2022-06-04 LAB
ANION GAP SERPL CALCULATED.3IONS-SCNC: 13 MMOL/L (ref 5–15)
BASOPHILS # BLD AUTO: 0.04 10*3/MM3 (ref 0–0.2)
BASOPHILS NFR BLD AUTO: 0.3 % (ref 0–1.5)
BUN SERPL-MCNC: 20 MG/DL (ref 8–23)
BUN/CREAT SERPL: 31.7 (ref 7–25)
CALCIUM SPEC-SCNC: 9 MG/DL (ref 8.6–10.5)
CHLORIDE SERPL-SCNC: 112 MMOL/L (ref 98–107)
CO2 SERPL-SCNC: 19 MMOL/L (ref 22–29)
CREAT SERPL-MCNC: 0.63 MG/DL (ref 0.57–1)
DEPRECATED RDW RBC AUTO: 47.2 FL (ref 37–54)
EGFRCR SERPLBLD CKD-EPI 2021: 88.1 ML/MIN/1.73
EOSINOPHIL # BLD AUTO: 0.11 10*3/MM3 (ref 0–0.4)
EOSINOPHIL NFR BLD AUTO: 0.9 % (ref 0.3–6.2)
ERYTHROCYTE [DISTWIDTH] IN BLOOD BY AUTOMATED COUNT: 14.6 % (ref 12.3–15.4)
GGT SERPL-CCNC: 33 U/L (ref 5–36)
GLUCOSE BLDC GLUCOMTR-MCNC: 123 MG/DL (ref 70–130)
GLUCOSE BLDC GLUCOMTR-MCNC: 139 MG/DL (ref 70–130)
GLUCOSE BLDC GLUCOMTR-MCNC: 148 MG/DL (ref 70–130)
GLUCOSE BLDC GLUCOMTR-MCNC: 224 MG/DL (ref 70–130)
GLUCOSE SERPL-MCNC: 120 MG/DL (ref 65–99)
HCT VFR BLD AUTO: 29.5 % (ref 34–46.6)
HGB BLD-MCNC: 9.5 G/DL (ref 12–15.9)
IMM GRANULOCYTES # BLD AUTO: 0.09 10*3/MM3 (ref 0–0.05)
IMM GRANULOCYTES NFR BLD AUTO: 0.8 % (ref 0–0.5)
LYMPHOCYTES # BLD AUTO: 0.73 10*3/MM3 (ref 0.7–3.1)
LYMPHOCYTES NFR BLD AUTO: 6.2 % (ref 19.6–45.3)
MCH RBC QN AUTO: 28.3 PG (ref 26.6–33)
MCHC RBC AUTO-ENTMCNC: 32.2 G/DL (ref 31.5–35.7)
MCV RBC AUTO: 87.8 FL (ref 79–97)
MONOCYTES # BLD AUTO: 0.67 10*3/MM3 (ref 0.1–0.9)
MONOCYTES NFR BLD AUTO: 5.7 % (ref 5–12)
NEUTROPHILS NFR BLD AUTO: 10.2 10*3/MM3 (ref 1.7–7)
NEUTROPHILS NFR BLD AUTO: 86.1 % (ref 42.7–76)
NRBC BLD AUTO-RTO: 0 /100 WBC (ref 0–0.2)
PLATELET # BLD AUTO: 209 10*3/MM3 (ref 140–450)
PMV BLD AUTO: 10.7 FL (ref 6–12)
POTASSIUM SERPL-SCNC: 3.5 MMOL/L (ref 3.5–5.2)
PROCALCITONIN SERPL-MCNC: 5.75 NG/ML (ref 0–0.25)
RBC # BLD AUTO: 3.36 10*6/MM3 (ref 3.77–5.28)
SODIUM SERPL-SCNC: 144 MMOL/L (ref 136–145)
WBC NRBC COR # BLD: 11.84 10*3/MM3 (ref 3.4–10.8)

## 2022-06-04 PROCEDURE — 84145 PROCALCITONIN (PCT): CPT | Performed by: HOSPITALIST

## 2022-06-04 PROCEDURE — 25010000002 PIPERACILLIN SOD-TAZOBACTAM PER 1 G: Performed by: INTERNAL MEDICINE

## 2022-06-04 PROCEDURE — 94799 UNLISTED PULMONARY SVC/PX: CPT

## 2022-06-04 PROCEDURE — 82977 ASSAY OF GGT: CPT | Performed by: INTERNAL MEDICINE

## 2022-06-04 PROCEDURE — 94664 DEMO&/EVAL PT USE INHALER: CPT

## 2022-06-04 PROCEDURE — 63710000001 INSULIN LISPRO (HUMAN) PER 5 UNITS: Performed by: NURSE PRACTITIONER

## 2022-06-04 PROCEDURE — 25010000002 ENOXAPARIN PER 10 MG: Performed by: NURSE PRACTITIONER

## 2022-06-04 PROCEDURE — 94761 N-INVAS EAR/PLS OXIMETRY MLT: CPT

## 2022-06-04 PROCEDURE — 99232 SBSQ HOSP IP/OBS MODERATE 35: CPT | Performed by: INTERNAL MEDICINE

## 2022-06-04 PROCEDURE — 80048 BASIC METABOLIC PNL TOTAL CA: CPT | Performed by: HOSPITALIST

## 2022-06-04 PROCEDURE — 94760 N-INVAS EAR/PLS OXIMETRY 1: CPT

## 2022-06-04 PROCEDURE — 85025 COMPLETE CBC W/AUTO DIFF WBC: CPT | Performed by: HOSPITALIST

## 2022-06-04 PROCEDURE — 94669 MECHANICAL CHEST WALL OSCILL: CPT

## 2022-06-04 PROCEDURE — 82962 GLUCOSE BLOOD TEST: CPT

## 2022-06-04 RX ADMIN — PRAVASTATIN SODIUM 40 MG: 40 TABLET ORAL at 17:44

## 2022-06-04 RX ADMIN — FAMOTIDINE 40 MG: 20 TABLET ORAL at 17:44

## 2022-06-04 RX ADMIN — TAZOBACTAM SODIUM AND PIPERACILLIN SODIUM 3.38 G: 375; 3 INJECTION, SOLUTION INTRAVENOUS at 17:44

## 2022-06-04 RX ADMIN — SODIUM CHLORIDE 50 ML/HR: 9 INJECTION, SOLUTION INTRAVENOUS at 17:45

## 2022-06-04 RX ADMIN — GUAIFENESIN 1200 MG: 600 TABLET, EXTENDED RELEASE ORAL at 14:42

## 2022-06-04 RX ADMIN — GUAIFENESIN 1200 MG: 600 TABLET, EXTENDED RELEASE ORAL at 21:05

## 2022-06-04 RX ADMIN — ENOXAPARIN SODIUM 40 MG: 100 INJECTION SUBCUTANEOUS at 14:38

## 2022-06-04 RX ADMIN — DONEPEZIL HYDROCHLORIDE 10 MG: 10 TABLET, FILM COATED ORAL at 21:05

## 2022-06-04 RX ADMIN — TAZOBACTAM SODIUM AND PIPERACILLIN SODIUM 3.38 G: 375; 3 INJECTION, SOLUTION INTRAVENOUS at 08:01

## 2022-06-04 RX ADMIN — INSULIN LISPRO 4 UNITS: 100 INJECTION, SOLUTION INTRAVENOUS; SUBCUTANEOUS at 13:21

## 2022-06-04 RX ADMIN — IPRATROPIUM BROMIDE AND ALBUTEROL SULFATE 3 ML: 2.5; .5 SOLUTION RESPIRATORY (INHALATION) at 11:32

## 2022-06-04 RX ADMIN — IPRATROPIUM BROMIDE AND ALBUTEROL SULFATE 3 ML: 2.5; .5 SOLUTION RESPIRATORY (INHALATION) at 20:26

## 2022-06-04 RX ADMIN — ZONISAMIDE 200 MG: 100 CAPSULE ORAL at 21:05

## 2022-06-04 RX ADMIN — TAZOBACTAM SODIUM AND PIPERACILLIN SODIUM 3.38 G: 375; 3 INJECTION, SOLUTION INTRAVENOUS at 23:39

## 2022-06-04 RX ADMIN — SODIUM CHLORIDE SOLN NEBU 7% 4 ML: 7 NEBU SOLN at 20:33

## 2022-06-04 RX ADMIN — SODIUM CHLORIDE SOLN NEBU 7% 4 ML: 7 NEBU SOLN at 07:14

## 2022-06-04 RX ADMIN — TAZOBACTAM SODIUM AND PIPERACILLIN SODIUM 3.38 G: 375; 3 INJECTION, SOLUTION INTRAVENOUS at 00:28

## 2022-06-04 RX ADMIN — IPRATROPIUM BROMIDE AND ALBUTEROL SULFATE 3 ML: 2.5; .5 SOLUTION RESPIRATORY (INHALATION) at 07:15

## 2022-06-04 RX ADMIN — SODIUM CHLORIDE 100 ML/HR: 9 INJECTION, SOLUTION INTRAVENOUS at 00:28

## 2022-06-04 NOTE — PLAN OF CARE
Goal Outcome Evaluation:  Plan of Care Reviewed With: patient        Progress: no change  Outcome Evaluation: Pt wearing 3L NC to keep sats up while sleeping. Continues on zosyn and IVF. Pulmonary consulted to see today. Pt confused at times.

## 2022-06-04 NOTE — PROGRESS NOTES
"DAILY PROGRESS NOTE  [facility]    Patient Identification:  Name: Di Macario  Age: 82 y.o.  Sex: female  :  1940  MRN: [id number]         Primary Care Physician: Lily Yepez PA-C      Subjective  Patient with no new complaints.    Objective:  General Appearance:  Comfortable, well-appearing, in no acute distress and not in pain.    Vital signs: (most recent): Blood pressure 140/64, pulse 60, temperature 97.7 °F (36.5 °C), temperature source Oral, resp. rate 18, height 160 cm (63\"), weight 66.2 kg (146 lb), SpO2 98 %.    Lungs:  Normal effort and normal respiratory rate.  She is not in respiratory distress.  No stridor.  There are rales and rhonchi.  No decreased breath sounds or wheezes.  (Basilar rales.  Loose rhonchi throughout.  She has a wet cough and again does not appear to be adequately clearing her secretions.)  Heart: Normal rate.  Regular rhythm.    Extremities: There is no dependent edema.    Neurological: Patient is alert.  (Oriented to person and place.  Pleasant and cooperative.).    Skin:  Warm and dry.                Vital signs in last 24 hours:  Temp:  [97.3 °F (36.3 °C)-98.1 °F (36.7 °C)] 97.7 °F (36.5 °C)  Heart Rate:  [59-68] 60  Resp:  [16-26] 18  BP: (117-140)/(54-64) 140/64    Intake/Output:    Intake/Output Summary (Last 24 hours) at 2022 1559  Last data filed at 2022 1323  Gross per 24 hour   Intake 680 ml   Output --   Net 680 ml         Results from last 7 days   Lab Units 22  0405 22  0404 22  0438 22  0604   WBC 10*3/mm3 11.84* 12.27* 15.73* 4.89   HEMOGLOBIN g/dL 9.5* 10.2* 10.1* 11.2*   PLATELETS 10*3/mm3 209 211 216 214     Results from last 7 days   Lab Units 22  0405 22  0404 22  0438 22  0604   SODIUM mmol/L 144 141 138 140   POTASSIUM mmol/L 3.5 3.6 4.3 3.9   CHLORIDE mmol/L 112* 107 106 106   CO2 mmol/L 19.0* 22.0 24.6 25.0   BUN mg/dL 20 24* 25* 21   CREATININE mg/dL 0.63 0.70 0.82 0.57 "   GLUCOSE mg/dL 120* 134* 120* 124*   Estimated Creatinine Clearance: 61.8 mL/min (by C-G formula based on SCr of 0.63 mg/dL).  Results from last 7 days   Lab Units 06/04/22  0405 06/03/22  0404 06/02/22  0438 06/01/22  0604   CALCIUM mg/dL 9.0 8.9 9.0 9.2   ALBUMIN g/dL  --   --   --  3.60     Results from last 7 days   Lab Units 06/01/22  0604   ALBUMIN g/dL 3.60   BILIRUBIN mg/dL <0.2   ALK PHOS U/L 128*   AST (SGOT) U/L 14   ALT (SGPT) U/L 16       Assessment:  Aspiration pneumonia/pneumonitis: Continue Zosyn, pulmonary toiletry.  Acute hypoxic respiratory failure: Secondary to above  Mucous plugging: Pulmonary toiletry.  Pulmonary consult appreciated.  Dysphagia: Modified diet.  Esophageal abnormality: Narrowing versus dysmotility.  Contributing to further aspiration risk.  GI input appreciated.  Dementia:  Diabetes type 2: Continue close monitoring sliding scale insulin.  Hypertension: Continue present regime.  Presence of cardiac pacer:      Plan:  Please see above.  Spoke with the patient as well as her daughter at bedside and a second daughter on speaker phone.  They reiterate they are interested in speaking to palliative care.  They are also interested in pursuing the endoscopy on Monday and then making an informed decision.  The patient will continue with a modified diet for the time being.  She appears to have moderate oral intake so we will turn down her IV fluid rate.    Efe Obregon MD  6/4/2022  15:59 EDT

## 2022-06-04 NOTE — PROGRESS NOTES
"DAILY PROGRESS NOTE  [facility]    Patient Identification:  Name: Di Macario  Age: 82 y.o.  Sex: female  :  1940  MRN: [id number]         Primary Care Physician: Lily Yepez PA-C      Subjective  Patient complains of feeling congested.    Objective:  General Appearance:  Comfortable, well-appearing, in no acute distress and not in pain.    Vital signs: (most recent): Blood pressure 129/54, pulse 68, temperature 97.3 °F (36.3 °C), temperature source Oral, resp. rate 18, height 160 cm (63\"), weight 66.3 kg (146 lb 3.2 oz), SpO2 91 %.    Lungs:  Normal effort and normal respiratory rate.  She is not in respiratory distress.  No stridor.  There are rales and rhonchi.  No decreased breath sounds or wheezes.  (Rales and rhonchi throughout.  Most pronounced right lower posterior lung fields.   Patient is persistently coughing but does not seem to be adequately clearing her secretions.)  Heart: Normal rate.  Regular rhythm.    Extremities: There is no dependent edema.    Neurological: Patient is alert.  (Sleeping on entering the room.  Does awaken reasonably easily.  Conversant and cooperated with exam.  Oriented to person and the fact that she is in a hospital.).    Skin:  Warm and dry.                Vital signs in last 24 hours:  Temp:  [97.3 °F (36.3 °C)-98.3 °F (36.8 °C)] 97.3 °F (36.3 °C)  Heart Rate:  [60-76] 68  Resp:  [16-20] 18  BP: (112-143)/(46-92) 129/54    Intake/Output:    Intake/Output Summary (Last 24 hours) at 6/3/2022 2013  Last data filed at 6/3/2022 1705  Gross per 24 hour   Intake 1350 ml   Output 0 ml   Net 1350 ml         Results from last 7 days   Lab Units 22  0404 22  0438 22  0604   WBC 10*3/mm3 12.27* 15.73* 4.89   HEMOGLOBIN g/dL 10.2* 10.1* 11.2*   PLATELETS 10*3/mm3 211 216 214     Results from last 7 days   Lab Units 22  0404 22  0438 22  0604   SODIUM mmol/L 141 138 140   POTASSIUM mmol/L 3.6 4.3 3.9   CHLORIDE mmol/L " 107 106 106   CO2 mmol/L 22.0 24.6 25.0   BUN mg/dL 24* 25* 21   CREATININE mg/dL 0.70 0.82 0.57   GLUCOSE mg/dL 134* 120* 124*   Estimated Creatinine Clearance: 55.8 mL/min (by C-G formula based on SCr of 0.7 mg/dL).  Results from last 7 days   Lab Units 06/03/22  0404 06/02/22  0438 06/01/22  0604   CALCIUM mg/dL 8.9 9.0 9.2   ALBUMIN g/dL  --   --  3.60     Results from last 7 days   Lab Units 06/01/22  0604   ALBUMIN g/dL 3.60   BILIRUBIN mg/dL <0.2   ALK PHOS U/L 128*   AST (SGOT) U/L 14   ALT (SGPT) U/L 16       Assessment:  Aspiration pneumonia/pneumonitis: Continue Zosyn, pulmonary toiletry.  Acute hypoxic respiratory failure: Secondary to above  Mucous plugging: Pulmonary toiletry.  Pulmonary consult.  Dysphagia: Modified diet.  Esophageal abnormality: Narrowing versus dysmotility.  Contributing to further aspiration risk.  GI input appreciated.  Dementia:  Diabetes type 2: Continue close monitoring sliding scale insulin.  Hypertension: Continue present regime.  Presence of cardiac pacer:    All problems new to this examiner.    Plan:  Please see above.    Efe Obregon MD  6/3/2022  20:13 EDT

## 2022-06-04 NOTE — CONSULTS
Patient Care Team:  Lily Yepez PA-C as PCP - General (Physician Assistant)  Madelin Ferguson MD as Consulting Physician (Cardiology)  Gigi Kaiser DO as Consulting Physician (Neurology)      Subjective     Patient is a 82 y.o. female.  We were consulted for aspiration pneumonia and mucous plugging.  Patient was admitted back on  after presenting to the emergency room with new onset of shortness of breath, cough and congestion that began about 4 AM in the morning associated with an episode of emesis.  Patient has a history of dementia, diabetes, peripheral neuropathy.  She had a CTA there was abnormal appearing esophagus and possible obstructing lesion.  Patient is sitting up in bed she clearly gets easily confused but she is very pleasant.  Her daughter is at bedside and there is another daughter that was there on the phone while I was talking to them and just apparently talked with Dr. Benoit and normally that they are not interested in any type of feeding tube.  The patient herself confirms that.  They were interested in palliative care discussion.  The patient does not like the dysphagia diet and she wants regular food and liquid and her family's inclination is to allow her to have what she wants not place feeding tube and possibly move towards palliative care.  Apparently she has been having a little bit of problems with swallowing and may be they all have not paid some much attention to it over the last few months.  She had a history of polio as a child and it affected her swallowing at that time and she has never been perfectly normal.  She is also been having more memory problems.  She is having trouble coughing up secretions not just swallowing.  She really has not had any lung problems prior to this and she was never smoker.  Apparently her health really started declining after her   a little over a year ago they had been  for 59 years.      Review of  Systems:  She not having any pain, chest pain no palpitations.  She is not nauseated.  No dysuria.  She is not coughing up any blood.  She is now having melena hematochezia or hematuria or easy bleeding or bruising      History  Past Medical History:   Diagnosis Date   • Dementia (HCC)     states better with medication    • Diabetes mellitus (HCC)    • Hyperlipidemia 1/13/2021   • Lung consolidation (HCC) 6/1/2022   • Peripheral neuropathy 8/28/2018   • Vitamin D deficiency      Past Surgical History:   Procedure Laterality Date   • ANKLE SURGERY Right    • BLADDER SURGERY      Prolapsed   • BREAST BIOPSY     • CARDIAC ELECTROPHYSIOLOGY PROCEDURE N/A 3/5/2021    Procedure: Pacemaker DC new [place];  Surgeon: Madelin Ferguson MD;  Location: [facility] INVASIVE LOC[facility];  Service: Cardiology;  Laterality: N/A;   • HYSTERECTOMY       Social History     Socioeconomic History   • Marital status:    Tobacco Use   • Smoking status: Former Smoker   • Smokeless tobacco: Never Used   Vaping Use   • Vaping Use: Never used   Substance and Sexual Activity   • Alcohol use: Yes     Comment: RARELY   • Drug use: No   • Sexual activity: Defer     Birth control/protection: Surgical     Family History   Problem Relation Age of Onset   • Hypertension Mother    • Hypertension Father          Allergies:  Patient has no known allergies.    Medications:  Prior to Admission medications    Medication Sig Start Date End Date Taking? Authorizing Provider   acetaminophen (TYLENOL) 325 MG tablet Take 650 mg by mouth Every 6 (Six) Hours As Needed for Mild Pain  or Moderate Pain .   Yes Provider, MD Julio   amLODIPine-benazepril (Lotrel) 5-20 MG per capsule Take 1 capsule by mouth Daily. 2/22/21  Yes Madelin Ferguson MD   Continuous Blood Gluc Sensor (Dexcom G6 Sensor) Dipsense Dexcom G6 Sensors, to replace every 10 days, 3 sensors per 30 day period ([facility]C #[address]-99). Check 6 times a day. Dx: E 11.65 sent to supply  store 3/17/22  Yes Lelo Pond MD   Continuous Blood Gluc Transmit (Dexcom G6 Transmitter) misc 1 each Every 3 (Three) Months. Dispense 1 Dexcom G6 Transmitter for each 3 month period (NDC #[address]-61). Check 6 times a day. Dx: E 11.65 sent to a supply store 3/17/22  Yes Lelo Pond MD   donepezil (ARICEPT) 10 MG tablet Take 1 tablet by mouth Every Night. 3/1/22  Yes Lily Yepez PA-C   Dulaglutide (Trulicity) 3 MG/0.5ML solution pen-injector Inject 0.5 mL under the skin into the appropriate area as directed 1 (One) Time Per Week. Replaces 1.5 mg weekly dose 3/17/22  Yes Lelo Pond MD   Glucagon (Gvoke HypoPen 2-Pack) 1 MG/0.2ML solution auto-injector Inject 1 mg under the skin into the appropriate area as directed As Needed (hypoglycemia). 5/6/21  Yes Mimi Ascencio APRN   glucose blood (FREESTYLE LITE) test strip Bid e11.65 2/16/22  Yes Lelo Pond MD   insulin glargine (LANTUS, SEMGLEE) 100 UNIT/ML injection Inject 26 Units under the skin into the appropriate area as directed Daily. Patient will call when needs. 3/17/22  Yes Lelo Pond MD   Insulin Pen Needle (BD Pen Needle Norma U/F) 32G X 4 MM misc Use once a day 2/23/22  Yes Lelo Pond MD   Lancets (freestyle) lancets 1 each by Other route 4 (Four) Times a Day. Use as instructed 8/12/21  Yes Mimi Ascencio APRN   multivitamin with minerals tablet tablet Take 1 tablet by mouth Daily.   Yes ProviderJulio MD   pravastatin (PRAVACHOL) 40 MG tablet Take 1 tablet by mouth Every Evening. 3/1/22  Yes Lily Yepez PA-C   Zonegran 100 MG capsule Take 2 capsules by mouth Every Night. 11/2/21  Yes Peewee Costa MD     donepezil, 10 mg, Oral, Nightly  enoxaparin, 40 mg, Subcutaneous, Q24H  famotidine, 40 mg, Oral, Daily Before Supper  guaiFENesin, 1,200 mg, Oral, Q12H  insulin lispro, 0-9 Units, Subcutaneous, TID AC  ipratropium-albuterol, 3 mL, Nebulization, 4x Daily -  "RT  piperacillin-tazobactam, 3.375 g, Intravenous, Q8H  pravastatin, 40 mg, Oral, Q PM  sodium chloride, 4 mL, Nebulization, BID - RT  zonisamide, 200 mg, Oral, Nightly      sodium chloride, 100 mL/hr, Last Rate: 100 mL/hr (06/04/22 0028)        Objective     Vital Signs  Vital Sign Min/Max for last 24 hours  Temp  Min: 97.3 °F (36.3 °C)  Max: 98.3 °F (36.8 °C)   BP  Min: 117/61  Max: 143/92   Pulse  Min: 59  Max: 68   Resp  Min: 16  Max: 26   SpO2  Min: 91 %  Max: 100 %   Flow (L/min)  Min: 2  Max: 3   Weight  Min: 66.2 kg (146 lb)  Max: 66.2 kg (146 lb)       Intake/Output Summary (Last 24 hours) at 6/4/2022 1005  Last data filed at 6/4/2022 0922  Gross per 24 hour   Intake 580 ml   Output --   Net 580 ml     I/O this shift:  In: 240 [P.O.:240]  Out: -   Last Weight and Admission Weight        06/04/22  0615   Weight: 66.2 kg (146 lb)     Flowsheet Rows    Flowsheet Row First Filed Value   Admission Height 160 cm (63\") Documented at 06/01/2022 0557   Admission Weight 65.8 kg (145 lb) Documented at 06/01/2022 0557          Body mass index is 25.86 kg/m².           Physical Exam:  General Appearance: Elderly white female she is resting in bed on 3 L nasal cannula O2 her oxygen saturations are 96 to 98%.  She does not currently feel short of breath.  Apparently she had a little episode through the night where she did get short of breath and they turned her oxygen up a little.  She is coughing she is having trouble she coughs but cannot get it out.  She sort of gags and gags to try and get any thing up.  Eyes: Conjunctiva are clear and anicteric  ENT: Mucous membranes are moist no erythema no exudates she has a Mallampati type I airway, nasal septum midline  Neck: No palpable adenopathy or thyromegaly no visible jugular venous tension and trachea midline  Lungs: She has some crackles and coarse rhonchi, rales in the bases right greater than left.  She coughs and the rhonchi move around but she cannot clear.  Chest " expansion is symmetric and nonlabored on the 3 L O2  Cardiac: Regular rate rhythm no murmur  Abdomen: Soft no palpable hepatosplenomegaly or masses  : Not examined  Musculoskeletal: Moderate thoracic kyphosis  Skin: Warm and dry no jaundice no petechiae  Neuro: She is a very pleasant.  She oriented but easily confused.  She will follow commands with all extremities  Extremities/P Vascular: No clubbing no cyanosis no edema palpable radial and dorsalis pedis pulses  MSE: She is a very pleasant lady      Labs:  Results from last 7 days   Lab Units 06/04/22 0405 06/03/22 0404 06/02/22 0438 06/01/22  0604   GLUCOSE mg/dL 120* 134* 120* 124*   SODIUM mmol/L 144 141 138 140   POTASSIUM mmol/L 3.5 3.6 4.3 3.9   CO2 mmol/L 19.0* 22.0 24.6 25.0   CHLORIDE mmol/L 112* 107 106 106   ANION GAP mmol/L 13.0 12.0 7.4 9.0   CREATININE mg/dL 0.63 0.70 0.82 0.57   BUN mg/dL 20 24* 25* 21   BUN / CREAT RATIO  31.7* 34.3* 30.5* 36.8*   CALCIUM mg/dL 9.0 8.9 9.0 9.2   ALK PHOS U/L  --   --   --  128*   TOTAL PROTEIN g/dL  --   --   --  6.3   ALT (SGPT) U/L  --   --   --  16   AST (SGOT) U/L  --   --   --  14   BILIRUBIN mg/dL  --   --   --  <0.2   ALBUMIN g/dL  --   --   --  3.60   GLOBULIN gm/dL  --   --   --  2.7     Estimated Creatinine Clearance: 61.8 mL/min (by C-G formula based on SCr of 0.63 mg/dL).      Results from last 7 days   Lab Units 06/04/22 0405 06/03/22 0404 06/02/22 0438 06/01/22  0604   WBC 10*3/mm3 11.84* 12.27* 15.73* 4.89   RBC 10*6/mm3 3.36* 3.57* 3.59* 3.92   HEMOGLOBIN g/dL 9.5* 10.2* 10.1* 11.2*   HEMATOCRIT % 29.5* 31.3* 31.1* 34.4   MCV fL 87.8 87.7 86.6 87.8   MCH pg 28.3 28.6 28.1 28.6   MCHC g/dL 32.2 32.6 32.5 32.6   RDW % 14.6 14.5 14.6 14.6   RDW-SD fl 47.2 46.4 46.3 46.8   MPV fL 10.7 10.9 11.2 10.5   PLATELETS 10*3/mm3 209 211 216 214   NEUTROPHIL % % 86.1*  --   --  66.3   LYMPHOCYTE % % 6.2*  --   --  27.4   MONOCYTES % % 5.7  --   --  5.3   EOSINOPHIL % % 0.9  --   --  0.4   BASOPHIL % % 0.3   --   --  0.4   IMM GRAN % % 0.8*  --   --  0.2   NEUTROS ABS 10*3/mm3 10.20*  --   --  3.24   LYMPHS ABS 10*3/mm3 0.73  --   --  1.34   MONOS ABS 10*3/mm3 0.67  --   --  0.26   EOS ABS 10*3/mm3 0.11  --   --  0.02   BASOS ABS 10*3/mm3 0.04  --   --  0.02   IMMATURE GRANS (ABS) 10*3/mm3 0.09*  --   --  0.01   NRBC /100 WBC 0.0  --   --  0.0         Results from last 7 days   Lab Units 06/01/22  0604   TROPONIN T ng/mL <0.010     Results from last 7 days   Lab Units 06/01/22  0604   PROBNP pg/mL 178.0         Results from last 7 days   Lab Units 06/04/22  0405 06/02/22  0438 06/01/22  0604   PROCALCITONIN ng/mL 5.75* 24.00* 0.08         Microbiology Results (last 10 days)     Procedure Component Value - Date/Time    Respiratory Panel PCR w/COVID-19(SARS-CoV-2) [facility] In-House, NP Swab in UTM/VTM, 3-4 HR TAT - Swab, Nasopharynx [[id number]]  (Normal) Collected: 06/01/22 0644    Lab Status: Final result Specimen: Swab from Nasopharynx Updated: 06/01/22 0743     ADENOVIRUS, PCR Not Detected     Coronavirus 229E Not Detected     Coronavirus HKU1 Not Detected     Coronavirus NL63 Not Detected     Coronavirus OC43 Not Detected     COVID19 Not Detected     Human Metapneumovirus Not Detected     Human Rhinovirus/Enterovirus Not Detected     Influenza A PCR Not Detected     Influenza B PCR Not Detected     Parainfluenza Virus 1 Not Detected     Parainfluenza Virus 2 Not Detected     Parainfluenza Virus 3 Not Detected     Parainfluenza Virus 4 Not Detected     RSV, PCR Not Detected     Bordetella pertussis pcr Not Detected     Bordetella parapertussis PCR Not Detected     Chlamydophila pneumoniae PCR Not Detected     Mycoplasma pneumo by PCR Not Detected    Narrative:      In the setting of a positive respiratory panel with a viral infection PLUS a negative procalcitonin without other underlying concern for bacterial infection, consider observing off antibiotics or discontinuation of antibiotics and  continue supportive care. If the respiratory panel is positive for atypical bacterial infection (Bordetella pertussis, Chlamydophila pneumoniae, or Mycoplasma pneumoniae), consider antibiotic de-escalation to target atypical bacterial infection.            Diagnostics:  FL Video Swallow With Speech Single Contrast    Result Date: 6/3/2022  VIDEO SWALLOWING EXAMINATION BY SPEECH PATHOLOGY  Clinical: Dysphasia  Video swallowing examination performed under the direction of speech pathology. Imaging reviewed by radiologist who concurs with the findings.  Speech pathology summary:Radiologist present: Dr. Mckeon. Penetration observed with thins and nectar. Moderate-severe pharyngeal residue post swallow with solids. Pt at risk for aspiration from residue.  FLUOROSCOPY TIME: 3 minutes 10 seconds, 5367 images.  This report was finalized on 6/3/2022 2:07 PM by Dr. Teja Mckeon M.D.      US Liver    Result Date: 6/2/2022  Examination: Liver sonogram  HISTORY: 82-year-old female with a history of elevated alkaline phosphatase level  FINDINGS: Sonographic evaluation of the liver and selected structures of the right upper quadrant was performed. Comparison is made to a CT of the abdomen with contrast dated 03/01/2021.  The right kidney has a normal appearance.  Normal echotexture is seen throughout the liver without evidence for focal abnormality. No intrahepatic bile duct dilatation is appreciated. The liver measures on the order of 14.5 cm in anterior to posterior dimension. The portal vein is patent with hepatopedal flow. There is no intrahepatic bile duct dilatation. Minimal sludge within the gallbladder is noted. No gallbladder wall thickening or pericholecystic fluid is appreciated. Per the sonographer, the patient was nontender in the right upper quadrant during the examination. The common bile duct measures 0.6 cm in diameter. The visualized portion of the pancreas appears normal. A small right pleural effusion is  noted.      1. Normal sonographic appearance of the liver. 2. Minimal gallbladder sludge. 3. Small right pleural effusion.  This report was finalized on 6/2/2022 9:38 AM by Dr. Hai Oh M.D.      XR Chest 1 View    Result Date: 6/1/2022  XR CHEST 1 VW-clinical: Shortness of breath  COMPARISON 03/05/2021  FINDINGS: The cardiomediastinal silhouette is stable, transvenous pacemaker in position as before. Lungs clear. No effusion or edema identified. Healing right shoulder fracture  CONCLUSION: No active disease of the chest  This report was finalized on 6/1/2022 6:55 AM by Dr. Teja Mckeon M.D.      CT Angiogram Chest    Result Date: 6/1/2022  CT ANGIOGRAM CHEST-  Radiation dose reduction techniques were utilized, including automated exposure control and exposure modulation based on body size.  Clinical: New onset shortness of breath, congestion, cough, rule out pulmonary embolus  CT scan of the chest performed with intravenous contrast, pulmonary embolus protocol to include coronal, sagittal and three-dimensional reconstruction.  FINDINGS: No pulmonary embolus. Atherosclerotic calcification of a normal diameter aorta. There is coronary artery calcification with cardiac enlargement, pacer leads within the right heart. No pericardial effusion is demonstrated.  Proximal and mid esophagus mildly distended, portion containing an air-fluid level consistent with distal dysmotility or obstruction, the distal esophagus is collapsed, wall cannot be well evaluated.  Multifocal areas of groundglass infiltrate demonstrated within both lungs having a basilar predominance. Associated interstitial infiltrates, with some nodular areas of consolidation, most pronounced in the right lower lobe. Suspect atypical pneumonia or aspiration. Covid pneumonia not excluded.  Bilateral areas of bronchial wall thickening with mucous plugging most pronounced in the right lower lobe.  No pleural effusion or pulmonary edema. Few mildly  enlarged mediastinal and hilar lymph nodes seen. There is an old right humerus fracture.  CONCLUSION: 1. Airspace disease with bronchial wall thickening and areas of mucous plugging as described above. 2. No pulmonary embolus, aortic aneurysm or dissection. 3. Cardiomegaly. 4. Abnormal esophagus, the appearance suggests distal dysmotility or obstructing lesion.     This report was finalized on 6/1/2022 10:53 AM by Dr. Teja Mckeon M.D.      Results for orders placed in visit on 01/13/21    Adult Transthoracic Echo Complete W/ Cont if Necessary Per Protocol    Interpretation Summary  · Calculated left ventricular EF = 66.8% Estimated left ventricular EF was in agreement with the calculated left ventricular EF.  · Left ventricular diastolic function was normal.  · Calculated left ventricular EF = 66.8%  · There is no evidence of pericardial effusion. .      Viewed CT angiogram no definite PE bilateral pulmonary infiltrates right greater than left.  There is a little bit of mucus material plugging 1 or 2 subsegmental level bronchi in the right lower lobe    Assessment & Plan     1. Aspiration pneumonia with retained secretions/mucous plugging.  I agree with bronchodilators, hypertonic saline, I will add some chest physiotherapy but I think the biggest issue is that she is probably ongoingly aspirating and that is probably what happened this morning and of course.  Clearing secretions could be quite a problem for patients with dysphagia and I think she is experiencing that as well.  Usually this does not get better in somebody with dementia and just worsens.  The family seems to understand this well.  I think the patient even has some understanding of this.  2. Acute hypoxic respiratory failure wean O2 as tolerated  3. Oropharyngeal dysphagia moderately severe per speech therapy  4. Esophageal dysmotility possibly possibly obstructing mass.  Needs EGD when improved.  GI following  5. Dementia  6. Diabetes mellitus  type 2  7. Hypertension  8. Peripheral neuropathy  9. Presence of permanent pacemaker    It sounds like the patient and family have made some decisions.  She does not want a feeding tube ever she does not really like the dysphagia diet and would really like to go back to a regular diet.  It sounds like and they have asked if I could get the palliative care team to come talk with them.  I think that that is probably a good idea.  I went ahead and took the liberty of consulting the palliative care nurse      Kurt Royal Jr, MD  06/04/22  10:05 EDT    Time:

## 2022-06-04 NOTE — PLAN OF CARE
Goal Outcome Evaluation:  Plan of Care Reviewed With: patient        Progress: improving  Outcome Evaluation: AOx4. Up with assist x1 to bathroom. Pt having very small bm's. NTL diet as recommended. Pt having difficulty accepting totality of it. During evening meal, pt drinking more of diet. Palliative consult placed, awaiting discussion. Pt lung sounds remain coarse. This nurse recommended that the pt remain sitting up at least 45-60 degrees. Daughter at bedside. VSS. Safety maintained.

## 2022-06-04 NOTE — PROGRESS NOTES
[facility]  Inpatient Progress Note    Reason for Follow Up:  Dysphagia, abnormal esophagram    Subjective     Interval History:   Speech therapy notes reviewed, appears to be significant oropharyngeal component to her dysphagia.  She feels more congested today.  On 4 L nasal cannula currently.  Drinking thickened juice.    Her daughter, who is her POA, was present during evaluation.      Current Facility-Administered Medications:   •  acetaminophen (TYLENOL) tablet 650 mg, 650 mg, Oral, Q4H PRN **OR** acetaminophen (TYLENOL) 160 MG/5ML solution 650 mg, 650 mg, Oral, Q4H PRN **OR** acetaminophen (TYLENOL) suppository 650 mg, 650 mg, Rectal, Q4H PRN, Cassie Roy APRN  •  dextrose (D50W) (25 g/50 mL) IV injection 25 g, 25 g, Intravenous, Q15 Min PRN, Cassie Roy APRN  •  dextrose (GLUTOSE) oral gel 15 g, 15 g, Oral, Q15 Min PRN, Cassie Roy APRN  •  donepezil (ARICEPT) tablet 10 mg, 10 mg, Oral, Nightly, Cassie Roy APRN, 10 mg at 06/03/22 2025  •  Enoxaparin Sodium (LOVENOX) syringe 40 mg, 40 mg, Subcutaneous, Q24H, Cassie Roy APRN, 40 mg at 06/03/22 1410  •  famotidine (PEPCID) tablet 40 mg, 40 mg, Oral, Daily Before Supper, Shanita Hendrix PA-C, 40 mg at 06/03/22 1841  •  glucagon (human recombinant) (GLUCAGEN DIAGNOSTIC) injection 1 mg, 1 mg, Subcutaneous, Q15 Min PRN, Cassie Roy APRN  •  guaiFENesin (MUCINEX) 12 hr tablet 1,200 mg, 1,200 mg, Oral, Q12H, Cassie Roy APRN, 1,200 mg at 06/03/22 2025  •  insulin lispro (ADMELOG) injection 0-9 Units, 0-9 Units, Subcutaneous, TID AC, Cassie Roy APRN, 4 Units at 06/03/22 1841  •  ipratropium-albuterol (DUO-NEB) nebulizer solution 3 mL, 3 mL, Nebulization, 4x Daily - RT, Cassie Roy APRN, 3 mL at 06/04/22 0715  •  nitroglycerin (NITROSTAT) SL tablet 0.4 mg, 0.4 mg, Sublingual, Q5 Min PRN, Cassie Roy, IAIN  •  ondansetron (ZOFRAN)  tablet 4 mg, 4 mg, Oral, Q6H PRN, 4 mg at 06/03/22 1841 **OR** ondansetron (ZOFRAN) injection 4 mg, 4 mg, Intravenous, Q6H PRN, Cassie Roy APRN  •  piperacillin-tazobactam (ZOSYN) 3.375 g in iso-osmotic dextrose 50 ml (premix), 3.375 g, Intravenous, Q8H, Suresh Wang MD, 3.375 g at 06/04/22 0801  •  pravastatin (PRAVACHOL) tablet 40 mg, 40 mg, Oral, Q PM, Cassie Roy APRN, 40 mg at 06/03/22 1842  •  sodium chloride 0.9 % infusion, 100 mL/hr, Intravenous, Continuous, Suresh Wang MD, Last Rate: 100 mL/hr at 06/04/22 0028, 100 mL/hr at 06/04/22 0028  •  sodium chloride 7 % nebulizer solution nebulizer solution 4 mL, 4 mL, Nebulization, BID - RT, Suresh Wang MD, 4 mL at 06/04/22 0714  •  zonisamide (ZONEGRAN) capsule 200 mg, 200 mg, Oral, Nightly, Cassie Roy APRN, 200 mg at 06/03/22 2022  Review of Systems:   All systems reviewed and negative except for: Pulmonary: Congestion, cough      Objective     Vital Signs  Temp:  [97.3 °F (36.3 °C)-98.3 °F (36.8 °C)] 98.1 °F (36.7 °C)  Heart Rate:  [59-68] 62  Resp:  [16-26] 26  BP: (117-143)/(54-92) 138/58  Body mass index is 25.86 kg/m².    Intake/Output Summary (Last 24 hours) at 6/4/2022 0905  Last data filed at 6/3/2022 1705  Gross per 24 hour   Intake 340 ml   Output --   Net 340 ml     No intake/output data recorded.     Physical Exam:   General: patient awake, alert and cooperative   Eyes: Normal lids and lashes, no scleral icterus   Neck: supple, normal ROM   Skin: warm and dry, not jaundiced   Cardiovascular: regular rhythm and rate, no murmurs auscultated   Pulm: Coarse on auscultation bilaterally, on nasal cannula, productive sounding cough   Abdomen: soft, nontender, nondistended; normal bowel sounds   Rectal: deferred   Extremities: no rash or edema   Psychiatric: Normal mood and behavior; poor historian     Results Review:     I reviewed the patient's new clinical results.    Results  from last 7 days   Lab Units 06/04/22  0405 06/03/22  0404 06/02/22  0438   WBC 10*3/mm3 11.84* 12.27* 15.73*   HEMOGLOBIN g/dL 9.5* 10.2* 10.1*   HEMATOCRIT % 29.5* 31.3* 31.1*   PLATELETS 10*3/mm3 209 211 216     Results from last 7 days   Lab Units 06/04/22  0405 06/03/22  0404 06/02/22  0438 06/01/22  0604   SODIUM mmol/L 144 141 138 140   POTASSIUM mmol/L 3.5 3.6 4.3 3.9   CHLORIDE mmol/L 112* 107 106 106   CO2 mmol/L 19.0* 22.0 24.6 25.0   BUN mg/dL 20 24* 25* 21   CREATININE mg/dL 0.63 0.70 0.82 0.57   CALCIUM mg/dL 9.0 8.9 9.0 9.2   BILIRUBIN mg/dL  --   --   --  <0.2   ALK PHOS U/L  --   --   --  128*   ALT (SGPT) U/L  --   --   --  16   AST (SGOT) U/L  --   --   --  14   GLUCOSE mg/dL 120* 134* 120* 124*         Lab Results   Lab Value Date/Time    LIPASE 59 06/01/2022 0604    LIPASE 163 (H) 03/01/2021 1225       Radiology:  FL Video Swallow With Speech Single Contrast   Final Result      US Liver   Final Result   1. Normal sonographic appearance of the liver.   2. Minimal gallbladder sludge.   3. Small right pleural effusion.       This report was finalized on 6/2/2022 9:38 AM by Dr. Hai Oh M.D.          CT Angiogram Chest   Final Result      XR Chest 1 View   Final Result          Assessment & Plan     Patient Active Problem List   Diagnosis   • Essential hypertension   • Impaired memory   • Vitamin D deficiency   • Hyperuricemia   • Slow transit constipation   • Chronic pain of left knee   • Fall   • At high risk for falls   • Peripheral neuropathy   • Uncontrolled type 2 diabetes mellitus with hyperglycemia (HCC)   • Alkaline phosphatase elevation   • Walker as ambulation aid   • Bradycardia, sinus   • Shortness of breath   • Leg swelling   • Hyperlipidemia   • Presence of cardiac pacemaker   • Humeral head fracture, right, closed, initial encounter   • Type 2 diabetes mellitus, with long-term current use of insulin (HCC)   • Nail abnormalities   • Acute hypoxemic respiratory failure (HCC)    • Esophageal abnormality   • Dementia without behavioral disturbance (HCC)   • Lung consolidation (HCC)   • Aspiration pneumonia due to vomitus (HCC)       Impression  1.  Chronic dysphagia: Speech evaluation identifies significant oropharyngeal component; imaging suggestive of esophageal motility versus obstruction also    2.  Abnormal imaging of the esophagus on CT scan: Appearance of dysmotility, possible obstruction at the distal esophagus    3.  Hypoxic respiratory failure    4.  Aspiration pneumonia: Suspected majority due to oropharyngeal dysphagia    Plan  Continue diet as per speech  Discussed with patient and her daughter, patient not interested in feeding tube; we discussed both CORTRAK and PEG tube placement  Patient and family are still interested in EGD to assess abnormal CT imaging of the esophagus.  Her respiratory status is still little tenuous, she sounds quite congested today so will likely defer this to 6/6  Discussed possibility of palliative care/hospice.  They are familiar with this from patient's  experience last year      I discussed the patients findings and my recommendations with patient and family.    All necessary PPE, including face mask and eye protection, were worn during this encounter.  Hand sanitization was performed both before and after the patient interaction.    Over 25 minutes was spent reviewing the patient's chart and records, in discussion with the patient, in examination of the patient, and in discussion with members of the patient's medical team.    Mayela Benoit MD

## 2022-06-05 LAB
ANION GAP SERPL CALCULATED.3IONS-SCNC: 13 MMOL/L (ref 5–15)
BUN SERPL-MCNC: 14 MG/DL (ref 8–23)
BUN/CREAT SERPL: 24.6 (ref 7–25)
CALCIUM SPEC-SCNC: 9 MG/DL (ref 8.6–10.5)
CHLORIDE SERPL-SCNC: 109 MMOL/L (ref 98–107)
CO2 SERPL-SCNC: 21 MMOL/L (ref 22–29)
CREAT SERPL-MCNC: 0.57 MG/DL (ref 0.57–1)
EGFRCR SERPLBLD CKD-EPI 2021: 90.3 ML/MIN/1.73
GLUCOSE BLDC GLUCOMTR-MCNC: 128 MG/DL (ref 70–130)
GLUCOSE BLDC GLUCOMTR-MCNC: 131 MG/DL (ref 70–130)
GLUCOSE BLDC GLUCOMTR-MCNC: 162 MG/DL (ref 70–130)
GLUCOSE BLDC GLUCOMTR-MCNC: 176 MG/DL (ref 70–130)
GLUCOSE SERPL-MCNC: 166 MG/DL (ref 65–99)
POTASSIUM SERPL-SCNC: 3.2 MMOL/L (ref 3.5–5.2)
SODIUM SERPL-SCNC: 143 MMOL/L (ref 136–145)

## 2022-06-05 PROCEDURE — 63710000001 INSULIN LISPRO (HUMAN) PER 5 UNITS: Performed by: NURSE PRACTITIONER

## 2022-06-05 PROCEDURE — 80048 BASIC METABOLIC PNL TOTAL CA: CPT | Performed by: HOSPITALIST

## 2022-06-05 PROCEDURE — 94799 UNLISTED PULMONARY SVC/PX: CPT

## 2022-06-05 PROCEDURE — 94668 MNPJ CHEST WALL SBSQ: CPT

## 2022-06-05 PROCEDURE — 94669 MECHANICAL CHEST WALL OSCILL: CPT

## 2022-06-05 PROCEDURE — 99232 SBSQ HOSP IP/OBS MODERATE 35: CPT | Performed by: INTERNAL MEDICINE

## 2022-06-05 PROCEDURE — 25010000002 ENOXAPARIN PER 10 MG: Performed by: NURSE PRACTITIONER

## 2022-06-05 PROCEDURE — 82962 GLUCOSE BLOOD TEST: CPT

## 2022-06-05 PROCEDURE — 25010000002 PIPERACILLIN SOD-TAZOBACTAM PER 1 G: Performed by: INTERNAL MEDICINE

## 2022-06-05 RX ORDER — POTASSIUM CHLORIDE 1.5 G/1.77G
40 POWDER, FOR SOLUTION ORAL ONCE
Status: COMPLETED | OUTPATIENT
Start: 2022-06-05 | End: 2022-06-05

## 2022-06-05 RX ORDER — SODIUM CHLORIDE, SODIUM LACTATE, POTASSIUM CHLORIDE, CALCIUM CHLORIDE 600; 310; 30; 20 MG/100ML; MG/100ML; MG/100ML; MG/100ML
30 INJECTION, SOLUTION INTRAVENOUS CONTINUOUS
Status: DISCONTINUED | OUTPATIENT
Start: 2022-06-05 | End: 2022-06-06

## 2022-06-05 RX ADMIN — ENOXAPARIN SODIUM 40 MG: 100 INJECTION SUBCUTANEOUS at 14:34

## 2022-06-05 RX ADMIN — INSULIN LISPRO 2 UNITS: 100 INJECTION, SOLUTION INTRAVENOUS; SUBCUTANEOUS at 09:35

## 2022-06-05 RX ADMIN — SODIUM CHLORIDE SOLN NEBU 7% 4 ML: 7 NEBU SOLN at 19:57

## 2022-06-05 RX ADMIN — TAZOBACTAM SODIUM AND PIPERACILLIN SODIUM 3.38 G: 375; 3 INJECTION, SOLUTION INTRAVENOUS at 16:49

## 2022-06-05 RX ADMIN — SODIUM CHLORIDE SOLN NEBU 7% 4 ML: 7 NEBU SOLN at 06:59

## 2022-06-05 RX ADMIN — PRAVASTATIN SODIUM 40 MG: 40 TABLET ORAL at 16:49

## 2022-06-05 RX ADMIN — DONEPEZIL HYDROCHLORIDE 10 MG: 10 TABLET, FILM COATED ORAL at 21:06

## 2022-06-05 RX ADMIN — ZONISAMIDE 200 MG: 100 CAPSULE ORAL at 21:05

## 2022-06-05 RX ADMIN — IPRATROPIUM BROMIDE AND ALBUTEROL SULFATE 3 ML: 2.5; .5 SOLUTION RESPIRATORY (INHALATION) at 06:58

## 2022-06-05 RX ADMIN — FAMOTIDINE 40 MG: 20 TABLET ORAL at 16:49

## 2022-06-05 RX ADMIN — IPRATROPIUM BROMIDE AND ALBUTEROL SULFATE 3 ML: 2.5; .5 SOLUTION RESPIRATORY (INHALATION) at 19:56

## 2022-06-05 RX ADMIN — GUAIFENESIN 1200 MG: 600 TABLET, EXTENDED RELEASE ORAL at 21:06

## 2022-06-05 RX ADMIN — TAZOBACTAM SODIUM AND PIPERACILLIN SODIUM 3.38 G: 375; 3 INJECTION, SOLUTION INTRAVENOUS at 09:35

## 2022-06-05 RX ADMIN — GUAIFENESIN 1200 MG: 600 TABLET, EXTENDED RELEASE ORAL at 09:35

## 2022-06-05 RX ADMIN — IPRATROPIUM BROMIDE AND ALBUTEROL SULFATE 3 ML: 2.5; .5 SOLUTION RESPIRATORY (INHALATION) at 15:17

## 2022-06-05 RX ADMIN — POTASSIUM CHLORIDE 40 MEQ: 1.5 POWDER, FOR SOLUTION ORAL at 16:49

## 2022-06-05 NOTE — PROGRESS NOTES
"DAILY PROGRESS NOTE  Psychiatric    Patient Identification:  Name: Di Macario  Age: 82 y.o.  Sex: female  :  1940  MRN: [id number]         Primary Care Physician: Lily Yepez PA-C      Subjective  No new or acute complaint.    Objective:  General Appearance:  Comfortable, well-appearing, in no acute distress and not in pain.    Vital signs: (most recent): Blood pressure 161/68, pulse 59, temperature 97.8 °F (36.6 °C), temperature source Oral, resp. rate 20, height 160 cm (63\"), weight 67.6 kg (149 lb), SpO2 93 %.    Lungs:  Normal effort and normal respiratory rate.  Breath sounds clear to auscultation.    Heart: Normal rate.  Regular rhythm.    Extremities: There is no dependent edema.    Neurological: Patient is alert and oriented to person, place and time.    Skin:  Warm and dry.                Vital signs in last 24 hours:  Temp:  [97.7 °F (36.5 °C)-98.1 °F (36.7 °C)] 97.8 °F (36.6 °C)  Heart Rate:  [59-72] 59  Resp:  [16-20] 20  BP: (132-161)/(61-70) 161/68    Intake/Output:    Intake/Output Summary (Last 24 hours) at 2022 1518  Last data filed at 2022 0900  Gross per 24 hour   Intake 240 ml   Output --   Net 240 ml         Results from last 7 days   Lab Units 22  0604   WBC 10*3/mm3 11.84* 12.27* 15.73* 4.89   HEMOGLOBIN g/dL 9.5* 10.2* 10.1* 11.2*   PLATELETS 10*3/mm3 209 211 216 214     Results from last 7 days   Lab Units 22  04322  0604   SODIUM mmol/L 143 144 141 138 140   POTASSIUM mmol/L 3.2* 3.5 3.6 4.3 3.9   CHLORIDE mmol/L 109* 112* 107 106 106   CO2 mmol/L 21.0* 19.0* 22.0 24.6 25.0   BUN mg/dL 14 20 24* 25* 21   CREATININE mg/dL 0.57 0.63 0.70 0.82 0.57   GLUCOSE mg/dL 166* 120* 134* 120* 124*   Estimated Creatinine Clearance: 69.1 mL/min (by C-G formula based on SCr of 0.57 mg/dL).  Results from last 7 days   Lab Units 22  0405 " 06/04/22  0405 06/03/22  0404 06/02/22  0438 06/01/22  0604   CALCIUM mg/dL 9.0 9.0 8.9 9.0 9.2   ALBUMIN g/dL  --   --   --   --  3.60     Results from last 7 days   Lab Units 06/01/22  0604   ALBUMIN g/dL 3.60   BILIRUBIN mg/dL <0.2   ALK PHOS U/L 128*   AST (SGOT) U/L 14   ALT (SGPT) U/L 16       Assessment:  Aspiration pneumonia/pneumonitis: Continue Zosyn, pulmonary toiletry.  Acute hypoxic respiratory failure: Secondary to above.  Improving  Mucous plugging: Pulmonary toiletry.  Pulmonary consult appreciated.  Dysphagia: Modified diet.  Esophageal abnormality: Narrowing versus dysmotility.  Contributing to further aspiration risk.  GI input appreciated.  EGD in a.m.  Dementia:  Diabetes type 2: Continue close monitoring sliding scale insulin.  Hypertension: Continue present regime.  Presence of cardiac pacer:  Hypokalemia: Oral replacement.      Plan:  Please see above.    Efe Obregon MD  6/5/2022  15:18 EDT

## 2022-06-05 NOTE — PROGRESS NOTES
[facility]  Inpatient Progress Note    Reason for Follow Up:  Dysphagia, abnormal esophagram    Subjective     Interval History:   Feels better today. Remains on 4 L NC      Current Facility-Administered Medications:   •  acetaminophen (TYLENOL) tablet 650 mg, 650 mg, Oral, Q4H PRN **OR** acetaminophen (TYLENOL) 160 MG/5ML solution 650 mg, 650 mg, Oral, Q4H PRN **OR** acetaminophen (TYLENOL) suppository 650 mg, 650 mg, Rectal, Q4H PRN, Cassie Roy APRN  •  dextrose (D50W) (25 g/50 mL) IV injection 25 g, 25 g, Intravenous, Q15 Min PRN, Cassie Roy APRN  •  dextrose (GLUTOSE) oral gel 15 g, 15 g, Oral, Q15 Min PRN, Cassie Roy APRN  •  donepezil (ARICEPT) tablet 10 mg, 10 mg, Oral, Nightly, Cassie Roy APRN, 10 mg at 06/04/22 2105  •  Enoxaparin Sodium (LOVENOX) syringe 40 mg, 40 mg, Subcutaneous, Q24H, Cassie Roy APRN, 40 mg at 06/04/22 1438  •  famotidine (PEPCID) tablet 40 mg, 40 mg, Oral, Daily Before Supper, Shanita Hendrix PA-C, 40 mg at 06/04/22 1744  •  glucagon (human recombinant) (GLUCAGEN DIAGNOSTIC) injection 1 mg, 1 mg, Subcutaneous, Q15 Min PRN, Cassie Roy APRN  •  guaiFENesin (MUCINEX) 12 hr tablet 1,200 mg, 1,200 mg, Oral, Q12H, Cassie Roy APRN, 1,200 mg at 06/05/22 0935  •  insulin lispro (ADMELOG) injection 0-9 Units, 0-9 Units, Subcutaneous, TID AC, Cassie Roy APRN, 2 Units at 06/05/22 0935  •  ipratropium-albuterol (DUO-NEB) nebulizer solution 3 mL, 3 mL, Nebulization, 4x Daily - RT, Cassie Roy APRN, 3 mL at 06/05/22 0658  •  nitroglycerin (NITROSTAT) SL tablet 0.4 mg, 0.4 mg, Sublingual, Q5 Min PRN, Cassie Roy APRN  •  ondansetron (ZOFRAN) tablet 4 mg, 4 mg, Oral, Q6H PRN, 4 mg at 06/03/22 1841 **OR** ondansetron (ZOFRAN) injection 4 mg, 4 mg, Intravenous, Q6H PRN, Cassie Roy APRN  •  piperacillin-tazobactam (ZOSYN) 3.375 g in iso-osmotic  dextrose 50 ml (premix), 3.375 g, Intravenous, Q8H, Suresh Wang MD, 3.375 g at 06/05/22 0935  •  pravastatin (PRAVACHOL) tablet 40 mg, 40 mg, Oral, Q PM, Cassie Roy APRN, 40 mg at 06/04/22 1744  •  sodium chloride 0.9 % infusion, 50 mL/hr, Intravenous, Continuous, Efe Obregon MD, Last Rate: 50 mL/hr at 06/04/22 1745, 50 mL/hr at 06/04/22 1745  •  sodium chloride 7 % nebulizer solution nebulizer solution 4 mL, 4 mL, Nebulization, BID - RT, Suresh Wang MD, 4 mL at 06/05/22 0659  •  zonisamide (ZONEGRAN) capsule 200 mg, 200 mg, Oral, Nightly, Cassie Roy APRN, 200 mg at 06/04/22 2105  Review of Systems:   All systems reviewed and negative except for: Pulmonary: Congestion, cough      Objective     Vital Signs  Temp:  [97.7 °F (36.5 °C)-98.1 °F (36.7 °C)] 98.1 °F (36.7 °C)  Heart Rate:  [59-72] 59  Resp:  [16-18] 18  BP: (132-153)/(61-70) 148/61  Body mass index is 26.39 kg/m².    Intake/Output Summary (Last 24 hours) at 6/5/2022 1337  Last data filed at 6/5/2022 0900  Gross per 24 hour   Intake 240 ml   Output --   Net 240 ml     I/O this shift:  In: 120 [P.O.:120]  Out: -      Physical Exam:   General: patient awake, alert and cooperative   Eyes: Normal lids and lashes, no scleral icterus   Neck: supple, normal ROM   Skin: warm and dry, not jaundiced   Cardiovascular: regular rhythm and rate, no murmurs auscultated   Pulm: Coarse and diminished on auscultation bilaterally, on nasal cannula, productive sounding  cough   Abdomen: soft, nontender, nondistended; normal bowel sounds   Rectal: deferred   Extremities: no rash or edema   Psychiatric: Normal mood and behavior; poor historian     Results Review:     I reviewed the patient's new clinical results.    Results from last 7 days   Lab Units 06/04/22  0405 06/03/22  0404 06/02/22  0438   WBC 10*3/mm3 11.84* 12.27* 15.73*   HEMOGLOBIN g/dL 9.5* 10.2* 10.1*   HEMATOCRIT % 29.5* 31.3* 31.1*   PLATELETS  10*3/mm3 209 211 216     Results from last 7 days   Lab Units 06/05/22  0405 06/04/22  0405 06/03/22  0404 06/02/22  0438 06/01/22  0604   SODIUM mmol/L 143 144 141   < > 140   POTASSIUM mmol/L 3.2* 3.5 3.6   < > 3.9   CHLORIDE mmol/L 109* 112* 107   < > 106   CO2 mmol/L 21.0* 19.0* 22.0   < > 25.0   BUN mg/dL 14 20 24*   < > 21   CREATININE mg/dL 0.57 0.63 0.70   < > 0.57   CALCIUM mg/dL 9.0 9.0 8.9   < > 9.2   BILIRUBIN mg/dL  --   --   --   --  <0.2   ALK PHOS U/L  --   --   --   --  128*   ALT (SGPT) U/L  --   --   --   --  16   AST (SGOT) U/L  --   --   --   --  14   GLUCOSE mg/dL 166* 120* 134*   < > 124*    < > = values in this interval not displayed.         Lab Results   Lab Value Date/Time    LIPASE 59 06/01/2022 0604    LIPASE 163 (H) 03/01/2021 1225       Radiology:  FL Video Swallow With Speech Single Contrast   Final Result      US Liver   Final Result   1. Normal sonographic appearance of the liver.   2. Minimal gallbladder sludge.   3. Small right pleural effusion.       This report was finalized on 6/2/2022 9:38 AM by Dr. Hai Oh M.D.          CT Angiogram Chest   Final Result      XR Chest 1 View   Final Result          Assessment & Plan     Patient Active Problem List   Diagnosis   • Essential hypertension   • Impaired memory   • Vitamin D deficiency   • Hyperuricemia   • Slow transit constipation   • Chronic pain of left knee   • Fall   • At high risk for falls   • Peripheral neuropathy   • Uncontrolled type 2 diabetes mellitus with hyperglycemia (HCC)   • Alkaline phosphatase elevation   • Walker as ambulation aid   • Bradycardia, sinus   • Shortness of breath   • Leg swelling   • Hyperlipidemia   • Presence of cardiac pacemaker   • Humeral head fracture, right, closed, initial encounter   • Type 2 diabetes mellitus, with long-term current use of insulin (HCC)   • Nail abnormalities   • Acute hypoxemic respiratory failure (HCC)   • Esophageal abnormality   • Dementia without behavioral  disturbance (HCC)   • Lung consolidation (HCC)   • Aspiration pneumonia due to vomitus (HCC)       Impression  1.  Chronic dysphagia: Speech evaluation identifies significant oropharyngeal component; imaging suggestive of esophageal motility versus obstruction also    2.  Abnormal imaging of the esophagus on CT scan: Appearance of dysmotility, possible obstruction at the distal esophagus    3.  Hypoxic respiratory failure    4.  Aspiration pneumonia: Suspected majority due to oropharyngeal dysphagia    Plan  Continue diet as per speech, NPO at MN  Pulmonary has cleared for EGD tomorrow; patient and family understand that this will not be without risk but her pulmonary status is about as good as it is going to get  Likely will make further decisions regarding possibility of palliative care after results of the scope      I discussed the patients findings and my recommendations with patient and family.    All necessary PPE, including face mask and eye protection, were worn during this encounter.  Hand sanitization was performed both before and after the patient interaction.    Over 25 minutes was spent reviewing the patient's chart and records, in discussion with the patient, in examination of the patient, and in discussion with members of the patient's medical team.    Mayela Benoit MD

## 2022-06-05 NOTE — PLAN OF CARE
Goal Outcome Evaluation:  Plan of Care Reviewed With: patient        Progress: improving  Outcome Evaluation: AOx4. Remains on 3L O2 NC. IS given to pt and instructed on how to use it. Pt not able to hold in deep breath long enough to reach green smiley face. Wound care done on LLE. Pt spent 1 couple of hours in recliner today. EGD planned for tomorrow. NPO after mn. VSS. Safety maintained.

## 2022-06-05 NOTE — PROGRESS NOTES
LOS: 4 days   Patient Care Team:  Lily Yepez PA-C as PCP - General (Physician Assistant)  Madelin Ferguson MD as Consulting Physician (Cardiology)  Gigi Kaiser DO as Consulting Physician (Neurology)    Subjective   Following for aspiration pneumonia and respiratory failure.  Patient sitting up in recliner family members at bedside.  She does not note any acute distress.  She is almost constantly clearing her throat or trying to clear her throat has a wet voice and throat clear.    Review of Systems:          Objective     Vital Signs  Vital Sign Min/Max for last 24 hours  Temp  Min: 97.7 °F (36.5 °C)  Max: 98.1 °F (36.7 °C)   BP  Min: 132/63  Max: 153/70   Pulse  Min: 59  Max: 72   Resp  Min: 16  Max: 18   SpO2  Min: 92 %  Max: 98 %   Flow (L/min)  Min: 3  Max: 4   Weight  Min: 67.6 kg (149 lb)  Max: 67.6 kg (149 lb)        Ventilator/Non-Invasive Ventilation Settings (From admission, onward)            None                       Body mass index is 26.39 kg/m².  I/O last 3 completed shifts:  In: 680 [P.O.:680]  Out: -   I/O this shift:  In: 120 [P.O.:120]  Out: -         Physical Exam:    General Appearance: Elderly white female she is resting in bed on 3 L nasal cannula O2 her oxygen saturations are 96 %.  She does not currently feel short of breath. .  Eyes: Conjunctiva are clear and anicteric  ENT: Mucous membranes are moist no erythema no exudates she has a Mallampati type I airway, nasal septum midline, her soft palate does not elevate symmetrically does not look like the right side lifts quite as well as the left  Neck: No palpable adenopathy or thyromegaly no visible jugular venous distension and trachea midline  Lungs: She has some crackles and coarse rhonchi, rales in the bases right greater than left.  She coughs and the rhonchi move around but she cannot clear.  Chest expansion is symmetric and nonlabored on the 3 L O2  Cardiac: Regular rate rhythm no murmur  Abdomen: Soft no palpable  hepatosplenomegaly or masses  : Not examined  Musculoskeletal: Moderate thoracic kyphosis  Skin: Warm and dry no jaundice no petechiae  Neuro: She is a very pleasant.  She oriented but easily confused.  She will follow commands with all extremities  Extremities/P Vascular: No clubbing no cyanosis no edema palpable radial and dorsalis pedis pulses  MSE: She is a very pleasant lady     Labs:  Results from last 7 days   Lab Units 06/05/22 0405 06/04/22 0405 06/03/22 0404 06/02/22 0438 06/01/22  0604   GLUCOSE mg/dL 166* 120* 134* 120* 124*   SODIUM mmol/L 143 144 141 138 140   POTASSIUM mmol/L 3.2* 3.5 3.6 4.3 3.9   CO2 mmol/L 21.0* 19.0* 22.0 24.6 25.0   CHLORIDE mmol/L 109* 112* 107 106 106   ANION GAP mmol/L 13.0 13.0 12.0 7.4 9.0   CREATININE mg/dL 0.57 0.63 0.70 0.82 0.57   BUN mg/dL 14 20 24* 25* 21   BUN / CREAT RATIO  24.6 31.7* 34.3* 30.5* 36.8*   CALCIUM mg/dL 9.0 9.0 8.9 9.0 9.2   ALK PHOS U/L  --   --   --   --  128*   TOTAL PROTEIN g/dL  --   --   --   --  6.3   ALT (SGPT) U/L  --   --   --   --  16   AST (SGOT) U/L  --   --   --   --  14   BILIRUBIN mg/dL  --   --   --   --  <0.2   ALBUMIN g/dL  --   --   --   --  3.60   GLOBULIN gm/dL  --   --   --   --  2.7     Estimated Creatinine Clearance: 69.1 mL/min (by C-G formula based on SCr of 0.57 mg/dL).      Results from last 7 days   Lab Units 06/04/22 0405 06/03/22 0404 06/02/22 0438 06/01/22  0604   WBC 10*3/mm3 11.84* 12.27* 15.73* 4.89   RBC 10*6/mm3 3.36* 3.57* 3.59* 3.92   HEMOGLOBIN g/dL 9.5* 10.2* 10.1* 11.2*   HEMATOCRIT % 29.5* 31.3* 31.1* 34.4   MCV fL 87.8 87.7 86.6 87.8   MCH pg 28.3 28.6 28.1 28.6   MCHC g/dL 32.2 32.6 32.5 32.6   RDW % 14.6 14.5 14.6 14.6   RDW-SD fl 47.2 46.4 46.3 46.8   MPV fL 10.7 10.9 11.2 10.5   PLATELETS 10*3/mm3 209 211 216 214   NEUTROPHIL % % 86.1*  --   --  66.3   LYMPHOCYTE % % 6.2*  --   --  27.4   MONOCYTES % % 5.7  --   --  5.3   EOSINOPHIL % % 0.9  --   --  0.4   BASOPHIL % % 0.3  --   --  0.4   IMM  GRAN % % 0.8*  --   --  0.2   NEUTROS ABS 10*3/mm3 10.20*  --   --  3.24   LYMPHS ABS 10*3/mm3 0.73  --   --  1.34   MONOS ABS 10*3/mm3 0.67  --   --  0.26   EOS ABS 10*3/mm3 0.11  --   --  0.02   BASOS ABS 10*3/mm3 0.04  --   --  0.02   IMMATURE GRANS (ABS) 10*3/mm3 0.09*  --   --  0.01   NRBC /100 WBC 0.0  --   --  0.0         Results from last 7 days   Lab Units 06/01/22  0604   TROPONIN T ng/mL <0.010     Results from last 7 days   Lab Units 06/01/22  0604   PROBNP pg/mL 178.0         Results from last 7 days   Lab Units 06/04/22  0405 06/02/22  0438 06/01/22  0604   PROCALCITONIN ng/mL 5.75* 24.00* 0.08         Microbiology Results (last 10 days)     Procedure Component Value - Date/Time    Respiratory Panel PCR w/COVID-19(SARS-CoV-2) [facility] In-House, NP Swab in UTM/VTM, 3-4 HR TAT - Swab, Nasopharynx [[id number]]  (Normal) Collected: 06/01/22 0644    Lab Status: Final result Specimen: Swab from Nasopharynx Updated: 06/01/22 0743     ADENOVIRUS, PCR Not Detected     Coronavirus 229E Not Detected     Coronavirus HKU1 Not Detected     Coronavirus NL63 Not Detected     Coronavirus OC43 Not Detected     COVID19 Not Detected     Human Metapneumovirus Not Detected     Human Rhinovirus/Enterovirus Not Detected     Influenza A PCR Not Detected     Influenza B PCR Not Detected     Parainfluenza Virus 1 Not Detected     Parainfluenza Virus 2 Not Detected     Parainfluenza Virus 3 Not Detected     Parainfluenza Virus 4 Not Detected     RSV, PCR Not Detected     Bordetella pertussis pcr Not Detected     Bordetella parapertussis PCR Not Detected     Chlamydophila pneumoniae PCR Not Detected     Mycoplasma pneumo by PCR Not Detected    Narrative:      In the setting of a positive respiratory panel with a viral infection PLUS a negative procalcitonin without other underlying concern for bacterial infection, consider observing off antibiotics or discontinuation of antibiotics and continue supportive care.  If the respiratory panel is positive for atypical bacterial infection (Bordetella pertussis, Chlamydophila pneumoniae, or Mycoplasma pneumoniae), consider antibiotic de-escalation to target atypical bacterial infection.              donepezil, 10 mg, Oral, Nightly  enoxaparin, 40 mg, Subcutaneous, Q24H  famotidine, 40 mg, Oral, Daily Before Supper  guaiFENesin, 1,200 mg, Oral, Q12H  insulin lispro, 0-9 Units, Subcutaneous, TID AC  ipratropium-albuterol, 3 mL, Nebulization, 4x Daily - RT  piperacillin-tazobactam, 3.375 g, Intravenous, Q8H  pravastatin, 40 mg, Oral, Q PM  sodium chloride, 4 mL, Nebulization, BID - RT  zonisamide, 200 mg, Oral, Nightly      sodium chloride, 50 mL/hr, Last Rate: 50 mL/hr (06/04/22 4083)        Diagnostics:  FL Video Swallow With Speech Single Contrast    Result Date: 6/3/2022  VIDEO SWALLOWING EXAMINATION BY SPEECH PATHOLOGY  Clinical: Dysphasia  Video swallowing examination performed under the direction of speech pathology. Imaging reviewed by radiologist who concurs with the findings.  Speech pathology summary:Radiologist present: Dr. Mckeon. Penetration observed with thins and nectar. Moderate-severe pharyngeal residue post swallow with solids. Pt at risk for aspiration from residue.  FLUOROSCOPY TIME: 3 minutes 10 seconds, 5367 images.  This report was finalized on 6/3/2022 2:07 PM by Dr. Teja Mckeon M.D.      US Liver    Result Date: 6/2/2022  Examination: Liver sonogram  HISTORY: 82-year-old female with a history of elevated alkaline phosphatase level  FINDINGS: Sonographic evaluation of the liver and selected structures of the right upper quadrant was performed. Comparison is made to a CT of the abdomen with contrast dated 03/01/2021.  The right kidney has a normal appearance.  Normal echotexture is seen throughout the liver without evidence for focal abnormality. No intrahepatic bile duct dilatation is appreciated. The liver measures on the order of 14.5 cm in anterior to  posterior dimension. The portal vein is patent with hepatopedal flow. There is no intrahepatic bile duct dilatation. Minimal sludge within the gallbladder is noted. No gallbladder wall thickening or pericholecystic fluid is appreciated. Per the sonographer, the patient was nontender in the right upper quadrant during the examination. The common bile duct measures 0.6 cm in diameter. The visualized portion of the pancreas appears normal. A small right pleural effusion is noted.      1. Normal sonographic appearance of the liver. 2. Minimal gallbladder sludge. 3. Small right pleural effusion.  This report was finalized on 6/2/2022 9:38 AM by Dr. Hai Oh M.D.      XR Chest 1 View    Result Date: 6/1/2022  XR CHEST 1 VW-clinical: Shortness of breath  COMPARISON 03/05/2021  FINDINGS: The cardiomediastinal silhouette is stable, transvenous pacemaker in position as before. Lungs clear. No effusion or edema identified. Healing right shoulder fracture  CONCLUSION: No active disease of the chest  This report was finalized on 6/1/2022 6:55 AM by Dr. Teja Mckeon M.D.      CT Angiogram Chest    Result Date: 6/1/2022  CT ANGIOGRAM CHEST-  Radiation dose reduction techniques were utilized, including automated exposure control and exposure modulation based on body size.  Clinical: New onset shortness of breath, congestion, cough, rule out pulmonary embolus  CT scan of the chest performed with intravenous contrast, pulmonary embolus protocol to include coronal, sagittal and three-dimensional reconstruction.  FINDINGS: No pulmonary embolus. Atherosclerotic calcification of a normal diameter aorta. There is coronary artery calcification with cardiac enlargement, pacer leads within the right heart. No pericardial effusion is demonstrated.  Proximal and mid esophagus mildly distended, portion containing an air-fluid level consistent with distal dysmotility or obstruction, the distal esophagus is collapsed, wall cannot be well  evaluated.  Multifocal areas of groundglass infiltrate demonstrated within both lungs having a basilar predominance. Associated interstitial infiltrates, with some nodular areas of consolidation, most pronounced in the right lower lobe. Suspect atypical pneumonia or aspiration. Covid pneumonia not excluded.  Bilateral areas of bronchial wall thickening with mucous plugging most pronounced in the right lower lobe.  No pleural effusion or pulmonary edema. Few mildly enlarged mediastinal and hilar lymph nodes seen. There is an old right humerus fracture.  CONCLUSION: 1. Airspace disease with bronchial wall thickening and areas of mucous plugging as described above. 2. No pulmonary embolus, aortic aneurysm or dissection. 3. Cardiomegaly. 4. Abnormal esophagus, the appearance suggests distal dysmotility or obstructing lesion.     This report was finalized on 6/1/2022 10:53 AM by Dr. Teja Mckeon M.D.      Results for orders placed in visit on 01/13/21    Adult Transthoracic Echo Complete W/ Cont if Necessary Per Protocol    Interpretation Summary  · Calculated left ventricular EF = 66.8% Estimated left ventricular EF was in agreement with the calculated left ventricular EF.  · Left ventricular diastolic function was normal.  · Calculated left ventricular EF = 66.8%  · There is no evidence of pericardial effusion. .          Active Hospital Problems    Diagnosis  POA   • **Aspiration pneumonia due to vomitus (HCC) [J69.0]  Unknown   • Acute hypoxemic respiratory failure (HCC) [J96.01]  Yes   • Esophageal abnormality [K22.9]  Yes   • Dementia without behavioral disturbance (HCC) [F03.90]  Yes   • Lung consolidation (HCC) [J18.1]  Yes   • Type 2 diabetes mellitus, with long-term current use of insulin (HCC) [E11.9, Z79.4]  Not Applicable   • Presence of cardiac pacemaker [Z95.0]  Yes   • Essential hypertension [I10]  Yes      Resolved Hospital Problems   No resolved problems to display.         Assessment & Plan      1. Aspiration pneumonia with retained secretions/mucous plugging.  I agree with bronchodilators, hypertonic saline, I will add some chest physiotherapy but I think the biggest issue is that she is probably ongoingly aspirating and that is probably what happened this morning and of course.  Clearing secretions could be quite a problem for patients with dysphagia and I think she is experiencing that as well.  Usually this does not get better in somebody with dementia and just worsens.  The family seems to understand this well.  I think the patient even has some understanding of this.  Debra tells me they plan to meet with palliative care tomorrow after EGD so they have the results of the EGD.  2. Acute hypoxic respiratory failure wean O2 as tolerated  3. Oropharyngeal dysphagia moderately severe per speech therapy  4. Esophageal dysmotility possibly possibly obstructing mass.  Needs EGD .  GI following.  From pulmonary standpoint I think she is okay for an EGD.  She has some increased risk but I do not think we can modify that significantly.  5. Dementia  6. Diabetes mellitus type 2  7. Hypertension  8. Peripheral neuropathy  9. Presence of permanent pacemaker      Plan for disposition:    Kurt Royal Jr, MD  06/05/22  11:57 EDT    Time:

## 2022-06-05 NOTE — NURSING NOTE
I spoke with patient at bedside and daughter Blossom by phone with patient's permission. Both are interested in palliative care meeting, but would like to wait until after the EGD. Palliative care team will continue to be available when family is ready.

## 2022-06-05 NOTE — PLAN OF CARE
Goal Outcome Evaluation:  Plan of Care Reviewed With: patient        Progress: no change  Outcome Evaluation: Pt now on 4L NC to keep sats above 90%. Pt eager to have diet changed to regular thins. Continues on IV abx and fluids. SOA with exertion. Lungs remain coarse. Safety maintained.

## 2022-06-06 ENCOUNTER — ANESTHESIA (OUTPATIENT)
Dept: GASTROENTEROLOGY | Facility: HOSPITAL | Age: 82
End: 2022-06-06

## 2022-06-06 ENCOUNTER — ANESTHESIA EVENT (OUTPATIENT)
Dept: GASTROENTEROLOGY | Facility: HOSPITAL | Age: 82
End: 2022-06-06

## 2022-06-06 LAB
ANION GAP SERPL CALCULATED.3IONS-SCNC: 16 MMOL/L (ref 5–15)
BUN SERPL-MCNC: 10 MG/DL (ref 8–23)
BUN/CREAT SERPL: 18.9 (ref 7–25)
CALCIUM SPEC-SCNC: 9.1 MG/DL (ref 8.6–10.5)
CHLORIDE SERPL-SCNC: 105 MMOL/L (ref 98–107)
CO2 SERPL-SCNC: 21 MMOL/L (ref 22–29)
CREAT SERPL-MCNC: 0.53 MG/DL (ref 0.57–1)
EGFRCR SERPLBLD CKD-EPI 2021: 91.9 ML/MIN/1.73
GLUCOSE BLDC GLUCOMTR-MCNC: 140 MG/DL (ref 70–130)
GLUCOSE BLDC GLUCOMTR-MCNC: 157 MG/DL (ref 70–130)
GLUCOSE BLDC GLUCOMTR-MCNC: 177 MG/DL (ref 70–130)
GLUCOSE SERPL-MCNC: 143 MG/DL (ref 65–99)
POTASSIUM SERPL-SCNC: 3.5 MMOL/L (ref 3.5–5.2)
SARS-COV-2 RNA RESP QL NAA+PROBE: NOT DETECTED
SODIUM SERPL-SCNC: 142 MMOL/L (ref 136–145)

## 2022-06-06 PROCEDURE — 43239 EGD BIOPSY SINGLE/MULTIPLE: CPT | Performed by: INTERNAL MEDICINE

## 2022-06-06 PROCEDURE — 43249 ESOPH EGD DILATION <30 MM: CPT | Performed by: INTERNAL MEDICINE

## 2022-06-06 PROCEDURE — 0DB78ZX EXCISION OF STOMACH, PYLORUS, VIA NATURAL OR ARTIFICIAL OPENING ENDOSCOPIC, DIAGNOSTIC: ICD-10-PCS | Performed by: INTERNAL MEDICINE

## 2022-06-06 PROCEDURE — 88305 TISSUE EXAM BY PATHOLOGIST: CPT | Performed by: INTERNAL MEDICINE

## 2022-06-06 PROCEDURE — 94799 UNLISTED PULMONARY SVC/PX: CPT

## 2022-06-06 PROCEDURE — 0D758ZZ DILATION OF ESOPHAGUS, VIA NATURAL OR ARTIFICIAL OPENING ENDOSCOPIC: ICD-10-PCS | Performed by: INTERNAL MEDICINE

## 2022-06-06 PROCEDURE — U0005 INFEC AGEN DETEC AMPLI PROBE: HCPCS | Performed by: INTERNAL MEDICINE

## 2022-06-06 PROCEDURE — C1726 CATH, BAL DIL, NON-VASCULAR: HCPCS | Performed by: INTERNAL MEDICINE

## 2022-06-06 PROCEDURE — U0003 INFECTIOUS AGENT DETECTION BY NUCLEIC ACID (DNA OR RNA); SEVERE ACUTE RESPIRATORY SYNDROME CORONAVIRUS 2 (SARS-COV-2) (CORONAVIRUS DISEASE [COVID-19]), AMPLIFIED PROBE TECHNIQUE, MAKING USE OF HIGH THROUGHPUT TECHNOLOGIES AS DESCRIBED BY CMS-2020-01-R: HCPCS | Performed by: INTERNAL MEDICINE

## 2022-06-06 PROCEDURE — 25010000002 PIPERACILLIN SOD-TAZOBACTAM PER 1 G: Performed by: INTERNAL MEDICINE

## 2022-06-06 PROCEDURE — 80048 BASIC METABOLIC PNL TOTAL CA: CPT | Performed by: HOSPITALIST

## 2022-06-06 PROCEDURE — 25010000002 AMPICILLIN-SULBACTAM PER 1.5 G: Performed by: INTERNAL MEDICINE

## 2022-06-06 PROCEDURE — 82962 GLUCOSE BLOOD TEST: CPT

## 2022-06-06 PROCEDURE — 25010000002 PROPOFOL 10 MG/ML EMULSION: Performed by: ANESTHESIOLOGY

## 2022-06-06 PROCEDURE — C1726 CATH, BAL DIL, NON-VASCULAR: HCPCS

## 2022-06-06 PROCEDURE — 63710000001 INSULIN LISPRO (HUMAN) PER 5 UNITS: Performed by: NURSE PRACTITIONER

## 2022-06-06 PROCEDURE — 25010000002 ENOXAPARIN PER 10 MG: Performed by: NURSE PRACTITIONER

## 2022-06-06 PROCEDURE — 94664 DEMO&/EVAL PT USE INHALER: CPT

## 2022-06-06 PROCEDURE — 94669 MECHANICAL CHEST WALL OSCILL: CPT

## 2022-06-06 RX ORDER — LIDOCAINE HYDROCHLORIDE 20 MG/ML
INJECTION, SOLUTION INFILTRATION; PERINEURAL AS NEEDED
Status: DISCONTINUED | OUTPATIENT
Start: 2022-06-06 | End: 2022-06-06 | Stop reason: SURG

## 2022-06-06 RX ORDER — PROPOFOL 10 MG/ML
VIAL (ML) INTRAVENOUS CONTINUOUS PRN
Status: DISCONTINUED | OUTPATIENT
Start: 2022-06-06 | End: 2022-06-06 | Stop reason: SURG

## 2022-06-06 RX ORDER — PROPOFOL 10 MG/ML
VIAL (ML) INTRAVENOUS AS NEEDED
Status: DISCONTINUED | OUTPATIENT
Start: 2022-06-06 | End: 2022-06-06 | Stop reason: SURG

## 2022-06-06 RX ORDER — SODIUM CHLORIDE 0.9 % (FLUSH) 0.9 %
10 SYRINGE (ML) INJECTION AS NEEDED
Status: DISCONTINUED | OUTPATIENT
Start: 2022-06-06 | End: 2022-06-06 | Stop reason: HOSPADM

## 2022-06-06 RX ORDER — LIDOCAINE HYDROCHLORIDE 10 MG/ML
0.5 INJECTION, SOLUTION INFILTRATION; PERINEURAL ONCE AS NEEDED
Status: DISCONTINUED | OUTPATIENT
Start: 2022-06-06 | End: 2022-06-06 | Stop reason: HOSPADM

## 2022-06-06 RX ORDER — FAMOTIDINE 40 MG/1
40 TABLET, FILM COATED ORAL
Status: DISCONTINUED | OUTPATIENT
Start: 2022-06-06 | End: 2022-06-09 | Stop reason: HOSPADM

## 2022-06-06 RX ORDER — SODIUM CHLORIDE 9 MG/ML
1000 INJECTION, SOLUTION INTRAVENOUS CONTINUOUS
Status: DISCONTINUED | OUTPATIENT
Start: 2022-06-06 | End: 2022-06-06

## 2022-06-06 RX ADMIN — AMPICILLIN SODIUM AND SULBACTAM SODIUM 3 G: 2; 1 INJECTION, POWDER, FOR SOLUTION INTRAMUSCULAR; INTRAVENOUS at 21:59

## 2022-06-06 RX ADMIN — FAMOTIDINE 40 MG: 40 TABLET, FILM COATED ORAL at 17:32

## 2022-06-06 RX ADMIN — LIDOCAINE HYDROCHLORIDE 50 MG: 20 INJECTION, SOLUTION INFILTRATION; PERINEURAL at 11:12

## 2022-06-06 RX ADMIN — SODIUM CHLORIDE SOLN NEBU 7% 4 ML: 7 NEBU SOLN at 07:38

## 2022-06-06 RX ADMIN — PROPOFOL 50 MG: 10 INJECTION, EMULSION INTRAVENOUS at 11:20

## 2022-06-06 RX ADMIN — DONEPEZIL HYDROCHLORIDE 10 MG: 10 TABLET, FILM COATED ORAL at 20:39

## 2022-06-06 RX ADMIN — SODIUM CHLORIDE 1000 ML: 9 INJECTION, SOLUTION INTRAVENOUS at 11:00

## 2022-06-06 RX ADMIN — INSULIN LISPRO 2 UNITS: 100 INJECTION, SOLUTION INTRAVENOUS; SUBCUTANEOUS at 17:32

## 2022-06-06 RX ADMIN — IPRATROPIUM BROMIDE AND ALBUTEROL SULFATE 3 ML: 2.5; .5 SOLUTION RESPIRATORY (INHALATION) at 12:33

## 2022-06-06 RX ADMIN — SODIUM CHLORIDE, POTASSIUM CHLORIDE, SODIUM LACTATE AND CALCIUM CHLORIDE 30 ML/HR: 600; 310; 30; 20 INJECTION, SOLUTION INTRAVENOUS at 09:22

## 2022-06-06 RX ADMIN — PRAVASTATIN SODIUM 40 MG: 40 TABLET ORAL at 16:36

## 2022-06-06 RX ADMIN — TAZOBACTAM SODIUM AND PIPERACILLIN SODIUM 3.38 G: 375; 3 INJECTION, SOLUTION INTRAVENOUS at 01:17

## 2022-06-06 RX ADMIN — PROPOFOL 50 MG: 10 INJECTION, EMULSION INTRAVENOUS at 11:13

## 2022-06-06 RX ADMIN — AMPICILLIN SODIUM AND SULBACTAM SODIUM 3 G: 2; 1 INJECTION, POWDER, FOR SOLUTION INTRAMUSCULAR; INTRAVENOUS at 16:53

## 2022-06-06 RX ADMIN — GUAIFENESIN 1200 MG: 600 TABLET, EXTENDED RELEASE ORAL at 20:37

## 2022-06-06 RX ADMIN — ZONISAMIDE 200 MG: 100 CAPSULE ORAL at 20:37

## 2022-06-06 RX ADMIN — IPRATROPIUM BROMIDE AND ALBUTEROL SULFATE 3 ML: 2.5; .5 SOLUTION RESPIRATORY (INHALATION) at 07:38

## 2022-06-06 RX ADMIN — TAZOBACTAM SODIUM AND PIPERACILLIN SODIUM 3.38 G: 375; 3 INJECTION, SOLUTION INTRAVENOUS at 09:26

## 2022-06-06 RX ADMIN — Medication 125 MCG/KG/MIN: at 11:13

## 2022-06-06 RX ADMIN — ENOXAPARIN SODIUM 40 MG: 100 INJECTION SUBCUTANEOUS at 16:46

## 2022-06-06 RX ADMIN — SODIUM CHLORIDE 50 ML/HR: 9 INJECTION, SOLUTION INTRAVENOUS at 01:18

## 2022-06-06 NOTE — PROGRESS NOTES
LOS: 5 days   Patient Care Team:  Lily Yepez PA-C as PCP - General (Physician Assistant)  Madelin Ferguson MD as Consulting Physician (Cardiology)  Gigi Kaiser DO as Consulting Physician (Neurology)    Subjective   Following for aspiration pneumonia and respiratory failure.  Patient sitting up in bed.  Multiple family members at bedside.  She does not appear in acute distress.  She still clearing her throat frequently.  She had an EGD had benign appearing esophageal stricture that was dilated. not having any chest pain.    Review of Systems:          Objective     Vital Signs  Vital Sign Min/Max for last 24 hours  Temp  Min: 97.1 °F (36.2 °C)  Max: 97.9 °F (36.6 °C)   BP  Min: 161/65  Max: 182/73   Pulse  Min: 60  Max: 109   Resp  Min: 20  Max: 24   SpO2  Min: 90 %  Max: 100 %   Flow (L/min)  Min: 2  Max: 3   Weight  Min: 67.2 kg (148 lb 3.2 oz)  Max: 67.2 kg (148 lb 3.2 oz)        Ventilator/Non-Invasive Ventilation Settings (From admission, onward)            None                       Body mass index is 26.25 kg/m².  I/O last 3 completed shifts:  In: 240 [P.O.:240]  Out: -   No intake/output data recorded.        Physical Exam:    General Appearance: Elderly white female she is resting in bed on 3 L nasal cannula O2 her oxygen saturations are 98 %.  She does not currently feel short of breath. .  Eyes: Conjunctiva are clear and anicteric  ENT: Mucous membranes are moist no erythema no exudates she has a Mallampati type I airway, nasal septum midline, her soft palate does not elevate symmetrically does not look like the right side lifts quite as well as the left  Neck: No palpable adenopathy or thyromegaly no visible jugular venous distension and trachea midline  Lungs:  Lungs sound much better today.  Most of the crackles and rales of resolved.  No rhonchi.  Nonlabored chest expansion  Cardiac: Regular rate rhythm no murmur  Abdomen: Soft no palpable hepatosplenomegaly or masses  : Not  examined  Musculoskeletal: Moderate thoracic kyphosis  Skin: Warm and dry no jaundice no petechiae  Neuro: She is a very pleasant.  She oriented but easily confused.  She will follow commands with all extremities  Extremities/P Vascular: No clubbing no cyanosis no edema palpable radial and dorsalis pedis pulses  MSE: She is a very pleasant lady     Labs:  Results from last 7 days   Lab Units 06/06/22  0526 06/05/22 0405 06/04/22 0405 06/03/22 0404 06/02/22 0438 06/01/22  0604   GLUCOSE mg/dL 143* 166* 120* 134* 120* 124*   SODIUM mmol/L 142 143 144 141 138 140   POTASSIUM mmol/L 3.5 3.2* 3.5 3.6 4.3 3.9   CO2 mmol/L 21.0* 21.0* 19.0* 22.0 24.6 25.0   CHLORIDE mmol/L 105 109* 112* 107 106 106   ANION GAP mmol/L 16.0* 13.0 13.0 12.0 7.4 9.0   CREATININE mg/dL 0.53* 0.57 0.63 0.70 0.82 0.57   BUN mg/dL 10 14 20 24* 25* 21   BUN / CREAT RATIO  18.9 24.6 31.7* 34.3* 30.5* 36.8*   CALCIUM mg/dL 9.1 9.0 9.0 8.9 9.0 9.2   ALK PHOS U/L  --   --   --   --   --  128*   TOTAL PROTEIN g/dL  --   --   --   --   --  6.3   ALT (SGPT) U/L  --   --   --   --   --  16   AST (SGOT) U/L  --   --   --   --   --  14   BILIRUBIN mg/dL  --   --   --   --   --  <0.2   ALBUMIN g/dL  --   --   --   --   --  3.60   GLOBULIN gm/dL  --   --   --   --   --  2.7     Estimated Creatinine Clearance: 74 mL/min (A) (by C-G formula based on SCr of 0.53 mg/dL (L)).      Results from last 7 days   Lab Units 06/04/22 0405 06/03/22 0404 06/02/22 0438 06/01/22  0604   WBC 10*3/mm3 11.84* 12.27* 15.73* 4.89   RBC 10*6/mm3 3.36* 3.57* 3.59* 3.92   HEMOGLOBIN g/dL 9.5* 10.2* 10.1* 11.2*   HEMATOCRIT % 29.5* 31.3* 31.1* 34.4   MCV fL 87.8 87.7 86.6 87.8   MCH pg 28.3 28.6 28.1 28.6   MCHC g/dL 32.2 32.6 32.5 32.6   RDW % 14.6 14.5 14.6 14.6   RDW-SD fl 47.2 46.4 46.3 46.8   MPV fL 10.7 10.9 11.2 10.5   PLATELETS 10*3/mm3 209 211 216 214   NEUTROPHIL % % 86.1*  --   --  66.3   LYMPHOCYTE % % 6.2*  --   --  27.4   MONOCYTES % % 5.7  --   --  5.3    EOSINOPHIL % % 0.9  --   --  0.4   BASOPHIL % % 0.3  --   --  0.4   IMM GRAN % % 0.8*  --   --  0.2   NEUTROS ABS 10*3/mm3 10.20*  --   --  3.24   LYMPHS ABS 10*3/mm3 0.73  --   --  1.34   MONOS ABS 10*3/mm3 0.67  --   --  0.26   EOS ABS 10*3/mm3 0.11  --   --  0.02   BASOS ABS 10*3/mm3 0.04  --   --  0.02   IMMATURE GRANS (ABS) 10*3/mm3 0.09*  --   --  0.01   NRBC /100 WBC 0.0  --   --  0.0         Results from last 7 days   Lab Units 06/01/22  0604   TROPONIN T ng/mL <0.010     Results from last 7 days   Lab Units 06/01/22  0604   PROBNP pg/mL 178.0         Results from last 7 days   Lab Units 06/04/22  0405 06/02/22  0438 06/01/22  0604   PROCALCITONIN ng/mL 5.75* 24.00* 0.08         Microbiology Results (last 10 days)     Procedure Component Value - Date/Time    Respiratory Panel PCR w/COVID-19(SARS-CoV-2) [facility] In-House, NP Swab in UTM/VTM, 3-4 HR TAT - Swab, Nasopharynx [[id number]]  (Normal) Collected: 06/01/22 0644    Lab Status: Final result Specimen: Swab from Nasopharynx Updated: 06/01/22 0743     ADENOVIRUS, PCR Not Detected     Coronavirus 229E Not Detected     Coronavirus HKU1 Not Detected     Coronavirus NL63 Not Detected     Coronavirus OC43 Not Detected     COVID19 Not Detected     Human Metapneumovirus Not Detected     Human Rhinovirus/Enterovirus Not Detected     Influenza A PCR Not Detected     Influenza B PCR Not Detected     Parainfluenza Virus 1 Not Detected     Parainfluenza Virus 2 Not Detected     Parainfluenza Virus 3 Not Detected     Parainfluenza Virus 4 Not Detected     RSV, PCR Not Detected     Bordetella pertussis pcr Not Detected     Bordetella parapertussis PCR Not Detected     Chlamydophila pneumoniae PCR Not Detected     Mycoplasma pneumo by PCR Not Detected    Narrative:      In the setting of a positive respiratory panel with a viral infection PLUS a negative procalcitonin without other underlying concern for bacterial infection, consider observing off  antibiotics or discontinuation of antibiotics and continue supportive care. If the respiratory panel is positive for atypical bacterial infection (Bordetella pertussis, Chlamydophila pneumoniae, or Mycoplasma pneumoniae), consider antibiotic de-escalation to target atypical bacterial infection.              donepezil, 10 mg, Oral, Nightly  enoxaparin, 40 mg, Subcutaneous, Q24H  famotidine, 40 mg, Oral, Daily Before Supper  guaiFENesin, 1,200 mg, Oral, Q12H  insulin lispro, 0-9 Units, Subcutaneous, TID AC  ipratropium-albuterol, 3 mL, Nebulization, 4x Daily - RT  piperacillin-tazobactam, 3.375 g, Intravenous, Q8H  pravastatin, 40 mg, Oral, Q PM  sodium chloride, 4 mL, Nebulization, BID - RT  zonisamide, 200 mg, Oral, Nightly      lactated ringers, 30 mL/hr, Last Rate: 30 mL/hr (06/06/22 0922)  sodium chloride, 50 mL/hr, Last Rate: Stopped (06/06/22 0926)        Diagnostics:  FL Video Swallow With Speech Single Contrast    Result Date: 6/3/2022  VIDEO SWALLOWING EXAMINATION BY SPEECH PATHOLOGY  Clinical: Dysphasia  Video swallowing examination performed under the direction of speech pathology. Imaging reviewed by radiologist who concurs with the findings.  Speech pathology summary:Radiologist present: Dr. Mckeon. Penetration observed with thins and nectar. Moderate-severe pharyngeal residue post swallow with solids. Pt at risk for aspiration from residue.  FLUOROSCOPY TIME: 3 minutes 10 seconds, 5367 images.  This report was finalized on 6/3/2022 2:07 PM by Dr. Teja Mckeon M.D.      US Liver    Result Date: 6/2/2022  Examination: Liver sonogram  HISTORY: 82-year-old female with a history of elevated alkaline phosphatase level  FINDINGS: Sonographic evaluation of the liver and selected structures of the right upper quadrant was performed. Comparison is made to a CT of the abdomen with contrast dated 03/01/2021.  The right kidney has a normal appearance.  Normal echotexture is seen throughout the liver without  evidence for focal abnormality. No intrahepatic bile duct dilatation is appreciated. The liver measures on the order of 14.5 cm in anterior to posterior dimension. The portal vein is patent with hepatopedal flow. There is no intrahepatic bile duct dilatation. Minimal sludge within the gallbladder is noted. No gallbladder wall thickening or pericholecystic fluid is appreciated. Per the sonographer, the patient was nontender in the right upper quadrant during the examination. The common bile duct measures 0.6 cm in diameter. The visualized portion of the pancreas appears normal. A small right pleural effusion is noted.      1. Normal sonographic appearance of the liver. 2. Minimal gallbladder sludge. 3. Small right pleural effusion.  This report was finalized on 6/2/2022 9:38 AM by Dr. Hai Oh M.D.      XR Chest 1 View    Result Date: 6/1/2022  XR CHEST 1 VW-clinical: Shortness of breath  COMPARISON 03/05/2021  FINDINGS: The cardiomediastinal silhouette is stable, transvenous pacemaker in position as before. Lungs clear. No effusion or edema identified. Healing right shoulder fracture  CONCLUSION: No active disease of the chest  This report was finalized on 6/1/2022 6:55 AM by Dr. Teja Mckeon M.D.      CT Angiogram Chest    Result Date: 6/1/2022  CT ANGIOGRAM CHEST-  Radiation dose reduction techniques were utilized, including automated exposure control and exposure modulation based on body size.  Clinical: New onset shortness of breath, congestion, cough, rule out pulmonary embolus  CT scan of the chest performed with intravenous contrast, pulmonary embolus protocol to include coronal, sagittal and three-dimensional reconstruction.  FINDINGS: No pulmonary embolus. Atherosclerotic calcification of a normal diameter aorta. There is coronary artery calcification with cardiac enlargement, pacer leads within the right heart. No pericardial effusion is demonstrated.  Proximal and mid esophagus mildly distended,  portion containing an air-fluid level consistent with distal dysmotility or obstruction, the distal esophagus is collapsed, wall cannot be well evaluated.  Multifocal areas of groundglass infiltrate demonstrated within both lungs having a basilar predominance. Associated interstitial infiltrates, with some nodular areas of consolidation, most pronounced in the right lower lobe. Suspect atypical pneumonia or aspiration. Covid pneumonia not excluded.  Bilateral areas of bronchial wall thickening with mucous plugging most pronounced in the right lower lobe.  No pleural effusion or pulmonary edema. Few mildly enlarged mediastinal and hilar lymph nodes seen. There is an old right humerus fracture.  CONCLUSION: 1. Airspace disease with bronchial wall thickening and areas of mucous plugging as described above. 2. No pulmonary embolus, aortic aneurysm or dissection. 3. Cardiomegaly. 4. Abnormal esophagus, the appearance suggests distal dysmotility or obstructing lesion.     This report was finalized on 6/1/2022 10:53 AM by Dr. Teja Mckeon M.D.      Results for orders placed in visit on 01/13/21    Adult Transthoracic Echo Complete W/ Cont if Necessary Per Protocol    Interpretation Summary  · Calculated left ventricular EF = 66.8% Estimated left ventricular EF was in agreement with the calculated left ventricular EF.  · Left ventricular diastolic function was normal.  · Calculated left ventricular EF = 66.8%  · There is no evidence of pericardial effusion. .          Active Hospital Problems    Diagnosis  POA   • **Aspiration pneumonia due to vomitus (HCC) [J69.0]  Unknown   • Acute hypoxemic respiratory failure (HCC) [J96.01]  Yes   • Esophageal abnormality [K22.9]  Yes   • Dementia without behavioral disturbance (HCC) [F03.90]  Yes   • Lung consolidation (HCC) [J18.1]  Yes   • Type 2 diabetes mellitus, with long-term current use of insulin (HCC) [E11.9, Z79.4]  Not Applicable   • Presence of cardiac pacemaker [Z95.0]   Yes   • Essential hypertension [I10]  Yes      Resolved Hospital Problems   No resolved problems to display.         Assessment & Plan     1. Aspiration pneumonia with retained secretions/mucous plugging.  I agree with bronchodilators, hypertonic saline, I will add some chest physiotherapy but I think the biggest issue is that she is probably ongoingly aspirating and that is probably what happened this morning and of course;  Clearing secretions could be quite a problem for patients with dysphagia and I think she is experiencing that as well.  Usually this does not get better in somebody with dementia and just worsens.  The family seems to understand this well.  I think the patient even has some understanding of this.  Family to meet with ablative care team to discuss longer-term treatment  2. Acute hypoxic respiratory failure wean O2 as tolerated  3. Oropharyngeal dysphagia moderately severe per speech therapy  4. Esophageal stricture benign-appearing status post dilation  5. Dementia  6. Diabetes mellitus type 2  7. Hypertension  8. Peripheral neuropathy  9. Presence of permanent pacemaker      Plan for disposition:    Kurt Royal Jr, MD  06/06/22  09:37 EDT    Time:

## 2022-06-06 NOTE — PROGRESS NOTES
"DAILY PROGRESS NOTE  [facility]    Patient Identification:  Name: Di Macario  Age: 82 y.o.  Sex: female  :  1940  MRN: [id number]         Primary Care Physician: Lily Yepez PA-C      Subjective  Patient with no acute complaints.  Now status post EGD and doing well.    Objective:  General Appearance:  Comfortable, well-appearing, in no acute distress and not in pain.    Vital signs: (most recent): Blood pressure 151/72, pulse 60, temperature 98.1 °F (36.7 °C), temperature source Oral, resp. rate 18, height 160 cm (63\"), weight 67.2 kg (148 lb 3.2 oz), SpO2 93 %.    Lungs:  Normal effort and normal respiratory rate.  She is not in respiratory distress.  No stridor.  There are rhonchi.  No rales, decreased breath sounds or wheezes.  (Scattered loose rhonchi)  Heart: Normal rate.  Regular rhythm.    Extremities: There is no dependent edema.  (Left leg wrapped below the knees.  Trace to 1+ pitting edema right lower extremity)  Neurological: Patient is alert.  (Oriented to person and place.  Cooperative and pleasant.).    Skin:  Warm and dry.                Vital signs in last 24 hours:  Temp:  [97.1 °F (36.2 °C)-98.1 °F (36.7 °C)] 98.1 °F (36.7 °C)  Heart Rate:  [] 60  Resp:  [16-24] 18  BP: (111-182)/(65-88) 151/72    Intake/Output:    Intake/Output Summary (Last 24 hours) at 2022 1621  Last data filed at 2022 1314  Gross per 24 hour   Intake 120 ml   Output --   Net 120 ml         Results from last 7 days   Lab Units 22  0405 22  0404 22  0438 22  0604   WBC 10*3/mm3 11.84* 12.27* 15.73* 4.89   HEMOGLOBIN g/dL 9.5* 10.2* 10.1* 11.2*   PLATELETS 10*3/mm3 209 211 216 214     Results from last 7 days   Lab Units 22  0526 22  0405 22  0405 22  0404 22  0438 22  0604   SODIUM mmol/L 142 143 144 141 138 140   POTASSIUM mmol/L 3.5 3.2* 3.5 3.6 4.3 3.9   CHLORIDE mmol/L 105 109* 112* 107 106 106   CO2 mmol/L 21.0* " 21.0* 19.0* 22.0 24.6 25.0   BUN mg/dL 10 14 20 24* 25* 21   CREATININE mg/dL 0.53* 0.57 0.63 0.70 0.82 0.57   GLUCOSE mg/dL 143* 166* 120* 134* 120* 124*   Estimated Creatinine Clearance: 74 mL/min (A) (by C-G formula based on SCr of 0.53 mg/dL (L)).  Results from last 7 days   Lab Units 06/06/22  0526 06/05/22  0405 06/04/22  0405 06/03/22  0404 06/02/22  0438 06/01/22  0604   CALCIUM mg/dL 9.1 9.0 9.0 8.9 9.0 9.2   ALBUMIN g/dL  --   --   --   --   --  3.60     Results from last 7 days   Lab Units 06/01/22  0604   ALBUMIN g/dL 3.60   BILIRUBIN mg/dL <0.2   ALK PHOS U/L 128*   AST (SGOT) U/L 14   ALT (SGPT) U/L 16       Assessment:  Aspiration pneumonia/pneumonitis: Continue Zosyn, pulmonary toiletry.  Acute hypoxic respiratory failure: Secondary to above.  Improving  Mucous plugging: Pulmonary toiletry.  Pulmonary consult appreciated.  Dysphagia: Modified diet.  Esophageal stricture: Status post EGD with dilatation 6/6/2022.  GI evaluation appreciated.  Dementia:  Diabetes type 2: Continue close monitoring sliding scale insulin.  Hypertension: Continue present regime.  Presence of cardiac pacer:  Hypokalemia: Oral replacement.      Plan:  Please see above.  Discharge planning.  Reported bed available on Wednesday.  Discussed with daughter at bedside.    Efe Obregon MD  6/6/2022  16:21 EDT

## 2022-06-06 NOTE — ANESTHESIA PREPROCEDURE EVALUATION
Anesthesia Evaluation     Patient summary reviewed                Airway   No difficulty expected  Dental      Pulmonary    (+) shortness of breath,   Cardiovascular     Rhythm: regular    (+) pacemaker pacemaker, hypertension,       Neuro/Psych  GI/Hepatic/Renal/Endo    (+)   diabetes mellitus,     ROS Comment: anemia    Musculoskeletal     Abdominal    Substance History      OB/GYN          Other                        Anesthesia Plan    ASA 3     MAC       Anesthetic plan, all risks, benefits, and alternatives have been provided, discussed and informed consent has been obtained with: patient.        CODE STATUS:    Code Status (Patient has no pulse and is not breathing): CPR (Attempt to Resuscitate)  Medical Interventions (Patient has pulse or is breathing): Full Support

## 2022-06-06 NOTE — PLAN OF CARE
Goal Outcome Evaluation:  Plan of Care Reviewed With: patient        Progress: no change  Outcome Evaluation: Pt now on 2L NC. Still gets SOA with exertion. NPO since midnight for EGD today. Consent signed in chart. Ambulating to BR. IVF and abx continue. Safety maintained.

## 2022-06-06 NOTE — ANESTHESIA POSTPROCEDURE EVALUATION
Patient: Di Macario    Procedure Summary     Date: 06/06/22 Room / Location:  [facility]    Anesthesia Start: 1108 Anesthesia Stop: 1128    Procedure: ESOPHAGOGASTRODUODENOSCOPY with biopsy, balloon dilation (N/A Esophagus) Diagnosis:       Esophageal abnormality      (Esophageal abnormality [K22.9])    Surgeons: Lo Benjamin MD Provider: Ben Bonilla MD    Anesthesia Type: MAC ASA Status: 3          Anesthesia Type: MAC    Vitals  Vitals Value Taken Time   /86 06/06/22 1138   Temp     Pulse 60 06/06/22 1138   Resp 18 06/06/22 1138   SpO2 95 % 06/06/22 1138           Post Anesthesia Care and Evaluation    Patient location during evaluation: PACU  Patient participation: complete - patient participated  Level of consciousness: awake  Pain score: 1  Pain management: adequate  Airway patency: patent  Anesthetic complications: No anesthetic complications  PONV Status: none  Cardiovascular status: acceptable  Respiratory status: acceptable  Hydration status: acceptable

## 2022-06-06 NOTE — PLAN OF CARE
Goal Outcome Evaluation:   Vital signs stable.  Oxygen 2 liters per minute, nasal cannula  Up with walker + 1 assist.  IVF saline locked.  EGD performed this shift, diet resumed.  Skin intact.

## 2022-06-06 NOTE — PROGRESS NOTES
Patient declines CPT at this time - states she wants to eat - just returned from procedure - agrees to nebulizer.

## 2022-06-06 NOTE — CASE MANAGEMENT/SOCIAL WORK
Continued Stay Note  [facility]     Patient Name: Di Macario  MRN: [id number]  Today's Date: 6/6/2022    Admit Date: 6/1/2022     Discharge Plan     Row Name 06/06/22 1601       Plan    Plan [facility]    Patient/Family in Agreement with Plan yes    Plan Comments Spoke with patient and daughter Carley at bedside.  Per [facility] will have bed on Wednesday.  Per Leilani Calderon at [facility] should have a bed available.  Patient states she would like to go to [facility] and daughter is agreeable.  CCP will continue to follow.  BHumeniuk RN                       Expected Discharge Date and Time     Expected Discharge Date Expected Discharge Time    Jun 8, 2022             Becky S. Humeniuk, RN     [facility]

## 2022-06-07 PROBLEM — R09.02 HYPOXIA: Status: ACTIVE | Noted: 2022-06-07

## 2022-06-07 LAB
GLUCOSE BLDC GLUCOMTR-MCNC: 127 MG/DL (ref 70–130)
GLUCOSE BLDC GLUCOMTR-MCNC: 129 MG/DL (ref 70–130)
GLUCOSE BLDC GLUCOMTR-MCNC: 134 MG/DL (ref 70–130)
GLUCOSE BLDC GLUCOMTR-MCNC: 134 MG/DL (ref 70–130)
GLUCOSE BLDC GLUCOMTR-MCNC: 169 MG/DL (ref 70–130)
GLUCOSE BLDC GLUCOMTR-MCNC: 332 MG/DL (ref 70–130)
GLUCOSE BLDC GLUCOMTR-MCNC: 43 MG/DL (ref 70–130)
GLUCOSE BLDC GLUCOMTR-MCNC: 49 MG/DL (ref 70–130)
GLUCOSE BLDC GLUCOMTR-MCNC: 50 MG/DL (ref 70–130)
GLUCOSE BLDC GLUCOMTR-MCNC: 512 MG/DL (ref 70–130)
GLUCOSE BLDC GLUCOMTR-MCNC: 58 MG/DL (ref 70–130)
GLUCOSE BLDC GLUCOMTR-MCNC: 73 MG/DL (ref 70–130)
LAB AP CASE REPORT: NORMAL
PATH REPORT.FINAL DX SPEC: NORMAL
PATH REPORT.GROSS SPEC: NORMAL

## 2022-06-07 PROCEDURE — 94669 MECHANICAL CHEST WALL OSCILL: CPT

## 2022-06-07 PROCEDURE — 82962 GLUCOSE BLOOD TEST: CPT

## 2022-06-07 PROCEDURE — 94799 UNLISTED PULMONARY SVC/PX: CPT

## 2022-06-07 PROCEDURE — 99232 SBSQ HOSP IP/OBS MODERATE 35: CPT | Performed by: PHYSICIAN ASSISTANT

## 2022-06-07 PROCEDURE — 63710000001 INSULIN LISPRO (HUMAN) PER 5 UNITS: Performed by: INTERNAL MEDICINE

## 2022-06-07 PROCEDURE — 94664 DEMO&/EVAL PT USE INHALER: CPT

## 2022-06-07 PROCEDURE — 92526 ORAL FUNCTION THERAPY: CPT | Performed by: SPEECH-LANGUAGE PATHOLOGIST

## 2022-06-07 PROCEDURE — 97535 SELF CARE MNGMENT TRAINING: CPT

## 2022-06-07 PROCEDURE — 25010000002 AMPICILLIN-SULBACTAM PER 1.5 G: Performed by: INTERNAL MEDICINE

## 2022-06-07 PROCEDURE — 25010000002 ENOXAPARIN PER 10 MG: Performed by: INTERNAL MEDICINE

## 2022-06-07 PROCEDURE — 97110 THERAPEUTIC EXERCISES: CPT

## 2022-06-07 PROCEDURE — 63710000001 INSULIN LISPRO (HUMAN) PER 5 UNITS: Performed by: HOSPITALIST

## 2022-06-07 RX ORDER — INSULIN LISPRO 100 [IU]/ML
5 INJECTION, SOLUTION INTRAVENOUS; SUBCUTANEOUS ONCE
Status: COMPLETED | OUTPATIENT
Start: 2022-06-07 | End: 2022-06-07

## 2022-06-07 RX ORDER — INSULIN LISPRO 100 [IU]/ML
0-7 INJECTION, SOLUTION INTRAVENOUS; SUBCUTANEOUS
Status: DISCONTINUED | OUTPATIENT
Start: 2022-06-08 | End: 2022-06-09 | Stop reason: HOSPADM

## 2022-06-07 RX ORDER — AMLODIPINE BESYLATE 5 MG/1
5 TABLET ORAL
Status: DISCONTINUED | OUTPATIENT
Start: 2022-06-07 | End: 2022-06-09 | Stop reason: HOSPADM

## 2022-06-07 RX ADMIN — GUAIFENESIN 1200 MG: 600 TABLET, EXTENDED RELEASE ORAL at 08:05

## 2022-06-07 RX ADMIN — DEXTROSE 15 G: 15 GEL ORAL at 19:24

## 2022-06-07 RX ADMIN — IPRATROPIUM BROMIDE AND ALBUTEROL SULFATE 3 ML: 2.5; .5 SOLUTION RESPIRATORY (INHALATION) at 16:58

## 2022-06-07 RX ADMIN — AMPICILLIN SODIUM AND SULBACTAM SODIUM 3 G: 2; 1 INJECTION, POWDER, FOR SOLUTION INTRAMUSCULAR; INTRAVENOUS at 16:30

## 2022-06-07 RX ADMIN — ZONISAMIDE 200 MG: 100 CAPSULE ORAL at 21:01

## 2022-06-07 RX ADMIN — IPRATROPIUM BROMIDE AND ALBUTEROL SULFATE 3 ML: 2.5; .5 SOLUTION RESPIRATORY (INHALATION) at 10:25

## 2022-06-07 RX ADMIN — IPRATROPIUM BROMIDE AND ALBUTEROL SULFATE 3 ML: 2.5; .5 SOLUTION RESPIRATORY (INHALATION) at 20:18

## 2022-06-07 RX ADMIN — INSULIN LISPRO 9 UNITS: 100 INJECTION, SOLUTION INTRAVENOUS; SUBCUTANEOUS at 16:30

## 2022-06-07 RX ADMIN — ENOXAPARIN SODIUM 40 MG: 100 INJECTION SUBCUTANEOUS at 13:36

## 2022-06-07 RX ADMIN — AMPICILLIN SODIUM AND SULBACTAM SODIUM 3 G: 2; 1 INJECTION, POWDER, FOR SOLUTION INTRAMUSCULAR; INTRAVENOUS at 22:07

## 2022-06-07 RX ADMIN — FAMOTIDINE 40 MG: 40 TABLET, FILM COATED ORAL at 16:30

## 2022-06-07 RX ADMIN — AMPICILLIN SODIUM AND SULBACTAM SODIUM 3 G: 2; 1 INJECTION, POWDER, FOR SOLUTION INTRAMUSCULAR; INTRAVENOUS at 04:07

## 2022-06-07 RX ADMIN — SODIUM CHLORIDE SOLN NEBU 7% 4 ML: 7 NEBU SOLN at 20:24

## 2022-06-07 RX ADMIN — GUAIFENESIN 1200 MG: 600 TABLET, EXTENDED RELEASE ORAL at 21:01

## 2022-06-07 RX ADMIN — PRAVASTATIN SODIUM 40 MG: 40 TABLET ORAL at 16:29

## 2022-06-07 RX ADMIN — SODIUM CHLORIDE SOLN NEBU 7% 4 ML: 7 NEBU SOLN at 10:25

## 2022-06-07 RX ADMIN — DEXTROSE 15 G: 15 GEL ORAL at 12:22

## 2022-06-07 RX ADMIN — INSULIN LISPRO 7 UNITS: 100 INJECTION, SOLUTION INTRAVENOUS; SUBCUTANEOUS at 08:05

## 2022-06-07 RX ADMIN — FAMOTIDINE 40 MG: 40 TABLET, FILM COATED ORAL at 08:05

## 2022-06-07 RX ADMIN — AMPICILLIN SODIUM AND SULBACTAM SODIUM 3 G: 2; 1 INJECTION, POWDER, FOR SOLUTION INTRAMUSCULAR; INTRAVENOUS at 10:13

## 2022-06-07 RX ADMIN — DONEPEZIL HYDROCHLORIDE 10 MG: 10 TABLET, FILM COATED ORAL at 21:01

## 2022-06-07 RX ADMIN — AMLODIPINE BESYLATE 5 MG: 5 TABLET ORAL at 16:29

## 2022-06-07 RX ADMIN — INSULIN LISPRO 5 UNITS: 100 INJECTION, SOLUTION INTRAVENOUS; SUBCUTANEOUS at 16:42

## 2022-06-07 NOTE — PROGRESS NOTES
Name: Di Macario ADMIT: 2022   : 1940  PCP: Lily Yepez PA-C    MRN: [id number] LOS: 6 days   AGE/SEX: 82 y.o. female  ROOM: [facility]     Subjective   Subjective   Feeling okay today. Doesn't like her modified diet. PO intake is erratic. Voiding well. Breathing better today.     Review of Systems   No N/V/D/abd pain/F/C/NS/SOA/CP/HA/vis changes/LUTS    Objective   Objective   Vital Signs  Temp:  [97.5 °F (36.4 °C)-98.6 °F (37 °C)] 97.5 °F (36.4 °C)  Heart Rate:  [59-91] 71  Resp:  [18-24] 20  BP: (141-172)/() 169/80  SpO2:  [91 %-97 %] 95 %  on  Flow (L/min):  [1-3] 1;   Device (Oxygen Therapy): room air  Body mass index is 25.21 kg/m².  Physical Exam  Vitals and nursing note reviewed. Exam conducted with a chaperone present (Family x 2, RT).   Constitutional:       General: She is not in acute distress.     Appearance: She is ill-appearing (chronically). She is not toxic-appearing or diaphoretic.   HENT:      Head: Normocephalic and atraumatic.      Nose: Nose normal.      Mouth/Throat:      Mouth: Mucous membranes are moist.      Pharynx: Oropharynx is clear.   Eyes:      General: No scleral icterus.        Right eye: No discharge.         Left eye: No discharge.      Extraocular Movements: Extraocular movements intact.      Conjunctiva/sclera: Conjunctivae normal.   Cardiovascular:      Rate and Rhythm: Normal rate and regular rhythm.      Pulses: Normal pulses.      Heart sounds: Normal heart sounds.   Pulmonary:      Effort: Pulmonary effort is normal. No respiratory distress.      Breath sounds: Normal breath sounds. No wheezing or rales.   Abdominal:      General: Bowel sounds are normal. There is no distension.      Palpations: Abdomen is soft.      Tenderness: There is no abdominal tenderness.   Musculoskeletal:         General: No swelling or deformity. Normal range of motion.      Cervical back: Neck supple. No rigidity.      Comments: Wraps to BLEs   Lymphadenopathy:       Cervical: No cervical adenopathy.   Skin:     General: Skin is warm and dry.      Capillary Refill: Capillary refill takes less than 2 seconds.      Coloration: Skin is pale. Skin is not jaundiced.      Findings: No rash.   Neurological:      General: No focal deficit present.      Mental Status: She is alert. Mental status is at baseline.      Cranial Nerves: No cranial nerve deficit.      Coordination: Coordination normal.      Comments: Oriented to person   Psychiatric:         Mood and Affect: Mood normal.         Behavior: Behavior normal.      Comments: Flat affect         Results Review     I reviewed the patient's new clinical results.  Results from last 7 days   Lab Units 06/04/22  0405 06/03/22  0404 06/02/22  0438 06/01/22  0604   WBC 10*3/mm3 11.84* 12.27* 15.73* 4.89   HEMOGLOBIN g/dL 9.5* 10.2* 10.1* 11.2*   PLATELETS 10*3/mm3 209 211 216 214     Results from last 7 days   Lab Units 06/06/22  0526 06/05/22 0405 06/04/22 0405 06/03/22  0404   SODIUM mmol/L 142 143 144 141   POTASSIUM mmol/L 3.5 3.2* 3.5 3.6   CHLORIDE mmol/L 105 109* 112* 107   CO2 mmol/L 21.0* 21.0* 19.0* 22.0   BUN mg/dL 10 14 20 24*   CREATININE mg/dL 0.53* 0.57 0.63 0.70   GLUCOSE mg/dL 143* 166* 120* 134*   EGFR mL/min/1.73 91.9 90.3 88.1 85.9     Results from last 7 days   Lab Units 06/01/22  0604   ALBUMIN g/dL 3.60   BILIRUBIN mg/dL <0.2   ALK PHOS U/L 128*   AST (SGOT) U/L 14   ALT (SGPT) U/L 16     Results from last 7 days   Lab Units 06/06/22  0526 06/05/22  0405 06/04/22 0405 06/03/22  0404 06/02/22  0438 06/01/22  0604   CALCIUM mg/dL 9.1 9.0 9.0 8.9   < > 9.2   ALBUMIN g/dL  --   --   --   --   --  3.60    < > = values in this interval not displayed.     Results from last 7 days   Lab Units 06/04/22  0405 06/02/22  0438 06/01/22  0604   PROCALCITONIN ng/mL 5.75* 24.00* 0.08     Glucose   Date/Time Value Ref Range Status   06/07/2022 1714 169 (H) 70 - 130 mg/dL Final     Comment:     Meter: [id number] : [id number]  [place] Link NA   06/07/2022 1613 512 (C) 70 - 130 mg/dL Final     Comment:     RN Notified R and V Meter: [id number] : [id number] [place] Link    06/07/2022 1249 129 70 - 130 mg/dL Final     Comment:     Meter: [id number] : [id number] [place] Link NA   06/07/2022 1212 58 (L) 70 - 130 mg/dL Final     Comment:     Meter: [id number] : [id number] [place] Link NA   06/07/2022 1152 50 (L) 70 - 130 mg/dL Final     Comment:     Result Not Confirmed Meter: [id number] : [id number] Cam[place] Link   06/07/2022 1142 49 (C) 70 - 130 mg/dL Final     Comment:     RN Notified R and V NURSE NOLVIA NOTIFY Meter: [id number] : [id number] [facility]   06/07/2022 0607 332 (H) 70 - 130 mg/dL Final     Comment:     Meter: [id number] : [id number] Marleni BLUM       No radiology results for the last day  Scheduled Medications  amLODIPine, 5 mg, Oral, Q24H  ampicillin-sulbactam, 3 g, Intravenous, Q6H  donepezil, 10 mg, Oral, Nightly  enoxaparin, 40 mg, Subcutaneous, Q24H  famotidine, 40 mg, Oral, BID AC  guaiFENesin, 1,200 mg, Oral, Q12H  insulin lispro, 0-9 Units, Subcutaneous, TID AC  ipratropium-albuterol, 3 mL, Nebulization, 4x Daily - RT  pravastatin, 40 mg, Oral, Q PM  sodium chloride, 4 mL, Nebulization, BID - RT  zonisamide, 200 mg, Oral, Nightly    Infusions   Diet  Diet Clear Liquid; Nectar / Syrup Thick       Assessment/Plan     Active Hospital Problems    Diagnosis  POA   • **Aspiration pneumonia due to vomitus (HCC) [J69.0]  Yes   • Hypoxia [R09.02]  Yes   • Esophageal abnormality [K22.9]  Yes   • Dementia without behavioral disturbance (HCC) [F03.90]  Yes   • Lung consolidation (HCC) [J18.1]  Yes   • Type 2 diabetes mellitus, with long-term current use of insulin (HCC) [E11.9, Z79.4]  Not Applicable   • Presence of cardiac pacemaker [Z95.0]  Yes   • Essential hypertension [I10]  Yes      Resolved Hospital Problems   No resolved problems to display.        82 y.o. female admitted from home with Aspiration pneumonia due to vomitus (HCC).    Aspiration pneumonia/pneumonitis: Continue Unasyn to complete 7d total abx course, pulmonary toilet, appreciate Pulm attention to pt.    Hypoxia: Secondary to above. Weaned down to RA at present.    Mucous plugging (on CT): continue pulmonary toilet. Continue hypertonic saline nebs at dc. F/u CT Chest in 6 weeks.    OP Dysphagia, mod severe: S/p VFSS. Modified diet per SLP. Continue speech therapy at facility and can possibly upgrade diet in the future if swallow improves.    Esophageal stricture and gastritis: S/p EGD with dilatation 6/6. GI evaluation appreciated. BID Pepcid.    Dementia: stable at baseline. Continue Aricept.    DM type 2: Continue close monitoring and sliding scale insulin. Sugars labile this afternoon, d/w RN on floor. Check A1c. Continue SSI for now. Trulicity on hold since admission. Lantus on hold as well.    Hypertension: BPs up today, restart amlodipine and monitor for hypotension.    Presence of cardiac pacer    Hypokalemia: Oral replacement as needed.    · Lovenox 40 mg SC daily for DVT prophylaxis.  · Full code.  · Discussed with patient, nursing staff, CCP and care team on multidisciplinary rounds. D/w family x 2 at bedside.  · Anticipate discharge to SNU facility tomorrow. Family has requested hospice consult for assistance with care at home after dc from facility.      Aj Owens MD  [facility]  06/07/22  18:09 EDT

## 2022-06-07 NOTE — PROGRESS NOTES
LOS: 6 days   Patient Care Team:  Lily Yepez PA-C as PCP - General (Physician Assistant)  Madelin Ferguson MD as Consulting Physician (Cardiology)  Gigi Kaiser DO as Consulting Physician (Neurology)    Subjective   Following for aspiration pneumonia and respiratory failure.  Patient sitting up in her chair.  Speech therapy is just reevaluated she is not ready for any upgrade in her diet.    Review of Systems:          Objective     Vital Signs  Vital Sign Min/Max for last 24 hours  Temp  Min: 97.5 °F (36.4 °C)  Max: 98.6 °F (37 °C)   BP  Min: 141/77  Max: 172/69   Pulse  Min: 59  Max: 91   Resp  Min: 18  Max: 24   SpO2  Min: 91 %  Max: 97 %   Flow (L/min)  Min: 1  Max: 3   Weight  Min: 64.5 kg (142 lb 4.8 oz)  Max: 64.5 kg (142 lb 4.8 oz)        Ventilator/Non-Invasive Ventilation Settings (From admission, onward)            None                       Body mass index is 25.21 kg/m².  I/O last 3 completed shifts:  In: 220 [P.O.:120; IV Piggyback:100]  Out: -   I/O this shift:  In: 950 [P.O.:850; IV Piggyback:100]  Out: -         Physical Exam:    General Appearance: Elderly white female she is resting in bed on 0.5 L nasal cannula O2 her oxygen saturations are 96 %.  She does not currently feel short of breath. .  Eyes: Conjunctiva are clear and anicteric  ENT:  Not examined  Neck: No palpable adenopathy or thyromegaly no visible jugular venous distension and trachea midline  Lungs:  There are no wheezes no rales.  No rhonchi.  Nonlabored chest expansion  Cardiac: Regular rate rhythm no murmur  Abdomen: Soft no palpable hepatosplenomegaly or masses  : Not examined  Musculoskeletal: Moderate thoracic kyphosis  Skin: Warm and dry no jaundice no petechiae  Neuro: She is a very pleasant.  She oriented but easily confused.  She will follow commands with all extremities  Extremities/P Vascular: No clubbing no cyanosis no edema palpable radial and dorsalis pedis pulses  MSE: She is a very pleasant  lady     Labs:  Results from last 7 days   Lab Units 06/06/22  0526 06/05/22 0405 06/04/22 0405 06/03/22 0404 06/02/22 0438 06/01/22  0604   GLUCOSE mg/dL 143* 166* 120* 134* 120* 124*   SODIUM mmol/L 142 143 144 141 138 140   POTASSIUM mmol/L 3.5 3.2* 3.5 3.6 4.3 3.9   CO2 mmol/L 21.0* 21.0* 19.0* 22.0 24.6 25.0   CHLORIDE mmol/L 105 109* 112* 107 106 106   ANION GAP mmol/L 16.0* 13.0 13.0 12.0 7.4 9.0   CREATININE mg/dL 0.53* 0.57 0.63 0.70 0.82 0.57   BUN mg/dL 10 14 20 24* 25* 21   BUN / CREAT RATIO  18.9 24.6 31.7* 34.3* 30.5* 36.8*   CALCIUM mg/dL 9.1 9.0 9.0 8.9 9.0 9.2   ALK PHOS U/L  --   --   --   --   --  128*   TOTAL PROTEIN g/dL  --   --   --   --   --  6.3   ALT (SGPT) U/L  --   --   --   --   --  16   AST (SGOT) U/L  --   --   --   --   --  14   BILIRUBIN mg/dL  --   --   --   --   --  <0.2   ALBUMIN g/dL  --   --   --   --   --  3.60   GLOBULIN gm/dL  --   --   --   --   --  2.7     Estimated Creatinine Clearance: 72.6 mL/min (A) (by C-G formula based on SCr of 0.53 mg/dL (L)).      Results from last 7 days   Lab Units 06/04/22 0405 06/03/22 0404 06/02/22  0438 06/01/22  0604   WBC 10*3/mm3 11.84* 12.27* 15.73* 4.89   RBC 10*6/mm3 3.36* 3.57* 3.59* 3.92   HEMOGLOBIN g/dL 9.5* 10.2* 10.1* 11.2*   HEMATOCRIT % 29.5* 31.3* 31.1* 34.4   MCV fL 87.8 87.7 86.6 87.8   MCH pg 28.3 28.6 28.1 28.6   MCHC g/dL 32.2 32.6 32.5 32.6   RDW % 14.6 14.5 14.6 14.6   RDW-SD fl 47.2 46.4 46.3 46.8   MPV fL 10.7 10.9 11.2 10.5   PLATELETS 10*3/mm3 209 211 216 214   NEUTROPHIL % % 86.1*  --   --  66.3   LYMPHOCYTE % % 6.2*  --   --  27.4   MONOCYTES % % 5.7  --   --  5.3   EOSINOPHIL % % 0.9  --   --  0.4   BASOPHIL % % 0.3  --   --  0.4   IMM GRAN % % 0.8*  --   --  0.2   NEUTROS ABS 10*3/mm3 10.20*  --   --  3.24   LYMPHS ABS 10*3/mm3 0.73  --   --  1.34   MONOS ABS 10*3/mm3 0.67  --   --  0.26   EOS ABS 10*3/mm3 0.11  --   --  0.02   BASOS ABS 10*3/mm3 0.04  --   --  0.02   IMMATURE GRANS (ABS) 10*3/mm3 0.09*  --    --  0.01   NRBC /100 WBC 0.0  --   --  0.0         Results from last 7 days   Lab Units 06/01/22  0604   TROPONIN T ng/mL <0.010     Results from last 7 days   Lab Units 06/01/22  0604   PROBNP pg/mL 178.0         Results from last 7 days   Lab Units 06/04/22  0405 06/02/22  0438 06/01/22  0604   PROCALCITONIN ng/mL 5.75* 24.00* 0.08         Microbiology Results (last 10 days)     Procedure Component Value - Date/Time    COVID PRE-OP / PRE-PROCEDURE SCREENING ORDER (NO ISOLATION) - Swab, Nasopharynx [[id number]]  (Normal) Collected: 06/06/22 0857    Lab Status: Final result Specimen: Swab from Nasopharynx Updated: 06/06/22 0949    Narrative:      The following orders were created for panel order COVID PRE-OP / PRE-PROCEDURE SCREENING ORDER (NO ISOLATION) - Swab, Nasopharynx.  Procedure                               Abnormality         Status                     ---------                               -----------         ------                     COVID-19,[facility]-HOU[facility]...[[id number]]  Normal              Final result                 Please view results for these tests on the individual orders.    COVID-19,[facility] CEPHEID/YANELIS NP SWAB IN TRANSPORT MEDIA 8-12 HR TAT - Swab, Nasopharynx [[id number]]  (Normal) Collected: 06/06/22 0857    Lab Status: Final result Specimen: Swab from Nasopharynx Updated: 06/06/22 0949     COVID19 Not Detected    Narrative:      Fact sheet for providers: https://www.fda.gov/media/499022/download     Fact sheet for patients: https://www.fda.gov/media/578813/download    Respiratory Panel PCR w/COVID-19(SARS-CoV-2) [facility] In-House, NP Swab in UTM/VTM, 3-4 HR TAT - Swab, Nasopharynx [[id number]]  (Normal) Collected: 06/01/22 0644    Lab Status: Final result Specimen: Swab from Nasopharynx Updated: 06/01/22 0743     ADENOVIRUS, PCR Not Detected     Coronavirus 229E Not Detected     Coronavirus HKU1 Not Detected     Coronavirus NL63 Not Detected     Coronavirus OC43  Not Detected     COVID19 Not Detected     Human Metapneumovirus Not Detected     Human Rhinovirus/Enterovirus Not Detected     Influenza A PCR Not Detected     Influenza B PCR Not Detected     Parainfluenza Virus 1 Not Detected     Parainfluenza Virus 2 Not Detected     Parainfluenza Virus 3 Not Detected     Parainfluenza Virus 4 Not Detected     RSV, PCR Not Detected     Bordetella pertussis pcr Not Detected     Bordetella parapertussis PCR Not Detected     Chlamydophila pneumoniae PCR Not Detected     Mycoplasma pneumo by PCR Not Detected    Narrative:      In the setting of a positive respiratory panel with a viral infection PLUS a negative procalcitonin without other underlying concern for bacterial infection, consider observing off antibiotics or discontinuation of antibiotics and continue supportive care. If the respiratory panel is positive for atypical bacterial infection (Bordetella pertussis, Chlamydophila pneumoniae, or Mycoplasma pneumoniae), consider antibiotic de-escalation to target atypical bacterial infection.              amLODIPine, 5 mg, Oral, Q24H  ampicillin-sulbactam, 3 g, Intravenous, Q6H  donepezil, 10 mg, Oral, Nightly  enoxaparin, 40 mg, Subcutaneous, Q24H  famotidine, 40 mg, Oral, BID AC  guaiFENesin, 1,200 mg, Oral, Q12H  insulin lispro, 0-9 Units, Subcutaneous, TID AC  ipratropium-albuterol, 3 mL, Nebulization, 4x Daily - RT  pravastatin, 40 mg, Oral, Q PM  sodium chloride, 4 mL, Nebulization, BID - RT  zonisamide, 200 mg, Oral, Nightly           Diagnostics:  FL Video Swallow With Speech Single Contrast    Result Date: 6/3/2022  VIDEO SWALLOWING EXAMINATION BY SPEECH PATHOLOGY  Clinical: Dysphasia  Video swallowing examination performed under the direction of speech pathology. Imaging reviewed by radiologist who concurs with the findings.  Speech pathology summary:Radiologist present: Dr. Mckeon. Penetration observed with thins and nectar. Moderate-severe pharyngeal residue post  swallow with solids. Pt at risk for aspiration from residue.  FLUOROSCOPY TIME: 3 minutes 10 seconds, 5367 images.  This report was finalized on 6/3/2022 2:07 PM by Dr. Teja Mckeon M.D.      US Liver    Result Date: 6/2/2022  Examination: Liver sonogram  HISTORY: 82-year-old female with a history of elevated alkaline phosphatase level  FINDINGS: Sonographic evaluation of the liver and selected structures of the right upper quadrant was performed. Comparison is made to a CT of the abdomen with contrast dated 03/01/2021.  The right kidney has a normal appearance.  Normal echotexture is seen throughout the liver without evidence for focal abnormality. No intrahepatic bile duct dilatation is appreciated. The liver measures on the order of 14.5 cm in anterior to posterior dimension. The portal vein is patent with hepatopedal flow. There is no intrahepatic bile duct dilatation. Minimal sludge within the gallbladder is noted. No gallbladder wall thickening or pericholecystic fluid is appreciated. Per the sonographer, the patient was nontender in the right upper quadrant during the examination. The common bile duct measures 0.6 cm in diameter. The visualized portion of the pancreas appears normal. A small right pleural effusion is noted.      1. Normal sonographic appearance of the liver. 2. Minimal gallbladder sludge. 3. Small right pleural effusion.  This report was finalized on 6/2/2022 9:38 AM by Dr. Hai Oh M.D.      XR Chest 1 View    Result Date: 6/1/2022  XR CHEST 1 VW-clinical: Shortness of breath  COMPARISON 03/05/2021  FINDINGS: The cardiomediastinal silhouette is stable, transvenous pacemaker in position as before. Lungs clear. No effusion or edema identified. Healing right shoulder fracture  CONCLUSION: No active disease of the chest  This report was finalized on 6/1/2022 6:55 AM by Dr. Teja Mckeon M.D.      CT Angiogram Chest    Result Date: 6/1/2022  CT ANGIOGRAM CHEST-  Radiation dose reduction  techniques were utilized, including automated exposure control and exposure modulation based on body size.  Clinical: New onset shortness of breath, congestion, cough, rule out pulmonary embolus  CT scan of the chest performed with intravenous contrast, pulmonary embolus protocol to include coronal, sagittal and three-dimensional reconstruction.  FINDINGS: No pulmonary embolus. Atherosclerotic calcification of a normal diameter aorta. There is coronary artery calcification with cardiac enlargement, pacer leads within the right heart. No pericardial effusion is demonstrated.  Proximal and mid esophagus mildly distended, portion containing an air-fluid level consistent with distal dysmotility or obstruction, the distal esophagus is collapsed, wall cannot be well evaluated.  Multifocal areas of groundglass infiltrate demonstrated within both lungs having a basilar predominance. Associated interstitial infiltrates, with some nodular areas of consolidation, most pronounced in the right lower lobe. Suspect atypical pneumonia or aspiration. Covid pneumonia not excluded.  Bilateral areas of bronchial wall thickening with mucous plugging most pronounced in the right lower lobe.  No pleural effusion or pulmonary edema. Few mildly enlarged mediastinal and hilar lymph nodes seen. There is an old right humerus fracture.  CONCLUSION: 1. Airspace disease with bronchial wall thickening and areas of mucous plugging as described above. 2. No pulmonary embolus, aortic aneurysm or dissection. 3. Cardiomegaly. 4. Abnormal esophagus, the appearance suggests distal dysmotility or obstructing lesion.     This report was finalized on 6/1/2022 10:53 AM by Dr. Teja Mckeon M.D.      Results for orders placed in visit on 01/13/21    Adult Transthoracic Echo Complete W/ Cont if Necessary Per Protocol    Interpretation Summary  · Calculated left ventricular EF = 66.8% Estimated left ventricular EF was in agreement with the calculated left  ventricular EF.  · Left ventricular diastolic function was normal.  · Calculated left ventricular EF = 66.8%  · There is no evidence of pericardial effusion. .          Active Hospital Problems    Diagnosis  POA   • **Aspiration pneumonia due to vomitus (HCC) [J69.0]  Unknown   • Acute hypoxemic respiratory failure (HCC) [J96.01]  Yes   • Esophageal abnormality [K22.9]  Yes   • Dementia without behavioral disturbance ([facility]) [F03.90]  Yes   • Lung consolidation (HCC) [J18.1]  Yes   • Type 2 diabetes mellitus, with long-term current use of insulin ([facility]) [E11.9, Z79.4]  Not Applicable   • Presence of cardiac pacemaker [Z95.0]  Yes   • Essential hypertension [I10]  Yes      Resolved Hospital Problems   No resolved problems to display.         Assessment & Plan     1. Aspiration pneumonia with retained secretions/mucous plugging.  Markedly improved.  She should complete her 7 days of antibiotics in the next few hours.  I think I would probably recommend sending her with as needed bronchodilators and and hypertonic saline.  She should have a follow-up CT scan in about 6 weeks to ensure complete clearing of infiltrates and airway plugging  2. Acute hypoxic respiratory failure wean O2 as tolerated  3. Oropharyngeal dysphagia moderately severe per speech therapy  4. Esophageal stricture benign-appearing status post dilation  5. Dementia  6. Diabetes mellitus type 2 hypoglycemia last evening.  7. Hypertension  8. Peripheral neuropathy  9. Presence of permanent pacemaker      Plan for disposition: From a pulmonary standpoint I think she is about ready for discharge back to nursing facility.  We will see how she does off of oxygen to see if she needs any O2.  Theoretically she should have a follow-up CT scan in about 6 weeks.  That can be done through her primary care or we will be glad to follow her do in our office.  Thank you we will see as needed    Kurt Royal Jr, MD  06/07/22  15:16 EDT    Time:

## 2022-06-07 NOTE — PLAN OF CARE
Goal Outcome Evaluation:  Plan of Care Reviewed With: patient, caregiver        Progress: no change  Outcome Evaluation: cont IV abx. cont clear liquid diet with NTL. speech therapy and palliative saw patient today. hospice consult palced. pt currently on RA, tolerating well. pt blood sugar was 49, attempted to give juice, blood sugar was then 50, gave another juice, blood sugar was 58. gave oral glucose gel, blood sugar came up to 129. with 1600 blood sugar check, pt blood sugar was 512. MD notified, 5 extra units ordered, given with sliding scale dose. patient blood sugar checked after and was 169. plan to d/c to [facility] tomorrow 6/8. pt tolerating meds with NTL well. BP med reordered per MD. pt up in chair most of shift, shifting weight left to right.

## 2022-06-07 NOTE — PLAN OF CARE
Goal Outcome Evaluation:              Outcome Evaluation: OT-No pain c/o at present. Pt. just finished with CNA for AM care and was fatigued. Grooming set up with cues for follow through. LE dressing socks Alexia with verbal/tactiled cues for sequence/follow through.  Rest breaks required in between self care tasks. Functional endurance fair-. UE therap. ex completed seated to increase activity tolerance for self care. Pt. was fatigued and declined sit to stand due to fatigue. Plan to cont. OT and progress as tolerated.    Patient was not wearing a face mask during this therapy encounter. Therapist used appropriate personal protective equipment including mask, gloves, and eye protection.  Mask used was standard procedure mask. Appropriate PPE was worn during the entire therapy session. Hand hygiene was completed before and after therapy session. Patient is not in enhanced droplet precautions.

## 2022-06-07 NOTE — PLAN OF CARE
Goal Outcome Evaluation:  Plan of Care Reviewed With: patient        Progress: no change  Outcome Evaluation: Pt had a large, loose BM at start of shift. Pt daughter reports that pt had this at home before they came to hospital several times. Pt turns and repostions herself. EGD completed, waiting for diet orders, pt is on clear liquids nectar thick. No c/o pain or discomfort. CTM, safety maintained.

## 2022-06-07 NOTE — THERAPY TREATMENT NOTE
Patient Name: Di Macario  : 1940    MRN: [id number]                              Today's Date: 2022       Admit Date: 2022    Visit Dx:     ICD-10-CM ICD-9-CM   1. Esophageal abnormality  K22.9 530.9   2. Acute hypoxemic respiratory failure (HCC)  J96.01 518.81   3. Viral URI with cough  J06.9 465.9     Patient Active Problem List   Diagnosis   • Essential hypertension   • Impaired memory   • Vitamin D deficiency   • Hyperuricemia   • Slow transit constipation   • Chronic pain of left knee   • Fall   • At high risk for falls   • Peripheral neuropathy   • Uncontrolled type 2 diabetes mellitus with hyperglycemia (HCC)   • Alkaline phosphatase elevation   • Walker as ambulation aid   • Bradycardia, sinus   • Shortness of breath   • Leg swelling   • Hyperlipidemia   • Presence of cardiac pacemaker   • Humeral head fracture, right, closed, initial encounter   • Type 2 diabetes mellitus, with long-term current use of insulin (HCC)   • Nail abnormalities   • Acute hypoxemic respiratory failure (HCC)   • Esophageal abnormality   • Dementia without behavioral disturbance (HCC)   • Lung consolidation (HCC)   • Aspiration pneumonia due to vomitus (HCC)     Past Medical History:   Diagnosis Date   • Dementia (HCC)     states better with medication    • Diabetes mellitus (HCC)    • Hyperlipidemia 2021   • Lung consolidation (HCC) 2022   • Peripheral neuropathy 2018   • Vitamin D deficiency      Past Surgical History:   Procedure Laterality Date   • ANKLE SURGERY Right    • BLADDER SURGERY      Prolapsed   • BREAST BIOPSY     • CARDIAC ELECTROPHYSIOLOGY PROCEDURE N/A 3/5/2021    Procedure: Pacemaker DC new BOSTON;  Surgeon: Madelin Ferguson MD;  Location: [facility] CATH INVASIVE LOCATION;  Service: Cardiology;  Laterality: N/A;   • ENDOSCOPY N/A 2022    Procedure: ESOPHAGOGASTRODUODENOSCOPY with biopsy, balloon dilation;  Surgeon: Lo Benjamin MD;  Location: [facility] ENDOSCOPY;   Service: Gastroenterology;  Laterality: N/A;  esophageal stricture, hiatal hernia, gastritis   • HYSTERECTOMY        General Information     Row Name 06/07/22 1241          OT Time and Intention    Mode of Treatment individual therapy;occupational therapy  -     Row Name 06/07/22 1241          General Information    Patient Profile Reviewed yes  -CW     Existing Precautions/Restrictions fall;oxygen therapy device and L/min  -     Row Name 06/07/22 1241          Cognition    Orientation Status (Cognition) oriented x 3  Min confusion. Repetition/cues for instructions.  -     Row Name 06/07/22 1241          Safety Issues, Functional Mobility    Safety Issues Affecting Function (Mobility) insight into deficits/self-awareness;awareness of need for assistance;ability to follow commands;safety precaution awareness  -           User Key  (r) = Recorded By, (t) = Taken By, (c) = Cosigned By    Initials Name Provider Type    Narda Sidhu OTR Occupational Therapist                 Mobility/ADL's     Row Name 06/07/22 1244          Transfers    Comment, (Transfers) Pt. was too fatigued and declined to attempt sit to stand.  -     Row Name 06/07/22 1244          Activities of Daily Living    BADL Assessment/Intervention upper body dressing;lower body dressing;grooming  -     Row Name 06/07/22 1244          Lower Body Dressing Assessment/Training    [place] Level (Lower Body Dressing) doff;don;socks;minimum assist (75% patient effort);verbal cues;nonverbal cues (demo/gesture)  -CW     Position (Lower Body Dressing) supported sitting  -CW     Row Name 06/07/22 1244          Grooming Assessment/Training    [place] Level (Grooming) grooming skills;hair care, combing/brushing;wash face, hands;verbal cues;set up  -CW     Position (Grooming) supported sitting  -CW           User Key  (r) = Recorded By, (t) = Taken By, (c) = Cosigned By    Initials Name Provider Type    Narda Sidhu OTR Occupational  Therapist               Obj/Interventions     Row Name 06/07/22 1248          Shoulder (Therapeutic Exercise)    Shoulder (Therapeutic Exercise) AROM (active range of motion)  -     Shoulder AROM (Therapeutic Exercise) bilateral;flexion;extension;horizontal aBduction/aDduction;10 repetitions;2 sets;sitting  -     Row Name 06/07/22 1248          Elbow/Forearm (Therapeutic Exercise)    Elbow/Forearm (Therapeutic Exercise) AROM (active range of motion)  -     Elbow/Forearm AROM (Therapeutic Exercise) bilateral;flexion;extension;supination;pronation;sitting;10 repetitions;2 sets  -     Row Name 06/07/22 1248          Wrist (Therapeutic Exercise)    Wrist (Therapeutic Exercise) AROM (active range of motion)  -     Wrist AROM (Therapeutic Exercise) bilateral;flexion;extension;10 repetitions;2 sets  -     Row Name 06/07/22 1248          Hand (Therapeutic Exercise)    Hand (Therapeutic Exercise) AROM (active range of motion)  -     Hand AROM/AAROM (Therapeutic Exercise) bilateral;AROM (active range of motion);finger flexion;finger extension;10 repetitions;2 sets  -     Row Name 06/07/22 1248          Motor Skills    Motor Skills functional endurance  -CW     Functional Endurance Fair-  -CW     Therapeutic Exercise shoulder;wrist;elbow/forearm;hand  -CW           User Key  (r) = Recorded By, (t) = Taken By, (c) = Cosigned By    Initials Name Provider Type    Narda Sidhu OTR Occupational Therapist               Goals/Plan    No documentation.                Clinical Impression     Row Name 06/07/22 1250          Pain Assessment    Pretreatment Pain Rating 0/10 - no pain  -     Posttreatment Pain Rating 0/10 - no pain  -     Row Name 06/07/22 1250          Plan of Care Review    Outcome Evaluation OT-No pain c/o at present. Pt. just finished with CNA for AM care and was fatigued. Grooming set up with cues for follow through. LE dressing socks Alexia with verbal/tactiled cues for sequence/follow through.   Rest breaks required in between self care tasks. Functional endurance fair-. UE therap. ex completed seated to increase activity tolerance for self care. Pt. was fatigued and declined sit to stand due to fatigue. Plan to cont. OT and progress as tolerated.  -CW     Row Name 06/07/22 1250          Positioning and Restraints    Pre-Treatment Position sitting in chair/recliner  -CW     Post Treatment Position chair  -CW     In Chair sitting;call light within reach;encouraged to call for assist;exit alarm on  -CW           User Key  (r) = Recorded By, (t) = Taken By, (c) = Cosigned By    Initials Name Provider Type    Narda Sidhu OTR Occupational Therapist               Outcome Measures     Row Name 06/07/22 1255          How much help from another is currently needed...    Putting on and taking off regular lower body clothing? 3  -CW     Bathing (including washing, rinsing, and drying) 3  -CW     Toileting (which includes using toilet bed pan or urinal) 3  -CW     Putting on and taking off regular upper body clothing 3  -CW     Taking care of personal grooming (such as brushing teeth) 3  -CW     Eating meals 3  -CW     AM-PAC 6 Clicks Score (OT) 18  -CW           User Key  (r) = Recorded By, (t) = Taken By, (c) = Cosigned By    Initials Name Provider Type    Narda Sidhu OTR Occupational Therapist                Occupational Therapy Education                 Title: PT OT SLP Therapies (In Progress)     Topic: Occupational Therapy (In Progress)     Point: ADL training (In Progress)     Description:   Instruct learner(s) on proper safety adaptation and remediation techniques during self care or transfers.   Instruct in proper use of assistive devices.              Learning Progress Summary           Patient Acceptance, E, NR by MARIBETH at 6/7/2022 1257    Acceptance, E, VU by AMNA at 6/2/2022 1143    Acceptance, E, VU by CL at 6/1/2022 1624   Family Acceptance, E, VU by CL at 6/1/2022 1624                   Point:  Home exercise program (Done)     Description:   Instruct learner(s) on appropriate technique for monitoring, assisting and/or progressing therapeutic exercises/activities.              Learning Progress Summary           Patient Acceptance, E, VU by KA at 6/2/2022 1143    Acceptance, E, VU by CL at 6/1/2022 1624   Family Acceptance, E, VU by CL at 6/1/2022 1624                   Point: Precautions (Done)     Description:   Instruct learner(s) on prescribed precautions during self-care and functional transfers.              Learning Progress Summary           Patient Acceptance, E, VU by KA at 6/2/2022 1143    Acceptance, E, VU by CL at 6/1/2022 1624   Family Acceptance, E, VU by CL at 6/1/2022 1624                   Point: Body mechanics (Done)     Description:   Instruct learner(s) on proper positioning and spine alignment during self-care, functional mobility activities and/or exercises.              Learning Progress Summary           Patient Acceptance, E, VU by KA at 6/2/2022 1143    Acceptance, E, VU by CL at 6/1/2022 1624   Family Acceptance, E, VU by CL at 6/1/2022 1624                               User Key     Initials Effective Dates Name Provider Type Discipline     06/16/21 -  Narda Mcfarlane OTR Occupational Therapist OT    CL 06/16/21 -  Maribel Montilla, RN Registered Nurse Nurse     02/22/22 -  Barbara Monroe OT Occupational Therapist OT              OT Recommendation and Plan     Plan of Care Review  Outcome Evaluation: OT-No pain c/o at present. Pt. just finished with CNA for AM care and was fatigued. Grooming set up with cues for follow through. LE dressing socks Alexia with verbal/tactiled cues for sequence/follow through.  Rest breaks required in between self care tasks. Functional endurance fair-. UE therap. ex completed seated to increase activity tolerance for self care. Pt. was fatigued and declined sit to stand due to fatigue. Plan to cont. OT and progress as tolerated.     Time  Calculation:    Time Calculation- OT     Row Name 06/07/22 1258             Time Calculation- OT    OT Start Time 0956  -CW      OT Stop Time 1019  -CW      OT Time Calculation (min) 23 min  -CW      Total Timed Code Minutes- OT 23 minute(s)  -CW              Timed Charges    23464 - OT Therapeutic Exercise Minutes 13  -CW      59064 - OT Self Care/Mgmt Minutes 10  -CW              Total Minutes    Timed Charges Total Minutes 23  -CW       Total Minutes 23  -CW            User Key  (r) = Recorded By, (t) = Taken By, (c) = Cosigned By    Initials Name Provider Type     Narda Mcfarlane OTR Occupational Therapist              Therapy Charges for Today     Code Description Service Date Service Provider Modifiers Qty    37366312385 HC OT THER PROC EA 15 MIN 6/7/2022 Narda Mcfarlane OTR GO 1    30703132449 HC OT SELF CARE/MGMT/TRAIN EA 15 MIN 6/7/2022 Narda Mcfarlane OTR GO 1               PRISCILLA Nix  6/7/2022

## 2022-06-07 NOTE — PLAN OF CARE
Goal Outcome Evaluation:  Plan of Care Reviewed With: patient, daughter           Outcome Evaluation: SLP completed extensive education with patient and daughters on results of VFSS and rationale for current diet recommendation, please see note for all detail. Patient to continue with NTL clear liquid diet at this time.

## 2022-06-07 NOTE — THERAPY TREATMENT NOTE
Acute Care - Speech Language Pathology   Swallow Treatment Note [facility]     Patient Name: Di Macario  : 1940  MRN: [id number]  Today's Date: 2022               Admit Date: 2022    Visit Dx:     ICD-10-CM ICD-9-CM   1. Esophageal abnormality  K22.9 530.9   2. Acute hypoxemic respiratory failure (HCC)  J96.01 518.81   3. Viral URI with cough  J06.9 465.9     Patient Active Problem List   Diagnosis   • Essential hypertension   • Impaired memory   • Vitamin D deficiency   • Hyperuricemia   • Slow transit constipation   • Chronic pain of left knee   • Fall   • At high risk for falls   • Peripheral neuropathy   • Uncontrolled type 2 diabetes mellitus with hyperglycemia (HCC)   • Alkaline phosphatase elevation   • Walker as ambulation aid   • Bradycardia, sinus   • Shortness of breath   • Leg swelling   • Hyperlipidemia   • Presence of cardiac pacemaker   • Humeral head fracture, right, closed, initial encounter   • Type 2 diabetes mellitus, with long-term current use of insulin (HCC)   • Nail abnormalities   • Acute hypoxemic respiratory failure (HCC)   • Esophageal abnormality   • Dementia without behavioral disturbance (HCC)   • Lung consolidation (HCC)   • Aspiration pneumonia due to vomitus (HCC)     Past Medical History:   Diagnosis Date   • Dementia (HCC)     states better with medication    • Diabetes mellitus (HCC)    • Hyperlipidemia 2021   • Lung consolidation (HCC) 2022   • Peripheral neuropathy 2018   • Vitamin D deficiency      Past Surgical History:   Procedure Laterality Date   • ANKLE SURGERY Right    • BLADDER SURGERY      Prolapsed   • BREAST BIOPSY     • CARDIAC ELECTROPHYSIOLOGY PROCEDURE N/A 3/5/2021    Procedure: Pacemaker DC new [place];  Surgeon: Madelin Ferguson MD;  Location: [facility] INVASIVE LOCATION;  Service: Cardiology;  Laterality: N/A;   • ENDOSCOPY N/A 2022    Procedure: ESOPHAGOGASTRODUODENOSCOPY with biopsy, balloon dilation;   Surgeon: Lo Benjamin MD;  Location: [facility] ENDOSCOPY;  Service: Gastroenterology;  Laterality: N/A;  esophageal stricture, hiatal hernia, gastritis   • HYSTERECTOMY         SLP Recommendation and Plan                                                                      Plan of Care Reviewed With: patient, daughter  Outcome Evaluation: SLP completed extensive education with patient and daughters on results of VFSS and rationale for current diet recommendation, please see note for all detail. Patient to continue with NTL clear liquid diet at this time.      SWALLOW EVALUATION (last 72 hours)     SLP Adult Swallow Evaluation     Row Name 06/07/22 1600                   Rehab Evaluation    Document Type therapy note (daily note)  -KA        Patient Observations alert;cooperative  -KA        Patient Effort good  -KA        Symptoms Noted During/After Treatment none  -KA                  General Information    Patient Profile Reviewed yes  -KA                  Oral Nutrition/Hydration Goal 1 (SLP)    Oral Nutrition/Hydration Goal 1, SLP Pt will carmencita PO without overt s/s of aspiration.  -KA        Time Frame (Oral Nutrition/Hydration Goal 1, SLP) by discharge  -KA        Barriers (Oral Nutrition/Hydration Goal 1, SLP) SLP completed extensive education with patient and two daughters on results of recent VFSS, rationale for current diet of NTL clears and why patient is currently not safe or ready for  diet upgrade and re-evaluation. Dr Royal also present during portion of education. SLP showed daughers and patient VFSS images from 6/3 and educated them on anatomy and physiological of function of swallow. Explained high risks for aspiration and recurrent PNA with solids, and pointed out the pharyngeal residue from puree and mechanical soft solids on VFSS and pt inability to clear pharyngeal residue. Patient currently dislikes her diet. SLP discussed with pt and family if she dislikes her diet then decision making would  be recommended on PO diet for her quality of life with known aspiration risks. Patients discharge plan is to go to SNF tomorrow where pt will receive rehab Patient and daughers plan is for patient to remain on current recommended diet of NTL clears and patient receive swallow therapy with hopes patient swallow will improve and patient can be safely upgraded. SLP explained to daughters and patient her swallow function may not improve after dysphagia therapy. Overall SLP completed education and answered all questions. SLP did assess NTL via cup with patient demonstrating audible swallow with multiple swallows and delayed coughing.  -KA              User Key  (r) = Recorded By, (t) = Taken By, (c) = Cosigned By    Initials Name Effective Dates    Sonny Garcia MA,[facility]-SLP 06/02/22 -                 EDUCATION  The patient has been educated in the following areas:   Dysphagia (Swallowing Impairment).        SLP GOALS     Row Name 06/07/22 1600             Oral Nutrition/Hydration Goal 1 (SLP)    Oral Nutrition/Hydration Goal 1, SLP Pt will carmencita PO without overt s/s of aspiration.  -KA      Time Frame (Oral Nutrition/Hydration Goal 1, SLP) by discharge  -KA      Barriers (Oral Nutrition/Hydration Goal 1, SLP) SLP completed extensive education with patient and two daughters on results of recent VFSS, rationale for current diet of NTL clears and why patient is currently not safe or ready for current diet upgrade and re-evaluation. Dr Royal also present during portion of education. SLP showed daughers and patient VFSS images from 6/3 and educated them on anatomy and physiological of function. Explained high risks for aspiration and recurrent PNA with solids, and pointed out the pharyngeal residue from puree and mechanical soft solids on VFSS and pt inability to clear pharyngeal residue. Patient currently dislikes her diet. SLP discussed with pt and family if she dislikes her diet then decision making would be recommended  on PO diet for her quality of life with known aspiration risks. Patients discharge plan is to go to SNF tomorrow where pt will receive dysphagia therapy. Patient and daughers plan is for patient to remain on current recommended diet of NTL clears and receive swallow therapy with hopes patient swallow with improve and patient can be safely upgraded. SLP explained to daughter and patient her swallow function may not improve after dysphagia therapy. Overall SLP completed education and answered all questions. SLP did assess NTL via cup with patient demonstrating audible swallow with multiple swallows and delayed coughing.  -KA            User Key  (r) = Recorded By, (t) = Taken By, (c) = Cosigned By    Initials Name Provider Type    Sonny Garcia MA,[facility]-SLP Speech and Language Pathologist                   Time Calculation:    Time Calculation- SLP     Row Name 06/07/22 1625             Time Calculation- SLP    SLP Start Time 1515  -KA      SLP Received On 06/07/22  -KA              Untimed Charges    37719-WA Treatment Swallow Minutes 75  -KA              Total Minutes    Untimed Charges Total Minutes 75  -KA       Total Minutes 75  -KA            User Key  (r) = Recorded By, (t) = Taken By, (c) = Cosigned By    Initials Name Provider Type    Sonny Garcia MA,[facility]-SLP Speech and Language Pathologist                Therapy Charges for Today     Code Description Service Date Service Provider Modifiers Qty    93527388177 HC ST TREATMENT SWALLOW 5 6/7/2022 Sonny Angel MA,[facility]-SLP GN 1               Sonny Angel MA,CCC-SLP  6/7/2022

## 2022-06-07 NOTE — CASE MANAGEMENT/SOCIAL WORK
Continued Stay Note  [facility]     Patient Name: Di Macario  MRN: [id number]  Today's Date: 6/7/2022    Admit Date: 6/1/2022     Discharge Plan     Row Name 06/07/22 1612       Plan    Plan [facility]    Patient/Family in Agreement with Plan yes    Plan Comments Met with pt and family at bedside, MD had just removed oxygen, pt and family agree with plan for d/c to [facility] tomorrow bed will be available per [facility]. Pt family can transport if she is not requiring oxygen. Discussed this with them and possibility of needing w/c van, family agreeable to cover cost. CCP will f/u tomorrow - Mayela PARKER               Discharge Codes    No documentation.               Expected Discharge Date and Time     Expected Discharge Date Expected Discharge Time    Jun 8, 2022             Mayela Rutledge RN

## 2022-06-07 NOTE — PROGRESS NOTES
"Arrived to room and discovered patient's SPO2 84% with good wave form - told patient would need to replace O2-and patient became angry and stated -\"You will not-the Doctor does not want it on\".Patient seemed confused as well -as she has always been pleasant. Explained policy and patient requested to have a nurse come to room to verify hospital policy. When nurse arrived to room SPO2 had stabilized-running 95-97% per monitor at this time.  "

## 2022-06-07 NOTE — CONSULTS
Purpose of the visit was to evaluate for: goals of care/advanced care planning, support for patient/family and hospice referral/discussion. Spoke with MD and RN as well as patient, family and HCS and discussed goals of care, care options, resuscitation status, [facility] and discharge options.      Assessment: Patient resting comfortably in her chair, no complaints of pain or signs of anxiety and restlessness.     Recommendations/Plan: [facility] evaluation for community based services.    Other Comments: Met with Mrs. Macario at the bedside this morning, she had no complaints of pain or discomfort. I called her daughter Blossom to set up a time to meet with the patient and her family about goals of care, we met this afternoon at 2 pm. We discussed that Mrs. Macario has had long term swallowing difficulties since she had Polio as a child. It has gotten progressively worse lately leading to her being in the hospital this admission. She is not enjoying the thickened dysphagia diet and does not know if she would continue with it outpatient. We discussed that it prevents aspiration and other medical complications, but she is still unsure about continuing it. She and the family would like her to have her swallow study this afternoon before having further discussions about this. Their goal is to discharge to a rehab facility to regain strength to return home.The family states that she has had increasing moments of confusion over the past few months and they are concerned about her safety being home alone. Her children are interested in some more support at home including 24/7 caregiver support. We discussed that 24/7 care is usually private pay but I will reach out to the discharge planner about resources for this. They also requested a [facility] consult to hear about their at home services. Will reach out to Dr. Owens for a [facility] consult. When discussing CODE status, they do not want to make changes to her CODE status at  this time, they would like to discuss it as a family prior to making changes.

## 2022-06-08 LAB
ADV 40+41 DNA STL QL NAA+NON-PROBE: NOT DETECTED
ALBUMIN SERPL-MCNC: 2.6 G/DL (ref 3.5–5.2)
ALBUMIN/GLOB SERPL: 0.8 G/DL
ALP SERPL-CCNC: 126 U/L (ref 39–117)
ALT SERPL W P-5'-P-CCNC: 14 U/L (ref 1–33)
ANION GAP SERPL CALCULATED.3IONS-SCNC: 13.1 MMOL/L (ref 5–15)
AST SERPL-CCNC: 9 U/L (ref 1–32)
ASTRO TYP 1-8 RNA STL QL NAA+NON-PROBE: NOT DETECTED
BASOPHILS # BLD AUTO: 0.03 10*3/MM3 (ref 0–0.2)
BASOPHILS NFR BLD AUTO: 0.4 % (ref 0–1.5)
BILIRUB SERPL-MCNC: 0.2 MG/DL (ref 0–1.2)
BUN SERPL-MCNC: 6 MG/DL (ref 8–23)
BUN/CREAT SERPL: 13.3 (ref 7–25)
C CAYETANENSIS DNA STL QL NAA+NON-PROBE: NOT DETECTED
C COLI+JEJ+UPSA DNA STL QL NAA+NON-PROBE: NOT DETECTED
CALCIUM SPEC-SCNC: 8.6 MG/DL (ref 8.6–10.5)
CHLORIDE SERPL-SCNC: 104 MMOL/L (ref 98–107)
CO2 SERPL-SCNC: 24.9 MMOL/L (ref 22–29)
CREAT SERPL-MCNC: 0.45 MG/DL (ref 0.57–1)
CRYPTOSP DNA STL QL NAA+NON-PROBE: NOT DETECTED
DEPRECATED RDW RBC AUTO: 44.3 FL (ref 37–54)
E HISTOLYT DNA STL QL NAA+NON-PROBE: NOT DETECTED
EAEC PAA PLAS AGGR+AATA ST NAA+NON-PRB: NOT DETECTED
EC STX1+STX2 GENES STL QL NAA+NON-PROBE: NOT DETECTED
EGFRCR SERPLBLD CKD-EPI 2021: 95.6 ML/MIN/1.73
EOSINOPHIL # BLD AUTO: 0.08 10*3/MM3 (ref 0–0.4)
EOSINOPHIL NFR BLD AUTO: 1.1 % (ref 0.3–6.2)
EPEC EAE GENE STL QL NAA+NON-PROBE: NOT DETECTED
ERYTHROCYTE [DISTWIDTH] IN BLOOD BY AUTOMATED COUNT: 14.4 % (ref 12.3–15.4)
ETEC LTA+ST1A+ST1B TOX ST NAA+NON-PROBE: NOT DETECTED
G LAMBLIA DNA STL QL NAA+NON-PROBE: NOT DETECTED
GLOBULIN UR ELPH-MCNC: 3.3 GM/DL
GLUCOSE BLDC GLUCOMTR-MCNC: 132 MG/DL (ref 70–130)
GLUCOSE BLDC GLUCOMTR-MCNC: 182 MG/DL (ref 70–130)
GLUCOSE BLDC GLUCOMTR-MCNC: 204 MG/DL (ref 70–130)
GLUCOSE BLDC GLUCOMTR-MCNC: 222 MG/DL (ref 70–130)
GLUCOSE BLDC GLUCOMTR-MCNC: 254 MG/DL (ref 70–130)
GLUCOSE SERPL-MCNC: 184 MG/DL (ref 65–99)
HBA1C MFR BLD: 7.9 % (ref 4.8–5.6)
HCT VFR BLD AUTO: 31.5 % (ref 34–46.6)
HGB BLD-MCNC: 10.3 G/DL (ref 12–15.9)
IMM GRANULOCYTES # BLD AUTO: 0.12 10*3/MM3 (ref 0–0.05)
IMM GRANULOCYTES NFR BLD AUTO: 1.7 % (ref 0–0.5)
LYMPHOCYTES # BLD AUTO: 1.13 10*3/MM3 (ref 0.7–3.1)
LYMPHOCYTES NFR BLD AUTO: 16.1 % (ref 19.6–45.3)
MAGNESIUM SERPL-MCNC: 1.6 MG/DL (ref 1.6–2.4)
MCH RBC QN AUTO: 28 PG (ref 26.6–33)
MCHC RBC AUTO-ENTMCNC: 32.7 G/DL (ref 31.5–35.7)
MCV RBC AUTO: 85.6 FL (ref 79–97)
MONOCYTES # BLD AUTO: 0.67 10*3/MM3 (ref 0.1–0.9)
MONOCYTES NFR BLD AUTO: 9.5 % (ref 5–12)
NEUTROPHILS NFR BLD AUTO: 4.99 10*3/MM3 (ref 1.7–7)
NEUTROPHILS NFR BLD AUTO: 71.2 % (ref 42.7–76)
NOROVIRUS GI+II RNA STL QL NAA+NON-PROBE: NOT DETECTED
NRBC BLD AUTO-RTO: 0 /100 WBC (ref 0–0.2)
P SHIGELLOIDES DNA STL QL NAA+NON-PROBE: NOT DETECTED
PLATELET # BLD AUTO: 296 10*3/MM3 (ref 140–450)
PMV BLD AUTO: 9.9 FL (ref 6–12)
POTASSIUM SERPL-SCNC: 2.8 MMOL/L (ref 3.5–5.2)
POTASSIUM SERPL-SCNC: 4.7 MMOL/L (ref 3.5–5.2)
PROT SERPL-MCNC: 5.9 G/DL (ref 6–8.5)
RBC # BLD AUTO: 3.68 10*6/MM3 (ref 3.77–5.28)
RVA RNA STL QL NAA+NON-PROBE: NOT DETECTED
S ENT+BONG DNA STL QL NAA+NON-PROBE: NOT DETECTED
SAPO I+II+IV+V RNA STL QL NAA+NON-PROBE: NOT DETECTED
SHIGELLA SP+EIEC IPAH ST NAA+NON-PROBE: NOT DETECTED
SODIUM SERPL-SCNC: 142 MMOL/L (ref 136–145)
V CHOL+PARA+VUL DNA STL QL NAA+NON-PROBE: NOT DETECTED
V CHOLERAE DNA STL QL NAA+NON-PROBE: NOT DETECTED
WBC NRBC COR # BLD: 7.02 10*3/MM3 (ref 3.4–10.8)
Y ENTEROCOL DNA STL QL NAA+NON-PROBE: NOT DETECTED

## 2022-06-08 PROCEDURE — 94799 UNLISTED PULMONARY SVC/PX: CPT

## 2022-06-08 PROCEDURE — 84132 ASSAY OF SERUM POTASSIUM: CPT | Performed by: HOSPITALIST

## 2022-06-08 PROCEDURE — 94761 N-INVAS EAR/PLS OXIMETRY MLT: CPT

## 2022-06-08 PROCEDURE — 87507 IADNA-DNA/RNA PROBE TQ 12-25: CPT | Performed by: HOSPITALIST

## 2022-06-08 PROCEDURE — 25010000002 AMPICILLIN-SULBACTAM PER 1.5 G: Performed by: HOSPITALIST

## 2022-06-08 PROCEDURE — 80053 COMPREHEN METABOLIC PANEL: CPT | Performed by: HOSPITALIST

## 2022-06-08 PROCEDURE — 25010000002 AMPICILLIN-SULBACTAM PER 1.5 G: Performed by: INTERNAL MEDICINE

## 2022-06-08 PROCEDURE — 85025 COMPLETE CBC W/AUTO DIFF WBC: CPT | Performed by: HOSPITALIST

## 2022-06-08 PROCEDURE — 25010000002 ENOXAPARIN PER 10 MG: Performed by: INTERNAL MEDICINE

## 2022-06-08 PROCEDURE — 83735 ASSAY OF MAGNESIUM: CPT | Performed by: HOSPITALIST

## 2022-06-08 PROCEDURE — 94669 MECHANICAL CHEST WALL OSCILL: CPT

## 2022-06-08 PROCEDURE — 97110 THERAPEUTIC EXERCISES: CPT

## 2022-06-08 PROCEDURE — 94664 DEMO&/EVAL PT USE INHALER: CPT

## 2022-06-08 PROCEDURE — 82962 GLUCOSE BLOOD TEST: CPT

## 2022-06-08 PROCEDURE — 25010000002 MAGNESIUM SULFATE IN D5W 1G/100ML (PREMIX) 1-5 GM/100ML-% SOLUTION: Performed by: HOSPITALIST

## 2022-06-08 PROCEDURE — 83036 HEMOGLOBIN GLYCOSYLATED A1C: CPT | Performed by: HOSPITALIST

## 2022-06-08 PROCEDURE — 99231 SBSQ HOSP IP/OBS SF/LOW 25: CPT | Performed by: INTERNAL MEDICINE

## 2022-06-08 PROCEDURE — 63710000001 INSULIN LISPRO (HUMAN) PER 5 UNITS: Performed by: HOSPITALIST

## 2022-06-08 RX ORDER — MAGNESIUM SULFATE 1 G/100ML
1 INJECTION INTRAVENOUS ONCE
Status: COMPLETED | OUTPATIENT
Start: 2022-06-08 | End: 2022-06-08

## 2022-06-08 RX ORDER — POTASSIUM CHLORIDE 1.5 G/1.77G
40 POWDER, FOR SOLUTION ORAL AS NEEDED
Status: DISCONTINUED | OUTPATIENT
Start: 2022-06-08 | End: 2022-06-09 | Stop reason: HOSPADM

## 2022-06-08 RX ORDER — POTASSIUM CHLORIDE 750 MG/1
40 TABLET, FILM COATED, EXTENDED RELEASE ORAL AS NEEDED
Status: DISCONTINUED | OUTPATIENT
Start: 2022-06-08 | End: 2022-06-09 | Stop reason: HOSPADM

## 2022-06-08 RX ORDER — POTASSIUM CHLORIDE 7.45 MG/ML
10 INJECTION INTRAVENOUS
Status: DISCONTINUED | OUTPATIENT
Start: 2022-06-08 | End: 2022-06-09 | Stop reason: HOSPADM

## 2022-06-08 RX ADMIN — POTASSIUM CHLORIDE 40 MEQ: 750 TABLET, EXTENDED RELEASE ORAL at 09:15

## 2022-06-08 RX ADMIN — GUAIFENESIN 1200 MG: 600 TABLET, EXTENDED RELEASE ORAL at 20:02

## 2022-06-08 RX ADMIN — AMPICILLIN SODIUM AND SULBACTAM SODIUM 3 G: 2; 1 INJECTION, POWDER, FOR SOLUTION INTRAMUSCULAR; INTRAVENOUS at 04:11

## 2022-06-08 RX ADMIN — ZONISAMIDE 200 MG: 100 CAPSULE ORAL at 20:02

## 2022-06-08 RX ADMIN — IPRATROPIUM BROMIDE AND ALBUTEROL SULFATE 3 ML: 2.5; .5 SOLUTION RESPIRATORY (INHALATION) at 20:39

## 2022-06-08 RX ADMIN — AMLODIPINE BESYLATE 5 MG: 5 TABLET ORAL at 09:15

## 2022-06-08 RX ADMIN — AMPICILLIN SODIUM AND SULBACTAM SODIUM 3 G: 2; 1 INJECTION, POWDER, FOR SOLUTION INTRAMUSCULAR; INTRAVENOUS at 22:45

## 2022-06-08 RX ADMIN — SODIUM CHLORIDE SOLN NEBU 7% 4 ML: 7 NEBU SOLN at 20:46

## 2022-06-08 RX ADMIN — ENOXAPARIN SODIUM 40 MG: 100 INJECTION SUBCUTANEOUS at 14:27

## 2022-06-08 RX ADMIN — INSULIN LISPRO 3 UNITS: 100 INJECTION, SOLUTION INTRAVENOUS; SUBCUTANEOUS at 16:48

## 2022-06-08 RX ADMIN — AMPICILLIN SODIUM AND SULBACTAM SODIUM 3 G: 2; 1 INJECTION, POWDER, FOR SOLUTION INTRAMUSCULAR; INTRAVENOUS at 16:48

## 2022-06-08 RX ADMIN — IPRATROPIUM BROMIDE AND ALBUTEROL SULFATE 3 ML: 2.5; .5 SOLUTION RESPIRATORY (INHALATION) at 06:40

## 2022-06-08 RX ADMIN — POTASSIUM CHLORIDE 40 MEQ: 750 TABLET, EXTENDED RELEASE ORAL at 14:27

## 2022-06-08 RX ADMIN — AMPICILLIN SODIUM AND SULBACTAM SODIUM 3 G: 2; 1 INJECTION, POWDER, FOR SOLUTION INTRAMUSCULAR; INTRAVENOUS at 09:16

## 2022-06-08 RX ADMIN — GUAIFENESIN 1200 MG: 600 TABLET, EXTENDED RELEASE ORAL at 09:15

## 2022-06-08 RX ADMIN — DONEPEZIL HYDROCHLORIDE 10 MG: 10 TABLET, FILM COATED ORAL at 20:02

## 2022-06-08 RX ADMIN — MAGNESIUM SULFATE HEPTAHYDRATE 1 G: 1 INJECTION, SOLUTION INTRAVENOUS at 09:15

## 2022-06-08 RX ADMIN — IPRATROPIUM BROMIDE AND ALBUTEROL SULFATE 3 ML: 2.5; .5 SOLUTION RESPIRATORY (INHALATION) at 15:19

## 2022-06-08 RX ADMIN — SODIUM CHLORIDE SOLN NEBU 7% 4 ML: 7 NEBU SOLN at 06:40

## 2022-06-08 RX ADMIN — FAMOTIDINE 40 MG: 40 TABLET, FILM COATED ORAL at 06:05

## 2022-06-08 RX ADMIN — PRAVASTATIN SODIUM 40 MG: 40 TABLET ORAL at 16:48

## 2022-06-08 RX ADMIN — POTASSIUM CHLORIDE 40 MEQ: 750 TABLET, EXTENDED RELEASE ORAL at 16:48

## 2022-06-08 RX ADMIN — INSULIN LISPRO 3 UNITS: 100 INJECTION, SOLUTION INTRAVENOUS; SUBCUTANEOUS at 09:15

## 2022-06-08 RX ADMIN — FAMOTIDINE 40 MG: 40 TABLET, FILM COATED ORAL at 16:48

## 2022-06-08 NOTE — PLAN OF CARE
Goal Outcome Evaluation:  Plan of Care Reviewed With: patient        Progress: improving  Outcome Evaluation: Pt demonstrates improved strength and endurance, as evidenced by tolerance of increased ambulation distance. Pt somewhat SOA with activity; however, SpO2 remained >= 95% while ambulating on room air. No new PT concerns; pt will continue to benefit from PT for  ongoing strengthening and mobility training.

## 2022-06-08 NOTE — CONSULTS
"Nutrition Services    Patient Name: Di Macario  YOB: 1940  MRN: [id number]  Admission date: 6/1/2022    Comment:  NPO/clear liquid x 7 days   Admitted for aspiration pneumonia due to vomitus   Clear liquid diet and ate 75% breakfast today   Pt reports she is hungry and wishes for cheeseburger  15# 9% x 6 months   Mighty Shakes at least TID     Pt is dysphagic w/ esophageal stenosis. Has been on clear liquids or NPO since admit date with no plans to upgrade diet until swallow function is improved. SLP recommends to continue clear liquid diet through d/c and at rehab until swallow function improves. RD was concerned for lack of nutrients patient will get on this diet. RN, RD, and SLP agreed that patient could drink mighty shakes since they are NTL consistency. Though this patient will be getting an ONS, it will still not be enough to meet her daily needs (calculated caloric and protein needs in note).  Discussed alternate routes or nutrition. If pt improves and can tolerated thin liquids, they can broaden her supplemental intake to boost, ensure, muscle milk, etc. If her swallow function does not improve in the next couple weeks, they need to consider an alternate route such as tube feeding so that she does not become malnourished.        CLINICAL NUTRITION ASSESSMENT      Reason for Assessment NPO/clear liquid x 7 days      H&P  H&P- \"This is a very pleasant 82-year-old female with history of diabetes, hypertension who presented to the emergency room after her daughter found her to be coughing severely early this morning with an episode of posttussive emesis.  She had difficulty recovering from this episode and looked significantly short of breath and thus she called...\"    Past Medical History:   Diagnosis Date   • Dementia (HCC)     states better with medication    • Diabetes mellitus (HCC)    • Hyperlipidemia 01/13/2021   • Lung consolidation (HCC) 06/01/2022   • Peripheral neuropathy " 08/28/2018   • Vitamin D deficiency        Past Surgical History:   Procedure Laterality Date   • ANKLE SURGERY Right    • BLADDER SURGERY      Prolapsed   • BREAST BIOPSY     • CARDIAC ELECTROPHYSIOLOGY PROCEDURE N/A 3/5/2021    Procedure: Pacemaker DC new [place];  Surgeon: Madelin Ferguson MD;  Location: [facility] CATH INVASIVE LOCATION;  Service: Cardiology;  Laterality: N/A;   • ENDOSCOPY N/A 6/6/2022    Procedure: ESOPHAGOGASTRODUODENOSCOPY with biopsy, balloon dilation;  Surgeon: Lo Benjamin MD;  Location: [facility] ENDOSCOPY;  Service: Gastroenterology;  Laterality: N/A;  esophageal stricture, hiatal hernia, gastritis   • HYSTERECTOMY          Current Problems   Aspiration pneumonia due to vomitus      Encounter Information        Nutrition/Diet History   Pt is dysphagic w/ esophageal stenosis. Has been on clear liquids or NPO since admit date with no plans to upgrade diet until swallow function is improved. SLP recommends to continue clear liquid diet through d/c and at rehab until swallow function improves. RD was concerned for lack of nutrients patient will get on this diet. Reached out to RN and SLP to collaborate on what supplements this patient can consume safely. RD and SLP agreed upon prescribing mighty shakes TID to aid with nutrient intake. If swallow function does not improve, RD recommends considering TF until swallow function improves if not indefinitely. Pt is at risk for developing malnutrition which could lead to generalized weakness and hinder swallow function from improving. Though this patient will be getting an ONS, it will still not be enough to meet her daily needs.     Met with patient and her daughters at bedside. Explained to them that she can drink mighty shakes and this will be her main source of nutrition the next couple of weeks as she works on her swallow function. Discussed alternate routes or nutrition. If pt improves and can tolerated thin liquids, they can broaden her  "supplemental intake to boost, ensure, muscle milk, etc. If her swallow function does not improve in the next couple weeks, they need to consider an alternate route such as tube feeding so that she does not become malnourished.     RD calculated needs further below to understand what her nutritional needs are per day    Typical Food Intake n/a   Food Preferences n/a   Meal/Snack Patterns n/a   Supplements None    Factors Affecting Intake Dysphagia    Tests/Procedures None in past 36 hrs      Anthropometrics        Current Height   Current Weight Height: 160 cm (63\")  Weight: 65.8 kg (145 lb) (06/08/22 0539)       Admission Weight 145# (65.7kg)    Ideal Body Weight (IBW) 115# (52.4 kg)    Usual Body Weight (UBW)        Trending Weight Hx     Weight change 15# 9% x 6 months              PTA: 44#  (23%) x 3 yrs     Wt Readings from Last 30 Encounters:   06/08/22 0539 65.8 kg (145 lb)   06/07/22 0144 64.5 kg (142 lb 4.8 oz)   06/06/22 0105 67.2 kg (148 lb 3.2 oz)   06/05/22 0558 67.6 kg (149 lb)   06/04/22 0615 66.2 kg (146 lb)   06/03/22 0513 66.3 kg (146 lb 3.2 oz)   06/02/22 0319 65.2 kg (143 lb 11.8 oz)   06/01/22 0557 65.8 kg (145 lb)   04/07/22 1134 66.1 kg (145 lb 12.8 oz)   03/17/22 1256 65.4 kg (144 lb 3.2 oz)   03/01/22 1035 66.8 kg (147 lb 3.2 oz)   10/29/21 0612 68.8 kg (151 lb 10.8 oz)   10/29/21 0024 68.5 kg (151 lb)   09/23/21 1017 68.5 kg (151 lb)   08/27/21 1107 69.4 kg (153 lb)   08/05/21 1123 69.5 kg (153 lb 3.2 oz)   05/06/21 1231 72.9 kg (160 lb 12.8 oz)   04/27/21 1527 73 kg (161 lb)   03/25/21 1029 71.2 kg (157 lb)   03/24/21 0938 72.6 kg (160 lb)   03/05/21 0834 72.6 kg (160 lb)   02/22/21 1334 73.5 kg (162 lb)   02/18/21 1109 72.1 kg (159 lb)   01/20/21 0923 72.6 kg (160 lb)   01/20/21 1024 72.6 kg (160 lb)   01/13/21 1407 72.6 kg (160 lb)   10/29/20 1147 74.7 kg (164 lb 9.6 oz)   10/10/19 1144 84.3 kg (185 lb 12.8 oz)   03/05/19 1304 85.7 kg (189 lb)   02/12/19 1153 75.8 kg (167 lb)   10/25/18 " 1259 85.7 kg (189 lb)   08/10/18 1059 85.3 kg (188 lb)   07/03/18 1425 86 kg (189 lb 9.6 oz)   02/01/18 1423 87.5 kg (192 lb 12.8 oz)   12/21/17 1414 86.4 kg (190 lb 8 oz)   08/16/17 1118 84.4 kg (186 lb)   07/21/17 0923 86.5 kg (190 lb 12.8 oz)   06/21/17 0820 85.3 kg (188 lb)      BMI kg/m2 Body mass index is 25.69 kg/m².       Labs        Pertinent Labs reviewed   Results from last 7 days   Lab Units 06/08/22  0459 06/06/22  0526 06/05/22  0405   SODIUM mmol/L 142 142 143   POTASSIUM mmol/L 2.8* 3.5 3.2*   CHLORIDE mmol/L 104 105 109*   CO2 mmol/L 24.9 21.0* 21.0*   BUN mg/dL 6* 10 14   CREATININE mg/dL 0.45* 0.53* 0.57   CALCIUM mg/dL 8.6 9.1 9.0   BILIRUBIN mg/dL 0.2  --   --    ALK PHOS U/L 126*  --   --    ALT (SGPT) U/L 14  --   --    AST (SGOT) U/L 9  --   --    GLUCOSE mg/dL 184* 143* 166*     Results from last 7 days   Lab Units 06/08/22  0459   MAGNESIUM mg/dL 1.6   HEMOGLOBIN g/dL 10.3*   HEMATOCRIT % 31.5*     COVID19   Date Value Ref Range Status   06/06/2022 Not Detected Not Detected - Ref. Range Final     Lab Results   Component Value Date    HGBA1C 7.90 (H) 06/08/2022        Medications    Scheduled Medications amLODIPine, 5 mg, Oral, Q24H  ampicillin-sulbactam, 3 g, Intravenous, Q6H  donepezil, 10 mg, Oral, Nightly  enoxaparin, 40 mg, Subcutaneous, Q24H  famotidine, 40 mg, Oral, BID AC  guaiFENesin, 1,200 mg, Oral, Q12H  insulin lispro, 0-7 Units, Subcutaneous, TID AC  ipratropium-albuterol, 3 mL, Nebulization, 4x Daily - RT  pravastatin, 40 mg, Oral, Q PM  sodium chloride, 4 mL, Nebulization, BID - RT  zonisamide, 200 mg, Oral, Nightly        Infusions      PRN Medications •  acetaminophen **OR** acetaminophen **OR** acetaminophen  •  dextrose  •  dextrose  •  glucagon (human recombinant)  •  nitroglycerin  •  ondansetron **OR** ondansetron  •  potassium chloride **OR** potassium chloride **OR** potassium chloride      Physical Findings        Physical Appearance, NFPE Moderate temple and  "scapular wasting     --  Edema  Trace edema in L&R arm and wrist   2+ in L&R ankle and foot    Gastrointestinal  Last bowel movement 6/7, hypoactive bowel sounds, diarrhea    Tubes/Drains None    Oral/Mouth Cavity Tooth/teeth missing, swallowing issues, esophageal stenosis    Skin L lower leg wound      Estimated/Assessed Needs       Energy Requirements    Height for Calculation  Height: 160 cm (63\")   Weight for Calculation 145# (65.8kg)   Method for Estimation  25-30kcal/kg    EST Needs (kcal/day) 1645kcal- 1974kcal/day       Protein Requirements    Weight for Calculation 145# (65.8kg)    EST Protein Needs (g/kg) 0.8-1g/kg    EST Daily Needs (g/day) 52.6-65.8g/day        Fluid Requirements     Estimated Needs (mL/day) 1645-1974mL/day      Current Nutrition Orders & Evaluation of Intake       Oral Nutrition     Food Allergies NKFA    Current PO Diet Diet Clear Liquid; Nectar / Syrup Thick   Supplement None    PO Evaluation     Trending % PO Intake 75% breakfast        Nutritional Risk Screening       MST SCORE     0     Nutrition Diagnosis         Nutrition Dx Problem 1 Inadequate nutrient intake related to esophageal stenosis, aspiration pneumonia due to vomitus as evidenced by pharyngeal dysphagia, clear liquid diet, and wt loss       Intervention Goal         Intervention Goal(s) Improve swallow function, tolerate po, drink ONS TID+, maintain weight     Nutrition Intervention        RD Action Order ONS, monitor swallow function      Nutrition Prescription          Diet Prescription Clear liquid; nectar thick liquids    Supplement Prescription Mighty shakes TID (vanilla)      Monitor/Evaluation        Monitor Per protocol, I&O, PO intake, Supplement intake, Pertinent labs, Weight, Skin status, GI status, Symptoms, POC/GOC, Swallow function     RD to follow per protocol.       Electronically signed by:  ALLEN MURPHY RD  06/08/22 09:56 EDT    "

## 2022-06-08 NOTE — PROGRESS NOTES
Name: Di Macario ADMIT: 2022   : 1940  PCP: Lily Yepez PA-C    MRN: [id number] LOS: 7 days   AGE/SEX: 82 y.o. female  ROOM: [facility]     Subjective   Subjective   Feeling okay today. Doesn't like her modified diet. PO intake is erratic. Voiding well. Breathing better today. Pt and family report several weeks of loose stool. No more than 3-4 times per day. No blood/mucus/pus. No F/C/NS. No N/V.     Review of Systems     No N/V/abd pain/F/C/NS/SOA/CP/HA/vis changes/LUTS    Objective   Objective   Vital Signs  Temp:  [97.2 °F (36.2 °C)-97.4 °F (36.3 °C)] 97.2 °F (36.2 °C)  Heart Rate:  [60-81] 61  Resp:  [16-20] 20  BP: (133-169)/(63-80) 133/71  SpO2:  [87 %-99 %] 99 %  on  Flow (L/min):  [2] 2;   Device (Oxygen Therapy): room air  Body mass index is 25.69 kg/m².  Physical Exam  Vitals and nursing note reviewed. Exam conducted with a chaperone present (Family x 2).   Constitutional:       General: She is not in acute distress.     Appearance: She is ill-appearing (chronically). She is not toxic-appearing or diaphoretic.      Comments: A little more bright today, more talkative   HENT:      Head: Normocephalic and atraumatic.      Nose: Nose normal.      Mouth/Throat:      Mouth: Mucous membranes are moist.      Pharynx: Oropharynx is clear.   Eyes:      General: No scleral icterus.        Right eye: No discharge.         Left eye: No discharge.      Extraocular Movements: Extraocular movements intact.      Conjunctiva/sclera: Conjunctivae normal.   Cardiovascular:      Rate and Rhythm: Normal rate and regular rhythm.      Pulses: Normal pulses.      Heart sounds: Normal heart sounds.   Pulmonary:      Effort: Pulmonary effort is normal. No respiratory distress.      Breath sounds: Normal breath sounds. No wheezing or rales.   Abdominal:      General: Bowel sounds are normal. There is no distension.      Palpations: Abdomen is soft.      Tenderness: There is no abdominal tenderness.    Musculoskeletal:         General: No swelling or deformity. Normal range of motion.      Cervical back: Neck supple. No rigidity.      Comments: Wraps to BLEs   Lymphadenopathy:      Cervical: No cervical adenopathy.   Skin:     General: Skin is warm and dry.      Capillary Refill: Capillary refill takes less than 2 seconds.      Coloration: Skin is pale. Skin is not jaundiced.      Findings: No rash.   Neurological:      General: No focal deficit present.      Mental Status: She is alert. Mental status is at baseline.      Cranial Nerves: No cranial nerve deficit.      Coordination: Coordination normal.      Comments: Oriented to person   Psychiatric:         Mood and Affect: Mood normal.         Behavior: Behavior normal.      Comments: Flat affect         Results Review     I reviewed the patient's new clinical results.  Results from last 7 days   Lab Units 06/08/22 0459 06/04/22 0405 06/03/22 0404 06/02/22  0438   WBC 10*3/mm3 7.02 11.84* 12.27* 15.73*   HEMOGLOBIN g/dL 10.3* 9.5* 10.2* 10.1*   PLATELETS 10*3/mm3 296 209 211 216     Results from last 7 days   Lab Units 06/08/22 0459 06/06/22  0526 06/05/22  0405 06/04/22  0405   SODIUM mmol/L 142 142 143 144   POTASSIUM mmol/L 2.8* 3.5 3.2* 3.5   CHLORIDE mmol/L 104 105 109* 112*   CO2 mmol/L 24.9 21.0* 21.0* 19.0*   BUN mg/dL 6* 10 14 20   CREATININE mg/dL 0.45* 0.53* 0.57 0.63   GLUCOSE mg/dL 184* 143* 166* 120*   EGFR mL/min/1.73 95.6 91.9 90.3 88.1     Results from last 7 days   Lab Units 06/08/22 0459   ALBUMIN g/dL 2.60*   BILIRUBIN mg/dL 0.2   ALK PHOS U/L 126*   AST (SGOT) U/L 9   ALT (SGPT) U/L 14     Results from last 7 days   Lab Units 06/08/22 0459 06/06/22  0526 06/05/22 0405 06/04/22  0405   CALCIUM mg/dL 8.6 9.1 9.0 9.0   ALBUMIN g/dL 2.60*  --   --   --    MAGNESIUM mg/dL 1.6  --   --   --      Results from last 7 days   Lab Units 06/04/22  0405 06/02/22  0438   PROCALCITONIN ng/mL 5.75* 24.00*     Hemoglobin A1C   Date/Time Value Ref  Range Status   06/08/2022 0459 7.90 (H) 4.80 - 5.60 % Final     Glucose   Date/Time Value Ref Range Status   06/08/2022 1127 132 (H) 70 - 130 mg/dL Final     Comment:     Meter: [id number] : [id number] Martha Sol NA   06/08/2022 0606 204 (H) 70 - 130 mg/dL Final     Comment:     Meter: [id number] : [id number] Joshua Mcgregor NA   06/08/2022 0001 254 (H) 70 - 130 mg/dL Final     Comment:     Meter: [id number] : [id number] Go Bijal NA   06/07/2022 2056 127 70 - 130 mg/dL Final     Comment:     Meter: [id number] : [id number] Go Bijal NA   06/07/2022 2004 73 70 - 130 mg/dL Final     Comment:     Meter: [id number] : [id number] Go Bijal NA   06/07/2022 1909 43 (C) 70 - 130 mg/dL Final     Comment:     RN Notified R and V Meter: [id number] : [id number] Darian Cordova RN   06/07/2022 1714 169 (H) 70 - 130 mg/dL Final     Comment:     Meter: [id number] : [id number] [place] BLUM       No radiology results for the last day  Scheduled Medications  amLODIPine, 5 mg, Oral, Q24H  ampicillin-sulbactam, 3 g, Intravenous, Q6H  donepezil, 10 mg, Oral, Nightly  enoxaparin, 40 mg, Subcutaneous, Q24H  famotidine, 40 mg, Oral, BID AC  guaiFENesin, 1,200 mg, Oral, Q12H  insulin lispro, 0-7 Units, Subcutaneous, TID AC  ipratropium-albuterol, 3 mL, Nebulization, 4x Daily - RT  pravastatin, 40 mg, Oral, Q PM  sodium chloride, 4 mL, Nebulization, BID - RT  zonisamide, 200 mg, Oral, Nightly    Infusions   Diet  Diet Clear Liquid; Nectar / Syrup Thick       Assessment/Plan     Active Hospital Problems    Diagnosis  POA   • **Aspiration pneumonia due to vomitus (HCC) [J69.0]  Yes   • Hypoxia [R09.02]  Yes   • Esophageal abnormality [K22.9]  Yes   • Dementia without behavioral disturbance (HCC) [F03.90]  Yes   • Lung consolidation (HCC) [J18.1]  Yes   • Type 2 diabetes mellitus, with long-term current use of insulin (HCC) [E11.9, Z79.4]  Not Applicable   • Presence of cardiac pacemaker [Z95.0]  Yes   •  Essential hypertension [I10]  Yes      Resolved Hospital Problems   No resolved problems to display.       82 y.o. female admitted from home with Aspiration pneumonia due to vomitus (HCC).    Aspiration pneumonia/pneumonitis: Continue Unasyn to complete 7d total abx course (today is final day of abx), pulmonary toilet, appreciate Pulm attention to pt.    Hypoxia: Secondary to above. Weaned down to RA at present. Might require nocturnal O2.    Mucous plugging (on CT): continue pulmonary toilet. Continue hypertonic saline nebs at ID. F/u CT Chest in 6 weeks.    OP Dysphagia, mod severe: S/p VFSS. Modified diet per SLP. Continue speech therapy at facility and can possibly upgrade diet in the future if swallow improves.    Esophageal stricture and gastritis: S/p EGD with dilatation 6/6. GI evaluation appreciated. Continue BID Pepcid.    Dementia: stable at baseline. Continue Aricept.    DM type 2: Continue close monitoring and sliding scale insulin. Sugars labile yesterday afternoon, less so today. A1c 7.9. Continue SSI for now. Trulicity on hold since admission. Lantus on hold as well.    Hypertension: restarted amlodipine, BPs improved today, continue to monitor for hypotension.    Presence of cardiac pacer    Hypokalemia/Hypomagnesemia: continue oral replacement with protocol. Replace Mg++ IV today.    Diarrhea: will check GI PCR, doubt infectious etiology, wonder if due to her Aricept, it is not severe, may be contributing to her hypoK+ though      · Lovenox 40 mg SC daily for DVT prophylaxis.  · Full code.  · Discussed with patient, nursing staff, CCP and care team on multidisciplinary rounds. D/w family x 2 at bedside.  · Anticipate discharge to SNU facility tomorrow.       Aj Owens MD  [facility]  06/08/22  15:39 EDT

## 2022-06-08 NOTE — PLAN OF CARE
Goal Outcome Evaluation:  Plan of Care Reviewed With: patient           Outcome Evaluation: VSS, no c/o pain. Pt required 1-2L O2 NC for sleep apnea last night. Pt up to BR with 1 x assist. IV antibiotics given. Blood sugars 127-250 overnight. Potassium 2.8 on morning labs--call out to Dr. Owens. Will CTM, safety maintained.

## 2022-06-08 NOTE — PROGRESS NOTES
LOS: 7 days   Patient Care Team:  Lily Yepez PA-C as PCP - General (Physician Assistant)  Madelin Ferguson MD as Consulting Physician (Cardiology)  Gigi Kaiser DO as Consulting Physician (Neurology)    Subjective   Following for aspiration pneumonia and respiratory failure.  Patient sitting up in bed she has been on room air saturating well.  Minimal to no cough no sputum no chest pain    Review of Systems:          Objective     Vital Signs  Vital Sign Min/Max for last 24 hours  Temp  Min: 97.2 °F (36.2 °C)  Max: 97.4 °F (36.3 °C)   BP  Min: 133/71  Max: 169/80   Pulse  Min: 60  Max: 81   Resp  Min: 16  Max: 20   SpO2  Min: 87 %  Max: 97 %   Flow (L/min)  Min: 1  Max: 2   Weight  Min: 65.8 kg (145 lb)  Max: 65.8 kg (145 lb)        Ventilator/Non-Invasive Ventilation Settings (From admission, onward)            None                       Body mass index is 25.69 kg/m².  I/O last 3 completed shifts:  In: 1490 [P.O.:1190; IV Piggyback:300]  Out: -   I/O this shift:  In: 240 [P.O.:240]  Out: -         Physical Exam:    General Appearance: Elderly white female she is resting in bed on room air oxygen saturations in the mid 90s in no distress  Eyes: Conjunctiva are clear and anicteric  ENT:  Not examined  Neck: No palpable adenopathy or thyromegaly no visible jugular venous distension and trachea midline  Lungs:  There are no wheezes no rales.  No rhonchi.  Nonlabored chest expansion  Cardiac: Regular rate rhythm no murmur  Abdomen: Soft no palpable hepatosplenomegaly or masses  : Not examined  Musculoskeletal: Moderate thoracic kyphosis  Skin: Warm and dry no jaundice no petechiae  Neuro: She is a very pleasant.  She oriented but easily confused.  She will follow commands with all extremities  Extremities/P Vascular: No clubbing no cyanosis no edema palpable radial and dorsalis pedis pulses  MSE: She is a very pleasant lady     Labs:  Results from last 7 days   Lab Units 06/08/22  5767  06/06/22  0526 06/05/22 0405 06/04/22 0405 06/03/22 0404 06/02/22 0438   GLUCOSE mg/dL 184* 143* 166* 120* 134* 120*   SODIUM mmol/L 142 142 143 144 141 138   POTASSIUM mmol/L 2.8* 3.5 3.2* 3.5 3.6 4.3   MAGNESIUM mg/dL 1.6  --   --   --   --   --    CO2 mmol/L 24.9 21.0* 21.0* 19.0* 22.0 24.6   CHLORIDE mmol/L 104 105 109* 112* 107 106   ANION GAP mmol/L 13.1 16.0* 13.0 13.0 12.0 7.4   CREATININE mg/dL 0.45* 0.53* 0.57 0.63 0.70 0.82   BUN mg/dL 6* 10 14 20 24* 25*   BUN / CREAT RATIO  13.3 18.9 24.6 31.7* 34.3* 30.5*   CALCIUM mg/dL 8.6 9.1 9.0 9.0 8.9 9.0   ALK PHOS U/L 126*  --   --   --   --   --    TOTAL PROTEIN g/dL 5.9*  --   --   --   --   --    ALT (SGPT) U/L 14  --   --   --   --   --    AST (SGOT) U/L 9  --   --   --   --   --    BILIRUBIN mg/dL 0.2  --   --   --   --   --    ALBUMIN g/dL 2.60*  --   --   --   --   --    GLOBULIN gm/dL 3.3  --   --   --   --   --      Estimated Creatinine Clearance: 86.4 mL/min (A) (by C-G formula based on SCr of 0.45 mg/dL (L)).      Results from last 7 days   Lab Units 06/08/22  0459 06/04/22 0405 06/03/22 0404 06/02/22 0438   WBC 10*3/mm3 7.02 11.84* 12.27* 15.73*   RBC 10*6/mm3 3.68* 3.36* 3.57* 3.59*   HEMOGLOBIN g/dL 10.3* 9.5* 10.2* 10.1*   HEMATOCRIT % 31.5* 29.5* 31.3* 31.1*   MCV fL 85.6 87.8 87.7 86.6   MCH pg 28.0 28.3 28.6 28.1   MCHC g/dL 32.7 32.2 32.6 32.5   RDW % 14.4 14.6 14.5 14.6   RDW-SD fl 44.3 47.2 46.4 46.3   MPV fL 9.9 10.7 10.9 11.2   PLATELETS 10*3/mm3 296 209 211 216   NEUTROPHIL % % 71.2 86.1*  --   --    LYMPHOCYTE % % 16.1* 6.2*  --   --    MONOCYTES % % 9.5 5.7  --   --    EOSINOPHIL % % 1.1 0.9  --   --    BASOPHIL % % 0.4 0.3  --   --    IMM GRAN % % 1.7* 0.8*  --   --    NEUTROS ABS 10*3/mm3 4.99 10.20*  --   --    LYMPHS ABS 10*3/mm3 1.13 0.73  --   --    MONOS ABS 10*3/mm3 0.67 0.67  --   --    EOS ABS 10*3/mm3 0.08 0.11  --   --    BASOS ABS 10*3/mm3 0.03 0.04  --   --    IMMATURE GRANS (ABS) 10*3/mm3 0.12* 0.09*  --   --     NRBC /100 WBC 0.0 0.0  --   --                      Results from last 7 days   Lab Units 06/04/22  0405 06/02/22  0438   PROCALCITONIN ng/mL 5.75* 24.00*         Microbiology Results (last 10 days)     Procedure Component Value - Date/Time    COVID PRE-OP / PRE-PROCEDURE SCREENING ORDER (NO ISOLATION) - Swab, Nasopharynx [[id number]]  (Normal) Collected: 06/06/22 0857    Lab Status: Final result Specimen: Swab from Nasopharynx Updated: 06/06/22 0949    Narrative:      The following orders were created for panel order COVID PRE-OP / PRE-PROCEDURE SCREENING ORDER (NO ISOLATION) - Swab, Nasopharynx.  Procedure                               Abnormality         Status                     ---------                               -----------         ------                     COVID-19,[facility]...[[id number]]  Normal              Final result                 Please view results for these tests on the individual orders.    COVID-19,[facility] CEPHEID/YANELIS NP SWAB IN TRANSPORT MEDIA 8-12 HR TAT - Swab, Nasopharynx [[id number]]  (Normal) Collected: 06/06/22 0857    Lab Status: Final result Specimen: Swab from Nasopharynx Updated: 06/06/22 0949     COVID19 Not Detected    Narrative:      Fact sheet for providers: https://www.fda.gov/media/678493/download     Fact sheet for patients: https://www.fda.gov/media/532194/download    Respiratory Panel PCR w/COVID-19(SARS-CoV-2) [facility] In-House, NP Swab in UTM/VTM, 3-4 HR TAT - Swab, Nasopharynx [[id number]]  (Normal) Collected: 06/01/22 0644    Lab Status: Final result Specimen: Swab from Nasopharynx Updated: 06/01/22 0743     ADENOVIRUS, PCR Not Detected     Coronavirus 229E Not Detected     Coronavirus HKU1 Not Detected     Coronavirus NL63 Not Detected     Coronavirus OC43 Not Detected     COVID19 Not Detected     Human Metapneumovirus Not Detected     Human Rhinovirus/Enterovirus Not Detected     Influenza A PCR Not Detected     Influenza B PCR Not  Detected     Parainfluenza Virus 1 Not Detected     Parainfluenza Virus 2 Not Detected     Parainfluenza Virus 3 Not Detected     Parainfluenza Virus 4 Not Detected     RSV, PCR Not Detected     Bordetella pertussis pcr Not Detected     Bordetella parapertussis PCR Not Detected     Chlamydophila pneumoniae PCR Not Detected     Mycoplasma pneumo by PCR Not Detected    Narrative:      In the setting of a positive respiratory panel with a viral infection PLUS a negative procalcitonin without other underlying concern for bacterial infection, consider observing off antibiotics or discontinuation of antibiotics and continue supportive care. If the respiratory panel is positive for atypical bacterial infection (Bordetella pertussis, Chlamydophila pneumoniae, or Mycoplasma pneumoniae), consider antibiotic de-escalation to target atypical bacterial infection.              amLODIPine, 5 mg, Oral, Q24H  ampicillin-sulbactam, 3 g, Intravenous, Q6H  donepezil, 10 mg, Oral, Nightly  enoxaparin, 40 mg, Subcutaneous, Q24H  famotidine, 40 mg, Oral, BID AC  guaiFENesin, 1,200 mg, Oral, Q12H  insulin lispro, 0-7 Units, Subcutaneous, TID AC  ipratropium-albuterol, 3 mL, Nebulization, 4x Daily - RT  pravastatin, 40 mg, Oral, Q PM  sodium chloride, 4 mL, Nebulization, BID - RT  zonisamide, 200 mg, Oral, Nightly           Diagnostics:  FL Video Swallow With Speech Single Contrast    Result Date: 6/3/2022  VIDEO SWALLOWING EXAMINATION BY SPEECH PATHOLOGY  Clinical: Dysphasia  Video swallowing examination performed under the direction of speech pathology. Imaging reviewed by radiologist who concurs with the findings.  Speech pathology summary:Radiologist present: Dr. Mckeon. Penetration observed with thins and nectar. Moderate-severe pharyngeal residue post swallow with solids. Pt at risk for aspiration from residue.  FLUOROSCOPY TIME: 3 minutes 10 seconds, 5367 images.  This report was finalized on 6/3/2022 2:07 PM by Dr. Teja Mckeon,  M.D.      US Liver    Result Date: 6/2/2022  Examination: Liver sonogram  HISTORY: 82-year-old female with a history of elevated alkaline phosphatase level  FINDINGS: Sonographic evaluation of the liver and selected structures of the right upper quadrant was performed. Comparison is made to a CT of the abdomen with contrast dated 03/01/2021.  The right kidney has a normal appearance.  Normal echotexture is seen throughout the liver without evidence for focal abnormality. No intrahepatic bile duct dilatation is appreciated. The liver measures on the order of 14.5 cm in anterior to posterior dimension. The portal vein is patent with hepatopedal flow. There is no intrahepatic bile duct dilatation. Minimal sludge within the gallbladder is noted. No gallbladder wall thickening or pericholecystic fluid is appreciated. Per the sonographer, the patient was nontender in the right upper quadrant during the examination. The common bile duct measures 0.6 cm in diameter. The visualized portion of the pancreas appears normal. A small right pleural effusion is noted.      1. Normal sonographic appearance of the liver. 2. Minimal gallbladder sludge. 3. Small right pleural effusion.  This report was finalized on 6/2/2022 9:38 AM by Dr. Hai Oh M.D.      XR Chest 1 View    Result Date: 6/1/2022  XR CHEST 1 VW-clinical: Shortness of breath  COMPARISON 03/05/2021  FINDINGS: The cardiomediastinal silhouette is stable, transvenous pacemaker in position as before. Lungs clear. No effusion or edema identified. Healing right shoulder fracture  CONCLUSION: No active disease of the chest  This report was finalized on 6/1/2022 6:55 AM by Dr. Teja Mckeon M.D.      CT Angiogram Chest    Result Date: 6/1/2022  CT ANGIOGRAM CHEST-  Radiation dose reduction techniques were utilized, including automated exposure control and exposure modulation based on body size.  Clinical: New onset shortness of breath, congestion, cough, rule out  pulmonary embolus  CT scan of the chest performed with intravenous contrast, pulmonary embolus protocol to include coronal, sagittal and three-dimensional reconstruction.  FINDINGS: No pulmonary embolus. Atherosclerotic calcification of a normal diameter aorta. There is coronary artery calcification with cardiac enlargement, pacer leads within the right heart. No pericardial effusion is demonstrated.  Proximal and mid esophagus mildly distended, portion containing an air-fluid level consistent with distal dysmotility or obstruction, the distal esophagus is collapsed, wall cannot be well evaluated.  Multifocal areas of groundglass infiltrate demonstrated within both lungs having a basilar predominance. Associated interstitial infiltrates, with some nodular areas of consolidation, most pronounced in the right lower lobe. Suspect atypical pneumonia or aspiration. Covid pneumonia not excluded.  Bilateral areas of bronchial wall thickening with mucous plugging most pronounced in the right lower lobe.  No pleural effusion or pulmonary edema. Few mildly enlarged mediastinal and hilar lymph nodes seen. There is an old right humerus fracture.  CONCLUSION: 1. Airspace disease with bronchial wall thickening and areas of mucous plugging as described above. 2. No pulmonary embolus, aortic aneurysm or dissection. 3. Cardiomegaly. 4. Abnormal esophagus, the appearance suggests distal dysmotility or obstructing lesion.     This report was finalized on 6/1/2022 10:53 AM by Dr. Teja Mckeon M.D.      Results for orders placed in visit on 01/13/21    Adult Transthoracic Echo Complete W/ Cont if Necessary Per Protocol    Interpretation Summary  · Calculated left ventricular EF = 66.8% Estimated left ventricular EF was in agreement with the calculated left ventricular EF.  · Left ventricular diastolic function was normal.  · Calculated left ventricular EF = 66.8%  · There is no evidence of pericardial effusion. .          Active  Hospital Problems    Diagnosis  POA   • **Aspiration pneumonia due to vomitus (HCC) [J69.0]  Yes   • Hypoxia [R09.02]  Yes   • Esophageal abnormality [K22.9]  Yes   • Dementia without behavioral disturbance (HCC) [F03.90]  Yes   • Lung consolidation (HCC) [J18.1]  Yes   • Type 2 diabetes mellitus, with long-term current use of insulin (HCC) [E11.9, Z79.4]  Not Applicable   • Presence of cardiac pacemaker [Z95.0]  Yes   • Essential hypertension [I10]  Yes      Resolved Hospital Problems   No resolved problems to display.         Assessment & Plan     1. Aspiration pneumonia with retained secretions/mucous plugging.  Markedly improved.  She should complete her 7 days of antibiotics today.  I think I would probably recommend sending her with as needed bronchodilators and and hypertonic saline.  She should have a follow-up CT scan in about 6 weeks to ensure complete clearing of infiltrates and airway plugging  2. Acute hypoxic respiratory failure  solved doing well on room air  3. Oropharyngeal dysphagia moderately severe per speech therapy  4. Esophageal stricture benign-appearing status post dilation  5. Dementia  6. Diabetes mellitus type 2 hypoglycemia last evening.  7. Hypertension  8. Peripheral neuropathy  9. Presence of permanent pacemaker      Plan for disposition: From a pulmonary standpoint I think she is about ready for she is okay for discharge does not look like she will need any oxygen.  Follow-up CT as recommended above will see as needed    Kurt Royal Jr, MD  06/08/22  14:10 EDT    Time:

## 2022-06-08 NOTE — PLAN OF CARE
Goal Outcome Evaluation:  Plan of Care Reviewed With: patient, daughter        Progress: improving  Outcome Evaluation: SLP add mighty shakes to patient diet. Pt had one mightly shake after lunch and refused mighty shake with dinner, pt educated on benefits of mighty shakes. Cont clear liquid, NTL diet. Mg and K+ repalced. Cont IV abx through tonight. Pt currently on RA, need 2L at HS. Wound care done to LLE. pt up to chair and BSC with ax1 and walker. pt walking in [place] today with PT. Waiting stool sample due to patient having diarrhea, supplies in room to collect sample and patient informed. daugthers at bedside.

## 2022-06-08 NOTE — PLAN OF CARE
Goal Outcome Evaluation:  Plan of Care Reviewed With:  (RN and RD)   Outcome Evaluation: Discussed SLP recommendation of nectar thick clear liquids with RN and RD. There are concerns for both risk of aspiration and risk of malnutrition and dehydration. Full liquid diet allows some puree/pudding consistencies and would not fully be appropriate for this patient due to high risk of aspiration based on her decreased ability to clear pharyngeal residue. Recommendations: continue nectar thick clear liquid diet with allowance of specified nectar thick supplements (mighty shake, nectar thick) and nectar thick soups. After discharge and with overall patient improvement, a repeat instrumental will be required prior to any upgrade given the patient’s significant oropharyngeal dysphagia.

## 2022-06-08 NOTE — CONSULTS
[facility] nurse met with patient, her 3 adult daughters, Mae, Sanjana and Carley and pt's brother,  Anamaria joined by phone. Provided pt and family with explanation of hospice and palliative services. Patient verbalized wishes to go to rehab, see if her swallow improves and then she will decide what she wants to do. Pt and family all feel referral to [facility] is most appropriate at this time. Referral sent and pt will be contacted after hospital discharge. Update given to Tasha VALDEZ.    Thank you for this referral. Please call Customer Support at [phone] with any questions.     Mae Rg RN, BSN  Referral & Admissions Coordinator  [facility]

## 2022-06-08 NOTE — THERAPY TREATMENT NOTE
Patient Name: Di Macario  : 1940    MRN: [id number]                              Today's Date: 2022       Admit Date: 2022    Visit Dx:     ICD-10-CM ICD-9-CM   1. Esophageal abnormality  K22.9 530.9   2. Acute hypoxemic respiratory failure (HCC)  J96.01 518.81   3. Viral URI with cough  J06.9 465.9     Patient Active Problem List   Diagnosis   • Essential hypertension   • Impaired memory   • Vitamin D deficiency   • Hyperuricemia   • Slow transit constipation   • Chronic pain of left knee   • Fall   • At high risk for falls   • Peripheral neuropathy   • Uncontrolled type 2 diabetes mellitus with hyperglycemia (HCC)   • Alkaline phosphatase elevation   • Walker as ambulation aid   • Bradycardia, sinus   • Shortness of breath   • Leg swelling   • Hyperlipidemia   • Presence of cardiac pacemaker   • Humeral head fracture, right, closed, initial encounter   • Type 2 diabetes mellitus, with long-term current use of insulin (HCC)   • Nail abnormalities   • Esophageal abnormality   • Dementia without behavioral disturbance (HCC)   • Lung consolidation (HCC)   • Aspiration pneumonia due to vomitus (HCC)   • Hypoxia     Past Medical History:   Diagnosis Date   • Dementia (HCC)     states better with medication    • Diabetes mellitus (HCC)    • Hyperlipidemia 2021   • Lung consolidation (HCC) 2022   • Peripheral neuropathy 2018   • Vitamin D deficiency      Past Surgical History:   Procedure Laterality Date   • ANKLE SURGERY Right    • BLADDER SURGERY      Prolapsed   • BREAST BIOPSY     • CARDIAC ELECTROPHYSIOLOGY PROCEDURE N/A 3/5/2021    Procedure: Pacemaker DC new [place];  Surgeon: Madelin Ferguson MD;  Location: [facility] CATH INVASIVE LOCATION;  Service: Cardiology;  Laterality: N/A;   • ENDOSCOPY N/A 2022    Procedure: ESOPHAGOGASTRODUODENOSCOPY with biopsy, balloon dilation;  Surgeon: Lo Benjamin MD;  Location: [facility] ENDOSCOPY;  Service: Gastroenterology;   Laterality: N/A;  esophageal stricture, hiatal hernia, gastritis   • HYSTERECTOMY        General Information     Row Name 06/08/22 1210          Physical Therapy Time and Intention    Document Type therapy note (daily note)  -EE     Mode of Treatment physical therapy;individual therapy  -EE     Row Name 06/08/22 1210          General Information    Existing Precautions/Restrictions fall  -EE     Row Name 06/08/22 1210          Cognition    Orientation Status (Cognition) oriented x 3  -EE     Row Name 06/08/22 1210          Safety Issues, Functional Mobility    Impairments Affecting Function (Mobility) balance;endurance/activity tolerance;strength;shortness of breath  -EE           User Key  (r) = Recorded By, (t) = Taken By, (c) = Cosigned By    Initials Name Provider Type    EE Debra Coker PT Physical Therapist               Mobility     Row Name 06/08/22 1210          Bed Mobility    Comment, (Bed Mobility) not tested; pt up in chair  -EE     Row Name 06/08/22 1210          Sit-Stand Transfer    Sit-Stand [place] (Transfers) contact guard;verbal cues  -EE     Assistive Device (Sit-Stand Transfers) walker, front-wheeled  -EE     Comment, (Sit-Stand Transfer) vc's for hand placement  -EE     Row Name 06/08/22 1210          Gait/Stairs (Locomotion)    [place] Level (Gait) contact guard;verbal cues  -EE     Assistive Device (Gait) walker, front-wheeled  -EE     Distance in Feet (Gait) 150' x 1  -EE     Deviations/Abnormal Patterns (Gait) jh decreased;stride length decreased  -EE     Comment, (Gait/Stairs) vc's to increased stride length; 1 standing rest break due to SOA, SpO2 checked and 95% on room air  -EE           User Key  (r) = Recorded By, (t) = Taken By, (c) = Cosigned By    Initials Name Provider Type    EE Debra Coker, JOY Physical Therapist               Obj/Interventions     Row Name 06/08/22 1211          Motor Skills    Therapeutic Exercise other (see comments)  seated B ankle pumps,  LAQ, marching while seated x 10 reps  -EE           User Key  (r) = Recorded By, (t) = Taken By, (c) = Cosigned By    Initials Name Provider Type    Debra Loera PT Physical Therapist               Goals/Plan    No documentation.                Clinical Impression     Row Name 06/08/22 1211          Pain    Pretreatment Pain Rating 0/10 - no pain  -EE     Posttreatment Pain Rating 0/10 - no pain  -EE     Row Name 06/08/22 1211          Plan of Care Review    Plan of Care Reviewed With patient  -EE     Progress improving  -EE     Outcome Evaluation Pt demonstrates improved strength and endurance, as evidenced by tolerance of increased ambulation distance. Pt somewhat SOA with activity; however, SpO2 remained >= 95% while ambulating on room air. No new PT concerns; pt will continue to benefit from PT for  ongoing strengthening and mobility training.  -EE     Row Name 06/08/22 1211          Vital Signs    Pre SpO2 (%) 97  -EE     O2 Delivery Pre Treatment room air  -EE     Intra SpO2 (%) 95  -EE     O2 Delivery Intra Treatment room air  -EE     Post SpO2 (%) 96  -EE     O2 Delivery Post Treatment room air  -EE     Pre Patient Position Sitting  -EE     Intra Patient Position Standing  -EE     Post Patient Position Sitting  -EE     Row Name 06/08/22 1211          Positioning and Restraints    Pre-Treatment Position sitting in chair/recliner  -EE     Post Treatment Position chair  -EE     In Chair reclined;call light within reach;encouraged to call for assist;exit alarm on;legs elevated  -EE           User Key  (r) = Recorded By, (t) = Taken By, (c) = Cosigned By    Initials Name Provider Type    Debra Loera PT Physical Therapist               Outcome Measures     Row Name 06/08/22 1213          How much help from another person do you currently need...    Turning from your back to your side while in flat bed without using bedrails? 4  -EE     Moving from lying on back to sitting on the side of a flat bed without  bedrails? 3  -EE     Moving to and from a bed to a chair (including a wheelchair)? 3  -EE     Standing up from a chair using your arms (e.g., wheelchair, bedside chair)? 3  -EE     Climbing 3-5 steps with a railing? 3  -EE     To walk in hospital room? 3  -EE     AM-PAC 6 Clicks Score (PT) 19  -EE     Highest level of mobility 6 --> Walked 10 steps or more  -EE           User Key  (r) = Recorded By, (t) = Taken By, (c) = Cosigned By    Initials Name Provider Type    EE Debra Coker, PT Physical Therapist                             Physical Therapy Education                 Title: PT OT SLP Therapies (In Progress)     Topic: Physical Therapy (Done)     Point: Mobility training (Done)     Learning Progress Summary           Patient Acceptance, E, VU,NR by EE at 6/8/2022 1215    Acceptance, E,TB, VU,NR by CB at 6/3/2022 1200    Acceptance, E,TB, VU,NR by CB at 6/2/2022 1009    Acceptance, E, VU by CL at 6/1/2022 1624   Family Acceptance, E, VU by CL at 6/1/2022 1624                   Point: Home exercise program (Done)     Learning Progress Summary           Patient Acceptance, E, VU,NR by EE at 6/8/2022 1215    Acceptance, E,TB, VU,NR by CB at 6/3/2022 1200    Acceptance, E, VU by CL at 6/1/2022 1624   Family Acceptance, E, VU by CL at 6/1/2022 1624                   Point: Body mechanics (Done)     Learning Progress Summary           Patient Acceptance, E,TB, VU,NR by CB at 6/3/2022 1200    Acceptance, E, VU by CL at 6/1/2022 1624   Family Acceptance, E, VU by CL at 6/1/2022 1624                   Point: Precautions (Done)     Learning Progress Summary           Patient Acceptance, E,TB, VU,NR by CB at 6/2/2022 1009    Acceptance, E, VU by CL at 6/1/2022 1624   Family Acceptance, E, VU by CL at 6/1/2022 1624                               User Key     Initials Effective Dates Name Provider Type Discipline    EE 06/16/21 -  Debra Coker, PT Physical Therapist PT    CL 06/16/21 -  Maribel Montilla, RN Registered Nurse  Nurse    CB 10/22/21 -  Maryana Johnson, PT Physical Therapist PT              PT Recommendation and Plan     Plan of Care Reviewed With: patient  Progress: improving  Outcome Evaluation: Pt demonstrates improved strength and endurance, as evidenced by tolerance of increased ambulation distance. Pt somewhat SOA with activity; however, SpO2 remained >= 95% while ambulating on room air. No new PT concerns; pt will continue to benefit from PT for  ongoing strengthening and mobility training.     Time Calculation:    PT Charges     Row Name 06/08/22 1215             Time Calculation    Start Time 1012  -EE      Stop Time 1024  -EE      Time Calculation (min) 12 min  -EE      PT Received On 06/08/22  -EE      PT - Next Appointment 06/09/22  -EE              Time Calculation- PT    Total Timed Code Minutes- PT 12 minute(s)  -EE              Timed Charges    54309 - PT Therapeutic Exercise Minutes 12  -EE              Total Minutes    Timed Charges Total Minutes 12  -EE       Total Minutes 12  -EE            User Key  (r) = Recorded By, (t) = Taken By, (c) = Cosigned By    Initials Name Provider Type    EE Debra Coker, JOY Physical Therapist              Therapy Charges for Today     Code Description Service Date Service Provider Modifiers Qty    50836961930 HC PT THER PROC EA 15 MIN 6/8/2022 Debra Coker, PT GP 1          PT G-Codes  Outcome Measure Options: AM-PAC 6 Clicks Basic Mobility (PT)  AM-PAC 6 Clicks Score (PT): 19  AM-PAC 6 Clicks Score (OT): 18      Patient was wearing a face mask during this therapy encounter. Therapist used appropriate personal protective equipment including mask and gloves.  Mask used was standard procedure mask. Appropriate PPE was worn during the entire therapy session. Hand hygiene was completed before and after therapy session. Patient is not in enhanced droplet precautions.     Debra Coker PT  6/8/2022

## 2022-06-08 NOTE — PROGRESS NOTES
[facility]  Inpatient Progress Note    Reason for Follow Up:  Dysphagia, abnormal esophagram    Subjective     Interval History:   She feels a little better since dilation.  No heartburn.  She does not like the thickened liquids    Current Facility-Administered Medications:   •  acetaminophen (TYLENOL) tablet 650 mg, 650 mg, Oral, Q4H PRN **OR** acetaminophen (TYLENOL) 160 MG/5ML solution 650 mg, 650 mg, Oral, Q4H PRN **OR** acetaminophen (TYLENOL) suppository 650 mg, 650 mg, Rectal, Q4H PRN, Lo Benjamin MD  •  amLODIPine (NORVASC) tablet 5 mg, 5 mg, Oral, Q24H, Aj Owens MD, 5 mg at 06/08/22 0915  •  ampicillin-sulbactam (UNASYN) 3 g in sodium chloride 0.9 % 100 mL IVPB-VTB, 3 g, Intravenous, Q6H, Aj Owens MD, 3 g at 06/08/22 0916  •  dextrose (D50W) (25 g/50 mL) IV injection 25 g, 25 g, Intravenous, Q15 Min PRN, Lo Benjamin MD  •  dextrose (GLUTOSE) oral gel 15 g, 15 g, Oral, Q15 Min PRN, Lo Benjamin MD, 15 g at 06/07/22 1924  •  donepezil (ARICEPT) tablet 10 mg, 10 mg, Oral, Nightly, Lo Benjamin MD, 10 mg at 06/07/22 2101  •  Enoxaparin Sodium (LOVENOX) syringe 40 mg, 40 mg, Subcutaneous, Q24H, Lo Benjamin MD, 40 mg at 06/08/22 1427  •  famotidine (PEPCID) tablet 40 mg, 40 mg, Oral, BID AC, Lo Benjamin MD, 40 mg at 06/08/22 0605  •  glucagon (human recombinant) (GLUCAGEN DIAGNOSTIC) injection 1 mg, 1 mg, Subcutaneous, Q15 Min PRN, Lo Benjamin MD  •  guaiFENesin (MUCINEX) 12 hr tablet 1,200 mg, 1,200 mg, Oral, Q12H, Lo Benjamin MD, 1,200 mg at 06/08/22 0915  •  insulin lispro (ADMELOG) injection 0-7 Units, 0-7 Units, Subcutaneous, TID AC, Aj Owens MD, 3 Units at 06/08/22 0915  •  ipratropium-albuterol (DUO-NEB) nebulizer solution 3 mL, 3 mL, Nebulization, 4x Daily - RT, Lo Benjamin MD, 3 mL at 06/08/22 0640  •  nitroglycerin (NITROSTAT) SL tablet 0.4 mg, 0.4 mg, Sublingual, Q5 Min PRN, Lo Benjamin,  MD  •  ondansetron (ZOFRAN) tablet 4 mg, 4 mg, Oral, Q6H PRN, 4 mg at 06/03/22 1841 **OR** ondansetron (ZOFRAN) injection 4 mg, 4 mg, Intravenous, Q6H PRN, Lo Benjamin MD  •  potassium chloride (K-DUR,KLOR-CON) ER tablet 40 mEq, 40 mEq, Oral, PRN, 40 mEq at 06/08/22 1427 **OR** potassium chloride (KLOR-CON) packet 40 mEq, 40 mEq, Oral, PRN **OR** potassium chloride 10 mEq in 100 mL IVPB, 10 mEq, Intravenous, Q1H PRN, Aj Owens MD  •  pravastatin (PRAVACHOL) tablet 40 mg, 40 mg, Oral, Q PM, Lo Benjamin MD, 40 mg at 06/07/22 1629  •  sodium chloride 7 % nebulizer solution nebulizer solution 4 mL, 4 mL, Nebulization, BID - RT, Lo Benjamin MD, 4 mL at 06/08/22 0640  •  zonisamide (ZONEGRAN) capsule 200 mg, 200 mg, Oral, Nightly, Lo Benjamin MD, 200 mg at 06/07/22 2101  Review of Systems:    Needed for nausea or vomiting, negative for fevers or chills    Objective     Vital Signs  Temp:  [97.2 °F (36.2 °C)-97.4 °F (36.3 °C)] 97.2 °F (36.2 °C)  Heart Rate:  [60-81] 60  Resp:  [16-20] 18  BP: (133-169)/(63-80) 133/71  Body mass index is 25.69 kg/m².    Intake/Output Summary (Last 24 hours) at 6/8/2022 1440  Last data filed at 6/8/2022 0845  Gross per 24 hour   Intake 680 ml   Output --   Net 680 ml     I/O this shift:  In: 240 [P.O.:240]  Out: -      Physical Exam:   General: patient awake, alert and cooperative   Eyes: Normal lids and lashes, no scleral icterus   Neck: supple, normal ROM   Skin: warm and dry, not jaundiced   Pulm:  regular and unlabored   Abdomen: soft, nontender, nondistended   Psychiatric: Normal mood and behavior; memory intact     Results Review:     I reviewed the patient's new clinical results.    Results from last 7 days   Lab Units 06/08/22  0459 06/04/22  0405 06/03/22  0404   WBC 10*3/mm3 7.02 11.84* 12.27*   HEMOGLOBIN g/dL 10.3* 9.5* 10.2*   HEMATOCRIT % 31.5* 29.5* 31.3*   PLATELETS 10*3/mm3 296 209 211     Results from last 7 days   Lab Units 06/08/22  0459  06/06/22  0526 06/05/22  0405   SODIUM mmol/L 142 142 143   POTASSIUM mmol/L 2.8* 3.5 3.2*   CHLORIDE mmol/L 104 105 109*   CO2 mmol/L 24.9 21.0* 21.0*   BUN mg/dL 6* 10 14   CREATININE mg/dL 0.45* 0.53* 0.57   CALCIUM mg/dL 8.6 9.1 9.0   BILIRUBIN mg/dL 0.2  --   --    ALK PHOS U/L 126*  --   --    ALT (SGPT) U/L 14  --   --    AST (SGOT) U/L 9  --   --    GLUCOSE mg/dL 184* 143* 166*         Lab Results   Lab Value Date/Time    LIPASE 59 06/01/2022 0604    LIPASE 163 (H) 03/01/2021 1225       Radiology:  FL Video Swallow With Speech Single Contrast   Final Result      US Liver   Final Result   1. Normal sonographic appearance of the liver.   2. Minimal gallbladder sludge.   3. Small right pleural effusion.       This report was finalized on 6/2/2022 9:38 AM by Dr. Hai Oh M.D.          CT Angiogram Chest   Final Result      XR Chest 1 View   Final Result          Assessment & Plan     Patient Active Problem List   Diagnosis   • Essential hypertension   • Impaired memory   • Vitamin D deficiency   • Hyperuricemia   • Slow transit constipation   • Chronic pain of left knee   • Fall   • At high risk for falls   • Peripheral neuropathy   • Uncontrolled type 2 diabetes mellitus with hyperglycemia (HCC)   • Alkaline phosphatase elevation   • Walker as ambulation aid   • Bradycardia, sinus   • Shortness of breath   • Leg swelling   • Hyperlipidemia   • Presence of cardiac pacemaker   • Humeral head fracture, right, closed, initial encounter   • Type 2 diabetes mellitus, with long-term current use of insulin (HCC)   • Nail abnormalities   • Esophageal abnormality   • Dementia without behavioral disturbance (HCC)   • Lung consolidation (HCC)   • Aspiration pneumonia due to vomitus (HCC)   • Hypoxia       Assessment:  1. Chronic dysphagia with significant oropharyngeal component status post EGD with findings of benign.  Distal esophageal stricture status post dilatation, gastritis and small hiatal  hernia.  2. Hypoxic respiratory failure  3. Aspiration pneumonia suspected secondary to oral pharyngeal dysphagia.         Plan:  · 40 mg famotidine p.o. twice daily  · Diet per SLP recommendations given significant oral pharyngeal dysphagia.  · Continue supportive care.  · No further inpatient recs at this time-we will sign off but are available as needed    I discussed the patients findings and my recommendations with patient.    Lo Benjamin MD

## 2022-06-09 VITALS
RESPIRATION RATE: 18 BRPM | DIASTOLIC BLOOD PRESSURE: 96 MMHG | HEIGHT: 63 IN | TEMPERATURE: 97.9 F | OXYGEN SATURATION: 96 % | BODY MASS INDEX: 25.55 KG/M2 | HEART RATE: 60 BPM | SYSTOLIC BLOOD PRESSURE: 134 MMHG | WEIGHT: 144.2 LBS

## 2022-06-09 LAB
ANION GAP SERPL CALCULATED.3IONS-SCNC: 9.7 MMOL/L (ref 5–15)
BUN SERPL-MCNC: 7 MG/DL (ref 8–23)
BUN/CREAT SERPL: 14 (ref 7–25)
CALCIUM SPEC-SCNC: 8.9 MG/DL (ref 8.6–10.5)
CHLORIDE SERPL-SCNC: 108 MMOL/L (ref 98–107)
CO2 SERPL-SCNC: 23.3 MMOL/L (ref 22–29)
CREAT SERPL-MCNC: 0.5 MG/DL (ref 0.57–1)
DEPRECATED RDW RBC AUTO: 45.9 FL (ref 37–54)
EGFRCR SERPLBLD CKD-EPI 2021: 93.2 ML/MIN/1.73
ERYTHROCYTE [DISTWIDTH] IN BLOOD BY AUTOMATED COUNT: 14.6 % (ref 12.3–15.4)
GLUCOSE BLDC GLUCOMTR-MCNC: 138 MG/DL (ref 70–130)
GLUCOSE BLDC GLUCOMTR-MCNC: 190 MG/DL (ref 70–130)
GLUCOSE SERPL-MCNC: 190 MG/DL (ref 65–99)
HCT VFR BLD AUTO: 32.2 % (ref 34–46.6)
HGB BLD-MCNC: 10.4 G/DL (ref 12–15.9)
MAGNESIUM SERPL-MCNC: 1.9 MG/DL (ref 1.6–2.4)
MCH RBC QN AUTO: 28.1 PG (ref 26.6–33)
MCHC RBC AUTO-ENTMCNC: 32.3 G/DL (ref 31.5–35.7)
MCV RBC AUTO: 87 FL (ref 79–97)
PLATELET # BLD AUTO: 306 10*3/MM3 (ref 140–450)
PMV BLD AUTO: 9.9 FL (ref 6–12)
POTASSIUM SERPL-SCNC: 4.5 MMOL/L (ref 3.5–5.2)
RBC # BLD AUTO: 3.7 10*6/MM3 (ref 3.77–5.28)
SARS-COV-2 RNA PNL SPEC NAA+PROBE: NOT DETECTED
SODIUM SERPL-SCNC: 141 MMOL/L (ref 136–145)
WBC NRBC COR # BLD: 7.94 10*3/MM3 (ref 3.4–10.8)

## 2022-06-09 PROCEDURE — 94799 UNLISTED PULMONARY SVC/PX: CPT

## 2022-06-09 PROCEDURE — 94664 DEMO&/EVAL PT USE INHALER: CPT

## 2022-06-09 PROCEDURE — 63710000001 INSULIN LISPRO (HUMAN) PER 5 UNITS: Performed by: HOSPITALIST

## 2022-06-09 PROCEDURE — 94669 MECHANICAL CHEST WALL OSCILL: CPT

## 2022-06-09 PROCEDURE — 85027 COMPLETE CBC AUTOMATED: CPT | Performed by: HOSPITALIST

## 2022-06-09 PROCEDURE — 83735 ASSAY OF MAGNESIUM: CPT | Performed by: HOSPITALIST

## 2022-06-09 PROCEDURE — 82962 GLUCOSE BLOOD TEST: CPT

## 2022-06-09 PROCEDURE — 80048 BASIC METABOLIC PNL TOTAL CA: CPT | Performed by: HOSPITALIST

## 2022-06-09 PROCEDURE — 87635 SARS-COV-2 COVID-19 AMP PRB: CPT | Performed by: HOSPITALIST

## 2022-06-09 PROCEDURE — 94761 N-INVAS EAR/PLS OXIMETRY MLT: CPT

## 2022-06-09 RX ORDER — GUAIFENESIN 600 MG/1
1200 TABLET, EXTENDED RELEASE ORAL EVERY 12 HOURS SCHEDULED
Start: 2022-06-09 | End: 2022-07-06

## 2022-06-09 RX ORDER — INSULIN LISPRO 100 [IU]/ML
0-7 INJECTION, SOLUTION INTRAVENOUS; SUBCUTANEOUS
Refills: 12
Start: 2022-06-09 | End: 2022-07-06

## 2022-06-09 RX ORDER — IPRATROPIUM BROMIDE AND ALBUTEROL SULFATE 2.5; .5 MG/3ML; MG/3ML
3 SOLUTION RESPIRATORY (INHALATION)
Qty: 360 ML
Start: 2022-06-09 | End: 2022-07-06

## 2022-06-09 RX ORDER — AMLODIPINE BESYLATE 5 MG/1
5 TABLET ORAL
Start: 2022-06-10 | End: 2022-07-06 | Stop reason: SDUPTHER

## 2022-06-09 RX ORDER — SODIUM CHLORIDE FOR INHALATION 7 %
4 VIAL, NEBULIZER (ML) INHALATION
Start: 2022-06-09 | End: 2022-07-06

## 2022-06-09 RX ORDER — FAMOTIDINE 40 MG/1
40 TABLET, FILM COATED ORAL
Start: 2022-06-09 | End: 2022-07-06

## 2022-06-09 RX ADMIN — SODIUM CHLORIDE SOLN NEBU 7% 4 ML: 7 NEBU SOLN at 07:29

## 2022-06-09 RX ADMIN — IPRATROPIUM BROMIDE AND ALBUTEROL SULFATE 3 ML: 2.5; .5 SOLUTION RESPIRATORY (INHALATION) at 11:20

## 2022-06-09 RX ADMIN — IPRATROPIUM BROMIDE AND ALBUTEROL SULFATE 3 ML: 2.5; .5 SOLUTION RESPIRATORY (INHALATION) at 07:29

## 2022-06-09 RX ADMIN — FAMOTIDINE 40 MG: 40 TABLET, FILM COATED ORAL at 10:08

## 2022-06-09 RX ADMIN — GUAIFENESIN 1200 MG: 600 TABLET, EXTENDED RELEASE ORAL at 10:08

## 2022-06-09 RX ADMIN — AMLODIPINE BESYLATE 5 MG: 5 TABLET ORAL at 10:08

## 2022-06-09 RX ADMIN — INSULIN LISPRO 2 UNITS: 100 INJECTION, SOLUTION INTRAVENOUS; SUBCUTANEOUS at 10:08

## 2022-06-09 NOTE — PLAN OF CARE
Goal Outcome Evaluation:pt discharged to [facility]. Report called to Mile RN-family at side  Dressing change to left lower leg as ordered  Safety maintained this shift

## 2022-06-09 NOTE — CASE MANAGEMENT/SOCIAL WORK
Continued Stay Note  [facility]     Patient Name: Di Macario  MRN: [id number]  Today's Date: 6/9/2022    Admit Date: 6/1/2022     Discharge Plan     Row Name 06/09/22 0911       Plan    Plan [facility]    Patient/Family in Agreement with Plan yes    Plan Comments CCP noted discharge orders. Per nursing, patient requiring supplemental oxygen again this morning. [facility]  van scheduled for 12 pm. Updated daughters Blossom and Carley of discharge and transport time. [facility] aware of discharge and COVID test ordered. Ly HARPER, RN/CCP               Discharge Codes    No documentation.               Expected Discharge Date and Time     Expected Discharge Date Expected Discharge Time    Jun 9, 2022             Mayela Kelsey

## 2022-06-09 NOTE — CASE MANAGEMENT/SOCIAL WORK
Case Management Discharge Note      Final Note: [facility]         Selected Continued Care - Discharged on 6/9/2022 Admission date: 6/1/2022 - Discharge disposition: Skilled Nursing Facility (DC - External)    Destination Coordination complete.    Service Provider Selected Services Address Phone Fax Patient Preferred    [facility][note 3][address] [facility] [id number] [phone] --          Durable Medical Equipment    No services have been selected for the patient.              Dialysis/Infusion    No services have been selected for the patient.              Home Medical Care    No services have been selected for the patient.              Therapy    No services have been selected for the patient.              Community Resources    No services have been selected for the patient.              Community & DME    No services have been selected for the patient.                  Transportation Services  W[facility]: [facility]    Final Discharge Disposition Code: 03 - skilled nursing facility (SNF)

## 2022-06-09 NOTE — SIGNIFICANT NOTE
06/09/22 0906   OTHER   Discipline physical therapist   Rehab Time/Intention   Session Not Performed   (Noted DC order.  Pt planning to DC to SNF.  No further acute PT needed prior to DC.)

## 2022-06-09 NOTE — DISCHARGE SUMMARY
Patient Name: Di Macario  : 1940  MRN: [id number]    Date of Admission: 2022  Date of Discharge:  2022  Primary Care Physician: Lily Yepez PA-C      Chief Complaint:   Vomiting      Discharge Diagnoses     Active Hospital Problems    Diagnosis  POA   • **Aspiration pneumonia due to vomitus (HCC) [J69.0]  Yes   • Hypoxia [R09.02]  Yes   • Esophageal abnormality [K22.9]  Yes   • Dementia without behavioral disturbance (HCC) [F03.90]  Yes   • Lung consolidation (HCC) [J18.1]  Yes   • Type 2 diabetes mellitus, with long-term current use of insulin (HCC) [E11.9, Z79.4]  Not Applicable   • Presence of cardiac pacemaker [Z95.0]  Yes   • Essential hypertension [I10]  Yes      Resolved Hospital Problems   No resolved problems to display.        Hospital Course     82 y.o. female admitted from home with aspiration pneumonia due to vomitus. Please see below for details of admission:     Aspiration pneumonia/pneumonitis: Completed 7d course of Unaysn, continue pulmonary toilet, appreciate Pulm attention to pt. She is at risk for continued aspiration, family aware.     Hypoxia: Secondary to above. Weaned down to RA at present. Might require nocturnal O2--defer to staff at facility.     Mucous plugging (on CT): continue pulmonary toilet. [facility] F/u CT Chest in 6 weeks with Dr. Royal (Pulm).     OP Dysphagia, mod severe: S/p VFSS. Modified diet per SLP. Continue speech therapy at facility and can possibly upgrade diet in the future if swallow improves.     Esophageal stricture and gastritis: S/p EGD with dilatation . GI evaluation appreciated. Continue BID Pepcid.     Dementia: stable at baseline. Continue Aricept.     DM type 2: Sugars better today. A1c 7.9. Continue SSI and Trulicity at discharge. Lantus on hold here due to some hypoglycemia.     Hypertension: restarted amlodipine, BPs improved today, continue to monitor at facility.     Presence of cardiac  pacer     Hypokalemia/Hypomagnesemia: s/p replacement. Both are wnl today.     Diarrhea: GI PCR negative, doubt infectious etiology, wonder if due to her Aricept?, it is not severe--no BM since yesterday, may be contributing to her hypoK+ though, f/u with medical staff at facility, may need to see GI as outpt        · Lovenox 40 mg SC daily for DVT prophylaxis while here.  · Full code confirmed with pt and family.  · Discussed with patient, nursing staff, CCP and care team on multidisciplinary rounds. No family present.  · Discharge to SNU facility today.       Day of Discharge     Subjective:  Feeling okay today. Doesn't like her modified diet. PO intake is erratic. Voiding well. Breathing fine today. No diarrhea today. No F/C/NS. No N/V. Very eager for discharge.    Physical Exam:  Temp:  [97.2 °F (36.2 °C)-98.4 °F (36.9 °C)] 97.9 °F (36.6 °C)  Heart Rate:  [60-71] 71  Resp:  [18-20] 18  BP: (131-142)/(56-96) 134/96  Body mass index is 25.54 kg/m².  Physical Exam  Vitals and nursing note reviewed.   Constitutional:       General: She is not in acute distress.     Appearance: She is ill-appearing (chronically). She is not toxic-appearing or diaphoretic.   Cardiovascular:      Rate and Rhythm: Normal rate and regular rhythm.      Pulses: Normal pulses.      Heart sounds: Normal heart sounds.   Pulmonary:      Effort: Pulmonary effort is normal. No respiratory distress.      Breath sounds: Normal breath sounds. No wheezing or rales.   Abdominal:      General: Bowel sounds are normal. There is no distension.      Palpations: Abdomen is soft.      Tenderness: There is no abdominal tenderness.   Musculoskeletal:         General: No swelling or deformity. Normal range of motion.   Skin:     General: Skin is warm and dry.   Neurological:      General: No focal deficit present.      Mental Status: She is alert. Mental status is at baseline.      Cranial Nerves: No cranial nerve deficit.      Coordination: Coordination  normal.      Comments: Oriented to person         Consultants     Consult Orders (all) (From admission, onward)     Start     Ordered    06/07/22 1635  Inpatient Hospice / [facility] Consult  Once        Specialty:  Hospice and Palliative Medicine  Provider:  (Not yet assigned)    06/07/22 1634    06/04/22 1111  Inpatient Palliative Care Nurse Consult  Once        Provider:  (Not yet assigned)    06/04/22 1110    06/04/22 0000  Inpatient Pulmonology Consult  Once        Specialty:  Pulmonary Disease  Provider:  Carson Kim MD    06/03/22 2012 06/01/22 1621  Inpatient Case Management  Consult  Once        Provider:  (Not yet assigned)    06/01/22 1620    06/01/22 1358  Inpatient Gastroenterology Consult  Once        Specialty:  Gastroenterology  Provider:  Rj Negro MD    06/01/22 1358    06/01/22 1106  [facility] (on-call MD unless specified) Details  Once        Specialty:  Hospitalist  Provider:  (Not yet assigned)    06/01/22 1105    Signed and Held  Inpatient Pulmonology Consult  Once,   Status:  Canceled        Specialty:  Pulmonary Disease  Provider:  (Not yet assigned)    Signed and Held    Signed and Held  Inpatient Gastroenterology Consult  Once,   Status:  Canceled        Specialty:  Gastroenterology  Provider:  (Not yet assigned)    Signed and Held              Procedures     ESOPHAGOGASTRODUODENOSCOPY with biopsy, balloon dilation      Imaging Results (All)     Procedure Component Value Units Date/Time    FL Video Swallow With Speech Single Contrast [[id number]] Collected: 06/03/22 1407     Updated: 06/03/22 1410    Narrative:      VIDEO SWALLOWING EXAMINATION BY SPEECH PATHOLOGY     Clinical: Dysphasia     Video swallowing examination performed under the direction of speech  pathology. Imaging reviewed by radiologist who concurs with the  findings.     Speech pathology summary:Radiologist present: Dr. Mckeon. Penetration  observed with thins and nectar. Moderate-severe  pharyngeal residue post  swallow with solids. Pt at risk for aspiration from residue.     FLUOROSCOPY TIME: 3 minutes 10 seconds, 5367 images.     This report was finalized on 6/3/2022 2:07 PM by Dr. Teja Mckeon M.D.        Liver [[id number]] Collected: 06/02/22 0928     Updated: 06/02/22 0948    Narrative:      Examination: Liver sonogram     HISTORY: 82-year-old female with a history of elevated alkaline  phosphatase level     FINDINGS: Sonographic evaluation of the liver and selected structures of  the right upper quadrant was performed. Comparison is made to a CT of  the abdomen with contrast dated 03/01/2021.  The right kidney has a  normal appearance.  Normal echotexture is seen throughout the liver  without evidence for focal abnormality. No intrahepatic bile duct  dilatation is appreciated. The liver measures on the order of 14.5 cm in  anterior to posterior dimension. The portal vein is patent with  hepatopedal flow. There is no intrahepatic bile duct dilatation. Minimal  sludge within the gallbladder is noted. No gallbladder wall thickening  or pericholecystic fluid is appreciated. Per the sonographer, the  patient was nontender in the right upper quadrant during the  examination. The common bile duct measures 0.6 cm in diameter. The  visualized portion of the pancreas appears normal. A small right pleural  effusion is noted.       Impression:      1. Normal sonographic appearance of the liver.  2. Minimal gallbladder sludge.  3. Small right pleural effusion.     This report was finalized on 6/2/2022 9:38 AM by Dr. Hia Oh M.D.       CT Angiogram Chest [[id number]] Collected: 06/01/22 1042     Updated: 06/01/22 1056    Narrative:      CT ANGIOGRAM CHEST-     Radiation dose reduction techniques were utilized, including automated  exposure control and exposure modulation based on body size.     Clinical: New onset shortness of breath, congestion, cough, rule out  pulmonary embolus     CT scan of  the chest performed with intravenous contrast, pulmonary  embolus protocol to include coronal, sagittal and three-dimensional  reconstruction.     FINDINGS: No pulmonary embolus. Atherosclerotic calcification of a  normal diameter aorta. There is coronary artery calcification with  cardiac enlargement, pacer leads within the right heart. No pericardial  effusion is demonstrated.     Proximal and mid esophagus mildly distended, portion containing an  air-fluid level consistent with distal dysmotility or obstruction, the  distal esophagus is collapsed, wall cannot be well evaluated.     Multifocal areas of groundglass infiltrate demonstrated within both  lungs having a basilar predominance. Associated interstitial  infiltrates, with some nodular areas of consolidation, most pronounced  in the right lower lobe. Suspect atypical pneumonia or aspiration. Covid  pneumonia not excluded.     Bilateral areas of bronchial wall thickening with mucous plugging most  pronounced in the right lower lobe.     No pleural effusion or pulmonary edema. Few mildly enlarged mediastinal  and hilar lymph nodes seen. There is an old right humerus fracture.     CONCLUSION:  1. Airspace disease with bronchial wall thickening and areas of mucous  plugging as described above.  2. No pulmonary embolus, aortic aneurysm or dissection.  3. Cardiomegaly.  4. Abnormal esophagus, the appearance suggests distal dysmotility or  obstructing lesion.              This report was finalized on 6/1/2022 10:53 AM by Dr. Teja Mckeon M.D.       XR Chest 1 View [[id number]] Collected: 06/01/22 0654     Updated: 06/01/22 0658    Narrative:      XR CHEST 1 VW-clinical: Shortness of breath     COMPARISON 03/05/2021     FINDINGS: The cardiomediastinal silhouette is stable, transvenous  pacemaker in position as before. Lungs clear. No effusion or edema  identified. Healing right shoulder fracture     CONCLUSION: No active disease of the chest     This report was  finalized on 6/1/2022 6:55 AM by Dr. Teja Mckeon M.D.           Results for orders placed during the hospital encounter of 05/21/21    Doppler Ankle Brachial Index Single Level CAR    Interpretation Summary  · Right Conclusion: The right SUYAPA is unable to be assessed due to vessel incompressibility, but has normal waveforms.Normal digital pressures.  · Left Conclusion: The left SUYAPA is normal. Unable to assess digital perfusion due to vessel incompressibility but waveforms are normal.    Results for orders placed in visit on 01/13/21    Adult Transthoracic Echo Complete W/ Cont if Necessary Per Protocol    Interpretation Summary  · Calculated left ventricular EF = 66.8% Estimated left ventricular EF was in agreement with the calculated left ventricular EF.  · Left ventricular diastolic function was normal.  · Calculated left ventricular EF = 66.8%  · There is no evidence of pericardial effusion. .    Pertinent Labs     Results from last 7 days   Lab Units 06/09/22  0512 06/08/22  0459 06/04/22  0405 06/03/22  0404   WBC 10*3/mm3 7.94 7.02 11.84* 12.27*   HEMOGLOBIN g/dL 10.4* 10.3* 9.5* 10.2*   PLATELETS 10*3/mm3 306 296 209 211     Results from last 7 days   Lab Units 06/09/22  0512 06/08/22  2309 06/08/22  0459 06/06/22  0526 06/05/22  0405   SODIUM mmol/L 141  --  142 142 143   POTASSIUM mmol/L 4.5 4.7 2.8* 3.5 3.2*   CHLORIDE mmol/L 108*  --  104 105 109*   CO2 mmol/L 23.3  --  24.9 21.0* 21.0*   BUN mg/dL 7*  --  6* 10 14   CREATININE mg/dL 0.50*  --  0.45* 0.53* 0.57   GLUCOSE mg/dL 190*  --  184* 143* 166*   EGFR mL/min/1.73 93.2  --  95.6 91.9 90.3     Results from last 7 days   Lab Units 06/08/22  0459   ALBUMIN g/dL 2.60*   BILIRUBIN mg/dL 0.2   ALK PHOS U/L 126*   AST (SGOT) U/L 9   ALT (SGPT) U/L 14     Results from last 7 days   Lab Units 06/09/22  0512 06/08/22  0459 06/06/22  0526 06/05/22  0405   CALCIUM mg/dL 8.9 8.6 9.1 9.0   ALBUMIN g/dL  --  2.60*  --   --    MAGNESIUM mg/dL 1.9 1.6  --   --                 Invalid input(s): LDLCALC      Results from last 7 days   Lab Units 06/06/22  0857   COVID19  Not Detected       Test Results Pending at Discharge     Pending Labs     Order Current Status    COVID PRE-OP / PRE-PROCEDURE SCREENING ORDER (NO ISOLATION) - Swab, Nasopharynx In process    COVID-19, [facility] IN-HOUSE CEPHEID/YANELIS NP SWAB IN TRANSPORT MEDIA 8-12 HR TAT - Swab, Nasopharynx In process          Discharge Details        Discharge Medications      New Medications      Instructions Start Date   amLODIPine 5 MG tablet  Commonly known as: NORVASC   5 mg, Oral, Every 24 Hours Scheduled   Start Date: Eve 10, 2022     famotidine 40 MG tablet  Commonly known as: PEPCID   40 mg, Oral, 2 Times Daily Before Meals      guaiFENesin 600 MG 12 hr tablet  Commonly known as: MUCINEX   1,200 mg, Oral, Every 12 Hours Scheduled      insulin lispro 100 UNIT/ML injection  Commonly known as: ADMELOG   0-7 Units, Subcutaneous, 3 Times Daily Before Meals      ipratropium-albuterol 0.5-2.5 mg/3 ml nebulizer  Commonly known as: DUO-NEB   3 mL, Nebulization, 4 Times Daily - RT      sodium chloride 7 % nebulizer solution nebulizer solution   4 mL, Nebulization, 2 Times Daily - RT         Continue These Medications      Instructions Start Date   acetaminophen 325 MG tablet  Commonly known as: TYLENOL   650 mg, Oral, Every 6 Hours PRN      BD Pen Needle Norma U/F 32G X 4 MM misc  Generic drug: Insulin Pen Needle   Use once a day      Dexcom G6 Sensor   Dipsense Dexcom G6 Sensors, to replace every 10 days, 3 sensors per 30 day period ([facility]C #[address]-52). Check 6 times a day. Dx: E 11.65 sent to supply store      Dexcom G6 Transmitter misc   1 each, Does not apply, Every 3 Months, Dispense 1 Dexcom G6 Transmitter for each 3 month period (NDC #[address]-97). Check 6 times a day. Dx: E 11.65 sent to a supply store      donepezil 10 MG tablet  Commonly known as: ARICEPT   10 mg, Oral, Nightly      freestyle lancets   1 each, Other, 4  Times Daily, Use as instructed      FREESTYLE LITE test strip  Generic drug: glucose blood   Bid e11.65      Gvoke HypoPen 2-Pack 1 MG/0.2ML solution auto-injector  Generic drug: Glucagon   1 mg, Subcutaneous, As Needed      multivitamin with minerals tablet tablet   1 tablet, Oral, Daily      pravastatin 40 MG tablet  Commonly known as: PRAVACHOL   40 mg, Oral, Every Evening      Trulicity 3 MG/0.5ML solution pen-injector  Generic drug: Dulaglutide   3 mg, Subcutaneous, Weekly, Replaces 1.5 mg weekly dose      Zonegran 100 MG capsule  Generic drug: zonisamide   200 mg, Oral, Nightly         Stop These Medications    amLODIPine-benazepril 5-20 MG per capsule  Commonly known as: Lotrel     insulin glargine 100 UNIT/ML injection  Commonly known as: LANTUS SEMGLEE            No Known Allergies    Discharge Disposition:  Skilled Nursing Facility (DC - External)      Discharge Diet:  Diet Order   Procedures   • Diet Clear Liquid; Nectar / Syrup Thick       Discharge Activity:   as tolerated, up with assistance only    CODE STATUS:    Code Status and Medical Interventions:   Ordered at: 06/01/22 1241     Code Status (Patient has no pulse and is not breathing):    CPR (Attempt to Resuscitate)     Medical Interventions (Patient has pulse or is breathing):    Full Support       Future Appointments   Date Time Provider Department Center   7/5/2022  1:00 PM [facility]   7/6/2022 10:30 AM Lily Yepez PA-C [facility]   7/12/2022  1:00 PM Lelo Pond MD [facility]   9/29/2022 10:00 AM [facility]   9/29/2022 10:15 AM Madelin Ferguson MD [facility]     Additional Instructions for the Follow-ups that You Need to Schedule     Discharge Follow-up with PCP   As directed       Currently Documented PCP:    Lily Yepez PA-C    PCP Phone Number:    [phone]     Follow Up Details: Lucho DESAI (PCP) in 2 weeks         Discharge Follow-up with  Specified Provider: Dr. Royal ([facility]); 6 Weeks   As directed      To: Dr. Royal (Pulm)    Follow Up: 6 Weeks         Discharge Follow-up with Specified Provider: Medical Staff at facility; 2 Days   As directed      To: Medical Staff at facility    Follow Up: 2 Days    Follow Up Details: will need BMP repeated to monitor potassium            Contact information for follow-up providers     Lily Yepez PA-C .    Specialties: Physician Assistant, Internal Medicine, Family Medicine  Why: Lucho DESAI (PCP) in 2 weeks  Contact information:  [address] [address]  [phone]                   Contact information for after-discharge care     Destination     [facility] .    Service: Skilled Nursing  Contact information:  [address] [place] [id number]  [phone]                             Additional Instructions for the Follow-ups that You Need to Schedule     Discharge Follow-up with PCP   As directed       Currently Documented PCP:    Lily Yepez PA-C    PCP Phone Number:    [phone]     Follow Up Details: Lucho DESAI (PCP) in 2 weeks         Discharge Follow-up with Specified Provider: Dr. Royal ([facility]); 6 Weeks   As directed      To: Dr. Royal (Pulm)    Follow Up: 6 Weeks         Discharge Follow-up with Specified Provider: Medical Staff at facility; 2 Days   As directed      To: Medical Staff at facility    Follow Up: 2 Days    Follow Up Details: will need BMP repeated to monitor potassium           Time Spent on Discharge:  Greater than 30 minutes      Aj Owens MD  [facility]  06/09/22  10:28 EDT

## 2022-06-20 ENCOUNTER — TELEPHONE (OUTPATIENT)
Dept: INTERNAL MEDICINE | Facility: CLINIC | Age: 82
End: 2022-06-20

## 2022-06-20 ENCOUNTER — TELEPHONE (OUTPATIENT)
Dept: ENDOCRINOLOGY | Age: 82
End: 2022-06-20

## 2022-06-20 NOTE — TELEPHONE ENCOUNTER
"HUB ok to read-    \" We can not admit without patient being seen her, only other option is she will have to go to ER and be seen by them before admitting.\"      lm  "

## 2022-06-20 NOTE — TELEPHONE ENCOUNTER
Caller: PHIL LINTON     Relationship: DAUGHTER    Best call back number: [phone]      What is your medical concern?     PATIENT IS NEEDING TO BE ADMITTED TO THE HOSPITAL FOR PNEUMONIA.  DAUGHTER FEELS THAT SHE NEEDS MOJGAN GALLEGOS'S ASSISTANCE IN GETTING HER SENT TO THE HOSPITAL.    ALSO THE REHAB FACILITY HAS NOT GIVEN HER THE INSULIN THAT HAS BEEN PRESCRIBED TO THE PATIENT AND DAUGHTER FEELS THIS MAY HAVE SOMETHING TO DO WITH HER MOTHER'S BODY BREAKING DOWN.    PHIL WOULD LIKE A CALL BACK PLEASE.

## 2022-07-01 ENCOUNTER — HOME HEALTH ADMISSION (OUTPATIENT)
Dept: HOME HEALTH SERVICES | Facility: HOME HEALTHCARE | Age: 82
End: 2022-07-01

## 2022-07-05 RX ORDER — INSULIN GLARGINE 100 [IU]/ML
5 INJECTION, SOLUTION SUBCUTANEOUS NIGHTLY
COMMUNITY
End: 2022-07-12 | Stop reason: SDUPTHER

## 2022-07-06 ENCOUNTER — OFFICE VISIT (OUTPATIENT)
Dept: INTERNAL MEDICINE | Facility: CLINIC | Age: 82
End: 2022-07-06

## 2022-07-06 VITALS
HEIGHT: 63 IN | DIASTOLIC BLOOD PRESSURE: 62 MMHG | SYSTOLIC BLOOD PRESSURE: 104 MMHG | WEIGHT: 130 LBS | BODY MASS INDEX: 23.04 KG/M2

## 2022-07-06 DIAGNOSIS — J69.0 ASPIRATION PNEUMONIA, UNSPECIFIED ASPIRATION PNEUMONIA TYPE, UNSPECIFIED LATERALITY, UNSPECIFIED PART OF LUNG: Primary | ICD-10-CM

## 2022-07-06 PROBLEM — R74.8 ALKALINE PHOSPHATASE ELEVATION: Status: RESOLVED | Noted: 2018-10-29 | Resolved: 2022-07-06

## 2022-07-06 PROBLEM — K22.9 ESOPHAGEAL ABNORMALITY: Status: RESOLVED | Noted: 2022-06-01 | Resolved: 2022-07-06

## 2022-07-06 PROBLEM — L60.9 NAIL ABNORMALITIES: Status: RESOLVED | Noted: 2022-03-17 | Resolved: 2022-07-06
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
## 2022-07-06 PROBLEM — S42.291A HUMERAL HEAD FRACTURE, RIGHT, CLOSED, INITIAL ENCOUNTER: Status: RESOLVED | Noted: 2021-10-29 | Resolved: 2022-07-06

## 2022-07-06 PROCEDURE — 99215 OFFICE O/P EST HI 40 MIN: CPT | Performed by: PHYSICIAN ASSISTANT

## 2022-07-06 RX ORDER — FAMOTIDINE 20 MG/1
20 TABLET, FILM COATED ORAL 2 TIMES DAILY
COMMUNITY

## 2022-07-06 RX ORDER — AMLODIPINE BESYLATE 5 MG/1
5 TABLET ORAL
Qty: 90 TABLET | Refills: 0
Start: 2022-07-06 | End: 2022-07-07 | Stop reason: SDUPTHER

## 2022-07-06 NOTE — PROGRESS NOTES
Subjective   Chief Complaint   Patient presents with   • Hospital Follow Up Visit     Esophageal abnormality        History of Present Illness     Pt is here today for fup. She was admitted on 6/1 with vomiting and aspiration pneumonia to [facility]. She was discharged on 6/9/22. She had an EGD with dilation. Underwent evaluation with speech pathology and had a swallow study. She was discharged to [facility] for rehab. She came home 5 days ago. She has not been on her basal insulin. She was discharged on a sliding scale, but [facility] told her and the family they do not give sliding scale. She was tried on oral Metformin however had another episode of vomiting on June 20th that was felt to be due to this. She has her dexcom and has continued to stay off all diabetic medications. She is seeing Dr. Tamayo early next week. She has been on pepcid BID for reflux. She reports her swallowing is better and has no dysphagia. She has lost 14 lbs since her admission. She has very little appetite. She is drinking water, but not much throughout the day.     She is to follow up with Dr. Royal for repeat CT chest, does not have an appointment yet.      Patient Active Problem List   Diagnosis   • Essential hypertension   • Vitamin D deficiency   • Hyperuricemia   • Slow transit constipation   • Chronic pain of left knee   • At high risk for falls   • Peripheral neuropathy   • Uncontrolled type 2 diabetes mellitus with hyperglycemia (HCC)   • Walker as ambulation aid   • Bradycardia, sinus   • Shortness of breath   • Leg swelling   • Hyperlipidemia   • Presence of cardiac pacemaker   • Dementia without behavioral disturbance (HCC)   • Aspiration pneumonia due to vomitus (HCC)       No Known Allergies    Current Outpatient Medications on File Prior to Visit   Medication Sig Dispense Refill   • acetaminophen (TYLENOL) 325 MG tablet Take 650 mg by mouth Every 6 (Six) Hours As Needed for Mild Pain  or Moderate Pain .     •  Continuous Blood Gluc Sensor (Dexcom G6 Sensor) Dipsense Dexcom G6 Sensors, to replace every 10 days, 3 sensors per 30 day period ([facility] #[address]-23). Check 6 times a day. Dx: E 11.65 sent to supply store 9 each 4   • Continuous Blood Gluc Transmit (Dexcom G6 Transmitter) misc 1 each Every 3 (Three) Months. Dispense 1 Dexcom G6 Transmitter for each 3 month period ([facility]C #[address]-56). Check 6 times a day. Dx: E 11.65 sent to a supply store 1 each 4   • donepezil (ARICEPT) 10 MG tablet Take 1 tablet by mouth Every Night. 90 tablet 3   • Dulaglutide (Trulicity) 3 MG/0.5ML solution pen-injector Inject 0.5 mL under the skin into the appropriate area as directed 1 (One) Time Per Week. Replaces 1.5 mg weekly dose 6 mL 2   • famotidine (PEPCID) 20 MG tablet Take 20 mg by mouth 2 (Two) Times a Day.     • Glucagon (Gvoke HypoPen 2-Pack) 1 MG/0.2ML solution auto-injector Inject 1 mg under the skin into the appropriate area as directed As Needed (hypoglycemia). 0.4 mL 1   • glucose blood (FREESTYLE LITE) test strip Bid e11.65 200 each 3   • Insulin Glargine (Lantus SoloStar) 100 UNIT/ML injection pen Inject 5 Units under the skin into the appropriate area as directed Every Night.     • Insulin Pen Needle (BD Pen Needle Norma U/F) 32G X 4 MM misc Use once a day 100 each 3   • Lactobacillus (FLORANEX PO) Take  by mouth.     • Lancets (freestyle) lancets 1 each by Other route 4 (Four) Times a Day. Use as instructed 400 each 1   • multivitamin with minerals tablet tablet Take 1 tablet by mouth Daily.     • pravastatin (PRAVACHOL) 40 MG tablet Take 1 tablet by mouth Every Evening. 90 tablet 3   • Zonegran 100 MG capsule Take 2 capsules by mouth Every Night.     • [DISCONTINUED] amLODIPine (NORVASC) 5 MG tablet Take 1 tablet by mouth Daily.     • [DISCONTINUED] famotidine (PEPCID) 40 MG tablet Take 1 tablet by mouth 2 (Two) Times a Day Before Meals.     • [DISCONTINUED] guaiFENesin (MUCINEX) 600 MG 12 hr tablet Take 2 tablets by  mouth Every 12 (Twelve) Hours.     • [DISCONTINUED] insulin lispro (ADMELOG) 100 UNIT/ML injection Inject 0-7 Units under the skin into the appropriate area as directed 3 (Three) Times a Day Before Meals.  12   • [DISCONTINUED] ipratropium-albuterol (DUO-NEB) 0.5-2.5 mg/3 ml nebulizer Take 3 mL by nebulization 4 (Four) Times a Day. 360 mL    • [DISCONTINUED] sodium chloride 7 % nebulizer solution nebulizer solution Take 4 mL by nebulization 2 (Two) Times a Day.       No current facility-administered medications on file prior to visit.       Past Medical History:   Diagnosis Date   • Dementia ([facility])     states better with medication    • Diabetes mellitus ([facility])    • Hyperlipidemia 01/13/2021   • Lung consolidation ([facility]) 06/01/2022   • Peripheral neuropathy 08/28/2018   • Vitamin D deficiency        Family History   Problem Relation Age of Onset   • Hypertension Mother    • Hypertension Father        Social History     Socioeconomic History   • Marital status:    Tobacco Use   • Smoking status: Former Smoker   • Smokeless tobacco: Never Used   Vaping Use   • Vaping Use: Never used   Substance and Sexual Activity   • Alcohol use: Yes     Comment: RARELY   • Drug use: No   • Sexual activity: Defer     Birth control/protection: Surgical       Past Surgical History:   Procedure Laterality Date   • ANKLE SURGERY Right    • BLADDER SURGERY      Prolapsed   • BREAST BIOPSY     • CARDIAC ELECTROPHYSIOLOGY PROCEDURE N/A 3/5/2021    Procedure: Pacemaker DC new [place];  Surgeon: Madelin Ferguson MD;  Location: [facility] CATH INVASIVE LOCATION;  Service: Cardiology;  Laterality: N/A;   • ENDOSCOPY N/A 6/6/2022    Procedure: ESOPHAGOGASTRODUODENOSCOPY with biopsy, balloon dilation;  Surgeon: Lo Benjamin MD;  Location: [facility] ENDOSCOPY;  Service: Gastroenterology;  Laterality: N/A;  esophageal stricture, hiatal hernia, gastritis   • HYSTERECTOMY       The following portions of the patient's history were reviewed and  "updated as appropriate: problem list, allergies, current medications, past medical history, past family history, past social history and past surgical history.    ROS     See HPI    Immunization History   Administered Date(s) Administered   • COVID-19 (PFIZER) PURPLE CAP 02/02/2021, 02/23/2021, 09/29/2021   • Fluzone High Dose =>65 Years ([employer] ONLY) 11/07/2016, 09/30/2019   • Fluzone High-Dose 65+yrs 10/19/2021   • Influenza, Unspecified 09/16/2019, 10/08/2020   • Pneumococcal Conjugate 13-Valent (PCV13) 01/31/2017   • Pneumococcal Polysaccharide (PPSV23) 02/01/2018   • Tdap 02/12/2021       Objective   Vitals:    07/06/22 1019 07/06/22 1111   BP:  104/62   Weight: 59 kg (130 lb)    Height: 160 cm (63\")      Body mass index is 23.03 kg/m².  Physical Exam  Vitals reviewed.   Constitutional:       Appearance: She is well-developed.   HENT:      Head: Normocephalic and atraumatic.   Cardiovascular:      Rate and Rhythm: Normal rate and regular rhythm.      Heart sounds: Normal heart sounds, S1 normal and S2 normal.   Pulmonary:      Effort: Pulmonary effort is normal.      Breath sounds: Normal breath sounds.   Musculoskeletal:      Comments: Using a walker for ambulation   Skin:     General: Skin is warm.   Neurological:      Mental Status: She is alert.   Psychiatric:         Behavior: Behavior normal.       Assessment & Plan   Diagnoses and all orders for this visit:    1. Aspiration pneumonia, unspecified aspiration pneumonia type, unspecified laterality, unspecified part of lung (HCC) (Primary)  -     Ambulatory Referral to Pulmonology    Other orders  -     amLODIPine (NORVASC) 5 MG tablet; Take 1 tablet by mouth Daily.  Dispense: 90 tablet; Refill: 0    Reviewed hospitalization records. Thorough medication review today. She is to continue Amlodipine 5 mg daily for her HTN. Her BP is low normal, discussed hypotension precautions with her and her family. They will monitor for any orthostatic sx and send me her " home blood pressures in a week. I am going to restart her basal insulin at 5 units at bedtime. She will follow up with Dr. Vargas next week for endo. I have reviewed her labs today, renal function is stable. Her electrolytes are normal and LFTs are wnl. Will set up an appointment with Dr. Royal for fup on her aspiration pna and CT. Discussed at length I would like her to increase her oral po intake including glucerna shakes and water throughout the day. I will see her back in 3 weeks to recheck her blood pressure and weight.     Return in about 3 weeks (around 7/27/2022).    Total time of encounter 51 minutes today spent in medical management.

## 2022-07-07 RX ORDER — AMLODIPINE BESYLATE 5 MG/1
5 TABLET ORAL
Qty: 90 TABLET | Refills: 0
Start: 2022-07-07 | End: 2022-07-12 | Stop reason: SDUPTHER

## 2022-07-08 ENCOUNTER — TELEPHONE (OUTPATIENT)
Dept: CARDIOLOGY | Facility: CLINIC | Age: 82
End: 2022-07-08

## 2022-07-08 NOTE — TELEPHONE ENCOUNTER
----- Message from Lucy Briseno sent at 7/1/2022  1:50 PM EDT -----  Regarding: RE AMLODIPINE  VERIFY MEDICATION  DANISH (DAUGHTER) [phone]    IS SHE SUPPOSE TO BE ON AMLODIPINE OR AMLODIPINE BENAZEPRIL  PLEASE ADVISE  SHE WILL NEED A NEW SCRIPT SHE IS GETTING LOW    Tried calling patient's daughter to inform her. Looks like patient's PCP has refilled the Amlodipine.

## 2022-07-12 ENCOUNTER — OFFICE VISIT (OUTPATIENT)
Dept: ENDOCRINOLOGY | Age: 82
End: 2022-07-12

## 2022-07-12 VITALS
OXYGEN SATURATION: 99 % | BODY MASS INDEX: 23.6 KG/M2 | WEIGHT: 133.2 LBS | TEMPERATURE: 97.4 F | DIASTOLIC BLOOD PRESSURE: 60 MMHG | SYSTOLIC BLOOD PRESSURE: 120 MMHG | HEART RATE: 63 BPM | HEIGHT: 63 IN

## 2022-07-12 DIAGNOSIS — E11.65 TYPE 2 DIABETES MELLITUS WITH HYPERGLYCEMIA, WITH LONG-TERM CURRENT USE OF INSULIN: Primary | ICD-10-CM

## 2022-07-12 DIAGNOSIS — Z79.4 TYPE 2 DIABETES MELLITUS WITH HYPERGLYCEMIA, WITH LONG-TERM CURRENT USE OF INSULIN: Primary | ICD-10-CM

## 2022-07-12 DIAGNOSIS — I10 ESSENTIAL HYPERTENSION: ICD-10-CM

## 2022-07-12 PROCEDURE — 99215 OFFICE O/P EST HI 40 MIN: CPT | Performed by: INTERNAL MEDICINE

## 2022-07-12 RX ORDER — AMLODIPINE BESYLATE 5 MG/1
5 TABLET ORAL
Qty: 90 TABLET | Refills: 0 | Status: ON HOLD | OUTPATIENT
Start: 2022-07-12 | End: 2022-08-10 | Stop reason: SDUPTHER

## 2022-07-12 RX ORDER — INSULIN ASPART 100 [IU]/ML
INJECTION, SOLUTION INTRAVENOUS; SUBCUTANEOUS
Qty: 45 ML | Refills: 2 | Status: SHIPPED | OUTPATIENT
Start: 2022-07-12 | End: 2023-03-29 | Stop reason: SDUPTHER

## 2022-07-12 RX ORDER — INSULIN GLARGINE 100 [IU]/ML
5 INJECTION, SOLUTION SUBCUTANEOUS NIGHTLY
Qty: 15 ML | Refills: 3 | Status: SHIPPED | OUTPATIENT
Start: 2022-07-12 | End: 2022-07-15 | Stop reason: SDUPTHER

## 2022-07-12 RX ORDER — GLUCAGON INJECTION, SOLUTION 1 MG/.2ML
1 INJECTION, SOLUTION SUBCUTANEOUS AS NEEDED
Qty: 0.4 ML | Refills: 1 | Status: SHIPPED | OUTPATIENT
Start: 2022-07-12

## 2022-07-12 NOTE — PROGRESS NOTES
Chief Complaint  Uncontrolled type 2 diabetes with hypoglycemia (Posthospitalization aspiration pneumonia 6/2022 discharged on dysphagia diet with thickened liquids.) and Diabetes    Subjective    Di Macario presents to [facility] ENDOCRINOLOGY  History of Present Illness  82-year-old  female returns for a 3-month visit.  Last visit 4/7/2022    .  Di is cared for by her daughter.  She has type 2 diabetes with long-term use of insulin.  She has essential hypertension, dementia without behavior disturbance, hyperlipidemia  At last visit a continuous glucose monitor Dexcom was ordered.  She now has it.  Early March 2022 hemoglobin A1c 11% indicating poor control.  By April 2022 she had compliance with medical management and there was significant improvement noted on her glucose levels.  At that time she was placed on a GLP-1 agent and basal insulin.  During hospitalization hemoglobin A1c June 2022 much improved at 7.9%    Duration of diabetes: Since the 1990s      Since last visit : On 6/1- 6/9/2022 patient was hospitalized for aspiration pneumonia due to vomitus, hypoxia, esophageal abnormality  Prior to hospitalization patient was on Trulicity 3 mg every 7 days and glargine injection 26 units every morning.   She was hospitalized and completed a 7-day course of Unasyn with a risk of recurrence and then transferred to a nursing home facility for rehabilitation.  She has residual dysphagia and is on a nectar thickened liquid diet.    During hospitalization she had some hypoglycemia so Lantus was placed on hold.        She was discharged on sliding scale insulin and Trulicity  Lispro Premeal 0 to 7 units  Trulicity 3 MGs every 7 days  Lantus discontinued during hospitalization due to hypoglycemia then restarted this 5 units at bedtime on 7/5/2022  Diet: Modified with nectar thickened liquids.  Patient does not like her new diet.  Monitoring glucose:  CGM use: Dexcom was started  "while at the intermediate/nursing home  Glucometer use:  How often patient is testing:  In the last month lowest glucose:  In the last month highest glucose:  Symptoms:  Hypoglycemia:  Hyperglycemia:  Peripheral neuropathy symptoms of paresthesias burning numbness tingling:  Vision changes:  Skin changes or ulcers:  Polyuria:  Polydipsia:  Gastroparesis symptoms:  Prevention:  Last eye examination  Last podiatry visit    History of heart disease:  History of kidney disease:  History of stroke:    History of hypertension  History of hyperlipidemia  Objective   Vital Signs:  /60   Pulse 63   Temp 97.4 °F (36.3 °C) (Temporal)   Ht 160 cm (63\")   Wt 60.4 kg (133 lb 3.2 oz)   SpO2 99%   BMI 23.60 kg/m²   Estimated body mass index is 23.6 kg/m² as calculated from the following:    Height as of this encounter: 160 cm (63\").    Weight as of this encounter: 60.4 kg (133 lb 3.2 oz).    BMI is within normal parameters. No other follow-up for BMI required.      Physical Exam  Vitals and nursing note reviewed.   HENT:      Head: Normocephalic.      Mouth/Throat:      Mouth: Mucous membranes are moist.   Cardiovascular:      Rate and Rhythm: Normal rate.      Pulses: Normal pulses.      Heart sounds: Normal heart sounds.   Pulmonary:      Effort: Pulmonary effort is normal.      Breath sounds: Normal breath sounds. No wheezing or rhonchi.   Abdominal:      General: Bowel sounds are normal.      Palpations: Abdomen is soft.   Musculoskeletal:         General: No swelling. Normal range of motion.      Cervical back: Normal range of motion and neck supple.   Skin:     General: Skin is warm and dry.   Neurological:      Mental Status: She is alert. Mental status is at baseline. She is confused.   Psychiatric:         Mood and Affect: Mood normal.         Behavior: Behavior normal.         Judgment: Judgment normal.        Result Review :The following data was reviewed by: Lelo Pond MD on 07/12/2022:    CGM " Continuous glucose monitor for 14 days from June 29 2022 to July 12, 2022. The CGM was active 70% of the 14 days.    Time in range by glucose [ mg/dl]  47% Very high >250   37 % high [180-250]  15 % in range [ ]  <1% Low glucose [54-69]    Less than 1 % low     Impression [ my mpression]  1. Hypoglycemia, mild/ early morning following hyperglycemia all night.  It is concerning that overnight there is an acute declination of glucose from over 300's to 60-80 mg/dl. At times at 6-9 am.  2.She has fasting hyperglycemia that starts at about 8 am and remains well over 180mg/dl with inclined rises of glucose to over 250 mg/dl after meals and steadily rising until bedtime to 250- or greater glucose.  3. No daytime hypoglycemia.     Component   Ref Range & Units 7 d ago 1 mo ago 4 mo ago 7 mo ago 8 mo ago 11 mo ago 1 yr ago   Hemoglobin A1C   4.8 - 5.6 % 8.7 High   7.90 High  R  11.0 High  CM  8.4 High  CM  10.33 High  R  13.00 High  R, CM  9.60 High  R, CM          Component      Latest Ref Rng & Units 7/5/2022 7/5/2022 7/5/2022 7/5/2022          12:49 PM 12:49 PM 12:49 PM 12:49 PM   Glucose      65 - 99 mg/dL    269 (H)   BUN      8 - 27 mg/dL    19   Creatinine      0.57 - 1.00 mg/dL    0.76   EGFR Result      >59 mL/min/1.73    78   BUN/Creatinine Ratio      12 - 28    25   Sodium      134 - 144 mmol/L    139   Potassium      3.5 - 5.2 mmol/L    4.5   Chloride      96 - 106 mmol/L    96   CO2      20 - 29 mmol/L    28   Calcium      8.7 - 10.3 mg/dL    10.1   Total Protein      6.0 - 8.5 g/dL    7.4   Albumin      3.6 - 4.6 g/dL    4.1   Globulin      1.5 - 4.5 g/dL    3.3   A/G Ratio      1.2 - 2.2    1.2   Total Bilirubin      0.0 - 1.2 mg/dL    0.2   Alkaline Phosphatase      44 - 121 IU/L    108   AST (SGOT)      0 - 40 IU/L    14   ALT (SGPT)      0 - 32 IU/L    9   Total Cholesterol      100 - 199 mg/dL   174    Triglycerides      0 - 149 mg/dL   138    HDL Cholesterol      >39 mg/dL   75    VLDL Cholesterol  Bryan      5 - 40 mg/dL   23    LDL Cholesterol       0 - 99 mg/dL   76    TSH Baseline      0.450 - 4.500 uIU/mL 1.430      Hemoglobin A1C      4.8 - 5.6 %  8.7 (H)       6/3/2022 video swallowing examination by speech pathologist concerning for moderately severe pharyngeal residuals post swallow with solids.  Patient is a risk for aspiration.    6/2/2022 ultrasound liver radiography impression 1.  A normal sonographic appearance of the liver.  2 minimal gallbladder sludging.  3 small right pleural effusion.  6/1/2022 CT angiogram of the chest significant for abnormal esophagus with the appearance suggestive of distal dysmotility or obstruction lesion.  Cardiomegaly noted.  No pulmonary embolism or aneurysm.     Adult transthoracic echo completed with contrast  Calculated left ventricular ejection fraction 66.8%.  No evidence of pericardial effusion.  Pertinent labs WBC on an admission 6/3/2022 was elevated 12.27 x 6/9/20 was decreased to 7.94 in the normal range.  Glucose remained 140-190 on morning lab draws.    Assessment and Plan  82 year old CF with new onset dysphagia and vomitus induced aspiration pneumonia. She is now discharged from rehabiitation on necter consistance liquids. I am not in favor of changing to thin liquids just because she does not like necter consistance as she will have another aspiration . If poor nutrition is a concern then a temporary feeding tube would be more appropriate.     Recommend nutrition and GI consult.    Glucose control is poor with poor memory and aversion to dysphagia diet. Glucose is mostly hyperglycemic 24 hours per day over 250 mg/dl except at 6-9 am when less than 1% of time glucose is 60-80 mg/dl. Most likely due to gluconeogenesis malfunction from longterm type  2 diabetes exacerbated by irratic po intake.   .  Dose insulin adjustments made    Diabetes medications  1.  Huyen you now are having a significant drop in sugar overnight as seen on your continuous  glucose monitor.  Please have a bedtime snack to include a protein and carbohydrates such as a half a sandwich.  2.  You are having significant high sugars after meals and before meals.  Lets start a sliding scale with NovoLog to improve that.  3.  Meals are difficult with your dysphagia or difficulty swallowing.  I would recommend you get the speech therapist to clear you before you start eating regular food.  Otherwise all the liquids need to be nectar consistency.  If this becomes a long-term problem a feeding tube might be the answer if she does not like any of the nectar consistency, liquids.  Likely this is a neurologic problem    4.  Thank you for using a continuous glucose monitor it really helps.  Measure glucose before each meal and bedtime    4. Memory loss/dementia :Counseled daughters to take over all medication and insulin dosing to prevent hypoglycemia.   Patient is forgetting when she takes insulin and has written down that one recent morning she took 30 units of lantus [ she has never previously taken that high a dose. Patient does not recall but indicated that if it was written then that's what was given.    Take Lantus ,glargine 5 units every am    Mealtime           Take Humalog, lispro/NovoLog,/ Novolin R /aspart per scale, 10-15 minutes before meals  Add scale as follows    If blood glucose is above 140 then add the following dose of Humalog, lispro/NovoLog, aspart to mealtime insulin [1: 30]    140-170 0 unit  171-200 2 units  201-230 3 units  231-260 4 units  261-290 5 units  291-320 6 units  321-350 7 units  351-380 8 units  381-or greater 9 units    Sick day rules: If glucose is over 250 mg/DL then check glucose every 2 hours and treat with correction scale above.    Example at breakfast take mealtime insulin as above and if glucose 268 then [per scale above] add 5 units.   Take total dose 15 minutes before meal. If at a restaurant, unsure when meal is coming then may take mealtime insulin  immediately after the last bite of food.       Diagnoses and all orders for this visit:    1. Type 2 diabetes mellitus with hyperglycemia, with long-term current use of insulin (HCC) (Primary)  -     Insulin Glargine (Lantus SoloStar) 100 UNIT/ML injection pen; Inject 5 Units under the skin into the appropriate area as directed Every Night. To max of 50 units per day.  Dispense: 15 mL; Refill: 3  -     Glucagon (Gvoke HypoPen 2-Pack) 1 MG/0.2ML solution auto-injector; Inject 1 mg under the skin into the appropriate area as directed As Needed (hypoglycemia).  Dispense: 0.4 mL; Refill: 1  -     insulin aspart (NovoLOG FlexPen) 100 UNIT/ML solution pen-injector sc pen; 1 unit per 30 mg/dl glucose over 170 mg/dl Use as directed for meal & correction coverage up to 50 units a day.  Dispense: 45 mL; Refill: 2    2. Essential hypertension  -     amLODIPine (NORVASC) 5 MG tablet; Take 1 tablet by mouth Daily.  Dispense: 90 tablet; Refill: 0           I spent 75 minutes caring for Di on this date of service. This time includes time spent by me in the following activities:reviewing tests, obtaining and/or reviewing a separately obtained history, performing a medically appropriate examination and/or evaluation , counseling and educating the patient/family/caregiver and ordering medications, tests, or procedures. Family had many questions about the hospitalization, the diabetic medication changes during hospitalization and during rehabilitation. I explained that patients poor appetite for hospital food and the necessary dysphagia diet caused caution in dosing medications to prevent hypoglycemia. Options and diagnosis of dysphagia discussed. Patient one daughter on the phone and another in the room had questions and concerns,   Follow Up Return in about 3 weeks (around 8/2/2022) for Recheck.  Patient was given instructions and counseling regarding her condition or for health maintenance advice. Please see specific  information pulled into the AVS if appropriate.

## 2022-07-12 NOTE — PATIENT INSTRUCTIONS
Diabetes medications  1.  Huyen you now are having a significant drop in sugar overnight as seen on your continuous glucose monitor.  Please have a bedtime snack to include a protein and carbohydrates such as a half a sandwich.  2.  You are having significant high sugars after meals and before meals.  Lets start a sliding scale with NovoLog to improve that.  3.  Meals are difficult with your dysphagia or difficulty swallowing.  I would recommend you get the speech therapist to clear you before you start eating regular food.  Otherwise all the liquids need to be nectar consistency.  If this becomes a long-term problem a feeding tube might be the answer if she does not like any of the nectar consistency, liquids.  Likely this is a neurologic problem    4.  Thank you for using a continuous glucose monitor it really helps.  Measure glucose before each meal and bedtime    Take Lantus ,glargine 5 units every am    Mealtime           Take Humalog, lispro/NovoLog,/ Novolin R /aspart per scale, 10-15 minutes before meals  Add scale as follows    If blood glucose is above 140 then add the following dose of Humalog, lispro/NovoLog, aspart to mealtime insulin [1: 30]    140-170 0 unit  171-200 2 units  201-230 3 units  231-260 4 units  261-290 5 units  291-320 6 units  321-350 7 units  351-380 8 units  381-or greater 9 units    Sick day rules: If glucose is over 250 mg/DL then check glucose every 2 hours and treat with correction scale above.    Example at breakfast take mealtime insulin as above and if glucose 268 then [per scale above] add 5 units.   Take total dose 15 minutes before meal. If at a restaurant, unsure when meal is coming then may take mealtime insulin immediately after the last bite of food.

## 2022-07-14 DIAGNOSIS — Z79.4 TYPE 2 DIABETES MELLITUS WITH HYPERGLYCEMIA, WITH LONG-TERM CURRENT USE OF INSULIN: ICD-10-CM

## 2022-07-14 DIAGNOSIS — E11.65 TYPE 2 DIABETES MELLITUS WITH HYPERGLYCEMIA, WITH LONG-TERM CURRENT USE OF INSULIN: ICD-10-CM

## 2022-07-15 ENCOUNTER — TELEPHONE (OUTPATIENT)
Dept: ENDOCRINOLOGY | Age: 82
End: 2022-07-15

## 2022-07-15 DIAGNOSIS — Z79.4 TYPE 2 DIABETES MELLITUS WITH HYPERGLYCEMIA, WITH LONG-TERM CURRENT USE OF INSULIN: ICD-10-CM

## 2022-07-15 DIAGNOSIS — E11.65 TYPE 2 DIABETES MELLITUS WITH HYPERGLYCEMIA, WITH LONG-TERM CURRENT USE OF INSULIN: ICD-10-CM

## 2022-07-15 RX ORDER — INSULIN GLARGINE 100 [IU]/ML
5 INJECTION, SOLUTION SUBCUTANEOUS NIGHTLY
Qty: 15 ML | Refills: 3 | Status: SHIPPED | OUTPATIENT
Start: 2022-07-15 | End: 2022-08-25

## 2022-07-15 NOTE — TELEPHONE ENCOUNTER
[facility] FAX FROM [facility] NEED TO CLARIFY DIRECTIONS FOR LANTUS. SEE MED[employer] FOR MORE INFO.

## 2022-07-17 RX ORDER — INSULIN GLARGINE 100 [IU]/ML
5 INJECTION, SOLUTION SUBCUTANEOUS NIGHTLY
Qty: 15 ML | Refills: 3 | OUTPATIENT
Start: 2022-07-17

## 2022-07-24 ENCOUNTER — APPOINTMENT (OUTPATIENT)
Dept: GENERAL RADIOLOGY | Facility: HOSPITAL | Age: 82
End: 2022-07-24

## 2022-07-24 ENCOUNTER — APPOINTMENT (OUTPATIENT)
Dept: CT IMAGING | Facility: HOSPITAL | Age: 82
End: 2022-07-24

## 2022-07-24 ENCOUNTER — HOSPITAL ENCOUNTER (INPATIENT)
Facility: HOSPITAL | Age: 82
LOS: 4 days | Discharge: NURSING, REHAB OR LONG-TERM CARE | End: 2022-07-28
Attending: EMERGENCY MEDICINE | Admitting: STUDENT IN AN ORGANIZED HEALTH CARE EDUCATION/TRAINING PROGRAM

## 2022-07-24 DIAGNOSIS — J69.0 ASPIRATION PNEUMONIA OF BOTH LUNGS DUE TO GASTRIC SECRETIONS, UNSPECIFIED PART OF LUNG: Primary | ICD-10-CM

## 2022-07-24 PROBLEM — R53.1 WEAKNESS: Status: ACTIVE | Noted: 2022-07-24

## 2022-07-24 LAB
ALBUMIN SERPL-MCNC: 3.8 G/DL (ref 3.5–5.2)
ALBUMIN/GLOB SERPL: 1.2 G/DL
ALP SERPL-CCNC: 90 U/L (ref 39–117)
ALT SERPL W P-5'-P-CCNC: 17 U/L (ref 1–33)
ANION GAP SERPL CALCULATED.3IONS-SCNC: 12.2 MMOL/L (ref 5–15)
AST SERPL-CCNC: 17 U/L (ref 1–32)
B PARAPERT DNA SPEC QL NAA+PROBE: NOT DETECTED
B PERT DNA SPEC QL NAA+PROBE: NOT DETECTED
BILIRUB SERPL-MCNC: 0.3 MG/DL (ref 0–1.2)
BILIRUB UR QL STRIP: NEGATIVE
BUN SERPL-MCNC: 28 MG/DL (ref 8–23)
BUN/CREAT SERPL: 33.3 (ref 7–25)
C PNEUM DNA NPH QL NAA+NON-PROBE: NOT DETECTED
CALCIUM SPEC-SCNC: 10 MG/DL (ref 8.6–10.5)
CHLORIDE SERPL-SCNC: 102 MMOL/L (ref 98–107)
CLARITY UR: CLEAR
CO2 SERPL-SCNC: 26.8 MMOL/L (ref 22–29)
COLOR UR: YELLOW
CREAT SERPL-MCNC: 0.84 MG/DL (ref 0.57–1)
DEPRECATED RDW RBC AUTO: 44.7 FL (ref 37–54)
EGFRCR SERPLBLD CKD-EPI 2021: 69 ML/MIN/1.73
EOSINOPHIL # BLD MANUAL: 0.18 10*3/MM3 (ref 0–0.4)
EOSINOPHIL NFR BLD MANUAL: 1 % (ref 0.3–6.2)
ERYTHROCYTE [DISTWIDTH] IN BLOOD BY AUTOMATED COUNT: 14.6 % (ref 12.3–15.4)
FLUAV SUBTYP SPEC NAA+PROBE: NOT DETECTED
FLUBV RNA ISLT QL NAA+PROBE: NOT DETECTED
GLOBULIN UR ELPH-MCNC: 3.3 GM/DL
GLUCOSE SERPL-MCNC: 230 MG/DL (ref 65–99)
GLUCOSE UR STRIP-MCNC: NEGATIVE MG/DL
HADV DNA SPEC NAA+PROBE: NOT DETECTED
HCOV 229E RNA SPEC QL NAA+PROBE: NOT DETECTED
HCOV HKU1 RNA SPEC QL NAA+PROBE: NOT DETECTED
HCOV NL63 RNA SPEC QL NAA+PROBE: NOT DETECTED
HCOV OC43 RNA SPEC QL NAA+PROBE: NOT DETECTED
HCT VFR BLD AUTO: 35.8 % (ref 34–46.6)
HGB BLD-MCNC: 12 G/DL (ref 12–15.9)
HGB UR QL STRIP.AUTO: NEGATIVE
HMPV RNA NPH QL NAA+NON-PROBE: NOT DETECTED
HPIV1 RNA ISLT QL NAA+PROBE: NOT DETECTED
HPIV2 RNA SPEC QL NAA+PROBE: NOT DETECTED
HPIV3 RNA NPH QL NAA+PROBE: NOT DETECTED
HPIV4 P GENE NPH QL NAA+PROBE: NOT DETECTED
KETONES UR QL STRIP: ABNORMAL
LEUKOCYTE ESTERASE UR QL STRIP.AUTO: NEGATIVE
LYMPHOCYTES # BLD MANUAL: 0.92 10*3/MM3 (ref 0.7–3.1)
LYMPHOCYTES NFR BLD MANUAL: 3 % (ref 5–12)
M PNEUMO IGG SER IA-ACNC: NOT DETECTED
MCH RBC QN AUTO: 28.6 PG (ref 26.6–33)
MCHC RBC AUTO-ENTMCNC: 33.5 G/DL (ref 31.5–35.7)
MCV RBC AUTO: 85.2 FL (ref 79–97)
MONOCYTES # BLD: 0.54 10*3/MM3 (ref 0.1–0.9)
NEUTROPHILS # BLD AUTO: 16.48 10*3/MM3 (ref 1.7–7)
NEUTROPHILS NFR BLD MANUAL: 90.9 % (ref 42.7–76)
NITRITE UR QL STRIP: NEGATIVE
NT-PROBNP SERPL-MCNC: 217 PG/ML (ref 0–1800)
PH UR STRIP.AUTO: 5.5 [PH] (ref 5–8)
PLAT MORPH BLD: NORMAL
PLATELET # BLD AUTO: 109 10*3/MM3 (ref 140–450)
PMV BLD AUTO: 11.8 FL (ref 6–12)
POLYCHROMASIA BLD QL SMEAR: ABNORMAL
POTASSIUM SERPL-SCNC: 4.7 MMOL/L (ref 3.5–5.2)
PROCALCITONIN SERPL-MCNC: 7.19 NG/ML (ref 0–0.25)
PROT SERPL-MCNC: 7.1 G/DL (ref 6–8.5)
PROT UR QL STRIP: NEGATIVE
RBC # BLD AUTO: 4.2 10*6/MM3 (ref 3.77–5.28)
RHINOVIRUS RNA SPEC NAA+PROBE: NOT DETECTED
RSV RNA NPH QL NAA+NON-PROBE: NOT DETECTED
SARS-COV-2 RNA NPH QL NAA+NON-PROBE: NOT DETECTED
SODIUM SERPL-SCNC: 141 MMOL/L (ref 136–145)
SP GR UR STRIP: 1.02 (ref 1–1.03)
TROPONIN T SERPL-MCNC: <0.01 NG/ML (ref 0–0.03)
UROBILINOGEN UR QL STRIP: ABNORMAL
VARIANT LYMPHS NFR BLD MANUAL: 5.1 % (ref 19.6–45.3)
WBC MORPH BLD: NORMAL
WBC NRBC COR # BLD: 18.13 10*3/MM3 (ref 3.4–10.8)

## 2022-07-24 PROCEDURE — 93005 ELECTROCARDIOGRAM TRACING: CPT | Performed by: EMERGENCY MEDICINE

## 2022-07-24 PROCEDURE — 83880 ASSAY OF NATRIURETIC PEPTIDE: CPT | Performed by: EMERGENCY MEDICINE

## 2022-07-24 PROCEDURE — 80053 COMPREHEN METABOLIC PANEL: CPT | Performed by: EMERGENCY MEDICINE

## 2022-07-24 PROCEDURE — 25010000002 PIPERACILLIN SOD-TAZOBACTAM PER 1 G: Performed by: EMERGENCY MEDICINE

## 2022-07-24 PROCEDURE — 71045 X-RAY EXAM CHEST 1 VIEW: CPT

## 2022-07-24 PROCEDURE — 84484 ASSAY OF TROPONIN QUANT: CPT | Performed by: EMERGENCY MEDICINE

## 2022-07-24 PROCEDURE — 85007 BL SMEAR W/DIFF WBC COUNT: CPT | Performed by: EMERGENCY MEDICINE

## 2022-07-24 PROCEDURE — 99285 EMERGENCY DEPT VISIT HI MDM: CPT

## 2022-07-24 PROCEDURE — 71250 CT THORAX DX C-: CPT

## 2022-07-24 PROCEDURE — 85025 COMPLETE CBC W/AUTO DIFF WBC: CPT | Performed by: EMERGENCY MEDICINE

## 2022-07-24 PROCEDURE — 0202U NFCT DS 22 TRGT SARS-COV-2: CPT | Performed by: EMERGENCY MEDICINE

## 2022-07-24 PROCEDURE — 84145 PROCALCITONIN (PCT): CPT | Performed by: EMERGENCY MEDICINE

## 2022-07-24 PROCEDURE — 87040 BLOOD CULTURE FOR BACTERIA: CPT | Performed by: EMERGENCY MEDICINE

## 2022-07-24 PROCEDURE — 81003 URINALYSIS AUTO W/O SCOPE: CPT | Performed by: EMERGENCY MEDICINE

## 2022-07-24 PROCEDURE — P9612 CATHETERIZE FOR URINE SPEC: HCPCS

## 2022-07-24 RX ORDER — ONDANSETRON 4 MG/1
4 TABLET, FILM COATED ORAL EVERY 6 HOURS PRN
Status: DISCONTINUED | OUTPATIENT
Start: 2022-07-24 | End: 2022-07-28 | Stop reason: HOSPADM

## 2022-07-24 RX ORDER — ONDANSETRON 2 MG/ML
4 INJECTION INTRAMUSCULAR; INTRAVENOUS EVERY 6 HOURS PRN
Status: DISCONTINUED | OUTPATIENT
Start: 2022-07-24 | End: 2022-07-28 | Stop reason: HOSPADM

## 2022-07-24 RX ORDER — SODIUM CHLORIDE 0.9 % (FLUSH) 0.9 %
10 SYRINGE (ML) INJECTION AS NEEDED
Status: DISCONTINUED | OUTPATIENT
Start: 2022-07-24 | End: 2022-07-28 | Stop reason: HOSPADM

## 2022-07-24 RX ORDER — INSULIN LISPRO 100 [IU]/ML
0-9 INJECTION, SOLUTION INTRAVENOUS; SUBCUTANEOUS EVERY 6 HOURS
Status: DISCONTINUED | OUTPATIENT
Start: 2022-07-24 | End: 2022-07-25

## 2022-07-24 RX ORDER — ACETAMINOPHEN 325 MG/1
650 TABLET ORAL EVERY 4 HOURS PRN
Status: DISCONTINUED | OUTPATIENT
Start: 2022-07-24 | End: 2022-07-28 | Stop reason: HOSPADM

## 2022-07-24 RX ORDER — UREA 10 %
3 LOTION (ML) TOPICAL NIGHTLY PRN
Status: DISCONTINUED | OUTPATIENT
Start: 2022-07-24 | End: 2022-07-25

## 2022-07-24 RX ORDER — ENOXAPARIN SODIUM 100 MG/ML
40 INJECTION SUBCUTANEOUS NIGHTLY
Status: DISCONTINUED | OUTPATIENT
Start: 2022-07-24 | End: 2022-07-28 | Stop reason: HOSPADM

## 2022-07-24 RX ORDER — INSULIN LISPRO 100 [IU]/ML
0-9 INJECTION, SOLUTION INTRAVENOUS; SUBCUTANEOUS
Status: DISCONTINUED | OUTPATIENT
Start: 2022-07-24 | End: 2022-07-24

## 2022-07-24 RX ORDER — DEXTROSE MONOHYDRATE 25 G/50ML
25 INJECTION, SOLUTION INTRAVENOUS
Status: DISCONTINUED | OUTPATIENT
Start: 2022-07-24 | End: 2022-07-28 | Stop reason: HOSPADM

## 2022-07-24 RX ORDER — SODIUM CHLORIDE 9 MG/ML
75 INJECTION, SOLUTION INTRAVENOUS CONTINUOUS
Status: DISCONTINUED | OUTPATIENT
Start: 2022-07-24 | End: 2022-07-25

## 2022-07-24 RX ORDER — NITROGLYCERIN 0.4 MG/1
0.4 TABLET SUBLINGUAL
Status: DISCONTINUED | OUTPATIENT
Start: 2022-07-24 | End: 2022-07-28 | Stop reason: HOSPADM

## 2022-07-24 RX ORDER — NICOTINE POLACRILEX 4 MG
15 LOZENGE BUCCAL
Status: DISCONTINUED | OUTPATIENT
Start: 2022-07-24 | End: 2022-07-28 | Stop reason: HOSPADM

## 2022-07-24 RX ADMIN — TAZOBACTAM SODIUM AND PIPERACILLIN SODIUM 3.38 G: 375; 3 INJECTION, SOLUTION INTRAVENOUS at 22:46

## 2022-07-25 ENCOUNTER — APPOINTMENT (OUTPATIENT)
Dept: CARDIOLOGY | Facility: HOSPITAL | Age: 82
End: 2022-07-25

## 2022-07-25 PROBLEM — D69.6 THROMBOCYTOPENIA: Status: ACTIVE | Noted: 2022-07-25

## 2022-07-25 LAB
ANION GAP SERPL CALCULATED.3IONS-SCNC: 9.2 MMOL/L (ref 5–15)
BH CV LOWER VASCULAR LEFT COMMON FEMORAL AUGMENT: NORMAL
BH CV LOWER VASCULAR LEFT COMMON FEMORAL COMPETENT: NORMAL
BH CV LOWER VASCULAR LEFT COMMON FEMORAL COMPRESS: NORMAL
BH CV LOWER VASCULAR LEFT COMMON FEMORAL PHASIC: NORMAL
BH CV LOWER VASCULAR LEFT COMMON FEMORAL SPONT: NORMAL
BH CV LOWER VASCULAR LEFT DISTAL FEMORAL COMPRESS: NORMAL
BH CV LOWER VASCULAR LEFT GASTRONEMIUS COMPRESS: NORMAL
BH CV LOWER VASCULAR LEFT GREATER SAPH AK COMPRESS: NORMAL
BH CV LOWER VASCULAR LEFT GREATER SAPH BK COMPRESS: NORMAL
BH CV LOWER VASCULAR LEFT LESSER SAPH COMPRESS: NORMAL
BH CV LOWER VASCULAR LEFT MID FEMORAL AUGMENT: NORMAL
BH CV LOWER VASCULAR LEFT MID FEMORAL COMPETENT: NORMAL
BH CV LOWER VASCULAR LEFT MID FEMORAL COMPRESS: NORMAL
BH CV LOWER VASCULAR LEFT MID FEMORAL PHASIC: NORMAL
BH CV LOWER VASCULAR LEFT MID FEMORAL SPONT: NORMAL
BH CV LOWER VASCULAR LEFT PERONEAL COMPRESS: NORMAL
BH CV LOWER VASCULAR LEFT POPLITEAL AUGMENT: NORMAL
BH CV LOWER VASCULAR LEFT POPLITEAL COMPETENT: NORMAL
BH CV LOWER VASCULAR LEFT POPLITEAL COMPRESS: NORMAL
BH CV LOWER VASCULAR LEFT POPLITEAL PHASIC: NORMAL
BH CV LOWER VASCULAR LEFT POPLITEAL SPONT: NORMAL
BH CV LOWER VASCULAR LEFT POSTERIOR TIBIAL COMPRESS: NORMAL
BH CV LOWER VASCULAR LEFT PROFUNDA FEMORAL COMPRESS: NORMAL
BH CV LOWER VASCULAR LEFT PROXIMAL FEMORAL COMPRESS: NORMAL
BH CV LOWER VASCULAR LEFT SAPHENOFEMORAL JUNCTION COMPRESS: NORMAL
BH CV LOWER VASCULAR RIGHT COMMON FEMORAL AUGMENT: NORMAL
BH CV LOWER VASCULAR RIGHT COMMON FEMORAL COMPETENT: NORMAL
BH CV LOWER VASCULAR RIGHT COMMON FEMORAL COMPRESS: NORMAL
BH CV LOWER VASCULAR RIGHT COMMON FEMORAL PHASIC: NORMAL
BH CV LOWER VASCULAR RIGHT COMMON FEMORAL SPONT: NORMAL
BH CV LOWER VASCULAR RIGHT DISTAL FEMORAL COMPRESS: NORMAL
BH CV LOWER VASCULAR RIGHT GASTRONEMIUS COMPRESS: NORMAL
BH CV LOWER VASCULAR RIGHT GREATER SAPH AK COMPRESS: NORMAL
BH CV LOWER VASCULAR RIGHT GREATER SAPH BK COMPRESS: NORMAL
BH CV LOWER VASCULAR RIGHT LESSER SAPH COMPRESS: NORMAL
BH CV LOWER VASCULAR RIGHT MID FEMORAL AUGMENT: NORMAL
BH CV LOWER VASCULAR RIGHT MID FEMORAL COMPETENT: NORMAL
BH CV LOWER VASCULAR RIGHT MID FEMORAL COMPRESS: NORMAL
BH CV LOWER VASCULAR RIGHT MID FEMORAL PHASIC: NORMAL
BH CV LOWER VASCULAR RIGHT MID FEMORAL SPONT: NORMAL
BH CV LOWER VASCULAR RIGHT PERONEAL COMPRESS: NORMAL
BH CV LOWER VASCULAR RIGHT POPLITEAL AUGMENT: NORMAL
BH CV LOWER VASCULAR RIGHT POPLITEAL COMPETENT: NORMAL
BH CV LOWER VASCULAR RIGHT POPLITEAL COMPRESS: NORMAL
BH CV LOWER VASCULAR RIGHT POPLITEAL PHASIC: NORMAL
BH CV LOWER VASCULAR RIGHT POPLITEAL SPONT: NORMAL
BH CV LOWER VASCULAR RIGHT POSTERIOR TIBIAL COMPRESS: NORMAL
BH CV LOWER VASCULAR RIGHT PROFUNDA FEMORAL COMPRESS: NORMAL
BH CV LOWER VASCULAR RIGHT PROXIMAL FEMORAL COMPRESS: NORMAL
BH CV LOWER VASCULAR RIGHT SAPHENOFEMORAL JUNCTION COMPRESS: NORMAL
BUN SERPL-MCNC: 29 MG/DL (ref 8–23)
BUN/CREAT SERPL: 35.8 (ref 7–25)
CALCIUM SPEC-SCNC: 9.4 MG/DL (ref 8.6–10.5)
CHLORIDE SERPL-SCNC: 106 MMOL/L (ref 98–107)
CO2 SERPL-SCNC: 25.8 MMOL/L (ref 22–29)
CREAT SERPL-MCNC: 0.81 MG/DL (ref 0.57–1)
D-LACTATE SERPL-SCNC: 1.7 MMOL/L (ref 0.5–2)
DEPRECATED RDW RBC AUTO: 44.8 FL (ref 37–54)
EGFRCR SERPLBLD CKD-EPI 2021: 72.1 ML/MIN/1.73
ERYTHROCYTE [DISTWIDTH] IN BLOOD BY AUTOMATED COUNT: 14.6 % (ref 12.3–15.4)
FOLATE SERPL-MCNC: 20 NG/ML (ref 4.78–24.2)
GLUCOSE BLDC GLUCOMTR-MCNC: 110 MG/DL (ref 70–130)
GLUCOSE BLDC GLUCOMTR-MCNC: 128 MG/DL (ref 70–130)
GLUCOSE BLDC GLUCOMTR-MCNC: 175 MG/DL (ref 70–130)
GLUCOSE BLDC GLUCOMTR-MCNC: 62 MG/DL (ref 70–130)
GLUCOSE BLDC GLUCOMTR-MCNC: 90 MG/DL (ref 70–130)
GLUCOSE BLDC GLUCOMTR-MCNC: 91 MG/DL (ref 70–130)
GLUCOSE BLDC GLUCOMTR-MCNC: 99 MG/DL (ref 70–130)
GLUCOSE SERPL-MCNC: 197 MG/DL (ref 65–99)
HBA1C MFR BLD: 8.4 % (ref 4.8–5.6)
HCT VFR BLD AUTO: 30.1 % (ref 34–46.6)
HGB BLD-MCNC: 10 G/DL (ref 12–15.9)
MAXIMAL PREDICTED HEART RATE: 137 BPM
MCH RBC QN AUTO: 28.4 PG (ref 26.6–33)
MCHC RBC AUTO-ENTMCNC: 33.2 G/DL (ref 31.5–35.7)
MCV RBC AUTO: 85.5 FL (ref 79–97)
PLATELET # BLD AUTO: 99 10*3/MM3 (ref 140–450)
PMV BLD AUTO: 11.6 FL (ref 6–12)
POTASSIUM SERPL-SCNC: 4.4 MMOL/L (ref 3.5–5.2)
QT INTERVAL: 419 MS
RBC # BLD AUTO: 3.52 10*6/MM3 (ref 3.77–5.28)
SODIUM SERPL-SCNC: 141 MMOL/L (ref 136–145)
STRESS TARGET HR: 116 BPM
VIT B12 BLD-MCNC: 765 PG/ML (ref 211–946)
WBC NRBC COR # BLD: 19.31 10*3/MM3 (ref 3.4–10.8)

## 2022-07-25 PROCEDURE — 93970 EXTREMITY STUDY: CPT

## 2022-07-25 PROCEDURE — 82962 GLUCOSE BLOOD TEST: CPT

## 2022-07-25 PROCEDURE — 25010000002 ENOXAPARIN PER 10 MG

## 2022-07-25 PROCEDURE — 83036 HEMOGLOBIN GLYCOSYLATED A1C: CPT

## 2022-07-25 PROCEDURE — 25010000002 PIPERACILLIN SOD-TAZOBACTAM PER 1 G

## 2022-07-25 PROCEDURE — 82746 ASSAY OF FOLIC ACID SERUM: CPT | Performed by: STUDENT IN AN ORGANIZED HEALTH CARE EDUCATION/TRAINING PROGRAM

## 2022-07-25 PROCEDURE — 80048 BASIC METABOLIC PNL TOTAL CA: CPT

## 2022-07-25 PROCEDURE — 85027 COMPLETE CBC AUTOMATED: CPT

## 2022-07-25 PROCEDURE — 83605 ASSAY OF LACTIC ACID: CPT | Performed by: STUDENT IN AN ORGANIZED HEALTH CARE EDUCATION/TRAINING PROGRAM

## 2022-07-25 PROCEDURE — 36415 COLL VENOUS BLD VENIPUNCTURE: CPT

## 2022-07-25 PROCEDURE — 92610 EVALUATE SWALLOWING FUNCTION: CPT

## 2022-07-25 PROCEDURE — 63710000001 INSULIN LISPRO (HUMAN) PER 5 UNITS

## 2022-07-25 PROCEDURE — 82607 VITAMIN B-12: CPT | Performed by: STUDENT IN AN ORGANIZED HEALTH CARE EDUCATION/TRAINING PROGRAM

## 2022-07-25 RX ORDER — DONEPEZIL HYDROCHLORIDE 10 MG/1
10 TABLET, FILM COATED ORAL NIGHTLY
Status: DISCONTINUED | OUTPATIENT
Start: 2022-07-25 | End: 2022-07-28 | Stop reason: HOSPADM

## 2022-07-25 RX ORDER — PRAVASTATIN SODIUM 40 MG
40 TABLET ORAL EVERY EVENING
Status: DISCONTINUED | OUTPATIENT
Start: 2022-07-25 | End: 2022-07-28 | Stop reason: HOSPADM

## 2022-07-25 RX ORDER — DOCUSATE SODIUM 100 MG/1
100 CAPSULE, LIQUID FILLED ORAL 2 TIMES DAILY PRN
Status: DISCONTINUED | OUTPATIENT
Start: 2022-07-25 | End: 2022-07-28 | Stop reason: HOSPADM

## 2022-07-25 RX ORDER — ZONISAMIDE 100 MG/1
200 CAPSULE ORAL NIGHTLY
Status: DISCONTINUED | OUTPATIENT
Start: 2022-07-25 | End: 2022-07-28 | Stop reason: HOSPADM

## 2022-07-25 RX ORDER — FAMOTIDINE 20 MG/1
20 TABLET, FILM COATED ORAL 2 TIMES DAILY
Status: DISCONTINUED | OUTPATIENT
Start: 2022-07-25 | End: 2022-07-26

## 2022-07-25 RX ORDER — INSULIN LISPRO 100 [IU]/ML
0-7 INJECTION, SOLUTION INTRAVENOUS; SUBCUTANEOUS
Status: DISCONTINUED | OUTPATIENT
Start: 2022-07-25 | End: 2022-07-28 | Stop reason: HOSPADM

## 2022-07-25 RX ORDER — UREA 10 %
1 LOTION (ML) TOPICAL NIGHTLY
Status: DISCONTINUED | OUTPATIENT
Start: 2022-07-25 | End: 2022-07-28 | Stop reason: HOSPADM

## 2022-07-25 RX ADMIN — INSULIN LISPRO 4 UNITS: 100 INJECTION, SOLUTION INTRAVENOUS; SUBCUTANEOUS at 01:49

## 2022-07-25 RX ADMIN — SODIUM CHLORIDE 75 ML/HR: 9 INJECTION, SOLUTION INTRAVENOUS at 00:38

## 2022-07-25 RX ADMIN — TAZOBACTAM SODIUM AND PIPERACILLIN SODIUM 3.38 G: 375; 3 INJECTION, SOLUTION INTRAVENOUS at 12:08

## 2022-07-25 RX ADMIN — ENOXAPARIN SODIUM 40 MG: 100 INJECTION SUBCUTANEOUS at 21:35

## 2022-07-25 RX ADMIN — DEXTROSE MONOHYDRATE 25 G: 25 INJECTION, SOLUTION INTRAVENOUS at 07:07

## 2022-07-25 RX ADMIN — TAZOBACTAM SODIUM AND PIPERACILLIN SODIUM 3.38 G: 375; 3 INJECTION, SOLUTION INTRAVENOUS at 21:35

## 2022-07-25 RX ADMIN — TAZOBACTAM SODIUM AND PIPERACILLIN SODIUM 3.38 G: 375; 3 INJECTION, SOLUTION INTRAVENOUS at 05:34

## 2022-07-25 RX ADMIN — SODIUM CHLORIDE, POTASSIUM CHLORIDE, SODIUM LACTATE AND CALCIUM CHLORIDE 500 ML: 600; 310; 30; 20 INJECTION, SOLUTION INTRAVENOUS at 01:45

## 2022-07-25 RX ADMIN — ENOXAPARIN SODIUM 40 MG: 100 INJECTION SUBCUTANEOUS at 01:49

## 2022-07-25 RX ADMIN — DEXTROSE MONOHYDRATE 25 G: 25 INJECTION, SOLUTION INTRAVENOUS at 04:24

## 2022-07-26 LAB
ANION GAP SERPL CALCULATED.3IONS-SCNC: 11 MMOL/L (ref 5–15)
BUN SERPL-MCNC: 19 MG/DL (ref 8–23)
BUN/CREAT SERPL: 26 (ref 7–25)
CALCIUM SPEC-SCNC: 9 MG/DL (ref 8.6–10.5)
CHLORIDE SERPL-SCNC: 106 MMOL/L (ref 98–107)
CO2 SERPL-SCNC: 25 MMOL/L (ref 22–29)
CREAT SERPL-MCNC: 0.73 MG/DL (ref 0.57–1)
DEPRECATED RDW RBC AUTO: 45.1 FL (ref 37–54)
EGFRCR SERPLBLD CKD-EPI 2021: 81.7 ML/MIN/1.73
ERYTHROCYTE [DISTWIDTH] IN BLOOD BY AUTOMATED COUNT: 14.5 % (ref 12.3–15.4)
GLUCOSE BLDC GLUCOMTR-MCNC: 143 MG/DL (ref 70–130)
GLUCOSE BLDC GLUCOMTR-MCNC: 189 MG/DL (ref 70–130)
GLUCOSE BLDC GLUCOMTR-MCNC: 266 MG/DL (ref 70–130)
GLUCOSE BLDC GLUCOMTR-MCNC: 62 MG/DL (ref 70–130)
GLUCOSE BLDC GLUCOMTR-MCNC: 87 MG/DL (ref 70–130)
GLUCOSE SERPL-MCNC: 161 MG/DL (ref 65–99)
HCT VFR BLD AUTO: 32.3 % (ref 34–46.6)
HGB BLD-MCNC: 10.7 G/DL (ref 12–15.9)
MCH RBC QN AUTO: 28.5 PG (ref 26.6–33)
MCHC RBC AUTO-ENTMCNC: 33.1 G/DL (ref 31.5–35.7)
MCV RBC AUTO: 85.9 FL (ref 79–97)
PLATELET # BLD AUTO: 90 10*3/MM3 (ref 140–450)
PMV BLD AUTO: 11 FL (ref 6–12)
POTASSIUM SERPL-SCNC: 3.7 MMOL/L (ref 3.5–5.2)
RBC # BLD AUTO: 3.76 10*6/MM3 (ref 3.77–5.28)
SODIUM SERPL-SCNC: 142 MMOL/L (ref 136–145)
WBC NRBC COR # BLD: 11.8 10*3/MM3 (ref 3.4–10.8)

## 2022-07-26 PROCEDURE — 85027 COMPLETE CBC AUTOMATED: CPT | Performed by: INTERNAL MEDICINE

## 2022-07-26 PROCEDURE — 97162 PT EVAL MOD COMPLEX 30 MIN: CPT

## 2022-07-26 PROCEDURE — 80048 BASIC METABOLIC PNL TOTAL CA: CPT | Performed by: INTERNAL MEDICINE

## 2022-07-26 PROCEDURE — 82962 GLUCOSE BLOOD TEST: CPT

## 2022-07-26 PROCEDURE — 92526 ORAL FUNCTION THERAPY: CPT

## 2022-07-26 PROCEDURE — 25010000002 ENOXAPARIN PER 10 MG

## 2022-07-26 PROCEDURE — 25010000002 PIPERACILLIN SOD-TAZOBACTAM PER 1 G

## 2022-07-26 PROCEDURE — 97110 THERAPEUTIC EXERCISES: CPT

## 2022-07-26 RX ORDER — DEXTROSE AND SODIUM CHLORIDE 5; .45 G/100ML; G/100ML
50 INJECTION, SOLUTION INTRAVENOUS CONTINUOUS
Status: DISCONTINUED | OUTPATIENT
Start: 2022-07-26 | End: 2022-07-28

## 2022-07-26 RX ADMIN — ZONISAMIDE 200 MG: 100 CAPSULE ORAL at 20:29

## 2022-07-26 RX ADMIN — Medication 10 ML: at 20:29

## 2022-07-26 RX ADMIN — TAZOBACTAM SODIUM AND PIPERACILLIN SODIUM 3.38 G: 375; 3 INJECTION, SOLUTION INTRAVENOUS at 05:33

## 2022-07-26 RX ADMIN — TAZOBACTAM SODIUM AND PIPERACILLIN SODIUM 3.38 G: 375; 3 INJECTION, SOLUTION INTRAVENOUS at 12:19

## 2022-07-26 RX ADMIN — TAZOBACTAM SODIUM AND PIPERACILLIN SODIUM 3.38 G: 375; 3 INJECTION, SOLUTION INTRAVENOUS at 20:29

## 2022-07-26 RX ADMIN — Medication 1 MG: at 20:29

## 2022-07-26 RX ADMIN — DONEPEZIL HYDROCHLORIDE 10 MG: 10 TABLET, FILM COATED ORAL at 20:29

## 2022-07-26 RX ADMIN — DEXTROSE AND SODIUM CHLORIDE 100 ML/HR: 5; 450 INJECTION, SOLUTION INTRAVENOUS at 06:46

## 2022-07-26 RX ADMIN — ENOXAPARIN SODIUM 40 MG: 100 INJECTION SUBCUTANEOUS at 20:29

## 2022-07-26 RX ADMIN — DEXTROSE MONOHYDRATE 25 G: 25 INJECTION, SOLUTION INTRAVENOUS at 06:01

## 2022-07-27 ENCOUNTER — APPOINTMENT (OUTPATIENT)
Dept: GENERAL RADIOLOGY | Facility: HOSPITAL | Age: 82
End: 2022-07-27

## 2022-07-27 LAB
ANION GAP SERPL CALCULATED.3IONS-SCNC: 13.7 MMOL/L (ref 5–15)
BUN SERPL-MCNC: 12 MG/DL (ref 8–23)
BUN/CREAT SERPL: 17.1 (ref 7–25)
CALCIUM SPEC-SCNC: 9.8 MG/DL (ref 8.6–10.5)
CHLORIDE SERPL-SCNC: 102 MMOL/L (ref 98–107)
CO2 SERPL-SCNC: 21.3 MMOL/L (ref 22–29)
CREAT SERPL-MCNC: 0.7 MG/DL (ref 0.57–1)
DEPRECATED RDW RBC AUTO: 45.4 FL (ref 37–54)
EGFRCR SERPLBLD CKD-EPI 2021: 85.9 ML/MIN/1.73
ERYTHROCYTE [DISTWIDTH] IN BLOOD BY AUTOMATED COUNT: 14.5 % (ref 12.3–15.4)
GLUCOSE BLDC GLUCOMTR-MCNC: 127 MG/DL (ref 70–130)
GLUCOSE BLDC GLUCOMTR-MCNC: 130 MG/DL (ref 70–130)
GLUCOSE BLDC GLUCOMTR-MCNC: 147 MG/DL (ref 70–130)
GLUCOSE BLDC GLUCOMTR-MCNC: 174 MG/DL (ref 70–130)
GLUCOSE BLDC GLUCOMTR-MCNC: 194 MG/DL (ref 70–130)
GLUCOSE SERPL-MCNC: 145 MG/DL (ref 65–99)
HCT VFR BLD AUTO: 34.7 % (ref 34–46.6)
HGB BLD-MCNC: 11.4 G/DL (ref 12–15.9)
MCH RBC QN AUTO: 28.1 PG (ref 26.6–33)
MCHC RBC AUTO-ENTMCNC: 32.9 G/DL (ref 31.5–35.7)
MCV RBC AUTO: 85.7 FL (ref 79–97)
PLATELET # BLD AUTO: 114 10*3/MM3 (ref 140–450)
PMV BLD AUTO: 11.8 FL (ref 6–12)
POTASSIUM SERPL-SCNC: 3.5 MMOL/L (ref 3.5–5.2)
RBC # BLD AUTO: 4.05 10*6/MM3 (ref 3.77–5.28)
SODIUM SERPL-SCNC: 137 MMOL/L (ref 136–145)
WBC NRBC COR # BLD: 6.93 10*3/MM3 (ref 3.4–10.8)

## 2022-07-27 PROCEDURE — 97535 SELF CARE MNGMENT TRAINING: CPT

## 2022-07-27 PROCEDURE — 25010000002 ENOXAPARIN PER 10 MG

## 2022-07-27 PROCEDURE — 80048 BASIC METABOLIC PNL TOTAL CA: CPT | Performed by: INTERNAL MEDICINE

## 2022-07-27 PROCEDURE — 85027 COMPLETE CBC AUTOMATED: CPT | Performed by: INTERNAL MEDICINE

## 2022-07-27 PROCEDURE — 97166 OT EVAL MOD COMPLEX 45 MIN: CPT

## 2022-07-27 PROCEDURE — 92611 MOTION FLUOROSCOPY/SWALLOW: CPT | Performed by: SPEECH-LANGUAGE PATHOLOGIST

## 2022-07-27 PROCEDURE — 74230 X-RAY XM SWLNG FUNCJ C+: CPT

## 2022-07-27 PROCEDURE — 25010000002 PIPERACILLIN SOD-TAZOBACTAM PER 1 G

## 2022-07-27 PROCEDURE — 63710000001 INSULIN LISPRO (HUMAN) PER 5 UNITS: Performed by: INTERNAL MEDICINE

## 2022-07-27 PROCEDURE — 82962 GLUCOSE BLOOD TEST: CPT

## 2022-07-27 RX ORDER — AMLODIPINE BESYLATE 2.5 MG/1
2.5 TABLET ORAL
Status: DISCONTINUED | OUTPATIENT
Start: 2022-07-27 | End: 2022-07-28 | Stop reason: HOSPADM

## 2022-07-27 RX ADMIN — DONEPEZIL HYDROCHLORIDE 10 MG: 10 TABLET, FILM COATED ORAL at 21:40

## 2022-07-27 RX ADMIN — BARIUM SULFATE 1 TEASPOON(S): 0.6 CREAM ORAL at 12:26

## 2022-07-27 RX ADMIN — ZONISAMIDE 200 MG: 100 CAPSULE ORAL at 21:40

## 2022-07-27 RX ADMIN — TAZOBACTAM SODIUM AND PIPERACILLIN SODIUM 3.38 G: 375; 3 INJECTION, SOLUTION INTRAVENOUS at 04:24

## 2022-07-27 RX ADMIN — BARIUM SULFATE 4 ML: 980 POWDER, FOR SUSPENSION ORAL at 12:26

## 2022-07-27 RX ADMIN — DEXTROSE AND SODIUM CHLORIDE 50 ML/HR: 5; 450 INJECTION, SOLUTION INTRAVENOUS at 21:38

## 2022-07-27 RX ADMIN — ENOXAPARIN SODIUM 40 MG: 100 INJECTION SUBCUTANEOUS at 21:40

## 2022-07-27 RX ADMIN — TAZOBACTAM SODIUM AND PIPERACILLIN SODIUM 3.38 G: 375; 3 INJECTION, SOLUTION INTRAVENOUS at 13:26

## 2022-07-27 RX ADMIN — TAZOBACTAM SODIUM AND PIPERACILLIN SODIUM 3.38 G: 375; 3 INJECTION, SOLUTION INTRAVENOUS at 21:38

## 2022-07-27 RX ADMIN — Medication 1 MG: at 21:40

## 2022-07-27 RX ADMIN — BARIUM SULFATE 55 ML: 0.81 POWDER, FOR SUSPENSION ORAL at 12:26

## 2022-07-27 RX ADMIN — PRAVASTATIN SODIUM 40 MG: 40 TABLET ORAL at 18:49

## 2022-07-27 RX ADMIN — INSULIN LISPRO 2 UNITS: 100 INJECTION, SOLUTION INTRAVENOUS; SUBCUTANEOUS at 18:49

## 2022-07-28 ENCOUNTER — HOSPITAL ENCOUNTER (INPATIENT)
Facility: HOSPITAL | Age: 82
LOS: 13 days | Discharge: HOME HEALTH | End: 2022-08-10
Attending: PHYSICAL MEDICINE & REHABILITATION | Admitting: PHYSICAL MEDICINE & REHABILITATION

## 2022-07-28 VITALS
RESPIRATION RATE: 18 BRPM | SYSTOLIC BLOOD PRESSURE: 145 MMHG | BODY MASS INDEX: 25 KG/M2 | WEIGHT: 141.09 LBS | OXYGEN SATURATION: 95 % | DIASTOLIC BLOOD PRESSURE: 72 MMHG | TEMPERATURE: 97.9 F | HEART RATE: 66 BPM | HEIGHT: 63 IN

## 2022-07-28 DIAGNOSIS — Z74.09 IMPAIRED FUNCTIONAL MOBILITY, BALANCE, GAIT, AND ENDURANCE: Primary | ICD-10-CM

## 2022-07-28 DIAGNOSIS — I10 ESSENTIAL HYPERTENSION: ICD-10-CM

## 2022-07-28 PROBLEM — M62.3 IMMOBILITY SYNDROME: Status: ACTIVE | Noted: 2022-07-28

## 2022-07-28 PROBLEM — R13.12 OROPHARYNGEAL DYSPHAGIA: Status: ACTIVE | Noted: 2022-07-28

## 2022-07-28 LAB
ANION GAP SERPL CALCULATED.3IONS-SCNC: 9.9 MMOL/L (ref 5–15)
BUN SERPL-MCNC: 11 MG/DL (ref 8–23)
BUN/CREAT SERPL: 18.6 (ref 7–25)
CALCIUM SPEC-SCNC: 9.2 MG/DL (ref 8.6–10.5)
CHLORIDE SERPL-SCNC: 104 MMOL/L (ref 98–107)
CO2 SERPL-SCNC: 26.1 MMOL/L (ref 22–29)
CREAT SERPL-MCNC: 0.59 MG/DL (ref 0.57–1)
DEPRECATED RDW RBC AUTO: 44.4 FL (ref 37–54)
EGFRCR SERPLBLD CKD-EPI 2021: 89.6 ML/MIN/1.73
ERYTHROCYTE [DISTWIDTH] IN BLOOD BY AUTOMATED COUNT: 14.7 % (ref 12.3–15.4)
GLUCOSE BLDC GLUCOMTR-MCNC: 139 MG/DL (ref 70–130)
GLUCOSE BLDC GLUCOMTR-MCNC: 143 MG/DL (ref 70–130)
GLUCOSE BLDC GLUCOMTR-MCNC: 259 MG/DL (ref 70–130)
GLUCOSE SERPL-MCNC: 136 MG/DL (ref 65–99)
HCT VFR BLD AUTO: 33.2 % (ref 34–46.6)
HGB BLD-MCNC: 11 G/DL (ref 12–15.9)
MCH RBC QN AUTO: 28.1 PG (ref 26.6–33)
MCHC RBC AUTO-ENTMCNC: 33.1 G/DL (ref 31.5–35.7)
MCV RBC AUTO: 84.9 FL (ref 79–97)
PLATELET # BLD AUTO: 140 10*3/MM3 (ref 140–450)
PMV BLD AUTO: 10.9 FL (ref 6–12)
POTASSIUM SERPL-SCNC: 3.9 MMOL/L (ref 3.5–5.2)
RBC # BLD AUTO: 3.91 10*6/MM3 (ref 3.77–5.28)
SARS-COV-2 RNA RESP QL NAA+PROBE: NOT DETECTED
SODIUM SERPL-SCNC: 140 MMOL/L (ref 136–145)
WBC NRBC COR # BLD: 6.63 10*3/MM3 (ref 3.4–10.8)

## 2022-07-28 PROCEDURE — 25010000002 PIPERACILLIN SOD-TAZOBACTAM PER 1 G: Performed by: INTERNAL MEDICINE

## 2022-07-28 PROCEDURE — U0005 INFEC AGEN DETEC AMPLI PROBE: HCPCS | Performed by: INTERNAL MEDICINE

## 2022-07-28 PROCEDURE — 25010000002 PIPERACILLIN SOD-TAZOBACTAM PER 1 G

## 2022-07-28 PROCEDURE — 85027 COMPLETE CBC AUTOMATED: CPT | Performed by: INTERNAL MEDICINE

## 2022-07-28 PROCEDURE — U0003 INFECTIOUS AGENT DETECTION BY NUCLEIC ACID (DNA OR RNA); SEVERE ACUTE RESPIRATORY SYNDROME CORONAVIRUS 2 (SARS-COV-2) (CORONAVIRUS DISEASE [COVID-19]), AMPLIFIED PROBE TECHNIQUE, MAKING USE OF HIGH THROUGHPUT TECHNOLOGIES AS DESCRIBED BY CMS-2020-01-R: HCPCS | Performed by: INTERNAL MEDICINE

## 2022-07-28 PROCEDURE — 80048 BASIC METABOLIC PNL TOTAL CA: CPT | Performed by: INTERNAL MEDICINE

## 2022-07-28 PROCEDURE — 82962 GLUCOSE BLOOD TEST: CPT

## 2022-07-28 PROCEDURE — 97110 THERAPEUTIC EXERCISES: CPT

## 2022-07-28 PROCEDURE — 63710000001 INSULIN LISPRO (HUMAN) PER 5 UNITS: Performed by: INTERNAL MEDICINE

## 2022-07-28 PROCEDURE — 25010000002 ENOXAPARIN PER 10 MG: Performed by: INTERNAL MEDICINE

## 2022-07-28 RX ORDER — PRAVASTATIN SODIUM 40 MG
40 TABLET ORAL EVERY EVENING
Status: CANCELLED | OUTPATIENT
Start: 2022-07-28

## 2022-07-28 RX ORDER — INSULIN LISPRO 100 [IU]/ML
0-7 INJECTION, SOLUTION INTRAVENOUS; SUBCUTANEOUS
Status: CANCELLED | OUTPATIENT
Start: 2022-07-28

## 2022-07-28 RX ORDER — ENOXAPARIN SODIUM 100 MG/ML
40 INJECTION SUBCUTANEOUS NIGHTLY
Status: CANCELLED | OUTPATIENT
Start: 2022-07-28

## 2022-07-28 RX ORDER — PRAVASTATIN SODIUM 40 MG
40 TABLET ORAL EVERY EVENING
Status: DISCONTINUED | OUTPATIENT
Start: 2022-07-29 | End: 2022-08-10 | Stop reason: HOSPADM

## 2022-07-28 RX ORDER — DOCUSATE SODIUM 100 MG/1
100 CAPSULE, LIQUID FILLED ORAL 2 TIMES DAILY PRN
Status: CANCELLED | OUTPATIENT
Start: 2022-07-28

## 2022-07-28 RX ORDER — ONDANSETRON 4 MG/1
4 TABLET, FILM COATED ORAL EVERY 6 HOURS PRN
Status: CANCELLED | OUTPATIENT
Start: 2022-07-28

## 2022-07-28 RX ORDER — NITROGLYCERIN 0.4 MG/1
0.4 TABLET SUBLINGUAL
Status: CANCELLED | OUTPATIENT
Start: 2022-07-28

## 2022-07-28 RX ORDER — AMLODIPINE BESYLATE 2.5 MG/1
2.5 TABLET ORAL
Status: DISCONTINUED | OUTPATIENT
Start: 2022-07-29 | End: 2022-08-10 | Stop reason: HOSPADM

## 2022-07-28 RX ORDER — ZONISAMIDE 100 MG/1
200 CAPSULE ORAL NIGHTLY
Status: DISCONTINUED | OUTPATIENT
Start: 2022-07-28 | End: 2022-08-10 | Stop reason: HOSPADM

## 2022-07-28 RX ORDER — NICOTINE POLACRILEX 4 MG
15 LOZENGE BUCCAL
Status: CANCELLED | OUTPATIENT
Start: 2022-07-28

## 2022-07-28 RX ORDER — SODIUM CHLORIDE 0.9 % (FLUSH) 0.9 %
10 SYRINGE (ML) INJECTION AS NEEDED
Status: DISCONTINUED | OUTPATIENT
Start: 2022-07-28 | End: 2022-08-10

## 2022-07-28 RX ORDER — ACETAMINOPHEN 325 MG/1
650 TABLET ORAL EVERY 4 HOURS PRN
Status: DISCONTINUED | OUTPATIENT
Start: 2022-07-28 | End: 2022-08-10 | Stop reason: HOSPADM

## 2022-07-28 RX ORDER — DONEPEZIL HYDROCHLORIDE 10 MG/1
10 TABLET, FILM COATED ORAL NIGHTLY
Status: DISCONTINUED | OUTPATIENT
Start: 2022-07-28 | End: 2022-08-10 | Stop reason: HOSPADM

## 2022-07-28 RX ORDER — UREA 10 %
1 LOTION (ML) TOPICAL NIGHTLY
Status: CANCELLED | OUTPATIENT
Start: 2022-07-28

## 2022-07-28 RX ORDER — ENOXAPARIN SODIUM 100 MG/ML
40 INJECTION SUBCUTANEOUS NIGHTLY
Status: DISCONTINUED | OUTPATIENT
Start: 2022-07-28 | End: 2022-08-10 | Stop reason: HOSPADM

## 2022-07-28 RX ORDER — ZONISAMIDE 100 MG/1
200 CAPSULE ORAL NIGHTLY
Status: CANCELLED | OUTPATIENT
Start: 2022-07-28

## 2022-07-28 RX ORDER — DEXTROSE MONOHYDRATE 25 G/50ML
25 INJECTION, SOLUTION INTRAVENOUS
Status: CANCELLED | OUTPATIENT
Start: 2022-07-28

## 2022-07-28 RX ORDER — DOCUSATE SODIUM 100 MG/1
100 CAPSULE, LIQUID FILLED ORAL 2 TIMES DAILY PRN
Status: DISCONTINUED | OUTPATIENT
Start: 2022-07-28 | End: 2022-08-10

## 2022-07-28 RX ORDER — NICOTINE POLACRILEX 4 MG
15 LOZENGE BUCCAL
Status: DISCONTINUED | OUTPATIENT
Start: 2022-07-28 | End: 2022-08-10 | Stop reason: HOSPADM

## 2022-07-28 RX ORDER — AMLODIPINE BESYLATE 2.5 MG/1
2.5 TABLET ORAL
Status: CANCELLED | OUTPATIENT
Start: 2022-07-29

## 2022-07-28 RX ORDER — DEXTROSE MONOHYDRATE 25 G/50ML
25 INJECTION, SOLUTION INTRAVENOUS
Status: DISCONTINUED | OUTPATIENT
Start: 2022-07-28 | End: 2022-08-10 | Stop reason: HOSPADM

## 2022-07-28 RX ORDER — ACETAMINOPHEN 325 MG/1
650 TABLET ORAL EVERY 4 HOURS PRN
Status: CANCELLED | OUTPATIENT
Start: 2022-07-28

## 2022-07-28 RX ORDER — SODIUM CHLORIDE 0.9 % (FLUSH) 0.9 %
10 SYRINGE (ML) INJECTION AS NEEDED
Status: CANCELLED | OUTPATIENT
Start: 2022-07-28

## 2022-07-28 RX ORDER — INSULIN LISPRO 100 [IU]/ML
0-7 INJECTION, SOLUTION INTRAVENOUS; SUBCUTANEOUS
Status: DISCONTINUED | OUTPATIENT
Start: 2022-07-29 | End: 2022-08-10 | Stop reason: HOSPADM

## 2022-07-28 RX ORDER — ONDANSETRON 2 MG/ML
4 INJECTION INTRAMUSCULAR; INTRAVENOUS EVERY 6 HOURS PRN
Status: DISCONTINUED | OUTPATIENT
Start: 2022-07-28 | End: 2022-08-10

## 2022-07-28 RX ORDER — ONDANSETRON 2 MG/ML
4 INJECTION INTRAMUSCULAR; INTRAVENOUS EVERY 6 HOURS PRN
Status: CANCELLED | OUTPATIENT
Start: 2022-07-28

## 2022-07-28 RX ORDER — NITROGLYCERIN 0.4 MG/1
0.4 TABLET SUBLINGUAL
Status: DISCONTINUED | OUTPATIENT
Start: 2022-07-28 | End: 2022-08-10 | Stop reason: HOSPADM

## 2022-07-28 RX ORDER — UREA 10 %
1 LOTION (ML) TOPICAL NIGHTLY
Status: DISCONTINUED | OUTPATIENT
Start: 2022-07-28 | End: 2022-08-10 | Stop reason: HOSPADM

## 2022-07-28 RX ORDER — DONEPEZIL HYDROCHLORIDE 10 MG/1
10 TABLET, FILM COATED ORAL NIGHTLY
Status: CANCELLED | OUTPATIENT
Start: 2022-07-28

## 2022-07-28 RX ORDER — ONDANSETRON 4 MG/1
4 TABLET, FILM COATED ORAL EVERY 6 HOURS PRN
Status: DISCONTINUED | OUTPATIENT
Start: 2022-07-28 | End: 2022-08-10

## 2022-07-28 RX ADMIN — ENOXAPARIN SODIUM 40 MG: 100 INJECTION SUBCUTANEOUS at 20:48

## 2022-07-28 RX ADMIN — AMLODIPINE BESYLATE 2.5 MG: 2.5 TABLET ORAL at 08:21

## 2022-07-28 RX ADMIN — ZONISAMIDE 200 MG: 100 CAPSULE ORAL at 20:46

## 2022-07-28 RX ADMIN — DONEPEZIL HYDROCHLORIDE 10 MG: 10 TABLET, FILM COATED ORAL at 20:46

## 2022-07-28 RX ADMIN — INSULIN LISPRO 4 UNITS: 100 INJECTION, SOLUTION INTRAVENOUS; SUBCUTANEOUS at 17:26

## 2022-07-28 RX ADMIN — TAZOBACTAM SODIUM AND PIPERACILLIN SODIUM 3.38 G: 375; 3 INJECTION, SOLUTION INTRAVENOUS at 05:33

## 2022-07-28 RX ADMIN — PRAVASTATIN SODIUM 40 MG: 40 TABLET ORAL at 17:32

## 2022-07-28 RX ADMIN — TAZOBACTAM SODIUM AND PIPERACILLIN SODIUM 3.38 G: 375; 3 INJECTION, SOLUTION INTRAVENOUS at 21:15

## 2022-07-28 RX ADMIN — TAZOBACTAM SODIUM AND PIPERACILLIN SODIUM 3.38 G: 375; 3 INJECTION, SOLUTION INTRAVENOUS at 13:26

## 2022-07-28 RX ADMIN — Medication 1 MG: at 20:48

## 2022-07-29 LAB
BACTERIA SPEC AEROBE CULT: NORMAL
BACTERIA SPEC AEROBE CULT: NORMAL
GLUCOSE BLDC GLUCOMTR-MCNC: 128 MG/DL (ref 70–130)
GLUCOSE BLDC GLUCOMTR-MCNC: 137 MG/DL (ref 70–130)
GLUCOSE BLDC GLUCOMTR-MCNC: 176 MG/DL (ref 70–130)
GLUCOSE BLDC GLUCOMTR-MCNC: 188 MG/DL (ref 70–130)

## 2022-07-29 PROCEDURE — 92610 EVALUATE SWALLOWING FUNCTION: CPT

## 2022-07-29 PROCEDURE — 97162 PT EVAL MOD COMPLEX 30 MIN: CPT

## 2022-07-29 PROCEDURE — 97166 OT EVAL MOD COMPLEX 45 MIN: CPT | Performed by: OCCUPATIONAL THERAPIST

## 2022-07-29 PROCEDURE — 97535 SELF CARE MNGMENT TRAINING: CPT | Performed by: OCCUPATIONAL THERAPIST

## 2022-07-29 PROCEDURE — 92523 SPEECH SOUND LANG COMPREHEN: CPT

## 2022-07-29 PROCEDURE — 25010000002 ENOXAPARIN PER 10 MG: Performed by: INTERNAL MEDICINE

## 2022-07-29 PROCEDURE — 82962 GLUCOSE BLOOD TEST: CPT

## 2022-07-29 PROCEDURE — 97530 THERAPEUTIC ACTIVITIES: CPT

## 2022-07-29 PROCEDURE — 25010000002 PIPERACILLIN SOD-TAZOBACTAM PER 1 G: Performed by: INTERNAL MEDICINE

## 2022-07-29 PROCEDURE — 63710000001 INSULIN LISPRO (HUMAN) PER 5 UNITS: Performed by: INTERNAL MEDICINE

## 2022-07-29 RX ADMIN — PRAVASTATIN SODIUM 40 MG: 40 TABLET ORAL at 16:32

## 2022-07-29 RX ADMIN — ZONISAMIDE 200 MG: 100 CAPSULE ORAL at 20:38

## 2022-07-29 RX ADMIN — TAZOBACTAM SODIUM AND PIPERACILLIN SODIUM 3.38 G: 375; 3 INJECTION, SOLUTION INTRAVENOUS at 13:29

## 2022-07-29 RX ADMIN — DONEPEZIL HYDROCHLORIDE 10 MG: 10 TABLET, FILM COATED ORAL at 20:38

## 2022-07-29 RX ADMIN — AMLODIPINE BESYLATE 2.5 MG: 2.5 TABLET ORAL at 08:43

## 2022-07-29 RX ADMIN — INSULIN LISPRO 2 UNITS: 100 INJECTION, SOLUTION INTRAVENOUS; SUBCUTANEOUS at 11:59

## 2022-07-29 RX ADMIN — Medication 1 MG: at 20:38

## 2022-07-29 RX ADMIN — TAZOBACTAM SODIUM AND PIPERACILLIN SODIUM 3.38 G: 375; 3 INJECTION, SOLUTION INTRAVENOUS at 20:49

## 2022-07-29 RX ADMIN — TAZOBACTAM SODIUM AND PIPERACILLIN SODIUM 3.38 G: 375; 3 INJECTION, SOLUTION INTRAVENOUS at 05:26

## 2022-07-29 RX ADMIN — ENOXAPARIN SODIUM 40 MG: 100 INJECTION SUBCUTANEOUS at 20:39

## 2022-07-29 RX ADMIN — INSULIN LISPRO 2 UNITS: 100 INJECTION, SOLUTION INTRAVENOUS; SUBCUTANEOUS at 16:53

## 2022-07-30 LAB
GLUCOSE BLDC GLUCOMTR-MCNC: 114 MG/DL (ref 70–130)
GLUCOSE BLDC GLUCOMTR-MCNC: 187 MG/DL (ref 70–130)
GLUCOSE BLDC GLUCOMTR-MCNC: 199 MG/DL (ref 70–130)
GLUCOSE BLDC GLUCOMTR-MCNC: 228 MG/DL (ref 70–130)

## 2022-07-30 PROCEDURE — 97110 THERAPEUTIC EXERCISES: CPT

## 2022-07-30 PROCEDURE — 25010000002 ENOXAPARIN PER 10 MG: Performed by: INTERNAL MEDICINE

## 2022-07-30 PROCEDURE — 97130 THER IVNTJ EA ADDL 15 MIN: CPT

## 2022-07-30 PROCEDURE — 63710000001 INSULIN LISPRO (HUMAN) PER 5 UNITS: Performed by: INTERNAL MEDICINE

## 2022-07-30 PROCEDURE — 97535 SELF CARE MNGMENT TRAINING: CPT | Performed by: OCCUPATIONAL THERAPIST

## 2022-07-30 PROCEDURE — 97530 THERAPEUTIC ACTIVITIES: CPT

## 2022-07-30 PROCEDURE — 97129 THER IVNTJ 1ST 15 MIN: CPT

## 2022-07-30 PROCEDURE — 82962 GLUCOSE BLOOD TEST: CPT

## 2022-07-30 PROCEDURE — 97110 THERAPEUTIC EXERCISES: CPT | Performed by: OCCUPATIONAL THERAPIST

## 2022-07-30 RX ADMIN — DONEPEZIL HYDROCHLORIDE 10 MG: 10 TABLET, FILM COATED ORAL at 19:54

## 2022-07-30 RX ADMIN — INSULIN LISPRO 3 UNITS: 100 INJECTION, SOLUTION INTRAVENOUS; SUBCUTANEOUS at 16:55

## 2022-07-30 RX ADMIN — AMLODIPINE BESYLATE 2.5 MG: 2.5 TABLET ORAL at 07:37

## 2022-07-30 RX ADMIN — ZONISAMIDE 200 MG: 100 CAPSULE ORAL at 19:54

## 2022-07-30 RX ADMIN — PRAVASTATIN SODIUM 40 MG: 40 TABLET ORAL at 16:55

## 2022-07-30 RX ADMIN — INSULIN LISPRO 2 UNITS: 100 INJECTION, SOLUTION INTRAVENOUS; SUBCUTANEOUS at 12:08

## 2022-07-30 RX ADMIN — Medication 1 MG: at 19:54

## 2022-07-30 RX ADMIN — ENOXAPARIN SODIUM 40 MG: 100 INJECTION SUBCUTANEOUS at 19:54

## 2022-07-31 LAB
GLUCOSE BLDC GLUCOMTR-MCNC: 132 MG/DL (ref 70–130)
GLUCOSE BLDC GLUCOMTR-MCNC: 134 MG/DL (ref 70–130)
GLUCOSE BLDC GLUCOMTR-MCNC: 217 MG/DL (ref 70–130)
GLUCOSE BLDC GLUCOMTR-MCNC: 240 MG/DL (ref 70–130)

## 2022-07-31 PROCEDURE — 25010000002 ENOXAPARIN PER 10 MG: Performed by: INTERNAL MEDICINE

## 2022-07-31 PROCEDURE — 63710000001 INSULIN LISPRO (HUMAN) PER 5 UNITS: Performed by: INTERNAL MEDICINE

## 2022-07-31 PROCEDURE — 82962 GLUCOSE BLOOD TEST: CPT

## 2022-07-31 RX ADMIN — PRAVASTATIN SODIUM 40 MG: 40 TABLET ORAL at 16:47

## 2022-07-31 RX ADMIN — DONEPEZIL HYDROCHLORIDE 10 MG: 10 TABLET, FILM COATED ORAL at 20:17

## 2022-07-31 RX ADMIN — ZONISAMIDE 200 MG: 100 CAPSULE ORAL at 20:16

## 2022-07-31 RX ADMIN — INSULIN LISPRO 3 UNITS: 100 INJECTION, SOLUTION INTRAVENOUS; SUBCUTANEOUS at 12:11

## 2022-07-31 RX ADMIN — AMLODIPINE BESYLATE 2.5 MG: 2.5 TABLET ORAL at 08:28

## 2022-07-31 RX ADMIN — ENOXAPARIN SODIUM 40 MG: 100 INJECTION SUBCUTANEOUS at 20:17

## 2022-07-31 RX ADMIN — Medication 1 MG: at 20:16

## 2022-08-01 LAB
GLUCOSE BLDC GLUCOMTR-MCNC: 133 MG/DL (ref 70–130)
GLUCOSE BLDC GLUCOMTR-MCNC: 140 MG/DL (ref 70–130)
GLUCOSE BLDC GLUCOMTR-MCNC: 233 MG/DL (ref 70–130)
GLUCOSE BLDC GLUCOMTR-MCNC: 235 MG/DL (ref 70–130)

## 2022-08-01 PROCEDURE — 63710000001 INSULIN LISPRO (HUMAN) PER 5 UNITS: Performed by: INTERNAL MEDICINE

## 2022-08-01 PROCEDURE — 92526 ORAL FUNCTION THERAPY: CPT

## 2022-08-01 PROCEDURE — 97535 SELF CARE MNGMENT TRAINING: CPT

## 2022-08-01 PROCEDURE — 97110 THERAPEUTIC EXERCISES: CPT

## 2022-08-01 PROCEDURE — 82962 GLUCOSE BLOOD TEST: CPT

## 2022-08-01 PROCEDURE — 25010000002 ENOXAPARIN PER 10 MG: Performed by: INTERNAL MEDICINE

## 2022-08-01 RX ADMIN — PRAVASTATIN SODIUM 40 MG: 40 TABLET ORAL at 17:46

## 2022-08-01 RX ADMIN — DONEPEZIL HYDROCHLORIDE 10 MG: 10 TABLET, FILM COATED ORAL at 21:50

## 2022-08-01 RX ADMIN — INSULIN LISPRO 3 UNITS: 100 INJECTION, SOLUTION INTRAVENOUS; SUBCUTANEOUS at 11:52

## 2022-08-01 RX ADMIN — ENOXAPARIN SODIUM 40 MG: 100 INJECTION SUBCUTANEOUS at 21:50

## 2022-08-01 RX ADMIN — AMLODIPINE BESYLATE 2.5 MG: 2.5 TABLET ORAL at 07:27

## 2022-08-01 RX ADMIN — INSULIN LISPRO 3 UNITS: 100 INJECTION, SOLUTION INTRAVENOUS; SUBCUTANEOUS at 17:46

## 2022-08-01 RX ADMIN — ZONISAMIDE 200 MG: 100 CAPSULE ORAL at 21:50

## 2022-08-01 RX ADMIN — Medication 1 MG: at 21:50

## 2022-08-01 NOTE — THERAPY TREATMENT NOTE
Inpatient Rehabilitation - Physical Therapy Treatment Note       [facility]     Patient Name: Di Macario  : 1940  MRN: [id number]    Today's Date: 2022                    Admit Date: 2022      Visit Dx:     ICD-10-CM ICD-9-CM   1. Impaired functional mobility, balance, gait, and endurance  Z74.09 V49.89       Patient Active Problem List   Diagnosis   • Essential hypertension   • Vitamin D deficiency   • Hyperuricemia   • Slow transit constipation   • Chronic pain of left knee   • At high risk for falls   • Peripheral neuropathy   • Uncontrolled type 2 diabetes mellitus with hyperglycemia (HCC)   • Walker as ambulation aid   • Bradycardia, sinus   • Shortness of breath   • Leg swelling   • Hyperlipidemia   • Presence of cardiac pacemaker   • Dementia without behavioral disturbance (HCC)   • Aspiration pneumonia due to vomitus ([facility])   • Aspiration pneumonia of both lungs due to gastric secretions, unspecified part of lung (HCC)   • Weakness   • Thrombocytopenia ([facility])   • Oropharyngeal dysphagia   • Immobility syndrome       Past Medical History:   Diagnosis Date   • Dementia ([facility])     states better with medication    • Diabetes mellitus (HCC)    • Hyperlipidemia 2021   • Lung consolidation ([facility]) 2022   • Peripheral neuropathy 2018   • Vitamin D deficiency        Past Surgical History:   Procedure Laterality Date   • ANKLE SURGERY Right    • BLADDER SURGERY      Prolapsed   • BREAST BIOPSY     • CARDIAC ELECTROPHYSIOLOGY PROCEDURE N/A 3/5/2021    Procedure: Pacemaker DC new BOSTON;  Surgeon: Madelin Ferguson MD;  Location: [facility] CATH INVASIVE LOCATION;  Service: Cardiology;  Laterality: N/A;   • ENDOSCOPY N/A 2022    Procedure: ESOPHAGOGASTRODUODENOSCOPY with biopsy, balloon dilation;  Surgeon: Lo Benjamin MD;  Location: [facility] ENDOSCOPY;  Service: Gastroenterology;  Laterality: N/A;  esophageal stricture, hiatal hernia, gastritis   • HYSTERECTOMY          PT ASSESSMENT (last 12 hours)     IRF PT Evaluation and Treatment     Row Name 08/01/22 0944          PT Time and Intention    Document Type daily treatment  -LB     Mode of Treatment physical therapy  -LB     Patient/Family/Caregiver Comments/Observations Pnt seated in recliner chair. NAD  -LB     Row Name 08/01/22 0944          General Information    Existing Precautions/Restrictions fall  -LB     Row Name 08/01/22 0944          Pain Assessment    Pretreatment Pain Rating 0/10 - no pain  -LB     Row Name 08/01/22 0944          Bed Mobility    Supine-Sit [place] (Bed Mobility) supervision  -LB     Sit-Supine [place] (Bed Mobility) supervision  -LB     Row Name 08/01/22 0944          Transfers    Bed-Chair [place] (Transfers) minimum assist (75% patient effort)  -LB     Chair-Bed [place] (Transfers) minimum assist (75% patient effort)  -LB     Assistive Device (Bed-Chair Transfers) wheelchair  -LB     Sit-Stand [place] (Transfers) minimum assist (75% patient effort);contact guard  -LB     Stand-Sit [place] (Transfers) contact guard  -LB     Row Name 08/01/22 0944          Chair-Bed Transfer    Assistive Device (Chair-Bed Transfers) wheelchair  -LB     Row Name 08/01/22 0944          Sit-Stand Transfer    Assistive Device (Sit-Stand Transfers) walker, front-wheeled;wheelchair  -LB     Row Name 08/01/22 0944          Stand-Sit Transfer    Assistive Device (Stand-Sit Transfers) walker, front-wheeled;wheelchair  -LB     Row Name 08/01/22 0944          Car Transfer    Type (Car Transfer) sit-stand;stand-sit  -LB     [place] Level (Car Transfer) minimum assist (75% patient effort)  -LB     Assistive Device (Car Transfer) walker, front-wheeled  -LB     Row Name 08/01/22 0944          Gait/Stairs (Locomotion)    [place] Level (Gait) contact guard  -LB     Assistive Device (Gait) walker, front-wheeled  -LB     Distance in Feet (Gait) 240, 120, 80  -LB     Pattern (Gait)  step-through  -LB     Deviations/Abnormal Patterns (Gait) base of support, narrow;gait speed decreased;stride length decreased  -LB     Bilateral Gait Deviations forward flexed posture;heel strike decreased  -LB     Left Sided Gait Deviations heel strike decreased  -LB     Right Sided Gait Deviations heel strike decreased  -LB     [place] Level (Stairs) minimum assist (75% patient effort)  -LB     Handrail Location (Stairs) both sides  -LB     Number of Steps (Stairs) 4  -LB     Ascending Technique (Stairs) step-to-step  -LB     Descending Technique (Stairs) step-to-step  -LB     Row Name 08/01/22 0944          Curb Negotiation (Mobility)    [place], Curb Negotiation minimal assist, 75% or more patient effort  -LB     Assistive Device (Curb Negotiation) walker, front-wheeled  -LB     Row Name 08/01/22 0944          Hip (Therapeutic Exercise)    Hip (Therapeutic Exercise) AROM (active range of motion)  -LB     Hip AROM (Therapeutic Exercise) bilateral;sitting;flexion;10 repetitions  -LB     Row Name 08/01/22 0944          Knee (Therapeutic Exercise)    Knee (Therapeutic Exercise) AROM (active range of motion)  -LB     Knee AROM (Therapeutic Exercise) bilateral;LAQ (long arc quad);sitting;10 repetitions  -LB     Row Name 08/01/22 0944          Ankle (Therapeutic Exercise)    Ankle (Therapeutic Exercise) AROM (active range of motion)  -LB     Ankle AROM (Therapeutic Exercise) bilateral;dorsiflexion;plantarflexion;sitting;10 repetitions  -LB     Row Name 08/01/22 0944          Positioning and Restraints    Post Treatment Position chair  -LB     In Chair sitting;call light within reach;exit alarm on  -LB     Row Name 08/01/22 0944          Vital Signs    Pre SpO2 (%) 98  -LB     O2 Delivery Pre Treatment room air  -LB     Intra SpO2 (%) 96  -LB     O2 Delivery Intra Treatment room air  -LB     Post SpO2 (%) 98  -LB     O2 Delivery Post Treatment room air  -LB           User Key  (r) = Recorded By, (t) = Taken  By, (c) = Cosigned By    Initials Name Provider Type    LB Stacey Gonzalez PTA Physical Therapist Assistant                 Physical Therapy Education                 Title: PT OT SLP Therapies (In Progress)     Topic: Physical Therapy (In Progress)     Point: Mobility training (In Progress)     Learning Progress Summary           Patient Acceptance, E, NR by  at 8/1/2022 1414    Comment: car, stairs    Acceptance, E,D, NR by  at 7/30/2022 1224    Acceptance, E, VU,NR by  at 7/29/2022 1448                   Point: Home exercise program (In Progress)     Learning Progress Summary           Patient Acceptance, E,D, NR by  at 7/30/2022 1224    Acceptance, E, VU,NR by  at 7/29/2022 1448                   Point: Body mechanics (Done)     Learning Progress Summary           Patient Acceptance, E, VU,NR by  at 7/29/2022 1448                   Point: Precautions (Done)     Learning Progress Summary           Patient Acceptance, E, VU,NR by  at 7/29/2022 1448                               User Key     Initials Effective Dates Name Provider Type Discipline     03/07/18 -  Stacey Gonzalez PTA Physical Therapist Assistant PT     06/16/21 -  Florinda Mckeon, PT Physical Therapist PT     06/06/22 -  Ronny Winters, JOY Student PT Student PT                PT Recommendation and Plan  Patient was wearing a face mask during this therapy encounter. Therapist used appropriate personal protective equipment including mask and gloves.  Mask used was standard procedure mask. Appropriate PPE was worn during the entire therapy session. Hand hygiene was completed before and after therapy session. Patient is not in enhanced droplet precautions.                           Time Calculation:      PT Charges     Row Name 08/01/22 1413 08/01/22 0944          Time Calculation    Start Time 1400  -LB 0930  -LB     Stop Time 1430  -LB 1000  -LB     Time Calculation (min) 30 min  -LB 30 min  -LB           User Key  (r) =  Recorded By, (t) = Taken By, (c) = Cosigned By    Initials Name Provider Type    Stacey Leblanc PTA Physical Therapist Assistant                Therapy Charges for Today     Code Description Service Date Service Provider Modifiers Qty    74806556447 HC PT THER PROC EA 15 MIN 8/1/2022 Stacey Gonzalez PTA GP 4                   Stacey Gonzalez PTA  8/1/2022

## 2022-08-01 NOTE — PLAN OF CARE
Goal Outcome Evaluation:  Plan of Care Reviewed With: patient           Outcome Evaluation: Patient alert and oriented, assist x1, and medication crushed in applesauce. She is an accucheck AC/HS- coverage needed at lunch and dinner. No pain, no impulsivity, O2 worn at night. VS stable, tolerated all therapies, and continent of b/b-BM today.

## 2022-08-01 NOTE — THERAPY TREATMENT NOTE
Inpatient Rehabilitation - Speech Language Pathology Treatment Note    [facility]     Patient Name: Di Macario  : 1940  MRN: [id number]    Today's Date: 2022                   Admit Date: 2022       Visit Dx:      ICD-10-CM ICD-9-CM   1. Impaired functional mobility, balance, gait, and endurance  Z74.09 V49.89       Patient Active Problem List   Diagnosis   • Essential hypertension   • Vitamin D deficiency   • Hyperuricemia   • Slow transit constipation   • Chronic pain of left knee   • At high risk for falls   • Peripheral neuropathy   • Uncontrolled type 2 diabetes mellitus with hyperglycemia (HCC)   • Walker as ambulation aid   • Bradycardia, sinus   • Shortness of breath   • Leg swelling   • Hyperlipidemia   • Presence of cardiac pacemaker   • Dementia without behavioral disturbance ([facility])   • Aspiration pneumonia due to vomitus ([facility])   • Aspiration pneumonia of both lungs due to gastric secretions, unspecified part of lung ([facility])   • Weakness   • Thrombocytopenia ([facility])   • Oropharyngeal dysphagia   • Immobility syndrome       Past Medical History:   Diagnosis Date   • Dementia ([facility])     states better with medication    • Diabetes mellitus (HCC)    • Hyperlipidemia 2021   • Lung consolidation ([facility]) 2022   • Peripheral neuropathy 2018   • Vitamin D deficiency        Past Surgical History:   Procedure Laterality Date   • ANKLE SURGERY Right    • BLADDER SURGERY      Prolapsed   • BREAST BIOPSY     • CARDIAC ELECTROPHYSIOLOGY PROCEDURE N/A 3/5/2021    Procedure: Pacemaker DC new [place];  Surgeon: Madelin Ferguson MD;  Location: [facility] CATH INVASIVE LOCATION;  Service: Cardiology;  Laterality: N/A;   • ENDOSCOPY N/A 2022    Procedure: ESOPHAGOGASTRODUODENOSCOPY with biopsy, balloon dilation;  Surgeon: Lo Benjamin MD;  Location: [facility] ENDOSCOPY;  Service: Gastroenterology;  Laterality: N/A;  esophageal stricture, hiatal hernia, gastritis   • HYSTERECTOMY          SLP Recommendation and Plan                                                   SLP EVALUATION (last 72 hours)     SLP SLC Evaluation     Row Name 08/01/22 1542                   Communication Assessment/Intervention    Document Type therapy note (daily note)  -TH        Subjective Information no complaints  -TH        Patient Observations alert;cooperative;agree to therapy  -TH        Patient Effort good  -TH              User Key  (r) = Recorded By, (t) = Taken By, (c) = Cosigned By    Initials Name Effective Dates    TH Natasha Lazo 06/16/21 -                    EDUCATION    The patient has been educated in the following areas:       Cognitive Impairment Dysphagia (Swallowing Impairment).             SLP GOALS     Row Name 08/01/22 1500 07/30/22 1000          (STG) Pharyngeal Strengthening Exercise Goal 1 (SLP)    Increase Superior Movement of the Hyolaryngeal Complex chin tuck against resistance (CTAR);hard effortful swallow  -TH --     Increase Anterior Movement of the Hyolaryngeal Complex hard effortful swallow;beulah;effortful pitch glide (falsetto + pharyngeal squeeze);chin tuck against resistance (CTAR)  -TH --     Increase Epiglottic Inversion and Retroflexion hard effortful swallow;effortful pitch glide (falsetto + pharyngeal squeeze);beulah  -TH --     Increase Squeeze/Positive Pressure Generation hard effortful swallow;effortful pitch glide (falsetto + pharyngeal squeeze);chin tuck against resistance (CTAR);beulah  -TH --     Increase Tongue Base Retraction beulah  glottal sounds  -TH --     [place]/Accuracy (Pharyngeal Strengthening Goal 1, SLP) with minimal cues (75-90% accuracy)  -TH --     Time Frame (Pharyngeal Strengthening Goal 1, SLP) by discharge  -TH --            (STG) Swallow Compensatory Strategies Goal 1 (SLP)    [place]/Accuracy (Swallow Compensatory Strategies/Techniques Goal 1, SLP) with moderate cues (50-74% accuracy)  Patient seen during meal time on this date and  initially not aware of swallow strategies she needed to complete during meal times, however, after review cueing faded to min-mod cues. Provided handout in room with swallow strategies as well.  Patient presented with throat clear x2 throughout meal.  -TH --     Time Frame (Swallow Compensatory Strategies/Techniques Goal 1, SLP) by discharge  -TH --            Memory Skills Goal 1 (SLP)    Improve Memory Skills Through Goal 1 (SLP) -- recalling related word lists with an imposed delay;80%;with minimal cues (75-90%)  -LS     Progress/Outcomes (Memory Skills Goal 1, SLP) -- goal ongoing  -LS     Comment (Memory Skills Goal 1, SLP) -- Pt achieved 90% accuracy with immediate memory. She exhibits difficulty with delayed memory achiving 70% accuracy with increased ltime delay and complexity of items to remember.  -LS            Functional Problem Solving Skills Goal 1 (SLP)    Improve Problem Solving Through Goal 1 (SLP) -- determine solutions to complex problems;80%;with minimal cues (75-90%)  -LS     Comment (Problem Solving Goal 1, SLP) -- Pt achieved 50% accuracy with deductive reasoning tasks.  -           User Key  (r) = Recorded By, (t) = Taken By, (c) = Cosigned By    Initials Name Provider Type     Jon Montez, MS CCC-SLP Speech and Language Pathologist     Natasha Lazo Speech and Language Pathologist                            Time Calculation:        Time Calculation- SLP     Row Name 08/01/22 1542 08/01/22 1541          Time Calculation- SLP    SLP Start Time 1130  -TH 1300  -TH     SLP Stop Time 1200  -TH 1330  -TH     SLP Time Calculation (min) 30 min  -TH 30 min  -TH           User Key  (r) = Recorded By, (t) = Taken By, (c) = Cosigned By    Initials Name Provider Type    Natasha Ventura Speech and Language Pathologist                  Therapy Charges for Today     Code Description Service Date Service Provider Modifiers Qty    [id number]  ST TREATMENT SWALLOW 4 8/1/2022 Natasha Lazo  GN 1                           Natasha Lazo  8/1/2022

## 2022-08-01 NOTE — PROGRESS NOTES
Case Management  Inpatient Rehabilitation Plan of Care and Discharge Plan Note    Rehabilitation Diagnosis:  Branch  Date of Onset:  Yin    Medical Summary:  Branch  Past Medical History: Branch    Plan of Care  Updated Problems/Interventions  Field    Expected Intensity:  Branch  Interdisciplinary Team:  Yin  Estimated Length of Stay/Anticipated Discharge Date: Branch  Anticipated Discharge Destination:  Anticipated discharge destination from inpatient rehabilitation is community  discharge with assistance. Patient lives alone in one story home. Ramp entrance.  Recently has had hired caregiver 5 hours/day 3 days/week. 3 daughters local who  stay with patient at night and most of day.  Patient current with [facility]  for PT, ST, NSG.  D/C plan is home with 24/7 assist from dgts and hired caregivers.      Based on the patient's medical and functional status, their prognosis and  expected level of functional improvement is:  Yin    Signed by: ML Godwin

## 2022-08-01 NOTE — PROGRESS NOTES
Inpatient Rehabilitation Plan of Care Note    Plan of Care  Updated Problems/Interventions  Cognition    [ST] Problem Solving(Active)  Current Status(08/01/2022): Mild to mod cognitive communication deficits.  Difficulty generating multiple solutions.  Weekly Goal(08/04/2022): Generate multiple solutions with min cues.  Discharge Goal: Functional problem solving for ADL/home with supervision.        Swallow Function    [ST] Swallowing(Active)  Current Status(08/01/2022): VFSS 7/27/22 rec: NPO. Patient/family desire  modified diet vs. NPO status. Pureed with nectar thick liquids.  Weekly Goal(08/08/2022): Safe swallow strategies with min cues.  Discharge Goal: Safe swallow with least restrictive diet.    Signed by: Natasha Lazo, SLP

## 2022-08-01 NOTE — PROGRESS NOTES
Case Management  Inpatient Rehabilitation Plan of Care and Discharge Plan Note    Rehabilitation Diagnosis:  Immobility Syndrome  Date of Onset:  07/24/2022    Medical Summary:  82 y.o.?female?with PMH of Esophageal Stricture, gastritis,  oropharyngeal dysphagia, dementia, DM2, HTN, symptomatic Bradycardia s/p PPM,  and Seizure D/O?who admitted to [facility] on?7/24/2022?due to  Aspiration Pneumonia. ?Pt. was?previously?admitted?to [facility]?6 weeks ago with  aspiration pneumonia and? readmitted with shortness of breath and cough since a  witnessed aspiration event?7-23-22.  ?  # Aspiration Pneumonia of both lungs  2/2 gastric secretions  -Recommendation for n.p.o.  Per patient family request comfort diet with pureed  diet per ST  - On Zosyn-completes July 29.  ?  # Dysphagia  - pt. Noncompliant with home diet-patient and family do not wish to pursue PEG  tube.  Comfort diet per patient/family request.  ?  # Leukocytosis - resolved  ?  # Dementia with behavioral disturbance  - mild  - on medication  ?  # Uncontrolled DM type 2  # Hyperglycemia  # Diabetic Peripheral polyneuropathy  - Hgb A1C 8.4  - follows with an outpatient endocrinologist  ?  # Essential HTN  - amlodipine restarted  ?  # Thrombocytopenia - mild  - trending labs  ?  # Generalized Weakness  ?  # BLE edema  - elevate and monitor  ?  ?  Functionally, patient has a history of dysphagia.  She was noncompliant with  dysphagia diet at home.  She has previous subacute rehab stays.  Most recently  from June 9, 2022 to July 1, 2022.  She had none of subacute rehab stay last  fall/winter after she fractured her right arm.  Continues with decreased active  range of motion at the right shoulder.  Is able to use the right hand for  writing but unable to raise her arm above her head.  She uses a Rollator walker  at home for the past 4 years.  Has history of dementia and is on donepezil.  On  Zonegran for Seizures.  Daughter Reported Family Spend the  Night with Patient  for the past Year and She Has 5-Hour Help for the Previous Week during the Day  before Coming to the Hospital.  ?  ?  Patient noted to be high risk for aspiration.  N.p.o. was recommended on  videofluoroscopic swallow study but patient family did not want PEG tube.  Wish  for safest oral diet.  Intake has been % at meals.  Weight has trended  down over the past 3 to 4 months.  Transfers are min assist.  Ambulated 80 feet  contact-guard with a rolling walker.  Decreased toe strike.  Narrow base of  support.  Flexed posture.  Decreased active range of motion of the right  shoulder.  Bathing min assist.  Upper body dressing supervision.  Lower body  dressing min assist.  ?  Past Medical History: Past Medical History:  DiagnosisDate  ?Dementia (HCC)?  ?states better with medication  ?Diabetes mellitus (HCC)?  ?Lkgmavasdoakbt33/13/2021  ?Lung consolidation (HCC)06/01/2022  ?Peripheral pyyasufcqe42/28/2018  ?Vitamin D deficiency?    ?  ?  Surgical History  Past Surgical History:  ProcedureLateralityDate  ?ANKLE SURGERYRight?  ?BLADDER SURGERY??  ?Prolapsed  ?BREAST BIOPSY??  ?CARDIAC ELECTROPHYSIOLOGY PROCEDUREN/A3/5/2021  ?Procedure: Pacemaker DC new [place];  Surgeon: Madelin Ferguson MD;  Location: [facility] CATH INVASIVE LOCATION;  Service: Cardiology;  Laterality: N/A;  ?ENDOSCOPYN/A6/6/2022  ?Procedure: ESOPHAGOGASTRODUODENOSCOPY with biopsy, balloon dilation;  Surgeon:  Lo Benjamin MD;  Location: [facility] ENDOSCOPY;  Service: Gastroenterology;  Laterality: N/A;  esophageal stricture, hiatal hernia, gastritis  ?HYSTERECTOMY??    ?    Plan of Care  Updated Problems/Interventions  Field    Expected Intensity:  Average of 3 hours of therapy 5 days/week.  Interdisciplinary Team:  Interdisciplinary Team: Medical Supervision and 24 Hour Rehabilitation Nursing.,  Physical Therapy:, Occupational Therapy:, Speech and Language Therapy:, Social  Work, Therapeutic Recreation., Registered  Dietitian.  Physical Therapy Intensity/Duration: 1 hour / day, 5 days / week, for  approximately 2 weeks.  Occupational Therapy Intensity/Duration: 1 hour / day, 5 days / week, for  approximately 2 weeks.  Speech Language Pathology  Intensity/Duration: 1 hour / day, 5 days / week, for  approximately 2 weeks.  Estimated Length of Stay/Anticipated Discharge Date: 2 weeks  Anticipated Discharge Destination:  Anticipated discharge destination from inpatient rehabilitation is community  discharge with assistance. Home with adult daughters who can provide 24/7 assist  at discharge.      Based on the patient's medical and functional status, their prognosis and  expected level of functional improvement is:  Goals are to achieve a level of  supervision with  mobility and self-care and improved swallow.   Rehabilitation  prognosis fair.  Medical prognosis fair.    Signed by: Khushbu Grewal RN

## 2022-08-01 NOTE — CONSULTS
Nutrition Services    Patient Name:  Di Macario  YOB: 1940  MRN: [id number]  Admit Date:  7/28/2022    Comments: Follow up:     Intake % most meals. Tolerating diet. Will supplement with magic cups at meals for added kcal/protein. Monitor during rehab stay.     CLINICAL NUTRITION ASSESSMENT      Reason for Assessment Acute Rehab Admission     Admitting Diagnosis        Medical/Surgical History Immobility Syndrome due to:  Aspiration PNA of both lungs  Dementia    Past Medical History:   Diagnosis Date   • Dementia (HCC)     states better with medication    • Diabetes mellitus (HCC)    • Hyperlipidemia 01/13/2021   • Lung consolidation (HCC) 06/01/2022   • Peripheral neuropathy 08/28/2018   • Vitamin D deficiency        Past Surgical History:   Procedure Laterality Date   • ANKLE SURGERY Right    • BLADDER SURGERY      Prolapsed   • BREAST BIOPSY     • CARDIAC ELECTROPHYSIOLOGY PROCEDURE N/A 3/5/2021    Procedure: Pacemaker DC jae CAMPBELL;  Surgeon: Madelin Ferguson MD;  Location: [facility] CATH INVASIVE LOCATION;  Service: Cardiology;  Laterality: N/A;   • ENDOSCOPY N/A 6/6/2022    Procedure: ESOPHAGOGASTRODUODENOSCOPY with biopsy, balloon dilation;  Surgeon: Lo Benjamin MD;  Location: [facility] ENDOSCOPY;  Service: Gastroenterology;  Laterality: N/A;  esophageal stricture, hiatal hernia, gastritis   • HYSTERECTOMY            Nutritional Risk Screening       Risk Screening Criteria Difficulty chewing/swallowing     Encounter Information        Nutrition History/Narrative:    7/29 Transfers from acute (6N) after treatment for aspiration pneumonia. Pt noted to be high risk for aspiration, NPO recommended based on VFSS but pt/family do not want PEG placement; wish for safest oral diet. Intake % at meals. Weight has trended down over past 3-4 months, UBW in the 140's lb and now 129 lb. Aic 8.4% (pt is 81 yo). Glu running in 100-200s.     Food Preferences:      Supplements:     "  Factors Affecting Intake: impaired cognition, swallow impairment       Current Nutrition Orders & Evaluation of Intake       Oral Nutrition     Food Allergies NKFA   Current PO Diet Diet Dysphagia; II - Pureed; Nectar / Syrup Thick; Consistent Carbohydrate   Supplement    PO Evaluation     Trending % PO Intake %   --  Anthropometrics        Current Height  Current Weight  BMI kg/m2 Height: 160 cm (62.99\")  Weight: 58.7 kg (129 lb 6.4 oz) (07/28/22 2112)  Body mass index is 22.93 kg/m².       Ideal Body Weight (IBW) 115 lb   Usual Body Weight (UBW) ~145 lb       Weight Change -15-20 lb in recent weeks (<3-4 months)   Trending Weight Hx  Wt Readings from Last 15 Encounters:   07/28/22 2112 58.7 kg (129 lb 6.4 oz)   07/28/22 0500 64 kg (141 lb 1.5 oz)   07/27/22 0333 63.3 kg (139 lb 8.8 oz)   07/26/22 0514 60.8 kg (134 lb 0.6 oz)   07/25/22 0248 59.9 kg (132 lb 0.9 oz)   07/24/22 2356 59.4 kg (130 lb 15.3 oz)   07/12/22 1321 60.4 kg (133 lb 3.2 oz)   07/06/22 1019 59 kg (130 lb)   06/09/22 0516 65.4 kg (144 lb 3.2 oz)   06/08/22 0539 65.8 kg (145 lb)   06/07/22 0144 64.5 kg (142 lb 4.8 oz)   06/06/22 0105 67.2 kg (148 lb 3.2 oz)   06/05/22 0558 67.6 kg (149 lb)   06/04/22 0615 66.2 kg (146 lb)   06/03/22 0513 66.3 kg (146 lb 3.2 oz)   06/02/22 0319 65.2 kg (143 lb 11.8 oz)   06/01/22 0557 65.8 kg (145 lb)   04/07/22 1134 66.1 kg (145 lb 12.8 oz)   03/17/22 1256 65.4 kg (144 lb 3.2 oz)   03/01/22 1035 66.8 kg (147 lb 3.2 oz)   10/29/21 0612 68.8 kg (151 lb 10.8 oz)   10/29/21 0024 68.5 kg (151 lb)   09/23/21 1017 68.5 kg (151 lb)   08/27/21 1107 69.4 kg (153 lb)   08/05/21 1123 69.5 kg (153 lb 3.2 oz)   05/06/21 1231 72.9 kg (160 lb 12.8 oz)   04/27/21 1527 73 kg (161 lb)   03/25/21 1029 71.2 kg (157 lb)      --  Physical Findings        Physical Appearance  alert, on oxygen therapy     Edema  lower extremity , 1+ (trace)   Gastrointestinal non-distended , last bowel movement:7/31   Tubes/Drains none " "  Oral/Mouth Cavity missing teeth   Skin skin intact     Estimated/Assessed Needs       Energy Requirements    Height for Calculation  Height: 160 cm (62.99\")   Weight for Calculation 58.7 kg   Method for Estimation  30 kcal/kg   EST Needs (kcal/day) 1761       Protein Requirements    Weight for Calculation 58.7 kg   EST Protein Needs (g/kg) 1.2 gm/kg   EST Daily Needs (g/day) 70       Fluid Requirements     Estimated Needs (mL/day) 1761       Fluid Deficit    Current Na Level (mEq/L)    Desired Na Level (mEq/L)    Estimated Fluid Deficit (L)       Tests/Procedures/Labs        Tests/Procedures VFSS 7/27 - moderate to severe oropharyngeal dysphagia        Pertinent Labs     Results from last 7 days   Lab Units 07/28/22  0658 07/27/22  1022 07/26/22  0657   SODIUM mmol/L 140 137 142   POTASSIUM mmol/L 3.9 3.5 3.7   CHLORIDE mmol/L 104 102 106   CO2 mmol/L 26.1 21.3* 25.0   BUN mg/dL 11 12 19   CREATININE mg/dL 0.59 0.70 0.73   CALCIUM mg/dL 9.2 9.8 9.0   GLUCOSE mg/dL 136* 145* 161*     Results from last 7 days   Lab Units 07/28/22  0658   HEMOGLOBIN g/dL 11.0*   HEMATOCRIT % 33.2*   WBC 10*3/mm3 6.63     Results from last 7 days   Lab Units 07/28/22  0658 07/27/22  1022 07/26/22  0657   PLATELETS 10*3/mm3 140 114* 90*     COVID19   Date Value Ref Range Status   07/28/2022 Not Detected Not Detected - Ref. Range Final     Lab Results   Component Value Date    HGBA1C 8.40 (H) 07/25/2022        Medications           Scheduled Medications amLODIPine, 2.5 mg, Oral, Q24H  donepezil, 10 mg, Oral, Nightly  enoxaparin, 40 mg, Subcutaneous, Nightly  insulin lispro, 0-7 Units, Subcutaneous, TID AC  melatonin, 1 mg, Oral, Nightly  pravastatin, 40 mg, Oral, Q PM  zonisamide, 200 mg, Oral, Nightly       Infusions     PRN Medications •  acetaminophen  •  dextrose  •  dextrose  •  docusate sodium  •  glucagon (human recombinant)  •  nitroglycerin  •  ondansetron **OR** ondansetron  •  sodium chloride        Nutrition Diagnosis      "   Nutrition Dx Problem 1 Problem: Swallowing Difficulty    Etiology: Medical Diagnosis - dementia, dysphagia    Signs/Symptoms: Unintended Weight Change and SLP/Swallow Evaluation    Comment: VFSS 7/27 - moderate to severe oropharyngeal dysphagia. Pt/family do not want PEG. Comfort diet only. Wt down about 15-20 lb in recent months.      Intervention Goal        Intervention Goal(s) Tolerate PO , No significant weight loss and PO intake goal %: 75     Nutrition Intervention        RD Action Encourage intake, Follow Tx Progress, Care plan reviewed and Recommend/ordered: supplement     Recommendations         Diet Prescription    Supplement Prescription Magic cups Q meal TID   EN Prescription    --  Monitor/Evaluation        Monitor Per protocol     RD to follow up per protocol.    Electronically signed by:  Julianna Hollins RD  08/01/22 13:35 EDT

## 2022-08-01 NOTE — THERAPY TREATMENT NOTE
Inpatient Rehabilitation - Occupational Therapy Treatment Note    [facility]     Patient Name: Di Macario  : 1940  MRN: [id number]    Today's Date: 2022                 Admit Date: 2022         ICD-10-CM ICD-9-CM   1. Impaired functional mobility, balance, gait, and endurance  Z74.09 V49.89       Patient Active Problem List   Diagnosis   • Essential hypertension   • Vitamin D deficiency   • Hyperuricemia   • Slow transit constipation   • Chronic pain of left knee   • At high risk for falls   • Peripheral neuropathy   • Uncontrolled type 2 diabetes mellitus with hyperglycemia (HCC)   • Walker as ambulation aid   • Bradycardia, sinus   • Shortness of breath   • Leg swelling   • Hyperlipidemia   • Presence of cardiac pacemaker   • Dementia without behavioral disturbance ([facility])   • Aspiration pneumonia due to vomitus ([facility])   • Aspiration pneumonia of both lungs due to gastric secretions, unspecified part of lung ([facility])   • Weakness   • Thrombocytopenia ([facility])   • Oropharyngeal dysphagia   • Immobility syndrome       Past Medical History:   Diagnosis Date   • Dementia ([facility])     states better with medication    • Diabetes mellitus ([facility])    • Hyperlipidemia 2021   • Lung consolidation ([facility]) 2022   • Peripheral neuropathy 2018   • Vitamin D deficiency        Past Surgical History:   Procedure Laterality Date   • ANKLE SURGERY Right    • BLADDER SURGERY      Prolapsed   • BREAST BIOPSY     • CARDIAC ELECTROPHYSIOLOGY PROCEDURE N/A 3/5/2021    Procedure: Pacemaker DC new [place];  Surgeon: Madelin Ferguson MD;  Location: [facility] CATH INVASIVE LOCATION;  Service: Cardiology;  Laterality: N/A;   • ENDOSCOPY N/A 2022    Procedure: ESOPHAGOGASTRODUODENOSCOPY with biopsy, balloon dilation;  Surgeon: Lo Benjamin MD;  Location: [facility] ENDOSCOPY;  Service: Gastroenterology;  Laterality: N/A;  esophageal stricture, hiatal hernia, gastritis   • HYSTERECTOMY               IRF OT  ASSESSMENT FLOWSHEET (last 12 hours)     IRF OT Evaluation and Treatment     Row Name 08/01/22 1202          OT Time and Intention    Document Type daily treatment  -DN     Mode of Treatment occupational therapy  -DN     Patient Effort good  -DN     Row Name 08/01/22 1202          Pain Assessment    Pretreatment Pain Rating 0/10 - no pain  -DN     Posttreatment Pain Rating 0/10 - no pain  -DN     Row Name 08/01/22 1202          Cognition/Psychosocial    Affect/Mental Status (Cognition) WFL  -DN     Follows Commands (Cognition) follows one-step commands;over 90% accuracy  -DN     Personal Safety Interventions fall prevention program maintained;gait belt  -DN     Cognitive Function safety deficit  -DN     Safety Deficit (Cognition) minimal deficit;awareness of need for assistance;insight into deficits/self-awareness  -DN     Row Name 08/01/22 1202          Bathing    [place] Level (Bathing) bathing skills;minimum assist (75% patient effort);verbal cues;nonverbal cues (demo/gesture)  -DN     Position (Bathing) sink side;supported sitting;supported standing  -DN     Set-up Assistance (Bathing) obtain supplies  -DN     Row Name 08/01/22 1202          Upper Body Dressing    [place] Level (Upper Body Dressing) upper body dressing skills;doff;don;pull over garment;supervision  -DN     Position (Upper Body Dressing) supported sitting  -DN     Set-up Assistance (Upper Body Dressing) obtain clothing  -DN     Row Name 08/01/22 1202          Lower Body Dressing    [place] Level (Lower Body Dressing) doff;don;pants/bottoms;shoes/slippers;socks;underwear;contact guard assist;verbal cues;nonverbal cues (demo/gesture)  -DN     Position (Lower Body Dressing) unsupported sitting;supported standing  -DN     Set-up Assistance (Lower Body Dressing) obtain clothing  -DN     Row Name 08/01/22 1202          Grooming    [place] Level (Grooming) grooming skills;deodorant application;hair care, combing/brushing;oral care  regimen;contact guard assist;verbal cues  -DN     Position (Grooming) supported standing  -DN     Set-up Assistance (Grooming) obtain supplies  -DN     Row Name 08/01/22 1202          Toileting    [place] Level (Toileting) toileting skills;adjust/manage clothing;contact guard assist;verbal cues;nonverbal cues (demo/gesture)  -DN     Assistive Device Use (Toileting) grab bar/safety frame;raised toilet seat  -DN     Position (Toileting) supported sitting;supported standing  -DN     Row Name 08/01/22 1202          Transfer Assessment/Treatment    Comment, (Transfers) from w/c in bathroom to recliner in room with RWX with CGA  -DN     Row Name 08/01/22 1202          Positioning and Restraints    Pre-Treatment Position sitting in chair/recliner  -DN     Post Treatment Position wheelchair  -DN     In Bed sitting;call light within reach;encouraged to call for assist;exit alarm on  -DN           User Key  (r) = Recorded By, (t) = Taken By, (c) = Cosigned By    Initials Name Effective Dates    DN Natan Hoover OT 06/16/21 -                  Occupational Therapy Education                 Title: PT OT SLP Therapies (In Progress)     Topic: Occupational Therapy (Done)     Point: ADL training (Done)     Description:   Instruct learner(s) on proper safety adaptation and remediation techniques during self care or transfers.   Instruct in proper use of assistive devices.              Learning Progress Summary           Patient Acceptance, E,TB,D, VU,DU by  at 7/29/2022 1533    Comment: ed pt on role of OT , benefit of therapy .POC w. OT ed on safety w. ADL and tsf. pt demo w min A.                   Point: Home exercise program (Done)     Description:   Instruct learner(s) on appropriate technique for monitoring, assisting and/or progressing therapeutic exercises/activities.              Learning Progress Summary           Patient Acceptance, E,TB,D, VU,DU by  at 7/29/2022 1533    Comment: ed pt on role of OT , benefit  of therapy .POC w. OT ed on safety w. ADL and tsf. pt demo w min A.                   Point: Precautions (Done)     Description:   Instruct learner(s) on prescribed precautions during self-care and functional transfers.              Learning Progress Summary           Patient Acceptance, E,TB,D, VU,DU by  at 7/29/2022 1533    Comment: ed pt on role of OT , benefit of therapy .POC w. OT ed on safety w. ADL and tsf. pt demo w min A.                   Point: Body mechanics (Done)     Description:   Instruct learner(s) on proper positioning and spine alignment during self-care, functional mobility activities and/or exercises.              Learning Progress Summary           Patient Acceptance, E,TB,D, VU,DU by  at 7/29/2022 1533    Comment: ed pt on role of OT , benefit of therapy .POC w. OT ed on safety w. ADL and tsf. pt demo w min A.                               User Key     Initials Effective Dates Name Provider Type [facility] 06/16/21 -  Adriana Gaxiola OTR Occupational Therapist OT                    OT Recommendation and Plan                         Time Calculation:      Time Calculation- OT     Row Name 08/01/22 0830             Time Calculation- OT    OT Start Time 0830  -DN      OT Stop Time 0900  -DN      OT Time Calculation (min) 30 min  -DN            User Key  (r) = Recorded By, (t) = Taken By, (c) = Cosigned By    Initials Name Provider Type    Natan Sunshine OT Occupational Therapist              Therapy Charges for Today     Code Description Service Date Service Provider Modifiers Qty    [id number] HC OT SELF CARE/MGMT/TRAIN EA 15 MIN 8/1/2022 Natan Hoover OT GO 2                   Natan Hoover OT  8/1/2022

## 2022-08-01 NOTE — PROGRESS NOTES
Inpatient Rehabilitation Plan of Care Note    Plan of Care  Care Plan Reviewed - No updates at this time.    Sphincter Control    Performed Intervention(s)  Encourage appropriate diet  Utilize incontinence products as needed      Safety    Performed Intervention(s)  All items within reach  Hourly rounding  Falls precautions in place      Psychosocial    Performed Intervention(s)  Provide calm environment  Allow pt to express concerns, wants and needs      Pain    Performed Intervention(s)  Offer medication if pain arises      Body Systems    Performed Intervention(s)  Check oxygen q shift  Blood sugar AC and HS    Signed by: Mahamed Allan RN

## 2022-08-01 NOTE — PROGRESS NOTES
Inpatient Rehabilitation Plan of Care Note    Plan of Care  Care Plan Reviewed - Updates as Follows    Cognition    [ST] Problem Solving(Active)  Current Status(08/01/2022): Mild to mod cognitive communication deficits.  Difficulty generating multiple solutions.  Weekly Goal(08/04/2022): Generate multiple solutions with min cues.  Discharge Goal: Functional problem solving for ADL/home with supervision.        Swallow Function    [ST] Swallowing(Active)  Current Status(08/01/2022): VFSS 7/27/22 rec: NPO. Patient/family desire  modified diet vs. NPO status. Pureed with nectar thick liquids.  Weekly Goal(08/08/2022): Safe swallow strategies with min cues.  Discharge Goal: Safe swallow with least restrictive diet.    Signed by: Natasha Lazo, SLP

## 2022-08-01 NOTE — PLAN OF CARE
Goal Outcome Evaluation:  Plan of Care Reviewed With: patient        Progress: improving  Outcome Evaluation: Pt rested very well this shift.  No c/o pain. All meds crushed w/applesauce.  NTL.  Up to bathroom ass x1 w/walker.  Pt removed IV this shift.  Tip intact.  No iv meds ordered.  Pt Dexcom changed to L abdomen by family.

## 2022-08-01 NOTE — PROGRESS NOTES
Inpatient Rehabilitation Functional Measures Assessment and Plan of Care    Plan of Care  Updated Problems/Interventions  Mobility    [OT] Toilet Transfers(Active)  Current Status(08/01/2022): CGA RWX  Weekly Goal(08/09/2022): SBA  Discharge Goal: SBA    [OT] Tub/Shower Transfers(Active)  Current Status(08/01/2022): Min  Weekly Goal(08/09/2022): CGA  Discharge Goal: SBA        Self Care    [OT] Bathing(Active)  Current Status(08/01/2022): Min  Weekly Goal(08/09/2022): CGA  Discharge Goal: SBA    [OT] Dressing (Lower)(Active)  Current Status(08/01/2022): CGA  Weekly Goal(08/09/2022): SBA  Discharge Goal: SBA    [OT] Dressing (Upper)(Active)  Current Status(08/01/2022): SBA  Weekly Goal(08/09/2022): SBA  Discharge Goal: SBA    [OT] Eating(Active)  Current Status(08/01/2022): s/u  Weekly Goal(08/09/2022): mod I  Discharge Goal: I    [OT] Grooming(Active)  Current Status(08/01/2022): SBA  Weekly Goal(08/09/2022): SBA  Discharge Goal: SBA    [OT] Toileting(Active)  Current Status(07/29/2022): min for balance  Weekly Goal(08/05/2022): CGA  Discharge Goal: SBA    Functional Measures  DANICA Eating:  Branch  DANICA Grooming: Branch  DANICA Bathing:  Branch  DANICA Upper Body Dressing:  Branch  DANICA Lower Body Dressing:  Branch  DANICA Toileting:  Branch    DANICA Bladder Management  Level of Assistance:  Branch  Frequency/Number of Accidents this Shift:  Branch    DANICA Bowel Management  Level of Assistance: Branch  Frequency/Number of Accidents this Shift: Branch    DANICA Bed/Chair/Wheelchair Transfer:  Branch  DANICA Toilet Transfer:  Branch  DANICA Tub/Shower Transfer:  Branch    Previously Documented Mode of Locomotion at Discharge: Field  DANICA Expected Mode of Locomotion at Discharge: Branch  DANICA Walk/Wheelchair:  Branch  DANICA Stairs:  Branch    DANICA Comprehension:  Branch  DANICA Expression:  Branch  DANICA Social Interaction:  Branch  DANICA Problem Solving:  Branch  DANICA Memory:  Branch    Therapy Mode Minutes  Occupational Therapy: Branch  Physical  Therapy: Branch  Speech Language Pathology:  Branch    Signed by: Natan Hoover OTR/L

## 2022-08-01 NOTE — THERAPY TREATMENT NOTE
Inpatient Rehabilitation - Occupational Therapy Treatment Note    [facility]     Patient Name: Di Macario  : 1940  MRN: [id number]    Today's Date: 2022                 Admit Date: 2022         ICD-10-CM ICD-9-CM   1. Impaired functional mobility, balance, gait, and endurance  Z74.09 V49.89       Patient Active Problem List   Diagnosis   • Essential hypertension   • Vitamin D deficiency   • Hyperuricemia   • Slow transit constipation   • Chronic pain of left knee   • At high risk for falls   • Peripheral neuropathy   • Uncontrolled type 2 diabetes mellitus with hyperglycemia (HCC)   • Walker as ambulation aid   • Bradycardia, sinus   • Shortness of breath   • Leg swelling   • Hyperlipidemia   • Presence of cardiac pacemaker   • Dementia without behavioral disturbance ([facility])   • Aspiration pneumonia due to vomitus ([facility])   • Aspiration pneumonia of both lungs due to gastric secretions, unspecified part of lung ([facility])   • Weakness   • Thrombocytopenia ([facility])   • Oropharyngeal dysphagia   • Immobility syndrome       Past Medical History:   Diagnosis Date   • Dementia ([facility])     states better with medication    • Diabetes mellitus ([facility])    • Hyperlipidemia 2021   • Lung consolidation ([facility]) 2022   • Peripheral neuropathy 2018   • Vitamin D deficiency        Past Surgical History:   Procedure Laterality Date   • ANKLE SURGERY Right    • BLADDER SURGERY      Prolapsed   • BREAST BIOPSY     • CARDIAC ELECTROPHYSIOLOGY PROCEDURE N/A 3/5/2021    Procedure: Pacemaker DC new BOSTON;  Surgeon: Madelin Ferguson MD;  Location: [facility] CATH INVASIVE LOCATION;  Service: Cardiology;  Laterality: N/A;   • ENDOSCOPY N/A 2022    Procedure: ESOPHAGOGASTRODUODENOSCOPY with biopsy, balloon dilation;  Surgeon: Lo Benjamin MD;  Location: [facility] ENDOSCOPY;  Service: Gastroenterology;  Laterality: N/A;  esophageal stricture, hiatal hernia, gastritis   • HYSTERECTOMY               IRF OT  ASSESSMENT FLOWSHEET (last 12 hours)     IRF OT Evaluation and Treatment     Row Name 08/01/22 1504 08/01/22 1202       OT Time and Intention    Document Type progress note  -DN daily treatment  -DN    Mode of Treatment occupational therapy  -DN occupational therapy  -DN    Patient Effort good  -DN good  -DN    Row Name 08/01/22 1504 08/01/22 1202       Pain Assessment    Pretreatment Pain Rating 0/10 - no pain  -DN 0/10 - no pain  -DN    Posttreatment Pain Rating 0/10 - no pain  -DN 0/10 - no pain  -DN    Row Name 08/01/22 1504 08/01/22 1202       Cognition/Psychosocial    Affect/Mental Status (Cognition) WFL  -DN WFL  -DN    Follows Commands (Cognition) -- follows one-step commands;over 90% accuracy  -DN    Personal Safety Interventions -- fall prevention program maintained;gait belt  -DN    Cognitive Function -- safety deficit  -DN    Safety Deficit (Cognition) -- minimal deficit;awareness of need for assistance;insight into deficits/self-awareness  -DN    Row Name 08/01/22 1202          Bathing    [place] Level (Bathing) bathing skills;minimum assist (75% patient effort);verbal cues;nonverbal cues (demo/gesture)  -DN     Position (Bathing) sink side;supported sitting;supported standing  -DN     Set-up Assistance (Bathing) obtain supplies  -DN     Row Name 08/01/22 1202          Upper Body Dressing    [place] Level (Upper Body Dressing) upper body dressing skills;doff;don;pull over garment;supervision  -DN     Position (Upper Body Dressing) supported sitting  -DN     Set-up Assistance (Upper Body Dressing) obtain clothing  -DN     Row Name 08/01/22 1202          Lower Body Dressing    [place] Level (Lower Body Dressing) doff;don;pants/bottoms;shoes/slippers;socks;underwear;contact guard assist;verbal cues;nonverbal cues (demo/gesture)  -DN     Position (Lower Body Dressing) unsupported sitting;supported standing  -DN     Set-up Assistance (Lower Body Dressing) obtain clothing  -DN     Row Name  08/01/22 1202          Grooming    [place] Level (Grooming) grooming skills;deodorant application;hair care, combing/brushing;oral care regimen;contact guard assist;verbal cues  -DN     Position (Grooming) supported standing  -DN     Set-up Assistance (Grooming) obtain supplies  -DN     Row Name 08/01/22 1504 08/01/22 1202       Toileting    [place] Level (Toileting) toileting skills;adjust/manage clothing;perform perineal hygiene;supervision;verbal cues  -DN toileting skills;adjust/manage clothing;contact guard assist;verbal cues;nonverbal cues (demo/gesture)  -DN    Assistive Device Use (Toileting) grab bar/safety frame;raised toilet seat  -DN grab bar/safety frame;raised toilet seat  -DN    Position (Toileting) supported sitting;supported standing  -DN supported sitting;supported standing  -DN    Set-up Assistance (Toileting) change pad/brief  -DN --    Row Name 08/01/22 1202          Transfer Assessment/Treatment    Comment, (Transfers) from w/c in bathroom to recliner in room with RWX with CGA  -DN     Row Name 08/01/22 1504          Transfers    [place] Level (Toilet Transfer) contact guard;verbal cues;nonverbal cues (demo/gesture)  -DN     Assistive Device (Toilet Transfer) grab bars/safety frame;raised toilet seat;walker, front-wheeled  -DN     Row Name 08/01/22 1504          Toilet Transfer    Type (Toilet Transfer) stand pivot/stand step  -DN     Row Name 08/01/22 1504          Balance    Static Standing Balance contact guard  standing at table pt reaching with BUE to work on weigth shift and use of BUE in standing for increased adls  -DN     Row Name 08/01/22 1504 08/01/22 1202       Positioning and Restraints    Pre-Treatment Position sitting in chair/recliner  -DN sitting in chair/recliner  -DN    Post Treatment Position wheelchair  -DN wheelchair  -DN    In Bed sitting;call light within reach;encouraged to call for assist;exit alarm on  -DN sitting;call light within reach;encouraged to  call for assist;exit alarm on  -DN          User Key  (r) = Recorded By, (t) = Taken By, (c) = Cosigned By    Initials Name Effective Dates    Natan Sunshine OT 06/16/21 -                  Occupational Therapy Education                 Title: PT OT SLP Therapies (In Progress)     Topic: Occupational Therapy (Done)     Point: ADL training (Done)     Description:   Instruct learner(s) on proper safety adaptation and remediation techniques during self care or transfers.   Instruct in proper use of assistive devices.              Learning Progress Summary           Patient Acceptance, E,TB,D, VU,DU by  at 7/29/2022 1533    Comment: ed pt on role of OT , benefit of therapy .POC w. OT ed on safety w. ADL and tsf. pt demo w min A.                   Point: Home exercise program (Done)     Description:   Instruct learner(s) on appropriate technique for monitoring, assisting and/or progressing therapeutic exercises/activities.              Learning Progress Summary           Patient Acceptance, E,TB,D, VU,DU by  at 7/29/2022 1533    Comment: ed pt on role of OT , benefit of therapy .POC w. OT ed on safety w. ADL and tsf. pt demo w min A.                   Point: Precautions (Done)     Description:   Instruct learner(s) on prescribed precautions during self-care and functional transfers.              Learning Progress Summary           Patient Acceptance, E,TB,D, VU,DU by  at 7/29/2022 1533    Comment: ed pt on role of OT , benefit of therapy .POC w. OT ed on safety w. ADL and tsf. pt demo w min A.                   Point: Body mechanics (Done)     Description:   Instruct learner(s) on proper positioning and spine alignment during self-care, functional mobility activities and/or exercises.              Learning Progress Summary           Patient Acceptance, E,TB,D, VU,DU by  at 7/29/2022 1533    Comment: ed pt on role of OT , benefit of therapy .POC w. OT ed on safety w. ADL and tsf. pt demo w min A.                                User Key     Initials Effective Dates Name Provider Type Discipline     06/16/21 -  Adriana Gaxiola OTR Occupational Therapist OT                    OT Recommendation and Plan                         Time Calculation:      Time Calculation- OT     Row Name 08/01/22 1230 08/01/22 0830          Time Calculation- OT    OT Start Time 1230  -DN 0830  -DN     OT Stop Time 1300  -DN 0900  -DN     OT Time Calculation (min) 30 min  -DN 30 min  -DN           User Key  (r) = Recorded By, (t) = Taken By, (c) = Cosigned By    Initials Name Provider Type    Natan Sunshine OT Occupational Therapist              Therapy Charges for Today     Code Description Service Date Service Provider Modifiers Qty    72639112839 HC OT SELF CARE/MGMT/TRAIN EA 15 MIN 8/1/2022 Natan Hoover, OT GO 2    01631497046 HC OT THER PROC EA 15 MIN 8/1/2022 Natan Hoover OT GO 1    94045330434 HC OT SELF CARE/MGMT/TRAIN EA 15 MIN 8/1/2022 Natan Hoover OT GO 1                   Natan Hoover OT  8/1/2022

## 2022-08-01 NOTE — PROGRESS NOTES
Chief Complaint:   Immobility syndrome/debility  Impaired cognition/impaired mobility/impaired self-care  ID status post aspiration pneumonia-Zosyn completes on July 29  Severe dysphagia-n.p.o. recommended on videofluoroscopic swallow study but patient family wish for avoiding PEG tube and for comfort diet-purée/nectar thick liquid  Hypertension-amlodipine  DVT prophylaxis-Lovenox/SCDs  Cognition-dementia-donepezil  Seizure disorder-Zonegran  Diabetes mellitus type 2-on Trulicity and Lantus low-dose at home     History of Present Illness  82 y.o. female with PMH of Esophageal Stricture, gastritis, oropharyngeal dysphagia, dementia, DM2, HTN, symptomatic Bradycardia s/p PPM, and Seizure D/O who admitted to [facility] 7[facility]/2022 due to Aspiration Pneumonia.  Pt. was previously admitted to [facility] 6 weeks ago with aspiration pneumonia and  readmitted with shortness of breath and cough since a witnessed aspiration event 7-23-22.    Subjective:     She feels tired.  Comfortable with her breathing.  Does have some occasional cough with whitish sputum.  She does not give a real clear description on how she is tolerating the p.o. intake.      Physical Exam:  MENTAL STATUS -  AWAKE / ALERT  HEENT- NCAT, SCLERAE ANICTERIC, CONJUNCTIVAE PINK, OP MOIST, NO JVD, EARS UNREMARKABLE EXTERNALLY  LUNGS - CTA, NO WHEEZES, RALES OR RHONCHI  HEART- RRR, NO RUB, MURMUR, OR GALLOP  ABD - NORMOACTIVE BOWEL SOUNDS, SOFT, NT. NO HEPATOSPLENOMEGALY APPRECIATED  EXT - NO EDEMA OR CYANOSIS  NEURO -awake alert.  Speech fluent.  Oriented x3.  MOTOR EXAM - RUE/RLE 5/5. LUE/LLE 5/5  Decreased active range of motion at the right shoulder to about 80 degrees of flexion.    Assessment and Plan:   # Functional deficits and Generalized Weakness related to multiple medical comorbities - Admit to inpt rehab    #1.  Aspiration Pneumonia of both lungs  2/2 gastric secretions  -Recommendation for n.p.o.  Per patient family request comfort diet  with pureed diet per ST  - On Zosyn-completes July 29.     #2. Dysphagia  - pt. Noncompliant with home diet-patient and family do not wish to pursue PEG tube.  Comfort diet per patient/family request.     #3.  Leukocytosis - resolved     #4. Dementia with behavioral disturbance  - mild  - on medication     #5.  Uncontrolled DM type 2 w/ Hyperglycemia  #6.  Diabetic Peripheral polyneuropathy  - Hgb A1C 8.4  - follows with an outpatient endocrinologist     #7.  Essential HTN  - amlodipine restarted     #8.  Thrombocytopenia - mild  - trending labs     #9.  BLE edema  - elevate and monitor       Now admit for comprehensive acute inpatient rehabilitation .  This would be an interdisciplinary program with physical therapy 1 hour,  occupational therapy 1 hour, and speech therapy 1 hour, 5 days a week.  Rehabilitation nursing for carryover, monitoring of pulmonary and neurologic   status, bowel and bladder, and skin  Ongoing physician follow-up.  Weekly team conferences.  Goals are to achieve a level of supervision with  mobility and self-care and improved swallow.   Rehabilitation prognosis fair.  Medical prognosis fair.  Estimated length of stay is approximately 2 weeks, but is only an estimation.      The patient's functional status and clinical status is unchanged from preadmission assessment and the patient continues appropriate for acute inpatient rehabilitation.  Goal is for home with home health   therapies.  Barrier to discharge: Impaired swallow mobility self-care- work on dysphagia exercises, transfers, balance, gait, ADLs to overcome.             Aj Chandra MD

## 2022-08-01 NOTE — THERAPY TREATMENT NOTE
Inpatient Rehabilitation - Occupational Therapy Treatment Note    [facility]     Patient Name: Di Macario  : 1940  MRN: [id number]    Today's Date: 2022                 Admit Date: 2022         ICD-10-CM ICD-9-CM   1. Impaired functional mobility, balance, gait, and endurance  Z74.09 V49.89       Patient Active Problem List   Diagnosis   • Essential hypertension   • Vitamin D deficiency   • Hyperuricemia   • Slow transit constipation   • Chronic pain of left knee   • At high risk for falls   • Peripheral neuropathy   • Uncontrolled type 2 diabetes mellitus with hyperglycemia (HCC)   • Walker as ambulation aid   • Bradycardia, sinus   • Shortness of breath   • Leg swelling   • Hyperlipidemia   • Presence of cardiac pacemaker   • Dementia without behavioral disturbance ([facility])   • Aspiration pneumonia due to vomitus ([facility])   • Aspiration pneumonia of both lungs due to gastric secretions, unspecified part of lung ([facility])   • Weakness   • Thrombocytopenia ([facility])   • Oropharyngeal dysphagia   • Immobility syndrome       Past Medical History:   Diagnosis Date   • Dementia ([facility])     states better with medication    • Diabetes mellitus ([facility])    • Hyperlipidemia 2021   • Lung consolidation ([facility]) 2022   • Peripheral neuropathy 2018   • Vitamin D deficiency        Past Surgical History:   Procedure Laterality Date   • ANKLE SURGERY Right    • BLADDER SURGERY      Prolapsed   • BREAST BIOPSY     • CARDIAC ELECTROPHYSIOLOGY PROCEDURE N/A 3/5/2021    Procedure: Pacemaker DC new [place];  Surgeon: Madelin Ferguson MD;  Location: [facility] CATH INVASIVE LOCATION;  Service: Cardiology;  Laterality: N/A;   • ENDOSCOPY N/A 2022    Procedure: ESOPHAGOGASTRODUODENOSCOPY with biopsy, balloon dilation;  Surgeon: Lo Benjamin MD;  Location: [facility] ENDOSCOPY;  Service: Gastroenterology;  Laterality: N/A;  esophageal stricture, hiatal hernia, gastritis   • HYSTERECTOMY               IRF OT  ASSESSMENT FLOWSHEET (last 12 hours)     IRF OT Evaluation and Treatment     Row Name 08/01/22 1504 08/01/22 1202       OT Time and Intention    Document Type progress note  -DN daily treatment  -DN    Mode of Treatment occupational therapy  -DN occupational therapy  -DN    Patient Effort good  -DN good  -DN    Row Name 08/01/22 1504 08/01/22 1202       Pain Assessment    Pretreatment Pain Rating 0/10 - no pain  -DN 0/10 - no pain  -DN    Posttreatment Pain Rating 0/10 - no pain  -DN 0/10 - no pain  -DN    Row Name 08/01/22 1504 08/01/22 1202       Cognition/Psychosocial    Affect/Mental Status (Cognition) WFL  -DN WFL  -DN    Follows Commands (Cognition) -- follows one-step commands;over 90% accuracy  -DN    Personal Safety Interventions -- fall prevention program maintained;gait belt  -DN    Cognitive Function -- safety deficit  -DN    Safety Deficit (Cognition) -- minimal deficit;awareness of need for assistance;insight into deficits/self-awareness  -DN    Row Name 08/01/22 1202          Bathing    [place] Level (Bathing) bathing skills;minimum assist (75% patient effort);verbal cues;nonverbal cues (demo/gesture)  -DN     Position (Bathing) sink side;supported sitting;supported standing  -DN     Set-up Assistance (Bathing) obtain supplies  -DN     Row Name 08/01/22 1202          Upper Body Dressing    [place] Level (Upper Body Dressing) upper body dressing skills;doff;don;pull over garment;supervision  -DN     Position (Upper Body Dressing) supported sitting  -DN     Set-up Assistance (Upper Body Dressing) obtain clothing  -DN     Row Name 08/01/22 1202          Lower Body Dressing    [place] Level (Lower Body Dressing) doff;don;pants/bottoms;shoes/slippers;socks;underwear;contact guard assist;verbal cues;nonverbal cues (demo/gesture)  -DN     Position (Lower Body Dressing) unsupported sitting;supported standing  -DN     Set-up Assistance (Lower Body Dressing) obtain clothing  -DN     Row Name  08/01/22 1202          Grooming    [place] Level (Grooming) grooming skills;deodorant application;hair care, combing/brushing;oral care regimen;contact guard assist;verbal cues  -DN     Position (Grooming) supported standing  -DN     Set-up Assistance (Grooming) obtain supplies  -DN     Row Name 08/01/22 1504 08/01/22 1202       Toileting    [place] Level (Toileting) toileting skills;adjust/manage clothing;perform perineal hygiene;supervision;verbal cues  -DN toileting skills;adjust/manage clothing;contact guard assist;verbal cues;nonverbal cues (demo/gesture)  -DN    Assistive Device Use (Toileting) grab bar/safety frame;raised toilet seat  -DN grab bar/safety frame;raised toilet seat  -DN    Position (Toileting) supported sitting;supported standing  -DN supported sitting;supported standing  -DN    Set-up Assistance (Toileting) change pad/brief  -DN --    Row Name 08/01/22 1202          Transfer Assessment/Treatment    Comment, (Transfers) from w/c in bathroom to recliner in room with RWX with CGA  -DN     Row Name 08/01/22 1504          Transfers    [place] Level (Toilet Transfer) contact guard;verbal cues;nonverbal cues (demo/gesture)  -DN     Assistive Device (Toilet Transfer) grab bars/safety frame;raised toilet seat;walker, front-wheeled  -DN     Row Name 08/01/22 1504          Toilet Transfer    Type (Toilet Transfer) stand pivot/stand step  -DN     Row Name 08/01/22 1504          Balance    Static Standing Balance contact guard  standing at table pt reaching with BUE to work on weigth shift and use of BUE in standing for increased adls  -DN     Row Name 08/01/22 1504 08/01/22 1202       Positioning and Restraints    Pre-Treatment Position sitting in chair/recliner  -DN sitting in chair/recliner  -DN    Post Treatment Position wheelchair  -DN wheelchair  -DN    In Bed sitting;call light within reach;encouraged to call for assist;exit alarm on  -DN sitting;call light within reach;encouraged to  call for assist;exit alarm on  -DN          User Key  (r) = Recorded By, (t) = Taken By, (c) = Cosigned By    Initials Name Effective Dates    Natan Sunshine OT 06/16/21 -                  Occupational Therapy Education                 Title: PT OT SLP Therapies (In Progress)     Topic: Occupational Therapy (Done)     Point: ADL training (Done)     Description:   Instruct learner(s) on proper safety adaptation and remediation techniques during self care or transfers.   Instruct in proper use of assistive devices.              Learning Progress Summary           Patient Acceptance, E,TB,D, VU,DU by  at 7/29/2022 1533    Comment: ed pt on role of OT , benefit of therapy .POC w. OT ed on safety w. ADL and tsf. pt demo w min A.                   Point: Home exercise program (Done)     Description:   Instruct learner(s) on appropriate technique for monitoring, assisting and/or progressing therapeutic exercises/activities.              Learning Progress Summary           Patient Acceptance, E,TB,D, VU,DU by  at 7/29/2022 1533    Comment: ed pt on role of OT , benefit of therapy .POC w. OT ed on safety w. ADL and tsf. pt demo w min A.                   Point: Precautions (Done)     Description:   Instruct learner(s) on prescribed precautions during self-care and functional transfers.              Learning Progress Summary           Patient Acceptance, E,TB,D, VU,DU by  at 7/29/2022 1533    Comment: ed pt on role of OT , benefit of therapy .POC w. OT ed on safety w. ADL and tsf. pt demo w min A.                   Point: Body mechanics (Done)     Description:   Instruct learner(s) on proper positioning and spine alignment during self-care, functional mobility activities and/or exercises.              Learning Progress Summary           Patient Acceptance, E,TB,D, VU,DU by  at 7/29/2022 1533    Comment: ed pt on role of OT , benefit of therapy .POC w. OT ed on safety w. ADL and tsf. pt demo w min A.                                User Key     Initials Effective Dates Name Provider Type Discipline     06/16/21 -  Adriana Gaxiola OTR Occupational Therapist OT                    OT Recommendation and Plan                         Time Calculation:      Time Calculation- OT     Row Name 08/01/22 1230 08/01/22 0830          Time Calculation- OT    OT Start Time 1230  -DN 0830  -DN     OT Stop Time 1300  -DN 0915  -DN     OT Time Calculation (min) 30 min  -DN 45 min  -DN           User Key  (r) = Recorded By, (t) = Taken By, (c) = Cosigned By    Initials Name Provider Type    Natan Sunshine OT Occupational Therapist              Therapy Charges for Today     Code Description Service Date Service Provider Modifiers Qty    61473060977 HC OT THER PROC EA 15 MIN 8/1/2022 Natan Hoover OT GO 1    32469640106 HC OT SELF CARE/MGMT/TRAIN EA 15 MIN 8/1/2022 Natan Hoover OT GO 1    94031357009 HC OT SELF CARE/MGMT/TRAIN EA 15 MIN 8/1/2022 Natan Hoover OT GO 3                   Natan Hoover OT  8/1/2022

## 2022-08-01 NOTE — PROGRESS NOTES
Inpatient Rehabilitation Plan of Care Note    Plan of Care  Care Plan Reviewed - Updates as Follows    Body Systems    [RN] Endocrine(Active)  Current Status(08/01/2022): DM 2; continuous monitor to right abd and connects  to phone  Weekly Goal(08/02/2022): Blood sugars WNL  Discharge Goal: Blood sugars WNL    [RN] Respiratory(Active)  Current Status(08/01/2022): Weaning off of oxygen; wearing 1 L nc at HS  Weekly Goal(08/02/2022): Sats WNL  Discharge Goal: Sats WNL    Performed Intervention(s)  Check oxygen q shift  Blood sugar AC and HS      Pain    [RN] Pain Management(Active)  Current Status(08/01/2022): Pt denies pain at this time  Weekly Goal(08/02/2022): No pain  Discharge Goal: No pain    Performed Intervention(s)  Offer medication if pain arises      Psychosocial    [RN] Coping/Adjustment(Active)  Current Status(08/01/2022): Appears to be coping well; has supportive daughters  Weekly Goal(08/02/2022): Verbalizes concerns  Discharge Goal: Demonstrates adequate coping strategies    Performed Intervention(s)  Provide calm environment  Allow pt to express concerns, wants and needs      Safety    [RN] Potential for Injury(Active)  Current Status(08/01/2022): At risk for falls r/t BLE weakness  Weekly Goal(08/02/2022): Will use call light  Discharge Goal: No falls    Performed Intervention(s)  All items within reach  [place]our[place] rounding  Falls precautions in place      Sphincter Control    [RN] Bladder Management(Active)  Current Status(08/01/2022): Continent with some reports of incontinence; wears  pull-up with pad  Weekly Goal(08/02/2022): Continent 100%  Discharge Goal: Continent 100%    [RN] Bowel Management(Active)  Current Status(08/01/2022): Continent with some incontinence  Weekly Goal(08/02/2022): Continent 75%  Discharge Goal: Continent 100%    Performed Intervention(s)  Encourage appropriate diet  Utilize incontinence products as needed    Signed by: Emilia Rai, RN

## 2022-08-02 ENCOUNTER — OUTSIDE FACILITY SERVICE (OUTPATIENT)
Dept: INTERNAL MEDICINE | Facility: CLINIC | Age: 82
End: 2022-08-02

## 2022-08-02 LAB
GLUCOSE BLDC GLUCOMTR-MCNC: 158 MG/DL (ref 70–130)
GLUCOSE BLDC GLUCOMTR-MCNC: 206 MG/DL (ref 70–130)
GLUCOSE BLDC GLUCOMTR-MCNC: 220 MG/DL (ref 70–130)
GLUCOSE BLDC GLUCOMTR-MCNC: 231 MG/DL (ref 70–130)

## 2022-08-02 PROCEDURE — 25010000002 ENOXAPARIN PER 10 MG: Performed by: INTERNAL MEDICINE

## 2022-08-02 PROCEDURE — 92526 ORAL FUNCTION THERAPY: CPT

## 2022-08-02 PROCEDURE — 97110 THERAPEUTIC EXERCISES: CPT

## 2022-08-02 PROCEDURE — OUTSIDEPOS PR OUTSIDE POS PLACEHOLDER: Performed by: PHYSICIAN ASSISTANT

## 2022-08-02 PROCEDURE — 63710000001 INSULIN LISPRO (HUMAN) PER 5 UNITS: Performed by: INTERNAL MEDICINE

## 2022-08-02 PROCEDURE — 97535 SELF CARE MNGMENT TRAINING: CPT

## 2022-08-02 PROCEDURE — 82962 GLUCOSE BLOOD TEST: CPT

## 2022-08-02 PROCEDURE — 92507 TX SP LANG VOICE COMM INDIV: CPT

## 2022-08-02 RX ADMIN — ZONISAMIDE 200 MG: 100 CAPSULE ORAL at 21:23

## 2022-08-02 RX ADMIN — INSULIN LISPRO 3 UNITS: 100 INJECTION, SOLUTION INTRAVENOUS; SUBCUTANEOUS at 12:02

## 2022-08-02 RX ADMIN — PRAVASTATIN SODIUM 40 MG: 40 TABLET ORAL at 16:18

## 2022-08-02 RX ADMIN — AMLODIPINE BESYLATE 2.5 MG: 2.5 TABLET ORAL at 07:24

## 2022-08-02 RX ADMIN — INSULIN GLARGINE-YFGN 5 UNITS: 100 INJECTION, SOLUTION SUBCUTANEOUS at 21:45

## 2022-08-02 RX ADMIN — ENOXAPARIN SODIUM 40 MG: 100 INJECTION SUBCUTANEOUS at 21:22

## 2022-08-02 RX ADMIN — Medication 1 MG: at 21:23

## 2022-08-02 RX ADMIN — INSULIN LISPRO 2 UNITS: 100 INJECTION, SOLUTION INTRAVENOUS; SUBCUTANEOUS at 07:25

## 2022-08-02 RX ADMIN — INSULIN LISPRO 3 UNITS: 100 INJECTION, SOLUTION INTRAVENOUS; SUBCUTANEOUS at 16:18

## 2022-08-02 RX ADMIN — DONEPEZIL HYDROCHLORIDE 10 MG: 10 TABLET, FILM COATED ORAL at 21:23

## 2022-08-02 NOTE — PROGRESS NOTES
TEAM CONF - AUGUST 2 - TRANSFERS MIN. CAR TRANSFERS MIN 4 STAIRS MIN. GAIT 160 FEET RW CTG. TOILET TRANSFERS CTG RW. SHOWER TRANSFERS MIN .  BATH MIN. LBD CTG. UBD SBA. EATING SET UP. GROOMING SBA. TOILETING MIN. CHRONIC DYSPHAGIA - COMFORT FEEDS AS NOT WANT PEG TUBE/ NPO STATUS - PUREE/NTL. PHARYNGEAL DYSPHAGIA WITH SILENT ASPIRATION. IMPAIRED COGNITION/ IMPAIRED RECALL BUT DID RECALL SWALLOW STRATEGIES. SKIN INTACT. MOSTLY CONTINENT BOWEL . INCONTINENT BLADDER BUT WEARS BRIEF AND ZANDER-PAD - USED AT HOME.. ON O2 1 LITER AT NIGHT.   ELOS -  END OF NEXT WEEK WITH 24 HOUR ASSIST.

## 2022-08-02 NOTE — THERAPY TREATMENT NOTE
Inpatient Rehabilitation - Occupational Therapy Treatment Note    [facility]     Patient Name: Di Macario  : 1940  MRN: [id number]    Today's Date: 2022                 Admit Date: 2022         ICD-10-CM ICD-9-CM   1. Impaired functional mobility, balance, gait, and endurance  Z74.09 V49.89       Patient Active Problem List   Diagnosis   • Essential hypertension   • Vitamin D deficiency   • Hyperuricemia   • Slow transit constipation   • Chronic pain of left knee   • At high risk for falls   • Peripheral neuropathy   • Uncontrolled type 2 diabetes mellitus with hyperglycemia (HCC)   • Walker as ambulation aid   • Bradycardia, sinus   • Shortness of breath   • Leg swelling   • Hyperlipidemia   • Presence of cardiac pacemaker   • Dementia without behavioral disturbance ([facility])   • Aspiration pneumonia due to vomitus ([facility])   • Aspiration pneumonia of both lungs due to gastric secretions, unspecified part of lung ([facility])   • Weakness   • Thrombocytopenia ([facility])   • Oropharyngeal dysphagia   • Immobility syndrome       Past Medical History:   Diagnosis Date   • Dementia ([facility])     states better with medication    • Diabetes mellitus ([facility])    • Hyperlipidemia 2021   • Lung consolidation ([facility]) 2022   • Peripheral neuropathy 2018   • Vitamin D deficiency        Past Surgical History:   Procedure Laterality Date   • ANKLE SURGERY Right    • BLADDER SURGERY      Prolapsed   • BREAST BIOPSY     • CARDIAC ELECTROPHYSIOLOGY PROCEDURE N/A 3/5/2021    Procedure: Pacemaker DC new BOSTON;  Surgeon: Madelin Ferguson MD;  Location: [facility] CATH INVASIVE LOCATION;  Service: Cardiology;  Laterality: N/A;   • ENDOSCOPY N/A 2022    Procedure: ESOPHAGOGASTRODUODENOSCOPY with biopsy, balloon dilation;  Surgeon: Lo Benjamin MD;  Location: [facility] ENDOSCOPY;  Service: Gastroenterology;  Laterality: N/A;  esophageal stricture, hiatal hernia, gastritis   • HYSTERECTOMY               IRF OT  ASSESSMENT FLOWSHEET (last 12 hours)     IRF OT Evaluation and Treatment     Row Name 08/02/22 1509 08/02/22 1210       OT Time and Intention    Document Type daily treatment  -DN daily treatment  -DN    Mode of Treatment occupational therapy  -DN occupational therapy  -DN    Patient Effort good  -DN good  -DN    Row Name 08/02/22 1509 08/02/22 1210       Pain Assessment    Pretreatment Pain Rating 0/10 - no pain  -DN 0/10 - no pain  -DN    Posttreatment Pain Rating 0/10 - no pain  -DN 0/10 - no pain  -DN    Row Name 08/02/22 1210          Cognition/Psychosocial    Affect/Mental Status (Cognition) WFL  -DN     Follows Commands (Cognition) follows one-step commands;over 90% accuracy  -DN     Personal Safety Interventions fall prevention program maintained;gait belt  -DN     Cognitive Function safety deficit  -DN     Safety Deficit (Cognition) minimal deficit  -DN     Row Name 08/02/22 1210          Bathing    Bryan Level (Bathing) bathing skills;contact guard assist;verbal cues;nonverbal cues (demo/gesture)  -DN     Assistive Device (Bathing) grab bar/tub rail  -DN     Position (Bathing) sink side;supported sitting;supported standing  -DN     Set-up Assistance (Bathing) obtain supplies  -DN     Row Name 08/02/22 1210          Upper Body Dressing    Bryan Level (Upper Body Dressing) upper body dressing skills;doff;don;bra/undergarment;set up assistance  -DN     Set-up Assistance (Upper Body Dressing) obtain clothing  -DN     Row Name 08/02/22 1210          Lower Body Dressing    Bryan Level (Lower Body Dressing) doff;don;pants/bottoms;shoes/slippers;socks;underwear;contact guard assist;verbal cues;nonverbal cues (demo/gesture)  -DN     Position (Lower Body Dressing) supported sitting;supported standing  -DN     Set-up Assistance (Lower Body Dressing) obtain clothing  -DN     Row Name 08/02/22 1210          Grooming    Bryan Level (Grooming) grooming skills;deodorant application;hair care,  combing/brushing;oral care regimen;supervision  -DN     Position (Grooming) supported sitting  -DN     Row Name 08/02/22 1509          Transfer Assessment/Treatment    Comment, (Transfers) recliner to w/c with RWX  -DN     Row Name 08/02/22 1509          Shoulder (Therapeutic Exercise)    Shoulder (Therapeutic Exercise) strengthening exercise  -DN     Shoulder AROM (Therapeutic Exercise) bilateral;10 repetitions;3 sets  -DN     Shoulder Strengthening (Therapeutic Exercise) 1 lb free weight  -DN     Row Name 08/02/22 1509          Elbow/Forearm (Therapeutic Exercise)    Elbow/Forearm (Therapeutic Exercise) strengthening exercise  -DN     Elbow/Forearm AROM (Therapeutic Exercise) 10 repetitions;3 sets  -DN     Elbow/Forearm Strengthening (Therapeutic Exercise) 1 lb free weight  -DN     Row Name 08/02/22 1509          Wrist (Therapeutic Exercise)    Wrist (Therapeutic Exercise) strengthening exercise  -DN     Wrist AROM (Therapeutic Exercise) bilateral;10 repetitions;3 sets  -DN     Wrist Strengthening (Therapeutic Exercise) 1 lb free weight  -DN     Row Name 08/02/22 1509          Hand (Therapeutic Exercise)    Hand (Therapeutic Exercise) strengthening exercise  -DN     Hand AROM/AAROM (Therapeutic Exercise) bilateral;20 repititions  -DN     Hand Strengthening (Therapeutic Exercise) hand gripper  -DN     Row Name 08/02/22 1509 08/02/22 1210       Positioning and Restraints    Pre-Treatment Position sitting in chair/recliner  -DN sitting in chair/recliner  -DN    Post Treatment Position chair  -DN chair  -DN    In Bed -- sitting;call light within reach;encouraged to call for assist;exit alarm on  -DN          User Key  (r) = Recorded By, (t) = Taken By, (c) = Cosigned By    Initials Name Effective Dates    Natan Sunshine OT 06/16/21 -                  Occupational Therapy Education                 Title: PT OT SLP Therapies (In Progress)     Topic: Occupational Therapy (Done)     Point: ADL training (Done)      Description:   Instruct learner(s) on proper safety adaptation and remediation techniques during self care or transfers.   Instruct in proper use of assistive devices.              Learning Progress Summary           Patient Acceptance, E,TB,D, VU,DU by  at 7/29/2022 1533    Comment: ed pt on role of OT , benefit of therapy .POC w. OT ed on safety w. ADL and tsf. pt demo w min A.                   Point: Home exercise program (Done)     Description:   Instruct learner(s) on appropriate technique for monitoring, assisting and/or progressing therapeutic exercises/activities.              Learning Progress Summary           Patient Acceptance, E,TB,D, VU,DU by  at 7/29/2022 1533    Comment: ed pt on role of OT , benefit of therapy .POC w. OT ed on safety w. ADL and tsf. pt demo w min A.                   Point: Precautions (Done)     Description:   Instruct learner(s) on prescribed precautions during self-care and functional transfers.              Learning Progress Summary           Patient Acceptance, E,TB,D, VU,DU by  at 7/29/2022 1533    Comment: ed pt on role of OT , benefit of therapy .POC w. OT ed on safety w. ADL and tsf. pt demo w min A.                   Point: Body mechanics (Done)     Description:   Instruct learner(s) on proper positioning and spine alignment during self-care, functional mobility activities and/or exercises.              Learning Progress Summary           Patient Acceptance, E,TB,D, VU,DU by  at 7/29/2022 1533    Comment: ed pt on role of OT , benefit of therapy .POC w. OT ed on safety w. ADL and tsf. pt demo w min A.                               User Key     Initials Effective Dates Name Provider Type Discipline     06/16/21 -  Adriana Gaxiola OTLEROY Occupational Therapist OT                    OT Recommendation and Plan                         Time Calculation:      Time Calculation- OT     Row Name 08/02/22 1230 08/02/22 0830          Time Calculation- OT    OT Start  Time 1230  -DN 0830  -DN     OT Stop Time 1300  -DN 0900  -DN     OT Time Calculation (min) 30 min  -DN 30 min  -DN           User Key  (r) = Recorded By, (t) = Taken By, (c) = Cosigned By    Initials Name Provider Type    Natan Sunshine OT Occupational Therapist              Therapy Charges for Today     Code Description Service Date Service Provider Modifiers Qty    37725160734 HC OT THER PROC EA 15 MIN 8/1/2022 Natan Hoover OT GO 1    53133293056 HC OT SELF CARE/MGMT/TRAIN EA 15 MIN 8/1/2022 Natan Hoover OT GO 1    16520679203 HC OT SELF CARE/MGMT/TRAIN EA 15 MIN 8/1/2022 Natan Hoover OT GO 3    39875693710 HC OT SELF CARE/MGMT/TRAIN EA 15 MIN 8/2/2022 Natan Hoover OT GO 2    71236586048 HC OT THER PROC EA 15 MIN 8/2/2022 Natan Hoover OT GO 2                   Natan Hoover OT  8/2/2022

## 2022-08-02 NOTE — PROGRESS NOTES
Inpatient Rehabilitation Plan of Care Note    Plan of Care  Care Plan Reviewed - No updates at this time.    Body Systems    [RN] Endocrine(Active)  Current Status(08/02/2022): DM 2; continuous monitor to right abd and connects  to phone  Weekly Goal(08/09/2022): Blood sugars WNL  Discharge Goal: Blood sugars WNL    [RN] Respiratory(Active)  Current Status(08/02/2022): Weaning off of oxygen; wearing 1 L nc at HS  Weekly Goal(08/09/2022): Sats WNL  Discharge Goal: Sats WNL    Performed Intervention(s)  Check oxygen q shift  Blood sugar AC and HS      Pain    [RN] Pain Management(Active)  Current Status(08/02/2022): Pt denies pain at this time  Weekly Goal(08/09/2022): No pain  Discharge Goal: No pain    Performed Intervention(s)  Offer medication if pain arises      Psychosocial    [RN] Coping/Adjustment(Active)  Current Status(08/02/2022): Appears to be coping well; has supportive daughters  Weekly Goal(08/09/2022): Verbalizes concerns  Discharge Goal: Demonstrates adequate coping strategies    Performed Intervention(s)  Provide calm environment  Allow pt to express concerns, wants and needs      Safety    [RN] Potential for Injury(Active)  Current Status(08/02/2022): At risk for falls r/t BLE weakness  Weekly Goal(08/09/2022): Will use call light  Discharge Goal: No falls    Performed Intervention(s)  All items within reach  Hourly rounding  Falls precautions in place      Sphincter Control    [RN] Bladder Management(Active)  Current Status(08/02/2022): Continent with some reports of incontinence; wears  pull-up with pad  Weekly Goal(08/09/2022): Continent 100%  Discharge Goal: Continent 100%    [RN] Bowel Management(Active)  Current Status(08/02/2022): Continent with some incontinence  Weekly Goal(08/09/2022): Continent 75%  Discharge Goal: Continent 100%    Performed Intervention(s)  Encourage appropriate diet  Utilize incontinence products as needed    Signed by: Mahamed Allan RN

## 2022-08-02 NOTE — THERAPY TREATMENT NOTE
Inpatient Rehabilitation - IRF Speech Language Pathology   Swallow Treatment Note/Discharge   [facility]     Patient Name: Di Macario  : 1940  MRN: [id number]  Today's Date: 2022               Admit Date: 2022    Visit Dx:      ICD-10-CM ICD-9-CM   1. Impaired functional mobility, balance, gait, and endurance  Z74.09 V49.89     Patient Active Problem List   Diagnosis   • Essential hypertension   • Vitamin D deficiency   • Hyperuricemia   • Slow transit constipation   • Chronic pain of left knee   • At high risk for falls   • Peripheral neuropathy   • Uncontrolled type 2 diabetes mellitus with hyperglycemia (HCC)   • Walker as ambulation aid   • Bradycardia, sinus   • Shortness of breath   • Leg swelling   • Hyperlipidemia   • Presence of cardiac pacemaker   • Dementia without behavioral disturbance (HCC)   • Aspiration pneumonia due to vomitus (HCC)   • Aspiration pneumonia of both lungs due to gastric secretions, unspecified part of lung (HCC)   • Weakness   • Thrombocytopenia (HCC)   • Oropharyngeal dysphagia   • Immobility syndrome         SLP Recommendation and Plan                                                             Therapy Treatment    Evaluation/Coping         Vitals/Pain/Safety    Pain  Pretreatment Pain Ratin/10 - no pain (22 1200)  Posttreatment Pain Ratin/10 - no pain (22 1200)  Pain Assessment  Pretreatment Pain Ratin/10 - no pain (22)  Posttreatment Pain Ratin/10 - no pain (22)  Pain Scale: Numbers Pre/Post-Treatment  Pretreatment Pain Ratin/10 - no pain (22 1200)  Posttreatment Pain Ratin/10 - no pain (22)    Cognition/Communication    Cognitive Assessment Intervention- SLP  Cognitive Function (Cognition): mild impairment, moderate impairment (22)  Orientation Status (Cognition): awareness of basic personal information, person, place, time, situation, WFL (22)  Memory  (Cognitive): simple, immediate, short-term, auditory, mild impairment, moderate impairment (07/29/22 1200)  Problem Solving (Cognitive): simple, WFL (07/29/22 1200)  Cognition, Comment: An informal speech-language and cognitive evaluation was initiated.  The pt presents with mild to moderate deficits with auditory processing and verbal expression/word finding deficits.  Pt is able to circumlocute and compensate to participate in conversational speech with minimal difficulties.  Pt able to provide personal past and biographical information accurately as well as some details regarding her past medical history and reason for admit.  Pt was able to attend/concentrate in a quiet speech office for 60 minutes in which she participated in both a bedside and an informal cognitive linguistic eval.  Pt is alert and oriented x 6.  Pt was able to repeat a series of numbers, words and sequences in increased length and complexity x 10 tasks with 6/10 (60%) independently, increased to 8/10 (80%) with repetition.  Pt was immediately able to repeat 3/3 (100%) unrelated words and then recalled those same words after 5-10 minute delay with 2/3 (67%) increaesd to 3/3 (100%) with cued recall.  Pt demonstrated mild/modeate defcits with auditory processing/immediate logical memory as she was able to retell less than 75% of an auditory presented passage and answered 4/5 (80%) open ended questions over the same passage.  The patient demonstrated basic verbal problem solving skills as she was able to verbally state solutions to simple hypothetical scenerios, cues were needed for abstract solutions, multiple solutions and mental flexibility.  Recommend continue with diagnostic therapy to assess reading comprehension, graphic expression, sequencing, categorization, thought oranization, executive functioning, mental math, word problems, deductive/abstract reasoning, and home management skills.  Pt states she lives at home alone and is a window.  Pt has a supportive family and five children. (07/29/22 1200)  Auditory Comprehension Assessment/Intervention  Auditory Comprehension (Communication): mild impairment (07/29/22 1200)  Answers Questions (Communication): yes/no, concrete, complex, WFL (07/29/22 1200)  Narrative Discourse: complex paragraph level, mild impairment (07/29/22 1200)  Successful Auditory Strategies (Communication): decrease environmental distractions (07/29/22 1200)  Auditory Comprehension Communication, Comment: Pt able to answer 6/6 (100%) simple auditory processing questions and 2/2 (100%) complex auditory processing questions.  Pt able to informally follow all directions througout evaluation with minimal need for repetition.  Pt able to retell details of an auditorily present passage expressing the overall theme of the story and provide apprporiate sequence/details with 60-75% accuracy.  Pt able to answer open ended questions over the passage with 4/5 (80%). (07/29/22 1200)  Reading Comprehension Assessment/Intervention  Reading Comprehension, Comment: Assess via diagnotic therapy with goals pending test results. (07/29/22 1200)  Verbal Expression Assessment/Intervention  Verbal Expression: mild impairment, moderate impairment (07/29/22 1200)  Confrontational Naming: high frequency, mild impairment, moderate impairment (07/29/22 1200)  Conversational Discourse/Fluency: mild impairment (07/29/22 1200)  Verbal Expression, Comment: Pt able to name 3/5 (60%) common objects located in the therapy room, increased to 100% with verbal cues.  Mild word finding deficits noted in conversational speech. (07/29/22 1200)  Graphic Expression Assessment/Intervention  Graphic Expression, Comment: Assess via diagnotic therapy with goals pending test results. (07/29/22 1200)  Motor Speech Assessment/Intervention  Motor Speech Function: WFL (07/29/22 1200)    Oral Motor/Eating    Oral Musculature and Cranial Nerve Assessment  Oral Motor General Assessment:  vocal impairment (07/29/22 1200)  Vocal Impairment, Detail. Cranial Nerve X (Vagus): CN10: Motor, vocal quality abnormality (see comments) (07/29/22 1200)  Oral Motor, Comment: Pt presents with reduced vocal intensity with breathy/hoarse vocal quality at timess. (07/29/22 1200)  General Eating/Swallowing Observations  Eating/Swallowing Skills: fed by SLP (07/29/22 1100)  Positioning During Eating: upright in chair (07/29/22 1100)  Utensils Used: spoon, cup (07/29/22 1100)  Consistencies Trialed: ice chips, pureed, nectar/syrup-thick liquids (07/29/22 1100)  Clinical Swallow Eval  Clinical Swallow Evaluation Summary: Bedside swallow completed.  Reviewed most recent VFSS results and recommendations.  Pt states she has had difficulty swallowing since she was 5 years ago due to Polio.  Pt/family desire a modified PO diet despite risks of aspiration, pneumonia and/or possible death.  Bedside swallow completed this date with ice chips, small bites of puree and small cup sips of nectar thick liquid with use of compensatory strategies which may possible reduced risk.  Pt was instructued to utilize effortful swallows, double swallows and alternating consistencies.  Pt able to utilize strategies initially with moderte cues and then minimal cues after instruction.  Possible wet vocal quality 1-2 times with small sips of nectar thick liquid.  Instructed pt to cough and re-swallow.  Recommend to honor pt's wishes and continue with puree diet and nectar thick liquids. Pt aware of risks and does not desire NPO status as recommended after VFSS.  Recommend meds whole or crushed in puree or with NTL.  Swallow precautions include sit upright at 90 degrees, small bites and sips, slow rate, effortful swallows, double swallows and alternate consistencies, as well as staying upright 30-60 minutes after meals.  Also recommend pt participate in a swallow exercise program to increase safety with PO. (07/29/22 1100)    Mobility/Basic  Activities/Instrumental Activities/Motor/Modality                   ROM/MMT                   Sensory/Myotome/Dermatome/Edema               Posture/Balance/Special Tests/Exercise/Transportation/Sexual Function                   Orthotics/Residual Limb/Prosthetic Management              Outcome Summary          SLP GOALS     Row Name 08/02/22 1100 08/01/22 1500          ([facility]) Pharyngeal Strengthening Exercise Goal 1 (SLP)    Increase Superior Movement of the Hyolaryngeal Complex shaker;hard effortful swallow  -CB chin tuck against resistance (CTAR);hard effortful swallow  -TH     Increase Anterior Movement of the Hyolaryngeal Complex shaker;hard effortful swallow  -CB hard effortful swallow;beulah;effortful pitch glide (falsetto + pharyngeal squeeze);chin tuck against resistance (CTAR)  -TH     Increase Epiglottic Inversion and Retroflexion shaker;hard effortful swallow  -CB hard effortful swallow;effortful pitch glide (falsetto + pharyngeal squeeze);beulah  -TH     Increase Squeeze/Positive Pressure Generation shaker;hard effortful swallow  -CB hard effortful swallow;effortful pitch glide (falsetto + pharyngeal squeeze);chin tuck against resistance (CTAR);beulah  -TH     Increase Tongue Base Retraction shaker;hard effortful swallow  -CB beulah  glottal sounds  -TH     Increase PES Opening shaker;hard effortful swallow  -CB --     [place]/Accuracy (Pharyngeal Strengthening Goal 1, SLP) with moderate cues (50-74% accuracy)  -CB with minimal cues (75-90% accuracy)  -TH     Time Frame (Pharyngeal Strengthening Goal 1, SLP) by discharge  -CB by discharge  -TH            ([facility]) Swallow Compensatory Strategies Goal 1 (SLP)    [place]/Accuracy (Swallow Compensatory Strategies/Techniques Goal 1, SLP) -- with moderate cues (50-74% accuracy)  Patient seen during meal time on this date and initially not aware of swallow strategies she needed to complete during meal times, however, after review cueing faded to min cues.  Provided handout in room with swallow strategies as well.  -TH     Time Frame (Swallow Compensatory Strategies/Techniques Goal 1, SLP) -- by discharge  -TH            Follow Directions Goal 2 (SLP)    Progress (Ability to Follow Directions Goal 1, SLP) 60%;independently (over 90% accuracy)  -CB --     Progress/Outcomes (Follow Directions Goal 1, SLP) good progress toward goal  -CB --     Comment (Follow Directions Goal 1, SLP) working memory for new task  -CB --            Memory Skills Goal 1 (SLP)    Progress (Memory Skills Goal 1, SLP) 60%;with minimal cues (75-90%)  -CB --     Progress/Outcomes (Memory Skills Goal 1, SLP) goal ongoing  -CB --     Comment (Memory Skills Goal 1, SLP) working memory for new task  -CB --           User Key  (r) = Recorded By, (t) = Taken By, (c) = Cosigned By    Initials Name Provider Type     Natasha Lazo Speech and Language Pathologist    Mckenzie Beth SLP Speech and Language Pathologist                EDUCATION  The patient has been educated in the following areas:   Dysphagia (Swallowing Impairment).             Time Calculation:    Time Calculation- SLP     Row Name 08/02/22 1102             Time Calculation- SLP    SLP Start Time 1000  -CB      SLP Stop Time 1100  -CB      SLP Time Calculation (min) 60 min  -CB      SLP Received On 08/02/22  -CB              Untimed Charges    21626-SR Treatment/ST Modification Prosth Aug Alter  30  -CB      23892-DU Treatment Swallow Minutes 30  -CB              Total Minutes    Untimed Charges Total Minutes 60  -CB       Total Minutes 60  -CB            User Key  (r) = Recorded By, (t) = Taken By, (c) = Cosigned By    Initials Name Provider Type    Mckenzie Beth SLP Speech and Language Pathologist                Therapy Charges for Today     Code Description Service Date Service Provider Modifiers Qty    58924585239 HC ST TREATMENT SPEECH 2 8/2/2022 Mckenzie Cintron SLP GN 1    46607051959 HC ST TREATMENT SWALLOW 2 8/2/2022 Abdulaziz  Stephen, SLP GN 1                    STEPHEN EUGENE, SLP  8/2/2022

## 2022-08-02 NOTE — THERAPY TREATMENT NOTE
Inpatient Rehabilitation - Occupational Therapy Treatment Note    [facility]     Patient Name: Di Macario  : 1940  MRN: [id number]    Today's Date: 2022                 Admit Date: 2022         ICD-10-CM ICD-9-CM   1. Impaired functional mobility, balance, gait, and endurance  Z74.09 V49.89       Patient Active Problem List   Diagnosis   • Essential hypertension   • Vitamin D deficiency   • Hyperuricemia   • Slow transit constipation   • Chronic pain of left knee   • At high risk for falls   • Peripheral neuropathy   • Uncontrolled type 2 diabetes mellitus with hyperglycemia (HCC)   • Walker as ambulation aid   • Bradycardia, sinus   • Shortness of breath   • Leg swelling   • Hyperlipidemia   • Presence of cardiac pacemaker   • Dementia without behavioral disturbance ([facility])   • Aspiration pneumonia due to vomitus ([facility])   • Aspiration pneumonia of both lungs due to gastric secretions, unspecified part of lung ([facility])   • Weakness   • Thrombocytopenia ([facility])   • Oropharyngeal dysphagia   • Immobility syndrome       Past Medical History:   Diagnosis Date   • Dementia ([facility])     states better with medication    • Diabetes mellitus ([facility])    • Hyperlipidemia 2021   • Lung consolidation ([facility]) 2022   • Peripheral neuropathy 2018   • Vitamin D deficiency        Past Surgical History:   Procedure Laterality Date   • ANKLE SURGERY Right    • BLADDER SURGERY      Prolapsed   • BREAST BIOPSY     • CARDIAC ELECTROPHYSIOLOGY PROCEDURE N/A 3/5/2021    Procedure: Pacemaker DC new [place];  Surgeon: Madelin Ferguson MD;  Location: [facility] CATH INVASIVE LOCATION;  Service: Cardiology;  Laterality: N/A;   • ENDOSCOPY N/A 2022    Procedure: ESOPHAGOGASTRODUODENOSCOPY with biopsy, balloon dilation;  Surgeon: Lo Benjamin MD;  Location: [facility] ENDOSCOPY;  Service: Gastroenterology;  Laterality: N/A;  esophageal stricture, hiatal hernia, gastritis   • HYSTERECTOMY               IRF OT  ASSESSMENT FLOWSHEET (last 12 hours)     IRF OT Evaluation and Treatment     Row Name 08/02/22 1210          OT Time and Intention    Document Type daily treatment  -DN     Mode of Treatment occupational therapy  -DN     Patient Effort good  -DN     Row Name 08/02/22 1210          Pain Assessment    Pretreatment Pain Rating 0/10 - no pain  -DN     Posttreatment Pain Rating 0/10 - no pain  -DN     Row Name 08/02/22 1210          Cognition/Psychosocial    Affect/Mental Status (Cognition) WFL  -DN     Follows Commands (Cognition) follows one-step commands;over 90% accuracy  -DN     Personal Safety Interventions fall prevention program maintained;gait belt  -DN     Cognitive Function safety deficit  -DN     Safety Deficit (Cognition) minimal deficit  -DN     Row Name 08/02/22 1210          Bathing    [place] Level (Bathing) bathing skills;contact guard assist;verbal cues;nonverbal cues (demo/gesture)  -DN     Assistive Device (Bathing) grab bar/tub rail  -DN     Position (Bathing) sink side;supported sitting;supported standing  -DN     Set-up Assistance (Bathing) obtain supplies  -DN     Row Name 08/02/22 1210          Upper Body Dressing    [place] Level (Upper Body Dressing) upper body dressing skills;doff;don;bra/undergarment;set up assistance  -DN     Set-up Assistance (Upper Body Dressing) obtain clothing  -DN     Row Name 08/02/22 1210          Lower Body Dressing    [place] Level (Lower Body Dressing) doff;don;pants/bottoms;shoes/slippers;socks;underwear;contact guard assist;verbal cues;nonverbal cues (demo/gesture)  -DN     Position (Lower Body Dressing) supported sitting;supported standing  -DN     Set-up Assistance (Lower Body Dressing) obtain clothing  -DN     Row Name 08/02/22 1210          Grooming    [place] Level (Grooming) grooming skills;deodorant application;hair care, combing/brushing;oral care regimen;supervision  -DN     Position (Grooming) supported sitting  -DN     Row Name  08/02/22 1210          Positioning and Restraints    Pre-Treatment Position sitting in chair/recliner  -DN     Post Treatment Position chair  -DN     In Bed sitting;call light within reach;encouraged to call for assist;exit alarm on  -DN           User Key  (r) = Recorded By, (t) = Taken By, (c) = Cosigned By    Initials Name Effective Dates    Natan Sunshine OT 06/16/21 -                  Occupational Therapy Education                 Title: PT OT SLP Therapies (In Progress)     Topic: Occupational Therapy (Done)     Point: ADL training (Done)     Description:   Instruct learner(s) on proper safety adaptation and remediation techniques during self care or transfers.   Instruct in proper use of assistive devices.              Learning Progress Summary           Patient Acceptance, E,TB,D, VU,DU by  at 7/29/2022 1533    Comment: ed pt on role of OT , benefit of therapy .POC w. OT ed on safety w. ADL and tsf. pt demo w min A.                   Point: Home exercise program (Done)     Description:   Instruct learner(s) on appropriate technique for monitoring, assisting and/or progressing therapeutic exercises/activities.              Learning Progress Summary           Patient Acceptance, E,TB,D, VU,DU by  at 7/29/2022 1533    Comment: ed pt on role of OT , benefit of therapy .POC w. OT ed on safety w. ADL and tsf. pt demo w min A.                   Point: Precautions (Done)     Description:   Instruct learner(s) on prescribed precautions during self-care and functional transfers.              Learning Progress Summary           Patient Acceptance, E,TB,D, VU,DU by  at 7/29/2022 1533    Comment: ed pt on role of OT , benefit of therapy .POC w. OT ed on safety w. ADL and tsf. pt demo w min A.                   Point: Body mechanics (Done)     Description:   Instruct learner(s) on proper positioning and spine alignment during self-care, functional mobility activities and/or exercises.              Learning  Progress Summary           Patient Acceptance, E,TB,D, VU,DU by  at 7/29/2022 7903    Comment: ed pt on role of OT , benefit of therapy .POC w. OT ed on safety w. ADL and tsf. pt demo w min A.                               User Key     Initials Effective Dates Name Provider Type Discipline     06/16/21 -  Adriana Gaxiola, OTR Occupational Therapist OT                    OT Recommendation and Plan                         Time Calculation:      Time Calculation- OT     Row Name 08/02/22 0830             Time Calculation- OT    OT Start Time 0830  -DN      OT Stop Time 0900  -DN      OT Time Calculation (min) 30 min  -DN            User Key  (r) = Recorded By, (t) = Taken By, (c) = Cosigned By    Initials Name Provider Type    DN Natan Hoover OT Occupational Therapist              Therapy Charges for Today     Code Description Service Date Service Provider Modifiers Qty    46177323892 HC OT THER PROC EA 15 MIN 8/1/2022 Natan Hoover OT GO 1    36931858740 HC OT SELF CARE/MGMT/TRAIN EA 15 MIN 8/1/2022 Natan Hoover OT GO 1    88514158359 HC OT SELF CARE/MGMT/TRAIN EA 15 MIN 8/1/2022 Ntaan Hoover OT GO 3    21185349680 HC OT SELF CARE/MGMT/TRAIN EA 15 MIN 8/2/2022 Natan Hoover OT GO 2                   Natan Hoover OT  8/2/2022

## 2022-08-02 NOTE — PROGRESS NOTES
Chief Complaint:   Immobility syndrome/debility  Impaired cognition/impaired mobility/impaired self-care  ID status post aspiration pneumonia-Zosyn completes on July 29  Severe dysphagia-n.p.o. recommended on videofluoroscopic swallow study but patient family wish for avoiding PEG tube and for comfort diet-purée/nectar thick liquid  Hypertension-amlodipine  DVT prophylaxis-Lovenox/SCDs  Cognition-dementia-donepezil  Seizure disorder-Zonegran  Diabetes mellitus type 2-on Trulicity and Lantus low-dose at home     History of Present Illness  82 y.o. female with PMH of Esophageal Stricture, gastritis, oropharyngeal dysphagia, dementia, DM2, HTN, symptomatic Bradycardia s/p PPM, and Seizure D/O who admitted to [facility] on 7/24/2022 due to Aspiration Pneumonia.  Pt. was previously admitted to [facility] 6 weeks ago with aspiration pneumonia and  readmitted with shortness of breath and cough since a witnessed aspiration event 7-23-22.    Subjective:       She reports that she is comfortable with her breathing.  Has occasional cough.  Indicates she is tolerating therapies.    Physical Exam:  MENTAL STATUS -  AWAKE / ALERT  HEENT- NCAT, SCLERAE ANICTERIC, CONJUNCTIVAE PINK, OP MOIST, NO JVD, EARS UNREMARKABLE EXTERNALLY  LUNGS - CTA, NO WHEEZES, RALES OR RHONCHI  HEART- RRR, NO RUB, MURMUR, OR GALLOP  ABD - NORMOACTIVE BOWEL SOUNDS, SOFT, NT. NO HEPATOSPLENOMEGALY APPRECIATED  EXT - NO EDEMA OR CYANOSIS  NEURO -awake alert.  Speech fluent.  Oriented x3.  MOTOR EXAM - RUE/RLE 5/5. LUE/LLE 5/5  Decreased active range of motion at the right shoulder to about 80 degrees of flexion.    Assessment and Plan:   # Functional deficits and Generalized Weakness related to multiple medical comorbities - Admit to inpt rehab    #1.  Aspiration Pneumonia of both lungs  2/2 gastric secretions  -Recommendation for n.p.o.  Per patient family request comfort diet with pureed diet per ST  - On Zosyn-completes July 29.     #2.  Dysphagia  - pt. Noncompliant with home diet-patient and family do not wish to pursue PEG tube.  Comfort diet per patient/family request.     #3.  Leukocytosis - resolved     #4. Dementia with behavioral disturbance  - mild  - on medication     #5.  Uncontrolled DM type 2 w/ Hyperglycemia  #6.  Diabetic Peripheral polyneuropathy  - Hgb A1C 8.4  - follows with an outpatient endocrinologist     #7.  Essential HTN  - amlodipine restarted     #8.  Thrombocytopenia - mild  - trending labs     #9.  BLE edema  - elevate and monitor    #10-diabetes mellitus-was on Lantus 5 units nightly at home.  August 3-blood sugars elevated-resume home Lantus dose        TEAM CONF - AUGUST 2 - TRANSFERS MIN. CAR TRANSFERS MIN 4 STAIRS MIN. GAIT 160 FEET RW CTG. TOILET TRANSFERS CTG RW. SHOWER TRANSFERS MIN .  BATH MIN. LBD CTG. UBD SBA. EATING SET UP. GROOMING SBA. TOILETING MIN. CHRONIC DYSPHAGIA - COMFORT FEEDS AS NOT WANT PEG TUBE/ NPO STATUS - PUREE/NTL. PHARYNGEAL DYSPHAGIA WITH SILENT ASPIRATION. IMPAIRED COGNITION/ IMPAIRED RECALL BUT DID RECALL SWALLOW STRATEGIES. SKIN INTACT. MOSTLY CONTINENT BOWEL . INCONTINENT BLADDER BUT WEARS BRIEF AND ZANDER-PAD - USED AT HOME.. ON O2 1 LITER AT NIGHT.   ELOS -  END OF NEXT WEEK WITH 24 HOUR ASSIST     Now admit for comprehensive acute inpatient rehabilitation .  This would be an interdisciplinary program with physical therapy 1 hour,  occupational therapy 1 hour, and speech therapy 1 hour, 5 days a week.  Rehabilitation nursing for carryover, monitoring of pulmonary and neurologic   status, bowel and bladder, and skin  Ongoing physician follow-up.  Weekly team conferences.  Goals are to achieve a level of supervision with  mobility and self-care and improved swallow.   Rehabilitation prognosis fair.  Medical prognosis fair.  Estimated length of stay is approximately 2 weeks, but is only an estimation.      The patient's functional status and clinical status is unchanged from preadmission  assessment and the patient continues appropriate for acute inpatient rehabilitation.  Goal is for home with home health   therapies.  Barrier to discharge: Impaired swallow mobility self-care- work on dysphagia exercises, transfers, balance, gait, ADLs to overcome.             Aj Chandra MD

## 2022-08-02 NOTE — PROGRESS NOTES
Physical Medicine and Rehabilitation  Inpatient Rehabilitation Interdisciplinary Plan of Care    Demographics            Age: 82Y            Gender: Female    Admission Date: 7/28/2022 7:00:00 PM  Rehabilitation Diagnosis:  Immobility Syndrome    # Functional deficits and Generalized Weakness related to multiple medical  comorbities - Admit to inpt rehab    #1.  Aspiration Pneumonia of both lungs  2/2 gastric secretions  -Recommendation for n.p.o.  Per patient family request comfort diet with pureed  diet per ST  - On Zosyn-completes July 29.    #2. Dysphagia  - pt. Noncompliant with home diet-patient and family do not wish to pursue PEG  tube.  Comfort diet per patient/family request.    #3.  Leukocytosis - resolved    #4. Dementia with behavioral disturbance  - mild  - on medication    #5.  Uncontrolled DM type 2 w/ Hyperglycemia  #6.  Diabetic Peripheral polyneuropathy  - Hgb A1C 8.4  - follows with an outpatient endocrinologist    #7.  Essential HTN  - amlodipine restarted    #8.  Thrombocytopenia - mild  - trending labs    #9.  BLE edema  - elevate and monitor    Plan of Care  Anticipated Discharge Date/Estimated Length of Stay: BASHIR: RENÉ 8/12  Anticipated Discharge Destination: Community discharge with assistance  Discharge Plan : Patient lives alone in one story home. Ramp entrance. Recently  has had hired caregiver 5 hours/day 3 days/week. 3 daughters local who stay with  patient at night and most of day.  Patient current with [facility]  for PT, ST, NSG.  D/C plan is home with 24/7 assist from dgts and hired caregivers.  Medical Necessity Expected Level Rationale: Goals are to achieve a level of  supervision with  mobility and self-care and improved swallow.   Rehabilitation  prognosis fair.  Medical prognosis fair.  Intensity and Duration: an average of 3 hours/5 days per week  Medical Supervision and 24 Hour Rehab Nursing: x  Physical Therapy: x  PT Intensity/Duration: 1 hour / day, 5 days / week, for  approximately 2 weeks.  Occupational Therapy: x  OT Intensity/Duration: 1 hour / day, 5 days / week, for approximately 2 weeks.  Speech and Language Therapy: x  SLP Intensity/Duration: 1 hour / day, 5 days / week, for approximately 2 weeks.  Social Work: x  Therapeutic Recreation: x  Registered Dietician: x  Updated (if changes indicated)  No changes to plan.    Based on the patient's medical and functional status, their prognosis and  expected level of functional improvement is: Goals are to achieve a level of  supervision with  mobility and self-care and improved swallow.   Rehabilitation  prognosis fair.  Medical prognosis fair.    Interdisciplinary Problem/Goals/Status  Body Systems    [RN] Endocrine(Active)  Current Status(08/02/2022): DM 2; continuous monitor to right abd and connects  to phone  Weekly Goal(08/09/2022): Blood sugars WNL  Discharge Goal: Blood sugars WNL    [RN] Respiratory(Active)  Current Status(08/02/2022): Weaning off of oxygen; wearing 1 L nc at HS  Weekly Goal(08/09/2022): Sats WNL  Discharge Goal: Sats WNL        Cognition    [ST] Problem Solving(Active)  Current Status(08/01/2022): Mild to mod cognitive communication deficits.  Difficulty generating multiple solutions.  Weekly Goal(08/04/2022): Generate multiple solutions with min cues.  Discharge Goal: Functional problem solving for ADL/home with supervision.        Mobility    [OT] Toilet Transfers(Active)  Current Status(08/01/2022): CGA RWX  Weekly Goal(08/09/2022): SBA  Discharge Goal: SBA    [OT] Tub/Shower Transfers(Active)  Current Status(08/01/2022): Min  Weekly Goal(08/09/2022): CGA  Discharge Goal: SBA    [PT] Bed/Chair/Wheelchair(Active)  Current Status(08/01/2022): Paula  Weekly Goal(08/09/2022): CGA  Discharge Goal: Supervision    [PT] Bed Mobility(Active)  Current Status(08/01/2022): Supervision  Weekly Goal(08/09/2022): Indep  Discharge Goal: Independent    [PT] Car Transfers(Active)  Current Status(08/01/2022): Paula,  Rwx  Weekly Goal(08/09/2022): PT only  Discharge Goal: Supervision    [PT] Stairs(Active)  Current Status(08/01/2022): 4, raem, Paula  Weekly Goal(08/09/2022): PT only  Discharge Goal: 4, rails, SBA    [PT] Walk(Active)  Current Status(08/01/2022): 160', rwx CGA  Weekly Goal(08/09/2022): To BR, Rwx, CGA/SBA  Discharge Goal: 160', Rwx, Supervision        Pain    [RN] Pain Management(Active)  Current Status(08/02/2022): Pt denies pain at this time  Weekly Goal(08/09/2022): No pain  Discharge Goal: No pain        Psychosocial    [RN] Coping/Adjustment(Active)  Current Status(08/02/2022): Appears to be coping well; has supportive daughters  Weekly Goal(08/09/2022): Verbalizes concerns  Discharge Goal: Demonstrates adequate coping strategies        Safety    [RN] Potential for Injury(Active)  Current Status(08/02/2022): At risk for falls r/t BLE weakness  Weekly Goal(08/09/2022): Will use call light  Discharge Goal: No falls        Self Care    [OT] Bathing(Active)  Current Status(08/01/2022): Min  Weekly Goal(08/09/2022): CGA  Discharge Goal: SBA    [OT] Dressing (Lower)(Active)  Current Status(08/01/2022): CGA  Weekly Goal(08/09/2022): SBA  Discharge Goal: SBA    [OT] Dressing (Upper)(Active)  Current Status(08/01/2022): SBA  Weekly Goal(08/09/2022): SBA  Discharge Goal: SBA    [OT] Eating(Active)  Current Status(08/01/2022): s/u  Weekly Goal(08/09/2022): mod I  Discharge Goal: I    [OT] Grooming(Active)  Current Status(08/01/2022): SBA  Weekly Goal(08/09/2022): SBA  Discharge Goal: SBA    [OT] Toileting(Active)  Current Status(07/29/2022): min for balance  Weekly Goal(08/05/2022): CGA  Discharge Goal: SBA        Sphincter Control    [RN] Bladder Management(Active)  Current Status(08/02/2022): Continent with some reports of incontinence; wears  pull-up with pad  Weekly Goal(08/09/2022): Continent 100%  Discharge Goal: Continent 100%    [RN] Bowel Management(Active)  Current Status(08/02/2022): Continent with some  incontinence  Weekly Goal(08/09/2022): Continent 75%  Discharge Goal: Continent 100%        Swallow Function    [ST] Swallowing(Active)  Current Status(08/01/2022): VFSS 7/27/22 rec: NPO. Patient/family desire  modified diet vs. NPO status. Pureed with nectar thick liquids.  Weekly Goal(08/08/2022): Safe swallow strategies with min cues.  Discharge Goal: Safe swallow with least restrictive diet.      Comments: 8/2 Min A with transfers, amb 160' rwx CGA, able to do 4 steps Min A  with bilateral rails.  Toilet tranfers CGA rwx, tub transfers Min A.   Mild -  Mod cognitive deficits.  VFSS 7/27 silent aspiration, recommend NPO, family  recommends modified diet.  Continent with some incontinence.    Signed by: Aj Chandra MD

## 2022-08-02 NOTE — PLAN OF CARE
Goal Outcome Evaluation:           Progress: improving  Outcome Evaluation: A&OX4. Forgetful. Immobility sydrome. Hx. esophageal strictures, gastritis, dysphagia, dementia, aspiration pneumoniax2, DM2, HTN, seizures. BS checks ACHS. Diet: pureed, NT liquids. Meds crushed in puree. On swallow, seizure, and aspiration precautions. Mildly [place]. 1L O2 NC at nite. Continent and incontinent. Last BM 8/2. Wears a brief and peripad. Has urgency.  Assistx1 to wheelchair. No complaints of pain. D/C home Friday, 8/12.

## 2022-08-02 NOTE — THERAPY TREATMENT NOTE
Inpatient Rehabilitation - Physical Therapy Treatment Note       [facility]     Patient Name: Di Macario  : 1940  MRN: [id number]    Today's Date: 2022                    Admit Date: 2022      Visit Dx:     ICD-10-CM ICD-9-CM   1. Impaired functional mobility, balance, gait, and endurance  Z74.09 V49.89       Patient Active Problem List   Diagnosis   • Essential hypertension   • Vitamin D deficiency   • Hyperuricemia   • Slow transit constipation   • Chronic pain of left knee   • At high risk for falls   • Peripheral neuropathy   • Uncontrolled type 2 diabetes mellitus with hyperglycemia (HCC)   • Walker as ambulation aid   • Bradycardia, sinus   • Shortness of breath   • Leg swelling   • Hyperlipidemia   • Presence of cardiac pacemaker   • Dementia without behavioral disturbance (HCC)   • Aspiration pneumonia due to vomitus ([facility])   • Aspiration pneumonia of both lungs due to gastric secretions, unspecified part of lung (HCC)   • Weakness   • Thrombocytopenia ([facility])   • Oropharyngeal dysphagia   • Immobility syndrome       Past Medical History:   Diagnosis Date   • Dementia ([facility])     states better with medication    • Diabetes mellitus (HCC)    • Hyperlipidemia 2021   • Lung consolidation ([facility]) 2022   • Peripheral neuropathy 2018   • Vitamin D deficiency        Past Surgical History:   Procedure Laterality Date   • ANKLE SURGERY Right    • BLADDER SURGERY      Prolapsed   • BREAST BIOPSY     • CARDIAC ELECTROPHYSIOLOGY PROCEDURE N/A 3/5/2021    Procedure: Pacemaker DC new BOSTON;  Surgeon: Madelin Ferguson MD;  Location: [facility] CATH INVASIVE LOCATION;  Service: Cardiology;  Laterality: N/A;   • ENDOSCOPY N/A 2022    Procedure: ESOPHAGOGASTRODUODENOSCOPY with biopsy, balloon dilation;  Surgeon: Lo Benjamin MD;  Location: [facility] ENDOSCOPY;  Service: Gastroenterology;  Laterality: N/A;  esophageal stricture, hiatal hernia, gastritis   • HYSTERECTOMY          PT ASSESSMENT (last 12 hours)     IRF PT Evaluation and Treatment     Row Name 08/02/22 0945          PT Time and Intention    Document Type daily treatment  -LB     Mode of Treatment physical therapy  -LB     Patient/Family/Caregiver Comments/Observations Seated in recliner chair. NAD  -LB     Row Name 08/02/22 0945          General Information    Existing Precautions/Restrictions fall  -LB     Comment, General Information Pleasant and cooperative  -LB     Row Name 08/02/22 0945          Pain Assessment    Pretreatment Pain Rating 0/10 - no pain  -LB     Row Name 08/02/22 0945          Transfers    Sit-Stand Eden (Transfers) contact guard;verbal cues  -LB     Stand-Sit Eden (Transfers) contact guard;verbal cues  -LB     Eden Level (Toilet Transfer) contact guard;verbal cues  -LB     Assistive Device (Toilet Transfer) grab bars/safety frame;walker, front-wheeled  -LB     Row Name 08/02/22 0945          Sit-Stand Transfer    Assistive Device (Sit-Stand Transfers) walker, front-wheeled  -LB     Row Name 08/02/22 0945          Stand-Sit Transfer    Assistive Device (Stand-Sit Transfers) walker, front-wheeled;wheelchair  -LB     Row Name 08/02/22 0945          Toilet Transfer    Type (Toilet Transfer) sit-stand;stand-sit  -LB     Row Name 08/02/22 0945          Gait/Stairs (Locomotion)    Eden Level (Gait) contact guard  -LB     Assistive Device (Gait) walker, front-wheeled  -LB     Distance in Feet (Gait) 240  -LB     Pattern (Gait) step-through  -LB     Deviations/Abnormal Patterns (Gait) base of support, narrow;gait speed decreased;stride length decreased  -LB     Bilateral Gait Deviations forward flexed posture;heel strike decreased  -LB     Left Sided Gait Deviations heel strike decreased  -LB     Right Sided Gait Deviations heel strike decreased  -LB     Eden Level (Stairs) minimum assist (75% patient effort)  -LB     Handrail Location (Stairs) both sides  -LB      Number of Steps (Stairs) 4  -LB     Ascending Technique (Stairs) step-to-step  -LB     Descending Technique (Stairs) step-to-step  -LB     Comment, (Gait/Stairs) Also amb w rollator wx 180' w CGA  -LB     Row Name 08/02/22 0945          Hip (Therapeutic Exercise)    Hip AROM (Therapeutic Exercise) bilateral;flexion;sitting;10 repetitions  -LB     Hip Strengthening (Therapeutic Exercise) bilateral;aBduction;sitting;resistance band;yellow;10 repetitions  -LB     Row Name 08/02/22 0945          Knee (Therapeutic Exercise)    Knee AROM (Therapeutic Exercise) bilateral;LAQ (long arc quad);sitting;10 repetitions;2 sets  -LB     Knee Strengthening (Therapeutic Exercise) bilateral;flexion;sitting;resistance band;yellow;10 repetitions  -LB     Row Name 08/02/22 0945          Ankle (Therapeutic Exercise)    Ankle AROM (Therapeutic Exercise) bilateral;dorsiflexion;plantarflexion;sitting;10 repetitions  -LB     Row Name 08/02/22 0945          Positioning and Restraints    Post Treatment Position wheelchair  -LB     In Wheelchair sitting;exit alarm on;with SLP  -LB           User Key  (r) = Recorded By, (t) = Taken By, (c) = Cosigned By    Initials Name Provider Type    LB Stacey Gonzalez PTA Physical Therapist Assistant                 Physical Therapy Education                 Title: PT OT SLP Therapies (In Progress)     Topic: Physical Therapy (In Progress)     Point: Mobility training (In Progress)     Learning Progress Summary           Patient Acceptance, E, NR by LB at 8/1/2022 1414    Comment: car, stairs    Acceptance, E,D, NR by KP at 7/30/2022 1224    Acceptance, E, VU,NR by KM at 7/29/2022 1448                   Point: Home exercise program (In Progress)     Learning Progress Summary           Patient Acceptance, E,D, NR by KP at 7/30/2022 1224    Acceptance, E, VU,NR by KM at 7/29/2022 1448                   Point: Body mechanics (Done)     Learning Progress Summary           Patient Acceptance, E, VU,NR by KM  at 7/29/2022 1448                   Point: Precautions (Done)     Learning Progress Summary           Patient Acceptance, E, VU,NR by  at 7/29/2022 1448                               User Key     Initials Effective Dates Name Provider Type Discipline    LB 03/07/18 -  Stacey Gonzalez PTA Physical Therapist Assistant PT     06/16/21 -  Florinda Mckeon PT Physical Therapist PT     06/06/22 -  Ronny Winters, PT Student PT Student PT                PT Recommendation and Plan      Patient was wearing a face mask during this therapy encounter. Therapist used appropriate personal protective equipment including mask and gloves.  Mask used was standard procedure mask. Appropriate PPE was worn during the entire therapy session. Hand hygiene was completed before and after therapy session. Patient is not in enhanced droplet precautions.                        Time Calculation:      PT Charges     Row Name 08/02/22 1341 08/02/22 0945          Time Calculation    Start Time 1330  -LB 0930  -LB     Stop Time 1400  -LB 1000  -LB     Time Calculation (min) 30 min  -LB 30 min  -LB           User Key  (r) = Recorded By, (t) = Taken By, (c) = Cosigned By    Initials Name Provider Type    LB Stacey Gonzalez PTA Physical Therapist Assistant                Therapy Charges for Today     Code Description Service Date Service Provider Modifiers Qty    45594088663 HC PT THER PROC EA 15 MIN 8/1/2022 Stacey Gonzalez PTA GP 4    09602317408 HC PT THER PROC EA 15 MIN 8/2/2022 Stacey Gonzalez PTA GP 4                   Stacey Gonzalez PTA  8/2/2022

## 2022-08-02 NOTE — PROGRESS NOTES
Inpatient Rehabilitation Plan of Care Note    Plan of Care  Care Plan Reviewed - No updates at this time.    Sphincter Control    Performed Intervention(s)  Encourage appropriate diet  Utilize incontinence products as needed      Safety    Performed Intervention(s)  All items within reach  Hourly rounding  Falls precautions in place      Psychosocial    Performed Intervention(s)  Provide calm environment  Allow pt to express concerns, wants and needs      Pain    Performed Intervention(s)  Offer medication if pain arises      Body Systems    Performed Intervention(s)  Check oxygen q shift  Blood sugar AC and HS    Signed by: Kallie Chi RN

## 2022-08-02 NOTE — PLAN OF CARE
Goal Outcome Evaluation:  Plan of Care Reviewed With: patient        Progress: improving  Outcome Evaluation: Pt rested well this shift.  Up to bathroom ass x1.  Meds crushed in applesauce.  No pain.   1L O2 @HS.

## 2022-08-02 NOTE — PROGRESS NOTES
Reviewed progress, d/c goals, and d/c date for Friday, 8/12 with patient and daughter, Carley, by phone. Discussed scheduling family conference next week prior to d/c. Daughter to check with her sisters to see when they can be available for conference and call back. Will assist with plans.

## 2022-08-02 NOTE — PROGRESS NOTES
Case Management  Inpatient Rehabilitation Team Conference    Conference Date/Time: 8/2/2022 8:18:57 AM    Team Conference Attendees:  Dr. Aj Hurst, Perez Shrestha, Pharmacist  Shani Mendoza, DELMERW  Stacey Gonzalez, PTA  Libertad Mcneal, PT  Natan Hoover, OT  Mayela Deng, SLP  Rachel Tejeda, CTRS  Julianna Hollins RD, LD  Khushbu Grewal, JOSÉ MIGUEL Chi, RN  Chaplain Ruth Ann    Demographics            Age: 82Y            Gender: Female    Admission Date: 7/28/2022 7:00:00 PM  Rehabilitation Diagnosis:  Immobility Syndrome  Past Medical History: Past Medical History:  DiagnosisDate  ?Dementia (HCC)?  ?states better with medication  ?Diabetes mellitus (HCC)?  ?Tfghmctgbvxbwm88/13/2021  ?Lung consolidation (HCC)06/01/2022  ?Peripheral tpvjmfmonn88/28/2018  ?Vitamin D deficiency?    ?  ?  Surgical History  Past Surgical History:  ProcedureLateralityDate  ?ANKLE SURGERYRight?  ?BLADDER SURGERY??  ?Prolapsed  ?BREAST BIOPSY??  ?CARDIAC ELECTROPHYSIOLOGY PROCEDUREN/A3/5/2021  ?Procedure: Pacemaker DC new BOSTON;  Surgeon: Madelin Ferguson MD;  Location: [facility] CATH INVASIVE LOCATION;  Service: Cardiology;  Laterality: N/A;  ?ENDOSCOPYN/A6/6/2022  ?Procedure: ESOPHAGOGASTRODUODENOSCOPY with biopsy, balloon dilation;  Surgeon:  Lo Benjamin MD;  Location: [facility] ENDOSCOPY;  Service: Gastroenterology;  Laterality: N/A;  esophageal stricture, hiatal hernia, gastritis  ?HYSTERECTOMY??    ?      Plan of Care  Anticipated Discharge Date/Estimated Length of Stay: 2 weeks  Anticipated Discharge Destination: Community discharge with assistance  Discharge Plan : Patient lives alone in one story home. Ramp entrance. Recently  has had hired caregiver 5 hours/day 3 days/week. 3 daughters local who stay with  patient at night and most of day.  Patient current with [facility]  for PT, ST, NSG.  D/C plan is home with 24/7 assist from dgts and hired caregivers.  Medical Necessity Expected Level Rationale:  Goals are to achieve a level of  supervision with  mobility and self-care and improved swallow.   Rehabilitation  prognosis fair.  Medical prognosis fair.  Intensity and Duration: an average of 3 hours/5 days per week  Medical Supervision and 24 Hour Rehab Nursing: x  Physical Therapy: x  PT Intensity/Duration: 1 hour / day, 5 days / week, for approximately 2 weeks.  Occupational Therapy: x  OT Intensity/Duration: 1 hour / day, 5 days / week, for approximately 2 weeks.  Speech and Language Therapy: x  SLP Intensity/Duration: 1 hour / day, 5 days / week, for approximately 2 weeks.  Social Work: x  Therapeutic Recreation: x  Registered Dietician: x  Updated (if changes indicated)    Anticipated Discharge Date/Estimated Length of Stay:   ELOS: DC 8/12    Based on the patient's medical and functional status, their prognosis and  expected level of functional improvement is: Goals are to achieve a level of  supervision with  mobility and self-care and improved swallow.   Rehabilitation  prognosis fair.  Medical prognosis fair.      Interdisciplinary Problem/Goals/Status    All Rehab Problems:  Body Systems    [RN] Endocrine(Active)  Current Status(08/02/2022): DM 2; continuous monitor to right abd and connects  to phone  Weekly Goal(08/09/2022): Blood sugars WNL  Discharge Goal: Blood sugars WNL    [RN] Respiratory(Active)  Current Status(08/02/2022): Weaning off of oxygen; wearing 1 L nc at HS  Weekly Goal(08/09/2022): Sats WNL  Discharge Goal: Sats WNL        Cognition    [ST] Problem Solving(Active)  Current Status(08/01/2022): Mild to mod cognitive communication deficits.  Difficulty generating multiple solutions.  Weekly Goal(08/04/2022): Generate multiple solutions with min cues.  Discharge Goal: Functional problem solving for ADL/home with supervision.        Mobility    [OT] Toilet Transfers(Active)  Current Status(08/01/2022): CGA RWX  Weekly Goal(08/09/2022): SBA  Discharge Goal: SBA    [OT] Tub/Shower  Transfers(Active)  Current Status(08/01/2022): Min  Weekly Goal(08/09/2022): CGA  Discharge Goal: SBA    [PT] Bed/Chair/Wheelchair(Active)  Current Status(08/01/2022): Paula  Weekly Goal(08/09/2022): CGA  Discharge Goal: Supervision    [PT] Bed Mobility(Active)  Current Status(08/01/2022): Supervision  Weekly Goal(08/09/2022): Indep  Discharge Goal: Independent    [PT] Car Transfers(Active)  Current Status(08/01/2022): Paula, Rwx  Weekly Goal(08/09/2022): PT only  Discharge Goal: Supervision    [PT] Stairs(Active)  Current Status(08/01/2022): 4, rails, Paula  Weekly Goal(08/09/2022): PT only  Discharge Goal: 4, rails, SBA    [PT] Walk(Active)  Current Status(08/01/2022): 160', rwx CGA  Weekly Goal(08/09/2022): To BR, Rwx, CGA/SBA  Discharge Goal: 160', Rwx, Supervision        Pain    [RN] Pain Management(Active)  Current Status(08/02/2022): Pt denies pain at this time  Weekly Goal(08/09/2022): No pain  Discharge Goal: No pain        Psychosocial    [RN] Coping/Adjustment(Active)  Current Status(08/02/2022): Appears to be coping well; has supportive daughters  Weekly Goal(08/09/2022): Verbalizes concerns  Discharge Goal: Demonstrates adequate coping strategies        Safety    [RN] Potential for Injury(Active)  Current Status(08/02/2022): At risk for falls r/t BLE weakness  Weekly Goal(08/09/2022): Will use call light  Discharge Goal: No falls        Self Care    [OT] Bathing(Active)  Current Status(08/01/2022): Min  Weekly Goal(08/09/2022): CGA  Discharge Goal: SBA    [OT] Dressing (Lower)(Active)  Current Status(08/01/2022): CGA  Weekly Goal(08/09/2022): SBA  Discharge Goal: SBA    [OT] Dressing (Upper)(Active)  Current Status(08/01/2022): SBA  Weekly Goal(08/09/2022): SBA  Discharge Goal: SBA    [OT] Eating(Active)  Current Status(08/01/2022): s/u  Weekly Goal(08/09/2022): mod I  Discharge Goal: I    [OT] Grooming(Active)  Current Status(08/01/2022): SBA  Weekly Goal(08/09/2022): SBA  Discharge Goal: SBA    [OT]  Toileting(Active)  Current Status(07/29/2022): min for balance  Weekly Goal(08/05/2022): CGA  Discharge Goal: SBA        Sphincter Control    [RN] Bladder Management(Active)  Current Status(08/02/2022): Continent with some reports of incontinence; wears  pull-up with pad  Weekly Goal(08/09/2022): Continent 100%  Discharge Goal: Continent 100%    [RN] Bowel Management(Active)  Current Status(08/02/2022): Continent with some incontinence  Weekly Goal(08/09/2022): Continent 75%  Discharge Goal: Continent 100%        Swallow Function    [ST] Swallowing(Active)  Current Status(08/01/2022): VFSS 7/27/22 rec: NPO. Patient/family desire  modified diet vs. NPO status. Pureed with nectar thick liquids.  Weekly Goal(08/08/2022): Safe swallow strategies with min cues.  Discharge Goal: Safe swallow with least restrictive diet.        Comments: 8/2 Min A with transfers, amb 160' rwx CGA, able to do 4 steps Min A  with bilateral rails.  Toilet tranfers CGA rwx, tub transfers Min A.   Mild -  Mod cognitive deficits.  VFSS 7/27 silent aspiration, recommend NPO, family  recommends modified diet.  Continent with some incontinence.    Signed by: Khushbu Grewal RN    Physician CoSigned By: Aj Chandra 08/02/2022 08:56:45

## 2022-08-03 LAB
BASOPHILS # BLD AUTO: 0.04 10*3/MM3 (ref 0–0.2)
BASOPHILS NFR BLD AUTO: 0.9 % (ref 0–1.5)
DEPRECATED RDW RBC AUTO: 47.5 FL (ref 37–54)
EOSINOPHIL # BLD AUTO: 0.14 10*3/MM3 (ref 0–0.4)
EOSINOPHIL NFR BLD AUTO: 3 % (ref 0.3–6.2)
ERYTHROCYTE [DISTWIDTH] IN BLOOD BY AUTOMATED COUNT: 14.7 % (ref 12.3–15.4)
GLUCOSE BLDC GLUCOMTR-MCNC: 125 MG/DL (ref 70–130)
GLUCOSE BLDC GLUCOMTR-MCNC: 187 MG/DL (ref 70–130)
GLUCOSE BLDC GLUCOMTR-MCNC: 225 MG/DL (ref 70–130)
GLUCOSE BLDC GLUCOMTR-MCNC: 245 MG/DL (ref 70–130)
HCT VFR BLD AUTO: 31.4 % (ref 34–46.6)
HGB BLD-MCNC: 9.9 G/DL (ref 12–15.9)
IMM GRANULOCYTES # BLD AUTO: 0.02 10*3/MM3 (ref 0–0.05)
IMM GRANULOCYTES NFR BLD AUTO: 0.4 % (ref 0–0.5)
LYMPHOCYTES # BLD AUTO: 1.73 10*3/MM3 (ref 0.7–3.1)
LYMPHOCYTES NFR BLD AUTO: 37.5 % (ref 19.6–45.3)
MCH RBC QN AUTO: 28.1 PG (ref 26.6–33)
MCHC RBC AUTO-ENTMCNC: 31.5 G/DL (ref 31.5–35.7)
MCV RBC AUTO: 89.2 FL (ref 79–97)
MONOCYTES # BLD AUTO: 0.41 10*3/MM3 (ref 0.1–0.9)
MONOCYTES NFR BLD AUTO: 8.9 % (ref 5–12)
NEUTROPHILS NFR BLD AUTO: 2.27 10*3/MM3 (ref 1.7–7)
NEUTROPHILS NFR BLD AUTO: 49.3 % (ref 42.7–76)
NRBC BLD AUTO-RTO: 0 /100 WBC (ref 0–0.2)
PLATELET # BLD AUTO: 254 10*3/MM3 (ref 140–450)
PMV BLD AUTO: 10.5 FL (ref 6–12)
RBC # BLD AUTO: 3.52 10*6/MM3 (ref 3.77–5.28)
WBC NRBC COR # BLD: 4.61 10*3/MM3 (ref 3.4–10.8)

## 2022-08-03 PROCEDURE — 85025 COMPLETE CBC W/AUTO DIFF WBC: CPT | Performed by: PHYSICAL MEDICINE & REHABILITATION

## 2022-08-03 PROCEDURE — 97110 THERAPEUTIC EXERCISES: CPT

## 2022-08-03 PROCEDURE — 82962 GLUCOSE BLOOD TEST: CPT

## 2022-08-03 PROCEDURE — 97535 SELF CARE MNGMENT TRAINING: CPT

## 2022-08-03 PROCEDURE — 63710000001 INSULIN LISPRO (HUMAN) PER 5 UNITS: Performed by: INTERNAL MEDICINE

## 2022-08-03 PROCEDURE — 92526 ORAL FUNCTION THERAPY: CPT

## 2022-08-03 PROCEDURE — 25010000002 ENOXAPARIN PER 10 MG: Performed by: INTERNAL MEDICINE

## 2022-08-03 RX ADMIN — ENOXAPARIN SODIUM 40 MG: 100 INJECTION SUBCUTANEOUS at 20:14

## 2022-08-03 RX ADMIN — DONEPEZIL HYDROCHLORIDE 10 MG: 10 TABLET, FILM COATED ORAL at 20:14

## 2022-08-03 RX ADMIN — INSULIN LISPRO 3 UNITS: 100 INJECTION, SOLUTION INTRAVENOUS; SUBCUTANEOUS at 11:42

## 2022-08-03 RX ADMIN — PRAVASTATIN SODIUM 40 MG: 40 TABLET ORAL at 16:52

## 2022-08-03 RX ADMIN — INSULIN LISPRO 3 UNITS: 100 INJECTION, SOLUTION INTRAVENOUS; SUBCUTANEOUS at 16:52

## 2022-08-03 RX ADMIN — INSULIN GLARGINE-YFGN 5 UNITS: 100 INJECTION, SOLUTION SUBCUTANEOUS at 22:34

## 2022-08-03 RX ADMIN — AMLODIPINE BESYLATE 2.5 MG: 2.5 TABLET ORAL at 07:45

## 2022-08-03 RX ADMIN — ZONISAMIDE 200 MG: 100 CAPSULE ORAL at 20:14

## 2022-08-03 RX ADMIN — Medication 1 MG: at 20:14

## 2022-08-03 NOTE — PROGRESS NOTES
Inpatient Rehabilitation Plan of Care Note    Plan of Care  Care Plan Reviewed - No updates at this time.    Sphincter Control    Performed Intervention(s)  Encourage appropriate diet  Utilize incontinence products as needed      Safety    Performed Intervention(s)  All items within reach  Hourly rounding  Falls precautions in place      Psychosocial    Performed Intervention(s)  Provide calm environment  Allow pt to express concerns, wants and needs      Pain    Performed Intervention(s)  Offer medication if pain arises      Body Systems    Performed Intervention(s)  Check oxygen q shift  Blood sugar AC and HS    Signed by: Barbara Lei RN

## 2022-08-03 NOTE — THERAPY TREATMENT NOTE
Inpatient Rehabilitation - Occupational Therapy Treatment Note    [facility]     Patient Name: Di Macario  : 1940  MRN: [id number]    Today's Date: 8/3/2022                 Admit Date: 2022         ICD-10-CM ICD-9-CM   1. Impaired functional mobility, balance, gait, and endurance  Z74.09 V49.89       Patient Active Problem List   Diagnosis   • Essential hypertension   • Vitamin D deficiency   • Hyperuricemia   • Slow transit constipation   • Chronic pain of left knee   • At high risk for falls   • Peripheral neuropathy   • Uncontrolled type 2 diabetes mellitus with hyperglycemia (HCC)   • Walker as ambulation aid   • Bradycardia, sinus   • Shortness of breath   • Leg swelling   • Hyperlipidemia   • Presence of cardiac pacemaker   • Dementia without behavioral disturbance ([facility])   • Aspiration pneumonia due to vomitus ([facility])   • Aspiration pneumonia of both lungs due to gastric secretions, unspecified part of lung ([facility])   • Weakness   • Thrombocytopenia ([facility])   • Oropharyngeal dysphagia   • Immobility syndrome       Past Medical History:   Diagnosis Date   • Dementia ([facility])     states better with medication    • Diabetes mellitus ([facility])    • Hyperlipidemia 2021   • Lung consolidation ([facility]) 2022   • Peripheral neuropathy 2018   • Vitamin D deficiency        Past Surgical History:   Procedure Laterality Date   • ANKLE SURGERY Right    • BLADDER SURGERY      Prolapsed   • BREAST BIOPSY     • CARDIAC ELECTROPHYSIOLOGY PROCEDURE N/A 3/5/2021    Procedure: Pacemaker DC new BOSTON;  Surgeon: Madelin Ferguson MD;  Location: [facility] CATH INVASIVE LOCATION;  Service: Cardiology;  Laterality: N/A;   • ENDOSCOPY N/A 2022    Procedure: ESOPHAGOGASTRODUODENOSCOPY with biopsy, balloon dilation;  Surgeon: Lo Benjamin MD;  Location: [facility] ENDOSCOPY;  Service: Gastroenterology;  Laterality: N/A;  esophageal stricture, hiatal hernia, gastritis   • HYSTERECTOMY               IRF OT  ASSESSMENT FLOWSHEET (last 12 hours)     IRF OT Evaluation and Treatment     Row Name 08/03/22 1207          OT Time and Intention    Document Type daily treatment  -DN     Mode of Treatment occupational therapy  -DN     Patient Effort good  -DN     Row Name 08/03/22 1207          Pain Assessment    Pretreatment Pain Rating 0/10 - no pain  -DN     Posttreatment Pain Rating 0/10 - no pain  -DN     Row Name 08/03/22 1207          Cognition/Psychosocial    Affect/Mental Status (Cognition) WFL  -DN     Orientation Status (Cognition) oriented to;person;place;situation  -DN     Follows Commands (Cognition) follows one-step commands;over 90% accuracy  -DN     Personal Safety Interventions fall prevention program maintained;gait belt  -DN     Cognitive Function safety deficit  -DN     Safety Deficit (Cognition) insight into deficits/self-awareness;awareness of need for assistance  -DN     Row Name 08/03/22 1207          Bathing    [place] Level (Bathing) bathing skills;supervision;verbal cues;nonverbal cues (demo/gesture)  -DN     Assistive Device (Bathing) grab bar/tub rail;hand held shower spray hose;tub bench  -DN     Position (Bathing) supported sitting;supported standing  -DN     Set-up Assistance (Bathing) obtain supplies  -DN     Row Name 08/03/22 1207          Upper Body Dressing    [place] Level (Upper Body Dressing) upper body dressing skills;doff;don;bra/undergarment;pull over garment;minimum assist (75% or more patient effort);verbal cues;nonverbal cues (demo/gesture)  -DN     Position (Upper Body Dressing) supported sitting  -DN     Set-up Assistance (Upper Body Dressing) obtain clothing  -DN     Comment (Upper Body Dressing) pt needed assist with bra fasteners today  -DN     Row Name 08/03/22 1207          Lower Body Dressing    [place] Level (Lower Body Dressing) doff;don;pants/bottoms;shoes/slippers;socks;underwear;contact guard assist  -DN     Position (Lower Body Dressing) supported  sitting;supported standing  -DN     Set-up Assistance (Lower Body Dressing) obtain clothing  -DN     Row Name 08/03/22 1207          Grooming    [place] Level (Grooming) grooming skills;hair care, combing/brushing;oral care regimen;supervision  -DN     Position (Grooming) supported sitting  -DN     Set-up Assistance (Grooming) obtain supplies  -DN     Row Name 08/03/22 1207          Transfers    [place] Level (Shower Transfer) contact guard;verbal cues;nonverbal cues (demo/gesture)  -DN     Assistive Device (Shower Transfer) tub bench;grab bar, tub/shower  -DN     Row Name 08/03/22 1207          Shower Transfer    Type (Shower Transfer) stand pivot/stand step  -DN     Row Name 08/03/22 1207          Elbow/Forearm (Therapeutic Exercise)    Elbow/Forearm (Therapeutic Exercise) strengthening exercise  -DN     Elbow/Forearm AROM (Therapeutic Exercise) bilateral;10 repetitions;3 sets  -DN     Elbow/Forearm Strengthening (Therapeutic Exercise) 1 lb free weight  -DN     Row Name 08/03/22 1207          Wrist (Therapeutic Exercise)    Wrist (Therapeutic Exercise) strengthening exercise  -DN     Wrist AROM (Therapeutic Exercise) bilateral;10 repetitions;3 sets  -DN     Wrist Strengthening (Therapeutic Exercise) 1 lb free weight  -DN     Row Name 08/03/22 1207          Hand (Therapeutic Exercise)    Hand (Therapeutic Exercise) strengthening exercise  -DN     Hand AROM/AAROM (Therapeutic Exercise) bilateral;20 repititions  -DN     Hand Strengthening (Therapeutic Exercise) hand gripper  -DN     Row Name 08/03/22 1207          Positioning and Restraints    Pre-Treatment Position sitting in chair/recliner  -DN     Post Treatment Position wheelchair  -DN     In Bed with PT  -DN           User Key  (r) = Recorded By, (t) = Taken By, (c) = Cosigned By    Initials Name Effective Dates    Natan Sunshine OT 06/16/21 -                  Occupational Therapy Education                 Title: PT OT SLP Therapies (In Progress)      Topic: Occupational Therapy (Done)     Point: ADL training (Done)     Description:   Instruct learner(s) on proper safety adaptation and remediation techniques during self care or transfers.   Instruct in proper use of assistive devices.              Learning Progress Summary           Patient Acceptance, E,TB,D, VU,DU by  at 7/29/2022 1533    Comment: ed pt on role of OT , benefit of therapy .POC w. OT ed on safety w. ADL and tsf. pt demo w min A.                   Point: Home exercise program (Done)     Description:   Instruct learner(s) on appropriate technique for monitoring, assisting and/or progressing therapeutic exercises/activities.              Learning Progress Summary           Patient Acceptance, E,TB,D, VU,DU by  at 7/29/2022 1533    Comment: ed pt on role of OT , benefit of therapy .POC w. OT ed on safety w. ADL and tsf. pt demo w min A.                   Point: Precautions (Done)     Description:   Instruct learner(s) on prescribed precautions during self-care and functional transfers.              Learning Progress Summary           Patient Acceptance, E,TB,D, VU,DU by  at 7/29/2022 1533    Comment: ed pt on role of OT , benefit of therapy .POC w. OT ed on safety w. ADL and tsf. pt demo w min A.                   Point: Body mechanics (Done)     Description:   Instruct learner(s) on proper positioning and spine alignment during self-care, functional mobility activities and/or exercises.              Learning Progress Summary           Patient Acceptance, E,TB,D, VU,DU by  at 7/29/2022 1533    Comment: ed pt on role of OT , benefit of therapy .POC w. OT ed on safety w. ADL and tsf. pt demo w min A.                               User Key     Initials Effective Dates Name Provider Type Discipline     06/16/21 -  Adriana Gaxiola OTLEROY Occupational Therapist OT                    OT Recommendation and Plan                         Time Calculation:      Time Calculation- OT     Row Name  08/03/22 1215 08/03/22 0830          Time Calculation- OT    OT Start Time -- 0830  -DN     OT Stop Time --  -DN 0930  -DN     OT Time Calculation (min) -- 60 min  -DN           User Key  (r) = Recorded By, (t) = Taken By, (c) = Cosigned By    Initials Name Provider Type    Natan Sunshine OT Occupational Therapist              Therapy Charges for Today     Code Description Service Date Service Provider Modifiers Qty    9193915 HC OT SELF CARE/MGMT/TRAIN EA 15 MIN 8/2/2022 Natan Hoover, OT GO 2    25423999453 HC OT THER PROC EA 15 MIN 8/2/2022 Natan Hoover, OT GO 2    06030404102 HC OT SELF CARE/MGMT/TRAIN EA 15 MIN 8/3/2022 Natan Hoover, OT GO 3    06412425235 HC OT THER PROC EA 15 MIN 8/3/2022 Natan Hoover, OT GO 1                   Natan Hoover OT  8/3/2022

## 2022-08-03 NOTE — THERAPY TREATMENT NOTE
Inpatient Rehabilitation - Speech Language Pathology Treatment Note    [facility]     Patient Name: Di Macario  : 1940  MRN: [id number]    Today's Date: 8/3/2022                   Admit Date: 2022       Visit Dx:      ICD-10-CM ICD-9-CM   1. Impaired functional mobility, balance, gait, and endurance  Z74.09 V49.89       Patient Active Problem List   Diagnosis   • Essential hypertension   • Vitamin D deficiency   • Hyperuricemia   • Slow transit constipation   • Chronic pain of left knee   • At high risk for falls   • Peripheral neuropathy   • Uncontrolled type 2 diabetes mellitus with hyperglycemia (HCC)   • Walker as ambulation aid   • Bradycardia, sinus   • Shortness of breath   • Leg swelling   • Hyperlipidemia   • Presence of cardiac pacemaker   • Dementia without behavioral disturbance ([facility])   • Aspiration pneumonia due to vomitus ([facility])   • Aspiration pneumonia of both lungs due to gastric secretions, unspecified part of lung ([facility])   • Weakness   • Thrombocytopenia ([facility])   • Oropharyngeal dysphagia   • Immobility syndrome       Past Medical History:   Diagnosis Date   • Dementia ([facility])     states better with medication    • Diabetes mellitus (HCC)    • Hyperlipidemia 2021   • Lung consolidation ([facility]) 2022   • Peripheral neuropathy 2018   • Vitamin D deficiency        Past Surgical History:   Procedure Laterality Date   • ANKLE SURGERY Right    • BLADDER SURGERY      Prolapsed   • BREAST BIOPSY     • CARDIAC ELECTROPHYSIOLOGY PROCEDURE N/A 3/5/2021    Procedure: Pacemaker DC new BOSTON;  Surgeon: Madelin Ferguson MD;  Location: [facility] CATH INVASIVE LOCATION;  Service: Cardiology;  Laterality: N/A;   • ENDOSCOPY N/A 2022    Procedure: ESOPHAGOGASTRODUODENOSCOPY with biopsy, balloon dilation;  Surgeon: Lo Benjamin MD;  Location: [facility] ENDOSCOPY;  Service: Gastroenterology;  Laterality: N/A;  esophageal stricture, hiatal hernia, gastritis   • HYSTERECTOMY          SLP Recommendation and Plan                                                   SLP EVALUATION (last 72 hours)     SLP SLC Evaluation     Row Name 08/03/22 1215 08/01/22 1542                Communication Assessment/Intervention    Document Type therapy note (daily note)  -SL therapy note (daily note)  -TH       Subjective Information no complaints  -SL no complaints  -TH       Patient Observations alert;cooperative;agree to therapy  -SL alert;cooperative;agree to therapy  -TH       Patient Effort good  -SL good  -TH       Symptoms Noted During/After Treatment none  -SL --             User Key  (r) = Recorded By, (t) = Taken By, (c) = Cosigned By    Initials Name Effective Dates    Shani Jimenez MS CCC-SLP 06/16/21 -     TH Natasha Lazo 06/16/21 -                    EDUCATION    The patient has been educated in the following areas:       Dysphagia (Swallowing Impairment).             SLP GOALS     Row Name 08/03/22 1439 08/02/22 1100 08/01/22 1500       (STG) Pharyngeal Strengthening Exercise Goal 1 (SLP)    Increase Superior Movement of the Hyolaryngeal Complex -- shaker;hard effortful swallow  -CB chin tuck against resistance (CTAR);hard effortful swallow  -TH    Increase Anterior Movement of the Hyolaryngeal Complex -- shaker;hard effortful swallow  -CB hard effortful swallow;beulah;effortful pitch glide (falsetto + pharyngeal squeeze);chin tuck against resistance (CTAR)  -TH    Increase Epiglottic Inversion and Retroflexion -- shaker;hard effortful swallow  -CB hard effortful swallow;effortful pitch glide (falsetto + pharyngeal squeeze);beulah  -TH    Increase Squeeze/Positive Pressure Generation -- shaker;hard effortful swallow  -CB hard effortful swallow;effortful pitch glide (falsetto + pharyngeal squeeze);chin tuck against resistance (CTAR);beulah  -TH    Increase Tongue Base Retraction -- shaker;hard effortful swallow  -CB beulah  glottal sounds  -TH    Increase PES Opening -- shaker;hard effortful  swallow  -CB --    Haines/Accuracy (Pharyngeal Strengthening Goal 1, SLP) -- with moderate cues (50-74% accuracy)  -CB with minimal cues (75-90% accuracy)  -TH    Time Frame (Pharyngeal Strengthening Goal 1, SLP) -- by discharge  -CB by discharge  -TH    Progress/Outcomes (Pharyngeal Strengthening Goal 1, SLP) goal ongoing  -SL -- --    Comment (Pharyngeal Strengthening Goal 1, SLP) pt instructed on and completed the following strengthening tasks: falsetto, high/low pitch change, scale, hard posterior phoneme productions, gargle x10 each; beulah x10 ; effortful swallows x25; CTAR - repetitive x10 and sustained x5 for 10 sec hold  -SL -- --       (STG) Swallow Management Recall Goal 1 (SLP)    Progress/Outcomes (Swallow Management Recall Goal 1, SLP) goal ongoing  -SL -- --    Comment (Swallow Management Recall Goal 1, SLP) pt has multiple signs posted in room and on lunch table re: swallow strategies- she was noted to spont refer to them periodically throughout lunch meal- pt indep with use of slow rate, small bite/sips sizes, alternating liq/solid, double swallows- she did require periodice cuing for use of throat clear /reswallow - no overt clinical s/s aspiraiton noted during meal although voice at baseline is raspy and slightly wet at times so difficult to differentiate- of note, VFSS revealed SILENT aspiration  -SL -- --       (STG) Swallow Compensatory Strategies Goal 1 (SLP)    [place]aines/Accuracy (Swallow Compensatory Strategies/Techniques Goal 1, SLP) -- -- with moderate cues (50-74% accuracy)  Patient seen during meal time on this date and initially not aware of swallow strategies she needed to complete during meal times, however, after review cueing faded to min cues. Provided handout in room with swallow strategies as well.  -TH    Time Frame (Swallow Compensatory Strategies/Techniques Goal 1, SLP) -- -- by discharge  -TH       Follow Directions Goal 2 (SLP)    Progress (Ability to Follow  Directions Goal 1, SLP) -- 60%;independently (over 90% accuracy)  -CB --    Progress/Outcomes (Follow Directions Goal 1, SLP) -- good progress toward goal  -CB --    Comment (Follow Directions Goal 1, SLP) -- working memory for new task  -CB --       Memory Skills Goal 1 (SLP)    Progress (Memory Skills Goal 1, SLP) -- 60%;with minimal cues (75-90%)  -CB --    Progress/Outcomes (Memory Skills Goal 1, SLP) -- goal ongoing  -CB --    Comment (Memory Skills Goal 1, SLP) -- working memory for new task  -CB --          User Key  (r) = Recorded By, (t) = Taken By, (c) = Cosigned By    Initials Name Provider Type    Shani Jimenez MS CCC-SLP Speech and Language Pathologist     Natasha Lazo Speech and Language Pathologist    Mckenzie Beth SLP Speech and Language Pathologist                            Time Calculation:        Time Calculation- SLP     Row Name 08/03/22 1451 08/03/22 1449          Time Calculation- SLP    SLP Start Time 1300  -SL 1130  -SL     SLP Stop Time 1330  -SL 1200  -SL     SLP Time Calculation (min) 30 min  -SL 30 min  -SL           User Key  (r) = Recorded By, (t) = Taken By, (c) = Cosigned By    Initials Name Provider Type    Shani Jimenez MS CCC-SLP Speech and Language Pathologist                  Therapy Charges for Today     Code Description Service Date Service Provider Modifiers Qty    [id number]  ST TREATMENT SWALLOW 4 8/3/2022 Shani Cuba MS CCC-SLP GN 1                           MS THA Casas  8/3/2022

## 2022-08-03 NOTE — PROGRESS NOTES
Chief Complaint:   Immobility syndrome/debility  Impaired cognition/impaired mobility/impaired self-care  ID status post aspiration pneumonia-Zosyn completes on July 29  Severe dysphagia-n.p.o. recommended on videofluoroscopic swallow study but patient family wish for avoiding PEG tube and for comfort diet-purée/nectar thick liquid  Hypertension-amlodipine  DVT prophylaxis-Lovenox/SCDs  Cognition-dementia-donepezil  Seizure disorder-Zonegran  Diabetes mellitus type 2-on Trulicity and Lantus low-dose at home     History of Present Illness  82 y.o. female with PMH of Esophageal Stricture, gastritis, oropharyngeal dysphagia, dementia, DM2, HTN, symptomatic Bradycardia s/p PPM, and Seizure D/O who admitted to [facility] on 7/24/2022 due to Aspiration Pneumonia.  Pt. was previously admitted to [facility] 6 weeks ago with aspiration pneumonia and  readmitted with shortness of breath and cough since a witnessed aspiration event 7-23-22.    Subjective:     Patient reports that she is tolerating activities.  She feels comfortable with breathing.  She feels she is tolerating the p.o. intake.  No new weakness.    Physical Exam:  MENTAL STATUS -  AWAKE / ALERT  HEENT- NCAT, SCLERAE ANICTERIC, CONJUNCTIVAE PINK, OP MOIST, NO JVD, EARS UNREMARKABLE EXTERNALLY  LUNGS - CTA, NO WHEEZES, RALES OR RHONCHI  HEART- RRR, NO RUB, MURMUR, OR GALLOP  ABD - NORMOACTIVE BOWEL SOUNDS, SOFT, NT. NO HEPATOSPLENOMEGALY APPRECIATED  EXT - NO EDEMA OR CYANOSIS  NEURO -awake alert.  Speech fluent.  Oriented x3.  MOTOR EXAM - RUE/RLE 5/5. LUE/LLE 5/5  Decreased active range of motion at the right shoulder to about 80 degrees of flexion.    Assessment and Plan:   # Functional deficits and Generalized Weakness related to multiple medical comorbities - Admit to inpt rehab    #1.  Aspiration Pneumonia of both lungs  2/2 gastric secretions  -Recommendation for n.p.o.  Per patient family request comfort diet with pureed diet per ST  - On  Zosyn-completes July 29.     #2. Dysphagia  - pt. Noncompliant with home diet-patient and family do not wish to pursue PEG tube.  Comfort diet per patient/family request.     #3.  Leukocytosis - resolved     #4. Dementia with behavioral disturbance  - mild  - on medication     #5.  Uncontrolled DM type 2 w/ Hyperglycemia  #6.  Diabetic Peripheral polyneuropathy  - Hgb A1C 8.4  - follows with an outpatient endocrinologist  -was on Lantus 5 units nightly at home.    August 2-blood sugars elevated-resume home Lantus dose 5 units daily    #7.  Essential HTN  - amlodipine restarted     #8.  Thrombocytopenia - mild  - trending labs     #9.  BLE edema  - elevate and monitor            TEAM CONF - AUGUST 2 - TRANSFERS MIN. CAR TRANSFERS MIN 4 STAIRS MIN. GAIT 160 FEET RW CTG. TOILET TRANSFERS CTG RW. SHOWER TRANSFERS MIN .  BATH MIN. LBD CTG. UBD SBA. EATING SET UP. GROOMING SBA. TOILETING MIN. CHRONIC DYSPHAGIA - COMFORT FEEDS AS NOT WANT PEG TUBE/ NPO STATUS - PUREE/NTL. PHARYNGEAL DYSPHAGIA WITH SILENT ASPIRATION. IMPAIRED COGNITION/ IMPAIRED RECALL BUT DID RECALL SWALLOW STRATEGIES. SKIN INTACT. MOSTLY CONTINENT BOWEL . INCONTINENT BLADDER BUT WEARS BRIEF AND ZANDER-PAD - USED AT HOME.. ON O2 1 LITER AT NIGHT.   ELOS -  END OF NEXT WEEK WITH 24 HOUR ASSIST     Now admit for comprehensive acute inpatient rehabilitation .  This would be an interdisciplinary program with physical therapy 1 hour,  occupational therapy 1 hour, and speech therapy 1 hour, 5 days a week.  Rehabilitation nursing for carryover, monitoring of pulmonary and neurologic   status, bowel and bladder, and skin  Ongoing physician follow-up.  Weekly team conferences.  Goals are to achieve a level of supervision with  mobility and self-care and improved swallow.   Rehabilitation prognosis fair.  Medical prognosis fair.  Estimated length of stay is approximately 2 weeks, but is only an estimation.      The patient's functional status and clinical status is  unchanged from preadmission assessment and the patient continues appropriate for acute inpatient rehabilitation.  Goal is for home with home health   therapies.  Barrier to discharge: Impaired swallow mobility self-care- work on dysphagia exercises, transfers, balance, gait, ADLs to overcome.             Aj Chandra MD

## 2022-08-03 NOTE — PROGRESS NOTES
Received call back from aleja Carley, regarding family conference. Scheduled family conference for Monday, 8/8 at 2:00 pm.

## 2022-08-03 NOTE — THERAPY TREATMENT NOTE
Inpatient Rehabilitation - Physical Therapy Treatment Note       [facility]     Patient Name: Di Macario  : 1940  MRN: [id number]    Today's Date: 8/3/2022                    Admit Date: 2022      Visit Dx:     ICD-10-CM ICD-9-CM   1. Impaired functional mobility, balance, gait, and endurance  Z74.09 V49.89       Patient Active Problem List   Diagnosis   • Essential hypertension   • Vitamin D deficiency   • Hyperuricemia   • Slow transit constipation   • Chronic pain of left knee   • At high risk for falls   • Peripheral neuropathy   • Uncontrolled type 2 diabetes mellitus with hyperglycemia (HCC)   • Walker as ambulation aid   • Bradycardia, sinus   • Shortness of breath   • Leg swelling   • Hyperlipidemia   • Presence of cardiac pacemaker   • Dementia without behavioral disturbance (HCC)   • Aspiration pneumonia due to vomitus ([facility])   • Aspiration pneumonia of both lungs due to gastric secretions, unspecified part of lung (HCC)   • Weakness   • Thrombocytopenia ([facility])   • Oropharyngeal dysphagia   • Immobility syndrome       Past Medical History:   Diagnosis Date   • Dementia ([facility])     states better with medication    • Diabetes mellitus (HCC)    • Hyperlipidemia 2021   • Lung consolidation ([facility]) 2022   • Peripheral neuropathy 2018   • Vitamin D deficiency        Past Surgical History:   Procedure Laterality Date   • ANKLE SURGERY Right    • BLADDER SURGERY      Prolapsed   • BREAST BIOPSY     • CARDIAC ELECTROPHYSIOLOGY PROCEDURE N/A 3/5/2021    Procedure: Pacemaker DC new BOSTON;  Surgeon: Madelin Ferguson MD;  Location: [facility] CATH INVASIVE LOCATION;  Service: Cardiology;  Laterality: N/A;   • ENDOSCOPY N/A 2022    Procedure: ESOPHAGOGASTRODUODENOSCOPY with biopsy, balloon dilation;  Surgeon: Lo Benjamin MD;  Location: [facility] ENDOSCOPY;  Service: Gastroenterology;  Laterality: N/A;  esophageal stricture, hiatal hernia, gastritis   • HYSTERECTOMY          PT ASSESSMENT (last 12 hours)     IRF PT Evaluation and Treatment     Row Name 08/03/22 0937          PT Time and Intention    Document Type daily treatment  -LB     Mode of Treatment physical therapy  -LB     Patient/Family/Caregiver Comments/Observations Seated in w/c. NAD  -LB     Row Name 08/03/22 0937          General Information    Existing Precautions/Restrictions fall  -LB     Row Name 08/03/22 0937          Pain Assessment    Pretreatment Pain Rating 0/10 - no pain  -LB     Row Name 08/03/22 0937          Transfers    Sit-Stand [place] (Transfers) contact guard  -LB     Stand-Sit [place] (Transfers) contact guard;verbal cues  -LB     Row Name 08/03/22 0937          Sit-Stand Transfer    Assistive Device (Sit-Stand Transfers) walker, front-wheeled;wheelchair  -LB     Row Name 08/03/22 0937          Stand-Sit Transfer    Assistive Device (Stand-Sit Transfers) walker, front-wheeled;wheelchair  -LB     Row Name 08/03/22 0937          Gait/Stairs (Locomotion)    [place] Level (Gait) contact guard;standby assist  -LB     Assistive Device (Gait) walker, front-wheeled  -LB     Distance in Feet (Gait) 180, 260  -LB     Pattern (Gait) step-through  -LB     Deviations/Abnormal Patterns (Gait) base of support, narrow;gait speed decreased;stride length decreased  -LB     Bilateral Gait Deviations forward flexed posture;heel strike decreased  -LB     Left Sided Gait Deviations heel strike decreased  -LB     Right Sided Gait Deviations heel strike decreased  -LB     [place] Level (Stairs) minimum assist (75% patient effort)  -LB     Handrail Location (Stairs) both sides  -LB     Number of Steps (Stairs) 4  -LB     Ascending Technique (Stairs) step-to-step  -LB     Descending Technique (Stairs) step-to-step  -LB     Row Name 08/03/22 0937          Curb Negotiation (Mobility)    [place], Curb Negotiation contact guard;verbal cues  -LB     Assistive Device (Curb Negotiation) walker,  "front-wheeled  -LB     Comment, Curb Negotiation (Mobility) 6\" curb  -LB     Row Name 08/03/22 0937          Balance    Comment, Balance figure 8's w Rwx w CGA  -LB     Row Name 08/03/22 0937          Positioning and Restraints    Post Treatment Position chair  -LB     In Chair sitting;call light within reach;exit alarm on  -LB     Row Name 08/03/22 0937          Vital Signs    Intra SpO2 (%) 98  -LB     O2 Delivery Intra Treatment room air  -LB           User Key  (r) = Recorded By, (t) = Taken By, (c) = Cosigned By    Initials Name Provider Type    LB Stacey Gonzalez PTA Physical Therapist Assistant                 Physical Therapy Education                 Title: PT OT SLP Therapies (In Progress)     Topic: Physical Therapy (In Progress)     Point: Mobility training (In Progress)     Learning Progress Summary           Patient Acceptance, E, NR by  at 8/3/2022 1139    Comment: stairs    Acceptance, E, NR by  at 8/2/2022 1413    Comment: stairs    Acceptance, E, NR by  at 8/1/2022 1414    Comment: car, stairs    Acceptance, E,D, NR by  at 7/30/2022 1224    Acceptance, E, VU,NR by  at 7/29/2022 1448                   Point: Home exercise program (In Progress)     Learning Progress Summary           Patient Acceptance, E,D, NR by  at 7/30/2022 1224    Acceptance, E, VU,NR by  at 7/29/2022 1448                   Point: Body mechanics (Done)     Learning Progress Summary           Patient Acceptance, E, VU,NR by  at 7/29/2022 1448                   Point: Precautions (Done)     Learning Progress Summary           Patient Acceptance, E, VU,NR by  at 7/29/2022 1448                               User Key     Initials Effective Dates Name Provider Type Discipline     03/07/18 -  Stacey Gonzalez PTA Physical Therapist Assistant PT     06/16/21 -  Florinda Mckeon, PT Physical Therapist PT     06/06/22 -  Ronny Winters, PT Student PT Student PT                PT Recommendation and " Plan  Patient was wearing a face mask during this therapy encounter. Therapist used appropriate personal protective equipment including mask and gloves.  Mask used was standard procedure mask. Appropriate PPE was worn during the entire therapy session. Hand hygiene was completed before and after therapy session. Patient is not in enhanced droplet precautions.                           Time Calculation:      PT Charges     Row Name 08/03/22 1407 08/03/22 0936          Time Calculation    Start Time 1330  -LB 0930  -LB     Stop Time 1400  -LB 1000  -LB     Time Calculation (min) 30 min  -LB 30 min  -LB           User Key  (r) = Recorded By, (t) = Taken By, (c) = Cosigned By    Initials Name Provider Type    Stacey Leblanc PTA Physical Therapist Assistant                Therapy Charges for Today     Code Description Service Date Service Provider Modifiers Qty    78419242364  PT THER PROC EA 15 MIN 8/2/2022 Stacey Gonzalez PTA GP 4    [id number]  PT THER PROC EA 15 MIN 8/3/2022 Stacey Gonzalez PTA GP 4                   Stacey Gonzalez PTA  8/3/2022

## 2022-08-03 NOTE — PLAN OF CARE
Goal Outcome Evaluation:      Pt is A/Ox4, calm/cooperative, OOB x 1 w CGA w rwx. No c/o pain. Meds taken crushed in puree following swallowing precautions. Pt has been continent of urine using restroom but has some urgency. O2 at 1L via nasal cannula worn during sleep.

## 2022-08-03 NOTE — THERAPY TREATMENT NOTE
Inpatient Rehabilitation - Occupational Therapy Treatment Note    [facility]     Patient Name: Di Macario  : 1940  MRN: [id number]    Today's Date: 8/3/2022                 Admit Date: 2022         ICD-10-CM ICD-9-CM   1. Impaired functional mobility, balance, gait, and endurance  Z74.09 V49.89       Patient Active Problem List   Diagnosis   • Essential hypertension   • Vitamin D deficiency   • Hyperuricemia   • Slow transit constipation   • Chronic pain of left knee   • At high risk for falls   • Peripheral neuropathy   • Uncontrolled type 2 diabetes mellitus with hyperglycemia (HCC)   • Walker as ambulation aid   • Bradycardia, sinus   • Shortness of breath   • Leg swelling   • Hyperlipidemia   • Presence of cardiac pacemaker   • Dementia without behavioral disturbance ([facility])   • Aspiration pneumonia due to vomitus ([facility])   • Aspiration pneumonia of both lungs due to gastric secretions, unspecified part of lung ([facility])   • Weakness   • Thrombocytopenia ([facility])   • Oropharyngeal dysphagia   • Immobility syndrome       Past Medical History:   Diagnosis Date   • Dementia ([facility])     states better with medication    • Diabetes mellitus ([facility])    • Hyperlipidemia 2021   • Lung consolidation ([facility]) 2022   • Peripheral neuropathy 2018   • Vitamin D deficiency        Past Surgical History:   Procedure Laterality Date   • ANKLE SURGERY Right    • BLADDER SURGERY      Prolapsed   • BREAST BIOPSY     • CARDIAC ELECTROPHYSIOLOGY PROCEDURE N/A 3/5/2021    Procedure: Pacemaker DC new BOSTON;  Surgeon: Madelin Ferguson MD;  Location: [facility] CATH INVASIVE LOCATION;  Service: Cardiology;  Laterality: N/A;   • ENDOSCOPY N/A 2022    Procedure: ESOPHAGOGASTRODUODENOSCOPY with biopsy, balloon dilation;  Surgeon: Lo Benjamin MD;  Location: [facility] ENDOSCOPY;  Service: Gastroenterology;  Laterality: N/A;  esophageal stricture, hiatal hernia, gastritis   • HYSTERECTOMY               IRF OT  ASSESSMENT FLOWSHEET (last 12 hours)     IRF OT Evaluation and Treatment     Row Name 08/03/22 1207          OT Time and Intention    Document Type daily treatment  -DN     Mode of Treatment occupational therapy  -DN     Patient Effort good  -DN     Row Name 08/03/22 1207          Pain Assessment    Pretreatment Pain Rating 0/10 - no pain  -DN     Posttreatment Pain Rating 0/10 - no pain  -DN     Row Name 08/03/22 1207          Cognition/Psychosocial    Affect/Mental Status (Cognition) WFL  -DN     Orientation Status (Cognition) oriented to;person;place;situation  -DN     Follows Commands (Cognition) follows one-step commands;over 90% accuracy  -DN     Personal Safety Interventions fall prevention program maintained;gait belt  -DN     Cognitive Function safety deficit  -DN     Safety Deficit (Cognition) insight into deficits/self-awareness;awareness of need for assistance  -DN     Row Name 08/03/22 1207          Bathing    [place] Level (Bathing) bathing skills;supervision;verbal cues;nonverbal cues (demo/gesture)  -DN     Assistive Device (Bathing) grab bar/tub rail;hand held shower spray hose;tub bench  -DN     Position (Bathing) supported sitting;supported standing  -DN     Set-up Assistance (Bathing) obtain supplies  -DN     Row Name 08/03/22 1207          Upper Body Dressing    [place] Level (Upper Body Dressing) upper body dressing skills;doff;don;bra/undergarment;pull over garment;minimum assist (75% or more patient effort);verbal cues;nonverbal cues (demo/gesture)  -DN     Position (Upper Body Dressing) supported sitting  -DN     Set-up Assistance (Upper Body Dressing) obtain clothing  -DN     Comment (Upper Body Dressing) pt needed assist with bra fasteners today  -DN     Row Name 08/03/22 1207          Lower Body Dressing    [place] Level (Lower Body Dressing) doff;don;pants/bottoms;shoes/slippers;socks;underwear;contact guard assist  -DN     Position (Lower Body Dressing) supported  sitting;supported standing  -DN     Set-up Assistance (Lower Body Dressing) obtain clothing  -DN     Row Name 08/03/22 1207          Grooming    [place] Level (Grooming) grooming skills;hair care, combing/brushing;oral care regimen;supervision  -DN     Position (Grooming) supported sitting  -DN     Set-up Assistance (Grooming) obtain supplies  -DN     Row Name 08/03/22 1207          Transfers    [place] Level (Shower Transfer) contact guard;verbal cues;nonverbal cues (demo/gesture)  -DN     Assistive Device (Shower Transfer) tub bench;grab bar, tub/shower  -DN     Row Name 08/03/22 1207          Shower Transfer    Type (Shower Transfer) stand pivot/stand step  -DN     Row Name 08/03/22 1207          Elbow/Forearm (Therapeutic Exercise)    Elbow/Forearm (Therapeutic Exercise) strengthening exercise  -DN     Elbow/Forearm AROM (Therapeutic Exercise) bilateral;10 repetitions;3 sets  -DN     Elbow/Forearm Strengthening (Therapeutic Exercise) 1 lb free weight  -DN     Row Name 08/03/22 1207          Wrist (Therapeutic Exercise)    Wrist (Therapeutic Exercise) strengthening exercise  -DN     Wrist AROM (Therapeutic Exercise) bilateral;10 repetitions;3 sets  -DN     Wrist Strengthening (Therapeutic Exercise) 1 lb free weight  -DN     Row Name 08/03/22 1207          Hand (Therapeutic Exercise)    Hand (Therapeutic Exercise) strengthening exercise  -DN     Hand AROM/AAROM (Therapeutic Exercise) bilateral;20 repititions  -DN     Hand Strengthening (Therapeutic Exercise) hand gripper  -DN     Row Name 08/03/22 1207          Positioning and Restraints    Pre-Treatment Position sitting in chair/recliner  -DN     Post Treatment Position wheelchair  -DN     In Bed with PT  -DN           User Key  (r) = Recorded By, (t) = Taken By, (c) = Cosigned By    Initials Name Effective Dates    Natan Sunshine OT 06/16/21 -                  Occupational Therapy Education                 Title: PT OT SLP Therapies (In Progress)      Topic: Occupational Therapy (Done)     Point: ADL training (Done)     Description:   Instruct learner(s) on proper safety adaptation and remediation techniques during self care or transfers.   Instruct in proper use of assistive devices.              Learning Progress Summary           Patient Acceptance, E,TB,D, VU,DU by  at 7/29/2022 1533    Comment: ed pt on role of OT , benefit of therapy .POC w. OT ed on safety w. ADL and tsf. pt demo w min A.                   Point: Home exercise program (Done)     Description:   Instruct learner(s) on appropriate technique for monitoring, assisting and/or progressing therapeutic exercises/activities.              Learning Progress Summary           Patient Acceptance, E,TB,D, VU,DU by  at 7/29/2022 1533    Comment: ed pt on role of OT , benefit of therapy .POC w. OT ed on safety w. ADL and tsf. pt demo w min A.                   Point: Precautions (Done)     Description:   Instruct learner(s) on prescribed precautions during self-care and functional transfers.              Learning Progress Summary           Patient Acceptance, E,TB,D, VU,DU by  at 7/29/2022 1533    Comment: ed pt on role of OT , benefit of therapy .POC w. OT ed on safety w. ADL and tsf. pt demo w min A.                   Point: Body mechanics (Done)     Description:   Instruct learner(s) on proper positioning and spine alignment during self-care, functional mobility activities and/or exercises.              Learning Progress Summary           Patient Acceptance, E,TB,D, VU,DU by  at 7/29/2022 1533    Comment: ed pt on role of OT , benefit of therapy .POC w. OT ed on safety w. ADL and tsf. pt demo w min A.                               User Key     Initials Effective Dates Name Provider Type Discipline     06/16/21 -  Adriana Gaxiola OTLEROY Occupational Therapist OT                    OT Recommendation and Plan                         Time Calculation:      Time Calculation- OT     Row Name  08/03/22 0830             Time Calculation- OT    OT Start Time 0830  -DN      OT Stop Time 0900  -DN      OT Time Calculation (min) 30 min  -DN            User Key  (r) = Recorded By, (t) = Taken By, (c) = Cosigned By    Initials Name Provider Type    Natan Sunshine OT Occupational Therapist              Therapy Charges for Today     Code Description Service Date Service Provider Modifiers Qty    90212083598 HC OT SELF CARE/MGMT/TRAIN EA 15 MIN 8/2/2022 Natan Hoover, OT GO 2    28545652502 HC OT THER PROC EA 15 MIN 8/2/2022 Natan Hoover OT GO 2    85462352616 HC OT SELF CARE/MGMT/TRAIN EA 15 MIN 8/3/2022 Natan Hoover OT GO 2                   Natan Hoover OT  8/3/2022

## 2022-08-04 LAB
GLUCOSE BLDC GLUCOMTR-MCNC: 114 MG/DL (ref 70–130)
GLUCOSE BLDC GLUCOMTR-MCNC: 196 MG/DL (ref 70–130)
GLUCOSE BLDC GLUCOMTR-MCNC: 248 MG/DL (ref 70–130)
GLUCOSE BLDC GLUCOMTR-MCNC: 256 MG/DL (ref 70–130)

## 2022-08-04 PROCEDURE — 25010000002 ENOXAPARIN PER 10 MG: Performed by: INTERNAL MEDICINE

## 2022-08-04 PROCEDURE — 97110 THERAPEUTIC EXERCISES: CPT

## 2022-08-04 PROCEDURE — 97535 SELF CARE MNGMENT TRAINING: CPT

## 2022-08-04 PROCEDURE — 82962 GLUCOSE BLOOD TEST: CPT

## 2022-08-04 PROCEDURE — 92526 ORAL FUNCTION THERAPY: CPT

## 2022-08-04 PROCEDURE — 63710000001 INSULIN LISPRO (HUMAN) PER 5 UNITS: Performed by: INTERNAL MEDICINE

## 2022-08-04 RX ORDER — INSULIN LISPRO 100 [IU]/ML
2 INJECTION, SOLUTION INTRAVENOUS; SUBCUTANEOUS
Status: COMPLETED | OUTPATIENT
Start: 2022-08-05 | End: 2022-08-08

## 2022-08-04 RX ADMIN — INSULIN LISPRO 4 UNITS: 100 INJECTION, SOLUTION INTRAVENOUS; SUBCUTANEOUS at 11:54

## 2022-08-04 RX ADMIN — PRAVASTATIN SODIUM 40 MG: 40 TABLET ORAL at 16:57

## 2022-08-04 RX ADMIN — ENOXAPARIN SODIUM 40 MG: 100 INJECTION SUBCUTANEOUS at 20:50

## 2022-08-04 RX ADMIN — Medication 1 MG: at 20:46

## 2022-08-04 RX ADMIN — INSULIN GLARGINE-YFGN 5 UNITS: 100 INJECTION, SOLUTION SUBCUTANEOUS at 20:46

## 2022-08-04 RX ADMIN — DONEPEZIL HYDROCHLORIDE 10 MG: 10 TABLET, FILM COATED ORAL at 20:51

## 2022-08-04 RX ADMIN — AMLODIPINE BESYLATE 2.5 MG: 2.5 TABLET ORAL at 09:01

## 2022-08-04 RX ADMIN — ZONISAMIDE 200 MG: 100 CAPSULE ORAL at 20:52

## 2022-08-04 RX ADMIN — INSULIN LISPRO 3 UNITS: 100 INJECTION, SOLUTION INTRAVENOUS; SUBCUTANEOUS at 16:56

## 2022-08-04 NOTE — PLAN OF CARE
Problem: Rehabilitation (IRF) Plan of Care  Goal: Plan of Care Review  Outcome: Ongoing, Progressing  Flowsheets (Taken 8/4/2022 6927)  Progress: improving  Plan of Care Reviewed With: patient  Outcome Evaluation: Oral are performed x3 this shift. No pain. Walks with a walker. Swallow precautions followed.   Goal Outcome Evaluation:  Plan of Care Reviewed With: patient        Progress: improving  Outcome Evaluation: Oral care performed x3 this shift. No pain. Walks with a walker. Swallow precautions followed.

## 2022-08-04 NOTE — PROGRESS NOTES
Chief Complaint:   Immobility syndrome/debility  Impaired cognition/impaired mobility/impaired self-care  ID status post aspiration pneumonia-Zosyn completes on July 29  Severe dysphagia-n.p.o. recommended on videofluoroscopic swallow study but patient family wish for avoiding PEG tube and for comfort diet-purée/nectar thick liquid  Hypertension-amlodipine  DVT prophylaxis-Lovenox/SCDs  Cognition-dementia-donepezil  Seizure disorder-Zonegran  Diabetes mellitus type 2-on Trulicity and Lantus low-dose at home     History of Present Illness  82 y.o. female with PMH of Esophageal Stricture, gastritis, oropharyngeal dysphagia, dementia, DM2, HTN, symptomatic Bradycardia s/p PPM, and Seizure D/O who admitted to [facility] on 7/24/2022 due to Aspiration Pneumonia.  Pt. was previously admitted to [facility] 6 weeks ago with aspiration pneumonia and  readmitted with shortness of breath and cough since a witnessed aspiration event 7-23-22.    Subjective:     Patient indicates she is tolerating p.o. diet and therapies.  No complaints of any shortness of air.    Physical Exam:  MENTAL STATUS -  AWAKE / ALERT  HEENT- NCAT, SCLERAE ANICTERIC, CONJUNCTIVAE PINK, OP MOIST, NO JVD, EARS UNREMARKABLE EXTERNALLY  LUNGS - CTA, NO WHEEZES, RALES OR RHONCHI  HEART- RRR, NO RUB, MURMUR, OR GALLOP  ABD - NORMOACTIVE BOWEL SOUNDS, SOFT, NT. NO HEPATOSPLENOMEGALY APPRECIATED  EXT - NO EDEMA OR CYANOSIS  NEURO -awake alert.  Speech fluent.  Oriented x3.  MOTOR EXAM - RUE/RLE 5/5. LUE/LLE 5/5      Assessment and Plan:   # Functional deficits and Generalized Weakness related to multiple medical comorbities - Admit to inpt rehab    #1.  Aspiration Pneumonia of both lungs  2/2 gastric secretions  -Recommendation for n.p.o.  Per patient family request comfort diet with pureed diet per ST  - On Zosyn-completes July 29.     #2. Dysphagia  - pt. Noncompliant with home diet-patient and family do not wish to pursue PEG tube.  Comfort diet per  patient/family request.     #3.  Leukocytosis - resolved     #4. Dementia with behavioral disturbance  - mild  - on medication     #5.  Uncontrolled DM type 2 w/ Hyperglycemia  #6.  Diabetic Peripheral polyneuropathy  - Hgb A1C 8.4  - follows with an outpatient endocrinologist  -was on Lantus 5 units nightly at home.    August 2-blood sugars elevated-resume home Lantus dose 5 units daily  August 4-blood glucose is elevated at lunch and suppertime.  - will add low-dose lispro 2 units with breakfast and lunch and asked that she have Trulicity once weekly brought in from home.    #7.  Essential HTN  - amlodipine restarted     #8.  Thrombocytopenia - mild  - trending labs     #9.  BLE edema  - elevate and monitor            TEAM CONF - AUGUST 2 - TRANSFERS MIN. CAR TRANSFERS MIN 4 STAIRS MIN. GAIT 160 FEET RW CTG. TOILET TRANSFERS CTG RW. SHOWER TRANSFERS MIN .  BATH MIN. LBD CTG. UBD SBA. EATING SET UP. GROOMING SBA. TOILETING MIN. CHRONIC DYSPHAGIA - COMFORT FEEDS AS NOT WANT PEG TUBE/ NPO STATUS - PUREE/NTL. PHARYNGEAL DYSPHAGIA WITH SILENT ASPIRATION. IMPAIRED COGNITION/ IMPAIRED RECALL BUT DID RECALL SWALLOW STRATEGIES. SKIN INTACT. MOSTLY CONTINENT BOWEL . INCONTINENT BLADDER BUT WEARS BRIEF AND ZANDER-PAD - USED AT HOME.. ON O2 1 LITER AT NIGHT.   ELOS -  END OF NEXT WEEK WITH 24 HOUR ASSIST     Now admit for comprehensive acute inpatient rehabilitation .  This would be an interdisciplinary program with physical therapy 1 hour,  occupational therapy 1 hour, and speech therapy 1 hour, 5 days a week.  Rehabilitation nursing for carryover, monitoring of pulmonary and neurologic   status, bowel and bladder, and skin  Ongoing physician follow-up.  Weekly team conferences.  Goals are to achieve a level of supervision with  mobility and self-care and improved swallow.   Rehabilitation prognosis fair.  Medical prognosis fair.  Estimated length of stay is approximately 2 weeks, but is only an estimation.      The patient's  functional status and clinical status is unchanged from preadmission assessment and the patient continues appropriate for acute inpatient rehabilitation.  Goal is for home with home health   therapies.  Barrier to discharge: Impaired swallow mobility self-care- work on dysphagia exercises, transfers, balance, gait, ADLs to overcome.             Aj Chandra MD

## 2022-08-04 NOTE — THERAPY TREATMENT NOTE
Inpatient Rehabilitation - Physical Therapy Treatment Note       [facility]     Patient Name: Di Macario  : 1940  MRN: [id number]    Today's Date: 2022                    Admit Date: 2022      Visit Dx:     ICD-10-CM ICD-9-CM   1. Impaired functional mobility, balance, gait, and endurance  Z74.09 V49.89       Patient Active Problem List   Diagnosis   • Essential hypertension   • Vitamin D deficiency   • Hyperuricemia   • Slow transit constipation   • Chronic pain of left knee   • At high risk for falls   • Peripheral neuropathy   • Uncontrolled type 2 diabetes mellitus with hyperglycemia (HCC)   • Walker as ambulation aid   • Bradycardia, sinus   • Shortness of breath   • Leg swelling   • Hyperlipidemia   • Presence of cardiac pacemaker   • Dementia without behavioral disturbance (HCC)   • Aspiration pneumonia due to vomitus ([facility])   • Aspiration pneumonia of both lungs due to gastric secretions, unspecified part of lung (HCC)   • Weakness   • Thrombocytopenia ([facility])   • Oropharyngeal dysphagia   • Immobility syndrome       Past Medical History:   Diagnosis Date   • Dementia ([facility])     states better with medication    • Diabetes mellitus (HCC)    • Hyperlipidemia 2021   • Lung consolidation ([facility]) 2022   • Peripheral neuropathy 2018   • Vitamin D deficiency        Past Surgical History:   Procedure Laterality Date   • ANKLE SURGERY Right    • BLADDER SURGERY      Prolapsed   • BREAST BIOPSY     • CARDIAC ELECTROPHYSIOLOGY PROCEDURE N/A 3/5/2021    Procedure: Pacemaker DC new BOSTON;  Surgeon: Madelin Ferguson MD;  Location: [facility] CATH INVASIVE LOCATION;  Service: Cardiology;  Laterality: N/A;   • ENDOSCOPY N/A 2022    Procedure: ESOPHAGOGASTRODUODENOSCOPY with biopsy, balloon dilation;  Surgeon: Lo Benjamin MD;  Location: [facility] ENDOSCOPY;  Service: Gastroenterology;  Laterality: N/A;  esophageal stricture, hiatal hernia, gastritis   • HYSTERECTOMY          PT ASSESSMENT (last 12 hours)     IRF PT Evaluation and Treatment     Row Name 08/04/22 0941          PT Time and Intention    Document Type daily treatment  -LB     Mode of Treatment physical therapy  -LB     Patient/Family/Caregiver Comments/Observations Seated in w/c. NAD  -LB     Row Name 08/04/22 0941          General Information    Existing Precautions/Restrictions fall  -LB     Row Name 08/04/22 0941          Pain Assessment    Pretreatment Pain Rating 0/10 - no pain  -LB     Row Name 08/04/22 0941          Bed Mobility    Rolling Left Maria Stein (Bed Mobility) supervision  -LB     Rolling Right Maria Stein (Bed Mobility) supervision  -LB     Scooting/Bridging Maria Stein (Bed Mobility) supervision  -LB     Supine-Sit Maria Stein (Bed Mobility) supervision  -LB     Sit-Supine Maria Stein (Bed Mobility) supervision  -LB     Comment, (Bed Mobility) assessed on txment mat  -LB     Row Name 08/04/22 0941          Transfers    Bed-Chair Maria Stein (Transfers) contact guard  -LB     Chair-Bed Maria Stein (Transfers) contact guard  -LB     Assistive Device (Bed-Chair Transfers) wheelchair  -LB     Sit-Stand Maria Stein (Transfers) contact guard;standby assist  -LB     Stand-Sit Maria Stein (Transfers) contact guard;standby assist  -LB     Row Name 08/04/22 0941          Chair-Bed Transfer    Assistive Device (Chair-Bed Transfers) wheelchair  -LB     Row Name 08/04/22 0941          Sit-Stand Transfer    Assistive Device (Sit-Stand Transfers) walker, front-wheeled;wheelchair  -LB     Row Name 08/04/22 0941          Stand-Sit Transfer    Assistive Device (Stand-Sit Transfers) walker, front-wheeled;wheelchair  -LB     Row Name 08/04/22 0941          Car Transfer    Type (Car Transfer) sit-stand;stand-sit  -LB     Maria Stein Level (Car Transfer) contact guard  -LB     Assistive Device (Car Transfer) walker, front-wheeled  -LB     Row Name 08/04/22 0941          Gait/Stairs (Locomotion)    Maria Stein  Level (Gait) contact guard;standby assist  -LB     Assistive Device (Gait) walker, front-wheeled  -LB     Distance in Feet (Gait) 260  -LB     Pattern (Gait) step-through  -LB     Deviations/Abnormal Patterns (Gait) base of support, narrow;gait speed decreased;stride length decreased  -LB     Bilateral Gait Deviations forward flexed posture;heel strike decreased  -LB     Left Sided Gait Deviations heel strike decreased  -LB     Right Sided Gait Deviations heel strike decreased  -LB     [place] Level (Stairs) minimum assist (75% patient effort);contact guard  -LB     Handrail Location (Stairs) both sides  -LB     Number of Steps (Stairs) 4  -LB     Ascending Technique (Stairs) step-to-step  -LB     Descending Technique (Stairs) step-to-step  -LB     Row Name 08/04/22 0941          Curb Negotiation (Mobility)    [place], Curb Negotiation minimal assist, 75% or more patient effort  slight boost given to ascend curb  -LB     Assistive Device (Curb Negotiation) walker, front-wheeled  -LB     Row Name 08/04/22 0941          Balance    Comment, Balance sidestepping at // bars CGA  -LB     Row Name 08/04/22 0941          Hip (Therapeutic Exercise)    Hip Strengthening (Therapeutic Exercise) bilateral;aBduction;flexion;sitting;resistance band;yellow;15 repititions  -LB     Row Name 08/04/22 0941          Knee (Therapeutic Exercise)    Knee Strengthening (Therapeutic Exercise) bilateral;flexion;LAQ (long arc quad);sitting;resistance band;yellow;15 repititions  Mini squats at // bars x 15 reps  -LB     Row Name 08/04/22 0941          Ankle (Therapeutic Exercise)    Ankle AROM (Therapeutic Exercise) bilateral;dorsiflexion;plantarflexion;sitting;15 repititions  -LB     Ankle Strengthening (Therapeutic Exercise) bilateral;plantarflexion;standing;15 repititions  -LB     Row Name 08/04/22 0941          Positioning and Restraints    Post Treatment Position chair  -LB     In Chair sitting;call light within reach;exit alarm  on  -LB     Row Name 08/04/22 0941          Weekly Progress Summary (PT)    Weekly Progress Summary (PT) Pnt has made good progress toward her functional mobility goals. She's progressed w bed mob, transfers and gait.She would benefit from continued PT to cont to work toward her functional mobility goals.  -LB     Row Name 08/04/22 0941          Bed Mobility Goal 1 (PT-IRF)    Activity/Assistive Device (Bed Mobility Goal 1, PT-IRF) bed mobility activities, all  -LB     [place] Level (Bed Mobility Goal 1, PT-IRF) independent  -LB     Time Frame (Bed Mobility Goal 1, PT-IRF) long-term goal (LTG);1 week  -LB     Progress/Outcomes (Bed Mobility Goal 1, PT-IRF) good progress toward goal;goal ongoing  -LB     Row Name 08/04/22 0941          Transfer Goal 1 (PT-IRF)    Activity/Assistive Device (Transfer Goal 1, PT-IRF) sit-to-stand/stand-to-sit;bed-to-chair/chair-to-bed;wheelchair transfer;car transfer  -LB     [place] Level (Transfer Goal 1, PT-IRF) supervision required  -LB     Time Frame (Transfer Goal 1, PT-IRF) long-term goal (LTG);1 week  -LB     Progress/Outcomes (Transfer Goal 1, PT-IRF) continuing progress toward goal;goal ongoing  -LB     Row Name 08/04/22 0941          Gait/Walking Locomotion Goal 1 (PT-IRF)    Activity/Assistive Device (Gait/Walking Locomotion Goal 1, PT-IRF) walker, rolling  -LB     Gait/Walking Locomotion Distance Goal 1 (PT-IRF) 160'  -LB     [place] Level (Gait/Walking Locomotion Goal 1, PT-IRF) supervision required  -LB     Time Frame (Gait/Walking Locomotion Goal 1, PT-IRF) long-term goal (LTG);1 week  -LB     Progress/Outcomes (Gait/Walking Locomotion Goal 1, PT-IRF) good progress toward goal;goal ongoing  -LB     Row Name 08/04/22 0941          Stairs Goal 1 (PT-IRF)    Activity/Assistive Device (Stairs Goal 1, PT-IRF) stairs, all skills  -LB     Number of Stairs (Stairs Goal 1, PT-IRF) 4  -LB     [place] Level (Stairs Goal 1, PT-IRF) standby assist  -LB     Time  Frame (Stairs Goal 1, PT-IRF) long-term goal (LTG);1 week  -LB     Progress/Outcomes (Stairs Goal 1, PT-IRF) goal ongoing  -LB           User Key  (r) = Recorded By, (t) = Taken By, (c) = Cosigned By    Initials Name Provider Type    LB Stacey Gonzalez PTA Physical Therapist Assistant                 Physical Therapy Education                 Title: PT OT SLP Therapies (In Progress)     Topic: Physical Therapy (In Progress)     Point: Mobility training (In Progress)     Learning Progress Summary           Patient Acceptance, E, NR by LB at 8/4/2022 1108    Comment: car, curb, stairs    Acceptance, E, NR by LB at 8/3/2022 1139    Comment: stairs    Acceptance, E, NR by LB at 8/2/2022 1413    Comment: stairs    Acceptance, E, NR by LB at 8/1/2022 1414    Comment: car, stairs    Acceptance, E,D, NR by  at 7/30/2022 1224    Acceptance, E, VU,NR by  at 7/29/2022 1448                   Point: Home exercise program (In Progress)     Learning Progress Summary           Patient Acceptance, E,D, NR by  at 7/30/2022 1224    Acceptance, E, VU,NR by KM at 7/29/2022 1448                   Point: Body mechanics (Done)     Learning Progress Summary           Patient Acceptance, E, VU,NR by  at 7/29/2022 1448                   Point: Precautions (Done)     Learning Progress Summary           Patient Acceptance, E, VU,NR by  at 7/29/2022 1448                               User Key     Initials Effective Dates Name Provider Type Discipline     03/07/18 -  Stacey Gonzalez PTA Physical Therapist Assistant PT     06/16/21 -  Florinda Mckeon PT Physical Therapist PT     06/06/22 -  Ronny Winters PT Student PT Student PT                PT Recommendation and Plan       Patient was wearing a face mask during this therapy encounter. Therapist used appropriate personal protective equipment including mask and gloves.  Mask used was standard procedure mask. Appropriate PPE was worn during the entire therapy session.  Hand hygiene was completed before and after therapy session. Patient is not in enhanced droplet precautions.                     Time Calculation:      PT Charges     Row Name 08/04/22 1059 08/04/22 0941          Time Calculation    Start Time 1100  -LB 0930  -LB     Stop Time 1130  -LB 1000  -LB     Time Calculation (min) 30 min  -LB 30 min  -LB     PT Goal Re-Cert Due Date 08/11/22  -LB --           User Key  (r) = Recorded By, (t) = Taken By, (c) = Cosigned By    Initials Name Provider Type    Stacey Leblanc PTA Physical Therapist Assistant                Therapy Charges for Today     Code Description Service Date Service Provider Modifiers Qty    34374262170 HC PT THER PROC EA 15 MIN 8/3/2022 Stacey Gonzalez PTA GP 4    48259286358 HC PT THER PROC EA 15 MIN 8/4/2022 Stacey Gonzalez PTA GP 4                   Stacey Gonzalez PTA  8/4/2022

## 2022-08-04 NOTE — THERAPY TREATMENT NOTE
Inpatient Rehabilitation - Occupational Therapy Treatment Note    [facility]     Patient Name: Di Macario  : 1940  MRN: [id number]    Today's Date: 2022                 Admit Date: 2022         ICD-10-CM ICD-9-CM   1. Impaired functional mobility, balance, gait, and endurance  Z74.09 V49.89       Patient Active Problem List   Diagnosis   • Essential hypertension   • Vitamin D deficiency   • Hyperuricemia   • Slow transit constipation   • Chronic pain of left knee   • At high risk for falls   • Peripheral neuropathy   • Uncontrolled type 2 diabetes mellitus with hyperglycemia (HCC)   • Walker as ambulation aid   • Bradycardia, sinus   • Shortness of breath   • Leg swelling   • Hyperlipidemia   • Presence of cardiac pacemaker   • Dementia without behavioral disturbance ([facility])   • Aspiration pneumonia due to vomitus ([facility])   • Aspiration pneumonia of both lungs due to gastric secretions, unspecified part of lung ([facility])   • Weakness   • Thrombocytopenia ([facility])   • Oropharyngeal dysphagia   • Immobility syndrome       Past Medical History:   Diagnosis Date   • Dementia ([facility])     states better with medication    • Diabetes mellitus ([facility])    • Hyperlipidemia 2021   • Lung consolidation ([facility]) 2022   • Peripheral neuropathy 2018   • Vitamin D deficiency        Past Surgical History:   Procedure Laterality Date   • ANKLE SURGERY Right    • BLADDER SURGERY      Prolapsed   • BREAST BIOPSY     • CARDIAC ELECTROPHYSIOLOGY PROCEDURE N/A 3/5/2021    Procedure: Pacemaker DC new [place];  Surgeon: Madelin Ferguson MD;  Location: [facility] CATH INVASIVE LOCATION;  Service: Cardiology;  Laterality: N/A;   • ENDOSCOPY N/A 2022    Procedure: ESOPHAGOGASTRODUODENOSCOPY with biopsy, balloon dilation;  Surgeon: Lo Benjamin MD;  Location: [facility] ENDOSCOPY;  Service: Gastroenterology;  Laterality: N/A;  esophageal stricture, hiatal hernia, gastritis   • HYSTERECTOMY               IRF OT  ASSESSMENT FLOWSHEET (last 12 hours)     IRF OT Evaluation and Treatment     Row Name 08/04/22 1525          OT Time and Intention    Document Type daily treatment  -DN     Mode of Treatment occupational therapy  -DN     Patient Effort good  -DN     Row Name 08/04/22 1525          Pain Assessment    Pretreatment Pain Rating 0/10 - no pain  -DN     Posttreatment Pain Rating 0/10 - no pain  -DN     Row Name 08/04/22 1525          Cognition/Psychosocial    Affect/Mental Status (Cognition) WFL  -DN     Orientation Status (Cognition) oriented to  -DN     Follows Commands (Cognition) follows one-step commands;over 90% accuracy  -DN     Personal Safety Interventions fall prevention program maintained;gait belt  -DN     Cognitive Function safety deficit  -DN     Safety Deficit (Cognition) insight into deficits/self-awareness;at risk behavior observed  -DN     Row Name 08/04/22 1525          Bathing    Comment (Bathing) pt declined bathing this am  -DN     Row Name 08/04/22 1525          Upper Body Dressing    [place] Level (Upper Body Dressing) upper body dressing skills;doff;don;pull over garment;supervision  -DN     Position (Upper Body Dressing) supported sitting  -DN     Set-up Assistance (Upper Body Dressing) obtain clothing  -DN     Row Name 08/04/22 1525          Lower Body Dressing    [place] Level (Lower Body Dressing) doff;don;pants/bottoms;shoes/slippers;socks;underwear;supervision;verbal cues  -DN     Position (Lower Body Dressing) supported sitting;supported standing  -DN     Set-up Assistance (Lower Body Dressing) obtain clothing  -DN     Row Name 08/04/22 1525          Grooming    [place] Level (Grooming) grooming skills;deodorant application;hair care, combing/brushing;oral care regimen;supervision  -DN     Position (Grooming) supported sitting;supported standing  -DN     Set-up Assistance (Grooming) obtain supplies  -DN     Row Name 08/04/22 1525          Toileting    [place] Level  (Toileting) toileting skills;adjust/manage clothing;perform perineal hygiene;verbal cues;nonverbal cues (demo/gesture);contact guard assist  -DN     Assistive Device Use (Toileting) grab bar/safety frame;raised toilet seat  -DN     Position (Toileting) supported sitting;supported standing  -DN     Set-up Assistance (Toileting) change pad/brief  -DN     Row Name 08/04/22 1525          Transfers    [place] Level (Toilet Transfer) contact guard;verbal cues  -DN     Assistive Device (Toilet Transfer) grab bars/safety frame;raised toilet seat  -DN     Row Name 08/04/22 1525          Toilet Transfer    Type (Toilet Transfer) stand pivot/stand step  -DN     Row Name 08/04/22 1525          Shoulder (Therapeutic Exercise)    Shoulder (Therapeutic Exercise) strengthening exercise  -DN     Shoulder AROM (Therapeutic Exercise) bilateral  -DN     Shoulder Strengthening (Therapeutic Exercise) --  standing with arm bike for 3 minutes on cardio cycle  -DN     Row Name 08/04/22 1525          Elbow/Forearm (Therapeutic Exercise)    Elbow/Forearm (Therapeutic Exercise) strengthening exercise  -DN     Elbow/Forearm AROM (Therapeutic Exercise) bilateral;10 repetitions;3 sets  -DN     Elbow/Forearm Strengthening (Therapeutic Exercise) 1 lb free weight  -DN     Row Name 08/04/22 1525          Wrist (Therapeutic Exercise)    Wrist (Therapeutic Exercise) strengthening exercise  -DN     Wrist AROM (Therapeutic Exercise) bilateral;10 repetitions;3 sets  -DN     Wrist Strengthening (Therapeutic Exercise) 1 lb free weight  -DN     Row Name 08/04/22 1525          Hand (Therapeutic Exercise)    Hand (Therapeutic Exercise) strengthening exercise  -DN     Hand AROM/AAROM (Therapeutic Exercise) bilateral;20 repititions  -DN     Hand Strengthening (Therapeutic Exercise) hand gripper  -DN     Row Name 08/04/22 1525          Positioning and Restraints    Pre-Treatment Position sitting in chair/recliner  -DN     Post Treatment Position wheelchair   -DN     In Bed sitting;call light within reach;encouraged to call for assist;exit alarm on  -DN           User Key  (r) = Recorded By, (t) = Taken By, (c) = Cosigned By    Initials Name Effective Dates    Natan Sunshine OT 06/16/21 -                  Occupational Therapy Education                 Title: PT OT SLP Therapies (In Progress)     Topic: Occupational Therapy (Done)     Point: ADL training (Done)     Description:   Instruct learner(s) on proper safety adaptation and remediation techniques during self care or transfers.   Instruct in proper use of assistive devices.              Learning Progress Summary           Patient Acceptance, E,TB,D, VU,DU by  at 7/29/2022 1533    Comment: ed pt on role of OT , benefit of therapy .POC w. OT ed on safety w. ADL and tsf. pt demo w min A.                   Point: Home exercise program (Done)     Description:   Instruct learner(s) on appropriate technique for monitoring, assisting and/or progressing therapeutic exercises/activities.              Learning Progress Summary           Patient Acceptance, E,TB,D, VU,DU by  at 7/29/2022 1533    Comment: ed pt on role of OT , benefit of therapy .POC w. OT ed on safety w. ADL and tsf. pt demo w min A.                   Point: Precautions (Done)     Description:   Instruct learner(s) on prescribed precautions during self-care and functional transfers.              Learning Progress Summary           Patient Acceptance, E,TB,D, VU,DU by  at 7/29/2022 1533    Comment: ed pt on role of OT , benefit of therapy .POC w. OT ed on safety w. ADL and tsf. pt demo w min A.                   Point: Body mechanics (Done)     Description:   Instruct learner(s) on proper positioning and spine alignment during self-care, functional mobility activities and/or exercises.              Learning Progress Summary           Patient Acceptance, E,TB,D, VU,DU by  at 7/29/2022 1533    Comment: ed pt on role of OT , benefit of therapy .POC w.  OT ed on safety w. ADL and tsf. pt demo w min A.                               User Key     Initials Effective Dates Name Provider Type [facility] 06/16/21 -  Adriana Gaxiola OTR Occupational Therapist OT                    OT Recommendation and Plan                         Time Calculation:      Time Calculation- OT     Row Name 08/04/22 0800             Time Calculation- OT    OT Start Time 0800  -DN      OT Stop Time 0900  -DN      OT Time Calculation (min) 60 min  -DN            User Key  (r) = Recorded By, (t) = Taken By, (c) = Cosigned By    Initials Name Provider Type    Natan Sunshine, OT Occupational Therapist              Therapy Charges for Today     Code Description Service Date Service Provider Modifiers Qty    58280161648 HC OT SELF CARE/MGMT/TRAIN EA 15 MIN 8/3/2022 Natan Hoover OT GO 3    63510007373 HC OT THER PROC EA 15 MIN 8/3/2022 Natan Hoover OT GO 1    61423995711 HC OT THER PROC EA 15 MIN 8/4/2022 Natan Hoover OT GO 1    71752711937 HC OT SELF CARE/MGMT/TRAIN EA 15 MIN 8/4/2022 Natan Hoover OT GO 3                   Natan Hoover OT  8/4/2022

## 2022-08-04 NOTE — PROGRESS NOTES
Inpatient Rehabilitation Plan of Care Note    Plan of Care  Care Plan Reviewed - No updates at this time.    Body Systems    [RN] Endocrine(Active)  Current Status(08/04/2022): DM 2; continuous monitor to right abd and connects  to phone  Weekly Goal(08/11/2022): Blood sugars WNL  Discharge Goal: Blood sugars WNL    [RN] Respiratory(Active)  Current Status(08/04/2022): Weaning off of oxygen; wearing 1 L nc at HS  Weekly Goal(08/10/2022): Sats WNL  Discharge Goal: Sats WNL    Performed Intervention(s)  Check oxygen q shift  Blood sugar AC and HS      Pain    [RN] Pain Management(Active)  Current Status(08/04/2022): Pt denies pain at this time  Weekly Goal(08/11/2022): No pain  Discharge Goal: No pain    Performed Intervention(s)  Offer medication if pain arises      Psychosocial    [RN] Coping/Adjustment(Active)  Current Status(08/04/2022): Appears to be coping well; has supportive daughters  Weekly Goal(08/10/2022): Verbalizes concerns  Discharge Goal: Demonstrates adequate coping strategies    Performed Intervention(s)  Provide calm environment  Allow pt to express concerns, wants and needs      Safety    [RN] Potential for Injury(Active)  Current Status(08/04/2022): At risk for falls r/t BLE weakness  Weekly Goal(08/11/2022): Will use call light  Discharge Goal: No falls    Performed Intervention(s)  All items within reach  [place] rounding  Falls precautions in place      Sphincter Control    [RN] Bladder Management(Active)  Current Status(08/04/2022): Continent with some reports of incontinence; wears  pull-up with pad  Weekly Goal(08/10/2022): Continent 100%  Discharge Goal: Continent 100%    [RN] Bowel Management(Active)  Current Status(08/04/2022): Continent with some incontinence  Weekly Goal(08/11/2022): Continent 75%  Discharge Goal: Continent 100%    Performed Intervention(s)  Encourage appropriate diet  Utilize incontinence products as needed    Signed by: Barbara Lei RN

## 2022-08-04 NOTE — THERAPY TREATMENT NOTE
Inpatient Rehabilitation - Speech Language Pathology Treatment Note    [facility]     Patient Name: Di Macario  : 1940  MRN: [id number]    Today's Date: 2022                   Admit Date: 2022       Visit Dx:      ICD-10-CM ICD-9-CM   1. Impaired functional mobility, balance, gait, and endurance  Z74.09 V49.89       Patient Active Problem List   Diagnosis   • Essential hypertension   • Vitamin D deficiency   • Hyperuricemia   • Slow transit constipation   • Chronic pain of left knee   • At high risk for falls   • Peripheral neuropathy   • Uncontrolled type 2 diabetes mellitus with hyperglycemia (HCC)   • Walker as ambulation aid   • Bradycardia, sinus   • Shortness of breath   • Leg swelling   • Hyperlipidemia   • Presence of cardiac pacemaker   • Dementia without behavioral disturbance ([facility])   • Aspiration pneumonia due to vomitus ([facility])   • Aspiration pneumonia of both lungs due to gastric secretions, unspecified part of lung ([facility])   • Weakness   • Thrombocytopenia ([facility])   • Oropharyngeal dysphagia   • Immobility syndrome       Past Medical History:   Diagnosis Date   • Dementia ([facility])     states better with medication    • Diabetes mellitus (HCC)    • Hyperlipidemia 2021   • Lung consolidation ([facility]) 2022   • Peripheral neuropathy 2018   • Vitamin D deficiency        Past Surgical History:   Procedure Laterality Date   • ANKLE SURGERY Right    • BLADDER SURGERY      Prolapsed   • BREAST BIOPSY     • CARDIAC ELECTROPHYSIOLOGY PROCEDURE N/A 3/5/2021    Procedure: Pacemaker DC new [place];  Surgeon: Madelin Ferguson MD;  Location: [facility] CATH INVASIVE LOCATION;  Service: Cardiology;  Laterality: N/A;   • ENDOSCOPY N/A 2022    Procedure: ESOPHAGOGASTRODUODENOSCOPY with biopsy, balloon dilation;  Surgeon: Lo Benjamin MD;  Location: [facility] ENDOSCOPY;  Service: Gastroenterology;  Laterality: N/A;  esophageal stricture, hiatal hernia, gastritis   • HYSTERECTOMY          SLP Recommendation and Plan                                                   SLP EVALUATION (last 72 hours)     SLP SLC Evaluation     Row Name 08/04/22 1309 08/03/22 1215 08/01/22 1542             Communication Assessment/Intervention    Document Type therapy note (daily note)  -SL therapy note (daily note)  -SL therapy note (daily note)  -TH      Subjective Information no complaints  -SL no complaints  -SL no complaints  -TH      Patient Observations alert;cooperative;agree to therapy  -SL alert;cooperative;agree to therapy  -SL alert;cooperative;agree to therapy  -TH      Patient Effort good  -SL good  -SL good  -TH      Symptoms Noted During/After Treatment none  -SL none  -SL --            User Key  (r) = Recorded By, (t) = Taken By, (c) = Cosigned By    Initials Name Effective Dates    SL Shani Cuba MS CCC-SLP 06/16/21 -      Natasha Lazo 06/16/21 -                    EDUCATION    The patient has been educated in the following areas:       Dysphagia (Swallowing Impairment).             SLP GOALS     Row Name 08/04/22 1310 08/03/22 1439 08/02/22 1100       (STG) Pharyngeal Strengthening Exercise Goal 1 (SLP)    Increase Superior Movement of the Hyolaryngeal Complex -- -- shaker;hard effortful swallow  -CB    Increase Anterior Movement of the Hyolaryngeal Complex -- -- shaker;hard effortful swallow  -CB    Increase Epiglottic Inversion and Retroflexion -- -- shaker;hard effortful swallow  -CB    Increase Squeeze/Positive Pressure Generation -- -- shaker;hard effortful swallow  -CB    Increase Tongue Base Retraction -- -- shaker;hard effortful swallow  -CB    Increase PES Opening -- -- shaker;hard effortful swallow  -CB    [place]/Accuracy (Pharyngeal Strengthening Goal 1, SLP) -- -- with moderate cues (50-74% accuracy)  -CB    Time Frame (Pharyngeal Strengthening Goal 1, SLP) -- -- by discharge  -CB    Progress/Outcomes (Pharyngeal Strengthening Goal 1, SLP) continuing progress toward goal;goal  ongoing  -SL goal ongoing  - --    Comment (Pharyngeal Strengthening Goal 1, SLP) pt completed all tasks 10-20 x each x2 sessions:  falsetto, hoigh/low pitch changes, scale x10 each; posterior phoneme productions x20 each; gargle x10; effortful swallows x20; beulah x10, CTAR repetitive x20 and sustained x10 with 10 sec holds;  - pt instructed on and completed the following strengthening tasks: falsetto, high/low pitch change, scale, hard posterior phoneme productions, gargle x10 each; beulah x10 ; effortful swallows x25; CTAR - repetitive x10 and sustained x5 for 10 sec hold  -SL --       (STG) Swallow Management Recall Goal 1 (SLP)    Progress/Outcomes (Swallow Management Recall Goal 1, SLP) continuing progress toward goal;goal ongoing  - goal ongoing  - --    Comment (Swallow Management Recall Goal 1, SLP) no overt clinical s/s aspiraiton noted during sessions onpuree on nectar thick liquids- pt used strategies indep- small bites/sips, alternating liquids/solids, and double swallows; does cont to require cues for periodic throat clear/reswallow  - pt has multiple signs posted in room and on lunch table re: swallow strategies- she was noted to spont refer to them periodically throughout lunch meal- pt indep with use of slow rate, small bite/sips sizes, alternating liq/solid, double swallows- she did require periodice cuing for use of throat clear /reswallow - no overt clinical s/s aspiraiton noted during meal although voice at baseline is raspy and slightly wet at times so difficult to differentiate- of note, VFSS revealed SILENT aspiration  -SL --       Follow Directions Goal 2 (SLP)    Progress (Ability to Follow Directions Goal 1, SLP) -- -- 60%;independently (over 90% accuracy)  -CB    Progress/Outcomes (Follow Directions Goal 1, SLP) -- -- good progress toward goal  -CB    Comment (Follow Directions Goal 1, SLP) -- -- working memory for new task  -CB       Memory Skills Goal 1 (SLP)    Progress  (Memory Skills Goal 1, SLP) -- -- 60%;with minimal cues (75-90%)  -CB    Progress/Outcomes (Memory Skills Goal 1, SLP) -- -- goal ongoing  -CB    Comment (Memory Skills Goal 1, SLP) -- -- working memory for new task  -CB    Row Name 08/01/22 1500             (STG) Pharyngeal Strengthening Exercise Goal 1 (SLP)    Increase Superior Movement of the Hyolaryngeal Complex chin tuck against resistance (CTAR);hard effortful swallow  -TH      Increase Anterior Movement of the Hyolaryngeal Complex hard effortful swallow;beulah;effortful pitch glide (falsetto + pharyngeal squeeze);chin tuck against resistance (CTAR)  -TH      Increase Epiglottic Inversion and Retroflexion hard effortful swallow;effortful pitch glide (falsetto + pharyngeal squeeze);beulah  -TH      Increase Squeeze/Positive Pressure Generation hard effortful swallow;effortful pitch glide (falsetto + pharyngeal squeeze);chin tuck against resistance (CTAR);beulah  -TH      Increase Tongue Base Retraction beulah  glottal sounds  -TH      [place]/Accuracy (Pharyngeal Strengthening Goal 1, SLP) with minimal cues (75-90% accuracy)  -TH      Time Frame (Pharyngeal Strengthening Goal 1, SLP) by discharge  -TH              ([facility]) Swallow Compensatory Strategies Goal 1 (SLP)    [place]/Accuracy (Swallow Compensatory Strategies/Techniques Goal 1, SLP) with moderate cues (50-74% accuracy)  Patient seen during meal time on this date and initially not aware of swallow strategies she needed to complete during meal times, however, after review cueing faded to min cues. Provided handout in room with swallow strategies as well.  -TH      Time Frame (Swallow Compensatory Strategies/Techniques Goal 1, SLP) by discharge  -TH            User Key  (r) = Recorded By, (t) = Taken By, (c) = Cosigned By    Initials Name Provider Type    Shani Jimenez, MS CCC-SLP Speech and Language Pathologist    Natasha Ventura Speech and Language Pathologist    Mckenzie Beth, SLP Speech  and Language Pathologist                            Time Calculation:        Time Calculation- SLP     Row Name 08/04/22 1314 08/04/22 1313          Time Calculation- SLP    SLP Start Time 1235  -SL 0900  -     SLP Stop Time 1305  -SL 0930  -     SLP Time Calculation (min) 30 min  -SL 30 min  -           User Key  (r) = Recorded By, (t) = Taken By, (c) = Cosigned By    Initials Name Provider Type    Shani Jimenez MS CCC-SLP Speech and Language Pathologist                  Therapy Charges for Today     Code Description Service Date Service Provider Modifiers Qty    94738278758 HC ST TREATMENT SWALLOW 4 8/3/2022 Shani Cuba MS CCC-SLP GN 1    64977294520 HC ST TREATMENT SWALLOW 4 8/4/2022 Shani Cuba MS CCC-SLP GN 1                           Shani Cuba MS CCC-JACQUELINE  8/4/2022

## 2022-08-04 NOTE — PLAN OF CARE
Goal Outcome Evaluation:       Pt is A/Ox4, calm/cooperative, OOB x 1 w CGA w rwx. No c/o pain. Meds taken crushed in puree with emphasis on swallowing precautions. Pt has been continent of urine using restroom but has some urgency. O2 at 1L via nasal cannula worn during sleep.

## 2022-08-04 NOTE — PLAN OF CARE
Goal Outcome Evaluation:  Plan of Care Reviewed With: patient        Progress: improving  Outcome Evaluation: Oral are performed x3 this shift. No pain. Walks with a walker. Swallow precautions followed.

## 2022-08-04 NOTE — PROGRESS NOTES
Inpatient Rehabilitation Plan of Care Note    Plan of Care  Care Plan Reviewed - No updates at this time.    Sphincter Control    Performed Intervention(s)  Encourage appropriate diet  Utilize incontinence products as needed      Safety    Performed Intervention(s)  All items within reach  Hourly rounding  Falls precautions in place      Psychosocial    Performed Intervention(s)  Provide calm environment  Allow pt to express concerns, wants and needs      Pain    Performed Intervention(s)  Offer medication if pain arises      Body Systems    Performed Intervention(s)  Check oxygen q shift  Blood sugar AC and HS    Signed by: Ju Powell RN

## 2022-08-05 LAB
GLUCOSE BLDC GLUCOMTR-MCNC: 138 MG/DL (ref 70–130)
GLUCOSE BLDC GLUCOMTR-MCNC: 159 MG/DL (ref 70–130)
GLUCOSE BLDC GLUCOMTR-MCNC: 167 MG/DL (ref 70–130)
GLUCOSE BLDC GLUCOMTR-MCNC: 227 MG/DL (ref 70–130)

## 2022-08-05 PROCEDURE — 25010000002 ENOXAPARIN PER 10 MG: Performed by: INTERNAL MEDICINE

## 2022-08-05 PROCEDURE — 97110 THERAPEUTIC EXERCISES: CPT

## 2022-08-05 PROCEDURE — 97535 SELF CARE MNGMENT TRAINING: CPT

## 2022-08-05 PROCEDURE — 63710000001 INSULIN LISPRO (HUMAN) PER 5 UNITS: Performed by: PHYSICAL MEDICINE & REHABILITATION

## 2022-08-05 PROCEDURE — 63710000001 INSULIN LISPRO (HUMAN) PER 5 UNITS: Performed by: INTERNAL MEDICINE

## 2022-08-05 PROCEDURE — 82962 GLUCOSE BLOOD TEST: CPT

## 2022-08-05 PROCEDURE — 97530 THERAPEUTIC ACTIVITIES: CPT

## 2022-08-05 PROCEDURE — 92526 ORAL FUNCTION THERAPY: CPT

## 2022-08-05 RX ADMIN — INSULIN LISPRO 2 UNITS: 100 INJECTION, SOLUTION INTRAVENOUS; SUBCUTANEOUS at 12:04

## 2022-08-05 RX ADMIN — ENOXAPARIN SODIUM 40 MG: 100 INJECTION SUBCUTANEOUS at 21:04

## 2022-08-05 RX ADMIN — INSULIN GLARGINE-YFGN 5 UNITS: 100 INJECTION, SOLUTION SUBCUTANEOUS at 21:05

## 2022-08-05 RX ADMIN — INSULIN LISPRO 2 UNITS: 100 INJECTION, SOLUTION INTRAVENOUS; SUBCUTANEOUS at 17:53

## 2022-08-05 RX ADMIN — INSULIN LISPRO 3 UNITS: 100 INJECTION, SOLUTION INTRAVENOUS; SUBCUTANEOUS at 12:04

## 2022-08-05 RX ADMIN — INSULIN LISPRO 2 UNITS: 100 INJECTION, SOLUTION INTRAVENOUS; SUBCUTANEOUS at 07:25

## 2022-08-05 RX ADMIN — DONEPEZIL HYDROCHLORIDE 10 MG: 10 TABLET, FILM COATED ORAL at 21:04

## 2022-08-05 RX ADMIN — ZONISAMIDE 200 MG: 100 CAPSULE ORAL at 21:04

## 2022-08-05 RX ADMIN — PRAVASTATIN SODIUM 40 MG: 40 TABLET ORAL at 17:54

## 2022-08-05 RX ADMIN — Medication 1 MG: at 21:04

## 2022-08-05 RX ADMIN — AMLODIPINE BESYLATE 2.5 MG: 2.5 TABLET ORAL at 07:26

## 2022-08-05 NOTE — THERAPY TREATMENT NOTE
Inpatient Rehabilitation - Physical Therapy Treatment Note       [facility]     Patient Name: Di Macario  : 1940  MRN: [id number]    Today's Date: 2022                    Admit Date: 2022      Visit Dx:     ICD-10-CM ICD-9-CM   1. Impaired functional mobility, balance, gait, and endurance  Z74.09 V49.89       Patient Active Problem List   Diagnosis   • Essential hypertension   • Vitamin D deficiency   • Hyperuricemia   • Slow transit constipation   • Chronic pain of left knee   • At high risk for falls   • Peripheral neuropathy   • Uncontrolled type 2 diabetes mellitus with hyperglycemia (HCC)   • Walker as ambulation aid   • Bradycardia, sinus   • Shortness of breath   • Leg swelling   • Hyperlipidemia   • Presence of cardiac pacemaker   • Dementia without behavioral disturbance (HCC)   • Aspiration pneumonia due to vomitus ([facility])   • Aspiration pneumonia of both lungs due to gastric secretions, unspecified part of lung (HCC)   • Weakness   • Thrombocytopenia ([facility])   • Oropharyngeal dysphagia   • Immobility syndrome       Past Medical History:   Diagnosis Date   • Dementia ([facility])     states better with medication    • Diabetes mellitus (HCC)    • Hyperlipidemia 2021   • Lung consolidation ([facility]) 2022   • Peripheral neuropathy 2018   • Vitamin D deficiency        Past Surgical History:   Procedure Laterality Date   • ANKLE SURGERY Right    • BLADDER SURGERY      Prolapsed   • BREAST BIOPSY     • CARDIAC ELECTROPHYSIOLOGY PROCEDURE N/A 3/5/2021    Procedure: Pacemaker DC new [place];  Surgeon: Madelin Ferguson MD;  Location: [facility] CATH INVASIVE LOCATION;  Service: Cardiology;  Laterality: N/A;   • ENDOSCOPY N/A 2022    Procedure: ESOPHAGOGASTRODUODENOSCOPY with biopsy, balloon dilation;  Surgeon: Lo Benjamin MD;  Location: [facility] ENDOSCOPY;  Service: Gastroenterology;  Laterality: N/A;  esophageal stricture, hiatal hernia, gastritis   • HYSTERECTOMY          PT ASSESSMENT (last 12 hours)     IRF PT Evaluation and Treatment     Row Name 08/05/22 0947          PT Time and Intention    Document Type daily treatment  -DP     Mode of Treatment individual therapy;physical therapy  -DP     Patient/Family/Caregiver Comments/Observations Pt up in w/c upon Pt arrival  -DP     Row Name 08/05/22 0947          General Information    Patient Profile Reviewed yes  -DP     Existing Precautions/Restrictions fall  -DP     Row Name 08/05/22 0947          Pain Assessment    Pretreatment Pain Rating 0/10 - no pain  -DP     Posttreatment Pain Rating 0/10 - no pain  -DP     Row Name 08/05/22 0947          Cognition/Psychosocial    Affect/Mental Status (Cognition) WFL  -DP     Orientation Status (Cognition) oriented to  -DP     Follows Commands (Cognition) follows one-step commands;over 90% accuracy  -DP     Personal Safety Interventions elopement precautions initiated;safety round/check completed;fall prevention program maintained;gait belt;muscle strengthening facilitated;nonskid shoes/slippers when out of bed;supervised activity  -DP     Row Name 08/05/22 0947          Bed Mobility    Bed Mobility rolling left;rolling right;scooting/bridging;supine-sit;sit-supine  -DP     Rolling Left [place] (Bed Mobility) supervision  -DP     Rolling Right [place] (Bed Mobility) supervision  -DP     Scooting/Bridging [place] (Bed Mobility) supervision  -DP     Supine-Sit [place] (Bed Mobility) supervision  -DP     Sit-Supine [place] (Bed Mobility) supervision  -DP     Comment, (Bed Mobility) assessed on treatment table  -DP     Row Name 08/05/22 0947          Transfer Assessment/Treatment    Transfers sit-stand transfer;stand-sit transfer  -DP     Comment, (Transfers) SBA from PT for all transfers  -DP     Row Name 08/05/22 0947          Transfers    Sit-Stand [place] (Transfers) standby assist  -DP     Stand-Sit [place] (Transfers) standby assist  -DP     Row Name  08/05/22 0947          Sit-Stand Transfer    Assistive Device (Sit-Stand Transfers) walker, front-wheeled;wheelchair  -DP     Row Name 08/05/22 0947          Stand-Sit Transfer    Assistive Device (Stand-Sit Transfers) walker, front-wheeled;wheelchair  -DP     Row Name 08/05/22 0947          Gait/Stairs (Locomotion)    [place] Level (Gait) standby assist  -DP     Assistive Device (Gait) walker, front-wheeled  -DP     Distance in Feet (Gait) 160'x1, 40'x2  -DP     Pattern (Gait) step-through  -DP     Deviations/Abnormal Patterns (Gait) base of support, narrow;gait speed decreased;stride length decreased  -DP     Bilateral Gait Deviations forward flexed posture;heel strike decreased  -DP     Left Sided Gait Deviations heel strike decreased  -DP     Right Sided Gait Deviations heel strike decreased  -DP     Comment, (Gait/Stairs) pt performed 5 step ups on each LE with focus on glute activation on 4 inch step.  -DP     Row Name 08/05/22 0947          Safety Issues, Functional Mobility    Impairments Affecting Function (Mobility) endurance/activity tolerance;strength;balance  -DP     Row Name 08/05/22 0947          Motor Skills    Therapeutic Exercise hip;knee;core strength  -DP     Row Name 08/05/22 0947          Hip (Therapeutic Exercise)    Hip (Therapeutic Exercise) strengthening exercise  -DP     Hip Strengthening (Therapeutic Exercise) bilateral;mini squats;extension;2 sets;10 repetitions;other (see comments);external rotation  Pt was able to perform mini squats with minimal VC but did require moderate verbal,visual and tactile cuing for standing hip extension as pt tended  to adduct LE's during execise. ER used YTB for 10 reps and needed to hold feet in place.  -DP     Row Name 08/05/22 0947          Knee (Therapeutic Exercise)    Knee (Therapeutic Exercise) strengthening exercise  -DP     Knee Strengthening (Therapeutic Exercise) bilateral;SLR (straight leg raise);LAQ (long arc quad);10 repetitions;2 sets   LAQ used 1.5lbs for 10 reps. Pt performed SLR 2x10 without weight  -DP     Row Name 08/05/22 0947          Core Strength (Therapeutic Exercise)    Core Strength (Therapeutic Exercise) bridging, bilateral lower extremities  -DP     Comment (Core Strength Therapeutic Exercise) 2x10 with 75% ROM  -DP     Row Name 08/05/22 0947          Positioning and Restraints    Pre-Treatment Position sitting in chair/recliner  -DP     Post Treatment Position wheelchair  -DP     In Wheelchair sitting;exit alarm on;with SLP  -DP           User Key  (r) = Recorded By, (t) = Taken By, (c) = Cosigned By    Initials Name Provider Type    Yordy Hussein, PT Physical Therapist                 Physical Therapy Education                 Title: PT OT SLP Therapies (Done)     Topic: Physical Therapy (Done)     Point: Mobility training (Done)     Learning Progress Summary           Patient Acceptance, E,D, VU,DU,NR by DP at 8/5/2022 1216    Acceptance, E, NR by LB at 8/4/2022 1108    Comment: car, curb, stairs    Acceptance, E, NR by LB at 8/3/2022 1139    Comment: stairs    Acceptance, E, NR by LB at 8/2/2022 1413    Comment: stairs    Acceptance, E, NR by LB at 8/1/2022 1414    Comment: car, stairs    Acceptance, E,D, NR by KP at 7/30/2022 1224    Acceptance, E, VU,NR by KM at 7/29/2022 1448                   Point: Home exercise program (Done)     Learning Progress Summary           Patient Acceptance, E,D, VU,DU,NR by DP at 8/5/2022 1216    Acceptance, E,D, NR by KP at 7/30/2022 1224    Acceptance, E, VU,NR by KM at 7/29/2022 1448                   Point: Body mechanics (Done)     Learning Progress Summary           Patient Acceptance, E, VU,NR by KM at 7/29/2022 1448                   Point: Precautions (Done)     Learning Progress Summary           Patient Acceptance, E,D, VU,DU,NR by DP at 8/5/2022 1216    Acceptance, E, VU,NR by KM at 7/29/2022 1448                               User Key     Initials Effective Dates Name Provider  Type Discipline    LB 03/07/18 -  Stacey Gonzalez, PTA Physical Therapist Assistant PT    KP 06/16/21 -  Florinda Mckeon, PT Physical Therapist PT    DP 08/24/21 -  Yordy Garcia PT Physical Therapist PT    KM 06/06/22 -  Ronny Winters, PT Student PT Student PT                PT Recommendation and Plan                          Time Calculation:      PT Charges     Row Name 08/05/22 1217 08/05/22 1216          Time Calculation    Start Time 1230  -DP 0930  -DP     Stop Time 1300  -DP 1000  -DP     Time Calculation (min) 30 min  -DP 30 min  -DP     PT Received On -- 08/05/22  -DP     PT - Next Appointment -- 08/06/22  -DP            Time Calculation- PT    Total Timed Code Minutes- PT 30 minute(s)  -DP 30 minute(s)  -DP           User Key  (r) = Recorded By, (t) = Taken By, (c) = Cosigned By    Initials Name Provider Type    DP Yordy Garcia, PT Physical Therapist                Therapy Charges for Today     Code Description Service Date Service Provider Modifiers Qty    60663736968 HC PT THERAPEUTIC ACT EA 15 MIN 8/5/2022 Yordy Garcia, PT GP 1    37402297221 HC PT THER PROC EA 15 MIN 8/5/2022 Yordy Garcia, PT GP 1    01634357796 HC PT THER PROC EA 15 MIN 8/5/2022 Yordy Garcia, PT GP 2               Patient was wearing a face mask during this therapy encounter. Therapist used appropriate personal protective equipment including mask and gloves.  Mask used was standard procedure mask. Appropriate PPE was worn during the entire therapy session. Hand hygiene was completed before and after therapy session. Patient is not in enhanced droplet precautions.       Yordy Garcia PT  8/5/2022

## 2022-08-05 NOTE — PROGRESS NOTES
Chief Complaint:   Immobility syndrome/debility  Impaired cognition/impaired mobility/impaired self-care  ID status post aspiration pneumonia-Zosyn completes on July 29  Severe dysphagia-n.p.o. recommended on videofluoroscopic swallow study but patient family wish for avoiding PEG tube and for comfort diet-purée/nectar thick liquid  Hypertension-amlodipine  DVT prophylaxis-Lovenox/SCDs  Cognition-dementia-donepezil  Seizure disorder-Zonegran  Diabetes mellitus type 2-on Trulicity and Lantus low-dose at home     History of Present Illness  82 y.o. female with PMH of Esophageal Stricture, gastritis, oropharyngeal dysphagia, dementia, DM2, HTN, symptomatic Bradycardia s/p PPM, and Seizure D/O who admitted to [facility] on 7/24/2022 due to Aspiration Pneumonia.  Pt. was previously admitted to [facility] 6 weeks ago with aspiration pneumonia and  readmitted with shortness of breath and cough since a witnessed aspiration event 7-23-22.    Subjective:     She reports she is comfortable with her breathing.  Does have occasional cough of whitish mucus.  Reports tolerating dysphagia diet.      Physical Exam:  MENTAL STATUS -  AWAKE / ALERT  HEENT- NCAT, SCLERAE ANICTERIC, CONJUNCTIVAE PINK, OP MOIST, NO JVD, EARS UNREMARKABLE EXTERNALLY  LUNGS - CTA, NO WHEEZES, RALES OR RHONCHI  HEART- RRR, NO RUB, MURMUR, OR GALLOP  ABD - NORMOACTIVE BOWEL SOUNDS, SOFT, NT. NO HEPATOSPLENOMEGALY APPRECIATED  EXT - NO EDEMA OR CYANOSIS  NEURO -awake alert.  Speech fluent.  Oriented x3.  MOTOR EXAM - RUE/RLE 5/5. LUE/LLE 5/5      Assessment and Plan:   # Functional deficits and Generalized Weakness related to multiple medical comorbities - Admit to inpt rehab    #1.  Aspiration Pneumonia of both lungs  2/2 gastric secretions  -Recommendation for n.p.o.  Per patient family request comfort diet with pureed diet per ST  - On Zosyn-completes July 29.     #2. Dysphagia  - pt. Noncompliant with home diet-patient and family do not wish to  pursue PEG tube.  Comfort diet per patient/family request.     #3.  Leukocytosis - resolved     #4. Dementia with behavioral disturbance  - mild  - on medication     #5.  Uncontrolled DM type 2 w/ Hyperglycemia  #6.  Diabetic Peripheral polyneuropathy  - Hgb A1C 8.4  - follows with an outpatient endocrinologist  -was on Lantus 5 units nightly at home.    August 2-blood sugars elevated-resume home Lantus dose 5 units daily  August 4-blood glucose is elevated at lunch and suppertime.  - will add low-dose lispro 2 units with breakfast and lunch and asked that she have Trulicity once weekly brought in from home.    #7.  Essential HTN  - amlodipine restarted     #8.  Thrombocytopenia - mild  - trending labs     #9.  BLE edema  - elevate and monitor            TEAM CONF - AUGUST 2 - TRANSFERS MIN. CAR TRANSFERS MIN 4 STAIRS MIN. GAIT 160 FEET RW CTG. TOILET TRANSFERS CTG RW. SHOWER TRANSFERS MIN .  BATH MIN. LBD CTG. UBD SBA. EATING SET UP. GROOMING SBA. TOILETING MIN. CHRONIC DYSPHAGIA - COMFORT FEEDS AS NOT WANT PEG TUBE/ NPO STATUS - PUREE/NTL. PHARYNGEAL DYSPHAGIA WITH SILENT ASPIRATION. IMPAIRED COGNITION/ IMPAIRED RECALL BUT DID RECALL SWALLOW STRATEGIES. SKIN INTACT. MOSTLY CONTINENT BOWEL . INCONTINENT BLADDER BUT WEARS BRIEF AND ZANDER-PAD - USED AT HOME.. ON O2 1 LITER AT NIGHT.   ELOS -  END OF NEXT WEEK WITH 24 HOUR ASSIST     Now admit for comprehensive acute inpatient rehabilitation .  This would be an interdisciplinary program with physical therapy 1 hour,  occupational therapy 1 hour, and speech therapy 1 hour, 5 days a week.  Rehabilitation nursing for carryover, monitoring of pulmonary and neurologic   status, bowel and bladder, and skin  Ongoing physician follow-up.  Weekly team conferences.  Goals are to achieve a level of supervision with  mobility and self-care and improved swallow.   Rehabilitation prognosis fair.  Medical prognosis fair.  Estimated length of stay is approximately 2 weeks, but is only  an estimation.      The patient's functional status and clinical status is unchanged from preadmission assessment and the patient continues appropriate for acute inpatient rehabilitation.  Goal is for home with home health   therapies.  Barrier to discharge: Impaired swallow mobility self-care- work on dysphagia exercises, transfers, balance, gait, ADLs to overcome.             Aj Chandra MD

## 2022-08-05 NOTE — PLAN OF CARE
Goal Outcome Evaluation:           Progress: improving  Outcome Evaluation: A&OX4. Forgetful. Immobility sydrome. Hx. esophageal strictures, gastritis, dysphagia, dementia, aspiration pneumoniax2, DM2, HTN, seizures. BS checks ACHS. Diet: pureed, NT liquids. Meds crushed in puree. On swallow, seizure, and aspiration precautions. Mildly Kaw. 1L O2 NC at nite. Continent and incontinent. Last BM 8/5. Wears a brief and peripad. Has urgency.  Assistx1 to wheelchair. No complaints of pain. D/C home Friday, 8/12. Participated fully in therapy. Dexcom to L. abdomen. 2 units regular insulin at meals.

## 2022-08-05 NOTE — THERAPY TREATMENT NOTE
Inpatient Rehabilitation - Speech Language Pathology Treatment Note    [facility]     Patient Name: Di Macario  : 1940  MRN: [id number]    Today's Date: 2022                   Admit Date: 2022       Visit Dx:      ICD-10-CM ICD-9-CM   1. Impaired functional mobility, balance, gait, and endurance  Z74.09 V49.89       Patient Active Problem List   Diagnosis   • Essential hypertension   • Vitamin D deficiency   • Hyperuricemia   • Slow transit constipation   • Chronic pain of left knee   • At high risk for falls   • Peripheral neuropathy   • Uncontrolled type 2 diabetes mellitus with hyperglycemia (HCC)   • Walker as ambulation aid   • Bradycardia, sinus   • Shortness of breath   • Leg swelling   • Hyperlipidemia   • Presence of cardiac pacemaker   • Dementia without behavioral disturbance ([facility])   • Aspiration pneumonia due to vomitus ([facility])   • Aspiration pneumonia of both lungs due to gastric secretions, unspecified part of lung ([facility])   • Weakness   • Thrombocytopenia ([facility])   • Oropharyngeal dysphagia   • Immobility syndrome       Past Medical History:   Diagnosis Date   • Dementia ([facility])     states better with medication    • Diabetes mellitus (HCC)    • Hyperlipidemia 2021   • Lung consolidation ([facility]) 2022   • Peripheral neuropathy 2018   • Vitamin D deficiency        Past Surgical History:   Procedure Laterality Date   • ANKLE SURGERY Right    • BLADDER SURGERY      Prolapsed   • BREAST BIOPSY     • CARDIAC ELECTROPHYSIOLOGY PROCEDURE N/A 3/5/2021    Procedure: Pacemaker DC new BOSTON;  Surgeon: Madelin Ferguson MD;  Location: [facility] CATH INVASIVE LOCATION;  Service: Cardiology;  Laterality: N/A;   • ENDOSCOPY N/A 2022    Procedure: ESOPHAGOGASTRODUODENOSCOPY with biopsy, balloon dilation;  Surgeon: Lo Benjamin MD;  Location: [facility] ENDOSCOPY;  Service: Gastroenterology;  Laterality: N/A;  esophageal stricture, hiatal hernia, gastritis   • HYSTERECTOMY          SLP Recommendation and Plan                                                   SLP EVALUATION (last 72 hours)     SLP SLC Evaluation     Row Name 08/05/22 1330 08/04/22 1309 08/03/22 1215             Communication Assessment/Intervention    Document Type therapy note (daily note)  -SL therapy note (daily note)  -SL therapy note (daily note)  -SL      Subjective Information no complaints  -SL no complaints  -SL no complaints  -SL      Patient Observations alert;cooperative;agree to therapy  -SL alert;cooperative;agree to therapy  -SL alert;cooperative;agree to therapy  -SL      Patient Effort good  -SL good  -SL good  -SL      Symptoms Noted During/After Treatment none  -SL none  -SL none  -SL            User Key  (r) = Recorded By, (t) = Taken By, (c) = Cosigned By    Initials Name Effective Dates    SL Shani Cuba MS CCC-SLP 06/16/21 -                    EDUCATION    The patient has been educated in the following areas:       Cognitive Impairment Dysphagia (Swallowing Impairment).             SLP GOALS     Row Name 08/05/22 1330 08/04/22 1310 08/03/22 1439       (STG) Pharyngeal Strengthening Exercise Goal 1 (SLP)    [place]/Accuracy (Pharyngeal Strengthening Goal 1, SLP) with minimal cues (75-90% accuracy)  pt displays good effort for all tasks  -SL -- --    Progress/Outcomes (Pharyngeal Strengthening Goal 1, SLP) goal ongoing  -SL continuing progress toward goal;goal ongoing  -SL goal ongoing  -SL    Comment (Pharyngeal Strengthening Goal 1, SLP) all tasks completed 10-20 x each; imrpved vocal strength for production of falsetto; did fatique over the course of 20 successive scale productions; completed CTAR - sustained x10 for 10 sec hold and repetitive for 20 successive trials; beulah and effortful swallows completed x15 x;  -SL pt completed all tasks 10-20 x each x2 sessions:  falsetto, hoigh/low pitch changes, scale x10 each; posterior phoneme productions x20 each; gargle x10; effortful swallows x20;  beulah x10, CTAR repetitive x20 and sustained x10 with 10 sec holds;  -SL pt instructed on and completed the following strengthening tasks: falsetto, high/low pitch change, scale, hard posterior phoneme productions, gargle x10 each; beulah x10 ; effortful swallows x25; CTAR - repetitive x10 and sustained x5 for 10 sec hold  -SL       (STG) Swallow Management Recall Goal 1 (SLP)    Progress/Outcomes (Swallow Management Recall Goal 1, SLP) goal ongoing  -SL continuing progress toward goal;goal ongoing  -SL goal ongoing  -SL    Comment (Swallow Management Recall Goal 1, SLP) pt cont to display good recall  of swallow strategies ( with exception of prn throat clearing)when taking po during tx ( puree/nectar thick liquids via cup);  no overt clinical s/s aspiration noted on any consistency during either session  - no overt clinical s/s aspiraiton noted during sessions onpuree on nectar thick liquids- pt used strategies indep- small bites/sips, alternating liquids/solids, and double swallows; does cont to require cues for periodic throat clear/reswallow  - pt has multiple signs posted in room and on lunch table re: swallow strategies- she was noted to spont refer to them periodically throughout lunch meal- pt indep with use of slow rate, small bite/sips sizes, alternating liq/solid, double swallows- she did require periodice cuing for use of throat clear /reswallow - no overt clinical s/s aspiraiton noted during meal although voice at baseline is raspy and slightly wet at times so difficult to differentiate- of note, VFSS revealed SILENT aspiration  -          User Key  (r) = Recorded By, (t) = Taken By, (c) = Cosigned By    Initials Name Provider Type    Shani Jimenez MS CCC-SLP Speech and Language Pathologist                            Time Calculation:        Time Calculation- SLP     Row Name 08/05/22 1445 08/05/22 1444          Time Calculation- SLP    SLP Start Time 1300  - 0900  -     SLP Stop Time 1330   - 0930  -     SLP Time Calculation (min) 30 min  -SL 30 min  -           User Key  (r) = Recorded By, (t) = Taken By, (c) = Cosigned By    Initials Name Provider Type    Shani Jimenez MS CCC-SLP Speech and Language Pathologist                  Therapy Charges for Today     Code Description Service Date Service Provider Modifiers Qty    25597916531 HC ST TREATMENT SWALLOW 4 8/4/2022 Shani Cuba MS CCC-SLP GN 1    [id number] HC ST TREATMENT SWALLOW 4 8/5/2022 Shani Cuba MS CCC-SLP GN 1                           MS THA Casas  8/5/2022

## 2022-08-05 NOTE — THERAPY TREATMENT NOTE
Inpatient Rehabilitation - Occupational Therapy Treatment Note    [facility]     Patient Name: Di Macario  : 1940  MRN: [id number]    Today's Date: 2022                 Admit Date: 2022         ICD-10-CM ICD-9-CM   1. Impaired functional mobility, balance, gait, and endurance  Z74.09 V49.89       Patient Active Problem List   Diagnosis   • Essential hypertension   • Vitamin D deficiency   • Hyperuricemia   • Slow transit constipation   • Chronic pain of left knee   • At high risk for falls   • Peripheral neuropathy   • Uncontrolled type 2 diabetes mellitus with hyperglycemia (HCC)   • Walker as ambulation aid   • Bradycardia, sinus   • Shortness of breath   • Leg swelling   • Hyperlipidemia   • Presence of cardiac pacemaker   • Dementia without behavioral disturbance ([facility])   • Aspiration pneumonia due to vomitus ([facility])   • Aspiration pneumonia of both lungs due to gastric secretions, unspecified part of lung ([facility])   • Weakness   • Thrombocytopenia ([facility])   • Oropharyngeal dysphagia   • Immobility syndrome       Past Medical History:   Diagnosis Date   • Dementia ([facility])     states better with medication    • Diabetes mellitus ([facility])    • Hyperlipidemia 2021   • Lung consolidation ([facility]) 2022   • Peripheral neuropathy 2018   • Vitamin D deficiency        Past Surgical History:   Procedure Laterality Date   • ANKLE SURGERY Right    • BLADDER SURGERY      Prolapsed   • BREAST BIOPSY     • CARDIAC ELECTROPHYSIOLOGY PROCEDURE N/A 3/5/2021    Procedure: Pacemaker DC new [place];  Surgeon: Madelin Ferguson MD;  Location: [facility] CATH INVASIVE LOCATION;  Service: Cardiology;  Laterality: N/A;   • ENDOSCOPY N/A 2022    Procedure: ESOPHAGOGASTRODUODENOSCOPY with biopsy, balloon dilation;  Surgeon: Lo Benjamin MD;  Location: [facility] ENDOSCOPY;  Service: Gastroenterology;  Laterality: N/A;  esophageal stricture, hiatal hernia, gastritis   • HYSTERECTOMY               IRF OT  ASSESSMENT FLOWSHEET (last 12 hours)     IRF OT Evaluation and Treatment     Row Name 08/05/22 1157          OT Time and Intention    Document Type progress note  -DN     Mode of Treatment occupational therapy  -DN     Patient Effort good  -DN     Symptoms Noted During/After Treatment none  -DN     Row Name 08/05/22 1157          Pain Assessment    Pretreatment Pain Rating 0/10 - no pain  -DN     Posttreatment Pain Rating 0/10 - no pain  -DN     Row Name 08/05/22 1157          Cognition/Psychosocial    Follows Commands (Cognition) follows one-step commands;over 90% accuracy;verbal cues/prompting required  -DN     Personal Safety Interventions supervised activity;nonskid shoes/slippers when out of bed;gait belt;fall prevention program maintained  -DN     Cognitive Function safety deficit  -DN     Safety Deficit (Cognition) minimal deficit;awareness of need for assistance;insight into deficits/self-awareness  -DN     Comment, Cognition pt needed vc today to complete adls before going out into room without pants on  -DN     Row Name 08/05/22 1157          Bathing    [place] Level (Bathing) bathing skills;verbal cues;standby assist  -DN     Position (Bathing) supported sitting;supported standing  -DN     Set-up Assistance (Bathing) obtain supplies  -DN     Row Name 08/05/22 1157          Upper Body Dressing    [place] Level (Upper Body Dressing) upper body dressing skills;doff;don;pull over garment;bra/undergarment;supervision  -DN     Position (Upper Body Dressing) supported sitting  -DN     Row Name 08/05/22 1157          Lower Body Dressing    [place] Level (Lower Body Dressing) doff;don;pants/bottoms;shoes/slippers;socks;underwear;verbal cues;nonverbal cues (demo/gesture);standby assist  -DN     Position (Lower Body Dressing) supported sitting;supported standing  -DN     Set-up Assistance (Lower Body Dressing) obtain clothing  -DN     Row Name 08/05/22 1157          Grooming    [place] Level  (Grooming) grooming skills;hair care, combing/brushing;oral care regimen;standby assist  -DN     Position (Grooming) supported standing;sink side  -DN     Row Name 08/05/22 1157          Toileting    [place] Level (Toileting) toileting skills;adjust/manage clothing;perform perineal hygiene;supervision;verbal cues  -DN     Assistive Device Use (Toileting) grab bar/safety frame;raised toilet seat  -DN     Position (Toileting) supported sitting;supported standing  -DN     Row Name 08/05/22 1157          Transfers    [place] Level (Toilet Transfer) standby assist  -DN     Assistive Device (Toilet Transfer) commode  -DN     Row Name 08/05/22 1157          Toilet Transfer    Type (Toilet Transfer) stand pivot/stand step  -DN     Row Name 08/05/22 1157          Shoulder (Therapeutic Exercise)    Shoulder (Therapeutic Exercise) strengthening exercise  -DN     Shoulder AROM (Therapeutic Exercise) bilateral;15 repititions;2 sets  -DN     Shoulder Strengthening (Therapeutic Exercise) --  dowel venkatesh with no weight with chest press seated  -DN     Row Name 08/05/22 1157          Elbow/Forearm (Therapeutic Exercise)    Elbow/Forearm (Therapeutic Exercise) strengthening exercise  -DN     Elbow/Forearm AROM (Therapeutic Exercise) bilateral;15 repititions;2 sets  -DN     Elbow/Forearm Strengthening (Therapeutic Exercise) 1 lb free weight  -DN     Row Name 08/05/22 1157          Wrist (Therapeutic Exercise)    Wrist (Therapeutic Exercise) strengthening exercise  -DN     Wrist AROM (Therapeutic Exercise) bilateral;15 repititions;3 sets  -DN     Wrist Strengthening (Therapeutic Exercise) 1 lb free weight  -DN     Row Name 08/05/22 1157          Hand (Therapeutic Exercise)    Hand (Therapeutic Exercise) strengthening exercise  -DN     Hand AROM/AAROM (Therapeutic Exercise) bilateral  -DN     Hand Strengthening (Therapeutic Exercise) hand gripper  -DN     Row Name 08/05/22 1157          Balance    Static Standing Balance standby  assist  -DN     Dynamic Standing Balance standby assist;verbal cues  -DN     Row Name 08/05/22 1157          Transfer Goal 1 (OT-IRF)    Activity/Assistive Device (Transfer Goal 1, OT-IRF) sit-to-stand/stand-to-sit;bed-to-chair/chair-to-bed;toilet;shower chair with a back;shower chair;walk-in shower;walker, rolling  -DN     [place] Level (Transfer Goal 1, OT-IRF) set-up required;contact guard required  -DN     Time Frame (Transfer Goal 1, OT-IRF) short-term goal (STG);1 week  -DN     Progress/Outcomes (Transfer Goal 1, OT-IRF) goal met  -DN     Row Name 08/05/22 1157          Transfer Goal 2 (OT-IRF)    Activity/Assistive Device (Transfer Goal 2, OT-IRF) sit-to-stand/stand-to-sit;bed-to-chair/chair-to-bed;toilet;walk-in shower;shower chair;walker, rolling  -DN     [place] Level (Transfer Goal 2, OT-IRF) set-up required;supervision required  -DN     Time Frame (Transfer Goal 2, OT-IRF) long-term goal (LTG);by discharge  -DN     Progress/Outcomes (Transfer Goal 2, OT-IRF) goal ongoing  -DN     Row Name 08/05/22 1157          Bathing Goal 1 (OT-IRF)    Activity/Device (Bathing Goal 1, OT-IRF) bathing skills, all;upper body bathing;lower body bathing;grab bar, tub/shower;hand-held shower spray hose;shower chair  -DN     [place] Level (Bathing Goal 1, OT-IRF) contact guard required  -DN     Time Frame (Bathing Goal 1, OT-IRF) short-term goal (STG);1 week  -DN     Progress/Outcomes (Bathing Goal 1, OT-IRF) goal met  -DN     Row Name 08/05/22 1157          Bathing Goal 2 (OT-IRF)    Activity/Device (Bathing Goal 2, OT-IRF) bathing skills, all;hand-held shower spray hose;grab bar, tub/shower;shower chair  -DN     [place] Level (Bathing Goal 2, OT-IRF) supervision required  -DN     Time Frame (Bathing Goal 2, OT-IRF) long-term goal (LTG);by discharge  -DN     Progress/Outcomes (Bathing Goal 2, OT-IRF) goal ongoing  -DN     Row Name 08/05/22 1157          UB Dressing Goal 1 (OT-IRF)    Activity/Device (UB  Dressing Goal 1, OT-IRF) upper body dressing  -DN     [place] (UB Dress Goal 1, OT-IRF) set-up required  -DN     Time Frame (UB Dressing Goal 1, OT-IRF) short-term goal (STG);1 week  -DN     Progress/Outcomes (UB Dressing Goal 1, OT-IRF) goal met  -DN     Row Name 08/05/22 1157          UB Dressing Goal 2 (OT-IRF)    Activity/Device (UB Dressing Goal 2, OT-IRF) upper body dressing  -DN     [place] (UB Dress Goal 2, OT-IRF) set-up required  -DN     Time Frame (UB Dressing Goal 2, OT-IRF) long-term goal (LTG);by discharge  -DN     Progress/Outcomes (UB Dressing Goal 2, OT-IRF) goal ongoing  -DN     Row Name 08/05/22 1157          LB Dressing Goal 1 (OT-IRF)    Activity/Device (LB Dressing Goal 1, OT-IRF) lower body dressing  -DN     [place] (LB Dressing Goal 1, OT-IRF) contact guard required  -DN     Time Frame (LB Dressing Goal 1, OT-IRF) short-term goal (STG);1 week  -DN     Progress/Outcomes (LB Dressing Goal 1, OT-IRF) goal met  -DN     Row Name 08/05/22 1157          LB Dressing Goal 2 (OT-IRF)    Activity/Device (LB Dressing Goal 2, OT-IRF) lower body dressing  -DN     [place] (LB Dressing Goal 2, OT-IRF) standby assist  -DN     Time Frame (LB Dressing Goal 2, OT-IRF) long-term goal (LTG);by discharge  -DN     Progress/Outcomes (LB Dressing Goal 2, OT-IRF) goal ongoing  -DN     Row Name 08/05/22 1157          Grooming Goal 1 (OT-IRF)    Activity/Device (Grooming Goal 1, OT-IRF) grooming skills, all  -DN     [place] (Grooming Goal 1, OT-IRF) supervision required;set-up required  -DN     Time Frame (Grooming Goal 1, OT-IRF) short-term goal (STG);1 week  -DN     Progress/Outcomes (Grooming Goal 1, OT-IRF) goal ongoing  -DN     Row Name 08/05/22 1157          Grooming Goal 2 (OT-IRF)    Activity/Device (Grooming Goal 2, OT-IRF) grooming skills, all  -DN     [place] (Grooming Goal 2, OT-IRF) supervision required  -DN     Time Frame (Grooming Goal 2, OT-IRF) long-term goal (LTG)  -DN      Progress/Outcomes (Grooming Goal 2, OT-IRF) goal revised this date  -DN     Row Name 08/05/22 1157          Toileting Goal 1 (OT-IRF)    Activity/Device (Toileting Goal 1, OT-IRF) toileting skills, all;grab bar/safety frame  -DN     [place] Level (Toileting Goal 1, OT-IRF) contact guard required  -DN     Progress/Outcomes (Toileting Goal 1, OT-IRF) goal ongoing;goal met  -DN     Time Frame (Toileting Goal 1, OT-IRF) short-term goal (STG)  -DN     Row Name 08/05/22 1157          Toileting Goal 2 (OT-IRF)    Activity/Device (Toileting Goal 2, OT-IRF) toileting skills, all;grab bar/safety frame  -DN     [place] Level (Toileting Goal 2, OT-IRF) standby assist  -DN     Progress/Outcomes (Toileting Goal 2, OT-IRF) goal ongoing  -DN     Time Frame (Toileting Goal 2, OT-IRF) long-term goal (LTG);by discharge  -DN     Row Name 08/05/22 1157          Strength Goal 1 (OT-IRF)    Strength Goal 1 (OT-IRF) incr L UE to 4+/5 incr B  to 30  -DN     Time Frame (Strength Goal 1, OT-IRF) long-term goal (LTG);by discharge  -DN     Progress/Outcomes (Strength Goal 1, OT-IRF) goal not met  -DN     Row Name 08/05/22 1157          Balance Goal 1 (OT)    Activity/Assistive Device (Balance Goal 1, OT) standing, dynamic  -DN     [place] Level/Cues Needed (Balance Goal 1, OT) set-up required;supervision required  -DN     Time Frame (Balance Goal 1, OT) long term goal (LTG);by discharge  -DN     Progress/Outcomes (Balance Goal 1, OT) goal ongoing  -DN     Row Name 08/05/22 1157           Endurance Goal 1 (OT)    Endurance Goal 1 (OT) during adls  -DN     [place] Level (Endurance Goal 1, OT) to increase;endurance 2 good  -DN     Time Frame (Endurance Goal 1, OT) long term goal (LTG)  -DN     Progress/Outcome (Endurance Goal 1, OT) goal not met;goal ongoing  -DN     Row Name 08/05/22 1157          Functional Mobility Goal 1 (OT)    Activity/Assistive Device (Functional Mobility Goal 1, OT) walker, rolling  -DN     [place]  Level/Cues Needed (Functional Mobility Goal 1, OT) set-up required;supervision required  -DN     Distance Goal 1 (Functional Mobility, OT) pt to walk to BR, shower toilet etc w walker  -DN     Time Frame (Functional Mobility Goal 1, OT) long term goal (LTG);by discharge  -DN     Progress/Outcome (Functional Mobility Goal 1, OT) goal ongoing;goal not met  -DN     Row Name 08/05/22 1157          Caregiver Training Goal 1 (OT-IRF)    Caregiver Training Goal 1 (OT-IRF) pt family ed on safety w pt ADLs and tsf and able to A her as needed  -DN     Time Frame (Caregiver Training Goal 1, OT-IRF) long-term goal (LTG);by discharge  -DN     Progress/Outcomes (Caregiver Training Goal 1, OT-IRF) goal ongoing  -DN           User Key  (r) = Recorded By, (t) = Taken By, (c) = Cosigned By    Initials Name Effective Dates    DN Natan Hoover OT 06/16/21 -                  Occupational Therapy Education                 Title: PT OT SLP Therapies (In Progress)     Topic: Occupational Therapy (Done)     Point: ADL training (Done)     Description:   Instruct learner(s) on proper safety adaptation and remediation techniques during self care or transfers.   Instruct in proper use of assistive devices.              Learning Progress Summary           Patient Acceptance, E,TB,D, VU,DU by  at 7/29/2022 1533    Comment: ed pt on role of OT , benefit of therapy .POC w. OT ed on safety w. ADL and tsf. pt demo w min A.                   Point: Home exercise program (Done)     Description:   Instruct learner(s) on appropriate technique for monitoring, assisting and/or progressing therapeutic exercises/activities.              Learning Progress Summary           Patient Acceptance, E,TB,D, VU,DU by  at 7/29/2022 1533    Comment: ed pt on role of OT , benefit of therapy .POC w. OT ed on safety w. ADL and tsf. pt demo w min A.                   Point: Precautions (Done)     Description:   Instruct learner(s) on prescribed precautions during  self-care and functional transfers.              Learning Progress Summary           Patient Acceptance, E,TB,D, VU,DU by  at 7/29/2022 1533    Comment: ed pt on role of OT , benefit of therapy .POC w. OT ed on safety w. ADL and tsf. pt demo w min A.                   Point: Body mechanics (Done)     Description:   Instruct learner(s) on proper positioning and spine alignment during self-care, functional mobility activities and/or exercises.              Learning Progress Summary           Patient Acceptance, E,TB,D, VU,DU by  at 7/29/2022 1533    Comment: ed pt on role of OT , benefit of therapy .POC w. OT ed on safety w. ADL and tsf. pt demo w min A.                               User Key     Initials Effective Dates Name Provider Type [facility] 06/16/21 -  Adriana Gaxiola OTR Occupational Therapist OT                    OT Recommendation and Plan                         Time Calculation:      Time Calculation- OT     Row Name 08/05/22 0800             Time Calculation- OT    OT Start Time 0800  -DN      OT Stop Time 0900  -DN      OT Time Calculation (min) 60 min  -DN            User Key  (r) = Recorded By, (t) = Taken By, (c) = Cosigned By    Initials Name Provider Type    DN Natan Hoover OT Occupational Therapist              Therapy Charges for Today     Code Description Service Date Service Provider Modifiers Qty    20589495956 HC OT THER PROC EA 15 MIN 8/4/2022 Natan Hoover OT GO 1    04990922372 HC OT SELF CARE/MGMT/TRAIN EA 15 MIN 8/4/2022 Natan Hoover OT GO 3    37440339611 HC OT SELF CARE/MGMT/TRAIN EA 15 MIN 8/5/2022 Natan Hoover OT GO 3    89506651477 HC OT THER PROC EA 15 MIN 8/5/2022 Natan Hoover OT GO 1                   Natan Hoover OT  8/5/2022

## 2022-08-05 NOTE — PLAN OF CARE
Goal Outcome Evaluation:  Plan of Care Reviewed With: patient        Progress: improving  Outcome Evaluation: Pt has slept with no c/o pain. O2 1L n/c at night. No unsafe behavior noted.

## 2022-08-06 LAB
GLUCOSE BLDC GLUCOMTR-MCNC: 122 MG/DL (ref 70–130)
GLUCOSE BLDC GLUCOMTR-MCNC: 186 MG/DL (ref 70–130)
GLUCOSE BLDC GLUCOMTR-MCNC: 213 MG/DL (ref 70–130)
GLUCOSE BLDC GLUCOMTR-MCNC: 229 MG/DL (ref 70–130)

## 2022-08-06 PROCEDURE — 97116 GAIT TRAINING THERAPY: CPT

## 2022-08-06 PROCEDURE — 63710000001 INSULIN LISPRO (HUMAN) PER 5 UNITS: Performed by: PHYSICAL MEDICINE & REHABILITATION

## 2022-08-06 PROCEDURE — 97110 THERAPEUTIC EXERCISES: CPT

## 2022-08-06 PROCEDURE — 25010000002 ENOXAPARIN PER 10 MG: Performed by: INTERNAL MEDICINE

## 2022-08-06 PROCEDURE — 82962 GLUCOSE BLOOD TEST: CPT

## 2022-08-06 PROCEDURE — 63710000001 INSULIN LISPRO (HUMAN) PER 5 UNITS: Performed by: INTERNAL MEDICINE

## 2022-08-06 RX ADMIN — Medication 1 MG: at 21:54

## 2022-08-06 RX ADMIN — INSULIN LISPRO 3 UNITS: 100 INJECTION, SOLUTION INTRAVENOUS; SUBCUTANEOUS at 16:43

## 2022-08-06 RX ADMIN — INSULIN GLARGINE-YFGN 5 UNITS: 100 INJECTION, SOLUTION SUBCUTANEOUS at 21:55

## 2022-08-06 RX ADMIN — ENOXAPARIN SODIUM 40 MG: 100 INJECTION SUBCUTANEOUS at 21:54

## 2022-08-06 RX ADMIN — DONEPEZIL HYDROCHLORIDE 10 MG: 10 TABLET, FILM COATED ORAL at 21:54

## 2022-08-06 RX ADMIN — ZONISAMIDE 200 MG: 100 CAPSULE ORAL at 21:54

## 2022-08-06 RX ADMIN — INSULIN LISPRO 2 UNITS: 100 INJECTION, SOLUTION INTRAVENOUS; SUBCUTANEOUS at 12:11

## 2022-08-06 RX ADMIN — INSULIN LISPRO 2 UNITS: 100 INJECTION, SOLUTION INTRAVENOUS; SUBCUTANEOUS at 09:05

## 2022-08-06 RX ADMIN — PRAVASTATIN SODIUM 40 MG: 40 TABLET ORAL at 16:44

## 2022-08-06 RX ADMIN — INSULIN LISPRO 2 UNITS: 100 INJECTION, SOLUTION INTRAVENOUS; SUBCUTANEOUS at 12:12

## 2022-08-06 RX ADMIN — AMLODIPINE BESYLATE 2.5 MG: 2.5 TABLET ORAL at 09:05

## 2022-08-06 NOTE — PROGRESS NOTES
Chief Complaint:   Immobility syndrome/debility  Impaired cognition/impaired mobility/impaired self-care  ID status post aspiration pneumonia-Zosyn completes on July 29  Severe dysphagia-n.p.o. recommended on videofluoroscopic swallow study but patient family wish for avoiding PEG tube and for comfort diet-purée/nectar thick liquid  Hypertension-amlodipine  DVT prophylaxis-Lovenox/SCDs  Cognition-dementia-donepezil  Seizure disorder-Zonegran  Diabetes mellitus type 2-on Trulicity and Lantus low-dose at home     History of Present Illness  82 y.o. female with PMH of Esophageal Stricture, gastritis, oropharyngeal dysphagia, dementia, DM2, HTN, symptomatic Bradycardia s/p PPM, and Seizure D/O who admitted to [facility] on 7/24/2022 due to Aspiration Pneumonia.  Pt. was previously admitted to [facility] 6 weeks ago with aspiration pneumonia and  readmitted with shortness of breath and cough since a witnessed aspiration event 7-23-22.    Subjective:     Patient seen and examined. No acute events overnight. Denies CP, SOA, N/V, F/C. Occasional cough. Tolerating diet.     Physical Exam:  MENTAL STATUS -  AWAKE / ALERT  HEENT- NCAT, SCLERAE ANICTERIC, CONJUNCTIVAE PINK, OP MOIST, NO JVD, EARS UNREMARKABLE EXTERNALLY  LUNGS - CTA, NO WHEEZES, RALES OR RHONCHI  HEART- RRR, NO RUB, MURMUR, OR GALLOP  ABD - NORMOACTIVE BOWEL SOUNDS, SOFT, NT. NO HEPATOSPLENOMEGALY APPRECIATED  EXT - NO EDEMA OR CYANOSIS  NEURO -awake alert.  Speech fluent.  Oriented x3.  MOTOR EXAM - RUE/RLE 5/5. LUE/LLE 5/5      Assessment and Plan:   # Functional deficits and Generalized Weakness related to multiple medical comorbities - Admit to inpt rehab    #1.  Aspiration Pneumonia of both lungs  2/2 gastric secretions  -Recommendation for n.p.o.  Per patient family request comfort diet with pureed diet per ST  - On Zosyn-completes July 29.     #2. Dysphagia  - pt. Noncompliant with home diet-patient and family do not wish to pursue PEG tube.   Comfort diet per patient/family request.     #3.  Leukocytosis - resolved     #4. Dementia with behavioral disturbance  - mild  - on medication     #5.  Uncontrolled DM type 2 w/ Hyperglycemia  August 6 - -227 on 8/5. 122 this am. Family has brought in home Trulicity. Blood sugars under fair control with current regimen. Defer re-start of Trulicity to Dr. Chandra on Monday.   #6.  Diabetic Peripheral polyneuropathy  - Hgb A1C 8.4  - follows with an outpatient endocrinologist  -was on Lantus 5 units nightly at home.    August 2-blood sugars elevated-resume home Lantus dose 5 units daily  August 4-blood glucose is elevated at lunch and suppertime.  - will add low-dose lispro 2 units with breakfast and lunch and asked that she have Trulicity once weekly brought in from home.    #7.  Essential HTN  - amlodipine restarted     #8.  Thrombocytopenia - mild  - trending labs     #9.  BLE edema  - elevate and monitor            TEAM CONF - AUGUST 2 - TRANSFERS MIN. CAR TRANSFERS MIN 4 STAIRS MIN. GAIT 160 FEET RW CTG. TOILET TRANSFERS CTG RW. SHOWER TRANSFERS MIN .  BATH MIN. LBD CTG. UBD SBA. EATING SET UP. GROOMING SBA. TOILETING MIN. CHRONIC DYSPHAGIA - COMFORT FEEDS AS NOT WANT PEG TUBE/ NPO STATUS - PUREE/NTL. PHARYNGEAL DYSPHAGIA WITH SILENT ASPIRATION. IMPAIRED COGNITION/ IMPAIRED RECALL BUT DID RECALL SWALLOW STRATEGIES. SKIN INTACT. MOSTLY CONTINENT BOWEL . INCONTINENT BLADDER BUT WEARS BRIEF AND ZANDER-PAD - USED AT HOME.. ON O2 1 LITER AT NIGHT.   ELOS -  END OF NEXT WEEK WITH 24 HOUR ASSIST     Now admit for comprehensive acute inpatient rehabilitation .  This would be an interdisciplinary program with physical therapy 1 hour,  occupational therapy 1 hour, and speech therapy 1 hour, 5 days a week.  Rehabilitation nursing for carryover, monitoring of pulmonary and neurologic   status, bowel and bladder, and skin  Ongoing physician follow-up.  Weekly team conferences.  Goals are to achieve a level of supervision  with  mobility and self-care and improved swallow.   Rehabilitation prognosis fair.  Medical prognosis fair.  Estimated length of stay is approximately 2 weeks, but is only an estimation.      The patient's functional status and clinical status is unchanged from preadmission assessment and the patient continues appropriate for acute inpatient rehabilitation.  Goal is for home with home health   therapies.  Barrier to discharge: Impaired swallow mobility self-care- work on dysphagia exercises, transfers, balance, gait, ADLs to overcome.         8/6/2022:    Reviewed medications, vital signs, and recent lab work. Progressing well. Continue comprehensive inpatient rehabilitation program.    -227 on 8/5. 122 this am. Family has brought in home Trulicity. Blood sugars under fair control with current regimen. Defer re-start of Trulicity to Dr. Chandra on Monday.         Han Grewal MD

## 2022-08-06 NOTE — THERAPY TREATMENT NOTE
Inpatient Rehabilitation - Physical Therapy Treatment Note       [facility]     Patient Name: Di Macario  : 1940  MRN: [id number]    Today's Date: 2022                    Admit Date: 2022      Visit Dx:     ICD-10-CM ICD-9-CM   1. Impaired functional mobility, balance, gait, and endurance  Z74.09 V49.89       Patient Active Problem List   Diagnosis   • Essential hypertension   • Vitamin D deficiency   • Hyperuricemia   • Slow transit constipation   • Chronic pain of left knee   • At high risk for falls   • Peripheral neuropathy   • Uncontrolled type 2 diabetes mellitus with hyperglycemia (HCC)   • Walker as ambulation aid   • Bradycardia, sinus   • Shortness of breath   • Leg swelling   • Hyperlipidemia   • Presence of cardiac pacemaker   • Dementia without behavioral disturbance (HCC)   • Aspiration pneumonia due to vomitus ([facility])   • Aspiration pneumonia of both lungs due to gastric secretions, unspecified part of lung (HCC)   • Weakness   • Thrombocytopenia ([facility])   • Oropharyngeal dysphagia   • Immobility syndrome       Past Medical History:   Diagnosis Date   • Dementia ([facility])     states better with medication    • Diabetes mellitus (HCC)    • Hyperlipidemia 2021   • Lung consolidation ([facility]) 2022   • Peripheral neuropathy 2018   • Vitamin D deficiency        Past Surgical History:   Procedure Laterality Date   • ANKLE SURGERY Right    • BLADDER SURGERY      Prolapsed   • BREAST BIOPSY     • CARDIAC ELECTROPHYSIOLOGY PROCEDURE N/A 3/5/2021    Procedure: Pacemaker DC new [place];  Surgeon: Madelin Ferguson MD;  Location: [facility] CATH INVASIVE LOCATION;  Service: Cardiology;  Laterality: N/A;   • ENDOSCOPY N/A 2022    Procedure: ESOPHAGOGASTRODUODENOSCOPY with biopsy, balloon dilation;  Surgeon: Lo Benjamin MD;  Location: [facility] ENDOSCOPY;  Service: Gastroenterology;  Laterality: N/A;  esophageal stricture, hiatal hernia, gastritis   • HYSTERECTOMY          PT ASSESSMENT (last 12 hours)     IR[facility] PT Evaluation and Treatment     Row Name 08/06/22 1209          PT Time and Intention    Document Type daily treatment  -EE     Mode of Treatment physical therapy  -EE     Patient/Family/Caregiver Comments/Observations Pt sitting up in WC, agreeable to PT.  -EE     Row Name 08/06/22 1209          General Information    Existing Precautions/Restrictions fall  -EE     Row Name 08/06/22 1209          Pain Assessment    Pretreatment Pain Rating 0/10 - no pain  -EE     Posttreatment Pain Rating 0/10 - no pain  -EE     Row Name 08/06/22 1209          Cognition/Psychosocial    Affect/Mental Status (Cognition) WFL  -EE     Orientation Status (Cognition) oriented x 3  -EE     Follows Commands (Cognition) follows one-step commands  -EE     Personal Safety Interventions fall prevention program maintained;gait belt;muscle strengthening facilitated;nonskid shoes/slippers when out of bed;supervised activity  -EE     Cognitive Function safety deficit  -EE     Safety Deficit (Cognition) minimal deficit  -EE     Row Name 08/06/22 1209          Transfers    Sit-Stand [place] (Transfers) standby assist  -EE     Stand-Sit [place] (Transfers) standby assist  -EE     Row Name 08/06/22 1209          Sit-Stand Transfer    Assistive Device (Sit-Stand Transfers) walker, front-wheeled  -EE     Row Name 08/06/22 1209          Stand-Sit Transfer    Assistive Device (Stand-Sit Transfers) walker, front-wheeled  -EE     Row Name 08/06/22 1209          Gait/Stairs (Locomotion)    [place] Level (Gait) standby assist;contact guard  -EE     Assistive Device (Gait) walker, front-wheeled  -EE     Distance in Feet (Gait) 240' x 1, 160' x 2  -EE     Pattern (Gait) step-through  -EE     Deviations/Abnormal Patterns (Gait) jh decreased;stride length decreased  -EE     Bilateral Gait Deviations forward flexed posture;heel strike decreased  -EE     Row Name 08/06/22 1209          Hip (Therapeutic  Exercise)    Hip Strengthening (Therapeutic Exercise) sitting;bilateral;aBduction;aDduction;10 repetitions;2 sets  -EE     Row Name 08/06/22 1209          Knee (Therapeutic Exercise)    Knee Strengthening (Therapeutic Exercise) sitting;bilateral;LAQ (long arc quad);hamstring curls;10 repetitions;2 sets  -EE     Row Name 08/06/22 1209          Ankle (Therapeutic Exercise)    Ankle Strengthening (Therapeutic Exercise) sitting;bilateral;dorsiflexion;plantarflexion;10 repetitions;2 sets  -EE     Row Name 08/06/22 1209          Positioning and Restraints    Pre-Treatment Position sitting in chair/recliner  -EE     Post Treatment Position chair  -EE     In Chair sitting;call light within reach;encouraged to call for assist;exit alarm on  -EE     In Wheelchair --  -EE           User Key  (r) = Recorded By, (t) = Taken By, (c) = Cosigned By    Initials Name Provider Type    EE Debra Coker, PT Physical Therapist                 Physical Therapy Education                 Title: PT OT SLP Therapies (Done)     Topic: Physical Therapy (Done)     Point: Mobility training (Done)     Learning Progress Summary           Patient Acceptance, E, VU,NR by EE at 8/6/2022 1213    Acceptance, E,D, VU,DU,NR by DP at 8/5/2022 1216    Acceptance, E, NR by LB at 8/4/2022 1108    Comment: car, curb, stairs    Acceptance, E, NR by LB at 8/3/2022 1139    Comment: stairs    Acceptance, E, NR by LB at 8/2/2022 1413    Comment: stairs    Acceptance, E, NR by LB at 8/1/2022 1414    Comment: car, stairs    Acceptance, E,D, NR by KP at 7/30/2022 1224    Acceptance, E, VU,NR by KM at 7/29/2022 1448                   Point: Home exercise program (Done)     Learning Progress Summary           Patient Acceptance, E, VU,NR by EE at 8/6/2022 1213    Acceptance, E,D, VU,DU,NR by DP at 8/5/2022 1216    Acceptance, E,D, NR by KP at 7/30/2022 1224    Acceptance, E, VU,NR by KM at 7/29/2022 1448                   Point: Body mechanics (Done)     Learning  Progress Summary           Patient Acceptance, E, VU,NR by EE at 8/6/2022 1213    Acceptance, E, VU,NR by KM at 7/29/2022 1448                   Point: Precautions (Done)     Learning Progress Summary           Patient Acceptance, E, VU,NR by EE at 8/6/2022 1213    Acceptance, E,D, VU,DU,NR by DP at 8/5/2022 1216    Acceptance, E, VU,NR by KM at 7/29/2022 1448                               User Key     Initials Effective Dates Name Provider Type Discipline    LB 03/07/18 -  Stacey Gonzalez, PTA Physical Therapist Assistant PT    EE 06/16/21 -  Debra Coker, PT Physical Therapist PT    KP 06/16/21 -  Florinda Mckeon, PT Physical Therapist PT    DP 08/24/21 -  Yordy Garcia, PT Physical Therapist PT    KM 06/06/22 -  Ronny Winters, PT Student PT Student PT                PT Recommendation and Plan                          Time Calculation:      PT Charges     Row Name 08/06/22 1213             Time Calculation    Start Time 1100  -EE      Stop Time 1130  -EE      Time Calculation (min) 30 min  -EE      PT Received On 08/06/22  -EE      PT - Next Appointment 08/07/22  -EE            User Key  (r) = Recorded By, (t) = Taken By, (c) = Cosigned By    Initials Name Provider Type    EE Debra Coker, PT Physical Therapist                Therapy Charges for Today     Code Description Service Date Service Provider Modifiers Qty    00378566982 HC PT THER PROC EA 15 MIN 8/6/2022 Debra Coker, PT GP 1    75817574361 HC GAIT TRAINING EA 15 MIN 8/6/2022 Debra Coker, PT GP 1    [id number] HC PT THER SUPP EA 15 MIN 8/6/2022 Debra Coker, PT GP 1              Patient was wearing a face mask during this therapy encounter. Therapist used appropriate personal protective equipment including mask and gloves.  Mask used was standard procedure mask. Appropriate PPE was worn during the entire therapy session. Hand hygiene was completed before and after therapy session. Patient is not in enhanced droplet precautions.         Debra  John Paul, PT  8/6/2022

## 2022-08-06 NOTE — PLAN OF CARE
Goal Outcome Evaluation:  Plan of Care Reviewed With: patient        Progress: improving  Outcome Evaluation: AAOx4. Cooperative with all care. Quiet. No c/o pain today. Up assist of 1, walker. Ate well, no coughing noted/ NTL. SSI given with all meals. Continent of B&B. Trulicity brought by family; Dr. Grewal stated he wants Dr. Chandra to place order on Monday.

## 2022-08-06 NOTE — PROGRESS NOTES
Inpatient Rehabilitation Plan of Care Note    Plan of Care  Care Plan Reviewed - No updates at this time.    Sphincter Control    Performed Intervention(s)  Encourage appropriate diet  Utilize incontinence products as needed      Safety    Performed Intervention(s)  All items within reach  Hourly rounding  Falls precautions in place      Psychosocial    Performed Intervention(s)  Provide calm environment  Allow pt to express concerns, wants and needs      Pain    Performed Intervention(s)  Offer medication if pain arises      Body Systems    Performed Intervention(s)  Check oxygen q shift  Blood sugar AC and HS    Signed by: Justina Knapp RN

## 2022-08-06 NOTE — PLAN OF CARE
Goal Outcome Evaluation:         Slept well. Meds crushed in applesauce. O2 1L/NC while sleeping. Continent & incontinent of B&B. Glucose 159 tonight, 5 units Lantus given as ordered. No c/o pain.

## 2022-08-06 NOTE — PROGRESS NOTES
Inpatient Rehabilitation Plan of Care Note    Plan of Care  Care Plan Reviewed - No updates at this time.    Sphincter Control    Performed Intervention(s)  Encourage appropriate diet  Utilize incontinence products as needed      Safety    Performed Intervention(s)  All items within reach  Hourly rounding  Falls precautions in place      Psychosocial    Performed Intervention(s)  Provide calm environment  Allow pt to express concerns, wants and needs      Pain    Performed Intervention(s)  Offer medication if pain arises      Body Systems    Performed Intervention(s)  Check oxygen q shift  Blood sugar AC and HS    Signed by: Shireen Peterson RN

## 2022-08-07 LAB
GLUCOSE BLDC GLUCOMTR-MCNC: 129 MG/DL (ref 70–130)
GLUCOSE BLDC GLUCOMTR-MCNC: 142 MG/DL (ref 70–130)
GLUCOSE BLDC GLUCOMTR-MCNC: 205 MG/DL (ref 70–130)
GLUCOSE BLDC GLUCOMTR-MCNC: 284 MG/DL (ref 70–130)

## 2022-08-07 PROCEDURE — 63710000001 INSULIN LISPRO (HUMAN) PER 5 UNITS: Performed by: PHYSICAL MEDICINE & REHABILITATION

## 2022-08-07 PROCEDURE — 82962 GLUCOSE BLOOD TEST: CPT

## 2022-08-07 PROCEDURE — 63710000001 INSULIN LISPRO (HUMAN) PER 5 UNITS: Performed by: INTERNAL MEDICINE

## 2022-08-07 PROCEDURE — 25010000002 ENOXAPARIN PER 10 MG: Performed by: INTERNAL MEDICINE

## 2022-08-07 RX ADMIN — INSULIN LISPRO 2 UNITS: 100 INJECTION, SOLUTION INTRAVENOUS; SUBCUTANEOUS at 11:51

## 2022-08-07 RX ADMIN — ENOXAPARIN SODIUM 40 MG: 100 INJECTION SUBCUTANEOUS at 21:01

## 2022-08-07 RX ADMIN — INSULIN GLARGINE-YFGN 5 UNITS: 100 INJECTION, SOLUTION SUBCUTANEOUS at 21:05

## 2022-08-07 RX ADMIN — PRAVASTATIN SODIUM 40 MG: 40 TABLET ORAL at 16:45

## 2022-08-07 RX ADMIN — DONEPEZIL HYDROCHLORIDE 10 MG: 10 TABLET, FILM COATED ORAL at 21:01

## 2022-08-07 RX ADMIN — AMLODIPINE BESYLATE 2.5 MG: 2.5 TABLET ORAL at 07:25

## 2022-08-07 RX ADMIN — INSULIN LISPRO 2 UNITS: 100 INJECTION, SOLUTION INTRAVENOUS; SUBCUTANEOUS at 07:25

## 2022-08-07 RX ADMIN — ZONISAMIDE 200 MG: 100 CAPSULE ORAL at 21:01

## 2022-08-07 RX ADMIN — Medication 1 MG: at 21:01

## 2022-08-07 RX ADMIN — INSULIN LISPRO 4 UNITS: 100 INJECTION, SOLUTION INTRAVENOUS; SUBCUTANEOUS at 16:45

## 2022-08-07 NOTE — PLAN OF CARE
Goal Outcome Evaluation:  Plan of Care Reviewed With: patient        Progress: improving  Outcome Evaluation: AAOx4. Cooperative with all care. No c/o pain. Up assist of 1, walker. Continent of B&B; wears depends for urgency. Skin intact. VS stable.

## 2022-08-07 NOTE — PLAN OF CARE
Goal Outcome Evaluation:    Slept well. Meds crushed in applesauce. Continent & incontinent of B&B. Glucose 229 tonight, 5 units Lantus given as ordered. No c/o pain.

## 2022-08-07 NOTE — PROGRESS NOTES
Chief Complaint:   Immobility syndrome/debility  Impaired cognition/impaired mobility/impaired self-care  ID status post aspiration pneumonia-Zosyn completes on July 29  Severe dysphagia-n.p.o. recommended on videofluoroscopic swallow study but patient family wish for avoiding PEG tube and for comfort diet-purée/nectar thick liquid  Hypertension-amlodipine  DVT prophylaxis-Lovenox/SCDs  Cognition-dementia-donepezil  Seizure disorder-Zonegran  Diabetes mellitus type 2-on Trulicity and Lantus low-dose at home     History of Present Illness  82 y.o. female with PMH of Esophageal Stricture, gastritis, oropharyngeal dysphagia, dementia, DM2, HTN, symptomatic Bradycardia s/p PPM, and Seizure D/O who admitted to [facility] on 7/24/2022 due to Aspiration Pneumonia.  Pt. was previously admitted to [facility] 6 weeks ago with aspiration pneumonia and  readmitted with shortness of breath and cough since a witnessed aspiration event 7-23-22.    Subjective:     Patient seen and examined. No acute events overnight. Denies CP, SOA, N/V, F/C. Tolerating diet. Blood sugars 129-142 today.    Physical Exam:  MENTAL STATUS -  AWAKE / ALERT  HEENT- NCAT, SCLERAE ANICTERIC, CONJUNCTIVAE PINK, OP MOIST, NO JVD, EARS UNREMARKABLE EXTERNALLY  LUNGS - CTA, NO WHEEZES, RALES OR RHONCHI  HEART- RRR, NO RUB, MURMUR, OR GALLOP  ABD - NORMOACTIVE BOWEL SOUNDS, SOFT, NT. NO HEPATOSPLENOMEGALY APPRECIATED  EXT - NO EDEMA OR CYANOSIS  NEURO -awake alert.  Speech fluent.  Oriented x3.  MOTOR EXAM - RUE/RLE 5/5. LUE/LLE 5/5      Assessment and Plan:   # Functional deficits and Generalized Weakness related to multiple medical comorbities - Admit to inpt rehab    #1.  Aspiration Pneumonia of both lungs  2/2 gastric secretions  -Recommendation for n.p.o.  Per patient family request comfort diet with pureed diet per ST  - On Zosyn-completes July 29.     #2. Dysphagia  - pt. Noncompliant with home diet-patient and family do not wish to pursue PEG  tube.  Comfort diet per patient/family request.     #3.  Leukocytosis - resolved     #4. Dementia with behavioral disturbance  - mild  - on medication     #5.  Uncontrolled DM type 2 w/ Hyperglycemia  August 6 - -227 on 8/5. 122 this am. Family has brought in home Trulicity. Blood sugars under fair control with current regimen. Defer re-start of Trulicity to Dr. Chandra on Monday.   #6.  Diabetic Peripheral polyneuropathy  - Hgb A1C 8.4  - follows with an outpatient endocrinologist  -was on Lantus 5 units nightly at home.    August 2-blood sugars elevated-resume home Lantus dose 5 units daily  August 4-blood glucose is elevated at lunch and suppertime.  - will add low-dose lispro 2 units with breakfast and lunch and asked that she have Trulicity once weekly brought in from home.    #7.  Essential HTN  - amlodipine restarted     #8.  Thrombocytopenia - mild  - trending labs     #9.  BLE edema  - elevate and monitor            TEAM CONF - AUGUST 2 - TRANSFERS MIN. CAR TRANSFERS MIN 4 STAIRS MIN. GAIT 160 FEET RW CTG. TOILET TRANSFERS CTG RW. SHOWER TRANSFERS MIN .  BATH MIN. LBD CTG. UBD SBA. EATING SET UP. GROOMING SBA. TOILETING MIN. CHRONIC DYSPHAGIA - COMFORT FEEDS AS NOT WANT PEG TUBE/ NPO STATUS - PUREE/NTL. PHARYNGEAL DYSPHAGIA WITH SILENT ASPIRATION. IMPAIRED COGNITION/ IMPAIRED RECALL BUT DID RECALL SWALLOW STRATEGIES. SKIN INTACT. MOSTLY CONTINENT BOWEL . INCONTINENT BLADDER BUT WEARS BRIEF AND ZANDER-PAD - USED AT HOME.. ON O2 1 LITER AT NIGHT.   ELOS -  END OF NEXT WEEK WITH 24 HOUR ASSIST     Now admit for comprehensive acute inpatient rehabilitation .  This would be an interdisciplinary program with physical therapy 1 hour,  occupational therapy 1 hour, and speech therapy 1 hour, 5 days a week.  Rehabilitation nursing for carryover, monitoring of pulmonary and neurologic   status, bowel and bladder, and skin  Ongoing physician follow-up.  Weekly team conferences.  Goals are to achieve a level of  supervision with  mobility and self-care and improved swallow.   Rehabilitation prognosis fair.  Medical prognosis fair.  Estimated length of stay is approximately 2 weeks, but is only an estimation.      The patient's functional status and clinical status is unchanged from preadmission assessment and the patient continues appropriate for acute inpatient rehabilitation.  Goal is for home with home health   therapies.  Barrier to discharge: Impaired swallow mobility self-care- work on dysphagia exercises, transfers, balance, gait, ADLs to overcome.         8/7/2022:    Reviewed medications, vital signs, and recent lab work. Progressing well. Continue comprehensive inpatient rehabilitation program.    Family has brought in home Trulicity. Blood sugars under fair control with current regimen. Defer re-start of Trulicity to Dr. Chandra on Monday.         Han Grewal MD

## 2022-08-08 LAB
GLUCOSE BLDC GLUCOMTR-MCNC: 152 MG/DL (ref 70–130)
GLUCOSE BLDC GLUCOMTR-MCNC: 158 MG/DL (ref 70–130)
GLUCOSE BLDC GLUCOMTR-MCNC: 263 MG/DL (ref 70–130)
GLUCOSE BLDC GLUCOMTR-MCNC: 295 MG/DL (ref 70–130)

## 2022-08-08 PROCEDURE — 25010000002 ENOXAPARIN PER 10 MG: Performed by: INTERNAL MEDICINE

## 2022-08-08 PROCEDURE — 63710000001 INSULIN LISPRO (HUMAN) PER 5 UNITS: Performed by: PHYSICAL MEDICINE & REHABILITATION

## 2022-08-08 PROCEDURE — 97110 THERAPEUTIC EXERCISES: CPT

## 2022-08-08 PROCEDURE — 63710000001 INSULIN LISPRO (HUMAN) PER 5 UNITS: Performed by: INTERNAL MEDICINE

## 2022-08-08 PROCEDURE — 97535 SELF CARE MNGMENT TRAINING: CPT

## 2022-08-08 PROCEDURE — 82962 GLUCOSE BLOOD TEST: CPT

## 2022-08-08 PROCEDURE — 92526 ORAL FUNCTION THERAPY: CPT

## 2022-08-08 RX ORDER — FAMOTIDINE 20 MG/1
20 TABLET, FILM COATED ORAL
Status: DISCONTINUED | OUTPATIENT
Start: 2022-08-08 | End: 2022-08-10 | Stop reason: HOSPADM

## 2022-08-08 RX ADMIN — INSULIN GLARGINE-YFGN 5 UNITS: 100 INJECTION, SOLUTION SUBCUTANEOUS at 22:11

## 2022-08-08 RX ADMIN — Medication 1 MG: at 22:03

## 2022-08-08 RX ADMIN — FAMOTIDINE 20 MG: 20 TABLET ORAL at 16:30

## 2022-08-08 RX ADMIN — INSULIN LISPRO 2 UNITS: 100 INJECTION, SOLUTION INTRAVENOUS; SUBCUTANEOUS at 11:22

## 2022-08-08 RX ADMIN — INSULIN LISPRO 2 UNITS: 100 INJECTION, SOLUTION INTRAVENOUS; SUBCUTANEOUS at 07:34

## 2022-08-08 RX ADMIN — INSULIN LISPRO 2 UNITS: 100 INJECTION, SOLUTION INTRAVENOUS; SUBCUTANEOUS at 07:33

## 2022-08-08 RX ADMIN — INSULIN LISPRO 4 UNITS: 100 INJECTION, SOLUTION INTRAVENOUS; SUBCUTANEOUS at 16:55

## 2022-08-08 RX ADMIN — ENOXAPARIN SODIUM 40 MG: 100 INJECTION SUBCUTANEOUS at 22:03

## 2022-08-08 RX ADMIN — DULAGLUTIDE 3 MG: 3 INJECTION, SOLUTION SUBCUTANEOUS at 16:30

## 2022-08-08 RX ADMIN — PRAVASTATIN SODIUM 40 MG: 40 TABLET ORAL at 16:30

## 2022-08-08 RX ADMIN — ZONISAMIDE 200 MG: 100 CAPSULE ORAL at 22:02

## 2022-08-08 RX ADMIN — AMLODIPINE BESYLATE 2.5 MG: 2.5 TABLET ORAL at 07:33

## 2022-08-08 RX ADMIN — DONEPEZIL HYDROCHLORIDE 10 MG: 10 TABLET, FILM COATED ORAL at 22:02

## 2022-08-08 NOTE — PROGRESS NOTES
Chief Complaint:   Immobility syndrome/debility  Impaired cognition/impaired mobility/impaired self-care  ID status post aspiration pneumonia-Zosyn completes on July 29  Severe dysphagia-n.p.o. recommended on videofluoroscopic swallow study but patient family wish for avoiding PEG tube and for comfort diet-purée/nectar thick liquid  Hypertension-amlodipine  DVT prophylaxis-Lovenox/SCDs  Cognition-dementia-donepezil  Seizure disorder-Zonegran  Diabetes mellitus type 2-on Trulicity and Lantus low-dose at home     History of Present Illness  82 y.o. female with PMH of Esophageal Stricture, gastritis, oropharyngeal dysphagia, dementia, DM2, HTN, symptomatic Bradycardia s/p PPM, and Seizure D/O who admitted to [facility] on 7/24/2022 due to Aspiration Pneumonia.  Pt. was previously admitted to [facility] 6 weeks ago with aspiration pneumonia and  readmitted with shortness of breath and cough since a witnessed aspiration event 7-23-22.    Subjective:   Comfortable with her breathing.  Reports tolerating dysphagia diet.  No shortness of air.  Does not describe any significant productive sputum.  Indicates tolerating activities.       Physical Exam:  MENTAL STATUS -  AWAKE / ALERT  HEENT- NCAT, SCLERAE ANICTERIC, CONJUNCTIVAE PINK, OP MOIST, NO JVD, EARS UNREMARKABLE EXTERNALLY  LUNGS - CTA, NO WHEEZES, RALES OR RHONCHI  HEART- RRR, NO RUB, MURMUR, OR GALLOP  ABD - NORMOACTIVE BOWEL SOUNDS, SOFT, NT. NO HEPATOSPLENOMEGALY APPRECIATED  EXT - NO EDEMA OR CYANOSIS  NEURO -awake alert.  Speech fluent.  Oriented x3.  MOTOR EXAM - RUE/RLE 5/5. LUE/LLE 5/5      Assessment and Plan:   # Functional deficits and Generalized Weakness related to multiple medical comorbities - Admit to inpt rehab    #1.  Aspiration Pneumonia of both lungs  2/2 gastric secretions  -Recommendation for n.p.o.  Per patient family request comfort diet with pureed diet per ST  - On Zosyn-completes July 29.     #2. Dysphagia  - pt. Noncompliant with  home diet-patient and family do not wish to pursue PEG tube.  Comfort diet per patient/family request.     #3.  Leukocytosis - resolved     #4. Dementia with behavioral disturbance  - mild  - on medication     #5.  Uncontrolled DM type 2 w/ Hyperglycemia  #6.  Diabetic Peripheral polyneuropathy  - Hgb A1C 8.4  - follows with an outpatient endocrinologist  -was on Lantus 5 units nightly at home.    August 2-blood sugars elevated-resume home Lantus dose 5 units daily  August 4-blood glucose is elevated at lunch and suppertime.  - will add low-dose lispro 2 units with breakfast and lunch and asked that she have Trulicity once weekly brought in from home.  August 8 - resume Trulicity from home. Continue home Lantus 5 units daily. D/c scheduled lispro with breakfast and lunch.     #7.  Essential HTN  - amlodipine restarted     #8.  Thrombocytopenia - mild  - trending labs     #9.  BLE edema  - elevate and monitor            TEAM CONF - AUGUST 2 - TRANSFERS MIN. CAR TRANSFERS MIN 4 STAIRS MIN. GAIT 160 FEET RW CTG. TOILET TRANSFERS CTG RW. SHOWER TRANSFERS MIN .  BATH MIN. LBD CTG. UBD SBA. EATING SET UP. GROOMING SBA. TOILETING MIN. CHRONIC DYSPHAGIA - COMFORT FEEDS AS NOT WANT PEG TUBE/ NPO STATUS - PUREE/NTL. PHARYNGEAL DYSPHAGIA WITH SILENT ASPIRATION. IMPAIRED COGNITION/ IMPAIRED RECALL BUT DID RECALL SWALLOW STRATEGIES. SKIN INTACT. MOSTLY CONTINENT BOWEL . INCONTINENT BLADDER BUT WEARS BRIEF AND ZANDER-PAD - USED AT HOME.. ON O2 1 LITER AT NIGHT.   ELOS -  END OF NEXT WEEK WITH 24 HOUR ASSIST     Now admit for comprehensive acute inpatient rehabilitation .  This would be an interdisciplinary program with physical therapy 1 hour,  occupational therapy 1 hour, and speech therapy 1 hour, 5 days a week.  Rehabilitation nursing for carryover, monitoring of pulmonary and neurologic   status, bowel and bladder, and skin  Ongoing physician follow-up.  Weekly team conferences.  Goals are to achieve a level of supervision with   mobility and self-care and improved swallow.   Rehabilitation prognosis fair.  Medical prognosis fair.  Estimated length of stay is approximately 2 weeks, but is only an estimation.      The patient's functional status and clinical status is unchanged from preadmission assessment and the patient continues appropriate for acute inpatient rehabilitation.  Goal is for home with home health   therapies.  Barrier to discharge: Impaired swallow mobility self-care- work on dysphagia exercises, transfers, balance, gait, ADLs to overcome.             Aj Chandra MD

## 2022-08-08 NOTE — CONSULTS
Nutrition Services    Patient Name:  Di Macario  YOB: 1940  MRN: [id number]  Admit Date:  7/28/2022    Comments: Follow up:     Intake 100% most meals. Tolerating diet, supplementing with magic cups at meals for added kcal/protein. Glu can be in 200s at times. Skin intact. Cont to monitor during rehab stay.     CLINICAL NUTRITION ASSESSMENT      Reason for Assessment Acute Rehab Admission     Admitting Diagnosis        Medical/Surgical History Immobility Syndrome due to:  Aspiration PNA of both lungs  Dementia    Past Medical History:   Diagnosis Date   • Dementia (HCC)     states better with medication    • Diabetes mellitus (HCC)    • Hyperlipidemia 01/13/2021   • Lung consolidation (HCC) 06/01/2022   • Peripheral neuropathy 08/28/2018   • Vitamin D deficiency        Past Surgical History:   Procedure Laterality Date   • ANKLE SURGERY Right    • BLADDER SURGERY      Prolapsed   • BREAST BIOPSY     • CARDIAC ELECTROPHYSIOLOGY PROCEDURE N/A 3/5/2021    Procedure: Pacemaker DC new BOSTON;  Surgeon: Madelin Ferguson MD;  Location: [facility] CATH INVASIVE LOCATION;  Service: Cardiology;  Laterality: N/A;   • ENDOSCOPY N/A 6/6/2022    Procedure: ESOPHAGOGASTRODUODENOSCOPY with biopsy, balloon dilation;  Surgeon: Lo Benjamin MD;  Location: [facility] ENDOSCOPY;  Service: Gastroenterology;  Laterality: N/A;  esophageal stricture, hiatal hernia, gastritis   • HYSTERECTOMY            Nutritional Risk Screening       Risk Screening Criteria Difficulty chewing/swallowing     Encounter Information        Nutrition History/Narrative:    7/29 Transfers from acute (6N) after treatment for aspiration pneumonia. Pt noted to be high risk for aspiration, NPO recommended based on VFSS but pt/family do not want PEG placement; wish for safest oral diet. Intake % at meals. Weight has trended down over past 3-4 months, UBW in the 140's lb and now 129 lb. Aic 8.4% (pt is 81 yo). Glu running in  "100-200s.     Food Preferences:      Supplements: Magic cups     Factors Affecting Intake: impaired cognition, swallow impairment       Current Nutrition Orders & Evaluation of Intake       Oral Nutrition     Food Allergies NKFA   Current PO Diet Diet Dysphagia; II - Pureed; Nectar / Syrup Thick; Consistent Carbohydrate   Supplement Magic cups with meals   PO Evaluation     Trending % PO Intake 100% x 3 days   --  Anthropometrics        Current Height  Current Weight  BMI kg/m2 Height: 160 cm (62.99\")  Weight: 58.7 kg (129 lb 6.4 oz) (07/28/22 2112)  Body mass index is 22.93 kg/m².       Ideal Body Weight (IBW) 115 lb   Usual Body Weight (UBW) ~145 lb       Weight Change -15-20 lb in recent weeks (<3-4 months)   Trending Weight Hx  Wt Readings from Last 15 Encounters:   07/28/22 2112 58.7 kg (129 lb 6.4 oz)   07/28/22 0500 64 kg (141 lb 1.5 oz)   07/27/22 0333 63.3 kg (139 lb 8.8 oz)   07/26/22 0514 60.8 kg (134 lb 0.6 oz)   07/25/22 0248 59.9 kg (132 lb 0.9 oz)   07/24/22 2356 59.4 kg (130 lb 15.3 oz)   07/12/22 1321 60.4 kg (133 lb 3.2 oz)   07/06/22 1019 59 kg (130 lb)   06/09/22 0516 65.4 kg (144 lb 3.2 oz)   06/08/22 0539 65.8 kg (145 lb)   06/07/22 0144 64.5 kg (142 lb 4.8 oz)   06/06/22 0105 67.2 kg (148 lb 3.2 oz)   06/05/22 0558 67.6 kg (149 lb)   06/04/22 0615 66.2 kg (146 lb)   06/03/22 0513 66.3 kg (146 lb 3.2 oz)   06/02/22 0319 65.2 kg (143 lb 11.8 oz)   06/01/22 0557 65.8 kg (145 lb)   04/07/22 1134 66.1 kg (145 lb 12.8 oz)   03/17/22 1256 65.4 kg (144 lb 3.2 oz)   03/01/22 1035 66.8 kg (147 lb 3.2 oz)   10/29/21 0612 68.8 kg (151 lb 10.8 oz)   10/29/21 0024 68.5 kg (151 lb)   09/23/21 1017 68.5 kg (151 lb)   08/27/21 1107 69.4 kg (153 lb)   08/05/21 1123 69.5 kg (153 lb 3.2 oz)   05/06/21 1231 72.9 kg (160 lb 12.8 oz)   04/27/21 1527 73 kg (161 lb)   03/25/21 1029 71.2 kg (157 lb)      --  Physical Findings        Physical Appearance  alert, on oxygen therapy     Edema  lower extremity , 1+ (trace) " "  Gastrointestinal non-distended , last bowel movement:8/7   Tubes/Drains none   Oral/Mouth Cavity missing teeth   Skin skin intact     Estimated/Assessed Needs       Energy Requirements    Height for Calculation  Height: 160 cm (62.99\")   Weight for Calculation 58.7 kg   Method for Estimation  30 kcal/kg   EST Needs (kcal/day) 1761       Protein Requirements    Weight for Calculation 58.7 kg   EST Protein Needs (g/kg) 1.2 gm/kg   EST Daily Needs (g/day) 70       Fluid Requirements     Estimated Needs (mL/day) 1761       Fluid Deficit    Current Na Level (mEq/L)    Desired Na Level (mEq/L)    Estimated Fluid Deficit (L)       Tests/Procedures/Labs        Tests/Procedures VFSS 7/27 - moderate to severe oropharyngeal dysphagia        Pertinent Labs           Invalid input(s): LABALBU, PROT  Results from last 7 days   Lab Units 08/03/22  0720   HEMOGLOBIN g/dL 9.9*   HEMATOCRIT % 31.4*   WBC 10*3/mm3 4.61     Results from last 7 days   Lab Units 08/03/22  0720   PLATELETS 10*3/mm3 254     COVID19   Date Value Ref Range Status   07/28/2022 Not Detected Not Detected - Ref. Range Final     Lab Results   Component Value Date    HGBA1C 8.40 (H) 07/25/2022        Medications           Scheduled Medications amLODIPine, 2.5 mg, Oral, Q24H  donepezil, 10 mg, Oral, Nightly  enoxaparin, 40 mg, Subcutaneous, Nightly  insulin glargine, 5 Units, Subcutaneous, Nightly  insulin lispro, 0-7 Units, Subcutaneous, TID AC  insulin lispro, 2 Units, Subcutaneous, Daily With Breakfast & Lunch  melatonin, 1 mg, Oral, Nightly  pravastatin, 40 mg, Oral, Q PM  zonisamide, 200 mg, Oral, Nightly       Infusions     PRN Medications •  acetaminophen  •  dextrose  •  dextrose  •  docusate sodium  •  glucagon (human recombinant)  •  nitroglycerin  •  ondansetron **OR** ondansetron  •  sodium chloride        Nutrition Diagnosis        Nutrition Dx Problem 1 Problem: Swallowing Difficulty    Etiology: Medical Diagnosis - dementia, " dysphagia    Signs/Symptoms: Unintended Weight Change and SLP/Swallow Evaluation    Comment: VFSS 7/27 - moderate to severe oropharyngeal dysphagia. Pt/family do not want PEG. Comfort diet only. Wt down about 15-20 lb in recent months.      Intervention Goal        Intervention Goal(s) Tolerate PO , No significant weight loss and PO intake goal %: 75     Nutrition Intervention        RD Action Encourage intake, Follow Tx Progress, Care plan reviewed and Recommend/ordered: supplement     Recommendations         Diet Prescription    Supplement Prescription Magic cups Q meal TID   EN Prescription    --  Monitor/Evaluation        Monitor Per protocol     RD to follow up per protocol.    Electronically signed by:  Julianna Hollins RD  08/08/22 12:03 EDT

## 2022-08-08 NOTE — THERAPY TREATMENT NOTE
Inpatient Rehabilitation - Physical Therapy Treatment Note       [facility]     Patient Name: Di Macario  : 1940  MRN: [id number]    Today's Date: 2022                    Admit Date: 2022      Visit Dx:     ICD-10-CM ICD-9-CM   1. Impaired functional mobility, balance, gait, and endurance  Z74.09 V49.89       Patient Active Problem List   Diagnosis   • Essential hypertension   • Vitamin D deficiency   • Hyperuricemia   • Slow transit constipation   • Chronic pain of left knee   • At high risk for falls   • Peripheral neuropathy   • Uncontrolled type 2 diabetes mellitus with hyperglycemia (HCC)   • Walker as ambulation aid   • Bradycardia, sinus   • Shortness of breath   • Leg swelling   • Hyperlipidemia   • Presence of cardiac pacemaker   • Dementia without behavioral disturbance (HCC)   • Aspiration pneumonia due to vomitus ([facility])   • Aspiration pneumonia of both lungs due to gastric secretions, unspecified part of lung (HCC)   • Weakness   • Thrombocytopenia ([facility])   • Oropharyngeal dysphagia   • Immobility syndrome       Past Medical History:   Diagnosis Date   • Dementia ([facility])     states better with medication    • Diabetes mellitus (HCC)    • Hyperlipidemia 2021   • Lung consolidation ([facility]) 2022   • Peripheral neuropathy 2018   • Vitamin D deficiency        Past Surgical History:   Procedure Laterality Date   • ANKLE SURGERY Right    • BLADDER SURGERY      Prolapsed   • BREAST BIOPSY     • CARDIAC ELECTROPHYSIOLOGY PROCEDURE N/A 3/5/2021    Procedure: Pacemaker DC new BOSTON;  Surgeon: Madelin Ferguson MD;  Location: [facility] CATH INVASIVE LOCATION;  Service: Cardiology;  Laterality: N/A;   • ENDOSCOPY N/A 2022    Procedure: ESOPHAGOGASTRODUODENOSCOPY with biopsy, balloon dilation;  Surgeon: Lo Benjamin MD;  Location: [facility] ENDOSCOPY;  Service: Gastroenterology;  Laterality: N/A;  esophageal stricture, hiatal hernia, gastritis   • HYSTERECTOMY          PT ASSESSMENT (last 12 hours)     IRF PT Evaluation and Treatment     Row Name 08/08/22 0946          PT Time and Intention    Document Type daily treatment  -LB     Mode of Treatment physical therapy  -LB     Patient/Family/Caregiver Comments/Observations Pnt seated in w/c. NAD  -LB     Row Name 08/08/22 0946          General Information    Existing Precautions/Restrictions fall  -LB     Row Name 08/08/22 0946          Pain Assessment    Pretreatment Pain Rating 0/10 - no pain  -LB     Row Name 08/08/22 0946          Bed Mobility    Supine-Sit [place] (Bed Mobility) modified independence  -LB     Sit-Supine [place] (Bed Mobility) modified independence  -LB     Assistive Device (Bed Mobility) bed rails  -LB     Row Name 08/08/22 0946          Transfers    Bed-Chair [place] (Transfers) contact guard;standby assist  -LB     Chair-Bed [place] (Transfers) contact guard;standby assist  -LB     Assistive Device (Bed-Chair Transfers) wheelchair  -LB     Sit-Stand [place] (Transfers) standby assist  -LB     Stand-Sit [place] (Transfers) standby assist  -LB     Row Name 08/08/22 0946          Chair-Bed Transfer    Assistive Device (Chair-Bed Transfers) wheelchair  -LB     Row Name 08/08/22 0946          Sit-Stand Transfer    Assistive Device (Sit-Stand Transfers) walker, front-wheeled;wheelchair  -LB     Row Name 08/08/22 0946          Stand-Sit Transfer    Assistive Device (Stand-Sit Transfers) walker, front-wheeled;wheelchair  -LB     Row Name 08/08/22 0946          Car Transfer    Type (Car Transfer) sit-stand;stand-sit  -LB     [place] Level (Car Transfer) contact guard;standby assist  -LB     Assistive Device (Car Transfer) walker, front-wheeled  -LB     Row Name 08/08/22 0946          Gait/Stairs (Locomotion)    [place] Level (Gait) standby assist  -LB     Assistive Device (Gait) walker, front-wheeled  -LB     Distance in Feet (Gait) 260  -LB     Pattern (Gait) step-through  " -LB     Deviations/Abnormal Patterns (Gait) jh decreased;stride length decreased  -LB     Bilateral Gait Deviations forward flexed posture;heel strike decreased  -LB     Left Sided Gait Deviations heel strike decreased  -LB     Right Sided Gait Deviations heel strike decreased  -LB     [place] Level (Stairs) contact guard  -LB     Handrail Location (Stairs) both sides  -LB     Number of Steps (Stairs) 4  -LB     Ascending Technique (Stairs) step-to-step  -LB     Descending Technique (Stairs) step-to-step  -LB     Comment, (Gait/Stairs) Amb w rollator wx 260' w sba. She states that \"it's easier to fall\" w rollator wx. Feels safest w standard rwx Has a rollator at home.. Will order standard rwx for pnt.  -LB     Row Name 08/08/22 0946          Curb Negotiation (Mobility)    [place], Curb Negotiation contact guard  -LB     Assistive Device (Curb Negotiation) walker, front-wheeled  -LB     Row Name 08/08/22 0946          Hip (Therapeutic Exercise)    Hip AROM (Therapeutic Exercise) bilateral;flexion;sitting;15 repititions  -LB     Hip Strengthening (Therapeutic Exercise) bilateral;aBduction;sitting;resistance band;yellow;15 repititions  -LB     Row Name 08/08/22 0946          Knee (Therapeutic Exercise)    Knee Strengthening (Therapeutic Exercise) bilateral;flexion;LAQ (long arc quad);resistance band;yellow;15 repititions  -LB     Row Name 08/08/22 0946          Ankle (Therapeutic Exercise)    Ankle AROM (Therapeutic Exercise) bilateral;dorsiflexion;plantarflexion;sitting;15 repititions  -LB     Row Name 08/08/22 0946          Positioning and Restraints    Post Treatment Position chair  -LB     In Chair sitting;call light within reach;exit alarm on  -LB     Row Name 08/08/22 0946          Vital Signs    Intra SpO2 (%) 98  -LB     O2 Delivery Intra Treatment room air  -LB           User Key  (r) = Recorded By, (t) = Taken By, (c) = Cosigned By    Initials Name Provider Type    LB Stacey Gonzalez PTA " Physical Therapist Assistant                 Physical Therapy Education                 Title: PT OT SLP Therapies (Done)     Topic: Physical Therapy (Done)     Point: Mobility training (Done)     Learning Progress Summary           Patient Acceptance, E, VU,NR by LB at 8/8/2022 1035    Comment: car, curb, stairs    Acceptance, E, VU,NR by EE at 8/6/2022 1213    Acceptance, E,D, VU,DU,NR by DP at 8/5/2022 1216    Acceptance, E, NR by LB at 8/4/2022 1108    Comment: car, curb, stairs    Acceptance, E, NR by LB at 8/3/2022 1139    Comment: stairs    Acceptance, E, NR by LB at 8/2/2022 1413    Comment: stairs    Acceptance, E, NR by LB at 8/1/2022 1414    Comment: car, stairs    Acceptance, E,D, NR by KP at 7/30/2022 1224    Acceptance, E, VU,NR by KM at 7/29/2022 1448                   Point: Home exercise program (Done)     Learning Progress Summary           Patient Acceptance, E, VU,NR by EE at 8/6/2022 1213    Acceptance, E,D, VU,DU,NR by DP at 8/5/2022 1216    Acceptance, E,D, NR by KP at 7/30/2022 1224    Acceptance, E, VU,NR by KM at 7/29/2022 1448                   Point: Body mechanics (Done)     Learning Progress Summary           Patient Acceptance, E, VU,NR by EE at 8/6/2022 1213    Acceptance, E, VU,NR by KM at 7/29/2022 1448                   Point: Precautions (Done)     Learning Progress Summary           Patient Acceptance, E, VU,NR by EE at 8/6/2022 1213    Acceptance, E,D, VU,DU,NR by DP at 8/5/2022 1216    Acceptance, E, VU,NR by KM at 7/29/2022 1448                               User Key     Initials Effective Dates Name Provider Type Discipline    LB 03/07/18 -  Stacey Gonzalez, PTA Physical Therapist Assistant PT    EE 06/16/21 -  Debra Coker, PT Physical Therapist PT    KP 06/16/21 -  Florinda Mckeon, PT Physical Therapist PT    DP 08/24/21 -  Yordy Garcia, PT Physical Therapist PT    KM 06/06/22 -  Ronny Winters, PT Student PT Student PT                PT Recommendation and Plan       Patient was wearing a face mask during this therapy encounter. Therapist used appropriate personal protective equipment including mask and gloves.  Mask used was standard procedure mask. Appropriate PPE was worn during the entire therapy session. Hand hygiene was completed before and after therapy session. Patient is not in enhanced droplet precautions.                        Time Calculation:      PT Charges     Row Name 08/08/22 0945             Time Calculation    Start Time 0930  -LB      Stop Time 1030  -LB      Time Calculation (min) 60 min  -LB            User Key  (r) = Recorded By, (t) = Taken By, (c) = Cosigned By    Initials Name Provider Type    Stacey Leblanc PTA Physical Therapist Assistant                Therapy Charges for Today     Code Description Service Date Service Provider Modifiers Qty    89101069895 HC PT THER PROC EA 15 MIN 8/8/2022 Stacey Gonzalez PTA GP 4                   Stacey Gonzalez PTA  8/8/2022

## 2022-08-08 NOTE — THERAPY TREATMENT NOTE
Inpatient Rehabilitation - Occupational Therapy Treatment Note    [facility]     Patient Name: Di Macario  : 1940  MRN: [id number]    Today's Date: 2022                 Admit Date: 2022         ICD-10-CM ICD-9-CM   1. Impaired functional mobility, balance, gait, and endurance  Z74.09 V49.89       Patient Active Problem List   Diagnosis   • Essential hypertension   • Vitamin D deficiency   • Hyperuricemia   • Slow transit constipation   • Chronic pain of left knee   • At high risk for falls   • Peripheral neuropathy   • Uncontrolled type 2 diabetes mellitus with hyperglycemia (HCC)   • Walker as ambulation aid   • Bradycardia, sinus   • Shortness of breath   • Leg swelling   • Hyperlipidemia   • Presence of cardiac pacemaker   • Dementia without behavioral disturbance ([facility])   • Aspiration pneumonia due to vomitus ([facility])   • Aspiration pneumonia of both lungs due to gastric secretions, unspecified part of lung ([facility])   • Weakness   • Thrombocytopenia ([facility])   • Oropharyngeal dysphagia   • Immobility syndrome       Past Medical History:   Diagnosis Date   • Dementia ([facility])     states better with medication    • Diabetes mellitus ([facility])    • Hyperlipidemia 2021   • Lung consolidation ([facility]) 2022   • Peripheral neuropathy 2018   • Vitamin D deficiency        Past Surgical History:   Procedure Laterality Date   • ANKLE SURGERY Right    • BLADDER SURGERY      Prolapsed   • BREAST BIOPSY     • CARDIAC ELECTROPHYSIOLOGY PROCEDURE N/A 3/5/2021    Procedure: Pacemaker DC new BOSTON;  Surgeon: Madelin Ferguson MD;  [place] [facility] [place] [place];  Service: Cardiology;  Laterality: N/A;   • ENDOSCOPY N/A 2022    Procedure: ESOPHAGOGASTRODUODENOSCOPY with biopsy, balloon dilation;  Surgeon: Lo Benjamin MD;  Location[place] [facility] [place];  Service: Gastroenterology;  Laterality: N/A;  esophageal stricture, hiatal hernia, gastritis   • HYSTERECTOMY               IRF OT  ASSESSMENT FLOWSHEET (last 12 hours)     IRF OT Evaluation and Treatment     Row Name 08/08/22 1538          OT Time and Intention    Document Type daily treatment  -DN     Mode of Treatment occupational therapy  -DN     Patient Effort good  -DN     Row Name 08/08/22 1538          Pain Assessment    Pretreatment Pain Rating 0/10 - no pain  -DN     Posttreatment Pain Rating 0/10 - no pain  -DN     Row Name 08/08/22 1538          Cognition/Psychosocial    Affect/Mental Status (Cognition) WFL  -DN     Orientation Status (Cognition) oriented x 3  -DN     Follows Commands (Cognition) follows one-step commands;over 90% accuracy  -DN     Personal Safety Interventions fall prevention program maintained;gait belt  -DN     Cognitive Function safety deficit  -DN     Safety Deficit (Cognition) minimal deficit  -DN     Row Name 08/08/22 1538          Bathing    [place] Level (Bathing) bathing skills;supervision  -DN     Assistive Device (Bathing) grab bar/tub rail;hand held shower spray hose;shower chair  -DN     Position (Bathing) supported sitting;supported standing  -DN     Row Name 08/08/22 1538          Upper Body Dressing    [place] Level (Upper Body Dressing) upper body dressing skills;doff;don;bra/undergarment;pull over garment;supervision  -DN     Position (Upper Body Dressing) supported sitting  -DN     Set-up Assistance (Upper Body Dressing) obtain clothing  -DN     Row Name 08/08/22 1538          Lower Body Dressing    [place] Level (Lower Body Dressing) doff;don;pants/bottoms;shoes/slippers;socks;underwear;supervision  -DN     Position (Lower Body Dressing) supported sitting;supported standing  -DN     Set-up Assistance (Lower Body Dressing) obtain clothing  -DN     Row Name 08/08/22 1538          Grooming    [place] Level (Grooming) grooming skills;oral care regimen;supervision  -DN     Position (Grooming) supported standing  -DN     Row Name 08/08/22 1538          Toileting    [place]  Level (Toileting) toileting skills;supervision  -DN     Assistive Device Use (Toileting) commode chair  -DN     Position (Toileting) supported sitting;supported standing  -DN     Row Name 08/08/22 1538          Transfers    [place] Level (Toilet Transfer) supervision;verbal cues  -DN     Assistive Device (Toilet Transfer) commode, 3-in-1;walker, front-wheeled  -DN     [place] Level (Shower Transfer) supervision  -DN     Assistive Device (Shower Transfer) walker, front-wheeled;shower chair  -DN     Row Name 08/08/22 1538          Toilet Transfer    Type (Toilet Transfer) stand pivot/stand step  -DN     Row Name 08/08/22 1538          Shower Transfer    Type (Shower Transfer) stand pivot/stand step  -DN     Row Name 08/08/22 1538          Elbow/Forearm (Therapeutic Exercise)    Elbow/Forearm (Therapeutic Exercise) strengthening exercise  -DN     Elbow/Forearm AROM (Therapeutic Exercise) bilateral;10 repetitions;3 sets  -DN     Elbow/Forearm Strengthening (Therapeutic Exercise) 1 lb free weight  -DN     Row Name 08/08/22 1538          Wrist (Therapeutic Exercise)    Wrist (Therapeutic Exercise) strengthening exercise  -DN     Wrist AROM (Therapeutic Exercise) bilateral;30 repititions;3 sets  -DN     Wrist Strengthening (Therapeutic Exercise) 1 lb free weight  -DN     Row Name 08/08/22 1538          Hand (Therapeutic Exercise)    Hand (Therapeutic Exercise) strengthening exercise  -DN     Hand AROM/AAROM (Therapeutic Exercise) bilateral;30 repititions  -DN     Hand Strengthening (Therapeutic Exercise) hand gripper  -DN     Row Name 08/08/22 1538          Positioning and Restraints    Pre-Treatment Position sitting in chair/recliner  -DN     Post Treatment Position wheelchair  -DN     In Bed sitting;call light within reach;encouraged to call for assist;exit alarm on  -DN           User Key  (r) = Recorded By, (t) = Taken By, (c) = Cosigned By    Initials Name Effective Dates    Natan Sunshine OT 06/16/21 -                   Occupational Therapy Education                 Title: PT OT SLP Therapies (Done)     Topic: Occupational Therapy (Done)     Point: ADL training (Done)     Description:   Instruct learner(s) on proper safety adaptation and remediation techniques during self care or transfers.   Instruct in proper use of assistive devices.              Learning Progress Summary           Patient Acceptance, E, VU by DN at 8/8/2022 1551    Comment: pt and family attended family conferance to discuss current status with adls, functional status, equipment needs, HEP ect    Acceptance, E, VU by SL at 8/8/2022 1510    Comment: family conf with pt and dtrs- discussed risk of aspiration, current diet, swallow strategies- will provide info re: thickeners, exercises and diet prior to D/C    Acceptance, E,TB,D, VU,DU by KP at 7/29/2022 1533    Comment: ed pt on role of OT , benefit of therapy .POC w. OT ed on safety w. ADL and tsf. pt demo w min A.   Family Acceptance, E, VU by DN at 8/8/2022 1551    Comment: pt and family attended family conferance to discuss current status with adls, functional status, equipment needs, HEP ect    Acceptance, E, VU by SL at 8/8/2022 1510    Comment: family conf with pt and dtrs- discussed risk of aspiration, current diet, swallow strategies- will provide info re: thickeners, exercises and diet prior to D/C                   Point: Home exercise program (Done)     Description:   Instruct learner(s) on appropriate technique for monitoring, assisting and/or progressing therapeutic exercises/activities.              Learning Progress Summary           Patient Acceptance, E, VU by DN at 8/8/2022 1551    Comment: pt and family attended family conferance to discuss current status with adls, functional status, equipment needs, HEP ect    Acceptance, E, VU by SL at 8/8/2022 1510    Comment: family conf with pt and dtrs- discussed risk of aspiration, current diet, swallow strategies- will provide info  re: thickeners, exercises and diet prior to D/C    Acceptance, E,TB,D, VU,DU by  at 7/29/2022 1533    Comment: ed pt on role of OT , benefit of therapy .POC w. OT ed on safety w. ADL and tsf. pt demo w min A.   Family Acceptance, E, VU by DN at 8/8/2022 1551    Comment: pt and family attended family conferance to discuss current status with adls, functional status, equipment needs, HEP ect    Acceptance, E, VU by SL at 8/8/2022 1510    Comment: family conf with pt and dtrs- discussed risk of aspiration, current diet, swallow strategies- will provide info re: thickeners, exercises and diet prior to D/C                   Point: Precautions (Done)     Description:   Instruct learner(s) on prescribed precautions during self-care and functional transfers.              Learning Progress Summary           Patient Acceptance, E, VU by DN at 8/8/2022 1551    Comment: pt and family attended family conferance to discuss current status with adls, functional status, equipment needs, HEP ect    Acceptance, E, VU by SL at 8/8/2022 1510    Comment: family conf with pt and dtrs- discussed risk of aspiration, current diet, swallow strategies- will provide info re: thickeners, exercises and diet prior to D/C    Acceptance, E,TB,D, VU,DU by  at 7/29/2022 1533    Comment: ed pt on role of OT , benefit of therapy .POC w. OT ed on safety w. ADL and tsf. pt demo w min A.   Family Acceptance, E, VU by DN at 8/8/2022 1551    Comment: pt and family attended family conferance to discuss current status with adls, functional status, equipment needs, HEP ect    Acceptance, E, VU by SL at 8/8/2022 1510    Comment: family conf with pt and dtrs- discussed risk of aspiration, current diet, swallow strategies- will provide info re: thickeners, exercises and diet prior to D/C                   Point: Body mechanics (Done)     Description:   Instruct learner(s) on proper positioning and spine alignment during self-care, functional mobility  activities and/or exercises.              Learning Progress Summary           Patient Acceptance, E, VU by DN at 8/8/2022 1551    Comment: pt and family attended family conferance to discuss current status with adls, functional status, equipment needs, HEP ect    Acceptance, E, VU by  at 8/8/2022 1510    Comment: family conf with pt and dtrs- discussed risk of aspiration, current diet, swallow strategies- will provide info re: thickeners, exercises and diet prior to D/C    Acceptance, E,TB,D, VU,DU by  at 7/29/2022 1533    Comment: ed pt on role of OT , benefit of therapy .POC w. OT ed on safety w. ADL and tsf. pt demo w min A.   Family Acceptance, E, VU by DN at 8/8/2022 1551    Comment: pt and family attended family conferance to discuss current status with adls, functional status, equipment needs, HEP ect    Acceptance, E, VU by  at 8/8/2022 1510    Comment: family conf with pt and dtrs- discussed risk of aspiration, current diet, swallow strategies- will provide info re: thickeners, exercises and diet prior to D/C                               User Key     Initials Effective Dates Name Provider Type Discipline     06/16/21 -  Shani Cuba, MS CCC-SLP Speech and Language Pathologist SLP    JAVAD 06/16/21 -  Natan Hoover OT Occupational Therapist OT     06/16/21 -  Adriana Gaxiola OTR Occupational Therapist OT                    OT Recommendation and Plan                         Time Calculation:      Time Calculation- OT     Row Name 08/08/22 0800             Time Calculation- OT    OT Start Time 0800  -DN      OT Stop Time 0900  -DN      OT Time Calculation (min) 60 min  -DN            User Key  (r) = Recorded By, (t) = Taken By, (c) = Cosigned By    Initials Name Provider Type    Natan Sunshine, OT Occupational Therapist              Therapy Charges for Today     Code Description Service Date Service Provider Modifiers Qty    48286880929 HC OT SELF CARE/MGMT/TRAIN EA 15 MIN 8/8/2022 Sneha  Natan, OT GO 4                   Ntaan Hoover, OT  8/8/2022

## 2022-08-08 NOTE — PLAN OF CARE
Goal Outcome Evaluation:           Progress: improving  Outcome Evaluation: A&OX4. Forgetful. Immobility sydrome. Hx. esophageal strictures, gastritis, dysphagia, dementia, aspiration pneumoniax2, DM2, HTN, seizures. BS checks ACHS. Diet: pureed, NT liquids. Meds crushed in puree. On swallow, seizure, and aspiration precautions. Mildly [place]. 1L O2 NC at nite. Continent and incontinent. Last BM 8/8. Wears a brief and peripad. Has urgency.  Assistx1 w/ rolling walker. No complaints of pain. D/C home Friday, 8/12. Participated fully in therapy. Dexcom to L. abdomen. D/C 2 units regular insulin at meals. Re-started home glucose control today: trulicity. Family conference completed today.

## 2022-08-08 NOTE — DISCHARGE PLACEMENT REQUEST
"Reji Macario (82 y.o. Female)             Date of Birth   1940    Social Security Number       Address   [address] [place] [address]    Home Phone   [phone]    MRN   [id number]       Muslim   Spiritism    Marital Status                               Admission Date   7/28/22    Admission Type   Elective    Admitting Provider   Aj Chandra MD    Attending Provider   Aj Chandra MD    Department, Room/Bed   [facility], 4404/1       Discharge Date       Discharge Disposition       Discharge Destination                               Attending Provider: Aj Chandra MD    Allergies: No Known Allergies    Isolation: None   Infection: None   Code Status: No CPR   Advance Care Planning Activity    Ht: 160 cm (62.99\")   Wt: 58.7 kg (129 lb 6.4 oz)    Admission Cmt: None   Principal Problem: None                Active Insurance as of 7/28/2022     Primary Coverage     Payor Plan Insurance Group Employer/Plan Group    MEDICARE MEDICARE A & B      Payor Plan Address Payor Plan Phone Number Payor Plan Fax Number Effective Dates    [address] [phone]  5/1/2004 - None Entered    [facility] [id number]       Subscriber Name Subscriber Birth Date Member ID       REJI MACARIO 1940 [id number]           Secondary Coverage     Payor Plan Insurance Group Employer/Plan Group     FOR LIFE  FOR LIFE  SUP       Payor Plan Address Payor Plan Phone Number Payor Plan Fax Number Effective Dates    [address] [phone]  2/1/2018 - None Entered    [facility] [id number]       Subscriber Name Subscriber Birth Date Member ID       REJI MACARIO 1940 [id number]                 Emergency Contacts      (Rel.) Home Phone Work Phone Mobile Phone    ANGELADANISH (Daughter) [phone] -- [phone]    AMBER MACARIO (Daughter) [phone] -- [phone]    PHIL FONTANA " (Daughter) [phone] -- [phone]

## 2022-08-08 NOTE — PROGRESS NOTES
Case Management  Inpatient Rehabilitation Plan of Care and Discharge Plan Note    Rehabilitation Diagnosis:  Branch  Date of Onset:  Yin    Medical Summary:  Branch  Past Medical History: Branch    Plan of Care  Updated Problems/Interventions  Field    Expected Intensity:  Branch  Interdisciplinary Team:  Yin  Estimated Length of Stay/Anticipated Discharge Date: Branch  Anticipated Discharge Destination:  Anticipated discharge destination from inpatient rehabilitation is community  discharge with assistance. Patient lives alone in one story home. Ramp entrance.  Recently has had hired caregiver 5 hours/day 3 days/week. 3 daughters local who  stay with patient at night and most of day.  Family conference held with patient and dgts on 8/8.  D/C plan is home with 24/7 assist from dgts and hired caregivers through [employer]. [facility], [facility]      Based on the patient's medical and functional status, their prognosis and  expected level of functional improvement is:  Yin    Signed by: ML Godwin

## 2022-08-08 NOTE — THERAPY TREATMENT NOTE
Inpatient Rehabilitation - Speech Language Pathology Treatment Note    [facility]     Patient Name: Di Macario  : 1940  MRN: [id number]    Today's Date: 2022                   Admit Date: 2022       Visit Dx:      ICD-10-CM ICD-9-CM   1. Impaired functional mobility, balance, gait, and endurance  Z74.09 V49.89       Patient Active Problem List   Diagnosis   • Essential hypertension   • Vitamin D deficiency   • Hyperuricemia   • Slow transit constipation   • Chronic pain of left knee   • At high risk for falls   • Peripheral neuropathy   • Uncontrolled type 2 diabetes mellitus with hyperglycemia (HCC)   • Walker as ambulation aid   • Bradycardia, sinus   • Shortness of breath   • Leg swelling   • Hyperlipidemia   • Presence of cardiac pacemaker   • Dementia without behavioral disturbance ([facility])   • Aspiration pneumonia due to vomitus ([facility])   • Aspiration pneumonia of both lungs due to gastric secretions, unspecified part of lung ([facility])   • Weakness   • Thrombocytopenia ([facility])   • Oropharyngeal dysphagia   • Immobility syndrome       Past Medical History:   Diagnosis Date   • Dementia ([facility])     states better with medication    • Diabetes mellitus (HCC)    • Hyperlipidemia 2021   • Lung consolidation ([facility]) 2022   • Peripheral neuropathy 2018   • Vitamin D deficiency        Past Surgical History:   Procedure Laterality Date   • ANKLE SURGERY Right    • BLADDER SURGERY      Prolapsed   • BREAST BIOPSY     • CARDIAC ELECTROPHYSIOLOGY PROCEDURE N/A 3/5/2021    Procedure: Pacemaker DC new [place];  Surgeon: Madelin Ferguson MD;  Location: [facility] CATH INVASIVE LOCATION;  Service: Cardiology;  Laterality: N/A;   • ENDOSCOPY N/A 2022    Procedure: ESOPHAGOGASTRODUODENOSCOPY with biopsy, balloon dilation;  Surgeon: Lo Benjamin MD;  Location: [facility] ENDOSCOPY;  Service: Gastroenterology;  Laterality: N/A;  esophageal stricture, hiatal hernia, gastritis   • HYSTERECTOMY          SLP Recommendation and Plan                                                   SLP EVALUATION (last 72 hours)     SLP SLC Evaluation     Row Name 08/08/22 2673                   Communication Assessment/Intervention    Document Type therapy note (daily note)  -SL        Subjective Information no complaints  -SL        Patient Observations alert;cooperative;agree to therapy  -SL        Patient Effort good  -SL        Symptoms Noted During/After Treatment none  -SL              User Key  (r) = Recorded By, (t) = Taken By, (c) = Cosigned By    Initials Name Effective Dates    SL Shani Cuba, MS CCC-SLP 06/16/21 -                    EDUCATION    The patient has been educated in the following areas:       Cognitive Impairment Dysphagia (Swallowing Impairment).             SLP GOALS     Row Name 08/08/22 2049             (STG) Pharyngeal Strengthening Exercise Goal 1 (SLP)    Progress/Outcomes (Pharyngeal Strengthening Goal 1, SLP) goal ongoing  -SL      Comment (Pharyngeal Strengthening Goal 1, SLP) CTAR completed for set of 10 sustained for 10 sec hold and 20 repetitive chin tuck maneuvers;  hard swallows completed x20; beulah x10; falsetto/pitch changes/scale ( reduce din range) completed posterior phoneme productions completed x20  -SL              (STG) Swallow Management Recall Goal 1 (SLP)    Progress/Outcomes (Swallow Management Recall Goal 1, SLP) goal ongoing  -SL      Comment (Swallow Management Recall Goal 1, SLP) no overt clinical s/s aspiration noted on puree or small sips of nectar thick lqiuids;  weak cough noted on 1/5 trials of ice chips in am session  -SL              (STG) Swallow Compensatory Strategies Goal 1 (SLP)    Progress/Outcomes (Swallow Compensatory Strategies/Techniques Goal 1, SLP) goal ongoing  -SL      Comment (Swallow Compensatory Strategies/Techniques Goal 1, SLP) pt continues to recall all swallow strategies and use them effectively with exception of prn thraot clear/reswallow and  occasional cue for double swallow  -SL            User Key  (r) = Recorded By, (t) = Taken By, (c) = Cosigned By    Initials Name Provider Type    Shani Jimenez MS CCC-SLP Speech and Language Pathologist                            Time Calculation:        Time Calculation- SLP     Row Name 08/08/22 1510 08/08/22 1509          Time Calculation- SLP    SLP Start Time 1300  -SL 0900  -SL     SLP Stop Time 1330  -SL 0930  -SL     SLP Time Calculation (min) 30 min  -SL 30 min  -SL           User Key  (r) = Recorded By, (t) = Taken By, (c) = Cosigned By    Initials Name Provider Type    Shani Jimenez MS CCC-SLP Speech and Language Pathologist                  Therapy Charges for Today     Code Description Service Date Service Provider Modifiers Qty    [id number]  ST TREATMENT SWALLOW 4 8/8/2022 Shani Cuba MS CCC-SLP GN 1                           MS THA Casas  8/8/2022

## 2022-08-08 NOTE — PROGRESS NOTES
Inpatient Rehabilitation Functional Measures Assessment and Plan of Care    Plan of Care  Updated Problems/Interventions  Swallow Function    [ST] Swallowing(Active)  Current Status(08/08/2022): VFSS 7/27/22 rec: NPO. Patient/family desire  modified diet vs. NPO status. Pureed with nectar thick liquids.  Weekly Goal(08/15/2022): Safe swallow strategies with indep  Discharge Goal: Safe swallow for least restrictive diet.    Functional Measures  [facility] Eating:  Branch  [facility] Grooming: [facility] Bathing:  [facility] Upper Body Dressing:  [facility] Lower Body Dressing:  [facility] Toileting:  [facility] Bladder Management  Level of Assistance:  [place]  Frequency/Number of Accidents this Shift:  [facility] Bowel Management  Level of Assistance: [place]  Frequency/Number of Accidents this Shift: [facility] Bed/Chair/Wheelchair Transfer:  [facility] Toilet Transfer:  [facility] Tub/Shower Transfer:  [place]    Previously Documented Mode of Locomotion at Discharge: Field  DANICA Expected Mode of Locomotion at Discharge: [facility] Walk/Wheelchair:  [facility] Stairs:  [facility] Comprehension:  [facility] Expression:  [facility] Social Interaction:  [facility] Problem Solving:  [facility] Memory:  [place]    Therapy Mode Minutes  Occupational Therapy: Branch  Physical Therapy: Branch  Speech Language Pathology:  Branch    Signed by: Shani Cuba, SLP

## 2022-08-08 NOTE — PROGRESS NOTES
Family conference held today with patient 2 daughters, Carley and Sanjana, OT, ST, NSG and SW present. PT sent written report. Patient's daughter, Mae, on phone.  Discussed progress and d/c recommendations.    PT report given by EVERETT. Patient is independent with bed mobility using rail. Transfers bed-chair and in/out of car with CG/SBA. Pateint walking with rolling walker SBA. PT has practiced patient using rollator but patient has stated she feels safest with standard rolling walker. PT recommended patient use rolling walker vs rollator at home. PT has also checked patient's O2 sats on room air during PT sessions and patient at 98%. Patient has gone up/down 4 steps with rails CGA.  PT anticipates patient's goals for all mobility at home to be supervision level.     OT reported patient at SBA with commode and shower transfers. Patient using BSC over commode and recommended she use this set up at home. Patient does bathing seated on shower chair with SBA. Patient does use grab bar to stand to wash mid section. Patient able to dress UE and LE with SBA seated. Patient able to change brief. Patient able to stand at sink to do grooming tasks. OT also working with patient on strengthening exercises. OT will give home exercise program and recommended patient do these every other day. Recommended patient have supervision at home when doing ADL's.     ST reviewed diet. Patient on pureed diet with Nectar thick liquids. Discussed swallow strategies: small bites and sips, double swallow, alternate solid and liquid, No straws, clear throat and re-swallow after every 4th or 5th bite. ST noted no obvious signs of aspiration. ST has been concentrating on swallow exercises with patient and patient has done well following strategies and doing well with exercises. Discussed possibly repeating VFSS prior to d/c. ST has not focused on cognition but feels patient has done well with following directions for exercises and remembering swallow  strategies with written cues. ST discussed different thickening agents and where can buy pre-thickened beverages. Will give printed information to family. Family would like any information on pureed diet. Will ask dietitian if have any information to give family.     NSG reviewed medications. NSG gives patient her medications crushed in applesauce. Patient was to start on Trulicity today. Patient does have Lantus pens at home. Recommended patient have supervision with medications and giving insulin at home. NSG reported patient's intake is good. Patient has been on 1L of O2 at night. Will need to check overnight oximetry. Discussed appointments with PCP, cardiology, pulmonology, and her neurologist.     Team recommended home health PT and ST. Patient was current with [facility]. Will notify of d/c date and services needed.    Discussed equipment for home. PT will order zulay rolling walker. OT discussed using shower chair with back vs built in bench. Patient has grab bars in shower and has BSC.     D/C plan is home with assistance from [facility] in home care agency and daughters. D/C date set for Friday, 8/12, but team to discuss if can go sooner. Will let family know after team conference tomorrow morning.   Will assist with plans.

## 2022-08-09 LAB
GLUCOSE BLDC GLUCOMTR-MCNC: 111 MG/DL (ref 70–130)
GLUCOSE BLDC GLUCOMTR-MCNC: 122 MG/DL (ref 70–130)
GLUCOSE BLDC GLUCOMTR-MCNC: 142 MG/DL (ref 70–130)
GLUCOSE BLDC GLUCOMTR-MCNC: 161 MG/DL (ref 70–130)
GLUCOSE BLDC GLUCOMTR-MCNC: 228 MG/DL (ref 70–130)

## 2022-08-09 PROCEDURE — 97535 SELF CARE MNGMENT TRAINING: CPT

## 2022-08-09 PROCEDURE — 82962 GLUCOSE BLOOD TEST: CPT

## 2022-08-09 PROCEDURE — 94762 N-INVAS EAR/PLS OXIMTRY CONT: CPT

## 2022-08-09 PROCEDURE — 25010000002 ENOXAPARIN PER 10 MG: Performed by: INTERNAL MEDICINE

## 2022-08-09 PROCEDURE — 92526 ORAL FUNCTION THERAPY: CPT

## 2022-08-09 PROCEDURE — 63710000001 INSULIN LISPRO (HUMAN) PER 5 UNITS: Performed by: INTERNAL MEDICINE

## 2022-08-09 PROCEDURE — 97110 THERAPEUTIC EXERCISES: CPT

## 2022-08-09 RX ADMIN — ZONISAMIDE 200 MG: 100 CAPSULE ORAL at 21:24

## 2022-08-09 RX ADMIN — FAMOTIDINE 20 MG: 20 TABLET ORAL at 06:00

## 2022-08-09 RX ADMIN — ENOXAPARIN SODIUM 40 MG: 100 INJECTION SUBCUTANEOUS at 21:25

## 2022-08-09 RX ADMIN — PRAVASTATIN SODIUM 40 MG: 40 TABLET ORAL at 16:45

## 2022-08-09 RX ADMIN — AMLODIPINE BESYLATE 2.5 MG: 2.5 TABLET ORAL at 07:34

## 2022-08-09 RX ADMIN — Medication 1 MG: at 21:24

## 2022-08-09 RX ADMIN — INSULIN LISPRO 2 UNITS: 100 INJECTION, SOLUTION INTRAVENOUS; SUBCUTANEOUS at 16:45

## 2022-08-09 RX ADMIN — INSULIN GLARGINE-YFGN 5 UNITS: 100 INJECTION, SOLUTION SUBCUTANEOUS at 21:25

## 2022-08-09 RX ADMIN — DONEPEZIL HYDROCHLORIDE 10 MG: 10 TABLET, FILM COATED ORAL at 21:24

## 2022-08-09 RX ADMIN — FAMOTIDINE 20 MG: 20 TABLET ORAL at 16:45

## 2022-08-09 NOTE — PLAN OF CARE
Goal Outcome Evaluation:  Plan of Care Reviewed With: patient        Progress: improving  Outcome Evaluation: Pt rested well this shift.  No c/o pain.  Pt has dexcom blood sugar moinitoring device.  Was alarmed early in AM that sugar was low.  Pt drank some juice, BG level increased.  pt up to bathroom ass x1.  Cont of bladder w/some dribbling.  All meds crushed in applesauce.

## 2022-08-09 NOTE — PROGRESS NOTES
SECTION GG      Self Care Performance Discharge:   Oral Hygiene: [place] provides verbal cues and/or touching/steadying and/or  contact guard assistance as patient completes activity.   Toileting Hygiene: : [place] provides verbal cues and/or touching/steadying  and/or contact guard assistance as patient completes activity.   Shower/Bathe Self: [place] provides verbal cues and/or touching/steadying and/or  contact guard assistance as patient completes activity.   Upper Body Dressing: [place] provides verbal cues and/or touching/steadying  and/or contact guard assistance as patient completes activity.   Lower Body Dressing: [place] provides verbal cues and/or touching/steadying  and/or contact guard assistance as patient completes activity.   Putting On/Taking Off Footwear: [place] provides verbal cues and/or  touching/steadying and/or contact guard assistance as patient completes  activity.    Mobility Toilet Transfer Discharge: [place] provides verbal cues or  touching/steadying assistance as patient completes activity.    Signed by: Natan Hoover OTR/BETH

## 2022-08-09 NOTE — PROGRESS NOTES
Inpatient Rehabilitation Functional Measures Assessment and Plan of Care    Plan of Care  Updated Problems/Interventions  Mobility    [OT] Toilet Transfers(Active)  Current Status(08/08/2022): SBA RWX  Weekly Goal(08/11/2022): SBA RWX  Discharge Goal: SBA RWX    [OT] Tub/Shower Transfers(Active)  Current Status(08/08/2022): SBA RWX  Weekly Goal(08/11/2022): SBA  Discharge Goal: SBA        Self Care    [OT] Bathing(Active)  Current Status(08/08/2022): SBA  Weekly Goal(08/11/2022): SBA  Discharge Goal: SBA    [OT] Dressing (Lower)(Active)  Current Status(08/08/2022): SBA  Weekly Goal(08/11/2022): SBA  Discharge Goal: SBA    [OT] Dressing (Upper)(Active)  Current Status(08/08/2022): SBA  Weekly Goal(08/11/2022): SBA  Discharge Goal: SBA    [OT] Eating(Active)  Current Status(08/08/2022): SBA  Weekly Goal(08/11/2022): SBA  Discharge Goal: Mod Indep    [OT] Grooming(Active)  Current Status(08/08/2022): SBA  Weekly Goal(08/11/2022): SBA  Discharge Goal: SBA    [OT] Toileting(Active)  Current Status(08/08/2022): SBA  Weekly Goal(08/11/2022): SBA  Discharge Goal: SBA    Functional Measures  DANICA Eating:  Branch  DANICA Grooming: Branch  DANICA Bathing:  Branch  DANICA Upper Body Dressing:  Branch  DANICA Lower Body Dressing:  Branch  DANICA Toileting:  Branch    DANICA Bladder Management  Level of Assistance:  Branch  Frequency/Number of Accidents this Shift:  Branch    DANICA Bowel Management  Level of Assistance: Branch  Frequency/Number of Accidents this Shift: Branch    DANICA Bed/Chair/Wheelchair Transfer:  Branch  DANICA Toilet Transfer:  Branch  DANICA Tub/Shower Transfer:  Branch    Previously Documented Mode of Locomotion at Discharge: Field  DANICA Expected Mode of Locomotion at Discharge: Branch  DANICA Walk/Wheelchair:  Branch  DANICA Stairs:  Branch    DANICA Comprehension:  Branch  DANICA Expression:  Branch  DANICA Social Interaction:  Branch  [facility] Problem Solving:  Branch  DANICA Memory:  Branch    Therapy Mode Minutes  Occupational Therapy: Branch  Physical  Therapy: Branch  Speech Language Pathology:  Branch    Signed by: Natan Hoover OTR/L

## 2022-08-09 NOTE — PROGRESS NOTES
Chief Complaint:   Immobility syndrome/debility  Impaired cognition/impaired mobility/impaired self-care  ID status post aspiration pneumonia-Zosyn completes on July 29  Severe dysphagia-n.p.o. recommended on videofluoroscopic swallow study but patient family wish for avoiding PEG tube and for comfort diet-purée/nectar thick liquid  Hypertension-amlodipine  DVT prophylaxis-Lovenox/SCDs  Cognition-dementia-donepezil  Seizure disorder-Zonegran  Diabetes mellitus type 2-on Trulicity and Lantus low-dose at home     History of Present Illness  82 y.o. female with PMH of Esophageal Stricture, gastritis, oropharyngeal dysphagia, dementia, DM2, HTN, symptomatic Bradycardia s/p PPM, and Seizure D/O who admitted to [facility] on 7/24/2022 due to Aspiration Pneumonia.  Pt. was previously admitted to [facility] 6 weeks ago with aspiration pneumonia and  readmitted with shortness of breath and cough since a witnessed aspiration event 7-23-22.    Subjective:   Comfortable with her breathing.  Reports tolerating dysphagia diet.  No shortness of air.   .  Indicates tolerating activities.       Physical Exam:  MENTAL STATUS -  AWAKE / ALERT  HEENT- NCAT, SCLERAE ANICTERIC, CONJUNCTIVAE PINK, OP MOIST, NO JVD, EARS UNREMARKABLE EXTERNALLY  LUNGS - CTA, NO WHEEZES, RALES OR RHONCHI  HEART- RRR, NO RUB, MURMUR, OR GALLOP  ABD - NORMOACTIVE BOWEL SOUNDS, SOFT, NT. NO HEPATOSPLENOMEGALY APPRECIATED  EXT - NO EDEMA OR CYANOSIS  NEURO -awake alert.  Speech fluent.  Oriented x3.  MOTOR EXAM - RUE/RLE 5/5. LUE/LLE 5/5      Assessment and Plan:   # Functional deficits and Generalized Weakness related to multiple medical comorbities - Admit to inpt rehab    #1.  Aspiration Pneumonia of both lungs  2/2 gastric secretions  -Recommendation for n.p.o.  Per patient family request comfort diet with pureed diet per ST  - On Zosyn-completes July 29.  August 9-check overnight pulse oximetry     #2. Dysphagia  - pt. Noncompliant with home  diet-patient and family do not wish to pursue PEG tube.  Comfort diet per patient/family request.     #3.  Leukocytosis - resolved     #4. Dementia with behavioral disturbance  - mild  - on medication     #5.  Uncontrolled DM type 2 w/ Hyperglycemia  #6.  Diabetic Peripheral polyneuropathy  - Hgb A1C 8.4  - follows with an outpatient endocrinologist  -was on Lantus 5 units nightly at home.    August 2-blood sugars elevated-resume home Lantus dose 5 units daily  August 4-blood glucose is elevated at lunch and suppertime.  - will add low-dose lispro 2 units with breakfast and lunch and asked that she have Trulicity once weekly brought in from home.  August 8 - resume Trulicity from home. Continue home Lantus 5 units daily. D/c scheduled lispro with breakfast and lunch.     #7.  Essential HTN  - amlodipine restarted     #8.  Thrombocytopenia - mild  - trending labs     #9.  BLE edema  - elevate and monitor            TEAM CONF - AUGUST 2 - TRANSFERS MIN. CAR TRANSFERS MIN 4 STAIRS MIN. GAIT 160 FEET RW CTG. TOILET TRANSFERS CTG RW. SHOWER TRANSFERS MIN .  BATH MIN. LBD CTG. UBD SBA. EATING SET UP. GROOMING SBA. TOILETING MIN. CHRONIC DYSPHAGIA - COMFORT FEEDS AS NOT WANT PEG TUBE/ NPO STATUS - PUREE/NTL. PHARYNGEAL DYSPHAGIA WITH SILENT ASPIRATION. IMPAIRED COGNITION/ IMPAIRED RECALL BUT DID RECALL SWALLOW STRATEGIES. SKIN INTACT. MOSTLY CONTINENT BOWEL . INCONTINENT BLADDER BUT WEARS BRIEF AND ZANDER-PAD - USED AT HOME.. ON O2 1 LITER AT NIGHT.   [facility] -  END OF NEXT WEEK WITH 24 HOUR ASSIST    TEAM CONF - AUGUST 9 - BED MOD INDEPENDENT. TRANSFERS CTG SBA. CAR TRANSFERS CTG SBA. 4 STAIRS CTG. GAIT 260 FEET RW SBA. TOILET AND SHOWER TRANSFERS SBA. BATH SBA. DRESSING SBA. DYSPHAGIA - FOLLOWS SWALLOW STRATEGIES  SWALLOW - PUREE, NECTAR THICK LIQUIDS.CONTINENT BOWEL AND BLADDER, WEARS BRIEF AS DRIBBLES.   [facility] - WED - HOME HEALTH PT AND SLP AND NURSING - IMMOBILITY SYNDROME/ DYSPHAGIA. ASPIRATION PNEUMONIA     Now admit for  comprehensive acute inpatient rehabilitation .  This would be an interdisciplinary program with physical therapy 1 hour,  occupational therapy 1 hour, and speech therapy 1 hour, 5 days a week.  Rehabilitation nursing for carryover, monitoring of pulmonary and neurologic   status, bowel and bladder, and skin  Ongoing physician follow-up.  Weekly team conferences.  Goals are to achieve a level of supervision with  mobility and self-care and improved swallow.   Rehabilitation prognosis fair.  Medical prognosis fair.  Estimated length of stay is approximately 2 weeks, but is only an estimation.      The patient's functional status and clinical status is unchanged from preadmission assessment and the patient continues appropriate for acute inpatient rehabilitation.  Goal is for home with home health   therapies.  Barrier to discharge: Impaired swallow mobility self-care- work on dysphagia exercises, transfers, balance, gait, ADLs to overcome.             Aj Chandra MD

## 2022-08-09 NOTE — PROGRESS NOTES
Inpatient Rehabilitation Plan of Care Note    Plan of Care  Care Plan Reviewed - No updates at this time.    Sphincter Control    Performed Intervention(s)  Encourage appropriate diet  Utilize incontinence products as needed      Safety    Performed Intervention(s)  All items within reach  Hourly rounding  Falls precautions in place      Psychosocial    Performed Intervention(s)  Provide calm environment  Allow pt to express concerns, wants and needs      Pain    Performed Intervention(s)  Offer medication if pain arises      Body Systems    Performed Intervention(s)  Check oxygen q shift  Blood sugar AC and HS    Signed by: Mirna Lopez RN

## 2022-08-09 NOTE — THERAPY DISCHARGE NOTE
Inpatient Rehabilitation - IRF Occupational Therapy Treatment Note/Discharge  [facility]     Patient Name: Di Macario  : 1940  MRN: [id number]  Today's Date: 2022               Admit Date: 2022       ICD-10-CM ICD-9-CM   1. Impaired functional mobility, balance, gait, and endurance  Z74.09 V49.89     Patient Active Problem List   Diagnosis   • Essential hypertension   • Vitamin D deficiency   • Hyperuricemia   • Slow transit constipation   • Chronic pain of left knee   • At high risk for falls   • Peripheral neuropathy   • Uncontrolled type 2 diabetes mellitus with hyperglycemia (HCC)   • Walker as ambulation aid   • Bradycardia, sinus   • Shortness of breath   • Leg swelling   • Hyperlipidemia   • Presence of cardiac pacemaker   • Dementia without behavioral disturbance (HCC)   • Aspiration pneumonia due to vomitus ([facility])   • Aspiration pneumonia of both lungs due to gastric secretions, unspecified part of lung (HCC)   • Weakness   • Thrombocytopenia ([facility])   • Oropharyngeal dysphagia   • Immobility syndrome     Past Medical History:   Diagnosis Date   • Dementia (HCC)     states better with medication    • Diabetes mellitus (HCC)    • Hyperlipidemia 2021   • Lung consolidation ([facility]) 2022   • Peripheral neuropathy 2018   • Vitamin D deficiency      Past Surgical History:   Procedure Laterality Date   • ANKLE SURGERY Right    • BLADDER SURGERY      Prolapsed   • BREAST BIOPSY     • CARDIAC ELECTROPHYSIOLOGY PROCEDURE N/A 3/5/2021    Procedure: Pacemaker DC new [place];  Surgeon: Madelin Ferguson MD;  Location: [facility] CATH INVASIVE LOCATION;  Service: Cardiology;  Laterality: N/A;   • ENDOSCOPY N/A 2022    Procedure: ESOPHAGOGASTRODUODENOSCOPY with biopsy, balloon dilation;  Surgeon: Lo Benjamin MD;  Location: [facility] ENDOSCOPY;  Service: Gastroenterology;  Laterality: N/A;  esophageal stricture, hiatal hernia, gastritis   • HYSTERECTOMY         IRF OT  ASSESSMENT FLOWSHEET (last 12 hours)     IRF OT Evaluation and Treatment     Row Name 08/09/22 1553          OT Time and Intention    Document Type discharge evaluation  -DN     Mode of Treatment occupational therapy  -DN     Patient Effort good  -DN     Row Name 08/09/22 1553          Pain Assessment    Pretreatment Pain Rating 0/10 - no pain  -DN     Posttreatment Pain Rating 0/10 - no pain  -DN     Row Name 08/09/22 1553          Cognition/Psychosocial    Affect/Mental Status (Cognition) WFL  -DN     Follows Commands (Cognition) follows one-step commands;over 90% accuracy  -DN     Personal Safety Interventions fall prevention program maintained;gait belt;supervised activity  -DN     Row Name 08/09/22 1553          Range of Motion Comprehensive    Comment, General Range of Motion R shoulder 75% AROM otherwise BUEs WFL  -DN     Row Name 08/09/22 1553          Strength (Manual Muscle Testing)    Comment, Left Hand (Dynamometer Testing) 25  -DN     Comment, Right Hand (Dynamometer Testing) 25  -DN     Left Hand: Lateral (Key) Pinch Strength (Pinch Dynamometer Testing) 5  -DN     Left Hand: [place] ([place]) Pinch Strength (Pinch Dynamometer Testing) 7  -DN     Right Hand: Lateral (Key) Pinch Strength (Pinch Dynamometer Testing) 6  -DN     Right Hand: [place] ([place]) Pinch Strength (Pinch Dynamometer Testing) 7  -DN     Row Name 08/09/22 1553          Bathing    [place] Level (Bathing) bathing skills;supervision  -DN     Position (Bathing) supported sitting;supported standing  -DN     Row Name 08/09/22 1553          Upper Body Dressing    [place] Level (Upper Body Dressing) supervision  -DN     Position (Upper Body Dressing) supported sitting  -DN     Row Name 08/09/22 1553          Lower Body Dressing    [place] Level (Lower Body Dressing) doff;don;pants/bottoms;shoes/slippers;socks;underwear;supervision  -DN     Row Name 08/09/22 1553          Grooming    [place] Level (Grooming) grooming  skills;deodorant application;hair care, combing/brushing;oral care regimen;supervision  -DN     Row Name 08/09/22 1553          Toileting    [place] Level (Toileting) toileting skills;supervision  -DN     Assistive Device Use (Toileting) commode chair  -DN     Position (Toileting) supported sitting;supported standing  -DN     Row Name 08/09/22 1553          Transfers    [place] Level (Toilet Transfer) supervision;verbal cues  -DN     Assistive Device (Toilet Transfer) commode, 3-in-1;walker, front-wheeled  -DN     [place] Level (Shower Transfer) supervision  -DN     Assistive Device (Shower Transfer) walker, front-wheeled;shower chair  -DN     Row Name 08/09/22 1553          Toilet Transfer    Type (Toilet Transfer) stand pivot/stand step  -DN     Row Name 08/09/22 1553          Shower Transfer    Type (Shower Transfer) stand pivot/stand step  -DN     Row Name 08/09/22 1553          Safety Issues, Functional Mobility    Safety Issues Affecting Function (Mobility) insight into deficits/self-awareness  -DN     Row Name 08/09/22 1553          Motor Skills    Results, 9 Hole Peg Test of Fine Motor Coordination RUE 34 and LUE33 and box and blocks RUE 42 and LUE 39  -DN     Functional Endurance good-  -DN     Row Name 08/09/22 1553          Transfer Goal 1 (OT-IRF)    Progress/Outcomes (Transfer Goal 1, OT-IRF) goal met  -DN     Row Name 08/09/22 1553          Transfer Goal 2 (OT-IRF)    Progress/Outcomes (Transfer Goal 2, OT-IRF) goal met  -DN     Row Name 08/09/22 1553          Bathing Goal 1 (OT-IRF)    Progress/Outcomes (Bathing Goal 1, OT-IRF) goal met  -DN     Row Name 08/09/22 1553          Bathing Goal 2 (OT-IRF)    Progress/Outcomes (Bathing Goal 2, OT-IRF) goal met  -DN     Row Name 08/09/22 1553          UB Dressing Goal 1 (OT-IRF)    Progress/Outcomes (UB Dressing Goal 1, OT-IRF) goal met  -DN     Row Name 08/09/22 1553          UB Dressing Goal 2 (OT-IRF)    Progress/Outcomes (UB Dressing Goal  2, OT-IRF) goal met  -DN     Row Name 08/09/22 1553          LB Dressing Goal 1 (OT-IRF)    Progress/Outcomes (LB Dressing Goal 1, OT-IRF) goal met  -DN     Row Name 08/09/22 1553          LB Dressing Goal 2 (OT-IRF)    Progress/Outcomes (LB Dressing Goal 2, OT-IRF) goal met  -DN     [facility] Name 08/09/22 1553          Grooming Goal 1 (OT-IRF)    Progress/Outcomes (Grooming Goal 1, OT-IRF) goal met  -DN     Row Name 08/09/22 1553          Grooming Goal 2 (OT-IRF)    Progress/Outcomes (Grooming Goal 2, OT-IRF) goal met  -DN     [facility] Name 08/09/22 1553          Toileting Goal 1 (OT-IRF)    Progress/Outcomes (Toileting Goal 1, OT-IRF) goal met  -DN     [facility] Name 08/09/22 1553          Toileting Goal 2 (OT-IRF)    Progress/Outcomes (Toileting Goal 2, OT-IRF) goal met  -DN     [facility] Name 08/09/22 1553          Strength Goal 1 (OT-IRF)    Progress/Outcomes (Strength Goal 1, OT-IRF) goal not met  -DN     [facility] Name 08/09/22 1553          Balance Goal 1 (OT)    Progress/Outcomes (Balance Goal 1, OT) goal met  -DN     [facility] Name 08/09/22 1553           Endurance Goal 1 (OT)    Progress/Outcome (Endurance Goal 1, OT) goal not met  -DN     [facility] Name 08/09/22 1553          Functional Mobility Goal 1 (OT)    Progress/Outcome (Functional Mobility Goal 1, OT) goal met  -DN     [facility] Name 08/09/22 1553          Caregiver Training Goal 1 (OT-IRF)    Progress/Outcomes (Caregiver Training Goal 1, OT-IRF) goal met  -DN           User Key  (r) = Recorded By, (t) = Taken By, (c) = Cosigned By    Initials Name Effective Dates    Natan Sunshine OT 06/16/21 -                    Occupational Therapy Education                 Title: PT OT SLP Therapies (Done)     Topic: Occupational Therapy (Done)     Point: ADL training (Done)     Description:   Instruct learner(s) on proper safety adaptation and remediation techniques during self care or transfers.   Instruct in proper use of assistive devices.              Learning Progress Summary           Patient  Acceptance, E, VU by DN at 8/9/2022 1603    Comment: BUE HEP    Acceptance, E, VU by DN at 8/8/2022 1551    Comment: pt and family attended family conferance to discuss current status with adls, functional status, equipment needs, HEP ect    Acceptance, E, VU by  at 8/8/2022 1510    Comment: family conf with pt and dtrs- discussed risk of aspiration, current diet, swallow strategies- will provide info re: thickeners, exercises and diet prior to D/C    Acceptance, E,TB,D, VU,DU by  at 7/29/2022 1533    Comment: ed pt on role of OT , benefit of therapy .POC w. OT ed on safety w. ADL and tsf. pt demo w min A.   Family Acceptance, E, VU by DN at 8/8/2022 1551    Comment: pt and family attended family conferance to discuss current status with adls, functional status, equipment needs, HEP ect    Acceptance, E, VU by  at 8/8/2022 1510    Comment: family conf with pt and dtrs- discussed risk of aspiration, current diet, swallow strategies- will provide info re: thickeners, exercises and diet prior to D/C                   Point: Home exercise program (Done)     Description:   Instruct learner(s) on appropriate technique for monitoring, assisting and/or progressing therapeutic exercises/activities.              Learning Progress Summary           Patient Acceptance, E, VU by DN at 8/9/2022 1603    Comment: BUE HEP    Acceptance, E, VU by DN at 8/8/2022 1551    Comment: pt and family attended family conferance to discuss current status with adls, functional status, equipment needs, HEP ect    Acceptance, E, VU by  at 8/8/2022 1510    Comment: family conf with pt and dtrs- discussed risk of aspiration, current diet, swallow strategies- will provide info re: thickeners, exercises and diet prior to D/C    Acceptance, E,TB,D, VU,DU by  at 7/29/2022 1533    Comment: ed pt on role of OT , benefit of therapy .POC w. OT ed on safety w. ADL and tsf. pt demo w min A.   Family Acceptance, E, VU by DN at 8/8/2022 1551     Comment: pt and family attended family conferance to discuss current status with adls, functional status, equipment needs, HEP ect    Acceptance, E, VU by SL at 8/8/2022 1510    Comment: family conf with pt and dtrs- discussed risk of aspiration, current diet, swallow strategies- will provide info re: thickeners, exercises and diet prior to D/C                   Point: Precautions (Done)     Description:   Instruct learner(s) on prescribed precautions during self-care and functional transfers.              Learning Progress Summary           Patient Acceptance, E, VU by DN at 8/9/2022 1603    Comment: BUE HEP    Acceptance, E, VU by DN at 8/8/2022 1551    Comment: pt and family attended family conferance to discuss current status with adls, functional status, equipment needs, HEP ect    Acceptance, E, VU by  at 8/8/2022 1510    Comment: family conf with pt and dtrs- discussed risk of aspiration, current diet, swallow strategies- will provide info re: thickeners, exercises and diet prior to D/C    Acceptance, E,TB,D, VU,DU by  at 7/29/2022 1533    Comment: ed pt on role of OT , benefit of therapy .POC w. OT ed on safety w. ADL and tsf. pt demo w min A.   Family Acceptance, E, VU by DN at 8/8/2022 1551    Comment: pt and family attended family conferance to discuss current status with adls, functional status, equipment needs, HEP ect    Acceptance, E, VU by  at 8/8/2022 1510    Comment: family conf with pt and dtrs- discussed risk of aspiration, current diet, swallow strategies- will provide info re: thickeners, exercises and diet prior to D/C                   Point: Body mechanics (Done)     Description:   Instruct learner(s) on proper positioning and spine alignment during self-care, functional mobility activities and/or exercises.              Learning Progress Summary           Patient Acceptance, E, VU by DN at 8/9/2022 1603    Comment: BUE HEP    Acceptance, E, VU by DN at 8/8/2022 1551    Comment: pt and  family attended family conferance to discuss current status with adls, functional status, equipment needs, HEP ect    Acceptance, E, VU by  at 8/8/2022 1510    Comment: family conf with pt and dtrs- discussed risk of aspiration, current diet, swallow strategies- will provide info re: thickeners, exercises and diet prior to D/C    Acceptance, E,TB,D, VU,DU by KP at 7/29/2022 1533    Comment: ed pt on role of OT , benefit of therapy .POC w. OT ed on safety w. ADL and tsf. pt demo w min A.   Family Acceptance, E, VU by DN at 8/8/2022 1551    Comment: pt and family attended family conferance to discuss current status with adls, functional status, equipment needs, HEP ect    Acceptance, E, VU by  at 8/8/2022 1510    Comment: family conf with pt and dtrs- discussed risk of aspiration, current diet, swallow strategies- will provide info re: thickeners, exercises and diet prior to D/C                               User Key     Initials Effective Dates Name Provider Type Discipline    KING 06/16/21 -  Shani Cuba, MS CCC-SLP Speech and Language Pathologist SLP    DN 06/16/21 -  Natan Hoover, MIRLANDE Occupational Therapist OT    HERNAN 06/16/21 -  Adriana Gaxiola OTR Occupational Therapist OT                OT Recommendation and Plan  Anticipated Discharge Disposition (OT): home, home with assist  Planned Therapy Interventions (OT): activity tolerance training, adaptive equipment training, BADL retraining, functional balance retraining, patient/caregiver education/training, ROM/therapeutic exercise, strengthening exercise, transfer/mobility retraining, occupation/activity based interventions           OT IRF GOALS     Row Name 08/09/22 1553 08/05/22 1157 07/29/22 1512       Transfer Goal 1 (OT-IRF)    Activity/Assistive Device (Transfer Goal 1, OT-IRF) -- sit-to-stand/stand-to-sit;bed-to-chair/chair-to-bed;toilet;shower chair with a back;shower chair;walk-in shower;walker, rolling  -DN  sit-to-stand/stand-to-sit;bed-to-chair/chair-to-bed;toilet;shower chair with a back;shower chair;walk-in shower;walker, rolling  -KP    [place] Level (Transfer Goal 1, OT-IRF) -- set-up required;contact guard required  -DN set-up required;contact guard required  -KP    Time Frame (Transfer Goal 1, OT-IRF) -- short-term goal (STG);1 week  -DN short-term goal (STG);1 week  -KP    Progress/Outcomes (Transfer Goal 1, OT-IRF) goal met  -DN goal met  -DN goal ongoing  -KP       Transfer Goal 2 (OT-IRF)    Activity/Assistive Device (Transfer Goal 2, OT-IRF) -- sit-to-stand/stand-to-sit;bed-to-chair/chair-to-bed;toilet;walk-in shower;shower chair;walker, rolling  -DN sit-to-stand/stand-to-sit;bed-to-chair/chair-to-bed;toilet;walk-in shower;shower chair;walker, rolling  -KP    [place] Level (Transfer Goal 2, OT-IRF) -- set-up required;supervision required  -DN set-up required;supervision required  -KP    Time Frame (Transfer Goal 2, OT-IRF) -- long-term goal (LTG);by discharge  -DN long-term goal (LTG);by discharge  -KP    Progress/Outcomes (Transfer Goal 2, OT-IRF) goal met  -DN goal ongoing  -DN goal ongoing  -KP       Bathing Goal 1 (OT-IRF)    Activity/Device (Bathing Goal 1, OT-IRF) -- bathing skills, all;upper body bathing;lower body bathing;grab bar, tub/shower;hand-held shower spray hose;shower chair  -DN bathing skills, all;upper body bathing;lower body bathing;grab bar, tub/shower;hand-held shower spray hose;shower chair  -KP    [place] Level (Bathing Goal 1, OT-IRF) -- contact guard required  -DN contact guard required  -KP    Time Frame (Bathing Goal 1, OT-IRF) -- short-term goal (STG);1 week  -DN short-term goal (STG);1 week  -KP    Progress/Outcomes (Bathing Goal 1, OT-IRF) goal met  -DN goal met  -DN goal ongoing  -KP       Bathing Goal 2 (OT-IRF)    Activity/Device (Bathing Goal 2, OT-IRF) -- bathing skills, all;hand-held shower spray hose;grab bar, tub/shower;shower chair  -DN bathing skills,  all;hand-held shower spray hose;grab bar, tub/shower;shower chair  -KP    [place] Level (Bathing Goal 2, OT-IRF) -- supervision required  -DN standby assist  -KP    Time Frame (Bathing Goal 2, OT-IRF) -- long-term goal (LTG);by discharge  -DN long-term goal (LTG);by discharge  -KP    Progress/Outcomes (Bathing Goal 2, OT-IRF) goal met  -DN goal ongoing  -DN goal ongoing  -KP       UB Dressing Goal 1 (OT-IRF)    Activity/Device (UB Dressing Goal 1, OT-IRF) -- upper body dressing  -DN upper body dressing  -KP    [place] (UB Dress Goal 1, OT-IRF) -- set-up required  -DN set-up required  -KP    Time Frame (UB Dressing Goal 1, OT-IRF) -- short-term goal (STG);1 week  -DN short-term goal (STG);1 week  -KP    Progress/Outcomes (UB Dressing Goal 1, OT-IRF) goal met  -DN goal met  -DN goal ongoing  -KP       UB Dressing Goal 2 (OT-IRF)    Activity/Device (UB Dressing Goal 2, OT-IRF) -- upper body dressing  -DN upper body dressing  -KP    [place] (UB Dress Goal 2, OT-IRF) -- set-up required  -DN modified independence  -KP    Time Frame (UB Dressing Goal 2, OT-IRF) -- long-term goal (LTG);by discharge  -DN long-term goal (LTG);by discharge  -KP    Progress/Outcomes (UB Dressing Goal 2, OT-IRF) goal met  -DN goal ongoing  -DN goal ongoing  -KP       LB Dressing Goal 1 (OT-IRF)    Activity/Device (LB Dressing Goal 1, OT-IRF) -- lower body dressing  -DN lower body dressing  -KP    [place] (LB Dressing Goal 1, OT-IRF) -- contact guard required  -DN contact guard required  -KP    Time Frame (LB Dressing Goal 1, OT-IRF) -- short-term goal (STG);1 week  -DN short-term goal (STG);1 week  -KP    Progress/Outcomes (LB Dressing Goal 1, OT-IRF) goal met  -DN goal met  -DN goal ongoing  -KP       LB Dressing Goal 2 (OT-IRF)    Activity/Device (LB Dressing Goal 2, OT-IRF) -- lower body dressing  -DN lower body dressing  -KP    [place] (LB Dressing Goal 2, OT-IRF) -- standby assist  -DN standby assist  -KP    Time  Frame (LB Dressing Goal 2, OT-IRF) -- long-term goal (LTG);by discharge  -DN long-term goal (LTG);by discharge  -KP    Progress/Outcomes (LB Dressing Goal 2, OT-IRF) goal met  -DN goal ongoing  -DN goal ongoing  -KP       Grooming Goal 1 (OT-IRF)    Activity/Device (Grooming Goal 1, OT-IRF) -- grooming skills, all  -DN grooming skills, all  -KP    [place] (Grooming Goal 1, OT-IRF) -- supervision required;set-up required  -DN set-up required  -KP    Time Frame (Grooming Goal 1, OT-IRF) -- short-term goal (STG);1 week  -DN short-term goal (STG);1 week  -KP    Progress/Outcomes (Grooming Goal 1, OT-IRF) goal met  -DN goal ongoing  -DN goal ongoing  -KP       Grooming Goal 2 (OT-IRF)    Activity/Device (Grooming Goal 2, OT-IRF) -- grooming skills, all  -DN grooming skills, all  -KP    [place] (Grooming Goal 2, OT-IRF) -- supervision required  -DN modified independence  -KP    Time Frame (Grooming Goal 2, OT-IRF) -- long-term goal (LTG)  -DN long-term goal (LTG);by discharge  -KP    Progress/Outcomes (Grooming Goal 2, OT-IRF) goal met  -DN goal revised this date  -DN goal ongoing  -KP       Toileting Goal 1 (OT-IRF)    Activity/Device (Toileting Goal 1, OT-IRF) -- toileting skills, all;grab bar/safety frame  -DN toileting skills, all;grab bar/safety frame  -KP    [place] Level (Toileting Goal 1, OT-IRF) -- contact guard required  -DN contact guard required  -KP    Progress/Outcomes (Toileting Goal 1, OT-IRF) goal met  -DN goal ongoing;goal met  -DN goal ongoing  -KP    Time Frame (Toileting Goal 1, OT-IRF) -- short-term goal (STG)  -DN short-term goal (STG);1 week  -KP       Toileting Goal 2 (OT-IRF)    Activity/Device (Toileting Goal 2, OT-IRF) -- toileting skills, all;grab bar/safety frame  -DN toileting skills, all;grab bar/safety frame  -KP    [place] Level (Toileting Goal 2, OT-IRF) -- standby assist  -DN standby assist  -KP    Progress/Outcomes (Toileting Goal 2, OT-IRF) goal met  -DN goal  ongoing  -DN goal ongoing  -KP    Time Frame (Toileting Goal 2, OT-IRF) -- long-term goal (LTG);by discharge  -DN long-term goal (LTG);by discharge  -KP       Strength Goal 1 (OT-IRF)    Strength Goal 1 (OT-IRF) -- incr L UE to 4+/5 incr B  to 30  -DN incr L UE to 4+/5 incr B  to 30  -KP    Time Frame (Strength Goal 1, OT-IRF) -- long-term goal (LTG);by discharge  -DN long-term goal (LTG);by discharge  -KP    Progress/Outcomes (Strength Goal 1, OT-IRF) goal not met  -DN goal not met  -DN goal ongoing  -KP       Balance Goal 1 (OT)    Activity/Assistive Device (Balance Goal 1, OT) -- standing, dynamic  -DN standing, dynamic  -KP    [place] Level/Cues Needed (Balance Goal 1, OT) -- set-up required;supervision required  -DN set-up required;supervision required  -KP    Time Frame (Balance Goal 1, OT) -- long term goal (LTG);by discharge  -DN long term goal (LTG);by discharge  -KP    Progress/Outcomes (Balance Goal 1, OT) goal met  -DN goal ongoing  -DN goal ongoing  -KP       Caregiver Training Goal 1 (OT-IRF)    Caregiver Training Goal 1 (OT-IRF) -- pt family ed on safety w pt ADLs and tsf and able to A her as needed  -DN pt family ed on safety w pt ADLs and tsf and able to A her as needed  -KP    Time Frame (Caregiver Training Goal 1, OT-IRF) -- long-term goal (LTG);by discharge  -DN long-term goal (LTG);by discharge  -KP    Progress/Outcomes (Caregiver Training Goal 1, OT-IRF) goal met  -DN goal ongoing  -DN goal ongoing  -KP          User Key  (r) = Recorded By, (t) = Taken By, (c) = Cosigned By    Initials Name Provider Type    Natan Sunshine, MIRLANDE Occupational Therapist    Adriana Lazo, OTR Occupational Therapist                    Time Calculation:    Time Calculation- OT     Row Name 08/09/22 1430 08/09/22 0815          Time Calculation- OT    OT Start Time 1430  -DN 0815  -DN     OT Stop Time 1500  -DN 0900  -DN     OT Time Calculation (min) 30 min  -DN 45 min  -DN           User Key   (r) = Recorded By, (t) = Taken By, (c) = Cosigned By    Initials Name Provider Type    Natan Sunshine OT Occupational Therapist                Therapy Charges for Today     Code Description Service Date Service Provider Modifiers Qty    88291317818 HC OT SELF CARE/MGMT/TRAIN EA 15 MIN 8/8/2022 Natan Hoover OT GO 4    04540586605 HC OT SELF CARE/MGMT/TRAIN EA 15 MIN 8/9/2022 Natan Hoover OT GO 3    [id number] HC OT THER PROC EA 15 MIN 8/9/2022 Natan Hoover OT GO 2               OT Discharge Summary  Anticipated Discharge Disposition (OT): home, home with assist  Reason for Discharge: Discharge from facility  Outcomes Achieved: Patient able to partially acheive established goals  Discharge Destination: Home with assist    Natan Hoover OT  8/9/2022

## 2022-08-09 NOTE — THERAPY DISCHARGE NOTE
Inpatient Rehabilitation - Speech Language Pathology Discharge Summary  [facility]       Patient Name: Di Macario  : 1940  MRN: [id number]    Today's Date: 2022                   Admit Date: 2022      SLP Recommendation and Plan    The patient has been seen for daily speech therapy with a focus on dysphagia education, and strengthening of the pharyngeal component of her swallow.  The patient is motivated to improve the safety of her swallow, and completes her tasks with minimal cues. Despite 2 VFSS's which have recommended NPO due to the severity of pt's swallow and placed her at high risk for aspiration, the pt and family chose to pursue the safest and least restrictive diet. Of note, silent aspiration did occur  on the VFSS.  The patient is currently receiving a puree diet with nectar thick liquids with the following swallow precautions/strategies:  Slow rate, small bites/sips, double swallows, upright for all meals and 30 min afterwards, alternating liquid/solid, prn throat clear/reswallow  after approximately 5 swallows. She has a list posted on her table to assist with recall of strategies- it is recommended that a list be placed on her table at home as well. She has recalled most strategies after initial reminders at beginning of sessions with exception of throat clear prn. She may have ice chips sparing;ly after good oral care with supervision.   It is recommended that the patient continue dysphagia therapy at next level of care ( home health). A repeat VFSS is warranted BEFORE any diet upgrades.     Visit Dx:    ICD-10-CM ICD-9-CM   1. Impaired functional mobility, balance, gait, and endurance  Z74.09 V49.89          Time Calculation- SLP     Row Name 22 1447 22 1446          Time Calculation- SLP    SLP Start Time 1300  -SL 0900  -SL     SLP Stop Time 1330  -SL 0930  -SL     SLP Time Calculation (min) 30 min  -SL 30 min  -SL           User Key  (r) = Recorded By, (t)  = Taken By, (c) = Cosigned By    Initials Name Provider Type    Shani Jimenez MS CCC-SLP Speech and Language Pathologist                   SLP GOALS     Row Name 08/09/22 1330 08/08/22 1330          (STG) Pharyngeal Strengthening Exercise Goal 1 (SLP)    Progress/Outcomes (Pharyngeal Strengthening Goal 1, SLP) continuing progress toward goal;goal ongoing  -SL goal ongoing  -SL     Comment (Pharyngeal Strengthening Goal 1, SLP) all tasks compelted x20 each; CTAR- sustained for 10 sec hold and repetitive; falsetto, hogh/low pitch changes, scale productions, posterior phoneme productions beulah, effortful swallows;  -SL CTAR completed for set of 10 sustained for 10 sec hold and 20 repetitive chin tuck maneuvers;  hard swallows completed x20; beulah x10; falsetto/pitch changes/scale ( reduce din range) completed posterior phoneme productions completed x20  -SL            (STG) Swallow Management Recall Goal 1 (SLP)    Progress/Outcomes (Swallow Management Recall Goal 1, SLP) goal ongoing;continuing progress toward goal  -SL goal ongoing  -SL     Comment (Swallow Management Recall Goal 1, SLP) no overt clinical s/s aspiraion noted on 5/5 trials of ice chips or on puree or nectar thick liquids in second session  -SL no overt clinical s/s aspiration noted on puree or small sips of nectar thick lqiuids;  weak cough noted on 1/5 trials of ice chips in am session  -SL            (STG) Swallow Compensatory Strategies Goal 1 (SLP)    Progress/Outcomes (Swallow Compensatory Strategies/Techniques Goal 1, SLP) goal ongoing  -SL goal ongoing  -SL     Comment (Swallow Compensatory Strategies/Techniques Goal 1, SLP) provided pt with written list of information re; puree diet, thickeners, thickened liquids, places to purchase prepackaged thcik liquids, and list of swallow strategies and exercises for home use.  -SL pt continues to recall all swallow strategies and use them effectively with exception of prn thraot clear/reswallow and  occasional cue for double swallow  -SL           User Key  (r) = Recorded By, (t) = Taken By, (c) = Cosigned By    Initials Name Provider Type    Shani Jimenez MS CCC-SLP Speech and Language Pathologist                  Therapy Charges for Today     Code Description Service Date Service Provider Modifiers Qty    52497251685 HC ST TREATMENT SWALLOW 4 8/8/2022 Shani Cuba MS CCC-SLP GN 1    [id number] HC ST TREATMENT SWALLOW 4 8/9/2022 Shani Cuba MS CCC-JACQUELINE GN 1                   MS THA Casas  8/9/2022

## 2022-08-09 NOTE — PROGRESS NOTES
Case Management  Inpatient Rehabilitation Team Conference    Conference Date/Time: 8/9/2022 8:03:05 AM    Team Conference Attendees:  Dr. Aj Shrestha, Pharmacist  Shani Mendoza, DELMERW  Stacey Gonzalez, PTA  Libertad Mcneal, PT  Natan Hoover, OT  Shani Cuba, SLP  Shani Gonzalez, CTRS  Khushbu Grewal, RN  Quyen Souza, JOSÉ MIGUEL    Demographics            Age: 82Y            Gender: Female    Admission Date: 7/28/2022 7:00:00 PM  Rehabilitation Diagnosis:  Immobility Syndrome  Past Medical History: Past Medical History:  DiagnosisDate  ?Dementia (HCC)?  ?states better with medication  ?Diabetes mellitus (HCC)?  ?Iwrxqxxwidkttq46/13/2021  ?Lung consolidation (HCC)06/01/2022  ?Peripheral uyfzmkfbkh92/28/2018  ?Vitamin D deficiency?    ?  ?  Surgical History  Past Surgical History:  ProcedureLateralityDate  ?ANKLE SURGERYRight?  ?BLADDER SURGERY??  ?Prolapsed  ?BREAST BIOPSY??  ?CARDIAC ELECTROPHYSIOLOGY PROCEDUREN/A3/5/2021  ?Procedure: Pacemaker DC [place];  Surgeon: Madelin Ferguson MD;  Location: [facility] CATH INVASIVE LOCATION;  Service: Cardiology;  Laterality: N/A;  ?ENDOSCOPYN/A6/6/2022  ?Procedure: ESOPHAGOGASTRODUODENOSCOPY with biopsy, balloon dilation;  Surgeon:  Lo Benjamin MD;  Location: [facility] ENDOSCOPY;  Service: Gastroenterology;  Laterality: N/A;  esophageal stricture, hiatal hernia, gastritis  ?HYSTERECTOMY??    ?      Plan of Care  Anticipated Discharge Date/Estimated Length of Stay: ELOS: DC 8/12  Anticipated Discharge Destination: Community discharge with assistance  Discharge Plan : Patient lives alone in one story home. Ramp entrance. Recently  has had hired caregiver 5 hours/day 3 days/week. 3 daughters local who stay with  patient at night and most of day.  Family conference held with patient and dgts on 8/8.  D/C plan is home with 24/7 assist from dgts and hired caregivers through [employer]. Caretenders CHRISTIANO PT, ST  Medical Necessity Expected Level Rationale: Goals are to  achieve a level of  supervision with  mobility and self-care and improved swallow.   Rehabilitation  prognosis fair.  Medical prognosis fair.  Intensity and Duration: an average of 3 hours/5 days per week  Medical Supervision and 24 Hour Rehab Nursing: x  Physical Therapy: x  PT Intensity/Duration: 1 hour / day, 5 days / week, for approximately 2 weeks.  Occupational Therapy: x  OT Intensity/Duration: 1 hour / day, 5 days / week, for approximately 2 weeks.  Speech and Language Therapy: x  SLP Intensity/Duration: 1 hour / day, 5 days / week, for approximately 2 weeks.  Social Work: x  Therapeutic Recreation: x  Registered Dietician: x  Updated (if changes indicated)    Anticipated Discharge Date/Estimated Length of Stay:   ELOS: DC 8/10 with HH    Based on the patient's medical and functional status, their prognosis and  expected level of functional improvement is: Goals are to achieve a level of  supervision with  mobility and self-care and improved swallow.   Rehabilitation  prognosis fair.  Medical prognosis fair.      Interdisciplinary Problem/Goals/Status    All Rehab Problems:  Body Systems    [RN] Endocrine(Active)  Current Status(08/09/2022): DM 2; continuous monitor to right abd and connects  to phone  Weekly Goal(08/16/2022): Blood sugars WNL  Discharge Goal: Blood sugars WNL    [RN] Respiratory(Active)  Current Status(08/09/2022): Weaning off of oxygen; wearing 1 L nc at HS  Weekly Goal(08/16/2022): Sats WNL  Discharge Goal: Sats WNL        Cognition    [ST] Problem Solving(Active)  Current Status(08/01/2022): Mild to mod cognitive communication deficits.  Difficulty generating multiple solutions.  Weekly Goal(08/04/2022): Generate multiple solutions with min cues.  Discharge Goal: Functional problem solving for ADL/home with supervision.        Mobility    [OT] Toilet Transfers(Active)  Current Status(08/08/2022): SBA RWX  Weekly Goal(08/11/2022): SBA RWX  Discharge Goal: SBA RWX    [OT] Tub/Shower  Transfers(Active)  Current Status(08/08/2022): SBA RWX  Weekly Goal(08/11/2022): SBA  Discharge Goal: SBA    [PT] Bed/Chair/Wheelchair(Active)  Current Status(08/08/2022): CGA/SBA  Weekly Goal(08/16/2022): supervision  Discharge Goal: Supervision    [PT] Bed Mobility(Active)  Current Status(08/08/2022): Mod Indep  Weekly Goal(08/16/2022): Indep  Discharge Goal: Independent    [PT] Car Transfers(Active)  Current Status(08/08/2022): CGA/SBA, Rwx  Weekly Goal(08/16/2022): PT only  Discharge Goal: Supervision    [PT] Stairs(Active)  Current Status(08/08/2022): 4, rails, CGA  Weekly Goal(08/16/2022): PT only  Discharge Goal: 4, rails, SBA    [PT] Walk(Active)  Current Status(08/08/2022): 260', rwx SBA  Weekly Goal(08/16/2022): To BR, Rwx, supervision  Discharge Goal: 260', Rwx, Supervision        Pain    [RN] Pain Management(Active)  Current Status(08/09/2022): Pt denies pain at this time  Weekly Goal(08/16/2022): No pain  Discharge Goal: No pain        Psychosocial    [RN] Coping/Adjustment(Active)  Current Status(08/09/2022): Appears to be coping well; has supportive daughters  Weekly Goal(08/16/2022): Verbalizes concerns  Discharge Goal: Demonstrates adequate coping strategies        Safety    [RN] Potential for Injury(Active)  Current Status(08/09/2022): At risk for falls r/t BLE weakness  Weekly Goal(08/16/2022): Will use call light  Discharge Goal: No falls        Self Care    [OT] Bathing(Active)  Current Status(08/08/2022): SBA  Weekly Goal(08/11/2022): SBA  Discharge Goal: SBA    [OT] Dressing (Lower)(Active)  Current Status(08/08/2022): SBA  Weekly Goal(08/11/2022): SBA  Discharge Goal: SBA    [OT] Dressing (Upper)(Active)  Current Status(08/08/2022): SBA  Weekly Goal(08/11/2022): SBA  Discharge Goal: SBA    [OT] Eating(Active)  Current Status(08/08/2022): SBA  Weekly Goal(08/11/2022): SBA  Discharge Goal: Mod Indep    [OT] Grooming(Active)  Current Status(08/08/2022): SBA  Weekly Goal(08/11/2022):  SBA  Discharge Goal: SBA    [OT] Toileting(Active)  Current Status(08/08/2022): SBA  Weekly Goal(08/11/2022): SBA  Discharge Goal: SBA        Sphincter Control    [RN] Bladder Management(Active)  Current Status(08/09/2022): Continent with some reports of incontinence; wears  pull-up with pad  Weekly Goal(08/16/2022): Continent 100%  Discharge Goal: Continent 100%    [RN] Bowel Management(Active)  Current Status(08/09/2022): Continent with some incontinence  Weekly Goal(08/16/2022): Continent 75%  Discharge Goal: Continent 100%        Swallow Function    [ST] Swallowing(Active)  Current Status(08/08/2022): VFSS 7/27/22 rec: NPO. Patient/family desire  modified diet vs. NPO status. Pureed with nectar thick liquids.  Weekly Goal(08/15/2022): Safe swallow strategies with indep  Discharge Goal: Safe swallow for least restrictive diet.        Comments: 8/2 Min A with transfers, amb 160' rwx CGA, able to do 4 steps Min A  with bilateral rails.  Toilet tranfers CGA rwx, tub transfers Min A.   Mild -  Mod cognitive deficits.  VFSS 7/27 silent aspiration, recommend NPO, family  recommends modified diet.  Continent with some incontinence.    8/9 Amb 260' rwx SBA, can do 4 steps with rails CGA.  Toilet and tub transfers  SBA rwx.  ADL's SBA.  Following swallow strategies well.  Continent with some  incontinence at times.    Signed by: Khushbu Grewal RN    Physician CoSigned By: Aj Chandra 08/09/2022 08:22:12

## 2022-08-09 NOTE — PLAN OF CARE
Goal Outcome Evaluation:           Progress: improving  Outcome Evaluation: Patient AOx4, room air, 1L O2 NC at night, dry skin, no pain. Continent b/b with some dribbling at times. Last BM 8/8. Dysphagia diet, pureed nectar syrup thick, consistent carb. Meds crushed with applesauce. Patient did not meet requirements for morning or afternoon insulin. Overnight sleep oximetry sutdy ordered to start at 2230. Plan is to be discharged tomorrow.

## 2022-08-09 NOTE — THERAPY DISCHARGE NOTE
Inpatient Rehabilitation - Physical Therapy Treatment Note/Discharge  [facility]     Patient Name: Di Macario  : 1940  MRN: [id number]  Today's Date: 2022                Admit Date: 2022    Visit Dx:    ICD-10-CM ICD-9-CM   1. Impaired functional mobility, balance, gait, and endurance  Z74.09 V49.89     Patient Active Problem List   Diagnosis   • Essential hypertension   • Vitamin D deficiency   • Hyperuricemia   • Slow transit constipation   • Chronic pain of left knee   • At high risk for falls   • Peripheral neuropathy   • Uncontrolled type 2 diabetes mellitus with hyperglycemia (HCC)   • Walker as ambulation aid   • Bradycardia, sinus   • Shortness of breath   • Leg swelling   • Hyperlipidemia   • Presence of cardiac pacemaker   • Dementia without behavioral disturbance ([facility])   • Aspiration pneumonia due to vomitus ([facility])   • Aspiration pneumonia of both lungs due to gastric secretions, unspecified part of lung ([facility])   • Weakness   • Thrombocytopenia ([facility])   • Oropharyngeal dysphagia   • Immobility syndrome     Past Medical History:   Diagnosis Date   • Dementia ([facility])     states better with medication    • Diabetes mellitus ([facility])    • Hyperlipidemia 2021   • Lung consolidation ([facility]) 2022   • Peripheral neuropathy 2018   • Vitamin D deficiency      Past Surgical History:   Procedure Laterality Date   • ANKLE SURGERY Right    • BLADDER SURGERY      Prolapsed   • BREAST BIOPSY     • CARDIAC ELECTROPHYSIOLOGY PROCEDURE N/A 3/5/2021    Procedure: Pacemaker DC new BOSTON;  Surgeon: Madelin Ferguson MD;  Location: [facility] CATH INVASIVE LOCATION;  Service: Cardiology;  Laterality: N/A;   • ENDOSCOPY N/A 2022    Procedure: ESOPHAGOGASTRODUODENOSCOPY with biopsy, balloon dilation;  Surgeon: Lo Benjamin MD;  Location: [facility] ENDOSCOPY;  Service: Gastroenterology;  Laterality: N/A;  esophageal stricture, hiatal hernia, gastritis   • HYSTERECTOMY         PT  ASSESSMENT (last 12 hours)     IRF PT Evaluation and Treatment     Row Name 08/09/22 0942          PT Time and Intention    Document Type daily treatment  -LB     Mode of Treatment physical therapy  -LB     Patient/Family/Caregiver Comments/Observations Pnt seated in w/c. NAD. Pleasant and cooperative  -LB     Row Name 08/09/22 0942          General Information    Existing Precautions/Restrictions fall  -LB     Row Name 08/09/22 0942          Pain Assessment    Pretreatment Pain Rating 0/10 - no pain  -LB     Row Name 08/09/22 0942          Bed Mobility    Rolling Left [place] (Bed Mobility) independent  -LB     Rolling Right [place] (Bed Mobility) independent  -LB     Scooting/Bridging [place] (Bed Mobility) independent  -LB     Supine-Sit [place] (Bed Mobility) independent  -LB     Sit-Supine [place] (Bed Mobility) independent  -LB     Row Name 08/09/22 0942          Transfers    Bed-Chair [place] (Transfers) standby assist  -LB     Chair-Bed [place] (Transfers) standby assist  -LB     Assistive Device (Bed-Chair Transfers) wheelchair  -LB     Sit-Stand [place] (Transfers) standby assist  -LB     Stand-Sit [place] (Transfers) standby assist  -LB     Row Name 08/09/22 0942          Chair-Bed Transfer    Assistive Device (Chair-Bed Transfers) wheelchair  -LB     Row Name 08/09/22 0942          Sit-Stand Transfer    Assistive Device (Sit-Stand Transfers) walker, front-wheeled;wheelchair  -LB     Row Name 08/09/22 0942          Stand-Sit Transfer    Assistive Device (Stand-Sit Transfers) walker, front-wheeled;wheelchair  -LB     Row Name 08/09/22 0942          Car Transfer    Type (Car Transfer) sit-stand;stand-sit  -LB     [place] Level (Car Transfer) standby assist;verbal cues  -LB     Assistive Device (Car Transfer) walker, front-wheeled  -LB     Row Name 08/09/22 0942          Gait/Stairs (Locomotion)    [place] Level (Gait) standby assist  -LB     Assistive  Device (Gait) walker, front-wheeled  -LB     Distance in Feet (Gait) 260  -LB     Pattern (Gait) step-through  -LB     Deviations/Abnormal Patterns (Gait) jh decreased;stride length decreased  -LB     Bilateral Gait Deviations forward flexed posture;heel strike decreased  -LB     Left Sided Gait Deviations heel strike decreased  -LB     Right Sided Gait Deviations heel strike decreased  -LB     [place] Level (Stairs) contact guard  -LB     Handrail Location (Stairs) both sides  -LB     Number of Steps (Stairs) 4  -LB     Ascending Technique (Stairs) step-to-step  -LB     Descending Technique (Stairs) step-to-step  -LB     Row Name 08/09/22 0942          Curb Negotiation (Mobility)    [place], Curb Negotiation contact guard  -LB     Assistive Device (Curb Negotiation) walker, front-wheeled  -LB     Row Name 08/09/22 0942          Rough/Uneven Surface Gait Skills (Mobility)    [place], Gait on Rough/Uneven Surface (Mobility) contact guard  -LB     Assistive Device (Rough/Uneven Surface Gait) walker, front-wheeled  -LB     Distance in Feet (Rough/Uneven Surface Gait) 12'  -LB     Comment, Gait Rough/Uneven Surface (Mobility) red mat  -LB     Row Name 08/09/22 0942          Balance    Comment, Balance While standing w rwx, retrieved  object from floor w reacher w cga  -LB     Row Name 08/09/22 0942          Core Strength (Therapeutic Exercise)    Core Strength (Therapeutic Exercise) bridging, bilateral lower extremities;supine;10 repetitions  -LB     Row Name 08/09/22 0942          Positioning and Restraints    Post Treatment Position chair  -LB     In Chair sitting;call light within reach;exit alarm on  -LB     Row Name 08/09/22 0942          Vital Signs    Pre SpO2 (%) 98  -LB     O2 Delivery Pre Treatment room air  -LB     Intra SpO2 (%) 96  -LB     O2 Delivery Intra Treatment room air  -LB     Post SpO2 (%) 98  -LB     O2 Delivery Post Treatment room air  -LB     Row Name 08/09/22 0942           Bed Mobility Goal 1 (PT-IRF)    Activity/Assistive Device (Bed Mobility Goal 1, PT-IRF) bed mobility activities, all  -LB     [place] Level (Bed Mobility Goal 1, PT-IRF) independent  -LB     Progress/Outcomes (Bed Mobility Goal 1, PT-IRF) goal met  -LB     Row Name 08/09/22 0942          Transfer Goal 1 (PT-IRF)    Activity/Assistive Device (Transfer Goal 1, PT-IRF) sit-to-stand/stand-to-sit;bed-to-chair/chair-to-bed;wheelchair transfer;car transfer  -LB     [place] Level (Transfer Goal 1, PT-IRF) supervision required  -LB     Progress/Outcomes (Transfer Goal 1, PT-IRF) good progress toward goal;goal not met  required SBA w occas vc  -LB     Row Name 08/09/22 0942          Gait/Walking Locomotion Goal 1 (PT-IRF)    Activity/Assistive Device (Gait/Walking Locomotion Goal 1, PT-IRF) walker, rolling  -LB     Gait/Walking Locomotion Distance Goal 1 (PT-IRF) 160'  -LB     [place] Level (Gait/Walking Locomotion Goal 1, PT-IRF) supervision required  -LB     Progress/Outcomes (Gait/Walking Locomotion Goal 1, PT-IRF) good progress toward goal;goal not met  -LB     Row Name 08/09/22 0942          Stairs Goal 1 (PT-IRF)    Activity/Assistive Device (Stairs Goal 1, PT-IRF) stairs, all skills  -LB     Number of Stairs (Stairs Goal 1, PT-IRF) 4  -LB     [place] Level (Stairs Goal 1, PT-IRF) standby assist  -LB     Progress/Outcomes (Stairs Goal 1, PT-IRF) good progress toward goal;goal not met  -LB           User Key  (r) = Recorded By, (t) = Taken By, (c) = Cosigned By    Initials Name Provider Type    LB Stacey Gonzalez PTA Physical Therapist Assistant                Physical Therapy Education                 Title: PT OT SLP Therapies (Done)     Topic: Physical Therapy (Resolved)     Point: Mobility training (Resolved)     Learning Progress Summary           Patient Acceptance, E, VU by KING at 8/8/2022 8901    Comment: family conf with pt and dtrs- discussed risk of aspiration, current diet, swallow  strategies- will provide info re: thickeners, exercises and diet prior to D/C    Acceptance, E, VU,NR by LB at 8/8/2022 1035    Comment: car, curb, stairs    Acceptance, E, VU,NR by EE at 8/6/2022 1213    Acceptance, E,D, VU,DU,NR by DP at 8/5/2022 1216    Acceptance, E, NR by LB at 8/4/2022 1108    Comment: car, curb, stairs    Acceptance, E, NR by LB at 8/3/2022 1139    Comment: stairs    Acceptance, E, NR by LB at 8/2/2022 1413    Comment: stairs    Acceptance, E, NR by LB at 8/1/2022 1414    Comment: car, stairs    Acceptance, E,D, NR by KP at 7/30/2022 1224    Acceptance, E, VU,NR by KM at 7/29/2022 1448   Family Acceptance, E, VU by SL at 8/8/2022 1510    Comment: family conf with pt and dtrs- discussed risk of aspiration, current diet, swallow strategies- will provide info re: thickeners, exercises and diet prior to D/C                   Point: Home exercise program (Resolved)     Learning Progress Summary           Patient Acceptance, E,H, VU,DU by LB at 8/9/2022 1122    Acceptance, E, VU by SL at 8/8/2022 1510    Comment: family conf with pt and dtrs- discussed risk of aspiration, current diet, swallow strategies- will provide info re: thickeners, exercises and diet prior to D/C    Acceptance, E, VU,NR by EE at 8/6/2022 1213    Acceptance, E,D, VU,DU,NR by DP at 8/5/2022 1216    Acceptance, E,D, NR by KP at 7/30/2022 1224    Acceptance, E, VU,NR by KM at 7/29/2022 1448   Family Acceptance, E, VU by SL at 8/8/2022 1510    Comment: family conf with pt and dtrs- discussed risk of aspiration, current diet, swallow strategies- will provide info re: thickeners, exercises and diet prior to D/C                   Point: Body mechanics (Resolved)     Learning Progress Summary           Patient Acceptance, E, VU by SL at 8/8/2022 1510    Comment: family conf with pt and dtrs- discussed risk of aspiration, current diet, swallow strategies- will provide info re: thickeners, exercises and diet prior to D/C    Acceptance,  E, VU,NR by EE at 8/6/2022 1213    Acceptance, E, VU,NR by KM at 7/29/2022 1448   Family Acceptance, E, VU by  at 8/8/2022 1510    Comment: family conf with pt and dtrs- discussed risk of aspiration, current diet, swallow strategies- will provide info re: thickeners, exercises and diet prior to D/C                   Point: Precautions (Resolved)     Learning Progress Summary           Patient Acceptance, E, VU by  at 8/8/2022 1510    Comment: family conf with pt and dtrs- discussed risk of aspiration, current diet, swallow strategies- will provide info re: thickeners, exercises and diet prior to D/C    Acceptance, E, VU,NR by EE at 8/6/2022 1213    Acceptance, E,D, VU,DU,NR by DP at 8/5/2022 1216    Acceptance, E, VU,NR by KM at 7/29/2022 1448   Family Acceptance, E, VU by SL at 8/8/2022 1510    Comment: family conf with pt and dtrs- discussed risk of aspiration, current diet, swallow strategies- will provide info re: thickeners, exercises and diet prior to D/C                               User Key     Initials Effective Dates Name Provider Type Discipline    SL 06/16/21 -  Shani Cuba, MS CCC-SLP Speech and Language Pathologist SLP    LB 03/07/18 -  Stacey Gonzalez, PTA Physical Therapist Assistant PT    EE 06/16/21 -  Debra Coker, PT Physical Therapist PT     06/16/21 -  Florinda Mckeon, PT Physical Therapist PT    DP 08/24/21 -  Yordy Garcia, PT Physical Therapist PT     06/06/22 -  Ronny Winters, PT Student PT Student PT                PT Recommendation and Plan    Patient was wearing a face mask during this therapy encounter. Therapist used appropriate personal protective equipment including mask and gloves.  Mask used was standard procedure mask. Appropriate PPE was worn during the entire therapy session. Hand hygiene was completed before and after therapy session. Patient is not in enhanced droplet precautions.                  Time Calculation:    PT Charges     Row Name 08/09/22 1121 08/09/22  0942          Time Calculation    Start Time 1100  -LB 0930  -LB     Stop Time 1130  -LB 1000  -LB     Time Calculation (min) 30 min  -LB 30 min  -LB           User Key  (r) = Recorded By, (t) = Taken By, (c) = Cosigned By    Initials Name Provider Type    Stacey Leblanc PTA Physical Therapist Assistant                Therapy Charges for Today     Code Description Service Date Service Provider Modifiers Qty    51969928581 HC PT THER PROC EA 15 MIN 8/8/2022 Stacey Gonzalez PTA GP 4    46913597595 HC PT THER PROC EA 15 MIN 8/9/2022 Stacey Gonzalez PTA GP 4               PT Discharge Summary  Reason for Discharge: Discharge from facility  Outcomes Achieved: Patient able to partially acheive established goals  Discharge Destination: Home with assist, Home with home health    Stacey Gonzalez PTA  8/9/2022

## 2022-08-09 NOTE — THERAPY TREATMENT NOTE
Inpatient Rehabilitation - Speech Language Pathology Treatment Note    [facility]     Patient Name: Di Macario  : 1940  MRN: [id number]    Today's Date: 2022                   Admit Date: 2022       Visit Dx:      ICD-10-CM ICD-9-CM   1. Impaired functional mobility, balance, gait, and endurance  Z74.09 V49.89       Patient Active Problem List   Diagnosis   • Essential hypertension   • Vitamin D deficiency   • Hyperuricemia   • Slow transit constipation   • Chronic pain of left knee   • At high risk for falls   • Peripheral neuropathy   • Uncontrolled type 2 diabetes mellitus with hyperglycemia (HCC)   • Walker as ambulation aid   • Bradycardia, sinus   • Shortness of breath   • Leg swelling   • Hyperlipidemia   • Presence of cardiac pacemaker   • Dementia without behavioral disturbance ([facility])   • Aspiration pneumonia due to vomitus ([facility])   • Aspiration pneumonia of both lungs due to gastric secretions, unspecified part of lung ([facility])   • Weakness   • Thrombocytopenia ([facility])   • Oropharyngeal dysphagia   • Immobility syndrome       Past Medical History:   Diagnosis Date   • Dementia ([facility])     states better with medication    • Diabetes mellitus (HCC)    • Hyperlipidemia 2021   • Lung consolidation ([facility]) 2022   • Peripheral neuropathy 2018   • Vitamin D deficiency        Past Surgical History:   Procedure Laterality Date   • ANKLE SURGERY Right    • BLADDER SURGERY      Prolapsed   • BREAST BIOPSY     • CARDIAC ELECTROPHYSIOLOGY PROCEDURE N/A 3/5/2021    Procedure: Pacemaker DC new [place];  Surgeon: Madelin Ferguson MD;  Location: [facility] CATH INVASIVE LOCATION;  Service: Cardiology;  Laterality: N/A;   • ENDOSCOPY N/A 2022    Procedure: ESOPHAGOGASTRODUODENOSCOPY with biopsy, balloon dilation;  Surgeon: Lo Benjamin MD;  Location: [facility] ENDOSCOPY;  Service: Gastroenterology;  Laterality: N/A;  esophageal stricture, hiatal hernia, gastritis   • HYSTERECTOMY          SLP Recommendation and Plan                                                   SLP EVALUATION (last 72 hours)     SLP SLC Evaluation     Row Name 08/09/22 1330 08/08/22 1330                Communication Assessment/Intervention    Document Type therapy note (daily note)  -SL therapy note (daily note)  -SL       Subjective Information no complaints  -SL no complaints  -SL       Patient Observations alert;cooperative;agree to therapy  -SL alert;cooperative;agree to therapy  -SL       Patient Effort good  -SL good  -SL       Symptoms Noted During/After Treatment none  -SL none  -SL             User Key  (r) = Recorded By, (t) = Taken By, (c) = Cosigned By    Initials Name Effective Dates    SL Shani Cuba, MS CCC-SLP 06/16/21 -                    EDUCATION    The patient has been educated in the following areas:       Cognitive Impairment Dysphagia (Swallowing Impairment).             SLP GOALS     Row Name 08/09/22 1330 08/08/22 1330          (STG) Pharyngeal Strengthening Exercise Goal 1 (SLP)    Progress/Outcomes (Pharyngeal Strengthening Goal 1, SLP) continuing progress toward goal;goal ongoing  -SL goal ongoing  -SL     Comment (Pharyngeal Strengthening Goal 1, SLP) all tasks compelted x20 each; CTAR- sustained for 10 sec hold and repetitive; falsetto, hogh/low pitch changes, scale productions, posterior phoneme productions beulah, effortful swallows;  -SL CTAR completed for set of 10 sustained for 10 sec hold and 20 repetitive chin tuck maneuvers;  hard swallows completed x20; beulah x10; falsetto/pitch changes/scale ( reduce din range) completed posterior phoneme productions completed x20  -SL            (STG) Swallow Management Recall Goal 1 (SLP)    Progress/Outcomes (Swallow Management Recall Goal 1, SLP) goal ongoing;continuing progress toward goal  -SL goal ongoing  -SL     Comment (Swallow Management Recall Goal 1, SLP) no overt clinical s/s aspiraion noted on 5/5 trials of ice chips or on puree or  nectar thick liquids in second session  - no overt clinical s/s aspiration noted on puree or small sips of nectar thick lqiuids;  weak cough noted on 1/5 trials of ice chips in am session  -SL            (STG) Swallow Compensatory Strategies Goal 1 (SLP)    Progress/Outcomes (Swallow Compensatory Strategies/Techniques Goal 1, SLP) goal ongoing  -SL goal ongoing  -SL     Comment (Swallow Compensatory Strategies/Techniques Goal 1, SLP) provided pt with written list of information re; puree diet, thickeners, thickened liquids, places to purchase prepackaged thcik liquids, and list of swallow strategies and exercises for home use.  -SL pt continues to recall all swallow strategies and use them effectively with exception of prn thraot clear/reswallow and occasional cue for double swallow  -SL           User Key  (r) = Recorded By, (t) = Taken By, (c) = Cosigned By    Initials Name Provider Type    Shani Jimenez MS CCC-SLP Speech and Language Pathologist                            Time Calculation:        Time Calculation- SLP     Row Name 08/09/22 1447 08/09/22 1446          Time Calculation- [facility]    SLP Start Time 1300  - 0900  -     SLP Stop Time 1330  -SL 0930  -     SLP Time Calculation (min) 30 min  - 30 min  -           User Key  (r) = Recorded By, (t) = Taken By, (c) = Cosigned By    Initials Name Provider Type    Shani Jimenez MS CCC-SLP Speech and Language Pathologist                  Therapy Charges for Today     Code Description Service Date Service Provider Modifiers Qty    14877456662 HC ST TREATMENT SWALLOW 4 8/8/2022 Shani Cuba MS CCC-SLP GN 1    45570221389 HC ST TREATMENT SWALLOW 4 8/9/2022 Shani Cuba MS CCC-JACQUELINE GN 1                           MS THA Casas  8/9/2022

## 2022-08-09 NOTE — PROGRESS NOTES
SECTION GG      Mobility Performance Discharge:     Roll Left and Right: Patient completed the activities by him/herself with no  assistance from a helper.   Sit to Lying: Patient completed the activities by him/herself with no  assistance from a helper.   Lying to Sitting on Side of Bed: Patient completed the activities by  him/herself with no assistance from a helper.   Sit to Stand: [place] provides verbal cues and/or touching/steadying and/or  contact guard assistance as patient completes activity. Assistance may be  provided throughout the activity or intermittently.   Chair/Bed to Chair Transfer: [place] provides verbal cues and/or  touching/steadying and/or contact guard assistance as patient completes  activity. Assistance may be provided throughout the activity or intermittently.   Car Transfer: [place] provides verbal cues and/or touching/steadying and/or  contact guard assistance as patient completes activity. Assistance may be  provided throughout the activity or intermittently.   Walk 10 Feet:   [place] provides verbal cues and/or touching/steadying and/or  contact guard assistance as patient completes activity. Assistance may be  provided throughout the activity or intermittently.  Walk 50 Feet with 2 Turns:   [place] provides verbal cues and/or  touching/steadying and/or contact guard assistance as patient completes  activity. Assistance may be provided throughout the activity or intermittently.  Walk 150 Feet:   [place] provides verbal cues and/or touching/steadying and/or  contact guard assistance as patient completes activity. Assistance may be  provided throughout the activity or intermittently.  Walking 10 Feet on Uneven Surfaces:   [place] provides verbal cues and/or  touching/steadying and/or contact guard assistance as patient completes  activity. Assistance may be provided throughout the activity or intermittently.  1 Step Over Curb or Up/Down Stair:   [place] provides verbal cues  and/or  touching/steadying and/or contact guard assistance as patient completes  activity. Assistance may be provided throughout the activity or intermittently.  4 Steps Up and Down, With/Without Rail:   [place] provides verbal cues and/or  touching/steadying and/or contact guard assistance as patient completes  activity. Assistance may be provided throughout the activity or intermittently.  12 Steps Up and Down, With/Without Rail:   Not attempted due to medical or  safety concerns.  Picking up an Object:   [place] provides verbal cues and/or touching/steadying  and/or contact guard assistance as patient completes activity. Assistance may be  provided throughout the activity or intermittently. Uses Wheelchair and/or  Scooter: No    Signed by: Stacey Gonzalez PTA

## 2022-08-09 NOTE — PROGRESS NOTES
Reviewed progress, d/c goals met, and d/c plans for home tomorrow, 8/10, with patient and daughter, Carley. Daughter plans to be here tomorrow around 1:00 p.m. Dietitian and ST both left information on pureed diet. Will let dietitian know what time daughter is coming so can meet with her regarding diet information. Daughter stated she did go to [facility] and was able to get some thickener products (Simply Thick) and ordered some pre-thickened juices to get tomorrow. Discussed [facility] to provide home PT, ST, NSG. Notified [facility] liaison of d/c planned for tomorrow. Patient to have overnight oximetry tonight. Will assist with plans.

## 2022-08-09 NOTE — PROGRESS NOTES
TEAM CONF - AUGUST 9 - BED MOD INDEPENDENT. TRANSFERS CTG SBA. CAR TRANSFERS CTG SBA. 4 STAIRS CTG. GAIT 260 FEET RW SBA. TOILET AND SHOWER TRANSFERS SBA. BATH SBA. DRESSING SBA. DYSPHAGIA - FOLLOWS SWALLOW STRATEGIES  SWALLOW - PUREE, NECTAR THICK LIQUIDS.CONTINENT BOWEL AND BLADDER, WEARS BRIEF AS DRIBBLES.   [facility] - [facility] - HOME HEALTH PT AND SLP AND NURSING - IMMOBILITY SYNDROME/ DYSPHAGIA. ASPIRATION PNEUMONIA

## 2022-08-10 VITALS
RESPIRATION RATE: 18 BRPM | OXYGEN SATURATION: 97 % | HEART RATE: 62 BPM | HEIGHT: 63 IN | TEMPERATURE: 97.6 F | BODY MASS INDEX: 22.93 KG/M2 | SYSTOLIC BLOOD PRESSURE: 117 MMHG | WEIGHT: 129.4 LBS | DIASTOLIC BLOOD PRESSURE: 63 MMHG

## 2022-08-10 LAB
GLUCOSE BLDC GLUCOMTR-MCNC: 151 MG/DL (ref 70–130)
GLUCOSE BLDC GLUCOMTR-MCNC: 159 MG/DL (ref 70–130)

## 2022-08-10 PROCEDURE — 82962 GLUCOSE BLOOD TEST: CPT

## 2022-08-10 PROCEDURE — 63710000001 INSULIN LISPRO (HUMAN) PER 5 UNITS: Performed by: INTERNAL MEDICINE

## 2022-08-10 RX ORDER — AMLODIPINE BESYLATE 5 MG/1
2.5 TABLET ORAL
Qty: 90 TABLET | Refills: 0
Start: 2022-08-10 | End: 2022-12-27 | Stop reason: SDUPTHER

## 2022-08-10 RX ORDER — UREA 10 %
1 LOTION (ML) TOPICAL NIGHTLY
Qty: 30 TABLET | Refills: 1 | Status: SHIPPED | OUTPATIENT
Start: 2022-08-10

## 2022-08-10 RX ADMIN — AMLODIPINE BESYLATE 2.5 MG: 2.5 TABLET ORAL at 08:54

## 2022-08-10 RX ADMIN — INSULIN LISPRO 2 UNITS: 100 INJECTION, SOLUTION INTRAVENOUS; SUBCUTANEOUS at 08:54

## 2022-08-10 RX ADMIN — INSULIN LISPRO 2 UNITS: 100 INJECTION, SOLUTION INTRAVENOUS; SUBCUTANEOUS at 11:56

## 2022-08-10 RX ADMIN — FAMOTIDINE 20 MG: 20 TABLET ORAL at 05:57

## 2022-08-10 RX ADMIN — ACETAMINOPHEN 650 MG: 325 TABLET, FILM COATED ORAL at 08:55

## 2022-08-10 NOTE — DISCHARGE SUMMARY
[facility] - REHABILITATION UNIT    REJI MILLER  1940    ADMIT DATE:  7/28/2022  7:00 PM  DISCHARGE DATE:  08/10/22      CHIEF COMPLAINT:    Immobility syndrome/debility  Impaired cognition/impaired mobility/impaired self-care  ID status post aspiration pneumonia-Zosyn completes on July 29  Severe dysphagia-n.p.o. recommended on videofluoroscopic swallow study but patient family wish for avoiding PEG tube and for comfort diet-purée/nectar thick liquid  Hypertension-amlodipine  DVT prophylaxis-Lovenox/SCDs  Cognition-dementia-donepezil  Seizure disorder-Zonegran  Diabetes mellitus type 2-on Trulicity and Lantus low-dose at home       HISTORY OF PRESENT ILLNESS:    82 y.o. female with PMH of Esophageal Stricture, gastritis, oropharyngeal dysphagia, dementia, DM2, HTN, symptomatic Bradycardia s/p PPM, and Seizure D/O who admitted to [facility] on 7/24/2022 due to Aspiration Pneumonia.  Pt. was previously admitted to [facility] 6 weeks ago with aspiration pneumonia and  readmitted with shortness of breath and cough since a witnessed aspiration event 7-23-22.     # Aspiration Pneumonia of both lungs  2/2 gastric secretions  -Recommendation for n.p.o.  Per patient family request comfort diet with pureed diet per ST  - On Zosyn-completes July 29.     # Dysphagia  - pt. Noncompliant with home diet-patient and family do not wish to pursue PEG tube.  Comfort diet per patient/family request.     # Leukocytosis - resolved     # Dementia with behavioral disturbance  - mild  - on medication     # Uncontrolled DM type 2  # Hyperglycemia  # Diabetic Peripheral polyneuropathy  - Hgb A1C 8.4  - follows with an outpatient endocrinologist     # Essential HTN  - amlodipine restarted     # Thrombocytopenia - mild  - trending labs     # Generalized Weakness     # BLE edema  - elevate and monitor        Functionally, patient has a history of dysphagia.  She was noncompliant with dysphagia diet at home.   She has previous subacute rehab stays.  Most recently from June 9, 2022 to July 1, 2022.  She had none of subacute rehab stay last fall/winter after she fractured her right arm.  Continues with decreased active range of motion at the right shoulder.  Is able to use the right hand for writing but unable to raise her arm above her head.  She uses a Rollator walker at home for the past 4 years.  Has history of dementia and is on donepezil.  On Zonegran for Seizures.  Daughter Reported Family Spend the Night with Patient for the past Year and She Has 5-Hour Help for the Previous Week during the Day before Coming to the Hospital.        Patient noted to be high risk for aspiration.  N.p.o. was recommended on videofluoroscopic swallow study but patient family did not want PEG tube.  Wish for safest oral diet.  Intake has been % at meals.  Weight has trended down over the past 3 to 4 months.  Transfers are min assist.  Ambulated 80 feet contact-guard with a rolling walker.  Decreased toe strike.  Narrow base of support.  Flexed posture.  Decreased active range of motion of the right shoulder.  Bathing min assist.  Upper body dressing supervision.  Lower body dressing min assist.     Given her functional impairments and comorbidities now admitted for acute inpatient rehabilitation.          HOSPITAL COURSE:    Patient participated in a comprehensive acute inpatient rehabilitation program.     During the hospital stay the following areas were addressed:      # Functional deficits and Generalized Weakness related to multiple medical comorbities - Admit to inpt rehab     #1.  Aspiration Pneumonia of both lungs  2/2 gastric secretions  -Recommendation for n.p.o.  Per patient family request comfort diet with pureed diet per ST  - [facility]-completes July 29.  August 9-check overnight pulse oximetry - no desats     #2. Dysphagia  - pt. Noncompliant with home diet-patient and family do not wish to pursue PEG tube.  Comfort  diet per patient/family request.     #3.  Leukocytosis - resolved     #4. Dementia with behavioral disturbance  - mild  - on medication     #5.  Uncontrolled DM type 2 w/ Hyperglycemia  #6.  Diabetic Peripheral polyneuropathy  - Hgb A1C 8.4  - follows with an outpatient endocrinologist  -was on Lantus 5 units nightly at home.    August 2-blood sugars elevated-resume home Lantus dose 5 units daily  August 4-blood glucose is elevated at lunch and suppertime.  - will add low-dose lispro 2 units with breakfast and lunch and asked that she have Trulicity once weekly brought in from home.  August 8 - resume Trulicity from home. Continue home Lantus 5 units daily. D/c scheduled lispro with breakfast and lunch.      #7.  Essential HTN  - amlodipine restarted     #8.  Thrombocytopenia - mild  - trending labs     #9.  BLE edema  - elevate and monitor         Disposition- August 10 - achieved inpt rehab goals. Medically stable. Meds and follow up reviewed. Discharge home todayh.         TEAM CONF - AUGUST 2 - TRANSFERS MIN. CAR TRANSFERS MIN 4 STAIRS MIN. GAIT 160 FEET RW CTG. TOILET TRANSFERS CTG RW. SHOWER TRANSFERS MIN .  BATH MIN. LBD CTG. UBD SBA. EATING SET UP. GROOMING SBA. TOILETING MIN. CHRONIC DYSPHAGIA - COMFORT FEEDS AS NOT WANT PEG TUBE/ NPO STATUS - PUREE/NTL. PHARYNGEAL DYSPHAGIA WITH SILENT ASPIRATION. IMPAIRED COGNITION/ IMPAIRED RECALL BUT DID RECALL SWALLOW STRATEGIES. SKIN INTACT. MOSTLY CONTINENT BOWEL . INCONTINENT BLADDER BUT WEARS BRIEF AND ZANDER-PAD - USED AT HOME.. ON O2 1 LITER AT NIGHT.   [facility]OS -  END OF NEXT WEEK WITH 24 HOUR ASSIST     TEAM CONF - AUGUST 9 - BED MOD INDEPENDENT. TRANSFERS CTG SBA. CAR TRANSFERS CTG SBA. 4 STAIRS CTG. GAIT 260 FEET RW SBA. TOILET AND SHOWER TRANSFERS SBA. BATH SBA. DRESSING SBA. DYSPHAGIA - FOLLOWS SWALLOW STRATEGIES  SWALLOW - PUREE, NECTAR THICK LIQUIDS.CONTINENT BOWEL AND BLADDER, WEARS BRIEF AS DRIBBLES.   ELOS - WED - HOME HEALTH PT AND SLP AND NURSING -  IMMOBILITY SYNDROME/ DYSPHAGIA. ASPIRATION PNEUMONIA     -admit for comprehensive acute inpatient rehabilitation .  This would be an interdisciplinary program with physical therapy 1 hour,  occupational therapy 1 hour, and speech therapy 1 hour, 5 days a week.  Rehabilitation nursing for carryover, monitoring of pulmonary and neurologic   status, bowel and bladder, and skin  Ongoing physician follow-up.  Weekly team conferences.    Goal is for home with home health   therapies.  Barrier to discharge: Impaired swallow mobility self-care- worked on dysphagia exercises, transfers, balance, gait, ADLs to overcome.          Physical Exam near the time of discharge:    MENTAL STATUS -  AWAKE / ALERT  HEENT- NCAT, SCLERAE ANICTERIC, CONJUNCTIVAE PINK, OP MOIST, NO JVD, EARS UNREMARKABLE EXTERNALLY  LUNGS - CTA, NO WHEEZES, RALES OR RHONCHI  HEART- RRR, NO RUB, MURMUR, OR GALLOP  ABD - NORMOACTIVE BOWEL SOUNDS, SOFT, NT. NO HEPATOSPLENOMEGALY APPRECIATED  EXT - NO EDEMA OR CYANOSIS  NEURO -awake alert.  Speech fluent.  Oriented x3.  MOTOR EXAM - RUE/RLE 5/5. LUE/LLE 5/5       RESULTS:  Glucose   Date/Time Value Ref Range Status   08/10/2022 1129 159 (H) 70 - 130 mg/dL Final     Comment:     Meter: [id number] : [id number] Bryce Gordon    08/10/2022 0704 151 (H) 70 - 130 mg/dL Final     Comment:     Meter: [id number] : [id number] [facility]   08/09/2022 2052 228 (H) 70 - 130 mg/dL Final     Comment:     Meter: [id number] : [id number] [facility]   08/09/2022 1608 161 (H) 70 - 130 mg/dL Final     Comment:     Meter: [id number] : [id number] Chloe Sheltona NA   08/09/2022 1103 142 (H) 70 - 130 mg/dL Final     Comment:     Meter: [id number] : [id number] Abanicholasva Lidia NA   08/09/2022 0617 111 70 - 130 mg/dL Final     Comment:     Meter: [id number] : [id number] Marion Sorto CNA   08/09/2022 0436 122 70 - 130 mg/dL Final     Comment:     Meter: [id number] : [id number] Marion Sorto CNA    08/08/2022 1912 295 (H) 70 - 130 mg/dL Final     Comment:     Meter: [id number] : [id number] Bryce Gordon KULWANT               Invalid input(s): LABALBU, ELA           Discharge Medications      New Medications      Instructions Start Date   melatonin 1 MG tablet   1 mg, Oral, Nightly         Changes to Medications      Instructions Start Date   amLODIPine 5 MG tablet  Commonly known as: NORVASC  What changed: how much to take   2.5 mg, Oral, Every 24 Hours Scheduled         Continue These Medications      Instructions Start Date   acetaminophen 325 MG tablet  Commonly known as: TYLENOL   650 mg, Oral, Every 6 Hours PRN      BD Pen Needle Norma U/F 32G X 4 MM misc  Generic drug: Insulin Pen Needle   Use once a day      Dexcom G6 Sensor   Dipsense Dexcom G6 Sensors, to replace every 10 days, 3 sensors per 30 day period (NDC #[address]-97). Check 6 times a day. Dx: E 11.65 sent to supply store      Dexcom G6 Transmitter misc   1 each, Does not apply, Every 3 Months, Dispense 1 Dexcom G6 Transmitter for each 3 month period (NDC #[address]-96). Check 6 times a day. Dx: E 11.65 sent to a supply store      donepezil 10 MG tablet  Commonly known as: ARICEPT   10 mg, Oral, Nightly      famotidine 20 MG tablet  Commonly known as: PEPCID   20 mg, Oral, 2 Times Daily      FLORANEX PO   Oral      freestyle lancets   1 each, Other, 4 Times Daily, Use as instructed      FREESTYLE LITE test strip  Generic drug: glucose blood   Bid e11.65      Gvoke HypoPen 2-Pack 1 MG/0.2ML solution auto-injector  Generic drug: Glucagon   1 mg, Subcutaneous, As Needed      Lantus SoloStar 100 UNIT/ML injection pen  Generic drug: Insulin Glargine   5 Units, Subcutaneous, Nightly      multivitamin with minerals tablet tablet   1 tablet, Oral, Daily      NovoLOG FlexPen 100 UNIT/ML solution pen-injector sc pen  Generic drug: insulin aspart   1 unit per 30 mg/dl glucose over 170 mg/dl Use as directed for meal & correction coverage up to 50 units  a day.      pravastatin 40 MG tablet  Commonly known as: PRAVACHOL   40 mg, Oral, Every Evening      Trulicity 3 MG/0.5ML solution pen-injector  Generic drug: Dulaglutide   3 mg, Subcutaneous, Weekly, Replaces 1.5 mg weekly dose      Zonegran 100 MG capsule  Generic drug: zonisamide   200 mg, Oral, Nightly           Lily Yepez PA-C Ford, Erika P, PA-CPCP - GeneralPhysician Assistant  Internal Medicine  Family [phone] [facility] [address]  [facility] [address] Steps: Follow up on 8/15/2022Appointment: Instructions: At 10:45 A.M.    ===    Aj Chandra MD Gormley, John Michael, [facility] Medicine and [phone] [facility] [address]  [facility] [address] Steps: Follow up on 9/28/2022Appointment: Instructions: 11:30 AM    ===    Madelin Ferguson MD Chandiramani, Shanker, [facility] [phone] [facility] [address] Steps: Follow up on 9/29/2022Appointment: Instructions: 10:15 am    ===    [facility] [phone] [facility], [address]  [facility] [id number] Steps: Appointment: Instructions: You will be receiving home PT, ST, NSG. They will call to schedule in home visits.    ===    Diet - Puree solids/ nectar thick liquids, consistent carbohydrate  Upright in chair for all intake      >30 on discharge    Aj Chandra MD

## 2022-08-10 NOTE — PLAN OF CARE
Goal Outcome Evaluation:           Progress: improving  Outcome Evaluation: Mrs. Macario is being discharged home with nagi, education provided on DC meds and follow ups.

## 2022-08-10 NOTE — PROGRESS NOTES
Inpatient Rehabilitation Plan of Care Note    Plan of Care  Care Plan Reviewed - No updates at this time.    Sphincter Control    Performed Intervention(s)  Encourage appropriate diet  Utilize incontinence products as needed      Safety    Performed Intervention(s)  All items within reach  Hourly rounding  Falls precautions in place      Psychosocial    Performed Intervention(s)  Provide calm environment  Allow pt to express concerns, wants and needs      Pain    Performed Intervention(s)  Offer medication if pain arises      Body Systems    Performed Intervention(s)  Check oxygen q shift  Blood sugar AC and HS    Signed by: Quyen Souza RN

## 2022-08-10 NOTE — PROGRESS NOTES
SECTION GG    Eating Performance Discharge: Gridley sets up or cleans up; patient completes  activity. Gridley assists only prior to or following the activity.    Signed by: Quyen Souza RN

## 2022-08-10 NOTE — PROGRESS NOTES
Patient to d/c home today, 8/10, with 24 hour assistance from daughters.  Daughters stated hired caregivers through [employer] will start next week. Discussed home health PT, ST, NSG to be provided through [facility]. Patient ready to go home.

## 2022-08-10 NOTE — DISCHARGE INSTRUCTIONS
Diet - Puree solids/ nectar thick liquids, consistent carbohydrate  Upright in chair for all intake

## 2022-08-10 NOTE — PLAN OF CARE
Goal Outcome Evaluation:     Slept well. Meds crushed in applesauce. Continent of bladder, so far tonight. Transfer x1 assist. Sleep study on room air, sats, WNL. Discharge plans for today. Glucose 228 tonight. Lantus 5 units given as ordered.

## 2022-08-10 NOTE — PROGRESS NOTES
PPS CMG Coordinator  Inpatient Rehabilitation Admission    Ethnic Group: White.  Marital Status:  Marital Status: .    IRF Admission Date:  07/28/2022  Admission Class: Initial Rehab.  Admit From:  [facility]    Pre-Hospital Living: Home. Pre-Hospital Living  With: (2) Family/Relatives.    Payment Sources: Primary: Medicare Fee for Service  Secondary: Not Listed.  Impairment Group: 16 Debility (non-cardiac, non-pulmonary)  Date of Onset of Impairment: 07/24/2022    Etiologic Diagnosis Code(s):  Rank Code      Description  1    J69.0     Pneumonitis due to inhalation of food and vomit    Comorbidities:  ICD    Are there any arthritis conditions recorded for Impairment Group, Etiologic  Diagnosis, or Comorbid Conditions that meet all of the regulatory requirements  for IRF classification (in 42 .29(b)(2)(x), (xi), and xii))? No    Presence of Pressure Ulcer:  No observed/documented pressure ulcers.    MEDICAL NEEDS  Height on Admission:  62.99 inches.  Weight on Admission:  129 pounds.    QUALITY INDICATORS  Prior Functioning:  Self Care: Patient needed partial assistance from another person to complete  activities.  Indoor Mobility: Patient completed the activities by him/herself, with or  without an assistive device, with no assistance from a helper.  Stairs: Not applicable (activity was not applicable to the patient prior to the  current illness, exacerbation, or injury).  Functional Cognition: Patient needed partial assistance from another person to  complete activities.  Prior Device Use: Walker    Bladder and Bowel: Bladder Continence: Always continent (no documented  incontinence).  Bowel Continence: Always continent (no documented incontinence).  Swallowing/Nutritional Status: Modiified food consistency/supervision (patient  requires modified food or liquid consistency and/or needs supervision during  eating for safety).  Special Conditions: Patient did not receive total  parenteral nutrition treatment  at the time of admission.    Section I. Active Diagnosis: Comorbidities and Co-existing Conditions:  Diabetes Mellitus (DM) - e.g., diabetic retinopathy, nephropathy, and  neuropathy).  Section J. Health Conditions: Patient has had two or more falls, or a fall with  injury, in the past year. Patient has not had major surgery during the 100 days  prior to admission.  Section M. Skin Conditions  Unhealed Pressure Ulcer/Injuries at Stage 1 or  Higher on Admission:  No.  Section N. Medication:  Potential Clinically Significant Medication Issues: No issues found during  review    Signed by: Khushbu Grewal RN

## 2022-08-11 ENCOUNTER — TRANSITIONAL CARE MANAGEMENT TELEPHONE ENCOUNTER (OUTPATIENT)
Dept: CALL CENTER | Facility: HOSPITAL | Age: 82
End: 2022-08-11

## 2022-08-11 ENCOUNTER — READMISSION MANAGEMENT (OUTPATIENT)
Dept: CALL CENTER | Facility: HOSPITAL | Age: 82
End: 2022-08-11

## 2022-08-11 NOTE — OUTREACH NOTE
Call Center TCM Note    Flowsheet Row Responses   [facility] patient discharged from? [place]   Does the patient have one of the following disease processes/diagnoses(primary or secondary)? COPD/Pneumonia   Was the primary reason for admission: Pneumonia   TCM attempt successful? Yes   Call start time 1059   Call end time 1102   Discharge diagnosis Aspiration pneumonia of both lungs    Person spoke with today (if not patient) and relationship Spoke with daughter- Caregiver DANISH Daughter [phone]    Meds reviewed with patient/caregiver? Yes   Medication comments Daughter states no concerns currently   Comments regarding appointments Has hospital follow up 08/15 @ 1045   Does the patient have a primary care provider?  Yes   Does the patient have an appointment with their PCP or specialist within 7 days of discharge? Yes   What is the Home health agency?  [facility]     Has home health visited the patient within 72 hours of discharge? Yes   Comments HH currently in the home per daughter- and patient is sticking to a puree diet with no concerns   Did the patient receive a copy of their discharge instructions? Yes   Nursing interventions Reviewed instructions with patient  [Sticking to puree diet-  with her now.]   What is the patient's perception of their health status since discharge? Improving   Nursing Interventions Nurse provided patient education   If the patient is a current smoker, are they able to teach back resources for cessation? Not a smoker   Is the patient/caregiver able to teach back the hierarchy of who to call/visit for symptoms/problems? PCP, Specialist, Home health nurse, Urgent Care, ED, 911 Yes   Is the patient able to teach back COPD zones? Yes   Is the patient/caregiver able to teach back importance of completing antibiotic course of treatment? Yes  [No current antibiotics now.]   TCM call completed? Yes   Wrap up additional comments Currently improving- no concerns currently  since being home from the rehab facility per daughter          Ria Schneider RN    8/11/2022, 11:06 EDT

## 2022-08-11 NOTE — OUTREACH NOTE
Prep Survey    Flowsheet Row Responses   [facility] patient discharged from? [place]   Is LACE score < 7 ? Yes   Emergency Room discharge w/ pulse ox? No   Eligibility [facility]-REHABILITATION UNIT   Date of Admission 07/28/22   Date of Discharge 08/10/22   Discharge Disposition Home-Health Care Sv   Discharge diagnosis Aspiration pneumonia of both lungs    Does the patient have one of the following disease processes/diagnoses(primary or secondary)? COPD/Pneumonia   Does the patient have Home health ordered? Yes   What is the Home health agency?  [facility]     Is there a DME ordered? No   Prep survey completed? Yes          CARMELITA SHI - Registered Nurse

## 2022-08-11 NOTE — PROGRESS NOTES
PPS CMG Coordinator  Inpatient Rehabilitation Discharge    Mode of Locomotion: Walking.    Discharge Against Medical Advice:  No.  Discharge Information  Patient Discharged Alive:  Yes  Discharge Destination/Living Setting: Home with Home Health Services  Diagnosis for Interruption/Death: ICD    Impairment Group: 16 Debility (non-cardiac, non-pulmonary)    Comorbidities: ICD    Complications: ICD    QUALITY INDICATORS  Section J Health Conditions: Fall(s) Since Admission:  No    Section M. Skin Conditions Discharge:  Unhealed Pressure Ulcer(s) at Stage 1 or  Higher:  No    . Current Number of Unhealed Pressure Ulcers  Branch    Section N. Medication:  Medication Intervention: N/A - There were no potential clinically significant  medication issues identified since admission or patient is not taking any  medications.    Signed by: Khushbu Grewal RN

## 2022-08-15 ENCOUNTER — OFFICE VISIT (OUTPATIENT)
Dept: INTERNAL MEDICINE | Facility: CLINIC | Age: 82
End: 2022-08-15

## 2022-08-15 VITALS
BODY MASS INDEX: 24.1 KG/M2 | HEIGHT: 63 IN | DIASTOLIC BLOOD PRESSURE: 71 MMHG | SYSTOLIC BLOOD PRESSURE: 119 MMHG | TEMPERATURE: 98.2 F | WEIGHT: 136 LBS

## 2022-08-15 DIAGNOSIS — D64.9 ANEMIA, UNSPECIFIED TYPE: Primary | ICD-10-CM

## 2022-08-15 DIAGNOSIS — R13.12 OROPHARYNGEAL DYSPHAGIA: ICD-10-CM

## 2022-08-15 DIAGNOSIS — J69.0 ASPIRATION PNEUMONIA DUE TO VOMIT, UNSPECIFIED LATERALITY, UNSPECIFIED PART OF LUNG: ICD-10-CM

## 2022-08-15 PROCEDURE — 1111F DSCHRG MED/CURRENT MED MERGE: CPT | Performed by: PHYSICIAN ASSISTANT

## 2022-08-15 PROCEDURE — 99495 TRANSJ CARE MGMT MOD F2F 14D: CPT | Performed by: PHYSICIAN ASSISTANT

## 2022-08-15 NOTE — PROGRESS NOTES
Subjective   Chief Complaint   Patient presents with   • Hospital Follow Up Visit       History of Present Illness      She went [facility] on 7/24 with witnessed aspiration and soa and cough and was admitted to the acute rehab facility. She was discharged on 8/10 from the subacute rehab institute. She has fup therapy at home with speech, PT and a nurse is coming weekly.     She was on IV zosyn while inpatient for bilateral aspiration PNA. She is following the modified liquid diet. Speech therapy is going to come once a week and they are going to do exercises once a day. She was discharged on Amlodipine 2.5 mg after being held due to hypotension in the hospital. She has had no further aspiration episodes. She is going to follow the modified diet to reduce risk and work with speech therapy. She has no cough. No reflux or heartburn. No recent falls. Denies dizziness.     She states she is having trouble with constipation, does not feel like she can fully empty her bowels.     Patient Active Problem List   Diagnosis   • Essential hypertension   • Vitamin D deficiency   • Hyperuricemia   • Slow transit constipation   • Chronic pain of left knee   • At high risk for falls   • Peripheral neuropathy   • Uncontrolled type 2 diabetes mellitus with hyperglycemia ([facility])   • Walker as ambulation aid   • Bradycardia, sinus   • Shortness of breath   • Leg swelling   • Hyperlipidemia   • Presence of cardiac pacemaker   • Dementia without behavioral disturbance ([facility])   • Aspiration pneumonia due to vomitus ([facility])   • Aspiration pneumonia of both lungs due to gastric secretions, unspecified part of lung ([facility])   • Weakness   • Thrombocytopenia ([facility])   • Oropharyngeal dysphagia   • Immobility syndrome       No Known Allergies    Current Outpatient Medications on File Prior to Visit   Medication Sig Dispense Refill   • acetaminophen (TYLENOL) 325 MG tablet Take 650 mg by mouth Every 6 (Six) Hours As Needed for Mild Pain  or Moderate Pain  .     • amLODIPine (NORVASC) 5 MG tablet Take 0.5 tablets by mouth Daily. 90 tablet 0   • Continuous Blood Gluc Sensor (Dexcom G6 Sensor) Dipsense Dexcom G6 Sensors, to replace every 10 days, 3 sensors per 30 day period (NDC #[address]-71). Check 6 times a day. Dx: E 11.65 sent to supply store 9 each 4   • donepezil (ARICEPT) 10 MG tablet Take 1 tablet by mouth Every Night. 90 tablet 3   • Dulaglutide (Trulicity) 3 MG/0.5ML solution pen-injector Inject 0.5 mL under the skin into the appropriate area as directed 1 (One) Time Per Week. Replaces 1.5 mg weekly dose 6 mL 2   • famotidine (PEPCID) 20 MG tablet Take 20 mg by mouth 2 (Two) Times a Day.     • glucose blood (FREESTYLE LITE) test strip Bid e11.65 200 each 3   • insulin aspart (NovoLOG FlexPen) 100 UNIT/ML solution pen-injector sc pen 1 unit per 30 mg/dl glucose over 170 mg/dl Use as directed for meal & correction coverage up to 50 units a day. 45 mL 2   • Insulin Glargine (Lantus SoloStar) 100 UNIT/ML injection pen Inject 5 Units under the skin into the appropriate area as directed Every Night. 15 mL 3   • Insulin Pen Needle (BD Pen Needle Norma U/F) 32G X 4 MM misc Use once a day 100 each 3   • Lactobacillus (FLORANEX PO) Take  by mouth.     • Lancets (freestyle) lancets 1 each by Other route 4 (Four) Times a Day. Use as instructed 400 each 1   • melatonin 1 MG tablet Take 1 tablet by mouth Every Night. 30 tablet 1   • pravastatin (PRAVACHOL) 40 MG tablet Take 1 tablet by mouth Every Evening. 90 tablet 3   • Zonegran 100 MG capsule Take 2 capsules by mouth Every Night.     • Glucagon (Gvoke HypoPen 2-Pack) 1 MG/0.2ML solution auto-injector Inject 1 mg under the skin into the appropriate area as directed As Needed (hypoglycemia). 0.4 mL 1     No current facility-administered medications on file prior to visit.       Past Medical History:   Diagnosis Date   • Dementia (HCC)     states better with medication    • Diabetes mellitus (HCC)    • Hyperlipidemia  01/13/2021   • Lung consolidation (HCC) 06/01/2022   • Peripheral neuropathy 08/28/2018   • Vitamin D deficiency        Family History   Problem Relation Age of Onset   • Hypertension Mother    • Hypertension Father        Social History     Socioeconomic History   • Marital status:    Tobacco Use   • Smoking status: Former Smoker   • Smokeless tobacco: Never Used   Vaping Use   • Vaping Use: Never used   Substance and Sexual Activity   • Alcohol use: Yes     Comment: RARELY   • Drug use: No   • Sexual activity: Defer     Birth control/protection: Surgical       Past Surgical History:   Procedure Laterality Date   • ANKLE SURGERY Right    • BLADDER SURGERY      Prolapsed   • BREAST BIOPSY     • CARDIAC ELECTROPHYSIOLOGY PROCEDURE N/A 3/5/2021    Procedure: Pacemaker DC new BOSTON;  Surgeon: Madelin Ferguson MD;  Location: [facility] CATH INVASIVE LOCATION;  Service: Cardiology;  Laterality: N/A;   • ENDOSCOPY N/A 6/6/2022    Procedure: ESOPHAGOGASTRODUODENOSCOPY with biopsy, balloon dilation;  Surgeon: Lo Benjamin MD;  Location: [facility] ENDOSCOPY;  Service: Gastroenterology;  Laterality: N/A;  esophageal stricture, hiatal hernia, gastritis   • HYSTERECTOMY           The following portions of the patient's history were reviewed and updated as appropriate: problem list, allergies, current medications, past medical history, past family history, past social history and past surgical history.    ROS     See HPI    Immunization History   Administered Date(s) Administered   • COVID-19 (PFIZER) PURPLE CAP 02/02/2021, 02/23/2021, 09/29/2021   • Fluzone High Dose =>65 Years ([employer] ONLY) 11/07/2016, 09/30/2019   • Fluzone High-Dose 65+yrs 10/19/2021   • Influenza, Unspecified 09/16/2019, 10/08/2020   • Pneumococcal Conjugate 13-Valent (PCV13) 01/31/2017   • Pneumococcal Polysaccharide (PPSV23) 02/01/2018   • Tdap 02/12/2021       Objective   Vitals:    08/15/22 1037 08/15/22 1124   BP:  119/71   Temp: 98.2 °F  "(36.8 °C)    Weight: 61.7 kg (136 lb)    Height: 160 cm (62.99\")      Body mass index is 24.1 kg/m².  Physical Exam  Vitals reviewed.   HENT:      Head: Normocephalic and atraumatic.   Cardiovascular:      Rate and Rhythm: Normal rate and regular rhythm.      Heart sounds: Normal heart sounds.   Musculoskeletal:      Comments: In a wheelchair   Neurological:      Mental Status: She is alert.       Assessment & Plan   Diagnoses and all orders for this visit:    1. Anemia, unspecified type (Primary)  -     Cancel: CBC & Differential  -     CBC & Differential  -     Ferritin  -     Iron Profile    2. Aspiration pneumonia due to vomit, unspecified laterality, unspecified part of lung (HCC)    3. Oropharyngeal dysphagia      Reviewed hospital records.     Recheck CBC and iron studies. Hemoglobin was 9.9 on discharge. Continue to work with speech therapy and urged modified diet to reduce risk of aspiration. Can start Miralax 1 capful every other day for constipation and see how she does. Sees rehab doctor in September, will have her fup in office afterwards.     Return in about 2 months (around 10/25/2022) for Lab Today.           "

## 2022-08-16 LAB
BASOPHILS # BLD AUTO: 0.1 X10E3/UL (ref 0–0.2)
BASOPHILS NFR BLD AUTO: 1 %
EOSINOPHIL # BLD AUTO: 0.1 X10E3/UL (ref 0–0.4)
EOSINOPHIL NFR BLD AUTO: 1 %
ERYTHROCYTE [DISTWIDTH] IN BLOOD BY AUTOMATED COUNT: 14.6 % (ref 11.7–15.4)
FERRITIN SERPL-MCNC: 153 NG/ML (ref 15–150)
HCT VFR BLD AUTO: 38.2 % (ref 34–46.6)
HGB BLD-MCNC: 12 G/DL (ref 11.1–15.9)
IMM GRANULOCYTES # BLD AUTO: 0 X10E3/UL (ref 0–0.1)
IMM GRANULOCYTES NFR BLD AUTO: 0 %
IRON SATN MFR SERPL: 19 % (ref 15–55)
IRON SERPL-MCNC: 58 UG/DL (ref 27–139)
LYMPHOCYTES # BLD AUTO: 1.7 X10E3/UL (ref 0.7–3.1)
LYMPHOCYTES NFR BLD AUTO: 30 %
MCH RBC QN AUTO: 28.3 PG (ref 26.6–33)
MCHC RBC AUTO-ENTMCNC: 31.4 G/DL (ref 31.5–35.7)
MCV RBC AUTO: 90 FL (ref 79–97)
MONOCYTES # BLD AUTO: 0.4 X10E3/UL (ref 0.1–0.9)
MONOCYTES NFR BLD AUTO: 7 %
NEUTROPHILS # BLD AUTO: 3.6 X10E3/UL (ref 1.4–7)
NEUTROPHILS NFR BLD AUTO: 61 %
PLATELET # BLD AUTO: 379 X10E3/UL (ref 150–450)
RBC # BLD AUTO: 4.24 X10E6/UL (ref 3.77–5.28)
TIBC SERPL-MCNC: 302 UG/DL (ref 250–450)
UIBC SERPL-MCNC: 244 UG/DL (ref 118–369)
WBC # BLD AUTO: 5.8 X10E3/UL (ref 3.4–10.8)

## 2022-08-24 NOTE — PROGRESS NOTES
Chief Complaint  Chief Complaint   Patient presents with   • Diabetes     Type 2: Dexcom   Patient has no hx of retinopathy and a mild case of neuropathy in her feet        Subjective          History of Present Illness    Di Estevezhernesto 82 y.o. presents for a follow-up evaluation for type 2 DM    She has been diabetic since     Pt is on the following medications for their DM: Lantus 5 units daily, Trulicity 3 mg weekly and Novolog as needed per sliding scale      If blood glucose is above 140 then add the followin-170          0 unit  171-200          2 units  201-230          3 units  231-260          4 units  261-290          5 units  291-320          6 units  321-350          7 units  351-380          8 units  381-or greater 9 units    Sick day rules: If glucose is over 250 mg/DL then check glucose every 2 hours and treat with correction scale above.      Pt complains of shortness of breath with activity.    Denies diarrhea, constipation, chest pain, vision changes, numbness and tingling in feet/hands.    Weight gain of 5 lbs since last visit.    Pt does not have a history of DM retinopathy.  Last eye exam was 2022    Pt does not have a history of nephropathy.  Patient is not currently taking ACE/ARB    Pt does not have neuropathy.    Pt does/not have a history of CAD or CVA.    Last A1C in  was 8.40    Last microalbumin in  was negative          Blood Sugars    Blood glucoses are checked via dexcom.    Fasting blood glucoses: 50s - low 100s    Pre-meal blood glucoses: mid 100s - 200s    Pt has has several episodes of hypoglycemia in the early morning          Sensor Data    Time in range 58%  High 37%  Very high 4%  Low <1%  Very low <1%      Average Glucose - 172 mg/dL      Sensor Wear - 93 %          Hyperlipidemia     Pt denies any muscle/body aches or chest pain    Pt is currently taking pravastatin 40 mg HS    Last lipid panel in  showed Total 174, Triglycerides 138,  "HDL 75 and LDL 76            Hypertension    Pt denies any chest pain, palpitations or headache    Current regimen includes amlodipine 2.5 mg daily            Other History       Pt has dementia and dysphagia - she is on nectar thick liquids    She was has been admitted several times to the hospital for aspiration PNA.        I have reviewed the patient's allergies, medicines, past medical hx, family hx and social hx.    Objective   Vital Signs:   /80   Pulse (!) 47   Temp 96.6 °F (35.9 °C) (Temporal)   Ht 157.5 cm (62\")   Wt 63 kg (138 lb 12.8 oz)   SpO2 98%   BMI 25.39 kg/m²       Physical Exam   Physical Exam  Constitutional:       General: She is not in acute distress.     Appearance: Normal appearance. She is not diaphoretic.   HENT:      Head: Normocephalic and atraumatic.   Eyes:      General:         Right eye: No discharge.         Left eye: No discharge.   Skin:     General: Skin is warm and dry.   Neurological:      Mental Status: She is alert.   Psychiatric:         Mood and Affect: Mood normal.         Behavior: Behavior normal.                    Results Review:   Hemoglobin A1C   Date Value Ref Range Status   07/25/2022 8.40 (H) 4.80 - 5.60 % Final     Triglycerides   Date Value Ref Range Status   07/05/2022 138 0 - 149 mg/dL Final     HDL Cholesterol   Date Value Ref Range Status   07/05/2022 75 >39 mg/dL Final     LDL Chol Calc (NIH)   Date Value Ref Range Status   07/05/2022 76 0 - 99 mg/dL Final     VLDL Cholesterol Bryan   Date Value Ref Range Status   07/05/2022 23 5 - 40 mg/dL Final         Assessment and Plan {CC Problem List  Visit Diagnosis  ROS  Review (Popup)  Health Maintenance  Quality  BestPractice  Medications  SmartSets  SnapShot Encounters  Media :23  Diagnoses and all orders for this visit:    1. Type 2 diabetes mellitus with hypoglycemia without coma, with long-term current use of insulin (HCC) (Primary)  -     Microalbumin / Creatinine Urine Ratio - Urine, " Clean Catch; Future  -     Hemoglobin A1c; Future  -     Comprehensive Metabolic Panel; Future    A1C was 8.4%  Pt is having morning hypoglycemia  Stop Lantus  Continue with Trulicity 3 mg weekly and Novolog as needed per sliding scale  Talked about the importance of taking Novolog before meals  Had a talk about the risk of tight glycemic control/hypoglycemia in pts that are older with dementia.  Goal of morning -130 and during the day time below 200.  Continue with Dexcom  Check labs prior to next visit      2. Mixed hyperlipidemia  -     Comprehensive Metabolic Panel; Future  -     Lipid Panel; Future    Lipid panel is good   Continue with statin      3. Essential hypertension  -     Comprehensive Metabolic Panel; Future    Stable  Continue with current medication regimen  Defer management to PCP        No refills needed at this time      RTC in 3-4 months with me, labs prior        Follow Up     Patient was given instructions and counseling regarding her condition or for health maintenance advice. Please see specific information pulled into the AVS if appropriate.              Carlotta Mckeon, IAIN  08/25/22

## 2022-08-25 ENCOUNTER — OFFICE VISIT (OUTPATIENT)
Dept: ENDOCRINOLOGY | Age: 82
End: 2022-08-25

## 2022-08-25 VITALS
SYSTOLIC BLOOD PRESSURE: 122 MMHG | HEART RATE: 47 BPM | WEIGHT: 138.8 LBS | BODY MASS INDEX: 25.54 KG/M2 | HEIGHT: 62 IN | TEMPERATURE: 96.6 F | OXYGEN SATURATION: 98 % | DIASTOLIC BLOOD PRESSURE: 80 MMHG

## 2022-08-25 DIAGNOSIS — Z79.4 TYPE 2 DIABETES MELLITUS WITH HYPOGLYCEMIA WITHOUT COMA, WITH LONG-TERM CURRENT USE OF INSULIN: Primary | ICD-10-CM

## 2022-08-25 DIAGNOSIS — E11.649 TYPE 2 DIABETES MELLITUS WITH HYPOGLYCEMIA WITHOUT COMA, WITH LONG-TERM CURRENT USE OF INSULIN: Primary | ICD-10-CM

## 2022-08-25 DIAGNOSIS — E78.2 MIXED HYPERLIPIDEMIA: ICD-10-CM

## 2022-08-25 DIAGNOSIS — I10 ESSENTIAL HYPERTENSION: ICD-10-CM

## 2022-08-25 PROCEDURE — 99214 OFFICE O/P EST MOD 30 MIN: CPT | Performed by: NURSE PRACTITIONER

## 2022-08-30 ENCOUNTER — TELEPHONE (OUTPATIENT)
Dept: INTERNAL MEDICINE | Facility: CLINIC | Age: 82
End: 2022-08-30

## 2022-08-30 NOTE — TELEPHONE ENCOUNTER
BART BARRERA, PT WITH [facility], CALLED TO REQUEST AN ORDER TO RESUME CARE ON THE PATIENT:    1 X WEEK FOR 4 WEEKS - PT    PLEASE ADVISE  [phone]

## 2022-09-09 PROCEDURE — G0179 MD RECERTIFICATION HHA PT: HCPCS | Performed by: PHYSICIAN ASSISTANT

## 2022-09-18 ENCOUNTER — APPOINTMENT (OUTPATIENT)
Dept: GENERAL RADIOLOGY | Facility: HOSPITAL | Age: 82
End: 2022-09-18

## 2022-09-18 ENCOUNTER — HOSPITAL ENCOUNTER (EMERGENCY)
Facility: HOSPITAL | Age: 82
Discharge: HOME | End: 2022-09-18
Attending: EMERGENCY MEDICINE | Admitting: EMERGENCY MEDICINE

## 2022-09-18 ENCOUNTER — APPOINTMENT (OUTPATIENT)
Dept: CT IMAGING | Facility: HOSPITAL | Age: 82
End: 2022-09-18

## 2022-09-18 VITALS
OXYGEN SATURATION: 100 % | HEART RATE: 60 BPM | HEIGHT: 62 IN | RESPIRATION RATE: 18 BRPM | BODY MASS INDEX: 25.4 KG/M2 | SYSTOLIC BLOOD PRESSURE: 146 MMHG | DIASTOLIC BLOOD PRESSURE: 79 MMHG | TEMPERATURE: 97.9 F | WEIGHT: 138 LBS

## 2022-09-18 DIAGNOSIS — S50.12XA TRAUMATIC HEMATOMA OF LEFT FOREARM, INITIAL ENCOUNTER: Primary | ICD-10-CM

## 2022-09-18 DIAGNOSIS — S29.019A THORACIC MYOFASCIAL STRAIN, INITIAL ENCOUNTER: ICD-10-CM

## 2022-09-18 DIAGNOSIS — S50.11XA CONTUSION OF RIGHT FOREARM, INITIAL ENCOUNTER: ICD-10-CM

## 2022-09-18 LAB
ALBUMIN SERPL-MCNC: 4.1 G/DL (ref 3.5–5.2)
ALBUMIN/GLOB SERPL: 1.6 G/DL
ALP SERPL-CCNC: 155 U/L (ref 39–117)
ALT SERPL W P-5'-P-CCNC: 28 U/L (ref 1–33)
ANION GAP SERPL CALCULATED.3IONS-SCNC: 10.2 MMOL/L (ref 5–15)
APTT PPP: 35.7 SECONDS (ref 22.7–35.4)
AST SERPL-CCNC: 21 U/L (ref 1–32)
BASOPHILS # BLD AUTO: 0.03 10*3/MM3 (ref 0–0.2)
BASOPHILS NFR BLD AUTO: 0.6 % (ref 0–1.5)
BILIRUB SERPL-MCNC: <0.2 MG/DL (ref 0–1.2)
BUN SERPL-MCNC: 29 MG/DL (ref 8–23)
BUN/CREAT SERPL: 42 (ref 7–25)
CALCIUM SPEC-SCNC: 10.1 MG/DL (ref 8.6–10.5)
CHLORIDE SERPL-SCNC: 103 MMOL/L (ref 98–107)
CO2 SERPL-SCNC: 26.8 MMOL/L (ref 22–29)
CREAT SERPL-MCNC: 0.69 MG/DL (ref 0.57–1)
DEPRECATED RDW RBC AUTO: 48.7 FL (ref 37–54)
EGFRCR SERPLBLD CKD-EPI 2021: 86.2 ML/MIN/1.73
EOSINOPHIL # BLD AUTO: 0.04 10*3/MM3 (ref 0–0.4)
EOSINOPHIL NFR BLD AUTO: 0.8 % (ref 0.3–6.2)
ERYTHROCYTE [DISTWIDTH] IN BLOOD BY AUTOMATED COUNT: 15.5 % (ref 12.3–15.4)
GLOBULIN UR ELPH-MCNC: 2.6 GM/DL
GLUCOSE SERPL-MCNC: 220 MG/DL (ref 65–99)
HCT VFR BLD AUTO: 36.3 % (ref 34–46.6)
HGB BLD-MCNC: 11.9 G/DL (ref 12–15.9)
IMM GRANULOCYTES # BLD AUTO: 0.04 10*3/MM3 (ref 0–0.05)
IMM GRANULOCYTES NFR BLD AUTO: 0.8 % (ref 0–0.5)
INR PPP: 0.98 (ref 0.9–1.1)
LYMPHOCYTES # BLD AUTO: 1.36 10*3/MM3 (ref 0.7–3.1)
LYMPHOCYTES NFR BLD AUTO: 26.5 % (ref 19.6–45.3)
MCH RBC QN AUTO: 28.3 PG (ref 26.6–33)
MCHC RBC AUTO-ENTMCNC: 32.8 G/DL (ref 31.5–35.7)
MCV RBC AUTO: 86.4 FL (ref 79–97)
MONOCYTES # BLD AUTO: 0.31 10*3/MM3 (ref 0.1–0.9)
MONOCYTES NFR BLD AUTO: 6 % (ref 5–12)
NEUTROPHILS NFR BLD AUTO: 3.35 10*3/MM3 (ref 1.7–7)
NEUTROPHILS NFR BLD AUTO: 65.3 % (ref 42.7–76)
NRBC BLD AUTO-RTO: 0 /100 WBC (ref 0–0.2)
PLATELET # BLD AUTO: 174 10*3/MM3 (ref 140–450)
PMV BLD AUTO: 11.5 FL (ref 6–12)
POTASSIUM SERPL-SCNC: 4.6 MMOL/L (ref 3.5–5.2)
PROT SERPL-MCNC: 6.7 G/DL (ref 6–8.5)
PROTHROMBIN TIME: 12.9 SECONDS (ref 11.7–14.2)
RBC # BLD AUTO: 4.2 10*6/MM3 (ref 3.77–5.28)
SODIUM SERPL-SCNC: 140 MMOL/L (ref 136–145)
WBC NRBC COR # BLD: 5.13 10*3/MM3 (ref 3.4–10.8)

## 2022-09-18 PROCEDURE — 80053 COMPREHEN METABOLIC PANEL: CPT | Performed by: EMERGENCY MEDICINE

## 2022-09-18 PROCEDURE — 73090 X-RAY EXAM OF FOREARM: CPT

## 2022-09-18 PROCEDURE — 72128 CT CHEST SPINE W/O DYE: CPT

## 2022-09-18 PROCEDURE — 96374 THER/PROPH/DIAG INJ IV PUSH: CPT

## 2022-09-18 PROCEDURE — 25010000002 ONDANSETRON PER 1 MG: Performed by: EMERGENCY MEDICINE

## 2022-09-18 PROCEDURE — 85025 COMPLETE CBC W/AUTO DIFF WBC: CPT | Performed by: EMERGENCY MEDICINE

## 2022-09-18 PROCEDURE — 72110 X-RAY EXAM L-2 SPINE 4/>VWS: CPT

## 2022-09-18 PROCEDURE — 85610 PROTHROMBIN TIME: CPT | Performed by: EMERGENCY MEDICINE

## 2022-09-18 PROCEDURE — 96375 TX/PRO/DX INJ NEW DRUG ADDON: CPT

## 2022-09-18 PROCEDURE — 99284 EMERGENCY DEPT VISIT MOD MDM: CPT

## 2022-09-18 PROCEDURE — 85730 THROMBOPLASTIN TIME PARTIAL: CPT | Performed by: EMERGENCY MEDICINE

## 2022-09-18 PROCEDURE — 25010000002 MORPHINE PER 10 MG: Performed by: EMERGENCY MEDICINE

## 2022-09-18 RX ORDER — MORPHINE SULFATE 2 MG/ML
2 INJECTION, SOLUTION INTRAMUSCULAR; INTRAVENOUS ONCE
Status: COMPLETED | OUTPATIENT
Start: 2022-09-18 | End: 2022-09-18

## 2022-09-18 RX ORDER — ACETAMINOPHEN AND CODEINE PHOSPHATE 300; 30 MG/1; MG/1
1 TABLET ORAL EVERY 6 HOURS PRN
Qty: 12 TABLET | Refills: 0 | Status: SHIPPED | OUTPATIENT
Start: 2022-09-18 | End: 2022-09-26

## 2022-09-18 RX ORDER — ONDANSETRON 2 MG/ML
4 INJECTION INTRAMUSCULAR; INTRAVENOUS ONCE
Status: COMPLETED | OUTPATIENT
Start: 2022-09-18 | End: 2022-09-18

## 2022-09-18 RX ORDER — SODIUM CHLORIDE 0.9 % (FLUSH) 0.9 %
10 SYRINGE (ML) INJECTION AS NEEDED
Status: DISCONTINUED | OUTPATIENT
Start: 2022-09-18 | End: 2022-09-18 | Stop reason: HOSPADM

## 2022-09-18 RX ADMIN — ONDANSETRON 4 MG: 2 INJECTION INTRAMUSCULAR; INTRAVENOUS at 05:35

## 2022-09-18 RX ADMIN — MORPHINE SULFATE 2 MG: 2 INJECTION, SOLUTION INTRAMUSCULAR; INTRAVENOUS at 05:35

## 2022-09-18 NOTE — ED PROVIDER NOTES
EMERGENCY DEPARTMENT ENCOUNTER    Room Number:  11/11  Date of encounter:  9/18/2022  PCP: Lily Yepez PA-C  Historian: Patient      HPI:  Chief Complaint: Fall  A complete HPI/ROS/PMH/PSH/SH/FH are unobtainable due to: None    Context: Di Macario is a 82 y.o. female who presents to the ED c/o slipping and falling at home.  Patient is going to the bathroom and did not have her slippers on securely which caused her to fall and strike her arms on the door frame.  Did not hit her head.  Denies any loss of consciousness complaining of pain to bilateral forearms and low back.      PAST MEDICAL HISTORY  Active Ambulatory Problems     Diagnosis Date Noted   • Essential hypertension 03/04/2016   • Vitamin D deficiency 03/04/2016   • Hyperuricemia 07/20/2016   • Slow transit constipation 01/31/2017   • Chronic pain of left knee 01/31/2017   • At high risk for falls 08/10/2018   • Peripheral neuropathy 08/28/2018   • Uncontrolled type 2 diabetes mellitus with hyperglycemia ([facility]) 10/25/2018   • Walker as ambulation aid 02/12/2019   • Bradycardia, sinus 01/13/2021   • Shortness of breath 01/13/2021   • Leg swelling 01/13/2021   • Hyperlipidemia 01/13/2021   • Presence of cardiac pacemaker 03/26/2021   • Dementia without behavioral disturbance ([facility]) 06/01/2022   • Aspiration pneumonia due to vomitus ([facility]) 06/02/2022   • Aspiration pneumonia of both lungs due to gastric secretions, unspecified part of lung ([facility]) 07/24/2022   • Weakness 07/24/2022   • Thrombocytopenia ([facility]) 07/25/2022   • Oropharyngeal dysphagia 07/28/2022   • Immobility syndrome 07/28/2022   • Type 2 diabetes mellitus with hypoglycemia, with long-term current use of insulin ([facility]) 08/25/2022     Resolved Ambulatory Problems     Diagnosis Date Noted   • Fall 02/03/2017   • Medicare annual wellness visit, subsequent 08/16/2017   • Alkaline phosphatase elevation 10/29/2018   • Calcium blood increased 10/29/2018   • Humeral head fracture, right, closed,  initial encounter 10/29/2021   • Nail abnormalities 03/17/2022   • Esophageal abnormality 06/01/2022     Past Medical History:   Diagnosis Date   • Dementia (HCC)    • Diabetes mellitus (HCC)    • Lung consolidation (HCC) 06/01/2022         PAST SURGICAL HISTORY  Past Surgical History:   Procedure Laterality Date   • ANKLE SURGERY Right    • BLADDER SURGERY      Prolapsed   • BREAST BIOPSY     • CARDIAC ELECTROPHYSIOLOGY PROCEDURE N/A 3/5/2021    Procedure: Pacemaker DC new BOSTON;  Surgeon: Madelin Ferguson MD;  Location:  [facility] CATH INVASIVE LOCATION;  Service: Cardiology;  Laterality: N/A;   • ENDOSCOPY N/A 6/6/2022    Procedure: ESOPHAGOGASTRODUODENOSCOPY with biopsy, balloon dilation;  Surgeon: Lo Benjamin MD;  Location: [facility] ENDOSCOPY;  Service: Gastroenterology;  Laterality: N/A;  esophageal stricture, hiatal hernia, gastritis   • HYSTERECTOMY           FAMILY HISTORY  Family History   Problem Relation Age of Onset   • Hypertension Mother    • Hypertension Father          SOCIAL HISTORY  Social History     Socioeconomic History   • Marital status:    Tobacco Use   • Smoking status: Former Smoker   • Smokeless tobacco: Never Used   Vaping Use   • Vaping Use: Never used   Substance and Sexual Activity   • Alcohol use: Not Currently     Comment: RARELY   • Drug use: No   • Sexual activity: Defer     Birth control/protection: Surgical         ALLERGIES  Patient has no known allergies.        REVIEW OF SYSTEMS  Review of Systems     All systems reviewed and negative except for those discussed in HPI.       PHYSICAL EXAM    I have reviewed the triage vital signs and nursing notes.    ED Triage Vitals [09/18/22 0456]   Temp Heart Rate Resp BP SpO2   -- 60 18 155/77 97 %      Temp src Heart Rate Source Patient Position BP Location FiO2 (%)   -- Monitor -- -- --       Physical Exam  GENERAL: not distressed  HENT: nares patent  EYES: no scleral icterus  CV: regular rhythm, regular  rate  RESPIRATORY: normal effort clear to auscultation bilaterally  ABDOMEN: soft, nontender nondistended  MUSCULOSKELETAL: no deformity moderate sized hematoma to lateral left proximal forearm no significant bony tenderness neurovascular intact distally, bruising to distal right forearm tender palpation over previous no significant bony tenderness neurovascular intact distally; diffuse L-spine tenderness there is no focal bony tenderness no step-offs  NEURO: alert, moves all extremities, follows commands  SKIN: warm, dry        LAB RESULTS  Recent Results (from the past 24 hour(s))   Comprehensive Metabolic Panel    Collection Time: 09/18/22  5:32 AM    Specimen: Blood   Result Value Ref Range    Glucose 220 (H) 65 - 99 mg/dL    BUN 29 (H) 8 - 23 mg/dL    Creatinine 0.69 0.57 - 1.00 mg/dL    Sodium 140 136 - 145 mmol/L    Potassium 4.6 3.5 - 5.2 mmol/L    Chloride 103 98 - 107 mmol/L    CO2 26.8 22.0 - 29.0 mmol/L    Calcium 10.1 8.6 - 10.5 mg/dL    Total Protein 6.7 6.0 - 8.5 g/dL    Albumin 4.10 3.50 - 5.20 g/dL    ALT (SGPT) 28 1 - 33 U/L    AST (SGOT) 21 1 - 32 U/L    Alkaline Phosphatase 155 (H) 39 - 117 U/L    Total Bilirubin <0.2 0.0 - 1.2 mg/dL    Globulin 2.6 gm/dL    A/G Ratio 1.6 g/dL    BUN/Creatinine Ratio 42.0 (H) 7.0 - 25.0    Anion Gap 10.2 5.0 - 15.0 mmol/L    eGFR 86.2 >60.0 mL/min/1.73   Protime-INR    Collection Time: 09/18/22  5:32 AM    Specimen: Blood   Result Value Ref Range    Protime 12.9 11.7 - 14.2 Seconds    INR 0.98 0.90 - 1.10   aPTT    Collection Time: 09/18/22  5:32 AM    Specimen: Blood   Result Value Ref Range    PTT 35.7 (H) 22.7 - 35.4 seconds   CBC Auto Differential    Collection Time: 09/18/22  5:32 AM    Specimen: Blood   Result Value Ref Range    WBC 5.13 3.40 - 10.80 10*3/mm3    RBC 4.20 3.77 - 5.28 10*6/mm3    Hemoglobin 11.9 (L) 12.0 - 15.9 g/dL    Hematocrit 36.3 34.0 - 46.6 %    MCV 86.4 79.0 - 97.0 fL    MCH 28.3 26.6 - 33.0 pg    MCHC 32.8 31.5 - 35.7 g/dL    RDW 15.5  (H) 12.3 - 15.4 %    RDW-SD 48.7 37.0 - 54.0 fl    MPV 11.5 6.0 - 12.0 fL    Platelets 174 140 - 450 10*3/mm3    Neutrophil % 65.3 42.7 - 76.0 %    Lymphocyte % 26.5 19.6 - 45.3 %    Monocyte % 6.0 5.0 - 12.0 %    Eosinophil % 0.8 0.3 - 6.2 %    Basophil % 0.6 0.0 - 1.5 %    Immature Grans % 0.8 (H) 0.0 - 0.5 %    Neutrophils, Absolute 3.35 1.70 - 7.00 10*3/mm3    Lymphocytes, Absolute 1.36 0.70 - 3.10 10*3/mm3    Monocytes, Absolute 0.31 0.10 - 0.90 10*3/mm3    Eosinophils, Absolute 0.04 0.00 - 0.40 10*3/mm3    Basophils, Absolute 0.03 0.00 - 0.20 10*3/mm3    Immature Grans, Absolute 0.04 0.00 - 0.05 10*3/mm3    nRBC 0.0 0.0 - 0.2 /100 WBC       Ordered the above labs and independently reviewed the results.        RADIOLOGY  CT Thoracic Spine Without Contrast    Result Date: 2022  CT SCAN OF THE THORACIC SPINE  HISTORY: Fell. Back pain. Possible compression fracture of T12 noted on limited plain films of the lumbar spine.  FINDINGS:  The CT scan was performed as an emergency procedure through the thoracic spine and demonstrates the followin. There is no evidence of acute compression fracture with particular reference to the T12 vertebral body. 2. There is some disc space narrowing, spurring and vacuum disc formation in the lower thoracic spine. There is no central or foraminal encroachment throughout the thoracic spine. 3. The visualized lungs are clear except for a calcified granuloma in the left lower lobe.   Radiation dose reduction techniques were utilized, including automated exposure control and exposure modulation based on body size.       XR Spine Lumbar Complete 4+VW, XR Forearm 2 View Bilateral    Result Date: 2022  TWO-VIEW LEFT FOREARM AND 2 VIEW RIGHT FOREARM  HISTORY: Fell. Pain.  FINDINGS: There is no evidence of acute fracture involving either forearm. There is prominent localized soft tissue swelling posterior and lateral to the proximal left ulna consistent with soft tissue hematoma  at this location.  FIVE-VIEW LUMBAR SPINE  HISTORY: Fell. Low back pain.  FINDINGS: There is prominent diffuse osteoporosis. The lateral view is somewhat obliqued and is suspicious for compression fracture involving the T11 vertebral body that is new since the recent chest CT scan dated 07/24/2022. There is severe disc space narrowing and some associated spurring at L4-5 associated with anterior subluxation of L4 measuring approximately 8 mm. This is unchanged from the reformatted lateral images of the abdominal and pelvic CT scan dated 03/01/2021.  This report was finalized on 9/18/2022 6:32 AM by Dr. Fernando Caba M.D.        I ordered the above noted radiological studies. Reviewed by me and discussed with radiologist.  See dictation for official radiology interpretation.      PROCEDURES    Procedures      MEDICATIONS GIVEN IN ER    Medications   sodium chloride 0.9 % flush 10 mL (has no administration in time range)   morphine injection 2 mg (2 mg Intravenous Given 9/18/22 0535)   ondansetron (ZOFRAN) injection 4 mg (4 mg Intravenous Given 9/18/22 0535)         PROGRESS, DATA ANALYSIS, CONSULTS, AND MEDICAL DECISION MAKING    All labs have been independently reviewed by me.  All radiology studies have been reviewed by me and discussed with radiologist dictating the report.   EKG's independently viewed and interpreted by me.  Discussion below represents my analysis of pertinent findings related to patient's condition, differential diagnosis, treatment plan and final disposition.        ED Course as of 09/18/22 0741   Sun Sep 18, 2022   0555 Hemoglobin(!): 11.9  Baseline [TJ]   0555 WBC: 5.13 [TJ]   0555 Platelets: 174 [TJ]      ED Course User Index  [TJ] Antonio Hernandez MD           PPE: The patient wore a surgical mask throughout the entire patient encounter. I wore an N95.    AS OF 07:41 EDT VITALS:    BP - 136/83  HR - 60  TEMP - 97.9 °F (36.6 °C) (Oral)  O2 SATS - 100%        DIAGNOSIS  Final  diagnoses:   Traumatic hematoma of left forearm, initial encounter   Contusion of right forearm, initial encounter   Thoracic myofascial strain, initial encounter         DISPOSITION  Discharge           Antonio Hernandez MD  09/18/22 0759

## 2022-09-18 NOTE — ED NOTES
Pt to triage from home via [facility] [id number] with c/o unwitnessed mechanical fall.  Pt was going to bathroom, felt her slipper wasn't on correctly and ended up falling.  Pt has deformity noted to left elbow with limited rom to extremity. Pt has contusion to right inner wrist. Pt also has tenderness to left side of back at waist level from the fall.  Pt denies hitting head, denies loc, no blood thinners.  Pt wearing mask in triage. Triage personnel wore appropriate PPE

## 2022-09-19 NOTE — PLAN OF CARE
Goal Outcome Evaluation:           Progress: improving  Outcome Evaluation: A&OX4. Forgetful. Immobility sydrome. Hx. esophageal strictures, gastritis, dysphagia, dementia, aspiration pneumoniax2, DM2, HTN, seizures. BS checks ACHS. Diet: pureed, NT liquids. Meds crushed in puree. On swallow, seizure, and aspiration precautions. Mildly [place]. 1L O2 NC at nite. Continent and incontinent. Last BM 8/2. Wears a brief and peripad. Has urgency.  Assistx1 to wheelchair. No complaints of pain. D/C home Friday, 8/12. Labs today: CBC. Participated fully in therapy.   2 seconds or less

## 2022-09-21 ENCOUNTER — OFFICE VISIT (OUTPATIENT)
Dept: INTERNAL MEDICINE | Facility: CLINIC | Age: 82
End: 2022-09-21

## 2022-09-21 ENCOUNTER — HOSPITAL ENCOUNTER (OUTPATIENT)
Dept: GENERAL RADIOLOGY | Facility: HOSPITAL | Age: 82
Discharge: HOME | End: 2022-09-21
Admitting: PHYSICIAN ASSISTANT

## 2022-09-21 VITALS
BODY MASS INDEX: 25.23 KG/M2 | SYSTOLIC BLOOD PRESSURE: 112 MMHG | HEIGHT: 62 IN | TEMPERATURE: 97.8 F | DIASTOLIC BLOOD PRESSURE: 70 MMHG

## 2022-09-21 DIAGNOSIS — R07.81 RIB PAIN: Primary | ICD-10-CM

## 2022-09-21 PROCEDURE — 71101 X-RAY EXAM UNILAT RIBS/CHEST: CPT

## 2022-09-21 PROCEDURE — 99214 OFFICE O/P EST MOD 30 MIN: CPT | Performed by: PHYSICIAN ASSISTANT

## 2022-09-21 NOTE — PROGRESS NOTES
Subjective   Chief Complaint   Patient presents with   • Hospital Follow Up Visit     Hematoma left forearm on 9/18/22 seen in ED       History of Present Illness  Patient's daughter is present and providing most of the history    Pt is here today after a fall and ED visit on 9/18/22. Slipped and fell at home, she hit her arms on the door frame. Did not hit her head, no LOC. She had xrays that showed no acute fracture. CT of spine was negative for compression fracture. She is having some pain over her left side, did not have any imaging of her ribs at the time.      Patient Active Problem List   Diagnosis   • Essential hypertension   • Vitamin D deficiency   • Hyperuricemia   • Slow transit constipation   • Chronic pain of left knee   • At high risk for falls   • Peripheral neuropathy   • Uncontrolled type 2 diabetes mellitus with hyperglycemia ([facility])   • Walker as ambulation aid   • Bradycardia, sinus   • Shortness of breath   • Leg swelling   • Hyperlipidemia   • Presence of cardiac pacemaker   • Dementia without behavioral disturbance ([facility])   • Aspiration pneumonia due to vomitus ([facility])   • Aspiration pneumonia of both lungs due to gastric secretions, unspecified part of lung ([facility])   • Weakness   • Thrombocytopenia ([facility])   • Oropharyngeal dysphagia   • Immobility syndrome   • Type 2 diabetes mellitus with hypoglycemia, with long-term current use of insulin ([facility])       No Known Allergies    Current Outpatient Medications on File Prior to Visit   Medication Sig Dispense Refill   • acetaminophen (TYLENOL) 325 MG tablet Take 650 mg by mouth Every 6 (Six) Hours As Needed for Mild Pain  or Moderate Pain . PRN     • amLODIPine (NORVASC) 5 MG tablet Take 0.5 tablets by mouth Daily. 90 tablet 0   • Continuous Blood Gluc Sensor (Dexcom G6 Sensor) Dipsense Dexcom G6 Sensors, to replace every 10 days, 3 sensors per 30 day period (NDC #[address]-22). Check 6 times a day. Dx: E 11.65 sent to supply store 9 each 4   • donepezil  (ARICEPT) 10 MG tablet Take 1 tablet by mouth Every Night. 90 tablet 3   • Dulaglutide (Trulicity) 3 MG/0.5ML solution pen-injector Inject 0.5 mL under the skin into the appropriate area as directed 1 (One) Time Per Week. Replaces 1.5 mg weekly dose 6 mL 2   • famotidine (PEPCID) 20 MG tablet Take 20 mg by mouth 2 (Two) Times a Day.     • Glucagon (Gvoke HypoPen 2-Pack) 1 MG/0.2ML solution auto-injector Inject 1 mg under the skin into the appropriate area as directed As Needed (hypoglycemia). 0.4 mL 1   • glucose blood (FREESTYLE LITE) test strip Bid e11.65 200 each 3   • insulin aspart (NovoLOG FlexPen) 100 UNIT/ML solution pen-injector sc pen 1 unit per 30 mg/dl glucose over 170 mg/dl Use as directed for meal & correction coverage up to 50 units a day. 45 mL 2   • Insulin Pen Needle (BD Pen Needle Norma U/F) 32G X 4 MM misc Use once a day 100 each 3   • Lactobacillus (FLORANEX PO) Take  by mouth.     • Lancets (freestyle) lancets 1 each by Other route 4 (Four) Times a Day. Use as instructed 400 each 1   • melatonin 1 MG tablet Take 1 tablet by mouth Every Night. 30 tablet 1   • pravastatin (PRAVACHOL) 40 MG tablet Take 1 tablet by mouth Every Evening. 90 tablet 3   • Zonegran 100 MG capsule Take 2 capsules by mouth Every Night.     • acetaminophen-codeine (TYLENOL #3) 300-30 MG per tablet Take 1 tablet by mouth Every 6 (Six) Hours As Needed for Moderate Pain. 12 tablet 0     No current facility-administered medications on file prior to visit.       Past Medical History:   Diagnosis Date   • Dementia (HCC)     states better with medication    • Diabetes mellitus (HCC)    • Hyperlipidemia 01/13/2021   • Lung consolidation (HCC) 06/01/2022   • Peripheral neuropathy 08/28/2018   • Vitamin D deficiency        Family History   Problem Relation Age of Onset   • Hypertension Mother    • Hypertension Father        Social History     Socioeconomic History   • Marital status:    Tobacco Use   • Smoking status: Former  "Smoker   • Smokeless tobacco: Never Used   Vaping Use   • Vaping Use: Never used   Substance and Sexual Activity   • Alcohol use: Not Currently     Comment: RARELY   • Drug use: No   • Sexual activity: Defer     Birth control/protection: Surgical       Past Surgical History:   Procedure Laterality Date   • ANKLE SURGERY Right    • BLADDER SURGERY      Prolapsed   • BREAST BIOPSY     • CARDIAC ELECTROPHYSIOLOGY PROCEDURE N/A 3/5/2021    Procedure: Pacemaker DC new BOSTON;  Surgeon: Madelin Ferguson MD;  Location: [facility] CATH INVASIVE LOCATION;  Service: Cardiology;  Laterality: N/A;   • ENDOSCOPY N/A 6/6/2022    Procedure: ESOPHAGOGASTRODUODENOSCOPY with biopsy, balloon dilation;  Surgeon: Lo Benjamin MD;  Location: [facility] ENDOSCOPY;  Service: Gastroenterology;  Laterality: N/A;  esophageal stricture, hiatal hernia, gastritis   • HYSTERECTOMY           The following portions of the patient's history were reviewed and updated as appropriate: problem list, allergies, current medications, past medical history, past family history, past social history and past surgical history.    ROS     See HPI    Immunization History   Administered Date(s) Administered   • COVID-19 (PFIZER) PURPLE CAP 02/02/2021, 02/23/2021, 09/29/2021   • Fluzone High Dose =>65 Years ([employer] ONLY) 11/07/2016, 09/30/2019   • Fluzone High-Dose 65+yrs 10/19/2021   • Influenza, Unspecified 09/16/2019, 10/08/2020   • Pneumococcal Conjugate 13-Valent (PCV13) 01/31/2017   • Pneumococcal Polysaccharide (PPSV23) 02/01/2018   • Tdap 02/12/2021       Objective   Vitals:    09/21/22 1638 09/21/22 1659   BP:  112/70   Temp: 97.8 °F (36.6 °C)    Height: 157.5 cm (62.01\")      Body mass index is 25.23 kg/m².  Physical Exam  Vitals reviewed.   Cardiovascular:      Rate and Rhythm: Normal rate and regular rhythm.   Pulmonary:      Effort: Pulmonary effort is normal.      Breath sounds: Normal breath sounds. No wheezing or rhonchi.      Comments: " Tenderness over lateral 10th and 11th ribs  Musculoskeletal:      Comments: Pt in wheelchair   Skin:     Comments: Large hematoma of left forearm starting at elbow and extending to wrist   Neurological:      Mental Status: She is alert.           Assessment & Plan   Diagnoses and all orders for this visit:    1. Rib pain (Primary)  -     XR Ribs Left With PA Chest    Will get an xray of her ribs today make sure no fractures. Continue tylenol 1000 mg every 6 hours, use voltaren gel prn for her back and also salon pas or lidocaine patches.     Reviewed ED workup

## 2022-09-28 ENCOUNTER — TELEPHONE (OUTPATIENT)
Dept: INTERNAL MEDICINE | Facility: CLINIC | Age: 82
End: 2022-09-28

## 2022-09-28 NOTE — TELEPHONE ENCOUNTER
Caller: SUKHDEV    Relationship: Home Health     Best call back number: [phone]    SUKHDEV, WITH [employer] CALLED WANTING TO GIVE UP A FEW SPEECH THERAPY VISITS TO LET THE PHYSICAL THERAPIST VISIT MORE WITH THE PATIENT.    SUKHDEV WONDERS IF SHE CAN PLEASE HAVE ONE MORE SPEECH THERAPY VISIT WITH THE PATIENT AND THEN IS ASKING FOR THE FOLLOWING DURATION FOR PHYSICAL THERAPY:    ONE TIME A WEEK FOR FOUR WEEKS.         PLEASE CALL AND ADVISE

## 2022-09-29 ENCOUNTER — CLINICAL SUPPORT NO REQUIREMENTS (OUTPATIENT)
Dept: CARDIOLOGY | Facility: CLINIC | Age: 82
End: 2022-09-29

## 2022-09-29 ENCOUNTER — OFFICE VISIT (OUTPATIENT)
Dept: CARDIOLOGY | Facility: CLINIC | Age: 82
End: 2022-09-29

## 2022-09-29 VITALS
DIASTOLIC BLOOD PRESSURE: 77 MMHG | BODY MASS INDEX: 26.5 KG/M2 | WEIGHT: 144 LBS | SYSTOLIC BLOOD PRESSURE: 124 MMHG | HEIGHT: 62 IN | HEART RATE: 66 BPM

## 2022-09-29 DIAGNOSIS — Z95.0 PRESENCE OF CARDIAC PACEMAKER: Primary | ICD-10-CM

## 2022-09-29 DIAGNOSIS — Z95.0 PRESENCE OF CARDIAC PACEMAKER: ICD-10-CM

## 2022-09-29 DIAGNOSIS — I10 ESSENTIAL HYPERTENSION: ICD-10-CM

## 2022-09-29 DIAGNOSIS — E78.00 PURE HYPERCHOLESTEROLEMIA: Primary | ICD-10-CM

## 2022-09-29 DIAGNOSIS — M79.89 LEG SWELLING: ICD-10-CM

## 2022-09-29 PROCEDURE — 93000 ELECTROCARDIOGRAM COMPLETE: CPT | Performed by: INTERNAL MEDICINE

## 2022-09-29 PROCEDURE — 93280 PM DEVICE PROGR EVAL DUAL: CPT | Performed by: INTERNAL MEDICINE

## 2022-09-29 PROCEDURE — 99214 OFFICE O/P EST MOD 30 MIN: CPT | Performed by: INTERNAL MEDICINE

## 2022-09-29 NOTE — PROGRESS NOTES
"1 yr with PCM check    Subjective:        Di Macario is a 82 y.o. female who here for follow up    CC  pacer  HPI  82-year-old female with a benign essential arterial hypertension, hyperlipidemia and the pacemaker here for the follow-up with no complaints of chest pains tightness heaviness of the pressure sensation     Problems Addressed this Visit        Cardiac and Vasculature    Essential hypertension    Hyperlipidemia - Primary    Presence of cardiac pacemaker       Symptoms and Signs    Leg swelling      Diagnoses       Codes Comments    Pure hypercholesterolemia    -  Primary ICD-10-CM: E78.00  ICD-9-CM: 272.0     Presence of cardiac pacemaker     ICD-10-CM: Z95.0  ICD-9-CM: V45.01     Essential hypertension     ICD-10-CM: I10  ICD-9-CM: 401.9     Leg swelling     ICD-10-CM: M79.89  ICD-9-CM: 729.81         .    The following portions of the patient's history were reviewed and updated as appropriate: allergies, current medications, past family history, past medical history, past social history, past surgical history and problem list.    Past Medical History:   Diagnosis Date   • Dementia (HCC)     states better with medication    • Diabetes mellitus (HCC)    • Hyperlipidemia 01/13/2021   • Lung consolidation (HCC) 06/01/2022   • Peripheral neuropathy 08/28/2018   • Vitamin D deficiency      reports that she has quit smoking. She has never used smokeless tobacco. She reports previous alcohol use. She reports that she does not use drugs.   Family History   Problem Relation Age of Onset   • Hypertension Mother    • Hypertension Father        Review of Systems  Constitutional: No wt loss, fever, fatigue  Gastrointestinal: No nausea, abdominal pain  Behavioral/Psych: No insomnia or anxiety   Cardiovascular no chest pains or tightness in the chest  Objective:       Physical Exam  /77   Pulse 66   Ht 157.5 cm (62.01\")   Wt 65.3 kg (144 lb)   BMI 26.33 kg/m²   General appearance: No acute changes "   Neck: Trachea midline; NECK, supple, no thyromegaly or lymphadenopathy   Lungs: Normal size and shape, normal breath sounds, equal distribution of air, no rales and rhonchi   CV: S1-S2 regular, no murmurs, no rub, no gallop   Abdomen: Soft, nontender; no masses , no abnormal abdominal sounds   Extremities: No deformity , normal color , no peripheral edema   Skin: Normal temperature, turgor and texture; no rash, ulcers            ECG 12 Lead    Date/Time: 2022 11:26 AM  Performed by: Madelin Ferguson MD  Authorized by: Madelin Ferguson MD   Comparison: compared with previous ECG   Similar to previous ECG  Rhythm: sinus rhythm  Pacin% capture  Clinical impression: abnormal EKG              Echocardiogram:        Current Outpatient Medications:   •  acetaminophen (TYLENOL) 325 MG tablet, Take 650 mg by mouth Every 6 (Six) Hours As Needed for Mild Pain  or Moderate Pain . PRN, Disp: , Rfl:   •  amLODIPine (NORVASC) 5 MG tablet, Take 0.5 tablets by mouth Daily., Disp: 90 tablet, Rfl: 0  •  Continuous Blood Gluc Sensor (Dexcom G6 Sensor), Dipsense Dexcom G6 Sensors, to replace every 10 days, 3 sensors per 30 day period (NDC #[address]-84). Check 6 times a day. Dx: E 11.65 sent to supply store, Disp: 9 each, Rfl: 4  •  donepezil (ARICEPT) 10 MG tablet, Take 1 tablet by mouth Every Night., Disp: 90 tablet, Rfl: 3  •  Dulaglutide (Trulicity) 3 MG/0.5ML solution pen-injector, Inject 0.5 mL under the skin into the appropriate area as directed 1 (One) Time Per Week. Replaces 1.5 mg weekly dose, Disp: 6 mL, Rfl: 2  •  famotidine (PEPCID) 20 MG tablet, Take 20 mg by mouth 2 (Two) Times a Day., Disp: , Rfl:   •  Glucagon (Gvoke HypoPen 2-Pack) 1 MG/0.2ML solution auto-injector, Inject 1 mg under the skin into the appropriate area as directed As Needed (hypoglycemia)., Disp: 0.4 mL, Rfl: 1  •  glucose blood (FREESTYLE LITE) test strip, Bid e11.65, Disp: 200 each, Rfl: 3  •  insulin aspart (NovoLOG FlexPen)  100 UNIT/ML solution pen-injector sc pen, 1 unit per 30 mg/dl glucose over 170 mg/dl Use as directed for meal & correction coverage up to 50 units a day., Disp: 45 mL, Rfl: 2  •  Insulin Pen Needle (BD Pen Needle Norma U/F) 32G X 4 MM misc, Use once a day, Disp: 100 each, Rfl: 3  •  Lancets (freestyle) lancets, 1 each by Other route 4 (Four) Times a Day. Use as instructed, Disp: 400 each, Rfl: 1  •  melatonin 1 MG tablet, Take 1 tablet by mouth Every Night., Disp: 30 tablet, Rfl: 1  •  pravastatin (PRAVACHOL) 40 MG tablet, Take 1 tablet by mouth Every Evening., Disp: 90 tablet, Rfl: 3  •  Zonegran 100 MG capsule, Take 2 capsules by mouth Every Night., Disp: , Rfl:    Assessment:        Patient Active Problem List   Diagnosis   • Essential hypertension   • Vitamin D deficiency   • Hyperuricemia   • Slow transit constipation   • Chronic pain of left knee   • At high risk for falls   • Peripheral neuropathy   • Uncontrolled type 2 diabetes mellitus with hyperglycemia (HCC)   • Walker as ambulation aid   • Bradycardia, sinus   • Shortness of breath   • Leg swelling   • Hyperlipidemia   • Presence of cardiac pacemaker   • Dementia without behavioral disturbance (HCC)   • Aspiration pneumonia due to vomitus (HCC)   • Aspiration pneumonia of both lungs due to gastric secretions, unspecified part of lung (HCC)   • Weakness   • Thrombocytopenia (HCC)   • Oropharyngeal dysphagia   • Immobility syndrome   • Type 2 diabetes mellitus with hypoglycemia, with long-term current use of insulin (HCC)               Plan:            ICD-10-CM ICD-9-CM   1. Pure hypercholesterolemia  E78.00 272.0   2. Presence of cardiac pacemaker  Z95.0 V45.01   3. Essential hypertension  I10 401.9   4. Leg swelling  M79.89 729.81     1. Pure hypercholesterolemia  Continue current medication    2. Presence of cardiac pacemaker  Pacemaker functioning normally    3. Essential hypertension  Blood pressure under control    4. Leg swelling  Di MCKINLEY  Renaldo has been complaining of the leg swelling, appears dependent pedal edema, significantly contributed with a venous insufficiency.    Strong recommendations of elevating the legs as well as using support hoses, along with the control of fluids and salt restriction has been explained in details         cv stable    1 yr  COUNSELING:    Di Sorto was given to patient for the following topics: diagnostic results, risk factor reductions, impressions, risks and benefits of treatment options and importance of treatment compliance .       SMOKING COUNSELING:        Dictated using Dragon dictation

## 2022-10-17 ENCOUNTER — OFFICE VISIT (OUTPATIENT)
Dept: INTERNAL MEDICINE | Facility: CLINIC | Age: 82
End: 2022-10-17

## 2022-10-17 VITALS
DIASTOLIC BLOOD PRESSURE: 70 MMHG | SYSTOLIC BLOOD PRESSURE: 130 MMHG | WEIGHT: 142 LBS | TEMPERATURE: 97.5 F | HEIGHT: 62 IN | BODY MASS INDEX: 26.13 KG/M2

## 2022-10-17 DIAGNOSIS — I10 ESSENTIAL HYPERTENSION: ICD-10-CM

## 2022-10-17 DIAGNOSIS — Z99.89 WALKER AS AMBULATION AID: ICD-10-CM

## 2022-10-17 DIAGNOSIS — Z23 FLU VACCINE NEED: ICD-10-CM

## 2022-10-17 DIAGNOSIS — J69.0 ASPIRATION PNEUMONIA DUE TO VOMIT, UNSPECIFIED LATERALITY, UNSPECIFIED PART OF LUNG: Primary | ICD-10-CM

## 2022-10-17 DIAGNOSIS — Z91.81 AT HIGH RISK FOR FALLS: ICD-10-CM

## 2022-10-17 PROCEDURE — 90662 IIV NO PRSV INCREASED AG IM: CPT | Performed by: PHYSICIAN ASSISTANT

## 2022-10-17 PROCEDURE — G0008 ADMIN INFLUENZA VIRUS VAC: HCPCS | Performed by: PHYSICIAN ASSISTANT

## 2022-10-17 PROCEDURE — 99214 OFFICE O/P EST MOD 30 MIN: CPT | Performed by: PHYSICIAN ASSISTANT

## 2022-10-17 NOTE — PROGRESS NOTES
Subjective   Chief Complaint   Patient presents with   • Hypertension     History of Present Illness     Speech therapy continues to come weekly. Following modified liquid diet. She has not had any other falls. Family continues to come and help. Her weight is increased.     Saw Dr. Emdond, had dependent edema. Recommended salt restriction, elevation and compression stockings. No other changes made.     Has seen pulmonary, repeat lung imaging normal. Goal is to prevent aspiration pneumonia. She has not had any further falls. Using her walker today.      She is seeing endocrinology in 1 month.     Patient Active Problem List   Diagnosis   • Essential hypertension   • Vitamin D deficiency   • Hyperuricemia   • Slow transit constipation   • Chronic pain of left knee   • At high risk for falls   • Peripheral neuropathy   • Uncontrolled type 2 diabetes mellitus with hyperglycemia ([facility])   • Walker as ambulation aid   • Bradycardia, sinus   • Shortness of breath   • Leg swelling   • Hyperlipidemia   • Presence of cardiac pacemaker   • Dementia without behavioral disturbance ([facility])   • Aspiration pneumonia due to vomitus ([facility])   • Aspiration pneumonia of both lungs due to gastric secretions, unspecified part of lung ([facility])   • Weakness   • Thrombocytopenia ([facility])   • Oropharyngeal dysphagia   • Immobility syndrome   • Type 2 diabetes mellitus with hypoglycemia, with long-term current use of insulin ([facility])       No Known Allergies    Current Outpatient Medications on File Prior to Visit   Medication Sig Dispense Refill   • acetaminophen (TYLENOL) 325 MG tablet Take 650 mg by mouth Every 6 (Six) Hours As Needed for Mild Pain  or Moderate Pain . PRN     • amLODIPine (NORVASC) 5 MG tablet Take 0.5 tablets by mouth Daily. 90 tablet 0   • Continuous Blood Gluc Sensor (Dexcom G6 Sensor) Dipsense Dexcom G6 Sensors, to replace every 10 days, 3 sensors per 30 day period (NDC #[address]-25). Check 6 times a day. Dx: E 11.65 sent to  supply store 9 each 4   • donepezil (ARICEPT) 10 MG tablet Take 1 tablet by mouth Every Night. 90 tablet 3   • Dulaglutide (Trulicity) 3 MG/0.5ML solution pen-injector Inject 0.5 mL under the skin into the appropriate area as directed 1 (One) Time Per Week. Replaces 1.5 mg weekly dose 6 mL 2   • famotidine (PEPCID) 20 MG tablet Take 20 mg by mouth 2 (Two) Times a Day.     • Glucagon (Gvoke HypoPen 2-Pack) 1 MG/0.2ML solution auto-injector Inject 1 mg under the skin into the appropriate area as directed As Needed (hypoglycemia). 0.4 mL 1   • glucose blood (FREESTYLE LITE) test strip Bid e11.65 200 each 3   • insulin aspart (NovoLOG FlexPen) 100 UNIT/ML solution pen-injector sc pen 1 unit per 30 mg/dl glucose over 170 mg/dl Use as directed for meal & correction coverage up to 50 units a day. 45 mL 2   • Insulin Pen Needle (BD Pen Needle Norma U/F) 32G X 4 MM misc Use once a day 100 each 3   • Lancets (freestyle) lancets 1 each by Other route 4 (Four) Times a Day. Use as instructed 400 each 1   • melatonin 1 MG tablet Take 1 tablet by mouth Every Night. 30 tablet 1   • pravastatin (PRAVACHOL) 40 MG tablet Take 1 tablet by mouth Every Evening. 90 tablet 3   • Zonegran 100 MG capsule Take 2 capsules by mouth Every Night.       No current facility-administered medications on file prior to visit.       Past Medical History:   Diagnosis Date   • Dementia (HCC)     states better with medication    • Diabetes mellitus (HCC)    • Hyperlipidemia 01/13/2021   • Lung consolidation (HCC) 06/01/2022   • Peripheral neuropathy 08/28/2018   • Vitamin D deficiency        Family History   Problem Relation Age of Onset   • Hypertension Mother    • Hypertension Father        Social History     Socioeconomic History   • Marital status:    Tobacco Use   • Smoking status: Former   • Smokeless tobacco: Never   Vaping Use   • Vaping Use: Never used   Substance and Sexual Activity   • Alcohol use: Not Currently     Comment: RARELY   •  "Drug use: No   • Sexual activity: Defer     Birth control/protection: Surgical       Past Surgical History:   Procedure Laterality Date   • ANKLE SURGERY Right    • BLADDER SURGERY      Prolapsed   • BREAST BIOPSY     • CARDIAC ELECTROPHYSIOLOGY PROCEDURE N/A 3/5/2021    Procedure: Pacemaker DC new BOSTON;  Surgeon: Madelin Ferguson MD;  Location: [facility] CATH INVASIVE LOCATION;  Service: Cardiology;  Laterality: N/A;   • ENDOSCOPY N/A 6/6/2022    Procedure: ESOPHAGOGASTRODUODENOSCOPY with biopsy, balloon dilation;  Surgeon: Lo Benjamin MD;  Location: [facility] ENDOSCOPY;  Service: Gastroenterology;  Laterality: N/A;  esophageal stricture, hiatal hernia, gastritis   • HYSTERECTOMY           The following portions of the patient's history were reviewed and updated as appropriate: problem list, allergies, current medications, past medical history, past family history, past social history and past surgical history.    ROS     See HPI    Immunization History   Administered Date(s) Administered   • COVID-19 (PFIZER) PURPLE CAP 02/02/2021, 02/23/2021, 09/29/2021   • Fluzone High Dose =>65 Years ([employer] ONLY) 11/07/2016, 09/30/2019   • Fluzone High-Dose 65+yrs 10/19/2021   • Influenza, Unspecified 09/16/2019, 10/08/2020   • Pneumococcal Conjugate 13-Valent (PCV13) 01/31/2017   • Pneumococcal Polysaccharide (PPSV23) 02/01/2018   • Tdap 02/12/2021       Objective   Vitals:    10/17/22 1251 10/17/22 1303   BP:  130/70   Temp: 97.5 °F (36.4 °C)    Weight: 64.4 kg (142 lb)    Height: 157.5 cm (62.01\")      Body mass index is 25.97 kg/m².  Physical Exam  Vitals reviewed.   Constitutional:       Appearance: Normal appearance.   HENT:      Head: Normocephalic and atraumatic.   Cardiovascular:      Rate and Rhythm: Normal rate and regular rhythm.   Pulmonary:      Effort: Pulmonary effort is normal.      Breath sounds: Normal breath sounds.   Musculoskeletal:      Comments: Using walker for ambulation   Neurological:      " Mental Status: She is alert.           Assessment & Plan   Diagnoses and all orders for this visit:    1. Aspiration pneumonia due to vomit, unspecified laterality, unspecified part of lung (HCC) (Primary)  Comments:  No recurrent episodes. Pulmonary saw in September, imaging clear    2. At high risk for falls  Comments:  Continue working with PT and using her walker    3. Walker as ambulation aid    4. Essential hypertension  Comments:  BP controlled    Overall improving. Goals are to continue to work with PT for strength and continue modified liquid diet to prevent aspiration. Reviewed notes with Dr. Ferraro, Dr. Braun, and Dr. Chandra.     Return in about 6 months (around 4/17/2023).

## 2022-10-18 ENCOUNTER — OUTSIDE FACILITY SERVICE (OUTPATIENT)
Dept: INTERNAL MEDICINE | Facility: CLINIC | Age: 82
End: 2022-10-18

## 2022-10-19 ENCOUNTER — OUTSIDE FACILITY SERVICE (OUTPATIENT)
Dept: INTERNAL MEDICINE | Facility: CLINIC | Age: 82
End: 2022-10-19

## 2022-10-19 PROCEDURE — OUTSIDEPOS PR OUTSIDE POS PLACEHOLDER: Performed by: PHYSICIAN ASSISTANT

## 2022-10-24 ENCOUNTER — TELEPHONE (OUTPATIENT)
Dept: INTERNAL MEDICINE | Facility: CLINIC | Age: 82
End: 2022-10-24

## 2022-10-24 NOTE — TELEPHONE ENCOUNTER
BART BARRERA WITH [facility] CALLED TO STATE THE PATIENT WAS DISCHARGED FROM PT TODAY 10/24/22   '      CALL BACK [phone]

## 2022-11-13 DIAGNOSIS — E78.5 HYPERLIPIDEMIA, UNSPECIFIED HYPERLIPIDEMIA TYPE: ICD-10-CM

## 2022-11-13 DIAGNOSIS — R41.3 IMPAIRED MEMORY: ICD-10-CM

## 2022-11-14 DIAGNOSIS — E11.649 TYPE 2 DIABETES MELLITUS WITH HYPOGLYCEMIA WITHOUT COMA, WITH LONG-TERM CURRENT USE OF INSULIN: ICD-10-CM

## 2022-11-14 DIAGNOSIS — I10 ESSENTIAL HYPERTENSION: ICD-10-CM

## 2022-11-14 DIAGNOSIS — E78.2 MIXED HYPERLIPIDEMIA: ICD-10-CM

## 2022-11-14 DIAGNOSIS — Z79.4 TYPE 2 DIABETES MELLITUS WITH HYPOGLYCEMIA WITHOUT COMA, WITH LONG-TERM CURRENT USE OF INSULIN: ICD-10-CM

## 2022-11-14 RX ORDER — DONEPEZIL HYDROCHLORIDE 10 MG/1
10 TABLET, FILM COATED ORAL NIGHTLY
Qty: 90 TABLET | Refills: 3 | Status: SHIPPED | OUTPATIENT
Start: 2022-11-14

## 2022-11-14 RX ORDER — PRAVASTATIN SODIUM 40 MG
40 TABLET ORAL EVERY EVENING
Qty: 90 TABLET | Refills: 3 | Status: SHIPPED | OUTPATIENT
Start: 2022-11-14

## 2022-11-15 LAB
ALBUMIN SERPL-MCNC: 4.5 G/DL (ref 3.5–5.2)
ALBUMIN/GLOB SERPL: 1.7 G/DL
ALP SERPL-CCNC: 219 U/L (ref 39–117)
ALT SERPL-CCNC: 18 U/L (ref 1–33)
AST SERPL-CCNC: 19 U/L (ref 1–32)
BILIRUB SERPL-MCNC: <0.2 MG/DL (ref 0–1.2)
BUN SERPL-MCNC: 28 MG/DL (ref 8–23)
BUN/CREAT SERPL: 40.6 (ref 7–25)
CALCIUM SERPL-MCNC: 10.4 MG/DL (ref 8.6–10.5)
CHLORIDE SERPL-SCNC: 102 MMOL/L (ref 98–107)
CHOLEST SERPL-MCNC: 166 MG/DL (ref 0–200)
CO2 SERPL-SCNC: 31 MMOL/L (ref 22–29)
CREAT SERPL-MCNC: 0.69 MG/DL (ref 0.57–1)
EGFRCR SERPLBLD CKD-EPI 2021: 86.2 ML/MIN/1.73
GLOBULIN SER CALC-MCNC: 2.6 GM/DL
GLUCOSE SERPL-MCNC: 255 MG/DL (ref 65–99)
HBA1C MFR BLD: 8.5 % (ref 4.8–5.6)
HDLC SERPL-MCNC: 74 MG/DL (ref 40–60)
IMP & REVIEW OF LAB RESULTS: NORMAL
LDLC SERPL CALC-MCNC: 61 MG/DL (ref 0–100)
POTASSIUM SERPL-SCNC: 4.1 MMOL/L (ref 3.5–5.2)
PROT SERPL-MCNC: 7.1 G/DL (ref 6–8.5)
SODIUM SERPL-SCNC: 142 MMOL/L (ref 136–145)
TRIGL SERPL-MCNC: 196 MG/DL (ref 0–150)
VLDLC SERPL CALC-MCNC: 31 MG/DL (ref 5–40)

## 2022-11-16 ENCOUNTER — OUTSIDE FACILITY SERVICE (OUTPATIENT)
Dept: INTERNAL MEDICINE | Facility: CLINIC | Age: 82
End: 2022-11-16

## 2022-11-16 PROCEDURE — OUTSIDEPOS PR OUTSIDE POS PLACEHOLDER: Performed by: PHYSICIAN ASSISTANT

## 2022-11-28 ENCOUNTER — OFFICE VISIT (OUTPATIENT)
Dept: ENDOCRINOLOGY | Age: 82
End: 2022-11-28

## 2022-11-28 VITALS
BODY MASS INDEX: 26.76 KG/M2 | HEIGHT: 62 IN | TEMPERATURE: 95.1 F | SYSTOLIC BLOOD PRESSURE: 122 MMHG | OXYGEN SATURATION: 99 % | HEART RATE: 86 BPM | WEIGHT: 145.4 LBS | DIASTOLIC BLOOD PRESSURE: 60 MMHG

## 2022-11-28 DIAGNOSIS — E55.9 VITAMIN D DEFICIENCY: ICD-10-CM

## 2022-11-28 DIAGNOSIS — I10 ESSENTIAL HYPERTENSION: ICD-10-CM

## 2022-11-28 DIAGNOSIS — Z79.4 TYPE 2 DIABETES MELLITUS WITH HYPOGLYCEMIA WITHOUT COMA, WITH LONG-TERM CURRENT USE OF INSULIN: Primary | ICD-10-CM

## 2022-11-28 DIAGNOSIS — E11.649 TYPE 2 DIABETES MELLITUS WITH HYPOGLYCEMIA WITHOUT COMA, WITH LONG-TERM CURRENT USE OF INSULIN: Primary | ICD-10-CM

## 2022-11-28 DIAGNOSIS — E78.00 PURE HYPERCHOLESTEROLEMIA: ICD-10-CM

## 2022-11-28 PROCEDURE — 99214 OFFICE O/P EST MOD 30 MIN: CPT | Performed by: NURSE PRACTITIONER

## 2022-11-28 PROCEDURE — 95251 CONT GLUC MNTR ANALYSIS I&R: CPT | Performed by: NURSE PRACTITIONER

## 2022-11-28 RX ORDER — DULAGLUTIDE 4.5 MG/.5ML
4.5 INJECTION, SOLUTION SUBCUTANEOUS WEEKLY
Qty: 6 ML | Refills: 1 | Status: SHIPPED | OUTPATIENT
Start: 2022-11-28 | End: 2022-12-14 | Stop reason: SDUPTHER

## 2022-11-28 NOTE — PROGRESS NOTES
Chief Complaint  Chief Complaint   Patient presents with   • Diabetes     Type2: patient uses Dexcom, Patient has no hx of retinopathy with a moderate case of neuropathy in her feet (numb)        Subjective          History of Present Illness    Di Macario 82 y.o. presents for a follow-up evaluation for type 2 DM     She has been diabetic since      Pt is on the following medications for their DM: Trulicity 3 mg weekly and Novolog as needed per sliding scale        If blood glucose is above 140 then add the followin-170          0 unit  171-200          1 units  201-230          2 units  231-260          3 units  261-290          4 units  291-320          5 units  321-350          6 units  351-380          7 units  381-or greater 8 units    Sick day rules: If glucose is over 250 mg/DL then check glucose every 2 hours and treat with correction scale above.      Lantus stopped due to morning hypoglycemia 22 -Had a talk elio patient and daughter about the risk of tight glycemic control/hypoglycemia in pts that are older with dementia.  Goal of morning -130 and during the day time below 200.          Pt complains of chronic constipation, shortness of breath with activity (better), intermittent numbness and tingling in feet and blurry vision.    Denies diarrhea and chest pain    Weight gain of 7 lbs since last visit.    Pt does not have a history of DM retinopathy.  Last eye exam was 2022     Pt does not have a history of nephropathy.  Patient is not currently taking ACE/ARB     Pt does not have neuropathy.     Pt does/not have a history of CAD or CVA.    Last A1C in  was 8.5    Last microalbumin in  was negative        Blood Sugars    Blood glucoses are checked via Dexcom    Fasting blood glucoses: high 100s - low 200s    Pre-meal blood glucoses: mid 100s - 300s    Pt has no episodes of hypoglycemia.          Sensor Data    Time in range 12%  High 58%  Very high  "30%  Low 0%  Very low 0%      Average Glucose - 231 mg/dL      Sensor Wear - 100 %             Hyperlipidemia     Pt denies any muscle/body aches and chest pain    Pt is currently taking pravastatin 40 mg HS    Last lipid panel in 11/22 showed Total 166, Triglycerides 196, HDL 74 and LDL 61            Hypertension    Pt denies any chest pain, palpitations or headache    Current regimen includes amlodipine 2.5 mg daily            Other History         Pt has dementia and dysphagia - she is on nectar thick liquids     She was has been admitted several times to the hospital for aspiration PNA.            I have reviewed the patient's allergies, medicines, past medical hx, family hx and social hx.    Objective   Vital Signs:   /60   Pulse 86   Temp 95.1 °F (35.1 °C) (Temporal)   Ht 157.5 cm (62\")   Wt 66 kg (145 lb 6.4 oz)   SpO2 99%   BMI 26.59 kg/m²       Physical Exam   Physical Exam  Constitutional:       General: She is not in acute distress.     Appearance: Normal appearance. She is not diaphoretic.   HENT:      Head: Normocephalic and atraumatic.   Eyes:      General:         Right eye: No discharge.         Left eye: No discharge.   Skin:     General: Skin is warm and dry.   Neurological:      Mental Status: She is alert.   Psychiatric:         Mood and Affect: Mood normal.         Behavior: Behavior normal.                    Results Review:   Hemoglobin A1C   Date Value Ref Range Status   11/14/2022 8.50 (H) 4.80 - 5.60 % Final     Comment:     Hemoglobin A1C Ranges:  Increased Risk for Diabetes  5.7% to 6.4%  Diabetes                     >= 6.5%  Diabetic Goal                < 7.0%     07/25/2022 8.40 (H) 4.80 - 5.60 % Final     Triglycerides   Date Value Ref Range Status   11/14/2022 196 (H) 0 - 150 mg/dL Final     HDL Cholesterol   Date Value Ref Range Status   11/14/2022 74 (H) 40 - 60 mg/dL Final     LDL Chol Calc (NIH)   Date Value Ref Range Status   11/14/2022 61 0 - 100 mg/dL Final     VLDL " Cholesterol Bryan   Date Value Ref Range Status   11/14/2022 31 5 - 40 mg/dL Final         Assessment and Plan {CC Problem List  Visit Diagnosis  ROS  Review (Popup)  Health Maintenance  Quality  BestPractice  Medications  SmartSets  SnapShot Encounters  Media :23  Diagnoses and all orders for this visit:    1. Type 2 diabetes mellitus with hypoglycemia without coma, with long-term current use of insulin (HCC) (Primary)  -     Dulaglutide (Trulicity) 4.5 MG/0.5ML solution pen-injector; Inject 0.5 mL under the skin into the appropriate area as directed 1 (One) Time Per Week.  Dispense: 6 mL; Refill: 1  -     Hemoglobin A1c; Future  -     Comprehensive Metabolic Panel; Future  -     Microalbumin / Creatinine Urine Ratio - Urine, Clean Catch; Future    Not much change in A1C at 8.5%  Increase Trulicity to 4.5 mg weekly  Continue with Noovlog PRN  Continue with Dexcom  Check labs prior to next visit        2. Pure hypercholesterolemia  -     Comprehensive Metabolic Panel; Future  -     Lipid Panel; Future    Total cholesterol and LDL are normal, continue with statin.    Triglycerides are slightly elevated, decrease dietary fat.  Check labs prior to next visit      3. Essential hypertension  -     Comprehensive Metabolic Panel; Future    Stable  Continue with current medication regimen  Defer management to PCP        4. Vitamin D deficiency  -     Vitamin D,25-Hydroxy; Future    Not on supplement  Check labs prior to next visit         No refills needed at this time      RTC in 3-4 months with me, labs prior        Follow Up     Patient was given instructions and counseling regarding her condition or for health maintenance advice. Please see specific information pulled into the AVS if appropriate.              Carlotta Mckeon, APRN  11/28/22

## 2022-12-12 ENCOUNTER — PATIENT MESSAGE (OUTPATIENT)
Dept: ENDOCRINOLOGY | Age: 82
End: 2022-12-12

## 2022-12-12 DIAGNOSIS — Z79.4 TYPE 2 DIABETES MELLITUS WITH HYPOGLYCEMIA WITHOUT COMA, WITH LONG-TERM CURRENT USE OF INSULIN: ICD-10-CM

## 2022-12-12 DIAGNOSIS — E11.649 TYPE 2 DIABETES MELLITUS WITH HYPOGLYCEMIA WITHOUT COMA, WITH LONG-TERM CURRENT USE OF INSULIN: ICD-10-CM

## 2022-12-14 RX ORDER — DULAGLUTIDE 4.5 MG/.5ML
4.5 INJECTION, SOLUTION SUBCUTANEOUS WEEKLY
Qty: 6 ML | Refills: 1 | Status: SHIPPED | OUTPATIENT
Start: 2022-12-14 | End: 2022-12-27 | Stop reason: SDUPTHER

## 2022-12-14 NOTE — TELEPHONE ENCOUNTER
From: Di Macario  To: IAIN Simmons  Sent: 12/12/2022 5:11 PM EST  Subject: Trulicity is not available at [facility]    Greetings we received a message that Trulicity is not available through [facility] and the order was stopped.  Can we get it sent from [facility] via Delivery or ?  Trulicity Sd Pen 0.5ml 4's  4.5 mg  3 qty  84-day supply  Temperature Sensitive  Rx Number [id number]# [id number]  Prescription details  Prescriber: Carlotta Mckeon

## 2022-12-22 NOTE — TELEPHONE ENCOUNTER
PT'S DAUGHTER LEFT A VOICEMAIL STATING PT NEEDS A REFILL OF HER DEXCOM TRANSMITTER.      GOING TO:    [facility] - [place], [place] - [address] [place] [address] [phone] [facility] [phone] FX

## 2022-12-23 DIAGNOSIS — E11.649 TYPE 2 DIABETES MELLITUS WITH HYPOGLYCEMIA WITHOUT COMA, WITH LONG-TERM CURRENT USE OF INSULIN: ICD-10-CM

## 2022-12-23 DIAGNOSIS — Z79.4 TYPE 2 DIABETES MELLITUS WITH HYPOGLYCEMIA WITHOUT COMA, WITH LONG-TERM CURRENT USE OF INSULIN: ICD-10-CM

## 2022-12-23 DIAGNOSIS — I10 ESSENTIAL HYPERTENSION: ICD-10-CM

## 2022-12-23 RX ORDER — AMLODIPINE BESYLATE 5 MG/1
2.5 TABLET ORAL
Qty: 90 TABLET | Refills: 0
Start: 2022-12-23

## 2022-12-23 RX ORDER — DULAGLUTIDE 4.5 MG/.5ML
4.5 INJECTION, SOLUTION SUBCUTANEOUS WEEKLY
Qty: 6 ML | Refills: 1 | OUTPATIENT
Start: 2022-12-23

## 2022-12-23 NOTE — TELEPHONE ENCOUNTER
----- Message from IAIN Simmons sent at 12/23/2022 10:48 AM EST -----  Regarding: Amlodipine refill  Please let pt know that her amlodipine refill needs to come from PCP or cardiology

## 2022-12-27 DIAGNOSIS — E11.649 TYPE 2 DIABETES MELLITUS WITH HYPOGLYCEMIA WITHOUT COMA, WITH LONG-TERM CURRENT USE OF INSULIN: ICD-10-CM

## 2022-12-27 DIAGNOSIS — Z79.4 TYPE 2 DIABETES MELLITUS WITH HYPOGLYCEMIA WITHOUT COMA, WITH LONG-TERM CURRENT USE OF INSULIN: ICD-10-CM

## 2022-12-27 DIAGNOSIS — I10 ESSENTIAL HYPERTENSION: ICD-10-CM

## 2022-12-27 RX ORDER — AMLODIPINE BESYLATE 5 MG/1
2.5 TABLET ORAL
Qty: 90 TABLET | Refills: 0
Start: 2022-12-27 | End: 2022-12-28 | Stop reason: SDUPTHER

## 2022-12-27 RX ORDER — DULAGLUTIDE 4.5 MG/.5ML
4.5 INJECTION, SOLUTION SUBCUTANEOUS WEEKLY
Qty: 6 ML | Refills: 1 | Status: SHIPPED | OUTPATIENT
Start: 2022-12-27 | End: 2023-03-03

## 2022-12-28 DIAGNOSIS — I10 ESSENTIAL HYPERTENSION: ICD-10-CM

## 2022-12-28 RX ORDER — AMLODIPINE BESYLATE 5 MG/1
2.5 TABLET ORAL
Qty: 90 TABLET | Refills: 0
Start: 2022-12-28 | End: 2022-12-28 | Stop reason: SDUPTHER

## 2022-12-28 RX ORDER — AMLODIPINE BESYLATE 5 MG/1
2.5 TABLET ORAL
Qty: 90 TABLET | Refills: 0 | Status: SHIPPED | OUTPATIENT
Start: 2022-12-28

## 2022-12-30 ENCOUNTER — TELEPHONE (OUTPATIENT)
Dept: CARDIOLOGY | Facility: CLINIC | Age: 82
End: 2022-12-30
Payer: MEDICARE

## 2022-12-30 ENCOUNTER — HOME HEALTH ADMISSION (OUTPATIENT)
Dept: HOME HEALTH SERVICES | Facility: HOME HEALTHCARE | Age: 82
End: 2022-12-30

## 2022-12-30 ENCOUNTER — OFFICE VISIT (OUTPATIENT)
Dept: INTERNAL MEDICINE | Facility: CLINIC | Age: 82
End: 2022-12-30

## 2022-12-30 VITALS
BODY MASS INDEX: 26.98 KG/M2 | WEIGHT: 146.6 LBS | SYSTOLIC BLOOD PRESSURE: 122 MMHG | HEIGHT: 62 IN | DIASTOLIC BLOOD PRESSURE: 80 MMHG

## 2022-12-30 DIAGNOSIS — T14.8XXA HEMATOMA: ICD-10-CM

## 2022-12-30 DIAGNOSIS — R13.12 OROPHARYNGEAL DYSPHAGIA: Primary | ICD-10-CM
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
## 2022-12-30 PROCEDURE — 99214 OFFICE O/P EST MOD 30 MIN: CPT | Performed by: PHYSICIAN ASSISTANT

## 2022-12-30 NOTE — PROGRESS NOTES
Subjective   Chief Complaint   Patient presents with   • Fall     History of Present Illness     Pt is here today with her daughter, fell a week ago. No double vision or eye pain a large bump above her right eye. Tripped on her foot. No cp, soa or palpitations. No vision changes. No dbl vision or blurred vision. Has large bruising on right side of face that is improving. Has gone back to a normal diet, stopped the pure foods. Has had no choking.      Patient Active Problem List   Diagnosis   • Essential hypertension   • Vitamin D deficiency   • Hyperuricemia   • Slow transit constipation   • Chronic pain of left knee   • At high risk for falls   • Peripheral neuropathy   • Uncontrolled type 2 diabetes mellitus with hyperglycemia ([facility])   • Walker as ambulation aid   • Bradycardia, sinus   • Shortness of breath   • Leg swelling   • Hyperlipidemia   • Presence of cardiac pacemaker   • Dementia without behavioral disturbance ([facility])   • Aspiration pneumonia due to vomitus ([facility])   • Aspiration pneumonia of both lungs due to gastric secretions, unspecified part of lung ([facility])   • Weakness   • Thrombocytopenia ([facility])   • Oropharyngeal dysphagia   • Immobility syndrome   • Type 2 diabetes mellitus with hypoglycemia, with long-term current use of insulin ([facility])       No Known Allergies    Current Outpatient Medications on File Prior to Visit   Medication Sig Dispense Refill   • acetaminophen (TYLENOL) 325 MG tablet Take 650 mg by mouth Every 6 (Six) Hours As Needed for Mild Pain  or Moderate Pain . PRN     • amLODIPine (NORVASC) 5 MG tablet Take 0.5 tablets by mouth Daily. 90 tablet 0   • Continuous Blood Gluc Sensor (Dexcom G6 Sensor) Dipsense Dexcom G6 Sensors, to replace every 10 days, 3 sensors per 30 day period (NDC #[address]-53). Check 6 times a day. Dx: E 11.65 sent to supply store 9 each 4   • donepezil (ARICEPT) 10 MG tablet Take 1 tablet by mouth Every Night. 90 tablet 3   • Dulaglutide (Trulicity) 4.5 MG/0.5ML  solution pen-injector Inject 0.5 mL under the skin into the appropriate area as directed 1 (One) Time Per Week. 6 mL 1   • famotidine (PEPCID) 20 MG tablet Take 20 mg by mouth 2 (Two) Times a Day.     • Glucagon (Gvoke HypoPen 2-Pack) 1 MG/0.2ML solution auto-injector Inject 1 mg under the skin into the appropriate area as directed As Needed (hypoglycemia). 0.4 mL 1   • glucose blood (FREESTYLE LITE) test strip Bid e11.65 200 each 3   • insulin aspart (NovoLOG FlexPen) 100 UNIT/ML solution pen-injector sc pen 1 unit per 30 mg/dl glucose over 170 mg/dl Use as directed for meal & correction coverage up to 50 units a day. 45 mL 2   • Insulin Pen Needle (BD Pen Needle Norma U/F) 32G X 4 MM misc Use once a day 100 each 3   • Lancets (freestyle) lancets 1 each by Other route 4 (Four) Times a Day. Use as instructed 400 each 1   • melatonin 1 MG tablet Take 1 tablet by mouth Every Night. 30 tablet 1   • pravastatin (PRAVACHOL) 40 MG tablet Take 1 tablet by mouth Every Evening. 90 tablet 3   • Zonegran 100 MG capsule Take 2 capsules by mouth Every Night.       No current facility-administered medications on file prior to visit.       Past Medical History:   Diagnosis Date   • Dementia (HCC)     states better with medication    • Diabetes mellitus (HCC)    • Hyperlipidemia 01/13/2021   • Lung consolidation (HCC) 06/01/2022   • Peripheral neuropathy 08/28/2018   • Vitamin D deficiency        Family History   Problem Relation Age of Onset   • Hypertension Mother    • Cancer Mother         Stomach Cancer   • Obesity Mother         her entire life over weight   • Hypertension Father        Social History     Socioeconomic History   • Marital status:    Tobacco Use   • Smoking status: Former     Packs/day: 0.00     Years: 5.00     Pack years: 0.00     Types: Cigarettes   • Smokeless tobacco: Never   • Tobacco comments:     quit in 1973 never a pack a day or even a pack a month smoker   Vaping Use   • Vaping Use: Never used  "  Substance and Sexual Activity   • Alcohol use: Not Currently     Comment: RARELY   • Drug use: No   • Sexual activity: Not Currently     Birth control/protection: Surgical, Post-menopausal     Comment: took the pill in the late 1960s early 70s       Past Surgical History:   Procedure Laterality Date   • ANKLE SURGERY Right    • BLADDER SURGERY      Prolapsed   • BREAST BIOPSY     • CARDIAC ELECTROPHYSIOLOGY PROCEDURE N/A 03/05/2021    Procedure: Pacemaker DC new BOSTON;  Surgeon: Madelin Ferguson MD;  Location: [facility] CATH INVASIVE LOCATION;  Service: Cardiology;  Laterality: N/A;   • ENDOSCOPY N/A 06/06/2022    Procedure: ESOPHAGOGASTRODUODENOSCOPY with biopsy, balloon dilation;  Surgeon: Lo Benjamin MD;  Location: [facility] ENDOSCOPY;  Service: Gastroenterology;  Laterality: N/A;  esophageal stricture, hiatal hernia, gastritis   • HYSTERECTOMY           The following portions of the patient's history were reviewed and updated as appropriate: problem list, allergies, current medications, past medical history, past family history, past social history and past surgical history.    ROS    See HPI    Immunization History   Administered Date(s) Administered   • COVID-19 (PFIZER) PURPLE CAP 02/02/2021, 02/23/2021, 09/29/2021   • Fluzone High Dose =>65 Years ([employer] ONLY) 11/07/2016, 09/30/2019   • Fluzone High-Dose 65+yrs 10/19/2021, 10/17/2022   • Influenza, Unspecified 09/16/2019, 10/08/2020   • Pneumococcal Conjugate 13-Valent (PCV13) 01/31/2017   • Pneumococcal Polysaccharide (PPSV23) 02/01/2018   • Tdap 02/12/2021       Objective   Vitals:    12/30/22 1349 12/30/22 1407   BP:  122/80   Weight: 66.5 kg (146 lb 9.6 oz)    Height: 157.5 cm (62\")      Body mass index is 26.81 kg/m².  Physical Exam  Constitutional:       Appearance: Normal appearance.   HENT:      Head: Normocephalic.   Eyes:      Extraocular Movements: Extraocular movements intact.      Conjunctiva/sclera: Conjunctivae normal.      Pupils: " Pupils are equal, round, and reactive to light.   Cardiovascular:      Rate and Rhythm: Normal rate and regular rhythm.   Skin:     Comments: Bruising starting at right eyebrow with superior hematoma. Bruising extends to right chin   Neurological:      Mental Status: She is alert.   Psychiatric:         Mood and Affect: Mood normal.         Thought Content: Thought content normal.       Assessment & Plan   Diagnoses and all orders for this visit:    1. Oropharyngeal dysphagia (Primary)  -     Ambulatory Referral to Home Health    2. Fall, initial encounter    3. Hematoma    Improving slowly. Reiterated using the walker at all times when up. Recommended going back to pureed foods until speech can evaluate as she is at high risk. She is in sinus rhythm today, had a reported episode of a fib with her pacemaker. They will speak with cardiology regarding. Due to recent falls I do not recommend anticoagulation.

## 2023-01-03 ENCOUNTER — TELEPHONE (OUTPATIENT)
Dept: INTERNAL MEDICINE | Facility: CLINIC | Age: 83
End: 2023-01-03

## 2023-01-03 NOTE — TELEPHONE ENCOUNTER
Caller: DANISH MILLER    Relationship to patient: Emergency Contact    Best call back number: [phone]     Date of positive COVID19 test: 1.1.23    Date if possible COVID19 exposure: 12.30.22    COVID19 symptoms: CHEST CONGESTION, LITTLE COUGH, O2 AT 97.     Date of initial quarantine: 1.1.23      What is the patients preferred pharmacy:   [id number] [place], [place] - [facility]  [facility] [phone] [facility] [phone]   [phone]

## 2023-01-03 NOTE — TELEPHONE ENCOUNTER
Spoke to patient's daughter, she is going to treat symptoms with mucinex and quarantine. Will call if she starts feeling worse

## 2023-01-04 ENCOUNTER — TELEPHONE (OUTPATIENT)
Dept: INTERNAL MEDICINE | Facility: CLINIC | Age: 83
End: 2023-01-04
Payer: MEDICARE

## 2023-01-04 NOTE — TELEPHONE ENCOUNTER
Carolyn Collado with [facility] was suppose to go out to the patient's home for home health however the patient told Rigoberto she has Covid and would not recommend them coming out until next week, please advise? Rigoberto [phone]

## 2023-01-09 ENCOUNTER — TELEPHONE (OUTPATIENT)
Dept: INTERNAL MEDICINE | Facility: CLINIC | Age: 83
End: 2023-01-09

## 2023-01-09 NOTE — TELEPHONE ENCOUNTER
Caller: SUKHDEV    Relationship: JONES     Best call back number:[phone]    What orders are you requesting (i.e. lab or imaging): VERBAL ORDER NEEDED   In what timeframe would the patient need to come in: 1/9/2023    Where will you receive your lab/imaging services: [facility]    Additional notes:   SUKHDEV SAYS SHE WAS TOLD LAST WEEK THAT SHE WOULD RECEIVE A CALL BACK ON 1/6/2023 FOR NEW START OF CARE DATE.     PLEASE ADVISE   START OF CARE NEEDED 1/9/2023

## 2023-01-17 ENCOUNTER — TELEPHONE (OUTPATIENT)
Dept: INTERNAL MEDICINE | Facility: CLINIC | Age: 83
End: 2023-01-17

## 2023-01-17 DIAGNOSIS — R13.12 OROPHARYNGEAL DYSPHAGIA: Primary | ICD-10-CM

## 2023-01-17 NOTE — TELEPHONE ENCOUNTER
Caller: PATRICIA GOLDMAN    Best call back number: [phone]    Who are you requesting to speak with (clinical staff, provider,  specific staff member): CLINICAL STAFF     What was the call regarding: WANTING TO GET ORDERS FOR A MODIFIED BARIUM  SWALLOW STUDY AT   AND WANTING TO FOLLOW HER 1 TIME A WEEK FOR 5 WEEKS AT LEAST TO SEE WHAT THE SWALLOW STUDY SAYS

## 2023-02-01 ENCOUNTER — HOSPITAL ENCOUNTER (OUTPATIENT)
Dept: GENERAL RADIOLOGY | Facility: HOSPITAL | Age: 83
Discharge: HOME | End: 2023-02-01
Admitting: PHYSICIAN ASSISTANT
Payer: MEDICARE

## 2023-02-01 DIAGNOSIS — R13.12 OROPHARYNGEAL DYSPHAGIA: ICD-10-CM

## 2023-02-01 PROCEDURE — 92611 MOTION FLUOROSCOPY/SWALLOW: CPT

## 2023-02-01 PROCEDURE — A9270 NON-COVERED ITEM OR SERVICE: HCPCS | Performed by: PHYSICIAN ASSISTANT

## 2023-02-01 PROCEDURE — 63710000001 BARIUM SULFATE 40 % SUSPENSION: Performed by: PHYSICIAN ASSISTANT

## 2023-02-01 PROCEDURE — 63710000001 BARIUM SULFATE 60 % CREAM: Performed by: PHYSICIAN ASSISTANT

## 2023-02-01 PROCEDURE — 74230 X-RAY XM SWLNG FUNCJ C+: CPT

## 2023-02-01 PROCEDURE — 63710000001 BARIUM SULFATE 98 % RECONSTITUTED SUSPENSION: Performed by: PHYSICIAN ASSISTANT

## 2023-02-01 PROCEDURE — 63710000001 BARIUM SULFATE 40 % RECONSTITUTED SUSPENSION: Performed by: PHYSICIAN ASSISTANT

## 2023-02-01 RX ADMIN — BARIUM SULFATE 50 ML: 400 SUSPENSION ORAL at 10:42

## 2023-02-01 RX ADMIN — BARIUM SULFATE 50 ML: 400 SUSPENSION ORAL at 10:43

## 2023-02-01 RX ADMIN — BARIUM SULFATE 4 ML: 980 POWDER, FOR SUSPENSION ORAL at 10:42

## 2023-02-01 RX ADMIN — BARIUM SULFATE 55 ML: 0.81 POWDER, FOR SUSPENSION ORAL at 10:42

## 2023-02-01 RX ADMIN — BARIUM SULFATE 1 TEASPOON(S): 0.6 CREAM ORAL at 10:42

## 2023-02-01 NOTE — THERAPY EVALUATION
2Speech Language Pathology   MBS FEES / Discharge Summary  [facility]       Patient Name: Di Macario  : 1940  MRN: [id number]    Today's Date: 2023      Visit Date: 2023     Visit Dx:     ICD-10-CM ICD-9-CM   1. Oropharyngeal dysphagia  R13.12 787.22       Patient Active Problem List   Diagnosis   • Essential hypertension   • Vitamin D deficiency   • Hyperuricemia   • Slow transit constipation   • Chronic pain of left knee   • At high risk for falls   • Peripheral neuropathy   • Uncontrolled type 2 diabetes mellitus with hyperglycemia (HCC)   • Walker as ambulation aid   • Bradycardia, sinus   • Shortness of breath   • Leg swelling   • Hyperlipidemia   • Presence of cardiac pacemaker   • Dementia without behavioral disturbance (HCC)   • Aspiration pneumonia due to vomitus (HCC)   • Aspiration pneumonia of both lungs due to gastric secretions, unspecified part of lung (HCC)   • Weakness   • Thrombocytopenia (HCC)   • Oropharyngeal dysphagia   • Immobility syndrome   • Type 2 diabetes mellitus with hypoglycemia, with long-term current use of insulin ([facility])        Past Medical History:   Diagnosis Date   • Dementia (HCC)     states better with medication    • Diabetes mellitus (HCC)    • Hyperlipidemia 2021   • Lung consolidation (HCC) 2022   • Peripheral neuropathy 2018   • Vitamin D deficiency         Past Surgical History:   Procedure Laterality Date   • ANKLE SURGERY Right    • BLADDER SURGERY      Prolapsed   • BREAST BIOPSY     • CARDIAC ELECTROPHYSIOLOGY PROCEDURE N/A 2021    Procedure: Pacemaker DC new BOSTON;  Surgeon: Madelin Ferguson MD;  Location: [facility] CATH INVASIVE LOCATION;  Service: Cardiology;  Laterality: N/A;   • ENDOSCOPY N/A 2022    Procedure: ESOPHAGOGASTRODUODENOSCOPY with biopsy, balloon dilation;  Surgeon: Lo Benjamin MD;  Location: [facility] ENDOSCOPY;  Service: Gastroenterology;  Laterality: N/A;  esophageal stricture,  "hiatal hernia, gastritis   • HYSTERECTOMY                    OP SLP Assessment/Plan - 02/01/23 1142        SLP Assessment    Clinical Impression Comments Pt presents with mod-severe pharyngoesophageal dysphagia.  -CB    SLP Diagnosis pharyngoesophageal dysphagia  -CB    Prognosis Fair (comment)  -CB    Patient would benefit from skilled therapy intervention No  -CB          User Key  (r) = Recorded By, (t) = Taken By, (c) = Cosigned By    Initials Name Provider Type    CB Mckenzie Cintron CCC-SLP Speech and Language Pathologist                 SLP Adult Swallow Evaluation     Row Name 02/01/23 1015       Rehab Evaluation    Document Type evaluation  -CB    Subjective Information no complaints  -CB    Patient Observations alert;cooperative;agree to therapy  -CB    Patient Effort adequate  -CB       General Information    Patient Profile Reviewed yes  -CB    Pertinent History Of Current Problem 82 year old female with history of silent aspiration, recommended NPO during last VFSS 7/2022.  Family, have chosen to accept least restrictive diet, puree/NTL.  Per Home Health [facility], patient is not following recommended diet and had consumed \"hot dog and Coke\" prior to her visit.  No known pulmonary issues at this time.  VFSS to assess pharyngeal stage of swallow.  -CB    Current Method of Nutrition pureed;nectar/syrup-thick liquids  -CB    Precautions/Limitations, Vision WFL;for purposes of eval  -CB    Precautions/Limitations, Hearing WFL;for purposes of eval  -CB    Prior Level of Function-Communication WFL  -CB    Prior Level of Function-Swallowing NPO  -CB    Plans/Goals Discussed with patient and family;agreed upon  -CB    Patient's Goals for Discharge return to PO diet  -CB       MBS/VFSS Interpretation    VFSS Summary Dr. Egan, radiologist, present - patient presents with moderate pharyngoesophageal dysphagia with silent penetration noted w/thin liquids.  Pt with significant pooling of residuals above UES with small " amount of bolus only passing thru UES on initial swallow.  Residuals noted above the UES with all solid consistencies, cleared with liquid wash.  Due to esophageal concern, patient remains at high risk for aspiration.  Education completed with patient and daughter using demonstration and visual explanation.  Recommend regular diet with thin liquids, using alternate liquid/solid in 1:1 ratio, upright for all PO, small bites.  GI consult recommended to assess UES involvement.  Monitor for any pulmonary issues and report to MD, if identified.  Due to history of non-compliance and minimal pulmonary difficulties, patient understands risks and return demonstration of compensatory strategies for safest PO.  -CB       SLP Communication to Radiology    Summary Statement Dr. Egan, radiologist, present - patient presents with moderate pharyngoesophageal dysphagia with silent penetration noted w/thin liquids.  Pt with significant pooling of residuals above UES with small amount of bolus only passing thru UES on initial swallow.  Residuals noted above the UES with all solid consistencies, cleared with liquid wash.  Due to esophageal concern, patient remains at high risk for aspiration.  Education completed with patient and daughter using demonstration and visual explanation.  Recommend regular diet with thin liquids, using alternate liquid/solid in 1:1 ratio, upright for all PO, small bites.  GI consult recommended to assess UES involvement.  Monitor for any pulmonary issues and report to MD, if identified.  Due to history of non-compliance and minimal pulmonary difficulties, patient understands risks and return demonstration of compensatory strategies for safest PO.  -CB       SLP Evaluation Clinical Impression    SLP Swallowing Diagnosis mod-severe;suspected esophageal dysphagia  -CB       Recommendations    SLP Diet Recommendation regular textures;thin liquids;other (see comments)  -CB    Recommended Precautions and  Strategies upright posture during/after eating;small bites of food and sips of liquid;general aspiration precautions;alternate between small bites of food and sips of liquid  -CB    Oral Care Recommendations Oral Care BID/PRN  -CB    SLP Rec. for Method of Medication Administration meds whole;meds crushed;with thin liquids;as tolerated  -CB    Monitor for Signs of Aspiration yes;notify SLP if any concerns  -CB    Demonstrates Need for Referral to Another Service gastroenterology  -CB          User Key  (r) = Recorded By, (t) = Taken By, (c) = Cosigned By    Initials Name Provider Type    CB Mckenzie Cintron CCC-SLP Speech and Language Pathologist                                OP SLP Education     Row Name 02/01/23 1142       Education    Barriers to Learning No barriers identified  -CB    Education Provided Described results of evaluation;Family/caregivers demonstrated recommended strategies;Patient participated in establishing goals and treatment plan;Patient expressed understanding of evaluation;Family/caregivers participated in establishing goals and treatment plan  -CB    Assessed Learning needs  -CB    Learning Motivation Strong  -CB    Learning Method Explanation;Demonstration  -CB    Teaching Response Verbalized understanding  -CB          User Key  (r) = Recorded By, (t) = Taken By, (c) = Cosigned By    Initials Name Effective Dates    CB Mckenzie Cintron CCC-SLP 11/10/22 -                                    Time Calculation:   Untimed Charges  50659-AP Motion Fluoro Eval Swallow Minutes: 45  Total Minutes  Untimed Charges Total Minutes: 45   Total Minutes: 45    Therapy Charges for Today     Code Description Service Date Service Provider Modifiers Qty    [id number]  ST MOTION FLUORO EVAL SWALLOW 3 2/1/2023 Mckenzie Cintron CCC-SLP GN 1                  THA Wright  2/1/2023

## 2023-02-08 ENCOUNTER — OFFICE VISIT (OUTPATIENT)
Dept: CARDIOLOGY | Age: 83
End: 2023-02-08
Payer: MEDICARE

## 2023-02-08 VITALS
HEART RATE: 67 BPM | BODY MASS INDEX: 25.4 KG/M2 | WEIGHT: 138 LBS | SYSTOLIC BLOOD PRESSURE: 133 MMHG | DIASTOLIC BLOOD PRESSURE: 78 MMHG | HEIGHT: 62 IN

## 2023-02-08 DIAGNOSIS — E78.00 PURE HYPERCHOLESTEROLEMIA: ICD-10-CM

## 2023-02-08 DIAGNOSIS — I48.0 PAROXYSMAL ATRIAL FIBRILLATION: Primary | ICD-10-CM

## 2023-02-08 DIAGNOSIS — I10 ESSENTIAL HYPERTENSION: ICD-10-CM

## 2023-02-08 PROCEDURE — 99214 OFFICE O/P EST MOD 30 MIN: CPT | Performed by: INTERNAL MEDICINE

## 2023-02-08 PROCEDURE — 93000 ELECTROCARDIOGRAM COMPLETE: CPT | Performed by: INTERNAL MEDICINE

## 2023-02-08 NOTE — PROGRESS NOTES
AFIB ON PCM PER TERRY TIMMONS    Subjective:        Di Macario is a 82 y.o. female who here for follow up    CC  AFIB 1 % ON MONITOR  HPI  82-year-old female with paroxysmal atrial fibrillation, hypertension and hypercholesterolemia was found to the atrial fibrillation on pacemaker denies any chest pains or tightness in the chest     Problems Addressed this Visit        Cardiac and Vasculature    Essential hypertension    Paroxysmal atrial fibrillation (HCC) - Primary    Relevant Orders    ECG 12 Lead    Pure hypercholesterolemia   Diagnoses       Codes Comments    Paroxysmal atrial fibrillation (HCC)    -  Primary ICD-10-CM: I48.0  ICD-9-CM: 427.31     Pure hypercholesterolemia     ICD-10-CM: E78.00  ICD-9-CM: 272.0     Essential hypertension     ICD-10-CM: I10  ICD-9-CM: 401.9         .    The following portions of the patient's history were reviewed and updated as appropriate: allergies, current medications, past family history, past medical history, past social history, past surgical history and problem list.    Past Medical History:   Diagnosis Date   • Dementia (HCC)     states better with medication    • Diabetes mellitus (HCC)    • Hyperlipidemia 01/13/2021   • Lung consolidation (HCC) 06/01/2022   • Peripheral neuropathy 08/28/2018   • Vitamin D deficiency      reports that she has quit smoking. Her smoking use included cigarettes. She has never used smokeless tobacco. She reports that she does not currently use alcohol. She reports that she does not use drugs.   Family History   Problem Relation Age of Onset   • Hypertension Mother    • Cancer Mother         Stomach Cancer   • Obesity Mother         her entire life over weight   • Hypertension Father        Review of Systems  Constitutional: No wt loss, fever, fatigue  Gastrointestinal: No nausea, abdominal pain  Behavioral/Psych: No insomnia or anxiety   Cardiovascular no chest pains or tightness in the chest  Objective:       Physical  "Exam  /78   Pulse 67   Ht 157.5 cm (62\")   Wt 62.6 kg (138 lb)   BMI 25.24 kg/m²   General appearance: No acute changes   Neck: Trachea midline; NECK, supple, no thyromegaly or lymphadenopathy   Lungs: Normal size and shape, normal breath sounds, equal distribution of air, no rales and rhonchi   CV: S1-S2 irregular, no murmurs, no rub, no gallop   Abdomen: Soft, nontender; no masses , no abnormal abdominal sounds   Extremities: No deformity , normal color , no peripheral edema   Skin: Normal temperature, turgor and texture; no rash, ulcers            ECG 12 Lead    Date/Time: 2/8/2023 3:57 PM  Performed by: Madelin Ferguson MD  Authorized by: Madelin Ferguson MD   Comparison: compared with previous ECG   Similar to previous ECG  Rhythm: sinus rhythm  ST Flattening: all    Clinical impression: non-specific ECG              Echocardiogram:        Current Outpatient Medications:   •  acetaminophen (TYLENOL) 325 MG tablet, Take 650 mg by mouth Every 6 (Six) Hours As Needed for Mild Pain  or Moderate Pain . PRN, Disp: , Rfl:   •  amLODIPine (NORVASC) 5 MG tablet, Take 0.5 tablets by mouth Daily., Disp: 90 tablet, Rfl: 0  •  Continuous Blood Gluc Sensor (Dexcom G6 Sensor), Dipsense Dexcom G6 Sensors, to replace every 10 days, 3 sensors per 30 day period ([facility]C #[address]-17). Check 6 times a day. Dx: E 11.65 sent to supply store, Disp: 9 each, Rfl: 4  •  donepezil (ARICEPT) 10 MG tablet, Take 1 tablet by mouth Every Night., Disp: 90 tablet, Rfl: 3  •  Dulaglutide (Trulicity) 4.5 MG/0.5ML solution pen-injector, Inject 0.5 mL under the skin into the appropriate area as directed 1 (One) Time Per Week., Disp: 6 mL, Rfl: 1  •  famotidine (PEPCID) 20 MG tablet, Take 20 mg by mouth 2 (Two) Times a Day., Disp: , Rfl:   •  Glucagon (Gvoke HypoPen 2-Pack) 1 MG/0.2ML solution auto-injector, Inject 1 mg under the skin into the appropriate area as directed As Needed (hypoglycemia)., Disp: 0.4 mL, Rfl: 1  •  " glucose blood (FREESTYLE LITE) test strip, Bid e11.65, Disp: 200 each, Rfl: 3  •  insulin aspart (NovoLOG FlexPen) 100 UNIT/ML solution pen-injector sc pen, 1 unit per 30 mg/dl glucose over 170 mg/dl Use as directed for meal & correction coverage up to 50 units a day., Disp: 45 mL, Rfl: 2  •  Insulin Pen Needle (BD Pen Needle Norma U/F) 32G X 4 MM misc, Use once a day, Disp: 100 each, Rfl: 3  •  Lancets (freestyle) lancets, 1 each by Other route 4 (Four) Times a Day. Use as instructed, Disp: 400 each, Rfl: 1  •  melatonin 1 MG tablet, Take 1 tablet by mouth Every Night., Disp: 30 tablet, Rfl: 1  •  pravastatin (PRAVACHOL) 40 MG tablet, Take 1 tablet by mouth Every Evening., Disp: 90 tablet, Rfl: 3  •  Zonegran 100 MG capsule, Take 2 capsules by mouth Every Night., Disp: , Rfl:    Assessment:        Patient Active Problem List   Diagnosis   • Essential hypertension   • Vitamin D deficiency   • Hyperuricemia   • Slow transit constipation   • Chronic pain of left knee   • At high risk for falls   • Peripheral neuropathy   • Uncontrolled type 2 diabetes mellitus with hyperglycemia (HCC)   • Walker as ambulation aid   • Bradycardia, sinus   • Shortness of breath   • Leg swelling   • Hyperlipidemia   • Presence of cardiac pacemaker   • Dementia without behavioral disturbance (HCC)   • Aspiration pneumonia due to vomitus (HCC)   • Aspiration pneumonia of both lungs due to gastric secretions, unspecified part of lung (HCC)   • Weakness   • Thrombocytopenia (HCC)   • Oropharyngeal dysphagia   • Immobility syndrome   • Type 2 diabetes mellitus with hypoglycemia, with long-term current use of insulin (HCC)   • Paroxysmal atrial fibrillation (HCC)   • Pure hypercholesterolemia               Plan:            ICD-10-CM ICD-9-CM   1. Paroxysmal atrial fibrillation (HCC)  I48.0 427.31   2. Pure hypercholesterolemia  E78.00 272.0   3. Essential hypertension  I10 401.9     1. Paroxysmal atrial fibrillation (HCC)  The burden of atrial  fibrillation is extremely small no anticoagulation has been started  - ECG 12 Lead    2. Pure hypercholesterolemia  Continue current treatment    3. Essential hypertension  Continue current treatment     PT WALKS WITH WALKER    RISK / BENEFIT DOES NOT FAVOR ANTICOAGULATION    SEE IN 6 MONTHS  COUNSELING:    Di Sorto was given to patient for the following topics: diagnostic results, risk factor reductions, impressions, risks and benefits of treatment options and importance of treatment compliance .       SMOKING COUNSELING:        Dictated using Dragon dictation

## 2023-02-13 ENCOUNTER — LAB (OUTPATIENT)
Dept: ENDOCRINOLOGY | Age: 83
End: 2023-02-13
Payer: MEDICARE

## 2023-02-13 DIAGNOSIS — E55.9 VITAMIN D DEFICIENCY: ICD-10-CM

## 2023-02-13 DIAGNOSIS — I10 ESSENTIAL HYPERTENSION: ICD-10-CM

## 2023-02-13 DIAGNOSIS — Z79.4 TYPE 2 DIABETES MELLITUS WITH HYPOGLYCEMIA WITHOUT COMA, WITH LONG-TERM CURRENT USE OF INSULIN: ICD-10-CM

## 2023-02-13 DIAGNOSIS — E11.649 TYPE 2 DIABETES MELLITUS WITH HYPOGLYCEMIA WITHOUT COMA, WITH LONG-TERM CURRENT USE OF INSULIN: ICD-10-CM

## 2023-02-13 DIAGNOSIS — E78.00 PURE HYPERCHOLESTEROLEMIA: ICD-10-CM

## 2023-02-14 DIAGNOSIS — E11.65 UNCONTROLLED TYPE 2 DIABETES MELLITUS WITH HYPERGLYCEMIA: Primary | ICD-10-CM

## 2023-02-14 DIAGNOSIS — E11.65 UNCONTROLLED TYPE 2 DIABETES MELLITUS WITH HYPERGLYCEMIA: ICD-10-CM

## 2023-02-14 LAB
25(OH)D3+25(OH)D2 SERPL-MCNC: 37.4 NG/ML (ref 30–100)
ALBUMIN SERPL-MCNC: 4.5 G/DL (ref 3.5–5.2)
ALBUMIN/CREAT UR: 96 MG/G CREAT (ref 0–29)
ALBUMIN/GLOB SERPL: 1.8 G/DL
ALP SERPL-CCNC: 172 U/L (ref 39–117)
ALT SERPL-CCNC: 11 U/L (ref 1–33)
AST SERPL-CCNC: 14 U/L (ref 1–32)
BILIRUB SERPL-MCNC: 0.2 MG/DL (ref 0–1.2)
BUN SERPL-MCNC: 20 MG/DL (ref 8–23)
BUN/CREAT SERPL: 26.3 (ref 7–25)
CALCIUM SERPL-MCNC: 10 MG/DL (ref 8.6–10.5)
CHLORIDE SERPL-SCNC: 95 MMOL/L (ref 98–107)
CHOLEST SERPL-MCNC: 165 MG/DL (ref 0–200)
CO2 SERPL-SCNC: 29.7 MMOL/L (ref 22–29)
CREAT SERPL-MCNC: 0.76 MG/DL (ref 0.57–1)
CREAT UR-MCNC: 45.8 MG/DL
EGFRCR SERPLBLD CKD-EPI 2021: 77.9 ML/MIN/1.73
GLOBULIN SER CALC-MCNC: 2.5 GM/DL
GLUCOSE SERPL-MCNC: 546 MG/DL (ref 65–99)
HBA1C MFR BLD: 12 % (ref 4.8–5.6)
HDLC SERPL-MCNC: 90 MG/DL (ref 40–60)
IMP & REVIEW OF LAB RESULTS: NORMAL
LDLC SERPL CALC-MCNC: 51 MG/DL (ref 0–100)
MICROALBUMIN UR-MCNC: 43.9 UG/ML
POTASSIUM SERPL-SCNC: 4.4 MMOL/L (ref 3.5–5.2)
PROT SERPL-MCNC: 7 G/DL (ref 6–8.5)
SODIUM SERPL-SCNC: 137 MMOL/L (ref 136–145)
TRIGL SERPL-MCNC: 149 MG/DL (ref 0–150)
VLDLC SERPL CALC-MCNC: 24 MG/DL (ref 5–40)

## 2023-02-14 RX ORDER — INSULIN GLARGINE 100 [IU]/ML
10 INJECTION, SOLUTION SUBCUTANEOUS DAILY
Qty: 9 ML | Refills: 1 | Status: SHIPPED | OUTPATIENT
Start: 2023-02-14

## 2023-02-14 RX ORDER — INSULIN GLARGINE 100 [IU]/ML
10 INJECTION, SOLUTION SUBCUTANEOUS DAILY
Qty: 9 ML | Refills: 1 | Status: SHIPPED | OUTPATIENT
Start: 2023-02-14 | End: 2023-02-14 | Stop reason: SDUPTHER

## 2023-02-14 NOTE — PROGRESS NOTES
Received page last night for critical lab result.  Pt's blood glucose was over 500.      Called patient 02/14/23 at 0825 - no answer and left voicemail.    Recalled patient 02/14/23 at 1222.  Pt answered the phone and said that she was doing okay and that her blood sugar at present time was mid 200s.  She did say that her blood sugars in the mornings have been in the 300-500s.    Restart Lantus 10 units daily.

## 2023-02-16 ENCOUNTER — OFFICE VISIT (OUTPATIENT)
Dept: GASTROENTEROLOGY | Facility: CLINIC | Age: 83
End: 2023-02-16
Payer: MEDICARE

## 2023-02-16 VITALS
SYSTOLIC BLOOD PRESSURE: 120 MMHG | HEART RATE: 69 BPM | DIASTOLIC BLOOD PRESSURE: 62 MMHG | WEIGHT: 137.4 LBS | HEIGHT: 63 IN | BODY MASS INDEX: 24.34 KG/M2 | TEMPERATURE: 97.3 F

## 2023-02-16 DIAGNOSIS — R13.14 PHARYNGOESOPHAGEAL DYSPHAGIA: Primary | ICD-10-CM

## 2023-02-16 DIAGNOSIS — Z87.19 HISTORY OF ESOPHAGEAL STRICTURE: ICD-10-CM

## 2023-02-16 DIAGNOSIS — K22.9 DISORDER OF UPPER ESOPHAGEAL SPHINCTER: ICD-10-CM

## 2023-02-16 PROCEDURE — 99213 OFFICE O/P EST LOW 20 MIN: CPT | Performed by: PHYSICIAN ASSISTANT

## 2023-02-16 NOTE — PROGRESS NOTES
"Chief Complaint  Difficulty Swallowing    Subjective          History of Present Illness    Di Macario is a  82 y.o. female presents for follow-up on dysphagia.  She is a patient Dr. Benjamin last seen on 6/8/2022.    She underwent EGD with dilation on 6/6/2022.  She followed Dr. Benjamin afterwards and reported feeling better at that time.  She takes Pepcid 20 mg twice daily. She denies dysphagia at this time, she maintains soft diet. She has been on pureed diet per SLP recommendation and wanted to see if she could advance so underwent swallow study.     Video swallow study on 2/1/2023 showed laryngeal penetration with thin liquids and substantial residuals similar to July 2022 swallow study.  SLP noted moderate to severe pharyngoesophageal dysphagia. Pt with significant pooling of residuals above UES with small amount of bolus only passing thru UES on initial swallow.  GI follow-up recommended due to this.  She remains at high risk for aspiration.    6/6/2022 EGD showed moderate esophageal stricture 34 cm from the incisor balloon dilated to 15 mm, mild gastritis.  Pathology showed mild chronic gastritis, negative H. pylori.    Not on blood thinners.     Objective   Vital Signs:   /62   Pulse 69   Temp 97.3 °F (36.3 °C)   Ht 160 cm (63\")   Wt 62.3 kg (137 lb 6.4 oz)   BMI 24.34 kg/m²       Physical Exam  Vitals reviewed.   Constitutional:       General: She is awake. She is not in acute distress.     Appearance: Normal appearance. She is well-developed and well-groomed.   HENT:      Head: Normocephalic.   Pulmonary:      Effort: Pulmonary effort is normal. No respiratory distress.   Skin:     Coloration: Skin is not pale.   Neurological:      Mental Status: She is alert and oriented to person, place, and time.      Gait: Gait is intact.   Psychiatric:         Mood and Affect: Mood and affect normal.         Speech: Speech normal.         Behavior: Behavior is cooperative.         Judgment: Judgment " normal.          Result Review :             Assessment and Plan    Diagnoses and all orders for this visit:    1. Pharyngoesophageal dysphagia (Primary)    2. Disorder of upper esophageal sphincter    3. History of esophageal stricture    Recommend proceeding with esophagram for reevaluation of the esophagus including the lower esophageal sphincter given previous dilation 1 year ago.  She denies dysphagia however the video swallow showed pooling above the upper esophageal sphincter putting her at high risk for aspiration.    We will discuss with Dr. Benjamin regarding further recommendations for the UES and possible repeat EGD.  Discussed with patient and she is willing to undergo EGD if necessary.  She is not on anticoagulation or antiplatelet therapy.    Will call Blossom (daughter) with results of esophagram and recommendations.    Follow Up   Return for Follow-up based on test results.    Dragon dictation used throughout this note.     Shanita Hendrix PA-C

## 2023-03-03 ENCOUNTER — OFFICE VISIT (OUTPATIENT)
Dept: ENDOCRINOLOGY | Age: 83
End: 2023-03-03
Payer: MEDICARE

## 2023-03-03 VITALS
TEMPERATURE: 96.6 F | WEIGHT: 141.8 LBS | SYSTOLIC BLOOD PRESSURE: 130 MMHG | HEIGHT: 63 IN | OXYGEN SATURATION: 99 % | HEART RATE: 52 BPM | DIASTOLIC BLOOD PRESSURE: 90 MMHG | BODY MASS INDEX: 25.12 KG/M2

## 2023-03-03 DIAGNOSIS — E11.65 TYPE 2 DIABETES MELLITUS WITH HYPERGLYCEMIA, WITH LONG-TERM CURRENT USE OF INSULIN: Primary | ICD-10-CM

## 2023-03-03 DIAGNOSIS — Z79.4 TYPE 2 DIABETES MELLITUS WITH HYPERGLYCEMIA, WITH LONG-TERM CURRENT USE OF INSULIN: Primary | ICD-10-CM

## 2023-03-03 DIAGNOSIS — E78.00 PURE HYPERCHOLESTEROLEMIA: ICD-10-CM

## 2023-03-03 DIAGNOSIS — I10 ESSENTIAL HYPERTENSION: ICD-10-CM

## 2023-03-03 PROCEDURE — 95251 CONT GLUC MNTR ANALYSIS I&R: CPT | Performed by: NURSE PRACTITIONER

## 2023-03-03 PROCEDURE — 99214 OFFICE O/P EST MOD 30 MIN: CPT | Performed by: NURSE PRACTITIONER

## 2023-03-03 RX ORDER — DULAGLUTIDE 0.75 MG/.5ML
0.75 INJECTION, SOLUTION SUBCUTANEOUS WEEKLY
Qty: 6 ML | Refills: 1 | Status: SHIPPED | OUTPATIENT
Start: 2023-03-03

## 2023-03-03 RX ORDER — MEMANTINE HYDROCHLORIDE 10 MG/1
TABLET ORAL
COMMUNITY
Start: 2023-02-18

## 2023-03-03 NOTE — PROGRESS NOTES
Chief Complaint  Chief Complaint   Patient presents with   • Diabetes     Type 2: Pt Dexcom is attached, is not up to date on eye exam, no hx of retinopathy, moderate neuropathy.        Subjective          History of Present Illness    Di Macario 82 y.o. presents for a follow-up evaluation for type 2 DM     She has been diabetic since      Pt is on the following medications for their DM: Lantus 10 units daily      Has been taking Trulicty 4.5 mg weekly since last visit due to shortage      Not been taking Novolog as needed per sliding scale - confusion as to whether or not to take it since she started back on long acting        If blood glucose is above 140 then add the followin-170          0 unit  171-200          1 units  201-230          2 units  231-260          3 units  261-290          4 units  291-320          5 units  321-350          6 units  351-380          7 units  381-or greater 8 units    Sick day rules: If glucose is over 250 mg/DL then check glucose every 2 hours and treat with correction scale above.          Lantus stopped due to morning hypoglycemia , but was restarted  due to hyperglycemia        22 -Had a talk elio patient and daughter about the risk of tight glycemic control/hypoglycemia in pts that are older with dementia.  Goal of morning -130 and during the day time below 200.           Pt complains of chronic constipation, shortness of breath with activity (better), intermittent numbness and tingling in feet and blurry vision while watching TV.       Denies diarrhea and chest pain.    Weight loss of 4 lbs since last visit.    Pt does not have a history of DM retinopathy.  Last eye exam was 2022     Pt does not have a history of nephropathy.  Patient is not currently taking ACE/ARB     Pt does not have neuropathy.     Pt does/not have a history of CAD or CVA.    Last A1C in  was 12.0    Last microalbumin in  was  "positive          Blood Sugars    Blood glucoses are checked via Dexcom    Fasting blood glucoses: 200-300s    Pre-meal blood glucoses: 200s-300s    Pt has no episodes of hypoglycemia - one on dexcom was not true low          Sensor Data    Time in range 6%  High 15%  Very high 78%  Low <1%  Very low 0%      Average Glucose - 312 mg/dL      Sensor Wear - 100 %            Hyperlipidemia     Pt denies any muscle/body aches and chest pain    Pt is currently taking pravastatin 40 mg HS    Last lipid panel in 02/23 showed Total 165, HDL 90, LDL 51 and Triglycerides 149            Hypertension    Pt denies any chest pain, palpitations or headache    Current regimen includes amlodipine 2.5 mg daily                 Other History         Pt has dementia and dysphagia - she is on nectar thick liquids     She was has been admitted several times to the hospital for aspiration PNA.            I have reviewed the patient's allergies, medicines, past medical hx, family hx and social hx.    Objective   Vital Signs:   /90   Pulse 52   Temp 96.6 °F (35.9 °C) (Temporal)   Ht 160 cm (62.99\")   Wt 64.3 kg (141 lb 12.8 oz)   SpO2 99%   BMI 25.13 kg/m²       Physical Exam   Physical Exam  Constitutional:       General: She is not in acute distress.     Appearance: Normal appearance. She is not diaphoretic.   HENT:      Head: Normocephalic and atraumatic.   Eyes:      General:         Right eye: No discharge.         Left eye: No discharge.   Skin:     General: Skin is warm and dry.   Neurological:      Mental Status: She is alert.   Psychiatric:         Mood and Affect: Mood normal.         Behavior: Behavior normal.                    Results Review:   Hemoglobin A1C   Date Value Ref Range Status   02/13/2023 12.00 (H) 4.80 - 5.60 % Final     Comment:     Hemoglobin A1C Ranges:  Increased Risk for Diabetes  5.7% to 6.4%  Diabetes                     >= 6.5%  Diabetic Goal                < 7.0%     07/25/2022 8.40 (H) 4.80 - " 5.60 % Final     Triglycerides   Date Value Ref Range Status   02/13/2023 149 0 - 150 mg/dL Final     HDL Cholesterol   Date Value Ref Range Status   02/13/2023 90 (H) 40 - 60 mg/dL Final     LDL Chol Calc (NIH)   Date Value Ref Range Status   02/13/2023 51 0 - 100 mg/dL Final     VLDL Cholesterol Bryan   Date Value Ref Range Status   02/13/2023 24 5 - 40 mg/dL Final         Assessment and Plan {CC Problem List  Visit Diagnosis  ROS  Review (Popup)  Health Maintenance  Quality  BestPractice  Medications  SmartSets  SnapShot Encounters  Media :23  Diagnoses and all orders for this visit:    1. Type 2 diabetes mellitus with hyperglycemia, with long-term current use of insulin (HCC) (Primary)  -     Dulaglutide (Trulicity) 0.75 MG/0.5ML solution pen-injector; Inject 0.75 mg under the skin into the appropriate area as directed 1 (One) Time Per Week.  Dispense: 6 mL; Refill: 1  -     Hemoglobin A1c; Future  -     Comprehensive Metabolic Panel; Future    A1C is elevated at 12%  Continue with Lantus 10 units daily  Start taking Novolog 3 units before breakfast, 2 units before lunch and 3 units before dinner  Restart Trulicity at 0.75 mg weekly - will restart in the hopes of weaning off Novolog  Continue with Dexcom  Protein noted in urine, but this may have been due to her high BG  Advised Daughters to let me know if her BGs are not coming down so I can adjust her medications      2. Pure hypercholesterolemia  -     Comprehensive Metabolic Panel; Future  -     Lipid Panel; Future    Lipid panel is good.    Continue with statin.      3. Essential hypertension  -     Comprehensive Metabolic Panel; Future    Stable  Continue with current medication regimen  Defer management to PCP        No refills needed at this time      RTC in 3-4 months with me, labs prior and 6-7 months with Dr. Stanley      Follow Up     Patient was given instructions and counseling regarding her condition or for health maintenance advice.  Please see specific information pulled into the AVS if appropriate.              Carlotta Mckeon, IAIN  03/03/23

## 2023-03-13 PROCEDURE — 93296 REM INTERROG EVL PM/IDS: CPT | Performed by: INTERNAL MEDICINE

## 2023-03-13 PROCEDURE — 93294 REM INTERROG EVL PM/LDLS PM: CPT | Performed by: INTERNAL MEDICINE

## 2023-03-21 DIAGNOSIS — Z79.4 TYPE 2 DIABETES MELLITUS WITH HYPERGLYCEMIA, WITH LONG-TERM CURRENT USE OF INSULIN: Primary | ICD-10-CM

## 2023-03-21 DIAGNOSIS — E11.65 TYPE 2 DIABETES MELLITUS WITH HYPERGLYCEMIA, WITH LONG-TERM CURRENT USE OF INSULIN: Primary | ICD-10-CM

## 2023-03-21 RX ORDER — PEN NEEDLE, DIABETIC 32GX 5/32"
NEEDLE, DISPOSABLE MISCELLANEOUS
Qty: 100 EACH | Refills: 3 | Status: SHIPPED | OUTPATIENT
Start: 2023-03-21

## 2023-03-23 ENCOUNTER — HOSPITAL ENCOUNTER (OUTPATIENT)
Dept: GENERAL RADIOLOGY | Facility: HOSPITAL | Age: 83
Discharge: HOME | End: 2023-03-23
Admitting: PHYSICIAN ASSISTANT
Payer: MEDICARE

## 2023-03-23 PROCEDURE — 63710000001 BARIUM SULFATE 700 MG TABLET: Performed by: PHYSICIAN ASSISTANT

## 2023-03-23 PROCEDURE — 74221 X-RAY XM ESOPHAGUS 2CNTRST: CPT

## 2023-03-23 PROCEDURE — A9270 NON-COVERED ITEM OR SERVICE: HCPCS | Performed by: PHYSICIAN ASSISTANT

## 2023-03-23 PROCEDURE — 63710000001 SOD BICARB-CITRIC ACID-SIMETHICONE 2.21-1.53-0.04 G PACK: Performed by: PHYSICIAN ASSISTANT

## 2023-03-23 PROCEDURE — 63710000001 BARIUM SULFATE 98 % RECONSTITUTED SUSPENSION: Performed by: PHYSICIAN ASSISTANT

## 2023-03-23 PROCEDURE — 63710000001 BARIUM SULFATE 96 % RECONSTITUTED SUSPENSION: Performed by: PHYSICIAN ASSISTANT

## 2023-03-23 RX ADMIN — BARIUM SULFATE 700 MG: 700 TABLET ORAL at 08:00

## 2023-03-23 RX ADMIN — BARIUM SULFATE 183 ML: 960 POWDER, FOR SUSPENSION ORAL at 08:00

## 2023-03-23 RX ADMIN — BARIUM SULFATE 135 ML: 980 POWDER, FOR SUSPENSION ORAL at 08:00

## 2023-03-23 RX ADMIN — ANTACID/ANTIFLATULENT 1 PACKET: 380; 550; 10; 10 GRANULE, EFFERVESCENT ORAL at 08:00

## 2023-03-27 ENCOUNTER — TELEPHONE (OUTPATIENT)
Dept: INTERNAL MEDICINE | Facility: CLINIC | Age: 83
End: 2023-03-27
Payer: MEDICARE

## 2023-03-27 NOTE — TELEPHONE ENCOUNTER
Per her daughter patient's blood sugar is high in the 300's has been going on for weeks, dementia is getting worse, behavior issues and is getting worse, please advise?  [phone].

## 2023-03-29 DIAGNOSIS — Z79.4 TYPE 2 DIABETES MELLITUS WITH HYPERGLYCEMIA, WITH LONG-TERM CURRENT USE OF INSULIN: ICD-10-CM

## 2023-03-29 DIAGNOSIS — E11.65 TYPE 2 DIABETES MELLITUS WITH HYPERGLYCEMIA, WITH LONG-TERM CURRENT USE OF INSULIN: ICD-10-CM

## 2023-03-29 RX ORDER — INSULIN ASPART 100 [IU]/ML
8 INJECTION, SOLUTION INTRAVENOUS; SUBCUTANEOUS DAILY
Qty: 7.2 ML | Refills: 1 | Status: SHIPPED | OUTPATIENT
Start: 2023-03-29 | End: 2023-06-27

## 2023-04-06 ENCOUNTER — CLINICAL SUPPORT NO REQUIREMENTS (OUTPATIENT)
Dept: CARDIOLOGY | Facility: CLINIC | Age: 83
End: 2023-04-06
Payer: MEDICARE

## 2023-04-06 DIAGNOSIS — Z95.0 PRESENCE OF CARDIAC PACEMAKER: Primary | ICD-10-CM

## 2023-04-07 ENCOUNTER — TELEPHONE (OUTPATIENT)
Dept: GASTROENTEROLOGY | Facility: CLINIC | Age: 83
End: 2023-04-07
Payer: MEDICARE

## 2023-04-07 NOTE — TELEPHONE ENCOUNTER
----- Message from Shanita Hendrix PA-C sent at 3/31/2023  4:55 PM EDT -----  Please let patient know that her esophagram showed abnormal movement of her esophagus which is likely related to her age.  It is causing her esophagus not to empty as quickly.  I do not see any restricturing of the esophagus on the esophagram.  Please let her know that I want to review with Dr. Benjamin as well to get her opinion.  We are both out next week so it will be a couple weeks before we get back to her.

## 2023-04-12 ENCOUNTER — TELEPHONE (OUTPATIENT)
Dept: GASTROENTEROLOGY | Facility: CLINIC | Age: 83
End: 2023-04-12
Payer: MEDICARE

## 2023-04-12 NOTE — TELEPHONE ENCOUNTER
----- Message from Lo Benjamin MD sent at 4/10/2023  4:26 PM EDT -----  Regarding: RE: Esophagram review  I agree with you-I do not think there is much that we can do with this.  She appears to have poor esophageal clearance due to dysmotility which is likely age-related.  The stricture I dilated was in the distal esophagus and most of her issues seem to be pharyngeal.  Her upper esophagus was otherwise normal last year so I doubt repeating her dilation would do anything for her symptoms.  Recommend diet as per speech therapy      ----- Message -----  From: Shanita Hendrix PA-C  Sent: 3/31/2023   4:59 PM EDT  To: Lo Benjamin MD  Subject: Esophagram review                                Dr. Benjamin  Can you please review this esophagram?  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  You saw her last year and dilated an esophageal stricture which resolved any trouble swallowing she had from a lower esophagus standpoint.  She has apparently been on soft diet and wanted to transition.      SLP evaluated her and noted moderate to severe pharyngoesophageal dysphagia. Pt with significant pooling of residuals above UES with small amount of bolus only passing thru UES on initial swallow.  GI follow-up recommended due to this.  She remains at high risk for aspiration.    I am not sure there is anything we can do but I wanted to make sure that you did not have any more recommendations.  Let me know.    Shanita Hendrix PA-C    ----- Message -----  From: Interface, Rad Results Holly In  Sent: 3/23/2023   8:37 AM EDT  To: Shanita Hendrix PA-C

## 2023-04-12 NOTE — TELEPHONE ENCOUNTER
"Please let patient know I reviewed her situation and esophagram with Dr. Benjamin.  Please give her Dr. Benjamin's thoughts as below:    \"I agree with you-I do not think there is much that we can do with this.  She appears to have poor esophageal clearance due to dysmotility which is likely age-related.  The stricture I dilated was in the distal esophagus and most of her issues seem to be pharyngeal.  Her upper esophagus was otherwise normal last year so I doubt repeating her dilation would do anything for her symptoms.  Recommend diet as per speech therapy\"  "

## 2023-04-17 DIAGNOSIS — I10 ESSENTIAL HYPERTENSION: ICD-10-CM

## 2023-04-17 RX ORDER — AMLODIPINE BESYLATE 5 MG/1
TABLET ORAL
Qty: 90 TABLET | Refills: 0 | Status: SHIPPED | OUTPATIENT
Start: 2023-04-17

## 2023-04-24 ENCOUNTER — OFFICE VISIT (OUTPATIENT)
Dept: INTERNAL MEDICINE | Facility: CLINIC | Age: 83
End: 2023-04-24
Payer: MEDICARE

## 2023-04-24 VITALS
HEIGHT: 63 IN | TEMPERATURE: 97.6 F | WEIGHT: 140 LBS | BODY MASS INDEX: 24.8 KG/M2 | SYSTOLIC BLOOD PRESSURE: 132 MMHG | DIASTOLIC BLOOD PRESSURE: 80 MMHG

## 2023-04-24 DIAGNOSIS — I10 ESSENTIAL HYPERTENSION: ICD-10-CM

## 2023-04-24 DIAGNOSIS — F41.9 ANXIETY: Primary | ICD-10-CM

## 2023-04-24 RX ORDER — CITALOPRAM 10 MG/1
10 TABLET ORAL DAILY
Qty: 90 TABLET | Refills: 1 | Status: SHIPPED | OUTPATIENT
Start: 2023-04-24

## 2023-04-24 NOTE — PROGRESS NOTES
Subjective   Chief Complaint   Patient presents with   • Hypertension   • Memory Loss       History of Present Illness        She has not had any more falls.  Daughters state that she is refusing the pureed diet and is following a normal diet. She has not had any episodes of choking.     Dr. Kaiser started her on Namenda and she is tolerating it well. Dr. Kaiser also ordered PT for gait training. Family states her blood sugar was too high each time they came to the house to do PT.     Endocrinology started her back on Trulicity, she is tolerating it well.     She has some anxiety at nighttime around going to bed.     Patient Active Problem List   Diagnosis   • Essential hypertension   • Vitamin D deficiency   • Hyperuricemia   • Slow transit constipation   • Chronic pain of left knee   • At high risk for falls   • Peripheral neuropathy   • Uncontrolled type 2 diabetes mellitus with hyperglycemia ([facility])   • Walker as ambulation aid   • Bradycardia, sinus   • Shortness of breath   • Leg swelling   • Hyperlipidemia   • Presence of cardiac pacemaker   • Dementia without behavioral disturbance   • Aspiration pneumonia due to vomitus   • Aspiration pneumonia of both lungs due to gastric secretions, unspecified part of lung   • Weakness   • Thrombocytopenia   • Oropharyngeal dysphagia   • Immobility syndrome   • Type 2 diabetes mellitus with hyperglycemia, with long-term current use of insulin   • Paroxysmal atrial fibrillation   • Pure hypercholesterolemia       No Known Allergies    Current Outpatient Medications on File Prior to Visit   Medication Sig Dispense Refill   • acetaminophen (TYLENOL) 325 MG tablet Take 2 tablets by mouth Every 6 (Six) Hours As Needed for Mild Pain or Moderate Pain. PRN     • amLODIPine (NORVASC) 5 MG tablet TAKE ONE-HALF TABLET BY MOUTH EVERY DAY 90 tablet 0   • Continuous Blood Gluc Sensor (Dexcom G6 Sensor) Dipsense Dexcom G6 Sensors, to replace every 10 days, 3 sensors per 30 day period ([facility]C  #[address]-11). Check 6 times a day. Dx: E 11.65 sent to supply store 9 each 4   • donepezil (ARICEPT) 10 MG tablet Take 1 tablet by mouth Every Night. 90 tablet 3   • Dulaglutide (Trulicity) 0.75 MG/0.5ML solution pen-injector Inject 0.75 mg under the skin into the appropriate area as directed 1 (One) Time Per Week. 6 mL 1   • famotidine (PEPCID) 20 MG tablet Take 1 tablet by mouth 2 (Two) Times a Day.     • Glucagon (Gvoke HypoPen 2-Pack) 1 MG/0.2ML solution auto-injector Inject 1 mg under the skin into the appropriate area as directed As Needed (hypoglycemia). 0.4 mL 1   • glucose blood (FREESTYLE LITE) test strip Bid e11.65 200 each 3   • insulin aspart (NovoLOG FlexPen) 100 UNIT/ML solution pen-injector sc pen Inject 8 Units under the skin into the appropriate area as directed Daily for 90 days. Inject 3 units with breakfast /2 units with lunch and /3 units at dinner 7.2 mL 1   • Insulin Glargine (Lantus SoloStar) 100 UNIT/ML injection pen Inject 10 Units under the skin into the appropriate area as directed Daily. 9 mL 1   • Insulin Pen Needle (BD Pen Needle Norma U/F) 32G X 4 MM misc Use once a day 100 each 3   • Lancets (freestyle) lancets 1 each by Other route 4 (Four) Times a Day. Use as instructed 400 each 1   • melatonin 1 MG tablet Take 1 tablet by mouth Every Night. 30 tablet 1   • memantine (NAMENDA) 10 MG tablet      • pravastatin (PRAVACHOL) 40 MG tablet Take 1 tablet by mouth Every Evening. 90 tablet 3   • Zonegran 100 MG capsule Take 2 capsules by mouth Every Night.       No current facility-administered medications on file prior to visit.       Past Medical History:   Diagnosis Date   • Dementia     states better with medication    • Diabetes mellitus    • Hyperlipidemia 01/13/2021   • Lung consolidation 06/01/2022   • Peripheral neuropathy 08/28/2018   • Vitamin D deficiency        Family History   Problem Relation Age of Onset   • Hypertension Mother    • Cancer Mother         Stomach Cancer   •  Obesity Mother         her entire life over weight   • Hypertension Father    • Cancer Father         abdominal tumor       Social History     Socioeconomic History   • Marital status:    Tobacco Use   • Smoking status: Former     Packs/day: 0.00     Years: 5.00     Pack years: 0.00     Types: Cigarettes   • Smokeless tobacco: Never   • Tobacco comments:     quit in 1973 never a pack a day or even a pack a month smoker   Vaping Use   • Vaping Use: Never used   Substance and Sexual Activity   • Alcohol use: Not Currently     Comment: RARELY   • Drug use: No   • Sexual activity: Not Currently     Birth control/protection: Surgical, Post-menopausal     Comment: took the pill in the late 1960s early 70s       Past Surgical History:   Procedure Laterality Date   • ANKLE SURGERY Right    • BLADDER SURGERY      Prolapsed   • BREAST BIOPSY     • CARDIAC ELECTROPHYSIOLOGY PROCEDURE N/A 03/05/2021    Procedure: Pacemaker DC new [place];  Surgeon: Madelin Ferguson MD;  Location: [facility] CATH INVASIVE LOCATION;  Service: Cardiology;  Laterality: N/A;   • ENDOSCOPY N/A 06/06/2022    Procedure: ESOPHAGOGASTRODUODENOSCOPY with biopsy, balloon dilation;  Surgeon: Lo Benjamin MD;  Location: [facility] ENDOSCOPY;  Service: Gastroenterology;  Laterality: N/A;  esophageal stricture, hiatal hernia, gastritis   • HYSTERECTOMY           The following portions of the patient's history were reviewed and updated as appropriate: problem list, allergies, current medications, past medical history, past family history, past social history and past surgical history.    ROS     See HPI    Immunization History   Administered Date(s) Administered   • COVID-19 (PFIZER) Purple Cap Monovalent 02/02/2021, 02/23/2021, 09/29/2021   • Fluzone High Dose =>65 Years ([employer] ONLY) 11/07/2016, 09/30/2019   • Fluzone High-Dose 65+yrs 10/19/2021, 10/17/2022   • Influenza, Unspecified 09/16/2019, 10/08/2020   • Pneumococcal Conjugate 13-Valent  "(PCV13) 01/31/2017   • Pneumococcal Polysaccharide (PPSV23) 02/01/2018   • Tdap 02/12/2021       Objective   Vitals:    04/24/23 1254   BP: 132/80   Temp: 97.6 °F (36.4 °C)   TempSrc: Temporal   Weight: 63.5 kg (140 lb)   Height: 160 cm (62.99\")     Body mass index is 24.81 kg/m².  Physical Exam  Vitals reviewed.   HENT:      Head: Normocephalic and atraumatic.   Cardiovascular:      Rate and Rhythm: Normal rate and regular rhythm.      Heart sounds: Normal heart sounds.   Pulmonary:      Effort: Pulmonary effort is normal.      Breath sounds: Normal breath sounds.   Neurological:      Mental Status: She is alert.           Assessment & Plan   Diagnoses and all orders for this visit:    1. Anxiety (Primary)  -     citalopram (CeleXA) 10 MG tablet; Take 1 tablet by mouth Daily.  Dispense: 90 tablet; Refill: 1    2. Essential hypertension    BP is controlled. She has not had any further falls. Namenda has been added by neurology. Endo is working on getting her sugars down. I am going to start Citalopram at bedtime and fup in 6-8 weeks.     Return in about 8 weeks (around 6/19/2023).           "

## 2023-05-18 NOTE — PROGRESS NOTES
" LOS: 1 day     Name: Di Macario  Age: 82 y.o.  Sex: female  :  1940  MRN: [id number]         Primary Care Physician: Lily Yepez PA-C    Subjective   Subjective  Patient has no specific complaints presently.  Denies shortness of breath today.  Currently requiring 2 L of oxygen.  No fevers.  Minimal cough.    Objective   Vital Signs  Temp:  [97.6 °F (36.4 °C)-98.2 °F (36.8 °C)] 98.1 °F (36.7 °C)  Heart Rate:  [61-79] 67  Resp:  [16-20] 18  BP: ()/(41-81) 97/41  Body mass index is 25.46 kg/m².    Objective:  General Appearance:  Comfortable, in no acute distress and ill-appearing.    Vital signs: (most recent): Blood pressure 97/41, pulse 67, temperature 98.1 °F (36.7 °C), temperature source Oral, resp. rate 18, height 160 cm (63\"), weight 65.2 kg (143 lb 11.8 oz), SpO2 95 %.    Lungs:  Normal effort and normal respiratory rate.  She is not in respiratory distress.  There are decreased breath sounds and rhonchi.    Heart: Normal rate.  Regular rhythm.    Abdomen: Abdomen is soft.  Bowel sounds are normal.   There is no abdominal tenderness.     Extremities: There is no dependent edema or local swelling.    Neurological: Patient is alert and oriented to person, place and time.    Skin:  Warm and dry.              Results Review:       I reviewed the patient's new clinical results.    Results from last 7 days   Lab Units 22  0438 22  0604   WBC 10*3/mm3 15.73* 4.89   HEMOGLOBIN g/dL 10.1* 11.2*   PLATELETS 10*3/mm3 216 214     Results from last 7 days   Lab Units 22  0438 22  0604   SODIUM mmol/L 138 140   POTASSIUM mmol/L 4.3 3.9   CHLORIDE mmol/L 106 106   CO2 mmol/L 24.6 25.0   BUN mg/dL 25* 21   CREATININE mg/dL 0.82 0.57   CALCIUM mg/dL 9.0 9.2   GLUCOSE mg/dL 120* 124*                 Scheduled Meds:   donepezil, 10 mg, Oral, Nightly  enoxaparin, 40 mg, Subcutaneous, Q24H  famotidine, 40 mg, Oral, Daily Before Supper  guaiFENesin, 1,200 mg, Oral, Q12H  insulin " ED Course as of 05/18/23 1744   Mon May 15, 2023       2337 Patient signed out to me at shift change. Alcohol use disorder living in [facility], drinking heavily for past 10 days. Found passed out in urine and stool. ETOH 0.36. no injuries. Lactic elevated. Has decreased, still elevated. Has received ativan, thiamine, folic acid. Still unable to walk steadily, does not want to go to detox, and has no one to pick her up.    Tue May 16, 2023   0348 Patient walked to the bathroom again, still unsteady. Requesting nausea medications. Wrote order for zofran   0526 Patient sleeping. Did brief neuro exam, cranial nerves, strength, and sensation intact   0642 Alcohol ethyl(!): 0.03   0646 Patient no longer toxic but still reportedly unsteady and requiring a gait belt when she goes to the commode.  Giving additional thiamine for possible Warnicke's and obtaining a CTA for possible stroke. Will be signed out to oncoming provider.          Cathie Haro MD  05/18/23 1743    CONTINUATION OF CARE    Results for orders placed or performed during the hospital encounter of 05/15/23   CT Head w/o Contrast    Impression    IMPRESSION:     No acute intracranial hemorrhage or CT evidence of acute transcortical  ischemia.      Interval complete opacification of the left maxillary sinus.    CHELSEY AARON MD         SYSTEM ID:  [id number]   CTA Head Neck with Contrast    Impression    IMPRESSION:    No intracranial arterial occlusion, flow-limiting stenosis or  aneurysm.    No evidence of flow-limiting stenosis, dissection, or occlusion of the  cervical carotid or vertebral arteries.      Endotracheal debris can be seen with aspiration.    CHELSEY AARON MD         SYSTEM ID:  [id number]     Patient's ambulation improved.  Ambulated successfully prior to ED discharge.  They do have a bed for her and patient will be discharged from the emergency department.  Hemodynamically stable at time of discharge, patient wants to go  home.    (F10.220) Acute alcoholic intoxication in alcoholism without complication (H)      (F10.20) Alcohol use disorder, severe, dependence (H)    (E87.20) Lactic acidosis       Lito Monge MD  07/07/23 1934     lispro, 0-9 Units, Subcutaneous, TID AC  ipratropium-albuterol, 3 mL, Nebulization, 4x Daily - RT  piperacillin-tazobactam, 3.375 g, Intravenous, Once  piperacillin-tazobactam, 3.375 g, Intravenous, Q8H  pravastatin, 40 mg, Oral, Q PM  sodium chloride, 4 mL, Nebulization, BID - RT  zonisamide, 200 mg, Oral, Nightly      PRN Meds:   •  acetaminophen **OR** acetaminophen **OR** acetaminophen  •  dextrose  •  dextrose  •  glucagon (human recombinant)  •  nitroglycerin  •  ondansetron **OR** ondansetron  Continuous Infusions:  sodium chloride, 100 mL/hr        Assessment & Plan   Active Hospital Problems    Diagnosis  POA   • **Aspiration pneumonia due to vomitus (HCC) [J69.0]  Unknown   • Acute hypoxemic respiratory failure (HCC) [J96.01]  Yes   • Esophageal abnormality [K22.9]  Yes   • Dementia without behavioral disturbance (HCC) [F03.90]  Yes   • Lung consolidation (HCC) [J18.1]  Yes   • Type 2 diabetes mellitus, with long-term current use of insulin (HCC) [E11.9, Z79.4]  Not Applicable   • Presence of cardiac pacemaker [Z95.0]  Yes   • Essential hypertension [I10]  Yes      Resolved Hospital Problems   No resolved problems to display.       Assessment & Plan    -Patient still requiring oxygen and labs much more consistent with bacterial infectious process with jump of her white blood cell count and procalcitonin.  I am going to start Zosyn as this no longer appears to be just a pneumonitis due to aspiration.  -Speech therapy evaluated and will perform VFSS.  They recommend strict n.p.o. for now  -She will need an EGD.  Possibly as soon as tomorrow if her oxygenation remains stable/improved and blood pressure better.  Discussed with Dr. Negro  -I will hold her antihypertensives and start IV fluids.  -Blood sugars reasonably controlled and will continue sliding scale  -Continue pulmonary hygiene measures and wean oxygen as tolerated      I wore full protective equipment throughout the patient encounter including  eye protection and facemask.  Hand hygiene was performed before donning protective equipment and after removal when leaving the room.    Suresh Wang MD  [facility]  06/02/22  13:08 EDT

## 2023-06-26 ENCOUNTER — TELEPHONE (OUTPATIENT)
Dept: INTERNAL MEDICINE | Facility: CLINIC | Age: 83
End: 2023-06-26

## 2023-06-26 NOTE — TELEPHONE ENCOUNTER
Caller: SIMONA CORTEZ    Relationship: [employer]    Best call back number: [phone]     What orders are you requesting (i.e. lab or imaging): VERBAL ORDERS    Additional notes: PHYSICAL THERAPY   1 DAY THIS WEEK  2 DAYS A WEEK FOR 3 WEEKS     ALSO, SIMONA STATES SHE FOUND A LEVEL 2 MEDICATION INTERACTION BETWEEN:    citalopram (CeleXA) 10 MG tablet & donepezil (ARICEPT) 10 MG tablet     SIMONA STATES SHE HAS A CONFIDENTIAL VOICEMAIL AND TO PLEASE ADVISE IF THIS IS OKAY

## 2023-08-08 NOTE — ED NOTES
Attempted to call report x's 1. Nurse unavailable at this time. Will call back in 5 minutes.    dry/warm Include Pregnancy/Lactation Warning?: No

## 2023-08-27 NOTE — DISCHARGE INSTRUCTIONS
"    Post Pacemaker / Defibrillator Implant Instructions      1.  The dressing may be removed in 48 hrs.    2. If steri-strips were used, they should not be removed. Allow them to \"fall off\".      3. You may shower after the dressing is removed. Do not allow shower water to hit directly on incision.    4. No lotion/powder/ointment/cream on incision until it is healed.    5. Gently wash incision daily with soap and water and pat dry.    6. You may reapply a dressing if there is drainage, otherwise leave your incision open to air. If you reapply a dressing, please notify the pacemaker clinic.    7. No heavy lifting, pulling, or pushing.    8. Do not raise the affected arm over your head for a minimum of 1 month.    9. The pacemaker clinic will contact you (usually within 1 business day) to schedule a pacemaker/incision check. The check is usually done 7-10 days post-implant. If you have not heard from the pacemaker clinic within 3 days, please call the office.    10. Please call the office if you experience any of the following:   bleeding or drainage from your incision   swelling, redness, or opening of your incision   fever or chills   pain not relieved with medication   chest pain or difficulty breathing   lightheadness    11. For defibrillator patients only: If you receive a shock from your device, please call the office. If you receive 2 or more shocks within a 24 hour period OR if you receive 1 shock and feel poorly, you should be evaluated in the emergency room. Please DO NOT DRIVE if you have received a shock until your device has been checked.  " Yes

## 2023-09-21 ENCOUNTER — CLINICAL SUPPORT NO REQUIREMENTS (OUTPATIENT)
Dept: CARDIOLOGY | Facility: CLINIC | Age: 83
End: 2023-09-21

## 2023-09-21 ENCOUNTER — OFFICE VISIT (OUTPATIENT)
Dept: CARDIOLOGY | Facility: CLINIC | Age: 83
End: 2023-09-21
Payer: MEDICARE

## 2023-09-21 VITALS
WEIGHT: 135 LBS | HEART RATE: 59 BPM | SYSTOLIC BLOOD PRESSURE: 150 MMHG | BODY MASS INDEX: 23.92 KG/M2 | HEIGHT: 63 IN | DIASTOLIC BLOOD PRESSURE: 81 MMHG

## 2023-09-21 DIAGNOSIS — I10 ESSENTIAL HYPERTENSION: ICD-10-CM

## 2023-09-21 DIAGNOSIS — I48.0 PAROXYSMAL ATRIAL FIBRILLATION: Primary | ICD-10-CM

## 2023-09-21 DIAGNOSIS — Z95.0 PRESENCE OF CARDIAC PACEMAKER: Primary | ICD-10-CM

## 2023-09-21 DIAGNOSIS — E78.00 PURE HYPERCHOLESTEROLEMIA: ICD-10-CM

## 2023-09-21 PROCEDURE — 93000 ELECTROCARDIOGRAM COMPLETE: CPT | Performed by: INTERNAL MEDICINE

## 2023-09-21 PROCEDURE — 3079F DIAST BP 80-89 MM HG: CPT | Performed by: INTERNAL MEDICINE

## 2023-09-21 PROCEDURE — 3077F SYST BP >= 140 MM HG: CPT | Performed by: INTERNAL MEDICINE

## 2023-09-21 PROCEDURE — 99213 OFFICE O/P EST LOW 20 MIN: CPT | Performed by: INTERNAL MEDICINE

## 2023-09-21 PROCEDURE — 93280 PM DEVICE PROGR EVAL DUAL: CPT | Performed by: INTERNAL MEDICINE

## 2023-09-21 NOTE — PROGRESS NOTES
1 year f/u   Subjective:        Di Macario is a 83 y.o. female who here for follow up    CC  Follow-up hypertension atrial fibrillation hypercholesterolemia  HPI  83-year-old female with hypertension atrial fibrillation hypercholesterolemia here for the follow-up with no complaints of chest pains tightness heaviness or the pressure sensation     Problems Addressed this Visit          Cardiac and Vasculature    Essential hypertension    Paroxysmal atrial fibrillation - Primary    Relevant Orders    Adult Transthoracic Echo Complete W/ Cont if Necessary Per Protocol    Pure hypercholesterolemia     Diagnoses         Codes Comments    Paroxysmal atrial fibrillation    -  Primary ICD-10-CM: I48.0  ICD-9-CM: 427.31     Essential hypertension     ICD-10-CM: I10  ICD-9-CM: 401.9     Pure hypercholesterolemia     ICD-10-CM: E78.00  ICD-9-CM: 272.0           .    The following portions of the patient's history were reviewed and updated as appropriate: allergies, current medications, past family history, past medical history, past social history, past surgical history and problem list.    Past Medical History:   Diagnosis Date    Dementia     states better with medication     Diabetes mellitus     Hyperlipidemia 01/13/2021    Lung consolidation 06/01/2022    Peripheral neuropathy 08/28/2018    Vitamin D deficiency      reports that she has quit smoking. Her smoking use included cigarettes. She has never used smokeless tobacco. She reports that she does not currently use alcohol. She reports that she does not use drugs.   Family History   Problem Relation Age of Onset    Hypertension Mother     Cancer Mother         Stomach Cancer    Obesity Mother         her entire life over weight    Hypertension Father     Cancer Father         abdominal tumor       Review of Systems  Constitutional: No wt loss, fever, fatigue  Gastrointestinal: No nausea, abdominal pain  Behavioral/Psych: No insomnia or anxiety   Cardiovascular no  "chest pains or tightness in the chest  Objective:       Physical Exam           Physical Exam  /81 (BP Location: Left arm, Patient Position: Sitting, Cuff Size: Adult)   Pulse 59   Ht 160 cm (63\")   Wt 61.2 kg (135 lb)   BMI 23.91 kg/m²     General appearance: No acute changes   Eyes: Sclerae conjunctivae normal, pupils reactive   HENT: Atraumatic; oropharynx clear with moist mucous membranes and no mucosal ulcerations;  Neck: Trachea midline; NECK, supple, no thyromegaly or lymphadenopathy   Lungs: Normal size and shape, normal breath sounds, equal distribution of air, no rales and rhonchi   CV: S1-S2 regular, no murmurs, no rub, no gallop   Abdomen: Soft, nontender; no masses , no abnormal abdominal sounds   Extremities: No deformity , normal color , no peripheral edema   Skin: Normal temperature, turgor and texture; no rash, ulcers  Psych: Appropriate affect, alert and oriented to person, place and time             ECG 12 Lead    Date/Time: 2023 11:13 AM  Performed by: Madelin Ferguson MD  Authorized by: Madelin Ferguson MD   Comparison: compared with previous ECG   Similar to previous ECG  Rhythm: sinus rhythm  Pacin% capture  Clinical impression: abnormal EKG          Echocardiogram:        Current Outpatient Medications:     acetaminophen (TYLENOL) 325 MG tablet, Take 2 tablets by mouth Every 6 (Six) Hours As Needed for Mild Pain or Moderate Pain. PRN, Disp: , Rfl:     amLODIPine (NORVASC) 5 MG tablet, Take 0.5 tablets by mouth Daily., Disp: 90 tablet, Rfl: 0    citalopram (CeleXA) 10 MG tablet, Take 1 tablet by mouth Daily., Disp: 90 tablet, Rfl: 3    Continuous Blood Gluc Sensor (Dexcom G6 Sensor), Dipsense Dexcom G6 Sensors, to replace every 10 days, 3 sensors per 30 day period ([facility]C #[address]-16). Check 6 times a day. Dx: E 11.65 sent to supply store, Disp: 9 each, Rfl: 4    donepezil (ARICEPT) 10 MG tablet, Take 1 tablet by mouth Every Night., Disp: 90 tablet, Rfl: 3    " Dulaglutide (Trulicity) 0.75 MG/0.5ML solution pen-injector, Inject 0.75 mg under the skin into the appropriate area as directed 1 (One) Time Per Week., Disp: 6 mL, Rfl: 1    famotidine (PEPCID) 20 MG tablet, Take 1 tablet by mouth 2 (Two) Times a Day., Disp: , Rfl:     Glucagon (Gvoke HypoPen 2-Pack) 1 MG/0.2ML solution auto-injector, Inject 1 mg under the skin into the appropriate area as directed As Needed (hypoglycemia)., Disp: 0.4 mL, Rfl: 1    glucose blood (FREESTYLE LITE) test strip, Bid e11.65, Disp: 200 each, Rfl: 3    Insulin Glargine (Lantus SoloStar) 100 UNIT/ML injection pen, Inject 10 Units under the skin into the appropriate area as directed Daily., Disp: 9 mL, Rfl: 1    Insulin Pen Needle (BD Pen Needle Norma U/F) 32G X 4 MM misc, Use once a day, Disp: 100 each, Rfl: 3    Lancets (freestyle) lancets, 1 each by Other route 4 (Four) Times a Day. Use as instructed, Disp: 400 each, Rfl: 1    melatonin 1 MG tablet, Take 1 tablet by mouth Every Night., Disp: 30 tablet, Rfl: 1    memantine (NAMENDA) 10 MG tablet, , Disp: , Rfl:     pravastatin (PRAVACHOL) 40 MG tablet, Take 1 tablet by mouth Every Evening., Disp: 90 tablet, Rfl: 3    Zonegran 100 MG capsule, Take 2 capsules by mouth Every Night., Disp: , Rfl:     insulin aspart (NovoLOG FlexPen) 100 UNIT/ML solution pen-injector sc pen, Inject 8 Units under the skin into the appropriate area as directed Daily for 90 days. Inject 3 units with breakfast /2 units with lunch and /3 units at dinner, Disp: 7.2 mL, Rfl: 1   Assessment:        Patient Active Problem List   Diagnosis    Essential hypertension    Vitamin D deficiency    Hyperuricemia    Slow transit constipation    Chronic pain of left knee    At high risk for falls    Peripheral neuropathy    Uncontrolled type 2 diabetes mellitus with hyperglycemia    Walker as ambulation aid    Bradycardia, sinus    Shortness of breath    Leg swelling    Hyperlipidemia    Presence of cardiac pacemaker    Dementia  without behavioral disturbance    Aspiration pneumonia due to vomitus    Aspiration pneumonia of both lungs due to gastric secretions, unspecified part of lung    Weakness    Thrombocytopenia    Oropharyngeal dysphagia    Immobility syndrome    Type 2 diabetes mellitus with hyperglycemia, with long-term current use of insulin    Paroxysmal atrial fibrillation    Pure hypercholesterolemia               Plan:            ICD-10-CM ICD-9-CM   1. Paroxysmal atrial fibrillation  I48.0 427.31   2. Essential hypertension  I10 401.9   3. Pure hypercholesterolemia  E78.00 272.0     1. Paroxysmal atrial fibrillation  Considering patient's medical condition as well as the risk factors, patient will require echocardiogram for further evaluation for the LV function, four-chamber evaluation, including the pressures, valvular function and  pericardial disease and pericardial effusion    - Adult Transthoracic Echo Complete W/ Cont if Necessary Per Protocol; Future    2. Essential hypertension  Continue current treatment    3. Pure hypercholesterolemia  Continue current treatment      1 yr with echo  COUNSELING:    Di Sorto was given to patient for the following topics: diagnostic results, risk factor reductions, impressions, risks and benefits of treatment options and importance of treatment compliance .       SMOKING COUNSELING:        Dictated using Dragon dictation

## 2023-10-02 DIAGNOSIS — Z79.4 TYPE 2 DIABETES MELLITUS WITH HYPERGLYCEMIA, WITH LONG-TERM CURRENT USE OF INSULIN: ICD-10-CM

## 2023-10-02 DIAGNOSIS — E78.00 PURE HYPERCHOLESTEROLEMIA: ICD-10-CM

## 2023-10-02 DIAGNOSIS — E11.65 TYPE 2 DIABETES MELLITUS WITH HYPERGLYCEMIA, WITH LONG-TERM CURRENT USE OF INSULIN: ICD-10-CM

## 2023-10-02 DIAGNOSIS — I10 ESSENTIAL HYPERTENSION: ICD-10-CM

## 2023-10-04 LAB
ALBUMIN SERPL-MCNC: 4.3 G/DL (ref 3.5–5.2)
ALBUMIN/GLOB SERPL: 1.7 G/DL
ALP SERPL-CCNC: 145 U/L (ref 39–117)
ALT SERPL-CCNC: 11 U/L (ref 1–33)
AST SERPL-CCNC: 12 U/L (ref 1–32)
BILIRUB SERPL-MCNC: 0.3 MG/DL (ref 0–1.2)
BUN SERPL-MCNC: 15 MG/DL (ref 8–23)
BUN/CREAT SERPL: 20.3 (ref 7–25)
CALCIUM SERPL-MCNC: 9.7 MG/DL (ref 8.6–10.5)
CHLORIDE SERPL-SCNC: 98 MMOL/L (ref 98–107)
CHOLEST SERPL-MCNC: 162 MG/DL (ref 0–200)
CO2 SERPL-SCNC: 30.7 MMOL/L (ref 22–29)
CREAT SERPL-MCNC: 0.74 MG/DL (ref 0.57–1)
EGFRCR SERPLBLD CKD-EPI 2021: 79.9 ML/MIN/1.73
GLOBULIN SER CALC-MCNC: 2.6 GM/DL
GLUCOSE SERPL-MCNC: 333 MG/DL (ref 65–99)
HBA1C MFR BLD: 12.5 % (ref 4.8–5.6)
HDLC SERPL-MCNC: 85 MG/DL (ref 40–60)
IMP & REVIEW OF LAB RESULTS: NORMAL
LDLC SERPL CALC-MCNC: 57 MG/DL (ref 0–100)
POTASSIUM SERPL-SCNC: 3.9 MMOL/L (ref 3.5–5.2)
PROT SERPL-MCNC: 6.9 G/DL (ref 6–8.5)
SODIUM SERPL-SCNC: 138 MMOL/L (ref 136–145)
TRIGL SERPL-MCNC: 114 MG/DL (ref 0–150)
TSH SERPL DL<=0.005 MIU/L-ACNC: 2.33 UIU/ML (ref 0.27–4.2)
VLDLC SERPL CALC-MCNC: 20 MG/DL (ref 5–40)

## 2023-10-10 ENCOUNTER — OFFICE VISIT (OUTPATIENT)
Dept: ENDOCRINOLOGY | Age: 83
End: 2023-10-10
Payer: MEDICARE

## 2023-10-10 VITALS
SYSTOLIC BLOOD PRESSURE: 126 MMHG | WEIGHT: 137.6 LBS | DIASTOLIC BLOOD PRESSURE: 82 MMHG | HEIGHT: 63 IN | HEART RATE: 67 BPM | BODY MASS INDEX: 24.38 KG/M2 | OXYGEN SATURATION: 100 %

## 2023-10-10 DIAGNOSIS — E11.65 UNCONTROLLED TYPE 2 DIABETES MELLITUS WITH HYPERGLYCEMIA: Primary | ICD-10-CM

## 2023-10-10 PROCEDURE — 99214 OFFICE O/P EST MOD 30 MIN: CPT | Performed by: INTERNAL MEDICINE

## 2023-10-10 PROCEDURE — 1159F MED LIST DOCD IN RCRD: CPT | Performed by: INTERNAL MEDICINE

## 2023-10-10 PROCEDURE — 95251 CONT GLUC MNTR ANALYSIS I&R: CPT | Performed by: INTERNAL MEDICINE

## 2023-10-10 PROCEDURE — 1160F RVW MEDS BY RX/DR IN RCRD: CPT | Performed by: INTERNAL MEDICINE

## 2023-10-10 PROCEDURE — 3074F SYST BP LT 130 MM HG: CPT | Performed by: INTERNAL MEDICINE

## 2023-10-10 PROCEDURE — 3079F DIAST BP 80-89 MM HG: CPT | Performed by: INTERNAL MEDICINE

## 2023-10-10 NOTE — PROGRESS NOTES
Chief complaint:  T2DM    HPI:   - 83 year old female here for management of diabetes mellitus type 2  - Has had diabetes for over 30 years  - Complications include peripheral neuropathy  - No known complications to date  - Is currently taking Lantus 10 units daily and Novolog 2-3 units qac with Trulicity 0.75 mg weekly  - Patient has dementia and she and her daughter are both saying she has missed many doses of her insulin and Trulicity resulting in hyperglycemia  - The patient and her daughter are discussing assisted living facilities however Ms. Macario prefers not to do that  - She is in a palliative care program  - Patient denies polyuria or polydipsia  - Denies hypoglycemia  - She does use a DexCom CGM    The following portions of the patient's history were reviewed and updated as appropriate: allergies, current medications, past family history, past medical history, past social history, past surgical history, and problem list.    Objective     Vitals:    10/10/23 1242   BP: 126/82   Pulse: 67   SpO2: 100%        Physical Exam  Vitals reviewed.   Constitutional:       Appearance: Normal appearance.   HENT:      Head: Normocephalic and atraumatic.   Eyes:      General: No scleral icterus.  Pulmonary:      Effort: Pulmonary effort is normal. No respiratory distress.   Neurological:      Mental Status: She is alert.      Gait: Gait normal.   Psychiatric:         Mood and Affect: Mood normal.         Behavior: Behavior normal.         Thought Content: Thought content normal.         Judgment: Judgment normal.     CGM interpretation  Dates reviewed:  9/27-10/10/23  Data:  Avg of 363, 90% very high, less than 1% hypoglycemia  Interpretation:  Hyperglycemia throughout the day and night    Assessment & Plan   T2DM, uncontrolled due to hyperglycemia. Complicated by peripheral neuropathy  - Discussed goals of care given patient with dementia, in palliative care, and missing multiple doses of diabetes care  - Will  increase Lantus to 14 units daily and she typically has at least someone visiting her daily to remind her to take Lantus  - Cont. Novolog 2-3 units qac with Trulicity 0.75 mg weekly    - Return to clinic in 3 months

## 2023-11-20 DIAGNOSIS — E11.65 UNCONTROLLED TYPE 2 DIABETES MELLITUS WITH HYPERGLYCEMIA: ICD-10-CM

## 2023-11-20 RX ORDER — INSULIN GLARGINE 100 [IU]/ML
10 INJECTION, SOLUTION SUBCUTANEOUS DAILY
Qty: 9 ML | Refills: 1 | Status: SHIPPED | OUTPATIENT
Start: 2023-11-20

## 2023-11-20 NOTE — TELEPHONE ENCOUNTER
Incoming Refill Request      Medication requested (name and dose): [facility]    Pharmacy where request should be sent: [facility]    Additional details provided by patient:     Best call back number: [phone]    Does the patient have less than a 3 day supply:  [] Yes  [] No    Varghese Grant Rep  11/20/23, 14:32 EST

## 2023-11-24 DIAGNOSIS — E78.5 HYPERLIPIDEMIA, UNSPECIFIED HYPERLIPIDEMIA TYPE: ICD-10-CM

## 2023-11-27 DIAGNOSIS — F41.9 ANXIETY: ICD-10-CM

## 2023-11-27 DIAGNOSIS — Z79.4 TYPE 2 DIABETES MELLITUS WITH HYPERGLYCEMIA, WITH LONG-TERM CURRENT USE OF INSULIN: ICD-10-CM

## 2023-11-27 DIAGNOSIS — E11.65 TYPE 2 DIABETES MELLITUS WITH HYPERGLYCEMIA, WITH LONG-TERM CURRENT USE OF INSULIN: ICD-10-CM

## 2023-11-27 RX ORDER — DULAGLUTIDE 0.75 MG/.5ML
0.75 INJECTION, SOLUTION SUBCUTANEOUS WEEKLY
Qty: 6 ML | Refills: 1 | Status: SHIPPED | OUTPATIENT
Start: 2023-11-27

## 2023-11-27 RX ORDER — PRAVASTATIN SODIUM 40 MG
40 TABLET ORAL EVERY EVENING
Qty: 90 TABLET | Refills: 3 | Status: SHIPPED | OUTPATIENT
Start: 2023-11-27

## 2023-11-27 RX ORDER — CITALOPRAM HYDROBROMIDE 10 MG/1
10 TABLET ORAL DAILY
Qty: 90 TABLET | Refills: 3 | Status: SHIPPED | OUTPATIENT
Start: 2023-11-27

## 2023-11-27 NOTE — TELEPHONE ENCOUNTER
Rx Refill Note  Requested Prescriptions     Pending Prescriptions Disp Refills    Dulaglutide (Trulicity) 0.75 MG/0.5ML solution pen-injector 6 mL 1     Sig: Inject 0.75 mg under the skin into the appropriate area as directed 1 (One) Time Per Week.      Last office visit with prescribing clinician: 10/10/2023   Last telemedicine visit with prescribing clinician: Visit date not found   Next office visit with prescribing clinician: Visit date not found                         Would you like a call back once the refill request has been completed: [] Yes [] No    If the office needs to give you a call back, can they leave a voicemail: [] Yes [] No    Radha Mills  11/27/23, 15:06 EST

## 2024-01-01 ENCOUNTER — APPOINTMENT (OUTPATIENT)
Dept: GENERAL RADIOLOGY | Facility: HOSPITAL | Age: 84
DRG: 177 | End: 2024-01-01
Payer: MEDICARE

## 2024-01-01 ENCOUNTER — APPOINTMENT (OUTPATIENT)
Dept: CARDIOLOGY | Facility: HOSPITAL | Age: 84
DRG: 177 | End: 2024-01-01
Payer: MEDICARE

## 2024-01-01 ENCOUNTER — HOSPITAL ENCOUNTER (INPATIENT)
Facility: HOSPITAL | Age: 84
LOS: 5 days | Discharge: HOSPICE | DRG: 177 | End: 2024-09-23
Attending: EMERGENCY MEDICINE | Admitting: INTERNAL MEDICINE
Payer: MEDICARE

## 2024-01-01 ENCOUNTER — HOSPITAL ENCOUNTER (INPATIENT)
Facility: HOSPITAL | Age: 84
LOS: 3 days | End: 2024-09-26
Attending: STUDENT IN AN ORGANIZED HEALTH CARE EDUCATION/TRAINING PROGRAM | Admitting: STUDENT IN AN ORGANIZED HEALTH CARE EDUCATION/TRAINING PROGRAM
Payer: COMMERCIAL

## 2024-01-01 ENCOUNTER — HOME CARE VISIT (OUTPATIENT)
Dept: HOME HEALTH SERVICES | Facility: HOME HEALTHCARE | Age: 84
End: 2024-01-01
Payer: MEDICARE

## 2024-01-01 VITALS
BODY MASS INDEX: 22.62 KG/M2 | HEART RATE: 86 BPM | TEMPERATURE: 98.8 F | SYSTOLIC BLOOD PRESSURE: 95 MMHG | OXYGEN SATURATION: 91 % | DIASTOLIC BLOOD PRESSURE: 50 MMHG | RESPIRATION RATE: 16 BRPM | WEIGHT: 127.65 LBS | HEIGHT: 63 IN

## 2024-01-01 VITALS — TEMPERATURE: 100.3 F | OXYGEN SATURATION: 88 %

## 2024-01-01 DIAGNOSIS — Z87.01 HISTORY OF ASPIRATION PNEUMONIA: ICD-10-CM

## 2024-01-01 DIAGNOSIS — R79.89 ELEVATED TROPONIN: ICD-10-CM

## 2024-01-01 DIAGNOSIS — R73.9 HYPERGLYCEMIA: ICD-10-CM

## 2024-01-01 DIAGNOSIS — J96.02 ACUTE HYPERCAPNIC RESPIRATORY FAILURE: Primary | ICD-10-CM

## 2024-01-01 DIAGNOSIS — U07.1 COVID-19: ICD-10-CM

## 2024-01-01 LAB
ALBUMIN SERPL-MCNC: 3 G/DL (ref 3.5–5.2)
ALBUMIN SERPL-MCNC: 3.1 G/DL (ref 3.5–5.2)
ALBUMIN SERPL-MCNC: 3.4 G/DL (ref 3.5–5.2)
ALBUMIN SERPL-MCNC: 3.8 G/DL (ref 3.5–5.2)
ALBUMIN/GLOB SERPL: 0.9 G/DL
ALBUMIN/GLOB SERPL: 0.9 G/DL
ALBUMIN/GLOB SERPL: 1 G/DL
ALBUMIN/GLOB SERPL: 1 G/DL
ALP SERPL-CCNC: 105 U/L (ref 39–117)
ALP SERPL-CCNC: 114 U/L (ref 39–117)
ALP SERPL-CCNC: 147 U/L (ref 39–117)
ALP SERPL-CCNC: 93 U/L (ref 39–117)
ALT SERPL W P-5'-P-CCNC: 7 U/L (ref 1–33)
ALT SERPL W P-5'-P-CCNC: 7 U/L (ref 1–33)
ALT SERPL W P-5'-P-CCNC: 9 U/L (ref 1–33)
ALT SERPL W P-5'-P-CCNC: 9 U/L (ref 1–33)
ANION GAP SERPL CALCULATED.3IONS-SCNC: 10.8 MMOL/L (ref 5–15)
ANION GAP SERPL CALCULATED.3IONS-SCNC: 11 MMOL/L (ref 5–15)
ANION GAP SERPL CALCULATED.3IONS-SCNC: 13.3 MMOL/L (ref 5–15)
ANION GAP SERPL CALCULATED.3IONS-SCNC: 14 MMOL/L (ref 5–15)
ANION GAP SERPL CALCULATED.3IONS-SCNC: 9.8 MMOL/L (ref 5–15)
ARTERIAL PATENCY WRIST A: POSITIVE
AST SERPL-CCNC: 13 U/L (ref 1–32)
AST SERPL-CCNC: 14 U/L (ref 1–32)
AST SERPL-CCNC: 15 U/L (ref 1–32)
AST SERPL-CCNC: 15 U/L (ref 1–32)
ATMOSPHERIC PRESS: 746.8 MMHG
ATMOSPHERIC PRESS: 747.3 MMHG
ATMOSPHERIC PRESS: 748.9 MMHG
ATMOSPHERIC PRESS: 749.3 MMHG
B PARAPERT DNA SPEC QL NAA+PROBE: NOT DETECTED
B PERT DNA SPEC QL NAA+PROBE: NOT DETECTED
BACTERIA SPEC AEROBE CULT: NORMAL
BACTERIA SPEC AEROBE CULT: NORMAL
BASE EXCESS BLDA CALC-SCNC: -1.5 MMOL/L (ref 0–2)
BASE EXCESS BLDA CALC-SCNC: 1 MMOL/L (ref 0–2)
BASE EXCESS BLDA CALC-SCNC: 2.3 MMOL/L (ref 0–2)
BASE EXCESS BLDA CALC-SCNC: 4.6 MMOL/L (ref 0–2)
BASOPHILS # BLD AUTO: 0.01 10*3/MM3 (ref 0–0.2)
BASOPHILS # BLD AUTO: 0.03 10*3/MM3 (ref 0–0.2)
BASOPHILS # BLD AUTO: 0.04 10*3/MM3 (ref 0–0.2)
BASOPHILS # BLD AUTO: 0.08 10*3/MM3 (ref 0–0.2)
BASOPHILS NFR BLD AUTO: 0.1 % (ref 0–1.5)
BASOPHILS NFR BLD AUTO: 0.2 % (ref 0–1.5)
BASOPHILS NFR BLD AUTO: 0.2 % (ref 0–1.5)
BASOPHILS NFR BLD AUTO: 0.7 % (ref 0–1.5)
BDY SITE: ABNORMAL
BH CV ECHO MEAS - ACS: 1.14 CM
BH CV ECHO MEAS - AO MAX PG: 12.1 MMHG
BH CV ECHO MEAS - AO MEAN PG: 7.1 MMHG
BH CV ECHO MEAS - AO ROOT DIAM: 3.2 CM
BH CV ECHO MEAS - AO V2 MAX: 173.7 CM/SEC
BH CV ECHO MEAS - AO V2 VTI: 42 CM
BH CV ECHO MEAS - AVA(I,D): 1.35 CM2
BH CV ECHO MEAS - EDV(CUBED): 70.4 ML
BH CV ECHO MEAS - EDV(MOD-SP2): 87 ML
BH CV ECHO MEAS - EDV(MOD-SP4): 87 ML
BH CV ECHO MEAS - EF(MOD-BP): 39.9 %
BH CV ECHO MEAS - EF(MOD-SP2): 35.6 %
BH CV ECHO MEAS - EF(MOD-SP4): 40.2 %
BH CV ECHO MEAS - ESV(CUBED): 16.4 ML
BH CV ECHO MEAS - ESV(MOD-SP2): 56 ML
BH CV ECHO MEAS - ESV(MOD-SP4): 52 ML
BH CV ECHO MEAS - FS: 38.5 %
BH CV ECHO MEAS - IVS/LVPW: 1.22 CM
BH CV ECHO MEAS - IVSD: 1.01 CM
BH CV ECHO MEAS - LAT PEAK E' VEL: 6 CM/SEC
BH CV ECHO MEAS - LV MASS(C)D: 119.7 GRAMS
BH CV ECHO MEAS - LV MAX PG: 2.6 MMHG
BH CV ECHO MEAS - LV MEAN PG: 2 MMHG
BH CV ECHO MEAS - LV V1 MAX: 80.5 CM/SEC
BH CV ECHO MEAS - LV V1 VTI: 20.5 CM
BH CV ECHO MEAS - LVIDD: 4.1 CM
BH CV ECHO MEAS - LVIDS: 2.5 CM
BH CV ECHO MEAS - LVOT AREA: 2.8 CM2
BH CV ECHO MEAS - LVOT DIAM: 1.88 CM
BH CV ECHO MEAS - LVPWD: 0.83 CM
BH CV ECHO MEAS - MED PEAK E' VEL: 4.2 CM/SEC
BH CV ECHO MEAS - MV A MAX VEL: 93.1 CM/SEC
BH CV ECHO MEAS - MV DEC SLOPE: 435.8 CM/SEC2
BH CV ECHO MEAS - MV DEC TIME: 0.24 SEC
BH CV ECHO MEAS - MV E MAX VEL: 81 CM/SEC
BH CV ECHO MEAS - MV E/A: 0.87
BH CV ECHO MEAS - MV MAX PG: 3.8 MMHG
BH CV ECHO MEAS - MV MEAN PG: 1.58 MMHG
BH CV ECHO MEAS - MV P1/2T: 60.1 MSEC
BH CV ECHO MEAS - MV V2 VTI: 27.9 CM
BH CV ECHO MEAS - MVA(P1/2T): 3.7 CM2
BH CV ECHO MEAS - MVA(VTI): 2.03 CM2
BH CV ECHO MEAS - PA ACC TIME: 0.17 SEC
BH CV ECHO MEAS - PA V2 MAX: 100.4 CM/SEC
BH CV ECHO MEAS - RAP SYSTOLE: 3 MMHG
BH CV ECHO MEAS - RV MAX PG: 1.83 MMHG
BH CV ECHO MEAS - RV V1 MAX: 67.7 CM/SEC
BH CV ECHO MEAS - RV V1 VTI: 16.9 CM
BH CV ECHO MEAS - RVSP: 28 MMHG
BH CV ECHO MEAS - SV(LVOT): 56.7 ML
BH CV ECHO MEAS - SV(MOD-SP2): 31 ML
BH CV ECHO MEAS - SV(MOD-SP4): 35 ML
BH CV ECHO MEAS - TAPSE (>1.6): 1.34 CM
BH CV ECHO MEAS - TR MAX PG: 25.5 MMHG
BH CV ECHO MEAS - TR MAX VEL: 252.3 CM/SEC
BH CV ECHO MEASUREMENTS AVERAGE E/E' RATIO: 15.88
BH CV XLRA - TDI S': 10.6 CM/SEC
BILIRUB SERPL-MCNC: 0.2 MG/DL (ref 0–1.2)
BILIRUB SERPL-MCNC: 0.2 MG/DL (ref 0–1.2)
BILIRUB SERPL-MCNC: 0.3 MG/DL (ref 0–1.2)
BILIRUB SERPL-MCNC: <0.2 MG/DL (ref 0–1.2)
BUN SERPL-MCNC: 17 MG/DL (ref 8–23)
BUN SERPL-MCNC: 17 MG/DL (ref 8–23)
BUN SERPL-MCNC: 18 MG/DL (ref 8–23)
BUN SERPL-MCNC: 19 MG/DL (ref 8–23)
BUN SERPL-MCNC: 21 MG/DL (ref 8–23)
BUN/CREAT SERPL: 29.2 (ref 7–25)
BUN/CREAT SERPL: 29.8 (ref 7–25)
BUN/CREAT SERPL: 30 (ref 7–25)
BUN/CREAT SERPL: 31.5 (ref 7–25)
BUN/CREAT SERPL: 32.7 (ref 7–25)
C PNEUM DNA NPH QL NAA+NON-PROBE: NOT DETECTED
CALCIUM SPEC-SCNC: 10.1 MG/DL (ref 8.6–10.5)
CALCIUM SPEC-SCNC: 9.4 MG/DL (ref 8.6–10.5)
CALCIUM SPEC-SCNC: 9.7 MG/DL (ref 8.6–10.5)
CHLORIDE SERPL-SCNC: 104 MMOL/L (ref 98–107)
CHLORIDE SERPL-SCNC: 107 MMOL/L (ref 98–107)
CHLORIDE SERPL-SCNC: 108 MMOL/L (ref 98–107)
CHLORIDE SERPL-SCNC: 96 MMOL/L (ref 98–107)
CHLORIDE SERPL-SCNC: 98 MMOL/L (ref 98–107)
CO2 BLDA-SCNC: 26 MMOL/L (ref 23–27)
CO2 BLDA-SCNC: 30.8 MMOL/L (ref 23–27)
CO2 BLDA-SCNC: 31.5 MMOL/L (ref 23–27)
CO2 BLDA-SCNC: >32.45 MMOL/L (ref 23–27)
CO2 SERPL-SCNC: 24.2 MMOL/L (ref 22–29)
CO2 SERPL-SCNC: 25.7 MMOL/L (ref 22–29)
CO2 SERPL-SCNC: 27 MMOL/L (ref 22–29)
CO2 SERPL-SCNC: 27 MMOL/L (ref 22–29)
CO2 SERPL-SCNC: 27.2 MMOL/L (ref 22–29)
CREAT SERPL-MCNC: 0.54 MG/DL (ref 0.57–1)
CREAT SERPL-MCNC: 0.55 MG/DL (ref 0.57–1)
CREAT SERPL-MCNC: 0.57 MG/DL (ref 0.57–1)
CREAT SERPL-MCNC: 0.65 MG/DL (ref 0.57–1)
CREAT SERPL-MCNC: 0.7 MG/DL (ref 0.57–1)
D-LACTATE SERPL-SCNC: 1.5 MMOL/L (ref 0.5–2)
D-LACTATE SERPL-SCNC: 2.5 MMOL/L (ref 0.5–2)
D-LACTATE SERPL-SCNC: 2.7 MMOL/L (ref 0.5–2)
D-LACTATE SERPL-SCNC: 3.8 MMOL/L (ref 0.5–2)
D-LACTATE SERPL-SCNC: 4.4 MMOL/L (ref 0.5–2)
DEPRECATED RDW RBC AUTO: 41.4 FL (ref 37–54)
DEPRECATED RDW RBC AUTO: 42.4 FL (ref 37–54)
DEPRECATED RDW RBC AUTO: 44.2 FL (ref 37–54)
DEPRECATED RDW RBC AUTO: 45.3 FL (ref 37–54)
DEVICE COMMENT 2: ABNORMAL
DEVICE COMMENT: ABNORMAL
EGFRCR SERPLBLD CKD-EPI 2021: 84.9 ML/MIN/1.73
EGFRCR SERPLBLD CKD-EPI 2021: 86.4 ML/MIN/1.73
EGFRCR SERPLBLD CKD-EPI 2021: 89.2 ML/MIN/1.73
EGFRCR SERPLBLD CKD-EPI 2021: 90 ML/MIN/1.73
EGFRCR SERPLBLD CKD-EPI 2021: 90.4 ML/MIN/1.73
EOSINOPHIL # BLD AUTO: 0 10*3/MM3 (ref 0–0.4)
EOSINOPHIL # BLD AUTO: 0.05 10*3/MM3 (ref 0–0.4)
EOSINOPHIL NFR BLD AUTO: 0 % (ref 0.3–6.2)
EOSINOPHIL NFR BLD AUTO: 0.4 % (ref 0.3–6.2)
ERYTHROCYTE [DISTWIDTH] IN BLOOD BY AUTOMATED COUNT: 13.3 % (ref 12.3–15.4)
ERYTHROCYTE [DISTWIDTH] IN BLOOD BY AUTOMATED COUNT: 13.7 % (ref 12.3–15.4)
ERYTHROCYTE [DISTWIDTH] IN BLOOD BY AUTOMATED COUNT: 13.8 % (ref 12.3–15.4)
ERYTHROCYTE [DISTWIDTH] IN BLOOD BY AUTOMATED COUNT: 14.3 % (ref 12.3–15.4)
FLUAV SUBTYP SPEC NAA+PROBE: NOT DETECTED
FLUBV RNA ISLT QL NAA+PROBE: NOT DETECTED
GAS FLOW AIRWAY: 6 LPM
GAS FLOW AIRWAY: 8 LPM
GEN 5 2HR TROPONIN T REFLEX: 196 NG/L
GEN 5 2HR TROPONIN T REFLEX: 239 NG/L
GLOBULIN UR ELPH-MCNC: 3.2 GM/DL
GLOBULIN UR ELPH-MCNC: 3.5 GM/DL
GLOBULIN UR ELPH-MCNC: 3.5 GM/DL
GLOBULIN UR ELPH-MCNC: 4 GM/DL
GLUCOSE BLDC GLUCOMTR-MCNC: 133 MG/DL (ref 70–130)
GLUCOSE BLDC GLUCOMTR-MCNC: 135 MG/DL (ref 70–130)
GLUCOSE BLDC GLUCOMTR-MCNC: 137 MG/DL (ref 70–130)
GLUCOSE BLDC GLUCOMTR-MCNC: 142 MG/DL (ref 70–130)
GLUCOSE BLDC GLUCOMTR-MCNC: 143 MG/DL (ref 70–130)
GLUCOSE BLDC GLUCOMTR-MCNC: 149 MG/DL (ref 70–130)
GLUCOSE BLDC GLUCOMTR-MCNC: 154 MG/DL (ref 70–130)
GLUCOSE BLDC GLUCOMTR-MCNC: 155 MG/DL (ref 70–130)
GLUCOSE BLDC GLUCOMTR-MCNC: 163 MG/DL (ref 70–130)
GLUCOSE BLDC GLUCOMTR-MCNC: 183 MG/DL (ref 70–130)
GLUCOSE BLDC GLUCOMTR-MCNC: 187 MG/DL (ref 70–130)
GLUCOSE BLDC GLUCOMTR-MCNC: 206 MG/DL (ref 70–130)
GLUCOSE BLDC GLUCOMTR-MCNC: 210 MG/DL (ref 70–130)
GLUCOSE BLDC GLUCOMTR-MCNC: 255 MG/DL (ref 70–130)
GLUCOSE BLDC GLUCOMTR-MCNC: 266 MG/DL (ref 70–130)
GLUCOSE BLDC GLUCOMTR-MCNC: 277 MG/DL (ref 70–130)
GLUCOSE BLDC GLUCOMTR-MCNC: 300 MG/DL (ref 70–130)
GLUCOSE BLDC GLUCOMTR-MCNC: 373 MG/DL (ref 70–130)
GLUCOSE BLDC GLUCOMTR-MCNC: 415 MG/DL (ref 70–130)
GLUCOSE BLDC GLUCOMTR-MCNC: 422 MG/DL (ref 70–130)
GLUCOSE BLDC GLUCOMTR-MCNC: 71 MG/DL (ref 70–130)
GLUCOSE BLDC GLUCOMTR-MCNC: 90 MG/DL (ref 70–130)
GLUCOSE SERPL-MCNC: 130 MG/DL (ref 65–99)
GLUCOSE SERPL-MCNC: 151 MG/DL (ref 65–99)
GLUCOSE SERPL-MCNC: 160 MG/DL (ref 65–99)
GLUCOSE SERPL-MCNC: 382 MG/DL (ref 65–99)
GLUCOSE SERPL-MCNC: 496 MG/DL (ref 65–99)
HADV DNA SPEC NAA+PROBE: NOT DETECTED
HCO3 BLDA-SCNC: 24.6 MMOL/L (ref 22–28)
HCO3 BLDA-SCNC: 29.5 MMOL/L (ref 22–28)
HCO3 BLDA-SCNC: 29.6 MMOL/L (ref 22–28)
HCO3 BLDA-SCNC: 30.6 MMOL/L (ref 22–28)
HCOV 229E RNA SPEC QL NAA+PROBE: NOT DETECTED
HCOV HKU1 RNA SPEC QL NAA+PROBE: NOT DETECTED
HCOV NL63 RNA SPEC QL NAA+PROBE: NOT DETECTED
HCOV OC43 RNA SPEC QL NAA+PROBE: NOT DETECTED
HCT VFR BLD AUTO: 33.2 % (ref 34–46.6)
HCT VFR BLD AUTO: 34 % (ref 34–46.6)
HCT VFR BLD AUTO: 35.1 % (ref 34–46.6)
HCT VFR BLD AUTO: 39.6 % (ref 34–46.6)
HEMODILUTION: NO
HGB BLD-MCNC: 10 G/DL (ref 12–15.9)
HGB BLD-MCNC: 10.7 G/DL (ref 12–15.9)
HGB BLD-MCNC: 10.7 G/DL (ref 12–15.9)
HGB BLD-MCNC: 11.6 G/DL (ref 12–15.9)
HMPV RNA NPH QL NAA+NON-PROBE: NOT DETECTED
HPIV1 RNA ISLT QL NAA+PROBE: NOT DETECTED
HPIV2 RNA SPEC QL NAA+PROBE: NOT DETECTED
HPIV3 RNA NPH QL NAA+PROBE: NOT DETECTED
HPIV4 P GENE NPH QL NAA+PROBE: NOT DETECTED
IMM GRANULOCYTES # BLD AUTO: 0.05 10*3/MM3 (ref 0–0.05)
IMM GRANULOCYTES # BLD AUTO: 0.05 10*3/MM3 (ref 0–0.05)
IMM GRANULOCYTES # BLD AUTO: 0.07 10*3/MM3 (ref 0–0.05)
IMM GRANULOCYTES # BLD AUTO: 0.1 10*3/MM3 (ref 0–0.05)
IMM GRANULOCYTES NFR BLD AUTO: 0.4 % (ref 0–0.5)
INHALED O2 CONCENTRATION: 21 %
INHALED O2 CONCENTRATION: 40 %
INSPIRATORY TIME: 1
LEFT ATRIUM VOLUME INDEX: 22 ML/M2
LYMPHOCYTES # BLD AUTO: 0.61 10*3/MM3 (ref 0.7–3.1)
LYMPHOCYTES # BLD AUTO: 0.62 10*3/MM3 (ref 0.7–3.1)
LYMPHOCYTES # BLD AUTO: 1.43 10*3/MM3 (ref 0.7–3.1)
LYMPHOCYTES # BLD AUTO: 2.78 10*3/MM3 (ref 0.7–3.1)
LYMPHOCYTES NFR BLD AUTO: 24.1 % (ref 19.6–45.3)
LYMPHOCYTES NFR BLD AUTO: 3.7 % (ref 19.6–45.3)
LYMPHOCYTES NFR BLD AUTO: 4.8 % (ref 19.6–45.3)
LYMPHOCYTES NFR BLD AUTO: 6.3 % (ref 19.6–45.3)
Lab: ABNORMAL
Lab: ABNORMAL
M PNEUMO IGG SER IA-ACNC: NOT DETECTED
MCH RBC QN AUTO: 25.8 PG (ref 26.6–33)
MCH RBC QN AUTO: 26 PG (ref 26.6–33)
MCH RBC QN AUTO: 26.2 PG (ref 26.6–33)
MCH RBC QN AUTO: 26.8 PG (ref 26.6–33)
MCHC RBC AUTO-ENTMCNC: 29.3 G/DL (ref 31.5–35.7)
MCHC RBC AUTO-ENTMCNC: 30.1 G/DL (ref 31.5–35.7)
MCHC RBC AUTO-ENTMCNC: 30.5 G/DL (ref 31.5–35.7)
MCHC RBC AUTO-ENTMCNC: 31.5 G/DL (ref 31.5–35.7)
MCV RBC AUTO: 85 FL (ref 79–97)
MCV RBC AUTO: 85.8 FL (ref 79–97)
MCV RBC AUTO: 86.5 FL (ref 79–97)
MCV RBC AUTO: 88.2 FL (ref 79–97)
MODALITY: ABNORMAL
MONOCYTES # BLD AUTO: 0.13 10*3/MM3 (ref 0.1–0.9)
MONOCYTES # BLD AUTO: 0.19 10*3/MM3 (ref 0.1–0.9)
MONOCYTES # BLD AUTO: 0.69 10*3/MM3 (ref 0.1–0.9)
MONOCYTES # BLD AUTO: 1.44 10*3/MM3 (ref 0.1–0.9)
MONOCYTES NFR BLD AUTO: 0.8 % (ref 5–12)
MONOCYTES NFR BLD AUTO: 1.5 % (ref 5–12)
MONOCYTES NFR BLD AUTO: 6 % (ref 5–12)
MONOCYTES NFR BLD AUTO: 6.3 % (ref 5–12)
NEUTROPHILS NFR BLD AUTO: 12.15 10*3/MM3 (ref 1.7–7)
NEUTROPHILS NFR BLD AUTO: 15.66 10*3/MM3 (ref 1.7–7)
NEUTROPHILS NFR BLD AUTO: 19.8 10*3/MM3 (ref 1.7–7)
NEUTROPHILS NFR BLD AUTO: 68.4 % (ref 42.7–76)
NEUTROPHILS NFR BLD AUTO: 7.9 10*3/MM3 (ref 1.7–7)
NEUTROPHILS NFR BLD AUTO: 86.8 % (ref 42.7–76)
NEUTROPHILS NFR BLD AUTO: 93.2 % (ref 42.7–76)
NEUTROPHILS NFR BLD AUTO: 94.9 % (ref 42.7–76)
NOTIFIED WHO: ABNORMAL
NOTIFIED WHO: ABNORMAL
NRBC BLD AUTO-RTO: 0 /100 WBC (ref 0–0.2)
NT-PROBNP SERPL-MCNC: 313 PG/ML (ref 0–1800)
O2 A-A PPRESDIFF RESPIRATORY: 0.6 MMHG
O2 A-A PPRESDIFF RESPIRATORY: 0.7 MMHG
PAW @ PEAK INSP FLOW SETTING VENT: 15 CMH2O
PCO2 BLDA: 43.7 MM HG (ref 35–45)
PCO2 BLDA: 46.2 MM HG (ref 35–45)
PCO2 BLDA: 62.5 MM HG (ref 35–45)
PCO2 BLDA: 64.1 MM HG (ref 35–45)
PH BLDA: 7.27 PH UNITS (ref 7.35–7.45)
PH BLDA: 7.3 PH UNITS (ref 7.35–7.45)
PH BLDA: 7.33 PH UNITS (ref 7.35–7.45)
PH BLDA: 7.44 PH UNITS (ref 7.35–7.45)
PLATELET # BLD AUTO: 260 10*3/MM3 (ref 140–450)
PLATELET # BLD AUTO: 273 10*3/MM3 (ref 140–450)
PLATELET # BLD AUTO: 286 10*3/MM3 (ref 140–450)
PLATELET # BLD AUTO: 289 10*3/MM3 (ref 140–450)
PMV BLD AUTO: 11 FL (ref 6–12)
PMV BLD AUTO: 11.3 FL (ref 6–12)
PMV BLD AUTO: 11.5 FL (ref 6–12)
PMV BLD AUTO: 11.6 FL (ref 6–12)
PO2 BLD: 276 MM[HG] (ref 0–500)
PO2 BLD: 391 MM[HG] (ref 0–500)
PO2 BLDA: 118.2 MM HG (ref 80–100)
PO2 BLDA: 156.5 MM HG (ref 80–100)
PO2 BLDA: 277.2 MM HG (ref 80–100)
PO2 BLDA: 57.9 MM HG (ref 80–100)
POTASSIUM SERPL-SCNC: 3.2 MMOL/L (ref 3.5–5.2)
POTASSIUM SERPL-SCNC: 3.4 MMOL/L (ref 3.5–5.2)
POTASSIUM SERPL-SCNC: 3.8 MMOL/L (ref 3.5–5.2)
POTASSIUM SERPL-SCNC: 3.9 MMOL/L (ref 3.5–5.2)
POTASSIUM SERPL-SCNC: 4.2 MMOL/L (ref 3.5–5.2)
POTASSIUM SERPL-SCNC: 5 MMOL/L (ref 3.5–5.2)
PROCALCITONIN SERPL-MCNC: 0.09 NG/ML (ref 0–0.25)
PROT SERPL-MCNC: 6.2 G/DL (ref 6–8.5)
PROT SERPL-MCNC: 6.6 G/DL (ref 6–8.5)
PROT SERPL-MCNC: 6.9 G/DL (ref 6–8.5)
PROT SERPL-MCNC: 7.8 G/DL (ref 6–8.5)
QT INTERVAL: 372 MS
QT INTERVAL: 486 MS
QT INTERVAL: 517 MS
QTC INTERVAL: 455 MS
QTC INTERVAL: 513 MS
QTC INTERVAL: 536 MS
RBC # BLD AUTO: 3.84 10*6/MM3 (ref 3.77–5.28)
RBC # BLD AUTO: 4 10*6/MM3 (ref 3.77–5.28)
RBC # BLD AUTO: 4.09 10*6/MM3 (ref 3.77–5.28)
RBC # BLD AUTO: 4.49 10*6/MM3 (ref 3.77–5.28)
READ BACK: YES
READ BACK: YES
RHINOVIRUS RNA SPEC NAA+PROBE: NOT DETECTED
RSV RNA NPH QL NAA+NON-PROBE: NOT DETECTED
SAO2 % BLDCOA: 90.3 % (ref 92–98.5)
SAO2 % BLDCOA: 98.3 % (ref 92–98.5)
SAO2 % BLDCOA: 99 % (ref 92–98.5)
SAO2 % BLDCOA: 99.8 % (ref 92–98.5)
SARS-COV-2 RNA NPH QL NAA+NON-PROBE: DETECTED
SET MECH RESP RATE: 20
SET MECH RESP RATE: 24
SINUS: 2.9 CM
SODIUM SERPL-SCNC: 137 MMOL/L (ref 136–145)
SODIUM SERPL-SCNC: 137 MMOL/L (ref 136–145)
SODIUM SERPL-SCNC: 141 MMOL/L (ref 136–145)
SODIUM SERPL-SCNC: 143 MMOL/L (ref 136–145)
SODIUM SERPL-SCNC: 145 MMOL/L (ref 136–145)
STJ: 3 CM
TOTAL RATE: 20 BREATHS/MINUTE
TOTAL RATE: 24 BREATHS/MINUTE
TOTAL RATE: 24 BREATHS/MINUTE
TOTAL RATE: 28 BREATHS/MINUTE
TROPONIN T DELTA: -9 NG/L
TROPONIN T DELTA: 203 NG/L
TROPONIN T SERPL HS-MCNC: 205 NG/L
TROPONIN T SERPL HS-MCNC: 232 NG/L
TROPONIN T SERPL HS-MCNC: 36 NG/L
WBC NRBC COR # BLD AUTO: 11.55 10*3/MM3 (ref 3.4–10.8)
WBC NRBC COR # BLD AUTO: 13.02 10*3/MM3 (ref 3.4–10.8)
WBC NRBC COR # BLD AUTO: 16.5 10*3/MM3 (ref 3.4–10.8)
WBC NRBC COR # BLD AUTO: 22.81 10*3/MM3 (ref 3.4–10.8)

## 2024-01-01 PROCEDURE — 94660 CPAP INITIATION&MGMT: CPT

## 2024-01-01 PROCEDURE — 94761 N-INVAS EAR/PLS OXIMETRY MLT: CPT

## 2024-01-01 PROCEDURE — 63710000001 INSULIN GLARGINE PER 5 UNITS: Performed by: INTERNAL MEDICINE

## 2024-01-01 PROCEDURE — 25010000002 REMDESIVIR 100 MG/20ML SOLUTION 1 EACH VIAL: Performed by: INTERNAL MEDICINE

## 2024-01-01 PROCEDURE — 82948 REAGENT STRIP/BLOOD GLUCOSE: CPT

## 2024-01-01 PROCEDURE — 25010000002 MORPHINE PER 10 MG: Performed by: STUDENT IN AN ORGANIZED HEALTH CARE EDUCATION/TRAINING PROGRAM

## 2024-01-01 PROCEDURE — 82803 BLOOD GASES ANY COMBINATION: CPT

## 2024-01-01 PROCEDURE — 25010000002 LORAZEPAM PER 2 MG: Performed by: EMERGENCY MEDICINE

## 2024-01-01 PROCEDURE — 63710000001 INSULIN GLARGINE PER 5 UNITS: Performed by: STUDENT IN AN ORGANIZED HEALTH CARE EDUCATION/TRAINING PROGRAM

## 2024-01-01 PROCEDURE — 85025 COMPLETE CBC W/AUTO DIFF WBC: CPT | Performed by: INTERNAL MEDICINE

## 2024-01-01 PROCEDURE — 94664 DEMO&/EVAL PT USE INHALER: CPT

## 2024-01-01 PROCEDURE — 93010 ELECTROCARDIOGRAM REPORT: CPT | Performed by: INTERNAL MEDICINE

## 2024-01-01 PROCEDURE — 25010000002 ENOXAPARIN PER 10 MG: Performed by: INTERNAL MEDICINE

## 2024-01-01 PROCEDURE — 25010000002 LORAZEPAM PER 2 MG: Performed by: STUDENT IN AN ORGANIZED HEALTH CARE EDUCATION/TRAINING PROGRAM

## 2024-01-01 PROCEDURE — 94799 UNLISTED PULMONARY SVC/PX: CPT

## 2024-01-01 PROCEDURE — 84145 PROCALCITONIN (PCT): CPT | Performed by: EMERGENCY MEDICINE

## 2024-01-01 PROCEDURE — 84484 ASSAY OF TROPONIN QUANT: CPT

## 2024-01-01 PROCEDURE — 25810000003 SODIUM CHLORIDE 0.9 % SOLUTION 250 ML FLEX CONT: Performed by: INTERNAL MEDICINE

## 2024-01-01 PROCEDURE — 36415 COLL VENOUS BLD VENIPUNCTURE: CPT | Performed by: INTERNAL MEDICINE

## 2024-01-01 PROCEDURE — 92610 EVALUATE SWALLOWING FUNCTION: CPT

## 2024-01-01 PROCEDURE — 84484 ASSAY OF TROPONIN QUANT: CPT | Performed by: EMERGENCY MEDICINE

## 2024-01-01 PROCEDURE — 83605 ASSAY OF LACTIC ACID: CPT | Performed by: EMERGENCY MEDICINE

## 2024-01-01 PROCEDURE — 83880 ASSAY OF NATRIURETIC PEPTIDE: CPT | Performed by: EMERGENCY MEDICINE

## 2024-01-01 PROCEDURE — 25010000002 PIPERACILLIN SOD-TAZOBACTAM PER 1 G: Performed by: INTERNAL MEDICINE

## 2024-01-01 PROCEDURE — 36600 WITHDRAWAL OF ARTERIAL BLOOD: CPT

## 2024-01-01 PROCEDURE — 85025 COMPLETE CBC W/AUTO DIFF WBC: CPT | Performed by: EMERGENCY MEDICINE

## 2024-01-01 PROCEDURE — 25010000002 PIPERACILLIN SOD-TAZOBACTAM PER 1 G: Performed by: EMERGENCY MEDICINE

## 2024-01-01 PROCEDURE — 63710000001 INSULIN REGULAR HUMAN PER 5 UNITS: Performed by: INTERNAL MEDICINE

## 2024-01-01 PROCEDURE — 84132 ASSAY OF SERUM POTASSIUM: CPT

## 2024-01-01 PROCEDURE — 25010000002 PIPERACILLIN SOD-TAZOBACTAM PER 1 G: Performed by: STUDENT IN AN ORGANIZED HEALTH CARE EDUCATION/TRAINING PROGRAM

## 2024-01-01 PROCEDURE — 71045 X-RAY EXAM CHEST 1 VIEW: CPT

## 2024-01-01 PROCEDURE — 25010000002 DEXAMETHASONE PER 1 MG: Performed by: INTERNAL MEDICINE

## 2024-01-01 PROCEDURE — 63710000001 INSULIN REGULAR HUMAN PER 5 UNITS: Performed by: EMERGENCY MEDICINE

## 2024-01-01 PROCEDURE — 94760 N-INVAS EAR/PLS OXIMETRY 1: CPT

## 2024-01-01 PROCEDURE — 99291 CRITICAL CARE FIRST HOUR: CPT

## 2024-01-01 PROCEDURE — 99232 SBSQ HOSP IP/OBS MODERATE 35: CPT | Performed by: INTERNAL MEDICINE

## 2024-01-01 PROCEDURE — 80053 COMPREHEN METABOLIC PANEL: CPT | Performed by: INTERNAL MEDICINE

## 2024-01-01 PROCEDURE — 25010000002 METHYLPREDNISOLONE PER 125 MG: Performed by: EMERGENCY MEDICINE

## 2024-01-01 PROCEDURE — 82803 BLOOD GASES ANY COMBINATION: CPT | Performed by: EMERGENCY MEDICINE

## 2024-01-01 PROCEDURE — 93005 ELECTROCARDIOGRAM TRACING: CPT

## 2024-01-01 PROCEDURE — 87040 BLOOD CULTURE FOR BACTERIA: CPT | Performed by: EMERGENCY MEDICINE

## 2024-01-01 PROCEDURE — 93306 TTE W/DOPPLER COMPLETE: CPT | Performed by: INTERNAL MEDICINE

## 2024-01-01 PROCEDURE — 25510000001 PERFLUTREN 6.52 MG/ML SUSPENSION 2 ML VIAL

## 2024-01-01 PROCEDURE — 99222 1ST HOSP IP/OBS MODERATE 55: CPT | Performed by: INTERNAL MEDICINE

## 2024-01-01 PROCEDURE — 36600 WITHDRAWAL OF ARTERIAL BLOOD: CPT | Performed by: EMERGENCY MEDICINE

## 2024-01-01 PROCEDURE — 84484 ASSAY OF TROPONIN QUANT: CPT | Performed by: INTERNAL MEDICINE

## 2024-01-01 PROCEDURE — 94640 AIRWAY INHALATION TREATMENT: CPT

## 2024-01-01 PROCEDURE — XW033E5 INTRODUCTION OF REMDESIVIR ANTI-INFECTIVE INTO PERIPHERAL VEIN, PERCUTANEOUS APPROACH, NEW TECHNOLOGY GROUP 5: ICD-10-PCS | Performed by: INTERNAL MEDICINE

## 2024-01-01 PROCEDURE — 93005 ELECTROCARDIOGRAM TRACING: CPT | Performed by: EMERGENCY MEDICINE

## 2024-01-01 PROCEDURE — 25010000002 POTASSIUM CHLORIDE 10 MEQ/100ML SOLUTION

## 2024-01-01 PROCEDURE — 93306 TTE W/DOPPLER COMPLETE: CPT

## 2024-01-01 PROCEDURE — 36415 COLL VENOUS BLD VENIPUNCTURE: CPT | Performed by: EMERGENCY MEDICINE

## 2024-01-01 PROCEDURE — 80053 COMPREHEN METABOLIC PANEL: CPT | Performed by: EMERGENCY MEDICINE

## 2024-01-01 PROCEDURE — 0202U NFCT DS 22 TRGT SARS-COV-2: CPT | Performed by: EMERGENCY MEDICINE

## 2024-01-01 RX ORDER — SODIUM CHLORIDE 0.9 % (FLUSH) 0.9 %
10 SYRINGE (ML) INJECTION EVERY 12 HOURS SCHEDULED
Status: DISCONTINUED | OUTPATIENT
Start: 2024-01-01 | End: 2024-01-01

## 2024-01-01 RX ORDER — MORPHINE SULFATE 2 MG/ML
2 INJECTION, SOLUTION INTRAMUSCULAR; INTRAVENOUS
Status: DISCONTINUED | OUTPATIENT
Start: 2024-01-01 | End: 2024-01-01 | Stop reason: HOSPADM

## 2024-01-01 RX ORDER — LORAZEPAM 2 MG/ML
0.5 INJECTION INTRAMUSCULAR
Status: CANCELLED | OUTPATIENT
Start: 2024-01-01 | End: 2024-01-01

## 2024-01-01 RX ORDER — NICOTINE POLACRILEX 4 MG
15 LOZENGE BUCCAL
OUTPATIENT
Start: 2024-01-01

## 2024-01-01 RX ORDER — DEXAMETHASONE SODIUM PHOSPHATE 10 MG/ML
6 INJECTION INTRAMUSCULAR; INTRAVENOUS DAILY
Status: DISCONTINUED | OUTPATIENT
Start: 2024-01-01 | End: 2024-01-01

## 2024-01-01 RX ORDER — DIPHENOXYLATE HCL/ATROPINE 2.5-.025MG
1 TABLET ORAL
Status: DISCONTINUED | OUTPATIENT
Start: 2024-01-01 | End: 2024-01-01 | Stop reason: HOSPADM

## 2024-01-01 RX ORDER — ACETAMINOPHEN 325 MG/1
650 TABLET ORAL EVERY 4 HOURS PRN
Status: CANCELLED | OUTPATIENT
Start: 2024-01-01

## 2024-01-01 RX ORDER — SODIUM CHLORIDE 0.9 % (FLUSH) 0.9 %
10 SYRINGE (ML) INJECTION AS NEEDED
Status: DISCONTINUED | OUTPATIENT
Start: 2024-01-01 | End: 2024-01-01 | Stop reason: HOSPADM

## 2024-01-01 RX ORDER — LORAZEPAM 2 MG/ML
1 INJECTION INTRAMUSCULAR
Status: DISCONTINUED | OUTPATIENT
Start: 2024-01-01 | End: 2024-01-01 | Stop reason: HOSPADM

## 2024-01-01 RX ORDER — LORAZEPAM 2 MG/ML
0.5 INJECTION INTRAMUSCULAR
Status: DISCONTINUED | OUTPATIENT
Start: 2024-01-01 | End: 2024-01-01 | Stop reason: HOSPADM

## 2024-01-01 RX ORDER — LORAZEPAM 2 MG/ML
2 INJECTION INTRAMUSCULAR
Status: CANCELLED | OUTPATIENT
Start: 2024-01-01 | End: 2024-01-01

## 2024-01-01 RX ORDER — NICOTINE POLACRILEX 4 MG
15 LOZENGE BUCCAL
Status: DISCONTINUED | OUTPATIENT
Start: 2024-01-01 | End: 2024-01-01 | Stop reason: HOSPADM

## 2024-01-01 RX ORDER — DEXTROSE MONOHYDRATE 25 G/50ML
10-50 INJECTION, SOLUTION INTRAVENOUS
Status: DISCONTINUED | OUTPATIENT
Start: 2024-01-01 | End: 2024-01-01

## 2024-01-01 RX ORDER — ASPIRIN 81 MG/1
81 TABLET, CHEWABLE ORAL DAILY
Status: DISCONTINUED | OUTPATIENT
Start: 2024-01-01 | End: 2024-01-01 | Stop reason: HOSPADM

## 2024-01-01 RX ORDER — BISACODYL 5 MG/1
5 TABLET, DELAYED RELEASE ORAL DAILY PRN
Status: DISCONTINUED | OUTPATIENT
Start: 2024-01-01 | End: 2024-01-01 | Stop reason: HOSPADM

## 2024-01-01 RX ORDER — SODIUM CHLORIDE 9 MG/ML
40 INJECTION, SOLUTION INTRAVENOUS AS NEEDED
Status: DISCONTINUED | OUTPATIENT
Start: 2024-01-01 | End: 2024-01-01

## 2024-01-01 RX ORDER — MORPHINE SULFATE 20 MG/ML
20 SOLUTION ORAL
Status: DISCONTINUED | OUTPATIENT
Start: 2024-01-01 | End: 2024-01-01 | Stop reason: HOSPADM

## 2024-01-01 RX ORDER — BISACODYL 10 MG
10 SUPPOSITORY, RECTAL RECTAL DAILY PRN
Status: DISCONTINUED | OUTPATIENT
Start: 2024-01-01 | End: 2024-01-01 | Stop reason: HOSPADM

## 2024-01-01 RX ORDER — GUAIFENESIN 600 MG/1
1200 TABLET, EXTENDED RELEASE ORAL EVERY 12 HOURS SCHEDULED
Status: DISCONTINUED | OUTPATIENT
Start: 2024-01-01 | End: 2024-01-01 | Stop reason: HOSPADM

## 2024-01-01 RX ORDER — LORAZEPAM 2 MG/ML
1 CONCENTRATE ORAL
Status: CANCELLED | OUTPATIENT
Start: 2024-01-01 | End: 2024-01-01

## 2024-01-01 RX ORDER — ALBUTEROL SULFATE 0.83 MG/ML
2.5 SOLUTION RESPIRATORY (INHALATION) ONCE
Status: COMPLETED | OUTPATIENT
Start: 2024-01-01 | End: 2024-01-01

## 2024-01-01 RX ORDER — MORPHINE SULFATE 10 MG/ML
6 INJECTION INTRAMUSCULAR; INTRAVENOUS; SUBCUTANEOUS
Status: DISCONTINUED | OUTPATIENT
Start: 2024-01-01 | End: 2024-01-01 | Stop reason: HOSPADM

## 2024-01-01 RX ORDER — METHYLPREDNISOLONE SODIUM SUCCINATE 125 MG/2ML
125 INJECTION, POWDER, LYOPHILIZED, FOR SOLUTION INTRAMUSCULAR; INTRAVENOUS ONCE
Status: COMPLETED | OUTPATIENT
Start: 2024-01-01 | End: 2024-01-01

## 2024-01-01 RX ORDER — LORAZEPAM 2 MG/ML
1 INJECTION INTRAMUSCULAR
Status: CANCELLED | OUTPATIENT
Start: 2024-01-01 | End: 2024-01-01

## 2024-01-01 RX ORDER — HYDROMORPHONE HYDROCHLORIDE 1 MG/ML
0.5 INJECTION, SOLUTION INTRAMUSCULAR; INTRAVENOUS; SUBCUTANEOUS
Status: DISCONTINUED | OUTPATIENT
Start: 2024-01-01 | End: 2024-01-01 | Stop reason: HOSPADM

## 2024-01-01 RX ORDER — MORPHINE SULFATE 20 MG/ML
5 SOLUTION ORAL
Status: DISCONTINUED | OUTPATIENT
Start: 2024-01-01 | End: 2024-01-01 | Stop reason: HOSPADM

## 2024-01-01 RX ORDER — POLYETHYLENE GLYCOL 3350 17 G/17G
17 POWDER, FOR SOLUTION ORAL DAILY PRN
Status: DISCONTINUED | OUTPATIENT
Start: 2024-01-01 | End: 2024-01-01 | Stop reason: HOSPADM

## 2024-01-01 RX ORDER — MORPHINE SULFATE 20 MG/ML
5 SOLUTION ORAL
Status: CANCELLED | OUTPATIENT
Start: 2024-01-01 | End: 2024-10-01

## 2024-01-01 RX ORDER — ACETAMINOPHEN 160 MG/5ML
650 SOLUTION ORAL EVERY 4 HOURS PRN
Status: CANCELLED | OUTPATIENT
Start: 2024-01-01

## 2024-01-01 RX ORDER — DEXTROSE MONOHYDRATE 25 G/50ML
25 INJECTION, SOLUTION INTRAVENOUS
Status: DISCONTINUED | OUTPATIENT
Start: 2024-01-01 | End: 2024-01-01 | Stop reason: HOSPADM

## 2024-01-01 RX ORDER — MORPHINE SULFATE 20 MG/ML
10 SOLUTION ORAL
Status: DISCONTINUED | OUTPATIENT
Start: 2024-01-01 | End: 2024-01-01 | Stop reason: HOSPADM

## 2024-01-01 RX ORDER — SODIUM CHLORIDE 0.9 % (FLUSH) 0.9 %
10 SYRINGE (ML) INJECTION EVERY 12 HOURS SCHEDULED
Status: DISCONTINUED | OUTPATIENT
Start: 2024-01-01 | End: 2024-01-01 | Stop reason: HOSPADM

## 2024-01-01 RX ORDER — LORAZEPAM 2 MG/ML
0.5 CONCENTRATE ORAL
Status: DISCONTINUED | OUTPATIENT
Start: 2024-01-01 | End: 2024-01-01 | Stop reason: HOSPADM

## 2024-01-01 RX ORDER — LORAZEPAM 2 MG/ML
2 INJECTION INTRAMUSCULAR
Status: DISCONTINUED | OUTPATIENT
Start: 2024-01-01 | End: 2024-01-01 | Stop reason: HOSPADM

## 2024-01-01 RX ORDER — LORAZEPAM 2 MG/ML
0.5 INJECTION INTRAMUSCULAR ONCE
Status: COMPLETED | OUTPATIENT
Start: 2024-01-01 | End: 2024-01-01

## 2024-01-01 RX ORDER — MORPHINE SULFATE 4 MG/ML
4 INJECTION, SOLUTION INTRAMUSCULAR; INTRAVENOUS
Status: DISCONTINUED | OUTPATIENT
Start: 2024-01-01 | End: 2024-01-01 | Stop reason: HOSPADM

## 2024-01-01 RX ORDER — ACETAMINOPHEN 650 MG/1
650 SUPPOSITORY RECTAL EVERY 4 HOURS PRN
Status: DISCONTINUED | OUTPATIENT
Start: 2024-01-01 | End: 2024-01-01 | Stop reason: HOSPADM

## 2024-01-01 RX ORDER — AMOXICILLIN 250 MG
2 CAPSULE ORAL 2 TIMES DAILY
Status: DISCONTINUED | OUTPATIENT
Start: 2024-01-01 | End: 2024-01-01 | Stop reason: HOSPADM

## 2024-01-01 RX ORDER — KETOROLAC TROMETHAMINE 15 MG/ML
15 INJECTION, SOLUTION INTRAMUSCULAR; INTRAVENOUS EVERY 6 HOURS PRN
Status: CANCELLED | OUTPATIENT
Start: 2024-01-01 | End: 2024-01-01

## 2024-01-01 RX ORDER — NICOTINE POLACRILEX 4 MG
15 LOZENGE BUCCAL
Status: DISCONTINUED | OUTPATIENT
Start: 2024-01-01 | End: 2024-01-01

## 2024-01-01 RX ORDER — ACETAMINOPHEN 160 MG/5ML
650 SOLUTION ORAL EVERY 4 HOURS PRN
Status: DISCONTINUED | OUTPATIENT
Start: 2024-01-01 | End: 2024-01-01 | Stop reason: HOSPADM

## 2024-01-01 RX ORDER — DIPHENOXYLATE HCL/ATROPINE 2.5-.025MG
1 TABLET ORAL
Status: CANCELLED | OUTPATIENT
Start: 2024-01-01

## 2024-01-01 RX ORDER — MORPHINE SULFATE 10 MG/ML
6 INJECTION INTRAMUSCULAR; INTRAVENOUS; SUBCUTANEOUS
Status: CANCELLED | OUTPATIENT
Start: 2024-01-01 | End: 2024-10-01

## 2024-01-01 RX ORDER — LORAZEPAM 2 MG/ML
1 CONCENTRATE ORAL
Status: DISCONTINUED | OUTPATIENT
Start: 2024-01-01 | End: 2024-01-01 | Stop reason: HOSPADM

## 2024-01-01 RX ORDER — IBUPROFEN 600 MG/1
1 TABLET ORAL
OUTPATIENT
Start: 2024-01-01

## 2024-01-01 RX ORDER — HYDROMORPHONE HYDROCHLORIDE 1 MG/ML
0.5 INJECTION, SOLUTION INTRAMUSCULAR; INTRAVENOUS; SUBCUTANEOUS
Status: CANCELLED | OUTPATIENT
Start: 2024-01-01 | End: 2024-10-01

## 2024-01-01 RX ORDER — LORAZEPAM 2 MG/ML
2 INJECTION INTRAMUSCULAR
Status: DISPENSED | OUTPATIENT
Start: 2024-01-01 | End: 2024-01-01

## 2024-01-01 RX ORDER — MORPHINE SULFATE 20 MG/ML
10 SOLUTION ORAL
Status: CANCELLED | OUTPATIENT
Start: 2024-01-01 | End: 2024-10-01

## 2024-01-01 RX ORDER — MORPHINE SULFATE 20 MG/ML
20 SOLUTION ORAL
Status: CANCELLED | OUTPATIENT
Start: 2024-01-01 | End: 2024-10-01

## 2024-01-01 RX ORDER — LORAZEPAM 2 MG/ML
0.5 CONCENTRATE ORAL
Status: CANCELLED | OUTPATIENT
Start: 2024-01-01 | End: 2024-01-01

## 2024-01-01 RX ORDER — ACETAMINOPHEN 325 MG/1
650 TABLET ORAL EVERY 4 HOURS PRN
Status: DISCONTINUED | OUTPATIENT
Start: 2024-01-01 | End: 2024-01-01 | Stop reason: HOSPADM

## 2024-01-01 RX ORDER — IPRATROPIUM BROMIDE AND ALBUTEROL SULFATE 2.5; .5 MG/3ML; MG/3ML
3 SOLUTION RESPIRATORY (INHALATION)
Status: DISCONTINUED | OUTPATIENT
Start: 2024-01-01 | End: 2024-01-01

## 2024-01-01 RX ORDER — IBUPROFEN 600 MG/1
1 TABLET ORAL
Status: DISCONTINUED | OUTPATIENT
Start: 2024-01-01 | End: 2024-01-01

## 2024-01-01 RX ORDER — LORAZEPAM 2 MG/ML
1 INJECTION INTRAMUSCULAR
Status: ACTIVE | OUTPATIENT
Start: 2024-01-01 | End: 2024-01-01

## 2024-01-01 RX ORDER — SODIUM CHLORIDE 9 MG/ML
40 INJECTION, SOLUTION INTRAVENOUS AS NEEDED
OUTPATIENT
Start: 2024-01-01

## 2024-01-01 RX ORDER — SODIUM CHLORIDE 9 MG/ML
40 INJECTION, SOLUTION INTRAVENOUS AS NEEDED
Status: DISCONTINUED | OUTPATIENT
Start: 2024-01-01 | End: 2024-01-01 | Stop reason: HOSPADM

## 2024-01-01 RX ORDER — MORPHINE SULFATE 2 MG/ML
2 INJECTION, SOLUTION INTRAMUSCULAR; INTRAVENOUS
Status: CANCELLED | OUTPATIENT
Start: 2024-01-01 | End: 2024-10-01

## 2024-01-01 RX ORDER — ENOXAPARIN SODIUM 100 MG/ML
40 INJECTION SUBCUTANEOUS DAILY
Status: DISCONTINUED | OUTPATIENT
Start: 2024-01-01 | End: 2024-01-01

## 2024-01-01 RX ORDER — ASPIRIN 300 MG/1
300 SUPPOSITORY RECTAL DAILY
Status: DISCONTINUED | OUTPATIENT
Start: 2024-01-01 | End: 2024-01-01

## 2024-01-01 RX ORDER — SODIUM CHLORIDE 0.9 % (FLUSH) 0.9 %
10 SYRINGE (ML) INJECTION AS NEEDED
Status: DISCONTINUED | OUTPATIENT
Start: 2024-01-01 | End: 2024-01-01

## 2024-01-01 RX ORDER — NITROGLYCERIN 0.4 MG/1
0.4 TABLET SUBLINGUAL
Status: DISCONTINUED | OUTPATIENT
Start: 2024-01-01 | End: 2024-01-01 | Stop reason: HOSPADM

## 2024-01-01 RX ORDER — POTASSIUM CHLORIDE 7.45 MG/ML
10 INJECTION INTRAVENOUS
Status: COMPLETED | OUTPATIENT
Start: 2024-01-01 | End: 2024-01-01

## 2024-01-01 RX ORDER — KETOROLAC TROMETHAMINE 15 MG/ML
15 INJECTION, SOLUTION INTRAMUSCULAR; INTRAVENOUS EVERY 6 HOURS PRN
Status: DISCONTINUED | OUTPATIENT
Start: 2024-01-01 | End: 2024-01-01 | Stop reason: HOSPADM

## 2024-01-01 RX ORDER — LORAZEPAM 2 MG/ML
2 CONCENTRATE ORAL
Status: ACTIVE | OUTPATIENT
Start: 2024-01-01 | End: 2024-01-01

## 2024-01-01 RX ORDER — NITROGLYCERIN 0.4 MG/1
0.4 TABLET SUBLINGUAL
OUTPATIENT
Start: 2024-01-01

## 2024-01-01 RX ORDER — SODIUM CHLORIDE 0.9 % (FLUSH) 0.9 %
10 SYRINGE (ML) INJECTION EVERY 12 HOURS SCHEDULED
OUTPATIENT
Start: 2024-01-01

## 2024-01-01 RX ORDER — ACETAMINOPHEN 650 MG/1
650 SUPPOSITORY RECTAL EVERY 4 HOURS PRN
Status: CANCELLED | OUTPATIENT
Start: 2024-01-01

## 2024-01-01 RX ORDER — IBUPROFEN 600 MG/1
1 TABLET ORAL
Status: DISCONTINUED | OUTPATIENT
Start: 2024-01-01 | End: 2024-01-01 | Stop reason: HOSPADM

## 2024-01-01 RX ORDER — LORAZEPAM 2 MG/ML
0.5 INJECTION INTRAMUSCULAR
Status: ACTIVE | OUTPATIENT
Start: 2024-01-01 | End: 2024-01-01

## 2024-01-01 RX ORDER — DEXTROSE MONOHYDRATE 25 G/50ML
25 INJECTION, SOLUTION INTRAVENOUS
OUTPATIENT
Start: 2024-01-01

## 2024-01-01 RX ORDER — DEXAMETHASONE SODIUM PHOSPHATE 10 MG/ML
6 INJECTION INTRAMUSCULAR; INTRAVENOUS DAILY
Status: DISCONTINUED | OUTPATIENT
Start: 2024-01-01 | End: 2024-01-01 | Stop reason: SDUPTHER

## 2024-01-01 RX ORDER — LORAZEPAM 2 MG/ML
1 CONCENTRATE ORAL
Status: ACTIVE | OUTPATIENT
Start: 2024-01-01 | End: 2024-01-01

## 2024-01-01 RX ORDER — DEXAMETHASONE 4 MG/1
6 TABLET ORAL DAILY
Status: DISCONTINUED | OUTPATIENT
Start: 2024-01-01 | End: 2024-01-01

## 2024-01-01 RX ORDER — LORAZEPAM 2 MG/ML
0.5 CONCENTRATE ORAL
Status: ACTIVE | OUTPATIENT
Start: 2024-01-01 | End: 2024-01-01

## 2024-01-01 RX ORDER — LORAZEPAM 2 MG/ML
2 CONCENTRATE ORAL
Status: CANCELLED | OUTPATIENT
Start: 2024-01-01 | End: 2024-01-01

## 2024-01-01 RX ORDER — LORAZEPAM 2 MG/ML
2 CONCENTRATE ORAL
Status: DISCONTINUED | OUTPATIENT
Start: 2024-01-01 | End: 2024-01-01 | Stop reason: HOSPADM

## 2024-01-01 RX ORDER — MORPHINE SULFATE 4 MG/ML
4 INJECTION, SOLUTION INTRAMUSCULAR; INTRAVENOUS
Status: CANCELLED | OUTPATIENT
Start: 2024-01-01 | End: 2024-10-01

## 2024-01-01 RX ORDER — KETOROLAC TROMETHAMINE 15 MG/ML
15 INJECTION, SOLUTION INTRAMUSCULAR; INTRAVENOUS EVERY 6 HOURS PRN
Status: ACTIVE | OUTPATIENT
Start: 2024-01-01 | End: 2024-01-01

## 2024-01-01 RX ORDER — SODIUM CHLORIDE 0.9 % (FLUSH) 0.9 %
10 SYRINGE (ML) INJECTION AS NEEDED
Status: CANCELLED | OUTPATIENT
Start: 2024-01-01

## 2024-01-01 RX ADMIN — POTASSIUM CHLORIDE 10 MEQ: 7.46 INJECTION, SOLUTION INTRAVENOUS at 06:10

## 2024-01-01 RX ADMIN — Medication 10 ML: at 20:15

## 2024-01-01 RX ADMIN — PIPERACILLIN AND TAZOBACTAM 3.38 G: 3; .375 INJECTION, POWDER, FOR SOLUTION INTRAVENOUS at 05:06

## 2024-01-01 RX ADMIN — LORAZEPAM 2 MG: 2 INJECTION INTRAMUSCULAR; INTRAVENOUS at 00:49

## 2024-01-01 RX ADMIN — IPRATROPIUM BROMIDE AND ALBUTEROL SULFATE 3 ML: .5; 3 SOLUTION RESPIRATORY (INHALATION) at 15:40

## 2024-01-01 RX ADMIN — PIPERACILLIN AND TAZOBACTAM 3.38 G: 3; .375 INJECTION, POWDER, FOR SOLUTION INTRAVENOUS at 13:37

## 2024-01-01 RX ADMIN — POTASSIUM CHLORIDE 10 MEQ: 7.46 INJECTION, SOLUTION INTRAVENOUS at 04:03

## 2024-01-01 RX ADMIN — Medication 10 ML: at 20:59

## 2024-01-01 RX ADMIN — PIPERACILLIN AND TAZOBACTAM 3.38 G: 3; .375 INJECTION, POWDER, FOR SOLUTION INTRAVENOUS at 05:22

## 2024-01-01 RX ADMIN — MORPHINE SULFATE 4 MG: 4 INJECTION, SOLUTION INTRAMUSCULAR; INTRAVENOUS at 12:19

## 2024-01-01 RX ADMIN — POTASSIUM CHLORIDE 10 MEQ: 7.46 INJECTION, SOLUTION INTRAVENOUS at 05:06

## 2024-01-01 RX ADMIN — IPRATROPIUM BROMIDE AND ALBUTEROL SULFATE 3 ML: .5; 3 SOLUTION RESPIRATORY (INHALATION) at 11:35

## 2024-01-01 RX ADMIN — LORAZEPAM 2 MG: 2 INJECTION INTRAMUSCULAR; INTRAVENOUS at 09:04

## 2024-01-01 RX ADMIN — REMDESIVIR 100 MG: 100 INJECTION, POWDER, LYOPHILIZED, FOR SOLUTION INTRAVENOUS at 01:39

## 2024-01-01 RX ADMIN — LORAZEPAM 1 MG: 2 INJECTION INTRAMUSCULAR; INTRAVENOUS at 00:25

## 2024-01-01 RX ADMIN — LORAZEPAM 2 MG: 2 INJECTION INTRAMUSCULAR; INTRAVENOUS at 04:47

## 2024-01-01 RX ADMIN — INSULIN HUMAN 4 UNITS: 100 INJECTION, SOLUTION PARENTERAL at 00:39

## 2024-01-01 RX ADMIN — LORAZEPAM 2 MG: 2 INJECTION INTRAMUSCULAR; INTRAVENOUS at 17:45

## 2024-01-01 RX ADMIN — Medication 10 ML: at 09:33

## 2024-01-01 RX ADMIN — INSULIN GLARGINE 5 UNITS: 100 INJECTION, SOLUTION SUBCUTANEOUS at 10:12

## 2024-01-01 RX ADMIN — MORPHINE SULFATE 4 MG: 4 INJECTION, SOLUTION INTRAMUSCULAR; INTRAVENOUS at 05:20

## 2024-01-01 RX ADMIN — LORAZEPAM 2 MG: 2 INJECTION INTRAMUSCULAR; INTRAVENOUS at 00:25

## 2024-01-01 RX ADMIN — LORAZEPAM 1 MG: 2 INJECTION INTRAMUSCULAR; INTRAVENOUS at 08:59

## 2024-01-01 RX ADMIN — LORAZEPAM 2 MG: 2 INJECTION INTRAMUSCULAR; INTRAVENOUS at 20:53

## 2024-01-01 RX ADMIN — DEXAMETHASONE SODIUM PHOSPHATE 6 MG: 10 INJECTION INTRAMUSCULAR; INTRAVENOUS at 07:55

## 2024-01-01 RX ADMIN — MORPHINE SULFATE 4 MG: 4 INJECTION, SOLUTION INTRAMUSCULAR; INTRAVENOUS at 08:46

## 2024-01-01 RX ADMIN — LORAZEPAM 1 MG: 2 INJECTION INTRAMUSCULAR; INTRAVENOUS at 05:05

## 2024-01-01 RX ADMIN — MORPHINE SULFATE 10 MG: 20 SOLUTION ORAL at 01:31

## 2024-01-01 RX ADMIN — MORPHINE SULFATE 4 MG: 4 INJECTION, SOLUTION INTRAMUSCULAR; INTRAVENOUS at 01:24

## 2024-01-01 RX ADMIN — MORPHINE SULFATE 4 MG: 4 INJECTION, SOLUTION INTRAMUSCULAR; INTRAVENOUS at 08:59

## 2024-01-01 RX ADMIN — Medication 10 ML: at 10:14

## 2024-01-01 RX ADMIN — LORAZEPAM 2 MG: 2 INJECTION INTRAMUSCULAR; INTRAVENOUS at 13:11

## 2024-01-01 RX ADMIN — Medication 10 ML: at 09:40

## 2024-01-01 RX ADMIN — IPRATROPIUM BROMIDE AND ALBUTEROL SULFATE 3 ML: .5; 3 SOLUTION RESPIRATORY (INHALATION) at 12:32

## 2024-01-01 RX ADMIN — MORPHINE SULFATE 4 MG: 4 INJECTION, SOLUTION INTRAMUSCULAR; INTRAVENOUS at 13:36

## 2024-01-01 RX ADMIN — REMDESIVIR 100 MG: 100 INJECTION, POWDER, LYOPHILIZED, FOR SOLUTION INTRAVENOUS at 01:00

## 2024-01-01 RX ADMIN — ENOXAPARIN SODIUM 40 MG: 100 INJECTION SUBCUTANEOUS at 09:32

## 2024-01-01 RX ADMIN — DEXAMETHASONE SODIUM PHOSPHATE 6 MG: 10 INJECTION INTRAMUSCULAR; INTRAVENOUS at 09:32

## 2024-01-01 RX ADMIN — LORAZEPAM 2 MG: 2 INJECTION INTRAMUSCULAR; INTRAVENOUS at 16:49

## 2024-01-01 RX ADMIN — PIPERACILLIN AND TAZOBACTAM 3.38 G: 3; .375 INJECTION, POWDER, LYOPHILIZED, FOR SOLUTION INTRAVENOUS at 22:52

## 2024-01-01 RX ADMIN — INSULIN HUMAN 7.2 UNITS/HR: 1 INJECTION, SOLUTION INTRAVENOUS at 00:40

## 2024-01-01 RX ADMIN — PIPERACILLIN AND TAZOBACTAM 3.38 G: 3; .375 INJECTION, POWDER, FOR SOLUTION INTRAVENOUS at 14:45

## 2024-01-01 RX ADMIN — MORPHINE SULFATE 2 MG: 2 INJECTION, SOLUTION INTRAMUSCULAR; INTRAVENOUS at 09:56

## 2024-01-01 RX ADMIN — PIPERACILLIN AND TAZOBACTAM 3.38 G: 3; .375 INJECTION, POWDER, FOR SOLUTION INTRAVENOUS at 15:05

## 2024-01-01 RX ADMIN — MORPHINE SULFATE 4 MG: 4 INJECTION, SOLUTION INTRAMUSCULAR; INTRAVENOUS at 17:45

## 2024-01-01 RX ADMIN — Medication 10 ML: at 08:10

## 2024-01-01 RX ADMIN — LORAZEPAM 2 MG: 2 INJECTION INTRAMUSCULAR; INTRAVENOUS at 05:02

## 2024-01-01 RX ADMIN — PIPERACILLIN AND TAZOBACTAM 3.38 G: 3; .375 INJECTION, POWDER, FOR SOLUTION INTRAVENOUS at 21:45

## 2024-01-01 RX ADMIN — PIPERACILLIN AND TAZOBACTAM 3.38 G: 3; .375 INJECTION, POWDER, FOR SOLUTION INTRAVENOUS at 20:59

## 2024-01-01 RX ADMIN — PIPERACILLIN AND TAZOBACTAM 3.38 G: 3; .375 INJECTION, POWDER, FOR SOLUTION INTRAVENOUS at 13:35

## 2024-01-01 RX ADMIN — PIPERACILLIN AND TAZOBACTAM 3.38 G: 3; .375 INJECTION, POWDER, FOR SOLUTION INTRAVENOUS at 05:11

## 2024-01-01 RX ADMIN — IPRATROPIUM BROMIDE AND ALBUTEROL SULFATE 3 ML: .5; 3 SOLUTION RESPIRATORY (INHALATION) at 08:13

## 2024-01-01 RX ADMIN — LORAZEPAM 2 MG: 2 INJECTION INTRAMUSCULAR; INTRAVENOUS at 21:20

## 2024-01-01 RX ADMIN — LORAZEPAM 2 MG: 2 INJECTION INTRAMUSCULAR; INTRAVENOUS at 08:46

## 2024-01-01 RX ADMIN — POTASSIUM CHLORIDE 10 MEQ: 7.46 INJECTION, SOLUTION INTRAVENOUS at 07:10

## 2024-01-01 RX ADMIN — INSULIN HUMAN 2 UNITS: 100 INJECTION, SOLUTION PARENTERAL at 12:17

## 2024-01-01 RX ADMIN — METHYLPREDNISOLONE SODIUM SUCCINATE 125 MG: 125 INJECTION INTRAMUSCULAR; INTRAVENOUS at 20:17

## 2024-01-01 RX ADMIN — IPRATROPIUM BROMIDE AND ALBUTEROL SULFATE 3 ML: .5; 3 SOLUTION RESPIRATORY (INHALATION) at 20:07

## 2024-01-01 RX ADMIN — MORPHINE SULFATE 4 MG: 4 INJECTION, SOLUTION INTRAMUSCULAR; INTRAVENOUS at 05:02

## 2024-01-01 RX ADMIN — IPRATROPIUM BROMIDE AND ALBUTEROL SULFATE 3 ML: .5; 3 SOLUTION RESPIRATORY (INHALATION) at 07:03

## 2024-01-01 RX ADMIN — DEXAMETHASONE SODIUM PHOSPHATE 6 MG: 10 INJECTION INTRAMUSCULAR; INTRAVENOUS at 10:12

## 2024-01-01 RX ADMIN — REMDESIVIR 200 MG: 100 INJECTION, POWDER, LYOPHILIZED, FOR SOLUTION INTRAVENOUS at 02:54

## 2024-01-01 RX ADMIN — INSULIN HUMAN 4 UNITS: 100 INJECTION, SOLUTION PARENTERAL at 18:09

## 2024-01-01 RX ADMIN — LORAZEPAM 1 MG: 2 INJECTION INTRAMUSCULAR; INTRAVENOUS at 13:36

## 2024-01-01 RX ADMIN — LORAZEPAM 2 MG: 2 INJECTION INTRAMUSCULAR; INTRAVENOUS at 21:00

## 2024-01-01 RX ADMIN — IPRATROPIUM BROMIDE AND ALBUTEROL SULFATE 3 ML: .5; 3 SOLUTION RESPIRATORY (INHALATION) at 22:05

## 2024-01-01 RX ADMIN — INSULIN HUMAN 10 UNITS: 100 INJECTION, SOLUTION PARENTERAL at 22:53

## 2024-01-01 RX ADMIN — IPRATROPIUM BROMIDE AND ALBUTEROL SULFATE 3 ML: .5; 3 SOLUTION RESPIRATORY (INHALATION) at 15:35

## 2024-01-01 RX ADMIN — INSULIN GLARGINE 10 UNITS: 100 INJECTION, SOLUTION SUBCUTANEOUS at 12:20

## 2024-01-01 RX ADMIN — Medication 10 ML: at 21:11

## 2024-01-01 RX ADMIN — ASPIRIN 81 MG: 81 TABLET, CHEWABLE ORAL at 08:09

## 2024-01-01 RX ADMIN — PIPERACILLIN AND TAZOBACTAM 3.38 G: 3; .375 INJECTION, POWDER, FOR SOLUTION INTRAVENOUS at 05:53

## 2024-01-01 RX ADMIN — LORAZEPAM 2 MG: 2 INJECTION INTRAMUSCULAR; INTRAVENOUS at 17:08

## 2024-01-01 RX ADMIN — MORPHINE SULFATE 2 MG: 2 INJECTION, SOLUTION INTRAMUSCULAR; INTRAVENOUS at 10:13

## 2024-01-01 RX ADMIN — Medication 10 ML: at 00:42

## 2024-01-01 RX ADMIN — MORPHINE SULFATE 4 MG: 4 INJECTION, SOLUTION INTRAMUSCULAR; INTRAVENOUS at 17:08

## 2024-01-01 RX ADMIN — PIPERACILLIN AND TAZOBACTAM 3.38 G: 3; .375 INJECTION, POWDER, FOR SOLUTION INTRAVENOUS at 21:41

## 2024-01-01 RX ADMIN — MORPHINE SULFATE 4 MG: 4 INJECTION, SOLUTION INTRAMUSCULAR; INTRAVENOUS at 17:14

## 2024-01-01 RX ADMIN — MORPHINE SULFATE 10 MG: 20 SOLUTION ORAL at 05:52

## 2024-01-01 RX ADMIN — LORAZEPAM 2 MG: 2 INJECTION INTRAMUSCULAR; INTRAVENOUS at 12:43

## 2024-01-01 RX ADMIN — MORPHINE SULFATE 4 MG: 4 INJECTION, SOLUTION INTRAMUSCULAR; INTRAVENOUS at 00:48

## 2024-01-01 RX ADMIN — MORPHINE SULFATE 4 MG: 4 INJECTION, SOLUTION INTRAMUSCULAR; INTRAVENOUS at 12:44

## 2024-01-01 RX ADMIN — ASPIRIN 300 MG: 300 SUPPOSITORY RECTAL at 12:20

## 2024-01-01 RX ADMIN — Medication 10 ML: at 21:46

## 2024-01-01 RX ADMIN — MORPHINE SULFATE 2 MG: 2 INJECTION, SOLUTION INTRAMUSCULAR; INTRAVENOUS at 21:45

## 2024-01-01 RX ADMIN — MORPHINE SULFATE 4 MG: 4 INJECTION, SOLUTION INTRAMUSCULAR; INTRAVENOUS at 16:56

## 2024-01-01 RX ADMIN — LORAZEPAM 0.5 MG: 2 INJECTION INTRAMUSCULAR; INTRAVENOUS at 09:56

## 2024-01-01 RX ADMIN — LORAZEPAM 1 MG: 2 SOLUTION, CONCENTRATE ORAL at 01:31

## 2024-01-01 RX ADMIN — LORAZEPAM 1 MG: 2 INJECTION INTRAMUSCULAR; INTRAVENOUS at 17:14

## 2024-01-01 RX ADMIN — LORAZEPAM 0.5 MG: 2 INJECTION INTRAMUSCULAR; INTRAVENOUS at 20:50

## 2024-01-01 RX ADMIN — PIPERACILLIN AND TAZOBACTAM 3.38 G: 3; .375 INJECTION, POWDER, FOR SOLUTION INTRAVENOUS at 20:44

## 2024-01-01 RX ADMIN — REMDESIVIR 100 MG: 100 INJECTION, POWDER, LYOPHILIZED, FOR SOLUTION INTRAVENOUS at 01:33

## 2024-01-01 RX ADMIN — INSULIN GLARGINE 10 UNITS: 100 INJECTION, SOLUTION SUBCUTANEOUS at 08:10

## 2024-01-01 RX ADMIN — LORAZEPAM 0.5 MG: 2 INJECTION INTRAMUSCULAR; INTRAVENOUS at 10:13

## 2024-01-01 RX ADMIN — Medication 10 ML: at 09:57

## 2024-01-01 RX ADMIN — LORAZEPAM 2 MG: 2 INJECTION INTRAMUSCULAR; INTRAVENOUS at 01:24

## 2024-01-01 RX ADMIN — MORPHINE SULFATE 4 MG: 4 INJECTION, SOLUTION INTRAMUSCULAR; INTRAVENOUS at 20:52

## 2024-01-01 RX ADMIN — INSULIN HUMAN 3 UNITS: 100 INJECTION, SOLUTION PARENTERAL at 17:24

## 2024-01-01 RX ADMIN — LORAZEPAM 1 MG: 2 INJECTION INTRAMUSCULAR; INTRAVENOUS at 20:14

## 2024-01-01 RX ADMIN — MORPHINE SULFATE 4 MG: 4 INJECTION, SOLUTION INTRAMUSCULAR; INTRAVENOUS at 05:05

## 2024-01-01 RX ADMIN — ENOXAPARIN SODIUM 40 MG: 100 INJECTION SUBCUTANEOUS at 08:09

## 2024-01-01 RX ADMIN — LORAZEPAM 2 MG: 2 INJECTION INTRAMUSCULAR; INTRAVENOUS at 12:19

## 2024-01-01 RX ADMIN — PIPERACILLIN AND TAZOBACTAM 3.38 G: 3; .375 INJECTION, POWDER, FOR SOLUTION INTRAVENOUS at 05:52

## 2024-01-01 RX ADMIN — MORPHINE SULFATE 4 MG: 4 INJECTION, SOLUTION INTRAMUSCULAR; INTRAVENOUS at 09:04

## 2024-01-01 RX ADMIN — PERFLUTREN 2 ML: 6.52 INJECTION, SUSPENSION INTRAVENOUS at 11:36

## 2024-01-01 RX ADMIN — MORPHINE SULFATE 4 MG: 4 INJECTION, SOLUTION INTRAMUSCULAR; INTRAVENOUS at 20:14

## 2024-01-01 RX ADMIN — MORPHINE SULFATE 4 MG: 4 INJECTION, SOLUTION INTRAMUSCULAR; INTRAVENOUS at 00:26

## 2024-01-01 RX ADMIN — MORPHINE SULFATE 4 MG: 4 INJECTION, SOLUTION INTRAMUSCULAR; INTRAVENOUS at 13:11

## 2024-01-01 RX ADMIN — MORPHINE SULFATE 4 MG: 4 INJECTION, SOLUTION INTRAMUSCULAR; INTRAVENOUS at 21:58

## 2024-01-01 RX ADMIN — MORPHINE SULFATE 4 MG: 4 INJECTION, SOLUTION INTRAMUSCULAR; INTRAVENOUS at 00:25

## 2024-01-01 RX ADMIN — LORAZEPAM 2 MG: 2 INJECTION INTRAMUSCULAR; INTRAVENOUS at 05:19

## 2024-01-01 RX ADMIN — MORPHINE SULFATE 4 MG: 4 INJECTION, SOLUTION INTRAMUSCULAR; INTRAVENOUS at 04:48

## 2024-01-01 RX ADMIN — ALBUTEROL SULFATE 2.5 MG: 2.5 SOLUTION RESPIRATORY (INHALATION) at 20:00

## 2024-01-01 RX ADMIN — Medication 10 ML: at 00:41

## 2024-01-01 RX ADMIN — LORAZEPAM 0.5 MG: 2 INJECTION INTRAMUSCULAR; INTRAVENOUS at 16:42

## 2024-01-01 RX ADMIN — MORPHINE SULFATE 4 MG: 4 INJECTION, SOLUTION INTRAMUSCULAR; INTRAVENOUS at 21:20

## 2024-01-10 ENCOUNTER — TELEPHONE (OUTPATIENT)
Dept: ENDOCRINOLOGY | Age: 84
End: 2024-01-10

## 2024-01-10 NOTE — TELEPHONE ENCOUNTER
Caller: AMBER MILLER    Relationship to patient: Emergency Contact    Best call back number: [phone]    Patient is needing: WANTS TO SCHEDULE MOTHER AN APPT FOR LABS FOR APPT ON 2.13

## 2024-01-28 ENCOUNTER — HOSPITAL ENCOUNTER (INPATIENT)
Facility: HOSPITAL | Age: 84
LOS: 7 days | Discharge: HOME HEALTH | DRG: 617 | End: 2024-02-04
Attending: EMERGENCY MEDICINE | Admitting: PODIATRIST
Payer: MEDICARE

## 2024-01-28 ENCOUNTER — APPOINTMENT (OUTPATIENT)
Dept: GENERAL RADIOLOGY | Facility: HOSPITAL | Age: 84
DRG: 617 | End: 2024-01-28
Payer: MEDICARE

## 2024-01-28 DIAGNOSIS — M86.9 TOE OSTEOMYELITIS, LEFT: ICD-10-CM

## 2024-01-28 DIAGNOSIS — L08.9 DIABETIC FOOT INFECTION: Primary | ICD-10-CM

## 2024-01-28 DIAGNOSIS — E11.628 DIABETIC FOOT INFECTION: Primary | ICD-10-CM

## 2024-01-28 DIAGNOSIS — M86.9 OSTEOMYELITIS OF GREAT TOE: ICD-10-CM

## 2024-01-28 DIAGNOSIS — E11.65 UNCONTROLLED TYPE 2 DIABETES MELLITUS WITH HYPERGLYCEMIA: ICD-10-CM

## 2024-01-28 LAB
ALBUMIN SERPL-MCNC: 3.7 G/DL (ref 3.5–5.2)
ALBUMIN/GLOB SERPL: 1.1 G/DL
ALP SERPL-CCNC: 148 U/L (ref 39–117)
ALT SERPL W P-5'-P-CCNC: 8 U/L (ref 1–33)
ANION GAP SERPL CALCULATED.3IONS-SCNC: 11 MMOL/L (ref 5–15)
AST SERPL-CCNC: 9 U/L (ref 1–32)
BASOPHILS # BLD AUTO: 0.05 10*3/MM3 (ref 0–0.2)
BASOPHILS NFR BLD AUTO: 0.5 % (ref 0–1.5)
BILIRUB SERPL-MCNC: 0.2 MG/DL (ref 0–1.2)
BUN SERPL-MCNC: 18 MG/DL (ref 8–23)
BUN/CREAT SERPL: 25.4 (ref 7–25)
CALCIUM SPEC-SCNC: 9.6 MG/DL (ref 8.6–10.5)
CHLORIDE SERPL-SCNC: 100 MMOL/L (ref 98–107)
CO2 SERPL-SCNC: 25 MMOL/L (ref 22–29)
CREAT SERPL-MCNC: 0.71 MG/DL (ref 0.57–1)
D-LACTATE SERPL-SCNC: 1.3 MMOL/L (ref 0.5–2)
DEPRECATED RDW RBC AUTO: 42.2 FL (ref 37–54)
EGFRCR SERPLBLD CKD-EPI 2021: 84 ML/MIN/1.73
EOSINOPHIL # BLD AUTO: 0.05 10*3/MM3 (ref 0–0.4)
EOSINOPHIL NFR BLD AUTO: 0.5 % (ref 0.3–6.2)
ERYTHROCYTE [DISTWIDTH] IN BLOOD BY AUTOMATED COUNT: 12.8 % (ref 12.3–15.4)
GLOBULIN UR ELPH-MCNC: 3.4 GM/DL
GLUCOSE BLDC GLUCOMTR-MCNC: 295 MG/DL (ref 70–130)
GLUCOSE SERPL-MCNC: 369 MG/DL (ref 65–99)
HCT VFR BLD AUTO: 35.7 % (ref 34–46.6)
HGB BLD-MCNC: 11.2 G/DL (ref 12–15.9)
IMM GRANULOCYTES # BLD AUTO: 0.03 10*3/MM3 (ref 0–0.05)
IMM GRANULOCYTES NFR BLD AUTO: 0.3 % (ref 0–0.5)
LYMPHOCYTES # BLD AUTO: 2.24 10*3/MM3 (ref 0.7–3.1)
LYMPHOCYTES NFR BLD AUTO: 22.5 % (ref 19.6–45.3)
MCH RBC QN AUTO: 28.1 PG (ref 26.6–33)
MCHC RBC AUTO-ENTMCNC: 31.4 G/DL (ref 31.5–35.7)
MCV RBC AUTO: 89.7 FL (ref 79–97)
MONOCYTES # BLD AUTO: 0.64 10*3/MM3 (ref 0.1–0.9)
MONOCYTES NFR BLD AUTO: 6.4 % (ref 5–12)
NEUTROPHILS NFR BLD AUTO: 6.96 10*3/MM3 (ref 1.7–7)
NEUTROPHILS NFR BLD AUTO: 69.8 % (ref 42.7–76)
NRBC BLD AUTO-RTO: 0 /100 WBC (ref 0–0.2)
PLATELET # BLD AUTO: 243 10*3/MM3 (ref 140–450)
PMV BLD AUTO: 11.1 FL (ref 6–12)
POTASSIUM SERPL-SCNC: 4.5 MMOL/L (ref 3.5–5.2)
PROCALCITONIN SERPL-MCNC: 0.08 NG/ML (ref 0–0.25)
PROT SERPL-MCNC: 7.1 G/DL (ref 6–8.5)
RBC # BLD AUTO: 3.98 10*6/MM3 (ref 3.77–5.28)
SODIUM SERPL-SCNC: 136 MMOL/L (ref 136–145)
WBC NRBC COR # BLD AUTO: 9.97 10*3/MM3 (ref 3.4–10.8)

## 2024-01-28 PROCEDURE — 99285 EMERGENCY DEPT VISIT HI MDM: CPT

## 2024-01-28 PROCEDURE — 83605 ASSAY OF LACTIC ACID: CPT | Performed by: EMERGENCY MEDICINE

## 2024-01-28 PROCEDURE — 84145 PROCALCITONIN (PCT): CPT | Performed by: EMERGENCY MEDICINE

## 2024-01-28 PROCEDURE — 80053 COMPREHEN METABOLIC PANEL: CPT | Performed by: EMERGENCY MEDICINE

## 2024-01-28 PROCEDURE — 82948 REAGENT STRIP/BLOOD GLUCOSE: CPT

## 2024-01-28 PROCEDURE — 73660 X-RAY EXAM OF TOE(S): CPT

## 2024-01-28 PROCEDURE — 63710000001 INSULIN LISPRO (HUMAN) PER 5 UNITS: Performed by: STUDENT IN AN ORGANIZED HEALTH CARE EDUCATION/TRAINING PROGRAM

## 2024-01-28 PROCEDURE — 85025 COMPLETE CBC W/AUTO DIFF WBC: CPT | Performed by: EMERGENCY MEDICINE

## 2024-01-28 RX ORDER — MEMANTINE HYDROCHLORIDE 10 MG/1
10 TABLET ORAL DAILY
Status: DISCONTINUED | OUTPATIENT
Start: 2024-01-28 | End: 2024-02-04 | Stop reason: HOSPADM

## 2024-01-28 RX ORDER — FAMOTIDINE 20 MG/1
20 TABLET, FILM COATED ORAL 2 TIMES DAILY
Status: DISCONTINUED | OUTPATIENT
Start: 2024-01-28 | End: 2024-02-04 | Stop reason: HOSPADM

## 2024-01-28 RX ORDER — ZONISAMIDE 100 MG/1
200 CAPSULE ORAL NIGHTLY
Status: DISCONTINUED | OUTPATIENT
Start: 2024-01-28 | End: 2024-02-04 | Stop reason: HOSPADM

## 2024-01-28 RX ORDER — INSULIN LISPRO 100 [IU]/ML
2-7 INJECTION, SOLUTION INTRAVENOUS; SUBCUTANEOUS
Status: DISCONTINUED | OUTPATIENT
Start: 2024-01-28 | End: 2024-02-01

## 2024-01-28 RX ORDER — PRAVASTATIN SODIUM 40 MG
40 TABLET ORAL EVERY EVENING
Status: DISCONTINUED | OUTPATIENT
Start: 2024-01-28 | End: 2024-02-04 | Stop reason: HOSPADM

## 2024-01-28 RX ORDER — NICOTINE POLACRILEX 4 MG
15 LOZENGE BUCCAL
Status: DISCONTINUED | OUTPATIENT
Start: 2024-01-28 | End: 2024-02-04 | Stop reason: HOSPADM

## 2024-01-28 RX ORDER — AMLODIPINE BESYLATE 2.5 MG/1
2.5 TABLET ORAL DAILY
Status: DISCONTINUED | OUTPATIENT
Start: 2024-01-29 | End: 2024-02-02

## 2024-01-28 RX ORDER — DONEPEZIL HYDROCHLORIDE 10 MG/1
10 TABLET, FILM COATED ORAL NIGHTLY
Status: DISCONTINUED | OUTPATIENT
Start: 2024-01-28 | End: 2024-02-04 | Stop reason: HOSPADM

## 2024-01-28 RX ORDER — IBUPROFEN 600 MG/1
1 TABLET ORAL
Status: DISCONTINUED | OUTPATIENT
Start: 2024-01-28 | End: 2024-02-04 | Stop reason: HOSPADM

## 2024-01-28 RX ORDER — CITALOPRAM HYDROBROMIDE 10 MG/1
10 TABLET ORAL DAILY
Status: DISCONTINUED | OUTPATIENT
Start: 2024-01-29 | End: 2024-02-04 | Stop reason: HOSPADM

## 2024-01-28 RX ORDER — SODIUM CHLORIDE 0.9 % (FLUSH) 0.9 %
10 SYRINGE (ML) INJECTION AS NEEDED
Status: DISCONTINUED | OUTPATIENT
Start: 2024-01-28 | End: 2024-02-04 | Stop reason: HOSPADM

## 2024-01-28 RX ORDER — UREA 10 %
1 LOTION (ML) TOPICAL NIGHTLY
Status: DISCONTINUED | OUTPATIENT
Start: 2024-01-28 | End: 2024-02-04 | Stop reason: HOSPADM

## 2024-01-28 RX ORDER — DEXTROSE MONOHYDRATE 25 G/50ML
25 INJECTION, SOLUTION INTRAVENOUS
Status: DISCONTINUED | OUTPATIENT
Start: 2024-01-28 | End: 2024-02-04 | Stop reason: HOSPADM

## 2024-01-28 RX ADMIN — INSULIN LISPRO 4 UNITS: 100 INJECTION, SOLUTION INTRAVENOUS; SUBCUTANEOUS at 22:45

## 2024-01-28 RX ADMIN — Medication 1 MG: at 22:45

## 2024-01-28 RX ADMIN — FAMOTIDINE 20 MG: 20 TABLET, FILM COATED ORAL at 22:45

## 2024-01-29 ENCOUNTER — APPOINTMENT (OUTPATIENT)
Dept: CARDIOLOGY | Facility: HOSPITAL | Age: 84
DRG: 617 | End: 2024-01-29
Payer: MEDICARE

## 2024-01-29 LAB
ANION GAP SERPL CALCULATED.3IONS-SCNC: 8 MMOL/L (ref 5–15)
BH CV LOWER ARTERIAL LEFT ABI RATIO: 1.66
BH CV LOWER ARTERIAL LEFT DORSALIS PEDIS SYS MAX: 151
BH CV LOWER ARTERIAL LEFT GREAT TOE SYS MAX: 82
BH CV LOWER ARTERIAL LEFT POST TIBIAL SYS MAX: 207
BH CV LOWER ARTERIAL LEFT TBI RATIO: 0.66
BH CV LOWER ARTERIAL RIGHT ABI RATIO: NORMAL
BH CV LOWER ARTERIAL RIGHT DORSALIS PEDIS SYS MAX: NORMAL
BH CV LOWER ARTERIAL RIGHT GREAT TOE SYS MAX: 102
BH CV LOWER ARTERIAL RIGHT POST TIBIAL SYS MAX: NORMAL
BH CV LOWER ARTERIAL RIGHT TBI RATIO: 0.82
BUN SERPL-MCNC: 15 MG/DL (ref 8–23)
BUN/CREAT SERPL: 27.8 (ref 7–25)
CALCIUM SPEC-SCNC: 9.1 MG/DL (ref 8.6–10.5)
CHLORIDE SERPL-SCNC: 102 MMOL/L (ref 98–107)
CO2 SERPL-SCNC: 28 MMOL/L (ref 22–29)
CREAT SERPL-MCNC: 0.54 MG/DL (ref 0.57–1)
DEPRECATED RDW RBC AUTO: 40.5 FL (ref 37–54)
EGFRCR SERPLBLD CKD-EPI 2021: 90.9 ML/MIN/1.73
ERYTHROCYTE [DISTWIDTH] IN BLOOD BY AUTOMATED COUNT: 12.8 % (ref 12.3–15.4)
GLUCOSE BLDC GLUCOMTR-MCNC: 112 MG/DL (ref 70–130)
GLUCOSE BLDC GLUCOMTR-MCNC: 169 MG/DL (ref 70–130)
GLUCOSE BLDC GLUCOMTR-MCNC: 201 MG/DL (ref 70–130)
GLUCOSE BLDC GLUCOMTR-MCNC: 378 MG/DL (ref 70–130)
GLUCOSE SERPL-MCNC: 181 MG/DL (ref 65–99)
HBA1C MFR BLD: 11.4 % (ref 4.8–5.6)
HCT VFR BLD AUTO: 31.3 % (ref 34–46.6)
HGB BLD-MCNC: 9.9 G/DL (ref 12–15.9)
MCH RBC QN AUTO: 27.7 PG (ref 26.6–33)
MCHC RBC AUTO-ENTMCNC: 31.6 G/DL (ref 31.5–35.7)
MCV RBC AUTO: 87.4 FL (ref 79–97)
PLATELET # BLD AUTO: 245 10*3/MM3 (ref 140–450)
PMV BLD AUTO: 11.3 FL (ref 6–12)
POTASSIUM SERPL-SCNC: 3.9 MMOL/L (ref 3.5–5.2)
RBC # BLD AUTO: 3.58 10*6/MM3 (ref 3.77–5.28)
SODIUM SERPL-SCNC: 138 MMOL/L (ref 136–145)
UPPER ARTERIAL LEFT ARM BRACHIAL SYS MAX: 123
UPPER ARTERIAL RIGHT ARM BRACHIAL SYS MAX: 125
WBC NRBC COR # BLD AUTO: 8.3 10*3/MM3 (ref 3.4–10.8)

## 2024-01-29 PROCEDURE — 93922 UPR/L XTREMITY ART 2 LEVELS: CPT

## 2024-01-29 PROCEDURE — 83036 HEMOGLOBIN GLYCOSYLATED A1C: CPT | Performed by: STUDENT IN AN ORGANIZED HEALTH CARE EDUCATION/TRAINING PROGRAM

## 2024-01-29 PROCEDURE — 63710000001 INSULIN GLARGINE PER 5 UNITS: Performed by: STUDENT IN AN ORGANIZED HEALTH CARE EDUCATION/TRAINING PROGRAM

## 2024-01-29 PROCEDURE — 63710000001 INSULIN LISPRO (HUMAN) PER 5 UNITS: Performed by: STUDENT IN AN ORGANIZED HEALTH CARE EDUCATION/TRAINING PROGRAM

## 2024-01-29 PROCEDURE — 93923 UPR/LXTR ART STDY 3+ LVLS: CPT

## 2024-01-29 PROCEDURE — 82948 REAGENT STRIP/BLOOD GLUCOSE: CPT

## 2024-01-29 PROCEDURE — 85027 COMPLETE CBC AUTOMATED: CPT | Performed by: STUDENT IN AN ORGANIZED HEALTH CARE EDUCATION/TRAINING PROGRAM

## 2024-01-29 PROCEDURE — 80048 BASIC METABOLIC PNL TOTAL CA: CPT | Performed by: STUDENT IN AN ORGANIZED HEALTH CARE EDUCATION/TRAINING PROGRAM

## 2024-01-29 PROCEDURE — 36415 COLL VENOUS BLD VENIPUNCTURE: CPT | Performed by: STUDENT IN AN ORGANIZED HEALTH CARE EDUCATION/TRAINING PROGRAM

## 2024-01-29 PROCEDURE — 99223 1ST HOSP IP/OBS HIGH 75: CPT | Performed by: INTERNAL MEDICINE

## 2024-01-29 RX ADMIN — FAMOTIDINE 20 MG: 20 TABLET, FILM COATED ORAL at 20:49

## 2024-01-29 RX ADMIN — DONEPEZIL HYDROCHLORIDE 10 MG: 10 TABLET, FILM COATED ORAL at 01:59

## 2024-01-29 RX ADMIN — PRAVASTATIN SODIUM 40 MG: 40 TABLET ORAL at 18:55

## 2024-01-29 RX ADMIN — INSULIN LISPRO 6 UNITS: 100 INJECTION, SOLUTION INTRAVENOUS; SUBCUTANEOUS at 22:43

## 2024-01-29 RX ADMIN — FAMOTIDINE 20 MG: 20 TABLET, FILM COATED ORAL at 09:54

## 2024-01-29 RX ADMIN — ZONISAMIDE 200 MG: 100 CAPSULE ORAL at 20:48

## 2024-01-29 RX ADMIN — ZONISAMIDE 200 MG: 100 CAPSULE ORAL at 02:04

## 2024-01-29 RX ADMIN — CITALOPRAM 10 MG: 20 TABLET, FILM COATED ORAL at 09:48

## 2024-01-29 RX ADMIN — MEMANTINE HYDROCHLORIDE 10 MG: 10 TABLET, FILM COATED ORAL at 02:03

## 2024-01-29 RX ADMIN — Medication 1 MG: at 20:47

## 2024-01-29 RX ADMIN — INSULIN GLARGINE 15 UNITS: 100 INJECTION, SOLUTION SUBCUTANEOUS at 09:48

## 2024-01-29 RX ADMIN — INSULIN LISPRO 2 UNITS: 100 INJECTION, SOLUTION INTRAVENOUS; SUBCUTANEOUS at 13:22

## 2024-01-29 RX ADMIN — DONEPEZIL HYDROCHLORIDE 10 MG: 10 TABLET, FILM COATED ORAL at 20:47

## 2024-01-29 RX ADMIN — AMLODIPINE BESYLATE 2.5 MG: 2.5 TABLET ORAL at 09:48

## 2024-01-29 NOTE — PLAN OF CARE
Goal Outcome Evaluation:  Plan of Care Reviewed With: patient           Outcome Evaluation: vss, MRI ordered, may due biopsy , npo after midnight, up to BR with walker & assit x1, ID & pod, consulted

## 2024-01-29 NOTE — CONSULTS
Podiatry Consult Note      Patient: Di Macario Admit Date: 01/28/2024    Age: 83 y.o.   PCP: Lily Yepez PA-C    MRN: [id number]  Room: [facility]        Subjective     Chief Complaint     Chief Complaint   Patient presents with    Wound Check        HPI     This is a 82yo F with PMH DM and nonhealing ulcer to the left hallux. Patient has been seeing her podiatrist, Dr. Camacho, who has been providing local wound care. Patient states that her toe has worsened over the last week and there was an increase in redness. She came to the ED and was admitted for osteomyelitis. She is receiving empiric abx.     Past Medical History     Past Medical History:   Diagnosis Date    Dementia     states better with medication     Diabetes mellitus     Hyperlipidemia 01/13/2021    Lung consolidation 06/01/2022    Peripheral neuropathy 08/28/2018    Vitamin D deficiency         Past Surgical History:   Procedure Laterality Date    ANKLE SURGERY Right     BLADDER SURGERY      Prolapsed    BREAST BIOPSY      CARDIAC ELECTROPHYSIOLOGY PROCEDURE N/A 03/05/2021    Procedure: Pacemaker DC new BOSTON;  Surgeon: Madelin Ferguson MD;  Location: [facility] CATH INVASIVE LOCATION;  Service: Cardiology;  Laterality: N/A;    ENDOSCOPY N/A 06/06/2022    Procedure: ESOPHAGOGASTRODUODENOSCOPY with biopsy, balloon dilation;  Surgeon: Lo Benjamin MD;  Location: [facility] ENDOSCOPY;  Service: Gastroenterology;  Laterality: N/A;  esophageal stricture, hiatal hernia, gastritis    HYSTERECTOMY          No Known Allergies     Social History     Tobacco Use   Smoking Status Former    Packs/day: 0.00    Years: 5.00    Additional pack years: 0.00    Total pack years: 0.00    Types: Cigarettes   Smokeless Tobacco Never   Tobacco Comments    quit in 1973 never a pack a day or even a pack a month smoker        Objective   Physical Exam    Vitals:    01/29/24 0451   BP: 119/62   Pulse: 67   Resp: 14   Temp: 97.6 °F (36.4 °C)   SpO2: 97%         Dermatology: distal callus with deep extension, positive probe to bone of distal phalanx. There is 1cc purulence expressed from the distal aspect. Mild periwound erythema, no proximal streaking, no malodor.    Vascular: DP and PT pulses palpable    Neurological: Light touch and protective sensation are diminished to the forefoot    Musckuloskeletal: no pain with 1st MTPJ, IPJ ROM left hallux    Labs     Lab Results   Component Value Date    HGBA1C 11.40 (H) 01/29/2024    POCGLU 201 (H) 01/29/2024        CBC:      Lab 01/29/24  0610 01/28/24  1917   WBC 8.30 9.97   HEMOGLOBIN 9.9* 11.2*   HEMATOCRIT 31.3* 35.7   PLATELETS 245 243   NEUTROS ABS  --  6.96   IMMATURE GRANS (ABS)  --  0.03   LYMPHS ABS  --  2.24   MONOS ABS  --  0.64   EOS ABS  --  0.05   MCV 87.4 89.7          Results for orders placed or performed during the hospital encounter of 07/24/22   Blood Culture - Blood, Hand, Right    Specimen: Hand, Right; Blood   Result Value Ref Range    Blood Culture No growth at 5 days         XR Toe 2+ View Left  Narrative: LEFT GREAT TOE: 3 VIEWS     HISTORY: Wound check.     COMPARISON: None.     FINDINGS: There is cortical bone loss at the tuft of the 1st distal  phalanx with mild lucency suspicious for osteomyelitis of the tuft of  the 1st distal phalanx. There is overlying soft tissue wound/ulcer and  there is generalized soft tissue swelling.     Impression: Soft tissue wound/ulcer at the tip of the great toe with  underlying cortical loss and bone lucency suspicious for osteomyelitis.  Soft tissue swelling.     This report was finalized on 1/28/2024 9:13 PM by Dr. Jonathan Park M.D on Workstation: [id number]          Assessment/Plan     82yo F with Diabetic foot ulcer, osteomyelitis left hallux    -Pt examined and evaluated by myself; I spoke directly with Dr. Camacho who was originally consulted and he is OK for me to see this patient while admitted.  -Xray reviewed; erosion of distal phalanx tuft  consistent with OM  -cont abx for cellulitis  -MRI pending; I spoke with Dr. Wang and both clinical and radiographic evidence of OM we may not need MRI. Patient has enough viable soft tissue to heal a partial hallux amp to remove infected bone and ulcer.  -Pt has smoking history, will order ABIs  -Possibly OR tomorrow 1/30/2024 for partial hallux amputation, left foot    Please call with questions      Nas Gonzales DPM  Office: [phone]

## 2024-01-29 NOTE — ED PROVIDER NOTES
EMERGENCY DEPARTMENT ENCOUNTER  Room Number:  22/22  PCP: Lily Yepez PA-C  Independent Historians: Patient and Family (daughter)      HPI:  Chief Complaint: had concerns including Wound Check.  Left great toe    A complete HPI/ROS/PMH/PSH/SH/FH are unobtainable due to: None    Chronic or social conditions impacting patient care (Social Determinants of Health): None      Context: Di Macario is a 83 y.o. female with a medical history of diabetes and neuropathy who presents to the ED c/o acute wound of the left great toe.  Patient follows with Dr. Camacho in the podiatry office every 3 months.  Over the past several weeks she has noticed an infection that began in the toenail of the left great toe.  It has progressively worsened and now causing some increasing swelling and redness and pain as well as drainage.  Patient denies any fevers or other constitutional symptoms.  She had hoped that she could wait until her next appointment with Dr. Camacho in the office to be evaluated, but the symptoms seem to be escalating and she thought it would be unwise to wait several more weeks.      Review of prior external notes (non-ED) -and- Review of prior external test results outside of this encounter: I reviewed cardiology office progress note from September 21, 2023 when patient had care for paroxysmal atrial fibrillation, hypertension and hypercholesterolemia.      PAST MEDICAL HISTORY  Active Ambulatory Problems     Diagnosis Date Noted    Essential hypertension 03/04/2016    Vitamin D deficiency 03/04/2016    Hyperuricemia 07/20/2016    Slow transit constipation 01/31/2017    Chronic pain of left knee 01/31/2017    At high risk for falls 08/10/2018    Peripheral neuropathy 08/28/2018    Uncontrolled type 2 diabetes mellitus with hyperglycemia 10/25/2018    Walker as ambulation aid 02/12/2019    Bradycardia, sinus 01/13/2021    Shortness of breath 01/13/2021    Leg swelling 01/13/2021    Hyperlipidemia 01/13/2021     Presence of cardiac pacemaker 03/26/2021    Dementia without behavioral disturbance 06/01/2022    Aspiration pneumonia due to vomitus 06/02/2022    Aspiration pneumonia of both lungs due to gastric secretions, unspecified part of lung 07/24/2022    Weakness 07/24/2022    Thrombocytopenia 07/25/2022    Oropharyngeal dysphagia 07/28/2022    Immobility syndrome 07/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    Type 2 diabetes mellitus with hyperglycemia, with long-term current use of insulin 08/25/2022    Paroxysmal atrial fibrillation 02/08/2023    Pure hypercholesterolemia 02/08/2023     Resolved Ambulatory Problems     Diagnosis Date Noted    Fall 02/03/2017    Medicare annual wellness visit, subsequent 08/16/2017    Alkaline phosphatase elevation 10/29/2018    Calcium blood increased 10/29/2018    Humeral head fracture, right, closed, initial encounter 10/29/2021    Nail abnormalities 03/17/2022    Esophageal abnormality 06/01/2022     Past Medical History:   Diagnosis Date    Dementia     Diabetes mellitus     Lung consolidation 06/01/2022         PAST SURGICAL HISTORY  Past Surgical History:   Procedure Laterality Date    ANKLE SURGERY Right     BLADDER SURGERY      Prolapsed    BREAST BIOPSY      CARDIAC ELECTROPHYSIOLOGY PROCEDURE N/A 03/05/2021    Procedure: Pacemaker DC new BOSTON;  Surgeon: Madelin Ferguson MD;  Location: [facility] CATH INVASIVE LOCATION;  Service: Cardiology;  Laterality: N/A;    ENDOSCOPY N/A 06/06/2022    Procedure: ESOPHAGOGASTRODUODENOSCOPY with biopsy, balloon dilation;  Surgeon: Lo Benjamin MD;  Location: [facility] ENDOSCOPY;  Service: Gastroenterology;  Laterality: N/A;  esophageal stricture, hiatal hernia, gastritis    HYSTERECTOMY           FAMILY HISTORY  Family History   Problem Relation Age of Onset    Hypertension Mother     Cancer Mother         Stomach Cancer    Obesity Mother         her entire life over weight    Hypertension Father     Cancer Father         abdominal tumor         SOCIAL  HISTORY  Social History     Socioeconomic History    Marital status:    Tobacco Use    Smoking status: Former     Packs/day: 0.00     Years: 5.00     Additional pack years: 0.00     Total pack years: 0.00     Types: Cigarettes    Smokeless tobacco: Never    Tobacco comments:     quit in 1973 never a pack a day or even a pack a month smoker   Vaping Use    Vaping Use: Never used   Substance and Sexual Activity    Alcohol use: Not Currently     Comment: RARELY    Drug use: No    Sexual activity: Not Currently     Birth control/protection: Surgical, Post-menopausal     Comment: took the pill in the late 1960s early 70s         ALLERGIES  Patient has no known allergies.      REVIEW OF SYSTEMS  Review of Systems  Included in HPI  All systems reviewed and negative except for those discussed in HPI.      PHYSICAL EXAM    I have reviewed the triage vital signs and nursing notes.    ED Triage Vitals   Temp Heart Rate Resp BP SpO2   01/28/24 1708 01/28/24 1708 01/28/24 1708 01/28/24 1715 01/28/24 1708   97 °F (36.1 °C) 96 16 173/86 94 %      Temp src Heart Rate Source Patient Position BP Location FiO2 (%)   01/28/24 1708 01/28/24 1708 -- -- --   Tympanic Monitor          Physical Exam  GENERAL: alert, no acute distress  SKIN: Warm, dry  HENT: Normocephalic, atraumatic  EYES: no scleral icterus  CV: regular rhythm, regular rate  RESPIRATORY: normal effort, lungs clear  ABDOMEN: soft, nontender, nondistended  MUSCULOSKELETAL: no deformity, no asymmetry.  The left great toe demonstrates a worrisome wound at the distal aspect that seems to be greatest around the toenail but certainly extends more proximally to the dorsal medial aspect.  There is an open wound with obvious drainage.  The surrounding tissues are indurated and erythematous and tender to palpation.  There does not appear to be any lymphangitis tracking to the foot or ankle.  NEURO: alert, moves all extremities, follows commands      NIH:                                                              LAB RESULTS  Recent Results (from the past 24 hour(s))   Comprehensive Metabolic Panel    Collection Time: 01/28/24  7:17 PM    Specimen: Blood   Result Value Ref Range    Glucose 369 (H) 65 - 99 mg/dL    BUN 18 8 - 23 mg/dL    Creatinine 0.71 0.57 - 1.00 mg/dL    Sodium 136 136 - 145 mmol/L    Potassium 4.5 3.5 - 5.2 mmol/L    Chloride 100 98 - 107 mmol/L    CO2 25.0 22.0 - 29.0 mmol/L    Calcium 9.6 8.6 - 10.5 mg/dL    Total Protein 7.1 6.0 - 8.5 g/dL    Albumin 3.7 3.5 - 5.2 g/dL    ALT (SGPT) 8 1 - 33 U/L    AST (SGOT) 9 1 - 32 U/L    Alkaline Phosphatase 148 (H) 39 - 117 U/L    Total Bilirubin 0.2 0.0 - 1.2 mg/dL    Globulin 3.4 gm/dL    A/G Ratio 1.1 g/dL    BUN/Creatinine Ratio 25.4 (H) 7.0 - 25.0    Anion Gap 11.0 5.0 - 15.0 mmol/L    eGFR 84.0 >60.0 mL/min/1.73   Procalcitonin    Collection Time: 01/28/24  7:17 PM    Specimen: Blood   Result Value Ref Range    Procalcitonin 0.08 0.00 - 0.25 ng/mL   Lactic Acid, Plasma    Collection Time: 01/28/24  7:17 PM    Specimen: Blood   Result Value Ref Range    Lactate 1.3 0.5 - 2.0 mmol/L   CBC Auto Differential    Collection Time: 01/28/24  7:17 PM    Specimen: Blood   Result Value Ref Range    WBC 9.97 3.40 - 10.80 10*3/mm3    RBC 3.98 3.77 - 5.28 10*6/mm3    Hemoglobin 11.2 (L) 12.0 - 15.9 g/dL    Hematocrit 35.7 34.0 - 46.6 %    MCV 89.7 79.0 - 97.0 fL    MCH 28.1 26.6 - 33.0 pg    MCHC 31.4 (L) 31.5 - 35.7 g/dL    RDW 12.8 12.3 - 15.4 %    RDW-SD 42.2 37.0 - 54.0 fl    MPV 11.1 6.0 - 12.0 fL    Platelets 243 140 - 450 10*3/mm3    Neutrophil % 69.8 42.7 - 76.0 %    Lymphocyte % 22.5 19.6 - 45.3 %    Monocyte % 6.4 5.0 - 12.0 %    Eosinophil % 0.5 0.3 - 6.2 %    Basophil % 0.5 0.0 - 1.5 %    Immature Grans % 0.3 0.0 - 0.5 %    Neutrophils, Absolute 6.96 1.70 - 7.00 10*3/mm3    Lymphocytes, Absolute 2.24 0.70 - 3.10 10*3/mm3    Monocytes, Absolute 0.64 0.10 - 0.90 10*3/mm3    Eosinophils, Absolute 0.05 0.00 - 0.40 10*3/mm3     Basophils, Absolute 0.05 0.00 - 0.20 10*3/mm3    Immature Grans, Absolute 0.03 0.00 - 0.05 10*3/mm3    nRBC 0.0 0.0 - 0.2 /100 WBC         RADIOLOGY  XR Toe 2+ View Left    Result Date: 1/28/2024  LEFT GREAT TOE: 3 VIEWS  HISTORY: Wound check.  COMPARISON: None.  FINDINGS: There is cortical bone loss at the tuft of the 1st distal phalanx with mild lucency suspicious for osteomyelitis of the tuft of the 1st distal phalanx. There is overlying soft tissue wound/ulcer and there is generalized soft tissue swelling.      Soft tissue wound/ulcer at the tip of the great toe with underlying cortical loss and bone lucency, suspicious for osteomyelitis. Soft tissue swelling.         MEDICATIONS GIVEN IN ER  Medications   sodium chloride 0.9 % flush 10 mL (has no administration in time range)         ORDERS PLACED DURING THIS VISIT:  Orders Placed This Encounter   Procedures    XR Toe 2+ View Left    Comprehensive Metabolic Panel    Procalcitonin    Lactic Acid, Plasma    CBC Auto Differential    [facility] (on-call MD unless specified) Details    Insert Peripheral IV    Inpatient Admission    CBC & Differential         OUTPATIENT MEDICATION MANAGEMENT:  Current Facility-Administered Medications Ordered in Epic   Medication Dose Route Frequency Provider Last Rate Last Admin    sodium chloride 0.9 % flush 10 mL  10 mL Intravenous PRN Lui Russell MD         Current Outpatient Medications Ordered in Epic   Medication Sig Dispense Refill    Insulin Glargine (Lantus SoloStar) 100 UNIT/ML injection pen Inject 10 Units under the skin into the appropriate area as directed Daily. 9 mL 1    acetaminophen (TYLENOL) 325 MG tablet Take 2 tablets by mouth Every 6 (Six) Hours As Needed for Mild Pain or Moderate Pain. PRN      amLODIPine (NORVASC) 5 MG tablet Take 0.5 tablets by mouth Daily. 90 tablet 0    citalopram (CeleXA) 10 MG tablet Take 1 tablet by mouth Daily. 90 tablet 3    Continuous Blood Gluc Sensor (Dexcom G6 Sensor) Dipsense Dexcom  G6 Sensors, to replace every 10 days, 3 sensors per 30 day period (NDC #[address]-14). Check 6 times a day. Dx: E 11.65 sent to supply store 9 each 4    donepezil (ARICEPT) 10 MG tablet Take 1 tablet by mouth Every Night. 90 tablet 3    Dulaglutide (Trulicity) 0.75 MG/0.5ML solution pen-injector Inject 0.75 mg under the skin into the appropriate area as directed 1 (One) Time Per Week. 6 mL 1    famotidine (PEPCID) 20 MG tablet Take 1 tablet by mouth 2 (Two) Times a Day.      Glucagon (Gvoke HypoPen 2-Pack) 1 MG/0.2ML solution auto-injector Inject 1 mg under the skin into the appropriate area as directed As Needed (hypoglycemia). 0.4 mL 1    glucose blood (FREESTYLE LITE) test strip Bid e11.65 200 each 3    insulin aspart (NovoLOG FlexPen) 100 UNIT/ML solution pen-injector sc pen Inject 8 Units under the skin into the appropriate area as directed Daily for 90 days. Inject 3 units with breakfast /2 units with lunch and /3 units at dinner 7.2 mL 1    Insulin Pen Needle (BD Pen Needle Norma U/F) 32G X 4 MM misc Use once a day 100 each 3    Lancets (freestyle) lancets 1 each by Other route 4 (Four) Times a Day. Use as instructed 400 each 1    melatonin 1 MG tablet Take 1 tablet by mouth Every Night. 30 tablet 1    memantine (NAMENDA) 10 MG tablet       pravastatin (PRAVACHOL) 40 MG tablet TAKE 1 TABLET EVERY EVENING 90 tablet 3    Zonegran 100 MG capsule Take 2 capsules by mouth Every Night.           PROCEDURES  Procedures            PROGRESS, DATA ANALYSIS, CONSULTS, AND MEDICAL DECISION MAKING  All labs have been independently interpreted by me.  All radiology studies have been reviewed by me. All EKG's have been independently viewed and interpreted by me.  Discussion below represents my analysis of pertinent findings related to patient's condition, differential diagnosis, treatment plan and final disposition.    Differential diagnosis includes but is not limited to osteomyelitis, paronychia, felon, sepsis, diabetic  foot ulcer.    Clinical Scores:                  ED Course as of 01/28/24 2008   Sun Jan 28, 2024 1907 Physical exam is worrisome for complicated infection of the left great toe.  I do believe it requires an inpatient consultation for potential debridement.  Will start IV antibiotics at this time.  Drawing basic labs including CBC and CMP and procalcitonin.  Obtaining a plain film x-ray as well to look for any sign of foreign body or bony erosion. [RITA]   1951 Glucose(!): 369 [RITA]   1951 I independently interpreted the x-ray of the left great toe and my findings are: No foreign body, no fracture or dislocation.  There does appear to be some disruption of the bony cortex at the distal great phalanx worrisome for potential osteomyelitis. [RITA]   1953 X-ray is worrisome for osteomyelitis process.  Broad-spectrum antibiotics therapy initiated.  Paging [facility] to request admission for further medical management. [RITA]   2007 I discussed with Dr. Pena from [facility] about this patient.  He agrees to accept her to the hospitalist service for further medical management tonight.  He request that we hold off on the antibiotic administration tonight so that patient can have a bone biopsy procedure prior to initiation of antibiotics. [RITA]      ED Course User Index  [RITA] Lui Russell MD           AS OF 20:08 EST VITALS:    BP - 173/86  HR - 96  TEMP - 97 °F (36.1 °C) (Tympanic)  O2 SATS - 94%    COMPLEXITY OF CARE  The patient requires admission.      DIAGNOSIS  Final diagnoses:   Diabetic foot infection   Uncontrolled type 2 diabetes mellitus with hyperglycemia   Osteomyelitis of great toe         DISPOSITION  ED Disposition       ED Disposition   Decision to Admit    Condition   --    Comment   Level of Care: Med/Surg [1]   Diagnosis: Toe osteomyelitis, left [[id number]]   Admitting Physician: ROD PENA [[id number]]   Attending Physician: ROD PENA [[id number]]   Certification: I Certify That Inpatient Hospital Services Are Medically  Necessary For Greater Than 2 Midnights                  Please note that portions of this document were completed with a voice recognition program.    Note Disclaimer: At [facility], we believe that sharing information builds trust and better relationships. You are receiving this note because you recently visited [facility]. It is possible you will see health information before a provider has talked with you about it. This kind of information can be easy to misunderstand. To help you fully understand what it means for your health, we urge you to discuss this note with your provider.         Lui Russell MD  01/28/24 2008

## 2024-01-29 NOTE — CONSULTS
Referring Provider: No referring provider defined for this encounter.  Reason for Consultation: Left great toe osteomyelitis    Subjective   History of present illness: This is an 83-year-old female with dementia type 2 diabetes and peripheral neuropathy who was admitted on January 28 with worsening infection of her left great toe.  The patient has been dealing with a diabetic foot ulcer on her left great toe and has been followed by podiatry as an outpatient.  Apparently over the past week the wound has worsened.  Admission blood work revealed a normal white blood cell count.  X-rays of the right foot were obtained and were concerning for osteomyelitis of the left great toe.  Podiatry was consulted and the plan is to take the patient to the OR tomorrow.  She was given an empiric dose of vancomycin and Zosyn in the ER.  Patient states she is unclear how long she has had the wound on her left great toe.  She denies any worsening pain erythema swelling or drainage and was brought to the ER by her daughter who noticed the bleeding wound for the first time the day of admission.  The patient denies any fevers or chills    Past Medical History:   Diagnosis Date    Dementia     states better with medication     Diabetes mellitus     Hyperlipidemia 01/13/2021    Lung consolidation 06/01/2022    Peripheral neuropathy 08/28/2018    Vitamin D deficiency        Past Surgical History:   Procedure Laterality Date    ANKLE SURGERY Right     BLADDER SURGERY      Prolapsed    BREAST BIOPSY      CARDIAC ELECTROPHYSIOLOGY PROCEDURE N/A 03/05/2021    Procedure: Pacemaker DC new BOSTON;  Surgeon: Madelin Ferguson MD;  Location: [facility] CATH INVASIVE LOCATION;  Service: Cardiology;  Laterality: N/A;    ENDOSCOPY N/A 06/06/2022    Procedure: ESOPHAGOGASTRODUODENOSCOPY with biopsy, balloon dilation;  Surgeon: Lo Benjamin MD;  Location: [facility] ENDOSCOPY;  Service: Gastroenterology;  Laterality: N/A;  esophageal stricture, hiatal  hernia, gastritis    HYSTERECTOMY          reports that she has quit smoking. Her smoking use included cigarettes. She has never used smokeless tobacco. She reports that she does not currently use alcohol. She reports that she does not use drugs.    family history includes Cancer in her father and mother; Hypertension in her father and mother; Obesity in her mother.    No Known Allergies    Medication:  Antibiotics:  None    Please refer to the medical record for a full medication list    Review of Systems  Pertinent items are noted in HPI, all other systems reviewed and negative    Objective   Vital Signs   Temp:  [96.3 °F (35.7 °C)-97.6 °F (36.4 °C)] 97.6 °F (36.4 °C)  Heart Rate:  [56-96] 61  Resp:  [14-18] 18  BP: (103-173)/(60-86) 103/60    Physical Exam:   General: In no acute distress  HEENT: Normocephalic, atraumatic, no scleral icterus.   Neck: Supple, trachea is midline  Cardiovascular: Normal rate, regular rhythm, normal S1 and S2, no murmurs, rubs, or gallops   Respiratory: Lungs are clear to auscultation bilaterally, no wheezing  GI: Abdomen is soft, nontender, nondistended, positive bowel sounds bilaterally,  Musculoskeletal: no edema, tenderness or deformity  Skin: No rashes   Extremities: Left great toe swollen erythematous with necrotic wound at tip  Neurological: Alert and oriented, moving all 4 extremities  Psychiatric: Normal mood and affect     Results Review:   I reviewed the patient's new clinical results.  I reviewed the patient's new imaging results and agree with the interpretation.    Lab Results   Component Value Date    WBC 8.30 01/29/2024    HGB 9.9 (L) 01/29/2024    HCT 31.3 (L) 01/29/2024    MCV 87.4 01/29/2024     01/29/2024       Lab Results   Component Value Date    GLUCOSE 181 (H) 01/29/2024    BUN 15 01/29/2024    CREATININE 0.54 (L) 01/29/2024    EGFRIFNONA 71 12/06/2021    EGFRIFAFRI 82 12/06/2021    BCR 27.8 (H) 01/29/2024    CO2 28.0 01/29/2024    CALCIUM 9.1  01/29/2024    PROTENTOTREF 6.9 10/03/2023    ALBUMIN 3.7 01/28/2024    LABIL2 1.7 10/03/2023    AST 9 01/28/2024    ALT 8 01/28/2024     Hemoglobin A1c 11.4  Procalcitonin 0.08    Microbiology:  none    Radiology:  X-ray of the left toe with soft tissue wound at the tip of the great toe with underlying cortical loss and bone lucency concerning for osteomyelitis    Assessment & Plan   Left great toe osteomyelitis  Poorly controlled type 2 diabetes complicating above  Dementia complicating above  Peripheral neuropathy complicating above    Podiatry note reviewed.  Plan to take the patient to the OR tomorrow.  Please obtain operative cultures to help guide antibiotic therapy.  Assuming all infected bone is removed and noncontinuous bone remains I would anticipate a 7 to 10-day course of antibiotics for remaining skin and soft tissue infection.    Given that the patient does not have any signs or symptoms of systemic infection.  Her white blood cell count is normal.  She is afebrile.  Her erythema is only limited to the tip of her toe and the wound has been present for quite some time without any worsening therefore I think it is reasonable to hold antibiotics to increase yield of cultures tomorrow.  When she goes to surgery okay to start empiric vancomycin dosing per pharmacy and Zosyn 3.375 g IV every 8 hours while awaiting final identification and sensitivity data    I discussed the patient's findings and my recommendations with patient, family, and nursing staff

## 2024-01-29 NOTE — ED NOTES
Nursing report ED to floor  Di Macario  83 y.o.  female    HPI :   Chief Complaint   Patient presents with    Wound Check       Admitting doctor:   Perez Barron MD    Admitting diagnosis:   The primary encounter diagnosis was Diabetic foot infection. Diagnoses of Uncontrolled type 2 diabetes mellitus with hyperglycemia and Osteomyelitis of great toe were also pertinent to this visit.    Code status:   Current Code Status       Date Active Code Status Order ID Comments User Context       Prior            Allergies:   Patient has no known allergies.    Isolation:   No active isolations    Intake and Output  No intake or output data in the 24 hours ending 01/28/24 2031    Weight:   There were no vitals filed for this visit.    Most recent vitals:   Vitals:    01/28/24 1708 01/28/24 1715   BP:  173/86   Pulse: 96    Resp: 16    Temp: 97 °F (36.1 °C)    TempSrc: Tympanic    SpO2: 94%        Active LDAs/IV Access:   Lines, Drains & Airways       Active LDAs       Name Placement date Placement time Site Days    Peripheral IV 01/28/24 1918 Right Wrist 01/28/24 1918  Wrist  less than 1                    Labs (abnormal labs have a star):   Labs Reviewed   COMPREHENSIVE METABOLIC PANEL - Abnormal; Notable for the following components:       Result Value    Glucose 369 (*)     Alkaline Phosphatase 148 (*)     BUN/Creatinine Ratio 25.4 (*)     All other components within normal limits    Narrative:     GFR Normal >60  Chronic Kidney Disease <60  Kidney Failure <15    The GFR formula is only valid for adults with stable renal function between ages 18 and 70.   CBC WITH AUTO DIFFERENTIAL - Abnormal; Notable for the following components:    Hemoglobin 11.2 (*)     MCHC 31.4 (*)     All other components within normal limits   PROCALCITONIN - Normal    Narrative:     As a Marker for Sepsis (Non-Neonates):    1. <0.5 ng/mL represents a low risk of severe sepsis and/or septic shock.  2. >2 ng/mL represents a high risk of  "severe sepsis and/or septic shock.    As a Marker for Lower Respiratory Tract Infections that require antibiotic therapy:    PCT on Admission    Antibiotic Therapy       6-12 Hrs later    >0.5                Strongly Recommended  >0.25 - <0.5        Recommended   0.1 - 0.25          Discouraged              Remeasure/reassess PCT  <0.1                Strongly Discouraged     Remeasure/reassess PCT    As 28 day mortality risk marker: \"Change in Procalcitonin Result\" (>80% or <=80%) if Day 0 (or Day 1) and Day 4 values are available. Refer to http://www.[facility]-pct-calculator.com    Change in PCT <=80%  A decrease of PCT levels below or equal to 80% defines a positive change in PCT test result representing a higher risk for 28-day all-cause mortality of patients diagnosed with severe sepsis for septic shock.    Change in PCT >80%  A decrease of PCT levels of more than 80% defines a negative change in PCT result representing a lower risk for 28-day all-cause mortality of patients diagnosed with severe sepsis or septic shock.      LACTIC ACID, PLASMA - Normal   CBC AND DIFFERENTIAL    Narrative:     The following orders were created for panel order CBC & Differential.  Procedure                               Abnormality         Status                     ---------                               -----------         ------                     CBC Auto Differential[[id number]]        Abnormal            Final result                 Please view results for these tests on the individual orders.       EKG:   No orders to display       Meds given in ED:   Medications   sodium chloride 0.9 % flush 10 mL (has no administration in time range)       Imaging results:  XR Toe 2+ View Left    Result Date: 1/28/2024  Soft tissue wound/ulcer at the tip of the great toe with underlying cortical loss and bone lucency, suspicious for osteomyelitis. Soft tissue swelling.       Ambulatory status:   - assistance    Social issues:   Social " History     Socioeconomic History    Marital status:    Tobacco Use    Smoking status: Former     Packs/day: 0.00     Years: 5.00     Additional pack years: 0.00     Total pack years: 0.00     Types: Cigarettes    Smokeless tobacco: Never    Tobacco comments:     quit in 1973 never a pack a day or even a pack a month smoker   Vaping Use    Vaping Use: Never used   Substance and Sexual Activity    Alcohol use: Not Currently     Comment: RARELY    Drug use: No    Sexual activity: Not Currently     Birth control/protection: Surgical, Post-menopausal     Comment: took the pill in the late 1960s early 70s       NIH Stroke Scale:       Kaylin Barajas RN  01/28/24 20:31 EST

## 2024-01-29 NOTE — H&P
Patient Name:  Di Macario  YOB: 1940  MRN:  [id number]  Admit Date:  1/28/2024  Patient Care Team:  Lily Yepez PA-C as PCP - General (Physician Assistant)  Madelin Ferguson MD as Consulting Physician (Cardiology)  Gigi Kaiser DO as Consulting Physician (Neurology)      Subjective   History Present Illness     Chief Complaint   Patient presents with    Wound Check       History of Present Illness  Ms. Macario is a 83 y.o. with a past medical history of dementia, uncontrolled diabetes type 2, hypertension, history of symptomatic bradycardia/sinus pauses status post pacemaker, paroxysmal atrial fibrillation not on anticoagulation presenting with left great toe wound.  Patient unable to give any kind of timeline for how long this has been there.  However, daughter saw this wound today and brought her to the hospital for further evaluation.  Patient sees her podiatrist every 3 months for diabetic footcare.    Review of Systems   Constitutional:  Negative for chills and fever.   HENT:  Negative for rhinorrhea and sore throat.    Respiratory:  Negative for cough and shortness of breath.    Cardiovascular:  Negative for chest pain and leg swelling.   Gastrointestinal:  Negative for abdominal pain, constipation, diarrhea, nausea and vomiting.   Genitourinary:  Negative for dysuria, frequency and urgency.   Musculoskeletal:  Negative for back pain and myalgias.   Skin:  Negative for rash.   Neurological:  Negative for dizziness and headaches.        Personal History     Past Medical History:   Diagnosis Date    Dementia     states better with medication     Diabetes mellitus     Hyperlipidemia 01/13/2021    Lung consolidation 06/01/2022    Peripheral neuropathy 08/28/2018    Vitamin D deficiency      Past Surgical History:   Procedure Laterality Date    ANKLE SURGERY Right     BLADDER SURGERY      Prolapsed    BREAST BIOPSY      CARDIAC ELECTROPHYSIOLOGY PROCEDURE N/A 03/05/2021     Procedure: Pacemaker DC new BOSTON;  Surgeon: Madelin Ferguson MD;  Location: [facility] CATH INVASIVE LOCATION;  Service: Cardiology;  Laterality: N/A;    ENDOSCOPY N/A 06/06/2022    Procedure: ESOPHAGOGASTRODUODENOSCOPY with biopsy, balloon dilation;  Surgeon: Lo Benjamin MD;  Location: [facility] ENDOSCOPY;  Service: Gastroenterology;  Laterality: N/A;  esophageal stricture, hiatal hernia, gastritis    HYSTERECTOMY       Family History   Problem Relation Age of Onset    Hypertension Mother     Cancer Mother         Stomach Cancer    Obesity Mother         her entire life over weight    Hypertension Father     Cancer Father         abdominal tumor     Social History     Tobacco Use    Smoking status: Former     Packs/day: 0.00     Years: 5.00     Additional pack years: 0.00     Total pack years: 0.00     Types: Cigarettes    Smokeless tobacco: Never    Tobacco comments:     quit in 1973 never a pack a day or even a pack a month smoker   Vaping Use    Vaping Use: Never used   Substance Use Topics    Alcohol use: Not Currently     Comment: RARELY    Drug use: No     No current facility-administered medications on file prior to encounter.     Current Outpatient Medications on File Prior to Encounter   Medication Sig Dispense Refill    Insulin Glargine (Lantus SoloStar) 100 UNIT/ML injection pen Inject 10 Units under the skin into the appropriate area as directed Daily. 9 mL 1    acetaminophen (TYLENOL) 325 MG tablet Take 2 tablets by mouth Every 6 (Six) Hours As Needed for Mild Pain or Moderate Pain. PRN      amLODIPine (NORVASC) 5 MG tablet Take 0.5 tablets by mouth Daily. 90 tablet 0    citalopram (CeleXA) 10 MG tablet Take 1 tablet by mouth Daily. 90 tablet 3    Continuous Blood Gluc Sensor (Dexcom G6 Sensor) Dipsense Dexcom G6 Sensors, to replace every 10 days, 3 sensors per 30 day period (NDC #[address]-30). Check 6 times a day. Dx: E 11.65 sent to supply store 9 each 4    donepezil (ARICEPT) 10 MG tablet  Take 1 tablet by mouth Every Night. 90 tablet 3    Dulaglutide (Trulicity) 0.75 MG/0.5ML solution pen-injector Inject 0.75 mg under the skin into the appropriate area as directed 1 (One) Time Per Week. 6 mL 1    famotidine (PEPCID) 20 MG tablet Take 1 tablet by mouth 2 (Two) Times a Day.      Glucagon (Gvoke HypoPen 2-Pack) 1 MG/0.2ML solution auto-injector Inject 1 mg under the skin into the appropriate area as directed As Needed (hypoglycemia). 0.4 mL 1    glucose blood (FREESTYLE LITE) test strip Bid e11.65 200 each 3    insulin aspart (NovoLOG FlexPen) 100 UNIT/ML solution pen-injector sc pen Inject 8 Units under the skin into the appropriate area as directed Daily for 90 days. Inject 3 units with breakfast /2 units with lunch and /3 units at dinner 7.2 mL 1    Insulin Pen Needle (BD Pen Needle Norma U/F) 32G X 4 MM misc Use once a day 100 each 3    Lancets (freestyle) lancets 1 each by Other route 4 (Four) Times a Day. Use as instructed 400 each 1    melatonin 1 MG tablet Take 1 tablet by mouth Every Night. 30 tablet 1    memantine (NAMENDA) 10 MG tablet       pravastatin (PRAVACHOL) 40 MG tablet TAKE 1 TABLET EVERY EVENING 90 tablet 3    Zonegran 100 MG capsule Take 2 capsules by mouth Every Night.       No Known Allergies    Objective    Objective     Vital Signs  Temp:  [97 °F (36.1 °C)] 97 °F (36.1 °C)  Heart Rate:  [60-96] 60  Resp:  [14-16] 14  BP: (164-173)/(75-86) 164/75  SpO2:  [94 %] 94 %  on   ;      There is no height or weight on file to calculate BMI.    Physical Exam  Constitutional:       General: She is not in acute distress.     Appearance: She is not ill-appearing.   Cardiovascular:      Rate and Rhythm: Normal rate and regular rhythm.   Pulmonary:      Effort: Pulmonary effort is normal. No respiratory distress.   Abdominal:      General: Abdomen is flat. There is no distension.      Tenderness: There is no abdominal tenderness.   Musculoskeletal:         General: No swelling or deformity.  Normal range of motion.   Skin:     General: Skin is warm and dry.      Comments: Left distal great toe with erythema, swelling, ulcer   Neurological:      General: No focal deficit present.      Mental Status: She is alert. Mental status is at baseline.         Results Review:  I reviewed the patient's new clinical results.  I reviewed the patient's new imaging results and agree with the interpretation.  I reviewed the patient's other test results and agree with the interpretation  I personally viewed and interpreted the patient's EKG/Telemetry data  Discussed with ED provider.    Lab Results (last 24 hours)       Procedure Component Value Units Date/Time    CBC & Differential [[id number]]  (Abnormal) Collected: 01/28/24 1917    Specimen: Blood Updated: 01/28/24 1930    Narrative:      The following orders were created for panel order CBC & Differential.  Procedure                               Abnormality         Status                     ---------                               -----------         ------                     CBC Auto Differential[[id number]]        Abnormal            Final result                 Please view results for these tests on the individual orders.    Comprehensive Metabolic Panel [[id number]]  (Abnormal) Collected: 01/28/24 1917    Specimen: Blood Updated: 01/28/24 1949     Glucose 369 mg/dL      BUN 18 mg/dL      Creatinine 0.71 mg/dL      Sodium 136 mmol/L      Potassium 4.5 mmol/L      Comment: Slight hemolysis detected by analyzer. Result may be falsely elevated.        Chloride 100 mmol/L      CO2 25.0 mmol/L      Calcium 9.6 mg/dL      Total Protein 7.1 g/dL      Albumin 3.7 g/dL      ALT (SGPT) 8 U/L      AST (SGOT) 9 U/L      Alkaline Phosphatase 148 U/L      Total Bilirubin 0.2 mg/dL      Globulin 3.4 gm/dL      A/G Ratio 1.1 g/dL      BUN/Creatinine Ratio 25.4     Anion Gap 11.0 mmol/L      eGFR 84.0 mL/min/1.73     Narrative:      GFR Normal >60  Chronic Kidney Disease  "<60  Kidney Failure <15    The GFR formula is only valid for adults with stable renal function between ages 18 and 70.    Procalcitonin [[id number]]  (Normal) Collected: 01/28/24 1917    Specimen: Blood Updated: 01/28/24 1956     Procalcitonin 0.08 ng/mL     Narrative:      As a Marker for Sepsis (Non-Neonates):    1. <0.5 ng/mL represents a low risk of severe sepsis and/or septic shock.  2. >2 ng/mL represents a high risk of severe sepsis and/or septic shock.    As a Marker for Lower Respiratory Tract Infections that require antibiotic therapy:    PCT on Admission    Antibiotic Therapy       6-12 Hrs later    >0.5                Strongly Recommended  >0.25 - <0.5        Recommended   0.1 - 0.25          Discouraged              Remeasure/reassess PCT  <0.1                Strongly Discouraged     Remeasure/reassess PCT    As 28 day mortality risk marker: \"Change in Procalcitonin Result\" (>80% or <=80%) if Day 0 (or Day 1) and Day 4 values are available. Refer to http://[email or web address]-pct-calculator.com    Change in PCT <=80%  A decrease of PCT levels below or equal to 80% defines a positive change in PCT test result representing a higher risk for 28-day all-cause mortality of patients diagnosed with severe sepsis for septic shock.    Change in PCT >80%  A decrease of PCT levels of more than 80% defines a negative change in PCT result representing a lower risk for 28-day all-cause mortality of patients diagnosed with severe sepsis or septic shock.       Lactic Acid, Plasma [[id number]]  (Normal) Collected: 01/28/24 1917    Specimen: Blood Updated: 01/28/24 1946     Lactate 1.3 mmol/L     CBC Auto Differential [[id number]]  (Abnormal) Collected: 01/28/24 1917    Specimen: Blood Updated: 01/28/24 1930     WBC 9.97 10*3/mm3      RBC 3.98 10*6/mm3      Hemoglobin 11.2 g/dL      Hematocrit 35.7 %      MCV 89.7 fL      MCH 28.1 pg      MCHC 31.4 g/dL      RDW 12.8 %      RDW-SD 42.2 fl      MPV 11.1 fL      Platelets 243 10*3/mm3 "      Neutrophil % 69.8 %      Lymphocyte % 22.5 %      Monocyte % 6.4 %      Eosinophil % 0.5 %      Basophil % 0.5 %      Immature Grans % 0.3 %      Neutrophils, Absolute 6.96 10*3/mm3      Lymphocytes, Absolute 2.24 10*3/mm3      Monocytes, Absolute 0.64 10*3/mm3      Eosinophils, Absolute 0.05 10*3/mm3      Basophils, Absolute 0.05 10*3/mm3      Immature Grans, Absolute 0.03 10*3/mm3      nRBC 0.0 /100 WBC             Imaging Results (Last 24 Hours)       Procedure Component Value Units Date/Time    XR Toe 2+ View Left [[id number]] Collected: 01/28/24 1937     Updated: 01/28/24 1937    Narrative:      LEFT GREAT TOE: 3 VIEWS     HISTORY: Wound check.     COMPARISON: None.     FINDINGS: There is cortical bone loss at the tuft of the 1st distal  phalanx with mild lucency suspicious for osteomyelitis of the tuft of  the 1st distal phalanx. There is overlying soft tissue wound/ulcer and  there is generalized soft tissue swelling.       Impression:      Soft tissue wound/ulcer at the tip of the great toe with  underlying cortical loss and bone lucency, suspicious for osteomyelitis.  Soft tissue swelling.               Results for orders placed in visit on 01/13/21    Adult Transthoracic Echo Complete W/ Cont if Necessary Per Protocol    Interpretation Summary  · Calculated left ventricular EF = 66.8% Estimated left ventricular EF was in agreement with the calculated left ventricular EF.  · Left ventricular diastolic function was normal.  · Calculated left ventricular EF = 66.8%  · There is no evidence of pericardial effusion. .      No orders to display        Assessment/Plan     Active Hospital Problems    Diagnosis  POA    **Toe osteomyelitis, left [M86.9]  Yes    Paroxysmal atrial fibrillation [I48.0]  Yes    Oropharyngeal dysphagia [R13.12]  Yes    Dementia without behavioral disturbance [F03.90]  Yes    Presence of cardiac pacemaker [Z95.0]  Yes    Hyperlipidemia [E78.5]  Yes    Uncontrolled type 2 diabetes  mellitus with hyperglycemia [E11.65]  Yes      Resolved Hospital Problems   No resolved problems to display.     Left great toe osteomyelitis  -X-ray concerning for left great toe osteomyelitis  -MRI ordered, will need to check pacemaker compatibility.  Appears she has a [employer] pacemaker that was placed in March 2021  -Podiatry consulted, appreciate recommendations  -ID consulted, appreciate recommendations  -Clinically is doing well with no signs or symptoms of sepsis.  Hold off antibiotics for now for highest yield of possible bone biopsy  -N.p.o. after midnight in case of possible procedure tomorrow.    Diabetes type 2, last A1c 12.5%  -Recheck A1c  -Lantus 10 units and sliding scale insulin.  Will titrate Lantus based on glucoses.  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    Dysphagia-looks like previously she was on a puréed diet.    I discussed the patient's findings and my recommendations with patient and nursing staff.    VTE Prophylaxis - SCDs.  Code Status - Limited code (no CPR, no intubation).       Perez Barron MD  [facility]  01/28/24  21:04 EST

## 2024-01-29 NOTE — NURSING NOTE
Wound/ostomy - consult received regarding wound and osteomyelitis to L great toe. Podiatry has seen patient and is managing with possible plans for the OR.

## 2024-01-29 NOTE — PROGRESS NOTES
" LOS: 1 day     Name: Di Macario  Age: 83 y.o.  Sex: female  :  1940  MRN: [id number]         Primary Care Physician: Lily Yepez PA-C    Subjective   Subjective  Denies any pain in the left great toe.  No fevers.  No acute overnight events.    Objective   Vital Signs  Temp:  [96.3 °F (35.7 °C)-97.6 °F (36.4 °C)] 97.6 °F (36.4 °C)  Heart Rate:  [56-96] 67  Resp:  [14-16] 14  BP: (119-173)/(62-86) 119/62  Body mass index is 23.95 kg/m².    Objective:  General Appearance:  Comfortable and in no acute distress (Elderly and somewhat frail appearing).    Vital signs: (most recent): Blood pressure 119/62, pulse 67, temperature 97.6 °F (36.4 °C), temperature source Oral, resp. rate 14, height 157.5 cm (62\"), weight 59.4 kg (130 lb 15.3 oz), SpO2 97%.    Lungs:  Normal effort and normal respiratory rate.    Heart: Normal rate.  Regular rhythm.    Abdomen: Abdomen is soft.  Bowel sounds are normal.   There is no abdominal tenderness.     Extremities: There is no dependent edema or local swelling.  (Wound to the tip of the left great toe with some necrotic appearing skin present)  Neurological: Patient is alert and oriented to person, place and time.    Skin:  Warm and dry.                Results Review:       I reviewed the patient's new clinical results.    Results from last 7 days   Lab Units 24  0610 24   WBC 10*3/mm3 8.30 9.97   HEMOGLOBIN g/dL 9.9* 11.2*   PLATELETS 10*3/mm3 245 243     Results from last 7 days   Lab Units 24  0610 24   SODIUM mmol/L 138 136   POTASSIUM mmol/L 3.9 4.5   CHLORIDE mmol/L 102 100   CO2 mmol/L 28.0 25.0   BUN mg/dL 15 18   CREATININE mg/dL 0.54* 0.71   CALCIUM mg/dL 9.1 9.6   GLUCOSE mg/dL 181* 369*           Scheduled Meds:   amLODIPine, 2.5 mg, Oral, Daily  citalopram, 10 mg, Oral, Daily  donepezil, 10 mg, Oral, Nightly  famotidine, 20 mg, Oral, BID  insulin glargine, 15 Units, Subcutaneous, Daily  insulin lispro, 2-7 Units, " Subcutaneous, 4x Daily AC & at Bedtime  melatonin, 1 mg, Oral, Nightly  memantine, 10 mg, Oral, Daily  pravastatin, 40 mg, Oral, Q PM  zonisamide, 200 mg, Oral, Nightly      PRN Meds:     dextrose    dextrose    glucagon (human recombinant)    influenza vaccine    [COMPLETED] Insert Peripheral IV **AND** sodium chloride  Continuous Infusions:       Assessment & Plan   Active Hospital Problems    Diagnosis  POA    **Toe osteomyelitis, left [M86.9]  Yes    Paroxysmal atrial fibrillation [I48.0]  Yes    Oropharyngeal dysphagia [R13.12]  Yes    Dementia without behavioral disturbance [F03.90]  Yes    Presence of cardiac pacemaker [Z95.0]  Yes    Hyperlipidemia [E78.5]  Yes    Uncontrolled type 2 diabetes mellitus with hyperglycemia [E11.65]  Yes      Resolved Hospital Problems   No resolved problems to display.       Assessment & Plan    Left great toe wound  -X-ray concerning for left great toe osteomyelitis  -MRI ordered, will need to check pacemaker compatibility.  Appears she has a [employer] pacemaker that was placed in March 2021  -Spoke with Dr. Gonzales of podiatry.  Plan is for ABIs today and partial toe amputation tomorrow.  -ID consulted.  Last evening she was held off of antibiotics until further evaluation can be conducted.     Diabetes type 2, last A1c 12.5%  -Recheck A1c 11.4  -Fasting glucose this morning 181.  Lantus 15 units and sliding scale insulin.   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      Dysphagia-was previously on a puréed diet.  We will allow her to eat today.  Have asked her nurse to clarify diet modifications and can be restarted in which ever form she was on just prior to admission.     I discussed the patient's findings and my recommendations with patient and nursing staff.     VTE Prophylaxis - SCDs.  Code Status - Limited code (no CPR, no intubation).      Expected discharge date/ time has not been documented.     Suresh Wang MD  [facility]  01/29/24  09:00 EST

## 2024-01-29 NOTE — PROGRESS NOTES
Continued Stay Note  [facility]     Patient Name: Di Macario  MRN: [id number]  Today's Date: 1/29/2024    Admit Date: 1/28/2024    Plan: Home (FU after surgery for poss needs)   Discharge Plan       Row Name 01/29/24 1211       Plan    Plan Home (FU after surgery for poss needs)    Plan Comments Introduced self and role of CCP. Patient confirmed DC plan is to return to home. Patient stated she lives alone and uses a walker (FW) at all times. Has used [facility] and been to [facility] and [facility] for rehab in the past. Patient poss to have surgery tomorrow. CCP will FU.                   Discharge Codes    No documentation.                       Shani Ramirez RN

## 2024-01-29 NOTE — CONSULTS
Nutrition Services    Patient Name:  Di Macario  YOB: 1940  MRN: [id number]  Admit Date:  1/28/2024    Assessment Date:  01/29/24    Summary: Nurse Admission Screen    83yoF admitted for nonhealing left great toe wound, concerns for osteomyelitis. H/o dementia, uncontrolled T2DM, dysphagia.  Plan to go OR tomorrow for partial hallux amputation. Was prev on a puree diet but pt refused puree diet. Is currently on regular texture and is NPO starting midnight for OR tomorrow. Was eating lunch regular texture during my visit. Reports good appetite and some occasional swallowing problem. 75% PO intake. Wt stable per chart review.     A1C 11.4, Glu 112, creat 0.54, BUN 27.8    REC:  SLP eval after procedure   Naga BID (B/D) mixed w/ lemonade to help w/ wound healing  CTM swallow function  Monitor PO intake    RD to follow nutrition needs    CLINICAL NUTRITION ASSESSMENT      Reason for Assessment Nurse Admission Screen     Diagnosis/Problem   Left toe nonhealing wound, osteomyelitis, uncontrolled T2DM, dysphagia   Medical/Surgical History Past Medical History:   Diagnosis Date    Dementia     states better with medication     Diabetes mellitus     Hyperlipidemia 01/13/2021    Lung consolidation 06/01/2022    Peripheral neuropathy 08/28/2018    Vitamin D deficiency        Past Surgical History:   Procedure Laterality Date    ANKLE SURGERY Right     BLADDER SURGERY      Prolapsed    BREAST BIOPSY      CARDIAC ELECTROPHYSIOLOGY PROCEDURE N/A 03/05/2021    Procedure: Pacemaker DC new [place];  Surgeon: Madelin Ferguson MD;  Location: [facility] CATH INVASIVE LOCATION;  Service: Cardiology;  Laterality: N/A;    ENDOSCOPY N/A 06/06/2022    Procedure: ESOPHAGOGASTRODUODENOSCOPY with biopsy, balloon dilation;  Surgeon: Lo Benjamin MD;  Location: [facility] ENDOSCOPY;  Service: Gastroenterology;  Laterality: N/A;  esophageal stricture, hiatal hernia, gastritis    HYSTERECTOMY          Anthropometrics   "      Current Height  Current Weight  BMI kg/m2 Height: 157.5 cm (62\")  Weight: 59.4 kg (130 lb 15.3 oz) (01/28/24 2230)  Body mass index is 23.95 kg/m².   Adjusted BMI (if applicable)    BMI Category Normal/Healthy (18.4 - 24.9)   Ideal Body Weight (IBW) 110#   Usual Body Weight (UBW) 137#   Weight Trend Loss   Weight History Wt Readings from Last 30 Encounters:   01/28/24 2230 59.4 kg (130 lb 15.3 oz)   10/10/23 1242 62.4 kg (137 lb 9.6 oz)   09/21/23 1046 61.2 kg (135 lb)   07/10/23 1426 62.6 kg (138 lb)   04/24/23 1254 63.5 kg (140 lb)   03/03/23 1401 64.3 kg (141 lb 12.8 oz)   02/16/23 1001 62.3 kg (137 lb 6.4 oz)   02/08/23 1520 62.6 kg (138 lb)   12/30/22 1349 66.5 kg (146 lb 9.6 oz)   11/28/22 1428 66 kg (145 lb 6.4 oz)   10/17/22 1251 64.4 kg (142 lb)   09/29/22 1027 65.3 kg (144 lb)   09/18/22 0600 62.6 kg (138 lb)   08/25/22 1402 63 kg (138 lb 12.8 oz)   08/15/22 1037 61.7 kg (136 lb)   07/28/22 2112 58.7 kg (129 lb 6.4 oz)   07/28/22 0500 64 kg (141 lb 1.5 oz)   07/27/22 0333 63.3 kg (139 lb 8.8 oz)   07/26/22 0514 60.8 kg (134 lb 0.6 oz)   07/25/22 0248 59.9 kg (132 lb 0.9 oz)   07/24/22 2356 59.4 kg (130 lb 15.3 oz)   07/12/22 1321 60.4 kg (133 lb 3.2 oz)   07/06/22 1019 59 kg (130 lb)   06/09/22 0516 65.4 kg (144 lb 3.2 oz)   06/08/22 0539 65.8 kg (145 lb)   06/07/22 0144 64.5 kg (142 lb 4.8 oz)   06/06/22 0105 67.2 kg (148 lb 3.2 oz)   06/05/22 0558 67.6 kg (149 lb)   06/04/22 0615 66.2 kg (146 lb)   06/03/22 0513 66.3 kg (146 lb 3.2 oz)   06/02/22 0319 65.2 kg (143 lb 11.8 oz)   06/01/22 0557 65.8 kg (145 lb)   04/07/22 1134 66.1 kg (145 lb 12.8 oz)   03/17/22 1256 65.4 kg (144 lb 3.2 oz)   03/01/22 1035 66.8 kg (147 lb 3.2 oz)   10/29/21 0612 68.8 kg (151 lb 10.8 oz)   10/29/21 0024 68.5 kg (151 lb)   09/23/21 1017 68.5 kg (151 lb)   08/27/21 1107 69.4 kg (153 lb)   08/05/21 1123 69.5 kg (153 lb 3.2 oz)   05/06/21 1231 72.9 kg (160 lb 12.8 oz)   04/27/21 1527 73 kg (161 lb)   03/25/21 1029 71.2 kg " (157 lb)      --  Labs       Pertinent Labs    Results from last 7 days   Lab Units 01/29/24  0610 01/28/24 1917   SODIUM mmol/L 138 136   POTASSIUM mmol/L 3.9 4.5   CHLORIDE mmol/L 102 100   CO2 mmol/L 28.0 25.0   BUN mg/dL 15 18   CREATININE mg/dL 0.54* 0.71   CALCIUM mg/dL 9.1 9.6   BILIRUBIN mg/dL  --  0.2   ALK PHOS U/L  --  148*   ALT (SGPT) U/L  --  8   AST (SGOT) U/L  --  9   GLUCOSE mg/dL 181* 369*     Results from last 7 days   Lab Units 01/29/24  0610 01/28/24 1917   HEMOGLOBIN g/dL 9.9* 11.2*   HEMATOCRIT % 31.3* 35.7   WBC 10*3/mm3 8.30 9.97   ALBUMIN g/dL  --  3.7     Results from last 7 days   Lab Units 01/29/24  0610 01/28/24 1917   PLATELETS 10*3/mm3 245 243     COVID19   Date Value Ref Range Status   07/28/2022 Not Detected Not Detected - Ref. Range Final     Lab Results   Component Value Date    HGBA1C 11.40 (H) 01/29/2024          Medications           Scheduled Medications amLODIPine, 2.5 mg, Oral, Daily  citalopram, 10 mg, Oral, Daily  donepezil, 10 mg, Oral, Nightly  famotidine, 20 mg, Oral, BID  insulin glargine, 15 Units, Subcutaneous, Daily  insulin lispro, 2-7 Units, Subcutaneous, 4x Daily AC & at Bedtime  melatonin, 1 mg, Oral, Nightly  memantine, 10 mg, Oral, Daily  pravastatin, 40 mg, Oral, Q PM  zonisamide, 200 mg, Oral, Nightly       Infusions     PRN Medications   dextrose    dextrose    glucagon (human recombinant)    influenza vaccine    [COMPLETED] Insert Peripheral IV **AND** sodium chloride     Physical Findings          General Findings alert, oriented   Oral/Mouth Cavity WDL   Edema  not assessed   Gastrointestinal non-distended , last bowel movement: 1/28   Skin  non-healing wound(s), location of wound: great left toe   Tubes/Drains/Lines none   NFPE Not indicated at this time   --  Current Nutrition Orders & Evaluation of Intake       Oral Nutrition     Food Allergies NKFA   Current PO Diet NPO Diet NPO Type: Sips with Meds  Diet: Regular/House Diet, Diabetic Diets;  Consistent Carbohydrate; Texture: Regular Texture (IDDSI 7); Fluid Consistency: Thin (IDDSI 0)   Supplement n/a   PO Evaluation     % PO Intake 75%    Factors Affecting Intake: swallow impairment   --  PES STATEMENT / NUTRITION DIAGNOSIS      Nutrition Dx Problem  Problem: Limited Adherence to Diet Modifications   Etiology: Medical Diagnosis - dysphagia    Signs/Symptoms: PO intake and Report/Observation pt refusing puree food     NUTRITION INTERVENTION / PLAN OF CARE      Intervention Goal(s) Meet estimated needs, Establish PO intake, Accepts oral nutrition supplement, No significant weight loss, and PO intake goal %: %         RD Intervention/Action Encourage intake, Continue to monitor, Care plan reviewed, and Recommend/order: naga BID   --      Prescription/Orders:       PO Diet Regular texture, consistent CHO      Supplements Naga       Enteral Nutrition       Parenteral Nutrition    New Prescription Ordered? Yes   --      Monitor/Evaluation Per protocol, PO intake, Supplement intake, Pertinent labs, Skin status, Swallow function   Discharge Plan/Needs Pending clinical course   --    RD to follow per protocol.      Electronically signed by:  Maryan Cantrell  01/29/24 17:26 EST

## 2024-01-30 ENCOUNTER — ANESTHESIA EVENT (OUTPATIENT)
Dept: PERIOP | Facility: HOSPITAL | Age: 84
End: 2024-01-30
Payer: MEDICARE

## 2024-01-30 ENCOUNTER — APPOINTMENT (OUTPATIENT)
Dept: GENERAL RADIOLOGY | Facility: HOSPITAL | Age: 84
DRG: 617 | End: 2024-01-30
Payer: MEDICARE

## 2024-01-30 ENCOUNTER — ANESTHESIA (OUTPATIENT)
Dept: PERIOP | Facility: HOSPITAL | Age: 84
End: 2024-01-30
Payer: MEDICARE

## 2024-01-30 LAB
ANION GAP SERPL CALCULATED.3IONS-SCNC: 14 MMOL/L (ref 5–15)
BUN SERPL-MCNC: 14 MG/DL (ref 8–23)
BUN/CREAT SERPL: 17.9 (ref 7–25)
CALCIUM SPEC-SCNC: 9.7 MG/DL (ref 8.6–10.5)
CHLORIDE SERPL-SCNC: 102 MMOL/L (ref 98–107)
CO2 SERPL-SCNC: 20 MMOL/L (ref 22–29)
CREAT SERPL-MCNC: 0.78 MG/DL (ref 0.57–1)
DEPRECATED RDW RBC AUTO: 43.9 FL (ref 37–54)
EGFRCR SERPLBLD CKD-EPI 2021: 75 ML/MIN/1.73
ERYTHROCYTE [DISTWIDTH] IN BLOOD BY AUTOMATED COUNT: 13.3 % (ref 12.3–15.4)
GLUCOSE BLDC GLUCOMTR-MCNC: 125 MG/DL (ref 70–130)
GLUCOSE BLDC GLUCOMTR-MCNC: 132 MG/DL (ref 70–130)
GLUCOSE BLDC GLUCOMTR-MCNC: 148 MG/DL (ref 70–130)
GLUCOSE BLDC GLUCOMTR-MCNC: 150 MG/DL (ref 70–130)
GLUCOSE BLDC GLUCOMTR-MCNC: 376 MG/DL (ref 70–130)
GLUCOSE SERPL-MCNC: 131 MG/DL (ref 65–99)
HCT VFR BLD AUTO: 39 % (ref 34–46.6)
HGB BLD-MCNC: 12.4 G/DL (ref 12–15.9)
MCH RBC QN AUTO: 28.3 PG (ref 26.6–33)
MCHC RBC AUTO-ENTMCNC: 31.8 G/DL (ref 31.5–35.7)
MCV RBC AUTO: 89 FL (ref 79–97)
PLATELET # BLD AUTO: 268 10*3/MM3 (ref 140–450)
PMV BLD AUTO: 11.6 FL (ref 6–12)
POTASSIUM SERPL-SCNC: 4.2 MMOL/L (ref 3.5–5.2)
RBC # BLD AUTO: 4.38 10*6/MM3 (ref 3.77–5.28)
SODIUM SERPL-SCNC: 136 MMOL/L (ref 136–145)
WBC NRBC COR # BLD AUTO: 11.88 10*3/MM3 (ref 3.4–10.8)

## 2024-01-30 PROCEDURE — 25810000003 LACTATED RINGERS PER 1000 ML: Performed by: ANESTHESIOLOGY

## 2024-01-30 PROCEDURE — 82948 REAGENT STRIP/BLOOD GLUCOSE: CPT

## 2024-01-30 PROCEDURE — 73630 X-RAY EXAM OF FOOT: CPT

## 2024-01-30 PROCEDURE — 87075 CULTR BACTERIA EXCEPT BLOOD: CPT | Performed by: PODIATRIST

## 2024-01-30 PROCEDURE — 87176 TISSUE HOMOGENIZATION CULTR: CPT | Performed by: PODIATRIST

## 2024-01-30 PROCEDURE — 25010000002 PROPOFOL 10 MG/ML EMULSION: Performed by: ANESTHESIOLOGY

## 2024-01-30 PROCEDURE — 63710000001 INSULIN LISPRO (HUMAN) PER 5 UNITS: Performed by: PODIATRIST

## 2024-01-30 PROCEDURE — 88311 DECALCIFY TISSUE: CPT | Performed by: PODIATRIST

## 2024-01-30 PROCEDURE — 87015 SPECIMEN INFECT AGNT CONCNTJ: CPT | Performed by: PODIATRIST

## 2024-01-30 PROCEDURE — 87147 CULTURE TYPE IMMUNOLOGIC: CPT | Performed by: PODIATRIST

## 2024-01-30 PROCEDURE — 87102 FUNGUS ISOLATION CULTURE: CPT | Performed by: PODIATRIST

## 2024-01-30 PROCEDURE — 88305 TISSUE EXAM BY PATHOLOGIST: CPT | Performed by: PODIATRIST

## 2024-01-30 PROCEDURE — 25010000002 BUPIVACAINE 0.5 % SOLUTION: Performed by: PODIATRIST

## 2024-01-30 PROCEDURE — 87116 MYCOBACTERIA CULTURE: CPT | Performed by: PODIATRIST

## 2024-01-30 PROCEDURE — 87070 CULTURE OTHR SPECIMN AEROBIC: CPT | Performed by: PODIATRIST

## 2024-01-30 PROCEDURE — 87206 SMEAR FLUORESCENT/ACID STAI: CPT | Performed by: PODIATRIST

## 2024-01-30 PROCEDURE — 80048 BASIC METABOLIC PNL TOTAL CA: CPT | Performed by: INTERNAL MEDICINE

## 2024-01-30 PROCEDURE — 85027 COMPLETE CBC AUTOMATED: CPT | Performed by: INTERNAL MEDICINE

## 2024-01-30 PROCEDURE — 0Y6Q0Z3 DETACHMENT AT LEFT 1ST TOE, LOW, OPEN APPROACH: ICD-10-PCS | Performed by: PODIATRIST

## 2024-01-30 PROCEDURE — 3E03329 INTRODUCTION OF OTHER ANTI-INFECTIVE INTO PERIPHERAL VEIN, PERCUTANEOUS APPROACH: ICD-10-PCS | Performed by: PODIATRIST

## 2024-01-30 PROCEDURE — 25010000002 LIDOCAINE 1 % SOLUTION: Performed by: PODIATRIST

## 2024-01-30 PROCEDURE — 87205 SMEAR GRAM STAIN: CPT | Performed by: PODIATRIST

## 2024-01-30 PROCEDURE — 3E013GC INTRODUCTION OF OTHER THERAPEUTIC SUBSTANCE INTO SUBCUTANEOUS TISSUE, PERCUTANEOUS APPROACH: ICD-10-PCS | Performed by: PODIATRIST

## 2024-01-30 RX ORDER — HYDROCODONE BITARTRATE AND ACETAMINOPHEN 5; 325 MG/1; MG/1
1 TABLET ORAL ONCE AS NEEDED
Status: DISCONTINUED | OUTPATIENT
Start: 2024-01-30 | End: 2024-01-30 | Stop reason: HOSPADM

## 2024-01-30 RX ORDER — PROMETHAZINE HYDROCHLORIDE 25 MG/1
25 TABLET ORAL ONCE AS NEEDED
Status: DISCONTINUED | OUTPATIENT
Start: 2024-01-30 | End: 2024-01-30 | Stop reason: HOSPADM

## 2024-01-30 RX ORDER — PHENYLEPHRINE HCL IN 0.9% NACL 1 MG/10 ML
SYRINGE (ML) INTRAVENOUS AS NEEDED
Status: DISCONTINUED | OUTPATIENT
Start: 2024-01-30 | End: 2024-01-30 | Stop reason: SURG

## 2024-01-30 RX ORDER — ONDANSETRON 2 MG/ML
4 INJECTION INTRAMUSCULAR; INTRAVENOUS ONCE AS NEEDED
Status: DISCONTINUED | OUTPATIENT
Start: 2024-01-30 | End: 2024-01-30 | Stop reason: HOSPADM

## 2024-01-30 RX ORDER — LIDOCAINE HYDROCHLORIDE 20 MG/ML
INJECTION, SOLUTION INFILTRATION; PERINEURAL AS NEEDED
Status: DISCONTINUED | OUTPATIENT
Start: 2024-01-30 | End: 2024-01-30 | Stop reason: SURG

## 2024-01-30 RX ORDER — IPRATROPIUM BROMIDE AND ALBUTEROL SULFATE 2.5; .5 MG/3ML; MG/3ML
3 SOLUTION RESPIRATORY (INHALATION) ONCE AS NEEDED
Status: DISCONTINUED | OUTPATIENT
Start: 2024-01-30 | End: 2024-01-30 | Stop reason: HOSPADM

## 2024-01-30 RX ORDER — HYDROCODONE BITARTRATE AND ACETAMINOPHEN 7.5; 325 MG/1; MG/1
1 TABLET ORAL EVERY 4 HOURS PRN
Status: DISCONTINUED | OUTPATIENT
Start: 2024-01-30 | End: 2024-01-30 | Stop reason: HOSPADM

## 2024-01-30 RX ORDER — LIDOCAINE HYDROCHLORIDE 10 MG/ML
0.5 INJECTION, SOLUTION INFILTRATION; PERINEURAL ONCE AS NEEDED
Status: DISCONTINUED | OUTPATIENT
Start: 2024-01-30 | End: 2024-02-04 | Stop reason: HOSPADM

## 2024-01-30 RX ORDER — BUPIVACAINE HYDROCHLORIDE 5 MG/ML
INJECTION, SOLUTION PERINEURAL AS NEEDED
Status: DISCONTINUED | OUTPATIENT
Start: 2024-01-30 | End: 2024-01-30 | Stop reason: HOSPADM

## 2024-01-30 RX ORDER — EPHEDRINE SULFATE 50 MG/ML
5 INJECTION, SOLUTION INTRAVENOUS ONCE AS NEEDED
Status: DISCONTINUED | OUTPATIENT
Start: 2024-01-30 | End: 2024-01-30 | Stop reason: HOSPADM

## 2024-01-30 RX ORDER — PROMETHAZINE HYDROCHLORIDE 25 MG/1
25 SUPPOSITORY RECTAL ONCE AS NEEDED
Status: DISCONTINUED | OUTPATIENT
Start: 2024-01-30 | End: 2024-01-30 | Stop reason: HOSPADM

## 2024-01-30 RX ORDER — HYDROMORPHONE HYDROCHLORIDE 1 MG/ML
0.25 INJECTION, SOLUTION INTRAMUSCULAR; INTRAVENOUS; SUBCUTANEOUS
Status: DISCONTINUED | OUTPATIENT
Start: 2024-01-30 | End: 2024-01-30 | Stop reason: HOSPADM

## 2024-01-30 RX ORDER — LIDOCAINE HYDROCHLORIDE 10 MG/ML
INJECTION, SOLUTION INFILTRATION; PERINEURAL AS NEEDED
Status: DISCONTINUED | OUTPATIENT
Start: 2024-01-30 | End: 2024-01-30 | Stop reason: HOSPADM

## 2024-01-30 RX ORDER — DIPHENHYDRAMINE HYDROCHLORIDE 50 MG/ML
12.5 INJECTION INTRAMUSCULAR; INTRAVENOUS
Status: DISCONTINUED | OUTPATIENT
Start: 2024-01-30 | End: 2024-01-30 | Stop reason: HOSPADM

## 2024-01-30 RX ORDER — SODIUM CHLORIDE 0.9 % (FLUSH) 0.9 %
3-10 SYRINGE (ML) INJECTION AS NEEDED
Status: DISCONTINUED | OUTPATIENT
Start: 2024-01-30 | End: 2024-02-04 | Stop reason: HOSPADM

## 2024-01-30 RX ORDER — FENTANYL CITRATE 50 UG/ML
25 INJECTION, SOLUTION INTRAMUSCULAR; INTRAVENOUS
Status: DISCONTINUED | OUTPATIENT
Start: 2024-01-30 | End: 2024-01-30 | Stop reason: HOSPADM

## 2024-01-30 RX ORDER — SODIUM CHLORIDE, SODIUM LACTATE, POTASSIUM CHLORIDE, CALCIUM CHLORIDE 600; 310; 30; 20 MG/100ML; MG/100ML; MG/100ML; MG/100ML
9 INJECTION, SOLUTION INTRAVENOUS CONTINUOUS
Status: DISCONTINUED | OUTPATIENT
Start: 2024-01-30 | End: 2024-02-04 | Stop reason: HOSPADM

## 2024-01-30 RX ORDER — PROPOFOL 10 MG/ML
VIAL (ML) INTRAVENOUS CONTINUOUS PRN
Status: DISCONTINUED | OUTPATIENT
Start: 2024-01-30 | End: 2024-01-30 | Stop reason: SURG

## 2024-01-30 RX ORDER — MAGNESIUM HYDROXIDE 1200 MG/15ML
LIQUID ORAL AS NEEDED
Status: DISCONTINUED | OUTPATIENT
Start: 2024-01-30 | End: 2024-01-30 | Stop reason: HOSPADM

## 2024-01-30 RX ORDER — HYDRALAZINE HYDROCHLORIDE 20 MG/ML
5 INJECTION INTRAMUSCULAR; INTRAVENOUS
Status: DISCONTINUED | OUTPATIENT
Start: 2024-01-30 | End: 2024-01-30 | Stop reason: HOSPADM

## 2024-01-30 RX ORDER — DROPERIDOL 2.5 MG/ML
0.62 INJECTION, SOLUTION INTRAMUSCULAR; INTRAVENOUS
Status: DISCONTINUED | OUTPATIENT
Start: 2024-01-30 | End: 2024-01-30 | Stop reason: HOSPADM

## 2024-01-30 RX ORDER — LABETALOL HYDROCHLORIDE 5 MG/ML
5 INJECTION, SOLUTION INTRAVENOUS
Status: DISCONTINUED | OUTPATIENT
Start: 2024-01-30 | End: 2024-01-30 | Stop reason: HOSPADM

## 2024-01-30 RX ORDER — SODIUM CHLORIDE 0.9 % (FLUSH) 0.9 %
3 SYRINGE (ML) INJECTION EVERY 12 HOURS SCHEDULED
Status: DISCONTINUED | OUTPATIENT
Start: 2024-01-30 | End: 2024-02-04 | Stop reason: HOSPADM

## 2024-01-30 RX ORDER — NALOXONE HCL 0.4 MG/ML
0.2 VIAL (ML) INJECTION AS NEEDED
Status: DISCONTINUED | OUTPATIENT
Start: 2024-01-30 | End: 2024-01-30 | Stop reason: HOSPADM

## 2024-01-30 RX ORDER — FLUMAZENIL 0.1 MG/ML
0.2 INJECTION INTRAVENOUS AS NEEDED
Status: DISCONTINUED | OUTPATIENT
Start: 2024-01-30 | End: 2024-01-30 | Stop reason: HOSPADM

## 2024-01-30 RX ADMIN — SODIUM CHLORIDE, POTASSIUM CHLORIDE, SODIUM LACTATE AND CALCIUM CHLORIDE 9 ML/HR: 600; 310; 30; 20 INJECTION, SOLUTION INTRAVENOUS at 15:40

## 2024-01-30 RX ADMIN — FAMOTIDINE 20 MG: 20 TABLET, FILM COATED ORAL at 08:00

## 2024-01-30 RX ADMIN — Medication 100 MCG: at 15:59

## 2024-01-30 RX ADMIN — Medication 200 MCG: at 16:04

## 2024-01-30 RX ADMIN — AMLODIPINE BESYLATE 2.5 MG: 2.5 TABLET ORAL at 08:00

## 2024-01-30 RX ADMIN — INSULIN LISPRO 6 UNITS: 100 INJECTION, SOLUTION INTRAVENOUS; SUBCUTANEOUS at 22:39

## 2024-01-30 RX ADMIN — MEMANTINE HYDROCHLORIDE 10 MG: 10 TABLET, FILM COATED ORAL at 08:00

## 2024-01-30 RX ADMIN — Medication 1 MG: at 21:10

## 2024-01-30 RX ADMIN — LIDOCAINE HYDROCHLORIDE 60 MG: 20 INJECTION, SOLUTION INFILTRATION; PERINEURAL at 15:54

## 2024-01-30 RX ADMIN — DONEPEZIL HYDROCHLORIDE 10 MG: 10 TABLET, FILM COATED ORAL at 21:10

## 2024-01-30 RX ADMIN — ZONISAMIDE 200 MG: 100 CAPSULE ORAL at 21:10

## 2024-01-30 RX ADMIN — PROPOFOL 100 MCG/KG/MIN: 10 INJECTION, EMULSION INTRAVENOUS at 15:55

## 2024-01-30 RX ADMIN — PROPOFOL 40 MG: 10 INJECTION, EMULSION INTRAVENOUS at 16:00

## 2024-01-30 RX ADMIN — CITALOPRAM 10 MG: 20 TABLET, FILM COATED ORAL at 08:00

## 2024-01-30 RX ADMIN — FAMOTIDINE 20 MG: 20 TABLET, FILM COATED ORAL at 21:10

## 2024-01-30 RX ADMIN — PROPOFOL 40 MG: 10 INJECTION, EMULSION INTRAVENOUS at 15:54

## 2024-01-30 NOTE — OP NOTE
Operative Report    Patient: Di Macario     Date: 01/30/24     MRN: [id number]       SURGEON: Nas Gonzales III, DPM     ASSISTANT: None    PROCEDURE: Partial hallux amputation, left foot    PRE-OPERATIVE DIAGNOSIS: Diabetic foot ulcer with necrosis of bone, left hallux  Osteomyelitis of left hallux    POST-OPERATIVE DIAGNOSIS: Same    ANAESTHESIA: MAC    HEMOSTASIS: None    ESTIMATED BLOOD LOSS: Minimal    SPECIMEN: Distal phalanx sent for microbiology, distal phalanx sent for pathology    IMPLANTS: None    COMPLICATIONS: None    INDICATION FOR PROCEDURE: This is an 83-year-old female who has a nonhealing ulcer to the distal aspect of the left hallux.  She has had progression of the ulcer with purulent drainage.  MRI shows osteomyelitis of the distal phalanx of the left hallux.  She is requiring surgical intervention for the problem this time.    Consent was obtained pre-operatively. All questions were answered, risks versus benefits were discussed at length. No guarantees or assurances were given or implied.    PROCEDURE: Patient was brought to the operating room and placed on the operative table in supine position. A surgical timeout was performed and then patients name, date of birth, procedure and surgical site were all verified. A local block of 10 cc 1% lidocaine plain was injected to the surgical site.  No tourniquet was used for this case.  The left lower extremity was then scrubbed, prepped and draped in the usual sterile manner.     Attention was directed to the left hallux where a fishmouth incision was made distal to the IPJ of the hallux.  Incision was deepened straight to bone and more skin was left plantarly there was noted to be viable.  The toe was then disarticulated at the IPJ.  The distal phalanx was placed on the back table and the distal tuft was cut into 2 pieces with bone cutters.  Half a sample was sent for microbiology, half of the sample was sent for pathology.  There is noted  to be no purulent drainage to the surgical site.  Surgical site was flushed with copious amounts of sterile saline.  Deep tissue was reapproximated with 3-0 Vicryl.  Skin was reapproximated with 3-0 nylon in simple fashion.  10 cc of 0.5% Marcaine plain was injected to the surgical site.  A dry sterile dressing of Xeroform, 4 x 4's, Bebeto, cast padding and Coban was applied to the left lower extremity.  Patient tolerated procedure anesthesia well.  Patient was transferred from the operating room to the recovery room with vital signs stable and neurovascular status intact.    Nas Gonzales III, MATTEOM

## 2024-01-30 NOTE — PLAN OF CARE
Goal Outcome Evaluation:  Plan of Care Reviewed With: patient        Progress: no change    Vss lt great toe swollen and reddened, skin prep done, up with assist and using a walker, bed alarm for safety, minimal pain on the affected toe and doesn't need any pain per pt, keep npo since MN, continue to monitor the pt.

## 2024-01-30 NOTE — PROGRESS NOTES
" LOS: 2 days     Name: Di Macario  Age: 83 y.o.  Sex: female  :  1940  MRN: [id number]         Primary Care Physician: Lily Yepez PA-C    Subjective   Subjective  No new complaints or acute overnight events.  Denies any foot/toe pain.  Denies fevers or chills.    Objective   Vital Signs  Temp:  [97.1 °F (36.2 °C)-97.9 °F (36.6 °C)] 97.1 °F (36.2 °C)  Heart Rate:  [60-63] 63  Resp:  [16-18] 16  BP: (103-135)/(56-70) 135/65  Body mass index is 23.95 kg/m².    Objective:  General Appearance:  Comfortable and in no acute distress.    Vital signs: (most recent): Blood pressure 135/65, pulse 63, temperature 97.1 °F (36.2 °C), temperature source Oral, resp. rate 16, height 157.5 cm (62\"), weight 59.4 kg (130 lb 15.3 oz), SpO2 98%.    Lungs:  Normal effort and normal respiratory rate.  She is not in respiratory distress.  There are decreased breath sounds.    Heart: Normal rate.  Regular rhythm.    Abdomen: Abdomen is soft.  Bowel sounds are normal.   There is no abdominal tenderness.     Extremities: There is no dependent edema or local swelling.  (Left great toe wound unchanged from yesterday)  Neurological: Patient is alert and oriented to person, place and time.    Skin:  Warm and dry.                Results Review:       I reviewed the patient's new clinical results.    Results from last 7 days   Lab Units 24  0554 24  0610 24   WBC 10*3/mm3 11.88* 8.30 9.97   HEMOGLOBIN g/dL 12.4 9.9* 11.2*   PLATELETS 10*3/mm3 268 245 243     Results from last 7 days   Lab Units 24  0554 24  0610 24   SODIUM mmol/L 136 138 136   POTASSIUM mmol/L 4.2 3.9 4.5   CHLORIDE mmol/L 102 102 100   CO2 mmol/L 20.0* 28.0 25.0   BUN mg/dL 14 15 18   CREATININE mg/dL 0.78 0.54* 0.71   CALCIUM mg/dL 9.7 9.1 9.6   GLUCOSE mg/dL 131* 181* 369*                 Scheduled Meds:   amLODIPine, 2.5 mg, Oral, Daily  citalopram, 10 mg, Oral, Daily  donepezil, 10 mg, Oral, " Nightly  famotidine, 20 mg, Oral, BID  insulin lispro, 2-7 Units, Subcutaneous, 4x Daily AC & at Bedtime  melatonin, 1 mg, Oral, Nightly  memantine, 10 mg, Oral, Daily  pravastatin, 40 mg, Oral, Q PM  zonisamide, 200 mg, Oral, Nightly      PRN Meds:     dextrose    dextrose    glucagon (human recombinant)    influenza vaccine    [COMPLETED] Insert Peripheral IV **AND** sodium chloride  Continuous Infusions:       Assessment & Plan   Active Hospital Problems    Diagnosis  POA    **Toe osteomyelitis, left [M86.9]  Yes    Paroxysmal atrial fibrillation [I48.0]  Yes    Oropharyngeal dysphagia [R13.12]  Yes    Dementia without behavioral disturbance [F03.90]  Yes    Presence of cardiac pacemaker [Z95.0]  Yes    Hyperlipidemia [E78.5]  Yes    Uncontrolled type 2 diabetes mellitus with hyperglycemia [E11.65]  Yes      Resolved Hospital Problems   No resolved problems to display.       Assessment & Plan    Left great toe wound  -X-ray concerning for left great toe osteomyelitis  -Planning for partial toe amputation today  -ID consulted.  Antibiotics will be initiated after surgery.     Diabetes type 2, last A1c 12.5%  -Recheck A1c 11.4  -Fasting glucose this morning 131.  This morning's Lantus on hold.  Plan to restart this tomorrow.  Continue sliding scale.  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     Dysphagia-resume modified diet post-operatively     I discussed the patient's findings and my recommendations with patient and  nursing staff.     VTE Prophylaxis - SCDs.  Code Status - Limited code (no CPR, no intubation).      Expected Discharge Date: 2/4/2024; Expected Discharge Time:      Suresh Wang MD  [facility]  01/30/24  10:02 EST

## 2024-01-30 NOTE — PLAN OF CARE
Goal Outcome Evaluation:  Plan of Care Reviewed With: patient        Progress: improving  Outcome Evaluation: Patient returned to unit from PACU with no c/o pain. LLE dressing CD&I. NCS diet resumed, tolerating clear liquids. Accu-check WNL. Fall precautions maintained. Family at bedside. Ordered post-op shoe for WBAT. VSS on room air.

## 2024-01-30 NOTE — PRE-PROCEDURE NOTE
Patient has a DNR order. I spoke with the patient and her daughter at bedside regarding surgical code. The patient has elected for FULL CODE during the procedure. She will keep the DNR status once procedure is over.

## 2024-01-30 NOTE — ANESTHESIA POSTPROCEDURE EVALUATION
"Patient: Di Macario    Procedure Summary       Date: 01/30/24 Room / Location: [facility] OR [address] [place] [address] MAIN OR    Anesthesia Start: 1549 Anesthesia Stop: 1631    Procedure: PARTIAL HALLUX AMPUTATION LEFT FOOT (Left) Diagnosis:       Toe osteomyelitis, left      (Toe osteomyelitis, left [M86.9])    Surgeons: Nas Gonzales DPM Provider: Aj Morrell MD    Anesthesia Type: MAC ASA Status: 4            Anesthesia Type: MAC    Vitals  Vitals Value Taken Time   /68 01/30/24 1645   Temp 36.3 °C (97.4 °F) 01/30/24 1631   Pulse 60 01/30/24 1659   Resp 16 01/30/24 1645   SpO2 100 % 01/30/24 1659   Vitals shown include unfiled device data.        Post Anesthesia Care and Evaluation    Patient location during evaluation: bedside  Patient participation: complete - patient participated  Level of consciousness: awake and alert  Pain management: adequate    Airway patency: patent  Anesthetic complications: No anesthetic complications    Cardiovascular status: acceptable  Respiratory status: acceptable  Hydration status: acceptable    Comments: /68   Pulse 60   Temp 36.3 °C (97.4 °F) (Oral)   Resp 16   Ht 157.5 cm (62\")   Wt 59.4 kg (130 lb 15.3 oz)   SpO2 100%   BMI 23.95 kg/m²     "

## 2024-01-30 NOTE — ANESTHESIA PREPROCEDURE EVALUATION
Anesthesia Evaluation     Patient summary reviewed   NPO Solid Status: > 8 hours             Airway   Mallampati: II  No difficulty expected  Dental      Pulmonary    (+) ,shortness of breath  Cardiovascular     Rhythm: regular    (+) pacemaker pacemaker, hypertension, dysrhythmias      Neuro/Psych  GI/Hepatic/Renal/Endo    (+) diabetes mellitus    ROS Comment: anemia    Musculoskeletal     Abdominal    Substance History      OB/GYN          Other                      Anesthesia Plan    ASA 4     MAC       Anesthetic plan, risks, benefits, and alternatives have been provided, discussed and informed consent has been obtained with: patient.      CODE STATUS:    Medical Intervention Limits: NO intubation (DNI); NO cardioversion  Code Status (Patient has no pulse and is not breathing): No CPR (Do Not Attempt to Resuscitate)  Medical Interventions (Patient has pulse or is breathing): Limited Support

## 2024-01-31 LAB
ANION GAP SERPL CALCULATED.3IONS-SCNC: 11.4 MMOL/L (ref 5–15)
BUN SERPL-MCNC: 12 MG/DL (ref 8–23)
BUN/CREAT SERPL: 20 (ref 7–25)
CALCIUM SPEC-SCNC: 9 MG/DL (ref 8.6–10.5)
CHLORIDE SERPL-SCNC: 102 MMOL/L (ref 98–107)
CO2 SERPL-SCNC: 23.6 MMOL/L (ref 22–29)
CREAT SERPL-MCNC: 0.6 MG/DL (ref 0.57–1)
DEPRECATED RDW RBC AUTO: 42.5 FL (ref 37–54)
EGFRCR SERPLBLD CKD-EPI 2021: 88.6 ML/MIN/1.73
ERYTHROCYTE [DISTWIDTH] IN BLOOD BY AUTOMATED COUNT: 13 % (ref 12.3–15.4)
GLUCOSE BLDC GLUCOMTR-MCNC: 140 MG/DL (ref 70–130)
GLUCOSE BLDC GLUCOMTR-MCNC: 293 MG/DL (ref 70–130)
GLUCOSE BLDC GLUCOMTR-MCNC: 332 MG/DL (ref 70–130)
GLUCOSE BLDC GLUCOMTR-MCNC: 369 MG/DL (ref 70–130)
GLUCOSE SERPL-MCNC: 152 MG/DL (ref 65–99)
HCT VFR BLD AUTO: 35.3 % (ref 34–46.6)
HGB BLD-MCNC: 11.1 G/DL (ref 12–15.9)
MCH RBC QN AUTO: 28 PG (ref 26.6–33)
MCHC RBC AUTO-ENTMCNC: 31.4 G/DL (ref 31.5–35.7)
MCV RBC AUTO: 89.1 FL (ref 79–97)
PLATELET # BLD AUTO: 252 10*3/MM3 (ref 140–450)
PMV BLD AUTO: 11.2 FL (ref 6–12)
POTASSIUM SERPL-SCNC: 4 MMOL/L (ref 3.5–5.2)
RBC # BLD AUTO: 3.96 10*6/MM3 (ref 3.77–5.28)
SODIUM SERPL-SCNC: 137 MMOL/L (ref 136–145)
WBC NRBC COR # BLD AUTO: 8 10*3/MM3 (ref 3.4–10.8)

## 2024-01-31 PROCEDURE — 97530 THERAPEUTIC ACTIVITIES: CPT

## 2024-01-31 PROCEDURE — 63710000001 INSULIN LISPRO (HUMAN) PER 5 UNITS: Performed by: PODIATRIST

## 2024-01-31 PROCEDURE — 25810000003 SODIUM CHLORIDE 0.9 % SOLUTION: Performed by: INTERNAL MEDICINE

## 2024-01-31 PROCEDURE — 25010000002 VANCOMYCIN 10 G RECONSTITUTED SOLUTION: Performed by: INTERNAL MEDICINE

## 2024-01-31 PROCEDURE — 85027 COMPLETE CBC AUTOMATED: CPT | Performed by: INTERNAL MEDICINE

## 2024-01-31 PROCEDURE — 82948 REAGENT STRIP/BLOOD GLUCOSE: CPT

## 2024-01-31 PROCEDURE — 63710000001 INSULIN GLARGINE PER 5 UNITS: Performed by: PODIATRIST

## 2024-01-31 PROCEDURE — 97161 PT EVAL LOW COMPLEX 20 MIN: CPT

## 2024-01-31 PROCEDURE — 80048 BASIC METABOLIC PNL TOTAL CA: CPT | Performed by: INTERNAL MEDICINE

## 2024-01-31 PROCEDURE — 25010000002 PIPERACILLIN SOD-TAZOBACTAM PER 1 G: Performed by: INTERNAL MEDICINE

## 2024-01-31 PROCEDURE — 99232 SBSQ HOSP IP/OBS MODERATE 35: CPT | Performed by: INTERNAL MEDICINE

## 2024-01-31 RX ORDER — ACETAMINOPHEN 325 MG/1
650 TABLET ORAL EVERY 4 HOURS PRN
Status: DISCONTINUED | OUTPATIENT
Start: 2024-01-31 | End: 2024-02-04 | Stop reason: HOSPADM

## 2024-01-31 RX ORDER — MORPHINE SULFATE 2 MG/ML
2 INJECTION, SOLUTION INTRAMUSCULAR; INTRAVENOUS
Status: DISCONTINUED | OUTPATIENT
Start: 2024-01-31 | End: 2024-02-04 | Stop reason: HOSPADM

## 2024-01-31 RX ADMIN — INSULIN LISPRO 5 UNITS: 100 INJECTION, SOLUTION INTRAVENOUS; SUBCUTANEOUS at 11:48

## 2024-01-31 RX ADMIN — Medication 3 ML: at 09:32

## 2024-01-31 RX ADMIN — VANCOMYCIN HYDROCHLORIDE 1250 MG: 10 INJECTION, POWDER, LYOPHILIZED, FOR SOLUTION INTRAVENOUS at 10:05

## 2024-01-31 RX ADMIN — MEMANTINE HYDROCHLORIDE 10 MG: 10 TABLET, FILM COATED ORAL at 08:13

## 2024-01-31 RX ADMIN — FAMOTIDINE 20 MG: 20 TABLET, FILM COATED ORAL at 20:46

## 2024-01-31 RX ADMIN — AMLODIPINE BESYLATE 2.5 MG: 2.5 TABLET ORAL at 08:13

## 2024-01-31 RX ADMIN — FAMOTIDINE 20 MG: 20 TABLET, FILM COATED ORAL at 08:13

## 2024-01-31 RX ADMIN — INSULIN LISPRO 6 UNITS: 100 INJECTION, SOLUTION INTRAVENOUS; SUBCUTANEOUS at 16:46

## 2024-01-31 RX ADMIN — CITALOPRAM 10 MG: 20 TABLET, FILM COATED ORAL at 08:13

## 2024-01-31 RX ADMIN — PIPERACILLIN SODIUM AND TAZOBACTAM SODIUM 3.38 G: 3; .375 INJECTION, SOLUTION INTRAVENOUS at 23:50

## 2024-01-31 RX ADMIN — PRAVASTATIN SODIUM 40 MG: 40 TABLET ORAL at 16:46

## 2024-01-31 RX ADMIN — PIPERACILLIN SODIUM AND TAZOBACTAM SODIUM 3.38 G: 3; .375 INJECTION, SOLUTION INTRAVENOUS at 16:46

## 2024-01-31 RX ADMIN — ZONISAMIDE 200 MG: 100 CAPSULE ORAL at 20:46

## 2024-01-31 RX ADMIN — Medication 3 ML: at 20:46

## 2024-01-31 RX ADMIN — Medication 1 MG: at 20:46

## 2024-01-31 RX ADMIN — INSULIN GLARGINE 15 UNITS: 100 INJECTION, SOLUTION SUBCUTANEOUS at 08:13

## 2024-01-31 RX ADMIN — INSULIN LISPRO 4 UNITS: 100 INJECTION, SOLUTION INTRAVENOUS; SUBCUTANEOUS at 22:03

## 2024-01-31 RX ADMIN — ACETAMINOPHEN 325MG 650 MG: 325 TABLET ORAL at 06:45

## 2024-01-31 RX ADMIN — PIPERACILLIN SODIUM AND TAZOBACTAM SODIUM 3.38 G: 3; .375 INJECTION, SOLUTION INTRAVENOUS at 09:31

## 2024-01-31 RX ADMIN — DONEPEZIL HYDROCHLORIDE 10 MG: 10 TABLET, FILM COATED ORAL at 20:46

## 2024-01-31 NOTE — PROGRESS NOTES
LOS: 3 days     Chief Complaint:  Left great toe osteomyelitis     Interval History: Afebrile, status post partial hallux amputation of the left foot.  Reports some pain but overall doing well.  Tolerating antibiotics without abdominal pain nausea vomiting diarrhea or rash    Vital Signs  Temp:  [96.7 °F (35.9 °C)-98 °F (36.7 °C)] 96.9 °F (36.1 °C)  Heart Rate:  [60-66] 62  Resp:  [16] 16  BP: ()/(57-76) 116/64    Physical Exam:  General: In no acute distress  Cardiovascular: RRR  Respiratory: Breathing comfortably on room air  GI: Soft, NT/ND, + bowel sounds bilaterally  Skin: No rashes   Extremities: Left foot covered in dressing    Antibiotics:  Vancomycin dosing per pharmacy  Zosyn 3.375 g IV every 8 hours     Results Review:    Lab Results   Component Value Date    WBC 8.00 01/31/2024    HGB 11.1 (L) 01/31/2024    HCT 35.3 01/31/2024    MCV 89.1 01/31/2024     01/31/2024     Lab Results   Component Value Date    GLUCOSE 152 (H) 01/31/2024    BUN 12 01/31/2024    CREATININE 0.60 01/31/2024    EGFRIFNONA 71 12/06/2021    EGFRIFAFRI 82 12/06/2021    BCR 20.0 01/31/2024    CO2 23.6 01/31/2024    CALCIUM 9.0 01/31/2024    PROTENTOTREF 6.9 10/03/2023    ALBUMIN 3.7 01/28/2024    LABIL2 1.7 10/03/2023    AST 9 01/28/2024    ALT 8 01/28/2024       Microbiology:  1/30 OR cx P    Assessment & Plan   Left great toe osteomyelitis status post amputation on January 30  Poorly controlled type 2 diabetes complicating above  Dementia complicating above  Peripheral neuropathy complicating above    Awaiting results of operative cultures.  For now continue empiric vancomycin dosing per pharmacy and Zosyn 3.375 g IV every 8 hours.  Per the operative note all infected bone was removed therefore I anticipate a 7 to 10-day course of antibiotics following surgery.  Anticipate discharge on oral antibiotics if possible based on culture and sensitivity results

## 2024-01-31 NOTE — PROGRESS NOTES
"[facility] Clinical Pharmacy Services: Vancomycin Pharmacokinetic Initial Consult Note    Di Macario is a 83 y.o. female who is on day 1 of pharmacy to dose vancomycin.    Indication: Bone and/or Joint Infection, SSTI  Consulting Provider: Dr. Wang  Planned Duration of Therapy: 7 days  Loading Dose Ordered or Given: not indicated   Culture/Source:   1/30 toe tissue/bone culture prelim no organisms    Target: -600 mg/L.hr   Other Antimicrobials: zosyn    Vitals/Labs  Ht: 157.5 cm (62\"); Wt: 59.4 kg (130 lb 15.3 oz)  Temp Readings from Last 1 Encounters:   01/31/24 96.7 °F (35.9 °C) (Oral)    Estimated Creatinine Clearance: 65.5 mL/min (by C-G formula based on SCr of 0.6 mg/dL).        Results from last 7 days   Lab Units 01/31/24  0549 01/30/24  0554 01/29/24  0610   CREATININE mg/dL 0.60 0.78 0.54*   WBC 10*3/mm3 8.00 11.88* 8.30     Assessment/Plan:    Vancomycin Dose:   1250 mg IV every  12  hours  Predictive AUC level for the dose ordered is 429 mg/L.hr, which is within the target of 400-600 mg/L.hr  Vanc Trough has been ordered for 2/2 at 0700     Pharmacy will follow patient's kidney function and will adjust doses and obtain levels as necessary. Thank you for involving pharmacy in this patient's care. Please contact pharmacy with any questions or concerns.                           Mayela Garcia, PharmD  Clinical Pharmacist    "

## 2024-01-31 NOTE — PLAN OF CARE
Goal Outcome Evaluation:  Plan of Care Reviewed With: patient        Progress: improving  Outcome Evaluation: vss, no c/o pain, lt foot dressing intact, use post op boots when ambulating, up with assist and using a walker, voiding freely, bed alarm for safety, contunue to monitor the pt,

## 2024-01-31 NOTE — THERAPY EVALUATION
Patient Name: Di Macario  : 1940    MRN: [id number]                              Today's Date: 2024       Admit Date: 2024    Visit Dx:     ICD-10-CM ICD-9-CM   1. Diabetic foot infection  E11.628 250.80    L08.9 686.9   2. Uncontrolled type 2 diabetes mellitus with hyperglycemia  E11.65 250.02   3. Osteomyelitis of great toe  M86.9 730.27   4. Toe osteomyelitis, left  M86.9 730.27     Patient Active Problem List   Diagnosis    Essential hypertension    Vitamin D deficiency    Hyperuricemia    Slow transit constipation    Chronic pain of left knee    At high risk for falls    Peripheral neuropathy    Uncontrolled type 2 diabetes mellitus with hyperglycemia    Walker as ambulation aid    Bradycardia, sinus    Shortness of breath    Leg swelling    Hyperlipidemia    Presence of cardiac pacemaker    Dementia without behavioral disturbance    Aspiration pneumonia due to vomitus    Aspiration pneumonia of both lungs due to gastric secretions, unspecified part of lung    Weakness    Thrombocytopenia    Oropharyngeal dysphagia    Immobility syndrome    Type 2 diabetes mellitus with hyperglycemia, with long-term current use of insulin    Paroxysmal atrial fibrillation    Pure hypercholesterolemia    Toe osteomyelitis, left     Past Medical History:   Diagnosis Date    Dementia     states better with medication     Diabetes mellitus     Essential hypertension 2016    Hyperlipidemia 2021    Lung consolidation 2022    Peripheral neuropathy 2018    Type 2 diabetes mellitus with hyperglycemia, with long-term current use of insulin 2022    Vitamin D deficiency      Past Surgical History:   Procedure Laterality Date    AMPUTATION DIGIT Left 2024    Procedure: PARTIAL HALLUX AMPUTATION LEFT FOOT;  Surgeon: Nas Gonzales DPM;  Location: [facility];  Service: Podiatry;  Laterality: Left;    ANKLE SURGERY Right     BLADDER SURGERY      Prolapsed    BREAST BIOPSY       CARDIAC ELECTROPHYSIOLOGY PROCEDURE N/A 03/05/2021    Procedure: Pacemaker DC new BOSTON;  Surgeon: Madelin Ferguson MD;  Location: [facility] CATH INVASIVE LOCATION;  Service: Cardiology;  Laterality: N/A;    ENDOSCOPY N/A 06/06/2022    Procedure: ESOPHAGOGASTRODUODENOSCOPY with biopsy, balloon dilation;  Surgeon: Lo Benjamin MD;  Location: [facility] ENDOSCOPY;  Service: Gastroenterology;  Laterality: N/A;  esophageal stricture, hiatal hernia, gastritis    HYSTERECTOMY        General Information       Row Name 01/31/24 1451          Physical Therapy Time and Intention    Document Type evaluation  -     Mode of Treatment individual therapy;physical therapy  -       Row Name 01/31/24 1451          General Information    Patient Profile Reviewed yes  -     Prior Level of Function independent:  -     Existing Precautions/Restrictions fall;weight bearing  WBAT in post op shoe  -       Row Name 01/31/24 1451          Living Environment    People in Home alone  -       Row Name 01/31/24 1451          Home Main Entrance    Number of Stairs, Main Entrance none  -       Row Name 01/31/24 1451          Cognition    Orientation Status (Cognition) oriented x 3  -       Row Name 01/31/24 1451          Safety Issues, Functional Mobility    Impairments Affecting Function (Mobility) balance;strength;endurance/activity tolerance;pain  -               User Key  (r) = Recorded By, (t) = Taken By, (c) = Cosigned By      Initials Name Provider Type     Karyn Staton PT Physical Therapist                   Mobility       Row Name 01/31/24 1452          Bed Mobility    Bed Mobility supine-sit  -     Supine-Sit Stone (Bed Mobility) standby assist;verbal cues  -     Assistive Device (Bed Mobility) head of bed elevated;bed rails  -     Comment, (Bed Mobility) Increased time  -       Row Name 01/31/24 1452          Sit-Stand Transfer    Sit-Stand Stone (Transfers) contact guard;minimum  assist (75% patient effort);verbal cues  -     Assistive Device (Sit-Stand Transfers) walker, front-wheeled  -     Comment, (Sit-Stand Transfer) From EOB and commode  -       Row Name 01/31/24 1452          Gait/Stairs (Locomotion)    Chunky Level (Gait) contact guard;verbal cues  -     Assistive Device (Gait) walker, front-wheeled  -     Distance in Feet (Gait) 30ft  -     Deviations/Abnormal Patterns (Gait) jh decreased;gait speed decreased;antalgic  -     Bilateral Gait Deviations forward flexed posture;heel strike decreased  -     Comment, (Gait/Stairs) Gait slow and mildly antalgic and unsteady. No overt LOB noted.  -               User Key  (r) = Recorded By, (t) = Taken By, (c) = Cosigned By      Initials Name Provider Type     Karyn Staton PT Physical Therapist                   Obj/Interventions       Row Name 01/31/24 1454          Range of Motion Comprehensive    General Range of Motion bilateral lower extremity ROM WFL  -       Row Name 01/31/24 1454          Strength Comprehensive (MMT)    General Manual Muscle Testing (MMT) Assessment lower extremity strength deficits identified  -     Comment, General Manual Muscle Testing (MMT) Assessment Generalized post op weakness  -       Row Name 01/31/24 1457          Balance    Balance Assessment sitting static balance;sitting dynamic balance;sit to stand dynamic balance;standing static balance;standing dynamic balance  -     Static Sitting Balance supervision  -     Dynamic Sitting Balance supervision  -     Position, Sitting Balance sitting edge of bed  -     Sit to Stand Dynamic Balance contact guard;minimal assist;verbal cues  -     Static Standing Balance contact guard  -     Dynamic Standing Balance contact guard  -     Position/Device Used, Standing Balance supported;walker, front-wheeled  -     Balance Interventions sitting;standing;sit to stand;supported;static;dynamic  -               User  Key  (r) = Recorded By, (t) = Taken By, (c) = Cosigned By      Initials Name Provider Type    Karyn Romeo PT Physical Therapist                   Goals/Plan       Row Name 01/31/24 1455          Bed Mobility Goal 1 (PT)    Activity/Assistive Device (Bed Mobility Goal 1, PT) bed mobility activities, all  -SM     [place] Level/Cues Needed (Bed Mobility Goal 1, PT) supervision required  -SM     Time Frame (Bed Mobility Goal 1, PT) 1 week  -SM       Row Name 01/31/24 145          Transfer Goal 1 (PT)    Activity/Assistive Device (Transfer Goal 1, PT) sit-to-stand/stand-to-sit;bed-to-chair/chair-to-bed;walker, rolling  -SM     [place] Level/Cues Needed (Transfer Goal 1, PT) supervision required  -SM     Time Frame (Transfer Goal 1, PT) 1 week  -SM       Row Name 01/31/24 1450          Gait Training Goal 1 (PT)    Activity/Assistive Device (Gait Training Goal 1, PT) gait (walking locomotion);walker, rolling  -SM     [place] Level (Gait Training Goal 1, PT) supervision required  -SM     Distance (Gait Training Goal 1, PT) 150ft  -SM     Time Frame (Gait Training Goal 1, PT) 1 week  -SM               User Key  (r) = Recorded By, (t) = Taken By, (c) = Cosigned By      Initials Name Provider Type    Karyn Romeo PT Physical Therapist                   Clinical Impression       Row Name 01/31/24 145          Pain    Pretreatment Pain Rating 3/10  -SM     Posttreatment Pain Rating 3/10  -SM     Pain Location - Side/Orientation Left  -SM     Pain Location - foot  -SM     Pain Intervention(s) Rest;Repositioned;Ambulation/increased activity  -SM       Row Name 01/31/24 1453          Plan of Care Review    Plan of Care Reviewed With patient  -     Outcome Evaluation Patient is an 83 y.o female who presents POD1 parital hallux ampuation of L LE. Patient is WBAT in post op shoe. Patient lives at home alone but has caregivers 4x per week. Patient reports she ambulates with a rwx baseline. Today  patient sat up to EOB with SBA and increased time. Patient required CGA-Alexia to perform STS from EOB and commode. Patient ambulated 30ft with rwx and CGA. Gait slow and mildly antalgic and unsteady. No overt LOB noted. Patient reclined in chair at end of session. Patient would continue to benefit from skilled PT intervention to address deficits in functional mobility and maximize safety and independence. PT will continue to monitor.  -       Row Name 01/31/24 1454          Therapy Assessment/Plan (PT)    Rehab Potential (PT) good, to achieve stated therapy goals  -     Criteria for Skilled Interventions Met (PT) yes  -SM     Therapy Frequency (PT) 6 times/wk  -       Row Name 01/31/24 1454          Vital Signs    Pre Patient Position Supine  -     Intra Patient Position Standing  -SM     Post Patient Position Sitting  -       Row Name 01/31/24 1454          Positioning and Restraints    Pre-Treatment Position in bed  -SM     Post Treatment Position chair  -SM     In Chair notified nsg;call light within reach;encouraged to call for assist;exit alarm on;reclined  -               User Key  (r) = Recorded By, (t) = Taken By, (c) = Cosigned By      Initials Name Provider Type     Karyn Staton, PT Physical Therapist                   Outcome Measures       Row Name 01/31/24 1458 01/31/24 0816       How much help from another person do you currently need...    Turning from your back to your side while in flat bed without using bedrails? 4  -SM 4  -MA    Moving from lying on back to sitting on the side of a flat bed without bedrails? 4  -SM 4  -MA    Moving to and from a bed to a chair (including a wheelchair)? 3  -SM 3  -MA    Standing up from a chair using your arms (e.g., wheelchair, bedside chair)? 3  -SM 3  -MA    Climbing 3-5 steps with a railing? 2  -SM 2  -MA    To walk in hospital room? 3  -SM 3  -MA    AM-PAC 6 Clicks Score (PT) 19  -SM 19  -MA    Highest Level of Mobility Goal 6 --> Walk 10  steps or more  - 6 --> Walk 10 steps or more  -MA      Row Name 01/31/24 1458          Functional Assessment    Outcome Measure Options AM-PAC 6 Clicks Basic Mobility (PT)  -               User Key  (r) = Recorded By, (t) = Taken By, (c) = Cosigned By      Initials Name Provider Type    Leilani Meza RN Registered Nurse    Karyn Romeo PT Physical Therapist                                 Physical Therapy Education       Title: PT OT SLP Therapies (Done)       Topic: Physical Therapy (Done)       Point: Mobility training (Done)       Learning Progress Summary             Patient Acceptance, E, VU by  at 1/31/2024 1458                         Point: Home exercise program (Done)       Learning Progress Summary             Patient Acceptance, E, VU by  at 1/31/2024 1458                         Point: Body mechanics (Done)       Learning Progress Summary             Patient Acceptance, E, VU by  at 1/31/2024 1458                         Point: Precautions (Done)       Learning Progress Summary             Patient Acceptance, E, VU by  at 1/31/2024 1458                                         User Key       Initials Effective Dates Name Provider Type Discipline     05/02/22 -  Karyn Staton, JOY Physical Therapist PT                  PT Recommendation and Plan     Plan of Care Reviewed With: patient  Outcome Evaluation: Patient is an 83 y.o female who presents POD1 parital hallux ampuation of L LE. Patient is WBAT in post op shoe. Patient lives at home alone but has caregivers 4x per week. Patient reports she ambulates with a rwx baseline. Today patient sat up to EOB with SBA and increased time. Patient required CGA-Alexia to perform STS from EOB and commode. Patient ambulated 30ft with rwx and CGA. Gait slow and mildly antalgic and unsteady. No overt LOB noted. Patient reclined in chair at end of session. Patient would continue to benefit from skilled PT intervention to address deficits  in functional mobility and maximize safety and independence. PT will continue to monitor.     Time Calculation:         PT Charges       Row Name 01/31/24 1459             Time Calculation    Start Time 1331  -SM      Stop Time 1349  -SM      Time Calculation (min) 18 min  -SM      PT Received On 01/31/24  -      PT - Next Appointment 02/01/24  -      PT Goal Re-Cert Due Date 02/07/24  -         Time Calculation- PT    Total Timed Code Minutes- PT 12 minute(s)  -SM         Timed Charges    88297 - PT Therapeutic Activity Minutes 12  -SM         Total Minutes    Timed Charges Total Minutes 12  -SM       Total Minutes 12  -SM                User Key  (r) = Recorded By, (t) = Taken By, (c) = Cosigned By      Initials Name Provider Type     Karyn Staton PT Physical Therapist                  Therapy Charges for Today       Code Description Service Date Service Provider Modifiers Qty    06987334710 HC PT THERAPEUTIC ACT EA 15 MIN 1/31/2024 Karyn Staton, PT GP 1    [id number] HC PT EVAL LOW COMPLEXITY 3 1/31/2024 Karyn Staton, PT GP 1            PT G-Codes  Outcome Measure Options: AM-PAC 6 Clicks Basic Mobility (PT)  AM-PAC 6 Clicks Score (PT): 19  PT Discharge Summary  Anticipated Discharge Disposition (PT): home with assist, home with home health, skilled nursing facility (pending progress)    Karyn Staton PT  1/31/2024

## 2024-01-31 NOTE — PLAN OF CARE
Goal Outcome Evaluation:  Plan of Care Reviewed With: patient        Progress: improving  Outcome Evaluation: Dressing intact to LLE. C/o some soreness, but no request for pain medication. Up with assist and use of walker with post-op shoe. IV Zosyn & Vancomycin given this AM. Taking medications in applesauce. NCS diet. Accu-check elevated at lunch, sliding scale insulin given. VSS on room air.

## 2024-01-31 NOTE — PLAN OF CARE
Goal Outcome Evaluation:  Plan of Care Reviewed With: patient           Outcome Evaluation: Patient is an 83 y.o female who presents POD1 parital hallux ampuation of L LE. Patient is WBAT in post op shoe. Patient lives at home alone but has caregivers 4x per week. Patient reports she ambulates with a rwx baseline. Today patient sat up to EOB with SBA and increased time. Patient required CGA-Alexia to perform STS from EOB and commode. Patient ambulated 30ft with rwx and CGA. Gait slow and mildly antalgic and unsteady. No overt LOB noted. Patient reclined in chair at end of session. Patient would continue to benefit from skilled PT intervention to address deficits in functional mobility and maximize safety and independence. PT will continue to monitor.      Anticipated Discharge Disposition (PT): home with assist, home with home health, skilled nursing facility (pending progress)

## 2024-01-31 NOTE — PROGRESS NOTES
" LOS: 3 days     Name: Di Macario  Age: 83 y.o.  Sex: female  :  1940  MRN: [id number]         Primary Care Physician: Lily Yepez PA-C    Subjective   Subjective  Complains of some left toe pain.  Reasonably controlled at this time after Tylenol this morning.  Denies any fevers or chills.  No acute overnight events.    Objective   Vital Signs  Temp:  [96.7 °F (35.9 °C)-98 °F (36.7 °C)] 96.7 °F (35.9 °C)  Heart Rate:  [60-66] 60  Resp:  [16] 16  BP: ()/(57-76) 126/71  Body mass index is 23.95 kg/m².    Objective:  General Appearance:  Comfortable and in no acute distress.    Vital signs: (most recent): Blood pressure 126/71, pulse 60, temperature 96.7 °F (35.9 °C), temperature source Oral, resp. rate 16, height 157.5 cm (62\"), weight 59.4 kg (130 lb 15.3 oz), SpO2 96%.    Lungs:  Normal effort and normal respiratory rate.  She is not in respiratory distress.  There are decreased breath sounds.    Heart: Normal rate.  Regular rhythm.    Abdomen: Abdomen is soft.  Bowel sounds are normal.   There is no abdominal tenderness.     Extremities: There is no dependent edema or local swelling.    Neurological: Patient is alert and oriented to person, place and time.    Skin:  Warm and dry.                Results Review:       I reviewed the patient's new clinical results.    Results from last 7 days   Lab Units 24  0549 24  0554 24  0610 24   WBC 10*3/mm3 8.00 11.88* 8.30 9.97   HEMOGLOBIN g/dL 11.1* 12.4 9.9* 11.2*   PLATELETS 10*3/mm3 252 268 245 243     Results from last 7 days   Lab Units 24  0549 24  0554 24  0610 24   SODIUM mmol/L 137 136 138 136   POTASSIUM mmol/L 4.0 4.2 3.9 4.5   CHLORIDE mmol/L 102 102 102 100   CO2 mmol/L 23.6 20.0* 28.0 25.0   BUN mg/dL 12 14 15 18   CREATININE mg/dL 0.60 0.78 0.54* 0.71   CALCIUM mg/dL 9.0 9.7 9.1 9.6   GLUCOSE mg/dL 152* 131* 181* 369*           Scheduled Meds:   amLODIPine, 2.5 mg, Oral, " Daily  citalopram, 10 mg, Oral, Daily  donepezil, 10 mg, Oral, Nightly  famotidine, 20 mg, Oral, BID  insulin glargine, 15 Units, Subcutaneous, Daily  insulin lispro, 2-7 Units, Subcutaneous, 4x Daily AC & at Bedtime  melatonin, 1 mg, Oral, Nightly  memantine, 10 mg, Oral, Daily  piperacillin-tazobactam, 3.375 g, Intravenous, Q8H  pravastatin, 40 mg, Oral, Q PM  sodium chloride, 3 mL, Intravenous, Q12H  vancomycin, 1,250 mg, Intravenous, Q24H  zonisamide, 200 mg, Oral, Nightly      PRN Meds:     acetaminophen    dextrose    dextrose    glucagon (human recombinant)    influenza vaccine    lidocaine    Morphine    Pharmacy to dose vancomycin    [COMPLETED] Insert Peripheral IV **AND** sodium chloride    sodium chloride  Continuous Infusions:  lactated ringers, 9 mL/hr, Last Rate: 9 mL/hr (01/30/24 1540)  Pharmacy to dose vancomycin,         Assessment & Plan   Active Hospital Problems    Diagnosis  POA    **Toe osteomyelitis, left [M86.9]  Yes    Paroxysmal atrial fibrillation [I48.0]  Yes    Oropharyngeal dysphagia [R13.12]  Yes    Dementia without behavioral disturbance [F03.90]  Yes    Presence of cardiac pacemaker [Z95.0]  Yes    Hyperlipidemia [E78.5]  Yes    Uncontrolled type 2 diabetes mellitus with hyperglycemia [E11.65]  Yes      Resolved Hospital Problems   No resolved problems to display.       Assessment & Plan    Left great toe wound  -X-ray concerning for left great toe osteomyelitis  -POD 1 from partial toe amputation  -Antibiotics initiated postoperatively and will await culture results.  ID following.     Diabetes type 2, last A1c 12.5%  -Recheck A1c 11.4  -Fasting glucose this morning looks okay.  Lantus 15 units has been restarted.  Continue sliding scale.  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  Rate controlled.     Dysphagia-tolerating regular diet     I discussed the patient's findings and my recommendations with patient     VTE Prophylaxis - SCDs.  Code Status - Limited code (no CPR, no intubation).    Discharge planning; therapy services    Expected Discharge Date: 2/4/2024; Expected Discharge Time:      Suresh Wang MD  [facility]  01/31/24  07:56 EST

## 2024-02-01 LAB
ANION GAP SERPL CALCULATED.3IONS-SCNC: 10 MMOL/L (ref 5–15)
BACTERIA SPEC AEROBE CULT: ABNORMAL
BACTERIA SPEC AEROBE CULT: ABNORMAL
BUN SERPL-MCNC: 19 MG/DL (ref 8–23)
BUN/CREAT SERPL: 23.5 (ref 7–25)
CALCIUM SPEC-SCNC: 9.5 MG/DL (ref 8.6–10.5)
CHLORIDE SERPL-SCNC: 104 MMOL/L (ref 98–107)
CO2 SERPL-SCNC: 25 MMOL/L (ref 22–29)
CREAT SERPL-MCNC: 0.81 MG/DL (ref 0.57–1)
DEPRECATED RDW RBC AUTO: 41.5 FL (ref 37–54)
EGFRCR SERPLBLD CKD-EPI 2021: 71.7 ML/MIN/1.73
ERYTHROCYTE [DISTWIDTH] IN BLOOD BY AUTOMATED COUNT: 12.9 % (ref 12.3–15.4)
GLUCOSE BLDC GLUCOMTR-MCNC: 129 MG/DL (ref 70–130)
GLUCOSE BLDC GLUCOMTR-MCNC: 165 MG/DL (ref 70–130)
GLUCOSE BLDC GLUCOMTR-MCNC: 179 MG/DL (ref 70–130)
GLUCOSE BLDC GLUCOMTR-MCNC: 406 MG/DL (ref 70–130)
GLUCOSE SERPL-MCNC: 180 MG/DL (ref 65–99)
GRAM STN SPEC: ABNORMAL
GRAM STN SPEC: ABNORMAL
HCT VFR BLD AUTO: 33.6 % (ref 34–46.6)
HGB BLD-MCNC: 10.7 G/DL (ref 12–15.9)
LAB AP CASE REPORT: NORMAL
MCH RBC QN AUTO: 28 PG (ref 26.6–33)
MCHC RBC AUTO-ENTMCNC: 31.8 G/DL (ref 31.5–35.7)
MCV RBC AUTO: 88 FL (ref 79–97)
PATH REPORT.FINAL DX SPEC: NORMAL
PATH REPORT.GROSS SPEC: NORMAL
PLATELET # BLD AUTO: 257 10*3/MM3 (ref 140–450)
PMV BLD AUTO: 11.2 FL (ref 6–12)
POTASSIUM SERPL-SCNC: 3.8 MMOL/L (ref 3.5–5.2)
RBC # BLD AUTO: 3.82 10*6/MM3 (ref 3.77–5.28)
SODIUM SERPL-SCNC: 139 MMOL/L (ref 136–145)
WBC NRBC COR # BLD AUTO: 11.37 10*3/MM3 (ref 3.4–10.8)

## 2024-02-01 PROCEDURE — 97530 THERAPEUTIC ACTIVITIES: CPT

## 2024-02-01 PROCEDURE — 25010000002 PIPERACILLIN SOD-TAZOBACTAM PER 1 G: Performed by: INTERNAL MEDICINE

## 2024-02-01 PROCEDURE — 99232 SBSQ HOSP IP/OBS MODERATE 35: CPT | Performed by: INTERNAL MEDICINE

## 2024-02-01 PROCEDURE — 80048 BASIC METABOLIC PNL TOTAL CA: CPT | Performed by: INTERNAL MEDICINE

## 2024-02-01 PROCEDURE — 97535 SELF CARE MNGMENT TRAINING: CPT

## 2024-02-01 PROCEDURE — 25010000002 ENOXAPARIN PER 10 MG: Performed by: INTERNAL MEDICINE

## 2024-02-01 PROCEDURE — 63710000001 INSULIN GLARGINE PER 5 UNITS: Performed by: PODIATRIST

## 2024-02-01 PROCEDURE — 25010000002 VANCOMYCIN 10 G RECONSTITUTED SOLUTION: Performed by: INTERNAL MEDICINE

## 2024-02-01 PROCEDURE — 63710000001 INSULIN LISPRO (HUMAN) PER 5 UNITS: Performed by: INTERNAL MEDICINE

## 2024-02-01 PROCEDURE — 82948 REAGENT STRIP/BLOOD GLUCOSE: CPT

## 2024-02-01 PROCEDURE — 25010000002 PENICILLIN G POTASSIUM PER 600000 UNITS: Performed by: INTERNAL MEDICINE

## 2024-02-01 PROCEDURE — 25810000003 SODIUM CHLORIDE 0.9 % SOLUTION: Performed by: INTERNAL MEDICINE

## 2024-02-01 PROCEDURE — 85027 COMPLETE CBC AUTOMATED: CPT | Performed by: INTERNAL MEDICINE

## 2024-02-01 PROCEDURE — 63710000001 INSULIN LISPRO (HUMAN) PER 5 UNITS: Performed by: PODIATRIST

## 2024-02-01 PROCEDURE — 97166 OT EVAL MOD COMPLEX 45 MIN: CPT

## 2024-02-01 RX ORDER — ENOXAPARIN SODIUM 100 MG/ML
40 INJECTION SUBCUTANEOUS EVERY 24 HOURS
Status: DISCONTINUED | OUTPATIENT
Start: 2024-02-01 | End: 2024-02-04 | Stop reason: HOSPADM

## 2024-02-01 RX ORDER — INSULIN LISPRO 100 [IU]/ML
5 INJECTION, SOLUTION INTRAVENOUS; SUBCUTANEOUS ONCE
Status: COMPLETED | OUTPATIENT
Start: 2024-02-01 | End: 2024-02-01

## 2024-02-01 RX ORDER — INSULIN LISPRO 100 [IU]/ML
3-14 INJECTION, SOLUTION INTRAVENOUS; SUBCUTANEOUS
Status: DISCONTINUED | OUTPATIENT
Start: 2024-02-01 | End: 2024-02-04 | Stop reason: HOSPADM

## 2024-02-01 RX ADMIN — ENOXAPARIN SODIUM 40 MG: 100 INJECTION SUBCUTANEOUS at 08:39

## 2024-02-01 RX ADMIN — INSULIN LISPRO 3 UNITS: 100 INJECTION, SOLUTION INTRAVENOUS; SUBCUTANEOUS at 17:38

## 2024-02-01 RX ADMIN — INSULIN LISPRO 2 UNITS: 100 INJECTION, SOLUTION INTRAVENOUS; SUBCUTANEOUS at 06:58

## 2024-02-01 RX ADMIN — PIPERACILLIN SODIUM AND TAZOBACTAM SODIUM 3.38 G: 3; .375 INJECTION, SOLUTION INTRAVENOUS at 10:06

## 2024-02-01 RX ADMIN — VANCOMYCIN HYDROCHLORIDE 1250 MG: 10 INJECTION, POWDER, LYOPHILIZED, FOR SOLUTION INTRAVENOUS at 08:39

## 2024-02-01 RX ADMIN — PRAVASTATIN SODIUM 40 MG: 40 TABLET ORAL at 16:28

## 2024-02-01 RX ADMIN — ZONISAMIDE 200 MG: 100 CAPSULE ORAL at 20:55

## 2024-02-01 RX ADMIN — INSULIN LISPRO 7 UNITS: 100 INJECTION, SOLUTION INTRAVENOUS; SUBCUTANEOUS at 13:01

## 2024-02-01 RX ADMIN — CITALOPRAM 10 MG: 20 TABLET, FILM COATED ORAL at 08:40

## 2024-02-01 RX ADMIN — Medication 3 ML: at 08:40

## 2024-02-01 RX ADMIN — FAMOTIDINE 20 MG: 20 TABLET, FILM COATED ORAL at 08:39

## 2024-02-01 RX ADMIN — MEMANTINE HYDROCHLORIDE 10 MG: 10 TABLET, FILM COATED ORAL at 08:40

## 2024-02-01 RX ADMIN — INSULIN GLARGINE 15 UNITS: 100 INJECTION, SOLUTION SUBCUTANEOUS at 08:38

## 2024-02-01 RX ADMIN — FAMOTIDINE 20 MG: 20 TABLET, FILM COATED ORAL at 20:55

## 2024-02-01 RX ADMIN — SODIUM CHLORIDE 4 MILLION UNITS: 9 INJECTION, SOLUTION INTRAVENOUS at 16:28

## 2024-02-01 RX ADMIN — Medication 1 MG: at 20:55

## 2024-02-01 RX ADMIN — AMLODIPINE BESYLATE 2.5 MG: 2.5 TABLET ORAL at 08:40

## 2024-02-01 RX ADMIN — DONEPEZIL HYDROCHLORIDE 10 MG: 10 TABLET, FILM COATED ORAL at 20:55

## 2024-02-01 RX ADMIN — SODIUM CHLORIDE 4 MILLION UNITS: 9 INJECTION, SOLUTION INTRAVENOUS at 21:58

## 2024-02-01 RX ADMIN — INSULIN LISPRO 5 UNITS: 100 INJECTION, SOLUTION INTRAVENOUS; SUBCUTANEOUS at 13:02

## 2024-02-01 RX ADMIN — Medication 3 ML: at 20:56

## 2024-02-01 NOTE — PROGRESS NOTES
Podiatry Progress Note      Patient: Di Macario Admit Date: 01/28/2024    Age: 83 y.o.   PCP: Lily Yepez PA-C    MRN: [id number]  Room: [facility]        Subjective     Chief Complaint     Chief Complaint   Patient presents with    Wound Check        HPI     Pt resting in bed, daughter at bedside. No complaints of foot pain.     Past Medical History     Past Medical History:   Diagnosis Date    Dementia     states better with medication     Diabetes mellitus     Essential hypertension 03/04/2016    Hyperlipidemia 01/13/2021    Lung consolidation 06/01/2022    Peripheral neuropathy 08/28/2018    Type 2 diabetes mellitus with hyperglycemia, with long-term current use of insulin 08/25/2022    Vitamin D deficiency         Past Surgical History:   Procedure Laterality Date    AMPUTATION DIGIT Left 1/30/2024    Procedure: PARTIAL HALLUX AMPUTATION LEFT FOOT;  Surgeon: Nas Gonzales DPM;  Location: [facility] MAIN OR;  Service: Podiatry;  Laterality: Left;    ANKLE SURGERY Right     BLADDER SURGERY      Prolapsed    BREAST BIOPSY      CARDIAC ELECTROPHYSIOLOGY PROCEDURE N/A 03/05/2021    Procedure: Pacemaker DC new BOSTON;  Surgeon: Madelin Ferguson MD;  Location: [facility] CATH INVASIVE LOCATION;  Service: Cardiology;  Laterality: N/A;    ENDOSCOPY N/A 06/06/2022    Procedure: ESOPHAGOGASTRODUODENOSCOPY with biopsy, balloon dilation;  Surgeon: Lo Benjamin MD;  Location: [facility] ENDOSCOPY;  Service: Gastroenterology;  Laterality: N/A;  esophageal stricture, hiatal hernia, gastritis    HYSTERECTOMY          No Known Allergies     Social History     Tobacco Use   Smoking Status Former    Packs/day: 0.00    Years: 5.00    Additional pack years: 0.00    Total pack years: 0.00    Types: Cigarettes   Smokeless Tobacco Never   Tobacco Comments    quit in 1973 never a pack a day or even a pack a month smoker        Objective   Physical Exam    Vitals:    02/01/24 0536   BP: 109/69   Pulse: 62   Resp: 16   Temp:  97.8 °F (36.6 °C)   SpO2: 98%        Dermatology: incision well-coapted, sutures in place. No drainage from incision. Mild periwound erythema.       Labs     Lab Results   Component Value Date    HGBA1C 11.40 (H) 01/29/2024    POCGLU 179 (H) 02/01/2024        CBC:      Lab 02/01/24  0451 01/31/24  0549 01/30/24  0554 01/29/24  0610 01/28/24  1917   WBC 11.37* 8.00 11.88* 8.30 9.97   HEMOGLOBIN 10.7* 11.1* 12.4 9.9* 11.2*   HEMATOCRIT 33.6* 35.3 39.0 31.3* 35.7   PLATELETS 257 252 268 245 243   NEUTROS ABS  --   --   --   --  6.96   IMMATURE GRANS (ABS)  --   --   --   --  0.03   LYMPHS ABS  --   --   --   --  2.24   MONOS ABS  --   --   --   --  0.64   EOS ABS  --   --   --   --  0.05   MCV 88.0 89.1 89.0 87.4 89.7          Results for orders placed or performed during the hospital encounter of 07/24/22   Blood Culture - Blood, Hand, Right    Specimen: Hand, Right; Blood   Result Value Ref Range    Blood Culture No growth at 5 days         XR Foot 3+ View Left  Narrative: XR FOOT 3+ VW LEFT-     INDICATIONS: Postoperative evaluation     TECHNIQUE: 3 VIEWS OF THE LEFT FOOT     COMPARISON: 1/28/2024     FINDINGS:     Amputation of the first distal phalanx is noted with small surgical soft  tissue gas in this region. No acute fracture, erosion, or dislocation is  identified. The bones are diffusely osteopenic. Mild to moderate  calcaneal spurring is apparent.     Impression:    Postsurgical changes.           This report was finalized on 1/30/2024 5:05 PM by Dr. Uche Geiger M.D on Workstation: [id number]          Assessment/Plan     82yo F who is POD #2 from partial amputation, left hallux    -pt examined and evaluated  -Micro OR cultures growing Group B Strep; abx per ID  -Cont WBAT in post op shoe  -Dressing changed; leave dressing c/d/I  -OK from podiatry standpoint. Patient will call to schedule outpatient appt with my office      Nas Gonzales DPM  Office: [phone]

## 2024-02-01 NOTE — THERAPY TREATMENT NOTE
Patient Name: Di Macario  : 1940    MRN: [id number]                              Today's Date: 2024       Admit Date: 2024    Visit Dx:     ICD-10-CM ICD-9-CM   1. Diabetic foot infection  E11.628 250.80    L08.9 686.9   2. Uncontrolled type 2 diabetes mellitus with hyperglycemia  E11.65 250.02   3. Osteomyelitis of great toe  M86.9 730.27   4. Toe osteomyelitis, left  M86.9 730.27     Patient Active Problem List   Diagnosis    Essential hypertension    Vitamin D deficiency    Hyperuricemia    Slow transit constipation    Chronic pain of left knee    At high risk for falls    Peripheral neuropathy    Uncontrolled type 2 diabetes mellitus with hyperglycemia    Walker as ambulation aid    Bradycardia, sinus    Shortness of breath    Leg swelling    Hyperlipidemia    Presence of cardiac pacemaker    Dementia without behavioral disturbance    Aspiration pneumonia due to vomitus    Aspiration pneumonia of both lungs due to gastric secretions, unspecified part of lung    Weakness    Thrombocytopenia    Oropharyngeal dysphagia    Immobility syndrome    Type 2 diabetes mellitus with hyperglycemia, with long-term current use of insulin    Paroxysmal atrial fibrillation    Pure hypercholesterolemia    Toe osteomyelitis, left     Past Medical History:   Diagnosis Date    Dementia     states better with medication     Diabetes mellitus     Essential hypertension 2016    Hyperlipidemia 2021    Lung consolidation 2022    Peripheral neuropathy 2018    Type 2 diabetes mellitus with hyperglycemia, with long-term current use of insulin 2022    Vitamin D deficiency      Past Surgical History:   Procedure Laterality Date    AMPUTATION DIGIT Left 2024    Procedure: PARTIAL HALLUX AMPUTATION LEFT FOOT;  Surgeon: Nas Gonzales DPM;  Location: [facility];  Service: Podiatry;  Laterality: Left;    ANKLE SURGERY Right     BLADDER SURGERY      Prolapsed    BREAST BIOPSY       CARDIAC ELECTROPHYSIOLOGY PROCEDURE N/A 03/05/2021    Procedure: Pacemaker DC new BOSTON;  Surgeon: Madelin Ferguson MD;  Location: [facility] CATH INVASIVE LOCATION;  Service: Cardiology;  Laterality: N/A;    ENDOSCOPY N/A 06/06/2022    Procedure: ESOPHAGOGASTRODUODENOSCOPY with biopsy, balloon dilation;  Surgeon: Lo Benjamin MD;  Location: [facility] ENDOSCOPY;  Service: Gastroenterology;  Laterality: N/A;  esophageal stricture, hiatal hernia, gastritis    HYSTERECTOMY        General Information       Row Name 02/01/24 1114          Physical Therapy Time and Intention    Document Type therapy note (daily note)  -DJ     Mode of Treatment individual therapy;physical therapy  -DJ       Row Name 02/01/24 1114          General Information    Patient Profile Reviewed yes  -DJ       Row Name 02/01/24 1114          Cognition    Orientation Status (Cognition) oriented x 3  -DJ       Row Name 02/01/24 1114          Safety Issues, Functional Mobility    Comment, Safety Issues/Impairments (Mobility) gt belt, post op shoe L LE, rubber soled shoe R LE  -DJ               User Key  (r) = Recorded By, (t) = Taken By, (c) = Cosigned By      Initials Name Provider Type    DJ Janice Matson, PT Physical Therapist                   Mobility       Row Name 02/01/24 1115          Bed Mobility    Comment, (Bed Mobility) UIC  -DJ       Row Name 02/01/24 1115          Transfers    Comment, (Transfers) sit/stand from recliner  -DJ       Row Name 02/01/24 1115          Bed-Chair Transfer    Bed-Chair [place] (Transfers) not tested  -DJ       Row Name 02/01/24 1115          Sit-Stand Transfer    Sit-Stand [place] (Transfers) contact guard;verbal cues  -DJ     Assistive Device (Sit-Stand Transfers) walker, front-wheeled  -DJ       Row Name 02/01/24 1115          Gait/Stairs (Locomotion)    [place] Level (Gait) contact guard;standby assist;verbal cues  -DJ     Assistive Device (Gait) walker, front-wheeled  -DJ     Distance in  Feet (Gait) 240'  -DJ     Deviations/Abnormal Patterns (Gait) antalgic;stride length decreased  -DJ     Bilateral Gait Deviations forward flexed posture;heel strike decreased  -DJ     [place] Level (Stairs) not tested  -DJ     Comment, (Gait/Stairs) Pt amb 240' with r wx and SBA-CGA; fair pace and good balance, slight L LE limp, flexed posture, endurance improving.  -DJ               User Key  (r) = Recorded By, (t) = Taken By, (c) = Cosigned By      Initials Name Provider Type    Janice Lentz PT Physical Therapist                   Obj/Interventions       Row Name 02/01/24 1117          Motor Skills    Motor Skills functional endurance  -DJ     Functional Endurance improving  -DJ     Therapeutic Exercise other (see comments)  AP, LAQ, seated marching  -DJ       Row Name 02/01/24 1117          Balance    Balance Assessment standing static balance;standing dynamic balance  -DJ     Static Standing Balance standby assist;verbal cues  -DJ     Dynamic Standing Balance contact guard;standby assist;verbal cues  -DJ     Position/Device Used, Standing Balance walker, front-wheeled;supported  -DJ     Balance Interventions sitting;standing;sit to stand;supported;weight shifting activity  -DJ     Comment, Balance good with r wx  -DJ               User Key  (r) = Recorded By, (t) = Taken By, (c) = Cosigned By      Initials Name Provider Type    Janice Lentz PT Physical Therapist                   Goals/Plan    No documentation.                  Clinical Impression       Row Name 02/01/24 1118          Pain    Pre/Posttreatment Pain Comment Generally without c/os  -DJ       Row Name 02/01/24 1118          Plan of Care Review    Plan of Care Reviewed With patient  -DJ     Progress improving  -DJ     Outcome Evaluation Pt sitting UIC upon PT arrival, pleasant and cooperative. She performed seated LE ther ex with vc. Pt stood from recliner with CGA using r wx. Pt amb 240' with r wx and SBA-CGA; fair pace and good  balance, slight L LE limp, flexed posture, endurance improving. Pt returned to recliner with all needs met. Overall, she dem sig improvement in amb distance with decreased assist. Pt may be able to return home with home PT servcies - at baseline she uses pd caregivers to assist with ADLs/IADLs. Pt safe to amb with [facility] staff throughout the day. Cont PT to address functional deficits and prepare for d/c as appropriate.  -DJ       Row Name 02/01/24 1118          Therapy Assessment/Plan (PT)    Criteria for Skilled Interventions Met (PT) skilled treatment is necessary  -DJ     Therapy Frequency (PT) 5 times/wk  -DJ       Row Name 02/01/24 1118          Vital Signs    O2 Delivery Pre Treatment room air  -DJ     O2 Delivery Intra Treatment room air  -DJ     O2 Delivery Post Treatment room air  -DJ     Pre Patient Position Sitting  -DJ     Intra Patient Position Standing  -DJ     Post Patient Position Sitting  -DJ       Row Name 02/01/24 1118          Positioning and Restraints    Pre-Treatment Position sitting in chair/recliner  -DJ     Post Treatment Position chair  -DJ     In Chair reclined;call light within reach;encouraged to call for assist;exit alarm on;notified [facility]  -DJ               User Key  (r) = Recorded By, (t) = Taken By, (c) = Cosigned By      Initials Name Provider Type    Janice Lentz, PT Physical Therapist                   Outcome Measures       Row Name 02/01/24 1121 02/01/24 0900       How much help from another person do you currently need...    Turning from your back to your side while in flat bed without using bedrails? 4  -DJ 4  -KS    Moving from lying on back to sitting on the side of a flat bed without bedrails? 3  -DJ 3  -KS    Moving to and from a bed to a chair (including a wheelchair)? 3  -DJ 3  -KS    Standing up from a chair using your arms (e.g., wheelchair, bedside chair)? 4  -DJ 3  -KS    Climbing 3-5 steps with a railing? 2  -DJ 2  -KS    To walk in hospital room? 3  -DJ 3  -KS     AM-PAC 6 Clicks Score (PT) 19  -DJ 18  -KS    Highest Level of Mobility Goal 6 --> Walk 10 steps or more  -DJ 6 --> Walk 10 steps or more  -KS      Row Name 02/01/24 1121          Functional Assessment    Outcome Measure Options AM-PAC 6 Clicks Basic Mobility (PT)  -               User Key  (r) = Recorded By, (t) = Taken By, (c) = Cosigned By      Initials Name Provider Type    Janice Lentz, PT Physical Therapist    Thalia Mills, RN Registered Nurse                                 Physical Therapy Education       Title: PT OT SLP Therapies (Done)       Topic: Physical Therapy (Done)       Point: Mobility training (Done)       Learning Progress Summary             Patient Acceptance, E, VU,NR by  at 2/1/2024 1122    Acceptance, E,TB, VU by MT at 2/1/2024 0446    Acceptance, E, VU by  at 1/31/2024 1458                         Point: Home exercise program (Done)       Learning Progress Summary             Patient Acceptance, E, VU,NR by  at 2/1/2024 1122    Acceptance, E,TB, VU by MT at 2/1/2024 0446    Acceptance, E, VU by  at 1/31/2024 1458                         Point: Body mechanics (Done)       Learning Progress Summary             Patient Acceptance, E, VU,NR by  at 2/1/2024 1122    Acceptance, E,TB, VU by MT at 2/1/2024 0446    Acceptance, E, VU by  at 1/31/2024 1458                         Point: Precautions (Done)       Learning Progress Summary             Patient Acceptance, E, VU,NR by  at 2/1/2024 1122    Acceptance, E,TB, VU by MT at 2/1/2024 0446    Acceptance, E, VU by  at 1/31/2024 1458                                         User Key       Initials Effective Dates Name Provider Type Discipline    MT 06/16/21 -  Dali Rasheed, RN Registered Nurse Nurse     10/25/19 -  Janice Matson, PT Physical Therapist PT     05/02/22 -  Karyn Staton, JOY Physical Therapist PT                  PT Recommendation and Plan     Plan of Care Reviewed With: patient  Progress:  improving  Outcome Evaluation: Pt sitting UIC upon PT arrival, pleasant and cooperative. She performed seated LE ther ex with vc. Pt stood from recliner with CGA using r wx. Pt amb 240' with r wx and SBA-CGA; fair pace and good balance, slight L LE limp, flexed posture, endurance improving. Pt returned to recliner with all needs met. Overall, she dem sig improvement in amb distance with decreased assist. Pt may be able to return home with home PT servcies - at baseline she uses pd caregivers to assist with ADLs/IADLs. Pt safe to amb with nsg staff throughout the day. Cont PT to address functional deficits and prepare for d/c as appropriate.     Time Calculation:         PT Charges       Row Name 02/01/24 1123             Time Calculation    Start Time 1059  -DJ      Stop Time 1114  -DJ      Time Calculation (min) 15 min  -DJ      PT Non-Billable Time (min) 10 min  -DJ      PT Received On 02/01/24  -DJ      PT - Next Appointment 02/02/24  -DJ                User Key  (r) = Recorded By, (t) = Taken By, (c) = Cosigned By      Initials Name Provider Type    Janice Lentz PT Physical Therapist                  Therapy Charges for Today       Code Description Service Date Service Provider Modifiers Qty    41520461255 HC PT THERAPEUTIC ACT EA 15 MIN 2/1/2024 Janice Matson PT GP 1            PT G-Codes  Outcome Measure Options: AM-PAC 6 Clicks Basic Mobility (PT)  AM-PAC 6 Clicks Score (PT): 19  PT Discharge Summary  Anticipated Discharge Disposition (PT): home with assist, home with home health    Janice Matson PT  2/1/2024

## 2024-02-01 NOTE — PLAN OF CARE
Goal Outcome Evaluation:  Plan of Care Reviewed With: patient           Outcome Evaluation: Pt seen for OT eval this afternoon. S/p partial hallux amputation of LLE. Pt is WBAT with post op shoe donned. She lives alone and reports being independent with bathing, dressing and toileting. Requires assist with laundry and cooking. She has a caregiver 4x a week and daughter assists with meals when caregiver not present. She uses a rwx at baseline. Today pt UIC upon arrival and agreeable to therapy. She stood with CGA and use of rwx from chair to mobilize into bathroom for toilet transfer. required cues for safety to complete with rwx. Required min A to stand from low commode height while using grab bars. Performed pericare in standing with assist for balance. Able to stand at the sink for short period to wash hands with CGA. Pt fatiques quickly with activity. Pt presents with decreased balance, strength, endurance, activity tolerance, ADL performance and functional mobility. Pt to benefit from skilled OT to address deficits.      Anticipated Discharge Disposition (OT): home with home health, home with assist, skilled nursing facility (pending progress in acute)

## 2024-02-01 NOTE — PROGRESS NOTES
LOS: 4 days     Chief Complaint:  Left great toe osteomyelitis     Interval History: Afebrile, reports some postoperative pain.  Denies any abdominal pain nausea vomiting or diarrhea.  No rash    Vital Signs  Temp:  [97.3 °F (36.3 °C)-98.1 °F (36.7 °C)] 98.1 °F (36.7 °C)  Heart Rate:  [62-72] 62  Resp:  [16-18] 18  BP: (104-145)/(57-69) 104/57    Physical Exam:  General: In no acute distress  Cardiovascular: RRR  Respiratory: Breathing comfortably on room air  GI: Soft, NT/ND, + bowel sounds bilaterally  Skin: No rashes   Extremities: Left foot covered in dressing    Antibiotics:  Vancomycin dosing per pharmacy  Zosyn 3.375 g IV every 8 hours     Results Review:    Lab Results   Component Value Date    WBC 11.37 (H) 02/01/2024    HGB 10.7 (L) 02/01/2024    HCT 33.6 (L) 02/01/2024    MCV 88.0 02/01/2024     02/01/2024     Lab Results   Component Value Date    GLUCOSE 180 (H) 02/01/2024    BUN 19 02/01/2024    CREATININE 0.81 02/01/2024    EGFRIFNONA 71 12/06/2021    EGFRIFAFRI 82 12/06/2021    BCR 23.5 02/01/2024    CO2 25.0 02/01/2024    CALCIUM 9.5 02/01/2024    PROTENTOTREF 6.9 10/03/2023    ALBUMIN 3.7 01/28/2024    LABIL2 1.7 10/03/2023    AST 9 01/28/2024    ALT 8 01/28/2024       Microbiology:  1/30 OR cx group B strep    Assessment & Plan   Left great toe osteomyelitis status post amputation on January 30  Poorly controlled type 2 diabetes complicating above  Dementia complicating above  Peripheral neuropathy complicating above    Operative cultures with group B strep.  Discontinue empiric vancomycin and Zosyn and start penicillin 4,000,000 units IV every 6 hours.  All infected bone has been removed.  I recommend a 7-day course of antibiotics postoperatively.  Antibiotic stop date February 6.  If the patient is discharged prior to that date okay to transition to penicillin 500 mg p.o. 4 times daily to complete the 7-day course of antibiotics    ID will sign off.  Please do not hesitate to call us with  further questions or concerns

## 2024-02-01 NOTE — PLAN OF CARE
Goal Outcome Evaluation:  Plan of Care Reviewed With: patient        Progress: improving  Outcome Evaluation: vss, no c/o pain, up with assist and using a post-op and a walker, bed alarm for safety, encourage to increase fluid intake, lt foot dressing intact, on iv zosyn, continue to monitor the pt.

## 2024-02-01 NOTE — THERAPY EVALUATION
Patient Name: Di Macario  : 1940    MRN: [id number]                              Today's Date: 2024       Admit Date: 2024    Visit Dx:     ICD-10-CM ICD-9-CM   1. Diabetic foot infection  E11.628 250.80    L08.9 686.9   2. Uncontrolled type 2 diabetes mellitus with hyperglycemia  E11.65 250.02   3. Osteomyelitis of great toe  M86.9 730.27   4. Toe osteomyelitis, left  M86.9 730.27     Patient Active Problem List   Diagnosis    Essential hypertension    Vitamin D deficiency    Hyperuricemia    Slow transit constipation    Chronic pain of left knee    At high risk for falls    Peripheral neuropathy    Uncontrolled type 2 diabetes mellitus with hyperglycemia    Walker as ambulation aid    Bradycardia, sinus    Shortness of breath    Leg swelling    Hyperlipidemia    Presence of cardiac pacemaker    Dementia without behavioral disturbance    Aspiration pneumonia due to vomitus    Aspiration pneumonia of both lungs due to gastric secretions, unspecified part of lung    Weakness    Thrombocytopenia    Oropharyngeal dysphagia    Immobility syndrome    Type 2 diabetes mellitus with hyperglycemia, with long-term current use of insulin    Paroxysmal atrial fibrillation    Pure hypercholesterolemia    Toe osteomyelitis, left     Past Medical History:   Diagnosis Date    Dementia     states better with medication     Diabetes mellitus     Essential hypertension 2016    Hyperlipidemia 2021    Lung consolidation 2022    Peripheral neuropathy 2018    Type 2 diabetes mellitus with hyperglycemia, with long-term current use of insulin 2022    Vitamin D deficiency      Past Surgical History:   Procedure Laterality Date    AMPUTATION DIGIT Left 2024    Procedure: PARTIAL HALLUX AMPUTATION LEFT FOOT;  Surgeon: Nas Gonzales DPM;  Location: [facility];  Service: Podiatry;  Laterality: Left;    ANKLE SURGERY Right     BLADDER SURGERY      Prolapsed    BREAST BIOPSY       CARDIAC ELECTROPHYSIOLOGY PROCEDURE N/A 03/05/2021    Procedure: Pacemaker DC new [place];  Surgeon: Madelin Ferguson MD;  Location: [facility] CATH INVASIVE LOCATION;  Service: Cardiology;  Laterality: N/A;    ENDOSCOPY N/A 06/06/2022    Procedure: ESOPHAGOGASTRODUODENOSCOPY with biopsy, balloon dilation;  Surgeon: Lo Benjamin MD;  Location: [facility] ENDOSCOPY;  Service: Gastroenterology;  Laterality: N/A;  esophageal stricture, hiatal hernia, gastritis    HYSTERECTOMY        General Information       Row Name 02/01/24 1456          OT Time and Intention    Document Type evaluation  -     Mode of Treatment individual therapy;occupational therapy  -       Row Name 02/01/24 1456          General Information    Patient Profile Reviewed yes  -     Prior Level of Function independent:  States she can dress and bathe self independently. Caregivers comes 4x a week and assists with laundry and cooking. Daughter also assits with meals on days caregiver is not present  -     Existing Precautions/Restrictions fall;weight bearing  WBAT in post op shoe  -       Row Name 02/01/24 1456          Living Environment    People in Home alone  -       Row Name 02/01/24 1456          Home Main Entrance    Number of Stairs, Main Entrance none  -       Row Name 02/01/24 1456          Cognition    Orientation Status (Cognition) oriented x 3  -       Row Name 02/01/24 1456          Safety Issues, Functional Mobility    Impairments Affecting Function (Mobility) balance;strength;endurance/activity tolerance;pain  -     Comment, Safety Issues/Impairments (Mobility) gait belt, post op shoe  -               User Key  (r) = Recorded By, (t) = Taken By, (c) = Cosigned By      Initials Name Provider Type    Barbara Brunner OT Occupational Therapist                     Mobility/ADL's       Row Name 02/01/24 1500          Bed Mobility    Comment, (Bed Mobility) UIC at the start and end of session  -       Row Name  02/01/24 1500          Sit-Stand Transfer    Sit-Stand [place] (Transfers) contact guard;verbal cues;minimum assist (75% patient effort);1 person assist  -     Assistive Device (Sit-Stand Transfers) walker, front-wheeled  -AMNA     Comment, (Sit-Stand Transfer) Min A from commode with use of grab bar/rwx and cga from chair in room with rwx  -       Row Name 02/01/24 1500          Functional Mobility    Functional Mobility- Ind. Level contact guard assist;1 person  -     Functional Mobility- Device walker, front-wheeled  -AMNA     Functional Mobility-Distance (Feet) --  short distance to bathroom and back to chair  -       Row Name 02/01/24 1500          Activities of Daily Living    BADL Assessment/Intervention toileting;grooming  -       Row Name 02/01/24 1500          Toileting Assessment/Training    [place] Level (Toileting) toileting skills;minimum assist (75% patient effort);contact guard assist;adjust/manage clothing;perform perineal hygiene  -     Position (Toileting) supported sitting;supported standing  -     Comment, (Toileting) assist for balance during ADL tasks  -       Row Name 02/01/24 1500          Grooming Assessment/Training    [place] Level (Grooming) grooming skills;wash face, hands;contact guard assist  -     Position (Grooming) sink side;supported standing  -               User Key  (r) = Recorded By, (t) = Taken By, (c) = Cosigned By      Initials Name Provider Type    Barbara Brunner OT Occupational Therapist                   Obj/Interventions       Row Name 02/01/24 1502          Range of Motion Comprehensive    General Range of Motion upper extremity range of motion deficits identified  -     Comment, General Range of Motion RUE limited to ~90 degrees shoulder flexion; LUE WFL  -       Row Name 02/01/24 1502          Strength Comprehensive (MMT)    General Manual Muscle Testing (MMT) Assessment upper extremity strength deficits identified  -      Comment, General Manual Muscle Testing (MMT) Assessment BUE grossly 3+/5  -[facility] Name 02/01/24 1502          Motor Skills    Motor Skills functional endurance  -     Functional Endurance Fatigues quickly with ADLs/functional mobility  -[facility] Name 02/01/24 1502          Balance    Static Sitting Balance supervision  -     Dynamic Sitting Balance supervision  -     Position, Sitting Balance sitting in chair  -     Static Standing Balance standby assist;contact guard  -     Dynamic Standing Balance contact guard;minimal assist  -     Position/Device Used, Standing Balance walker, front-wheeled  -     Balance Interventions sitting;standing  -               User Key  (r) = Recorded By, (t) = Taken By, (c) = Cosigned By      Initials Name Provider Type    Barbara Brunner, OT Occupational Therapist                   Goals/Plan       Row Name 02/01/24 1511          Transfer Goal 1 (OT)    Activity/Assistive Device (Transfer Goal 1, OT) transfers, all  -KA     [place] Level/Cues Needed (Transfer Goal 1, OT) standby assist  -KA     Time Frame (Transfer Goal 1, OT) short term goal (STG);2 weeks  -KA     Progress/Outcome (Transfer Goal 1, OT) goal ongoing  -KA       Row Name 02/01/24 1511          Bathing Goal 1 (OT)    Activity/Device (Bathing Goal 1, OT) bathing skills, all  -KA     [place] Level/Cues Needed (Bathing Goal 1, OT) standby assist  -KA     Time Frame (Bathing Goal 1, OT) short term goal (STG);2 weeks  -     Progress/Outcomes (Bathing Goal 1, OT) goal ongoing  -KA       Row Name 02/01/24 1511          Toileting Goal 1 (OT)    Activity/Device (Toileting Goal 1, OT) toileting skills, all  -KA     [place] Level/Cues Needed (Toileting Goal 1, OT) standby assist  -KA     Time Frame (Toileting Goal 1, OT) short term goal (STG);2 weeks  -     Progress/Outcome (Toileting Goal 1, OT) goal ongoing  -KA       Row Name 02/01/24 1511          Grooming Goal 1 (OT)     Activity/Device (Grooming Goal 1, OT) grooming skills, all  -KA     [place] (Grooming Goal 1, OT) standby assist  -KA     Time Frame (Grooming Goal 1, OT) short term goal (STG);2 weeks  -KA     Progress/Outcome (Grooming Goal 1, OT) goal ongoing  -KA       Row Name 02/01/24 1511          Therapy Assessment/Plan (OT)    Planned Therapy Interventions (OT) activity tolerance training;BADL retraining;functional balance retraining;patient/caregiver education/training;transfer/mobility retraining;occupation/activity based interventions;ROM/therapeutic exercise;strengthening exercise  -KA               User Key  (r) = Recorded By, (t) = Taken By, (c) = Cosigned By      Initials Name Provider Type    Barbara Brunner, MIRLANDE Occupational Therapist                   Clinical Impression       Row Name 02/01/24 1503          Pain Assessment    Pretreatment Pain Rating 0/10 - no pain  -KA     Posttreatment Pain Rating 0/10 - no pain  -       Row Name 02/01/24 1503          Plan of Care Review    Plan of Care Reviewed With patient  -     Outcome Evaluation Pt seen for OT eval this afternoon. S/p partial hallux amputation of LLE. Pt is WBAT with post op shoe donned. She lives alone and reports being independent with bathing, dressing and toileting. Requires assist with laundry and cooking. She has a caregiver 4x a week and daughter assists with meals when caregiver not present. She uses a rwx at baseline. Today pt UIC upon arrival and agreeable to therapy. She stood with CGA and use of rwx from chair to mobilize into bathroom for toilet transfer. required cues for safety to complete with rwx. Required min A to stand from low commode height while using grab bars. Performed pericare in standing with assist for balance. Able to stand at the sink for short period to wash hands with CGA. Pt fatiques quickly with activity. Pt presents with decreased balance, strength, endurance, activity tolerance, ADL performance and  functional mobility. Pt to benefit from skilled OT to address deficits.  -       Row Name 02/01/24 1503          Therapy Assessment/Plan (OT)    Rehab Potential (OT) good, to achieve stated therapy goals  -     Criteria for Skilled Therapeutic Interventions Met (OT) yes  -KA     Therapy Frequency (OT) 5 times/wk  -       Row Name 02/01/24 1503          Therapy Plan Review/Discharge Plan (OT)    Anticipated Discharge Disposition (OT) home with home health;home with assist;skilled nursing facility  pending progress in acute  -       Row Name 02/01/24 1503          Vital Signs    Pre Patient Position Sitting  -KA     Intra Patient Position Standing  -KA     Post Patient Position Sitting  -[facility] Name 02/01/24 1503          Positioning and Restraints    Pre-Treatment Position sitting in chair/recliner  -KA     Post Treatment Position chair  -KA     In Chair notified nsg;reclined;call light within reach;encouraged to call for assist;exit alarm on  -KA               User Key  (r) = Recorded By, (t) = Taken By, (c) = Cosigned By      Initials Name Provider Type    Barbara Brunner, OT Occupational Therapist                   Outcome Measures       Row Name 02/01/24 1512          How much help from another is currently needed...    Putting on and taking off regular lower body clothing? 2  -KA     Bathing (including washing, rinsing, and drying) 3  -KA     Toileting (which includes using toilet bed pan or urinal) 2  -KA     Putting on and taking off regular upper body clothing 3  -KA     Taking care of personal grooming (such as brushing teeth) 4  -KA     Eating meals 4  -KA     AM-PAC 6 Clicks Score (OT) 18  -KA       [facility] Name 02/01/24 1121 02/01/24 0900       How much help from another person do you currently need...    Turning from your back to your side while in flat bed without using bedrails? 4  -DJ 4  -KS    Moving from lying on back to sitting on the side of a flat bed without bedrails? 3  -DJ 3  -KS     Moving to and from a bed to a chair (including a wheelchair)? 3  -DJ 3  -KS    Standing up from a chair using your arms (e.g., wheelchair, bedside chair)? 4  -DJ 3  -KS    Climbing 3-5 steps with a railing? 2  -DJ 2  -KS    To walk in hospital room? 3  -DJ 3  -KS    AM-PAC 6 Clicks Score (PT) 19  -DJ 18  -KS    Highest Level of Mobility Goal 6 --> Walk 10 steps or more  -DJ 6 --> Walk 10 steps or more  -KS      Row Name 02/01/24 1512 02/01/24 1121       Functional Assessment    Outcome Measure Options AM-PAC 6 Clicks Daily Activity (OT)  -KA AM-PAC 6 Clicks Basic Mobility (PT)  -DJ              User Key  (r) = Recorded By, (t) = Taken By, (c) = Cosigned By      Initials Name Provider Type    Janice Lentz, PT Physical Therapist    Barbara Brunner, OT Occupational Therapist    Thalia Mills, RN Registered Nurse                    Occupational Therapy Education       Title: PT OT SLP Therapies (Done)       Topic: Occupational Therapy (Done)       Point: ADL training (Done)       Description:   Instruct learner(s) on proper safety adaptation and remediation techniques during self care or transfers.   Instruct in proper use of assistive devices.                  Learning Progress Summary             Patient Acceptance, E, DARREN,DU by AMNA at 2/1/2024 1512                         Point: Home exercise program (Done)       Description:   Instruct learner(s) on appropriate technique for monitoring, assisting and/or progressing therapeutic exercises/activities.                  Learning Progress Summary             Patient Acceptance, E, VU,ANTHONY by AMNA at 2/1/2024 1512                         Point: Precautions (Done)       Description:   Instruct learner(s) on prescribed precautions during self-care and functional transfers.                  Learning Progress Summary             Patient Acceptance, E, VU,DU by AMNA at 2/1/2024 1512                                         User Key       Initials Effective Dates Name  Provider Type Discipline     09/22/22 -  Barbara Monroe OT Occupational Therapist OT                  OT Recommendation and Plan  Planned Therapy Interventions (OT): activity tolerance training, BADL retraining, functional balance retraining, patient/caregiver education/training, transfer/mobility retraining, occupation/activity based interventions, ROM/therapeutic exercise, strengthening exercise  Therapy Frequency (OT): 5 times/wk  Plan of Care Review  Plan of Care Reviewed With: patient  Outcome Evaluation: Pt seen for OT eval this afternoon. S/p partial hallux amputation of LLE. Pt is WBAT with post op shoe donned. She lives alone and reports being independent with bathing, dressing and toileting. Requires assist with laundry and cooking. She has a caregiver 4x a week and daughter assists with meals when caregiver not present. She uses a rwx at baseline. Today pt UIC upon arrival and agreeable to therapy. She stood with CGA and use of rwx from chair to mobilize into bathroom for toilet transfer. required cues for safety to complete with rwx. Required min A to stand from low commode height while using grab bars. Performed pericare in standing with assist for balance. Able to stand at the sink for short period to wash hands with CGA. Pt fatiques quickly with activity. Pt presents with decreased balance, strength, endurance, activity tolerance, ADL performance and functional mobility. Pt to benefit from skilled OT to address deficits.     Time Calculation:   Evaluation Complexity (OT)  Review Occupational Profile/Medical/Therapy History Complexity: expanded/moderate complexity  Assessment, Occupational Performance/Identification of Deficit Complexity: 3-5 performance deficits  Clinical Decision Making Complexity (OT): detailed assessment/moderate complexity  Overall Complexity of Evaluation (OT): moderate complexity     Time Calculation- OT       Row Name 02/01/24 4807             Time Calculation- OT    OT  Start Time 1419  -KA      OT Stop Time 1449  -KA      OT Time Calculation (min) 30 min  -KA      OT Received On 02/01/24  -KA      OT - Next Appointment 02/02/24  -KA      OT Goal Re-Cert Due Date 02/15/24  -KA         Timed Charges    83507 - OT Self Care/Mgmt Minutes 20  -KA         Untimed Charges    OT Eval/Re-eval Minutes 10  -KA         Total Minutes    Timed Charges Total Minutes 20  -KA      Untimed Charges Total Minutes 10  -KA       Total Minutes 30  -KA                User Key  (r) = Recorded By, (t) = Taken By, (c) = Cosigned By      Initials Name Provider Type    Barbara Brunner OT Occupational Therapist                  Therapy Charges for Today       Code Description Service Date Service Provider Modifiers Qty    69143388383 HC OT SELF CARE/MGMT/TRAIN EA 15 MIN 2/1/2024 Barbara Monroe OT GO 1    78298484272 HC OT EVAL MOD COMPLEXITY 3 2/1/2024 Barbara Monroe OT GO 1                 Barbara Monroe OT  2/1/2024

## 2024-02-01 NOTE — PLAN OF CARE
Goal Outcome Evaluation:  Plan of Care Reviewed With: patient        Progress: improving  Outcome Evaluation: Pt sitting UIC upon PT arrival, pleasant and cooperative. She performed seated LE ther ex with vc. Pt stood from recliner with CGA using r wx. Pt amb 240' with r wx and SBA-CGA; fair pace and good balance, slight L LE limp, flexed posture, endurance improving. Pt returned to recliner with all needs met. Overall, she dem sig improvement in amb distance with decreased assist. Pt may be able to return home with home PT servcies - at baseline she uses pd caregivers to assist with ADLs/IADLs. Pt safe to amb with nsg staff throughout the day. Cont PT to address functional deficits and prepare for d/c as appropriate.      Anticipated Discharge Disposition (PT): home with assist, home with home health

## 2024-02-01 NOTE — PROGRESS NOTES
"[facility] Clinical Pharmacy Services: Vancomycin Monitoring Note    Di Macario is a 83 y.o. female who is on day 2/7 of pharmacy to dose vancomycin for Bone and/or Joint Infection.    Previous Vancomycin Dose:   1250 mg IV every  24  hours  Updated Cultures and Sensitivities: 1/30 tissue cx strep agalactiae (group B)      Vitals/Labs  Ht: 157.5 cm (62\"); Wt: 59.4 kg (130 lb 15.3 oz)   Temp Readings from Last 1 Encounters:   02/01/24 97.8 °F (36.6 °C) (Oral)     Estimated Creatinine Clearance: 48.5 mL/min (by C-G formula based on SCr of 0.81 mg/dL).        Results from last 7 days   Lab Units 02/01/24  0451 01/31/24  0549 01/30/24  0554   CREATININE mg/dL 0.81 0.60 0.78   WBC 10*3/mm3 11.37* 8.00 11.88*     Assessment/Plan    Current Vancomycin Dose: 1250 mg IV every  24  hours; provides a predicted  mg/L.hr   Next Level Date and Time: Vanc Trough on 2/2 at 0730  We will continue to monitor patient changes and renal function     Thank you for involving pharmacy in this patient's care. Please contact pharmacy with any questions or concerns.       Anaya Raza [facility]  Clinical Pharmacist          "

## 2024-02-01 NOTE — PROGRESS NOTES
" LOS: 4 days     Name: Di Macario  Age: 83 y.o.  Sex: female  :  1940  MRN: [id number]         Primary Care Physician: Lily Yepez PA-C    Subjective   Subjective  Complains of some mild left foot pain.  Reasonably controlled.  Denies chest pain, shortness of breath, fevers or chills    Objective   Vital Signs  Temp:  [96.9 °F (36.1 °C)-97.8 °F (36.6 °C)] 97.8 °F (36.6 °C)  Heart Rate:  [62-72] 62  Resp:  [16] 16  BP: (109-145)/(64-69) 109/69  Body mass index is 23.95 kg/m².    Objective:  General Appearance:  Comfortable and in no acute distress (Weak and deconditioned appearing).    Vital signs: (most recent): Blood pressure 109/69, pulse 62, temperature 97.8 °F (36.6 °C), temperature source Oral, resp. rate 16, height 157.5 cm (62\"), weight 59.4 kg (130 lb 15.3 oz), SpO2 98%.    Lungs:  Normal effort and normal respiratory rate.    Heart: Normal rate.  Regular rhythm.    Abdomen: Abdomen is soft.  Bowel sounds are normal.   There is no abdominal tenderness.     Extremities: There is no dependent edema or local swelling.  (Left foot is dressed and wrapped)  Neurological: Patient is alert and oriented to person, place and time.    Skin:  Warm and dry.                Results Review:       I reviewed the patient's new clinical results.    Results from last 7 days   Lab Units 24  04524  0549 24  0554 24  0610 24   WBC 10*3/mm3 11.37* 8.00 11.88* 8.30 9.97   HEMOGLOBIN g/dL 10.7* 11.1* 12.4 9.9* 11.2*   PLATELETS 10*3/mm3 257 252 268 245 243     Results from last 7 days   Lab Units 24  04524  0549 24  0554 24  0610 24   SODIUM mmol/L 139 137 136 138 136   POTASSIUM mmol/L 3.8 4.0 4.2 3.9 4.5   CHLORIDE mmol/L 104 102 102 102 100   CO2 mmol/L 25.0 23.6 20.0* 28.0 25.0   BUN mg/dL 19 12 14 15 18   CREATININE mg/dL 0.81 0.60 0.78 0.54* 0.71   CALCIUM mg/dL 9.5 9.0 9.7 9.1 9.6   GLUCOSE mg/dL 180* 152* 131* 181* 369*       "           Scheduled Meds:   amLODIPine, 2.5 mg, Oral, Daily  citalopram, 10 mg, Oral, Daily  donepezil, 10 mg, Oral, Nightly  enoxaparin, 40 mg, Subcutaneous, Q24H  famotidine, 20 mg, Oral, BID  insulin glargine, 15 Units, Subcutaneous, Daily  insulin lispro, 2-7 Units, Subcutaneous, 4x Daily AC & at Bedtime  melatonin, 1 mg, Oral, Nightly  memantine, 10 mg, Oral, Daily  piperacillin-tazobactam, 3.375 g, Intravenous, Q8H  pravastatin, 40 mg, Oral, Q PM  sodium chloride, 3 mL, Intravenous, Q12H  vancomycin, 1,250 mg, Intravenous, Q24H  zonisamide, 200 mg, Oral, Nightly      PRN Meds:     acetaminophen    dextrose    dextrose    glucagon (human recombinant)    influenza vaccine    lidocaine    Morphine    Pharmacy to dose vancomycin    [COMPLETED] Insert Peripheral IV **AND** sodium chloride    sodium chloride  Continuous Infusions:  lactated ringers, 9 mL/hr, Last Rate: 9 mL/hr (01/30/24 1540)  Pharmacy to dose vancomycin,         Assessment & Plan   Active Hospital Problems    Diagnosis  POA    **Toe osteomyelitis, left [M86.9]  Yes    Paroxysmal atrial fibrillation [I48.0]  Yes    Oropharyngeal dysphagia [R13.12]  Yes    Dementia without behavioral disturbance [F03.90]  Yes    Presence of cardiac pacemaker [Z95.0]  Yes    Hyperlipidemia [E78.5]  Yes    Uncontrolled type 2 diabetes mellitus with hyperglycemia [E11.65]  Yes      Resolved Hospital Problems   No resolved problems to display.       Assessment & Plan    Left great toe wound  -X-ray concerning for left great toe osteomyelitis  -POD 2 from partial toe amputation  -Antibiotics initiated postoperatively and will await culture results.  ID following.     Diabetes type 2, last A1c 12.5%  -Recheck A1c 11.4  -Fasting glucose 180.  Increase Lantus dose to 17 units.  -Was previously prescribed short acting insulin but patient is not taking at home   -Daughter at the bedside tells me that patient has been managing her own insulin at home with blood sugars averaging  in the 300s.     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  Rate controlled.     Dysphagia-tolerating regular diet     I discussed the patient's findings and my recommendations with patient     VTE Prophylaxis -start Lovenox  Code Status - Limited code (no CPR, no intubation).     She will need SNF upon discharge      Expected Discharge Date: 2/4/2024; Expected Discharge Time:      Suresh Wang MD  [facility]  02/01/24  08:51 EST

## 2024-02-01 NOTE — PLAN OF CARE
Goal Outcome Evaluation:  Plan of Care Reviewed With: patient        Progress: improving  Outcome Evaluation: Max temp 99.2, other vitals stable, up in chair, worked with PT and OT, up with walker and assist x1, falls precautions maintained, monitoring blood sugars, kvo for abx, abx given

## 2024-02-02 ENCOUNTER — TELEPHONE (OUTPATIENT)
Dept: INTERNAL MEDICINE | Facility: CLINIC | Age: 84
End: 2024-02-02
Payer: MEDICARE

## 2024-02-02 LAB
ANION GAP SERPL CALCULATED.3IONS-SCNC: 10.6 MMOL/L (ref 5–15)
BUN SERPL-MCNC: 21 MG/DL (ref 8–23)
BUN/CREAT SERPL: 28.4 (ref 7–25)
CALCIUM SPEC-SCNC: 9.2 MG/DL (ref 8.6–10.5)
CHLORIDE SERPL-SCNC: 104 MMOL/L (ref 98–107)
CO2 SERPL-SCNC: 25.4 MMOL/L (ref 22–29)
CREAT SERPL-MCNC: 0.74 MG/DL (ref 0.57–1)
DEPRECATED RDW RBC AUTO: 39 FL (ref 37–54)
EGFRCR SERPLBLD CKD-EPI 2021: 79.9 ML/MIN/1.73
ERYTHROCYTE [DISTWIDTH] IN BLOOD BY AUTOMATED COUNT: 12.7 % (ref 12.3–15.4)
GLUCOSE BLDC GLUCOMTR-MCNC: 133 MG/DL (ref 70–130)
GLUCOSE BLDC GLUCOMTR-MCNC: 168 MG/DL (ref 70–130)
GLUCOSE BLDC GLUCOMTR-MCNC: 297 MG/DL (ref 70–130)
GLUCOSE BLDC GLUCOMTR-MCNC: 78 MG/DL (ref 70–130)
GLUCOSE SERPL-MCNC: 127 MG/DL (ref 65–99)
HCT VFR BLD AUTO: 30 % (ref 34–46.6)
HGB BLD-MCNC: 9.6 G/DL (ref 12–15.9)
MCH RBC QN AUTO: 27.4 PG (ref 26.6–33)
MCHC RBC AUTO-ENTMCNC: 32 G/DL (ref 31.5–35.7)
MCV RBC AUTO: 85.5 FL (ref 79–97)
PLATELET # BLD AUTO: 261 10*3/MM3 (ref 140–450)
PMV BLD AUTO: 11.1 FL (ref 6–12)
POTASSIUM SERPL-SCNC: 4 MMOL/L (ref 3.5–5.2)
RBC # BLD AUTO: 3.51 10*6/MM3 (ref 3.77–5.28)
SODIUM SERPL-SCNC: 140 MMOL/L (ref 136–145)
WBC NRBC COR # BLD AUTO: 11.13 10*3/MM3 (ref 3.4–10.8)

## 2024-02-02 PROCEDURE — 25010000002 PENICILLIN G POTASSIUM PER 600000 UNITS: Performed by: INTERNAL MEDICINE

## 2024-02-02 PROCEDURE — 63710000001 INSULIN LISPRO (HUMAN) PER 5 UNITS: Performed by: INTERNAL MEDICINE

## 2024-02-02 PROCEDURE — 63710000001 INSULIN GLARGINE PER 5 UNITS: Performed by: INTERNAL MEDICINE

## 2024-02-02 PROCEDURE — 85027 COMPLETE CBC AUTOMATED: CPT | Performed by: INTERNAL MEDICINE

## 2024-02-02 PROCEDURE — 82948 REAGENT STRIP/BLOOD GLUCOSE: CPT

## 2024-02-02 PROCEDURE — 25010000002 ENOXAPARIN PER 10 MG: Performed by: INTERNAL MEDICINE

## 2024-02-02 PROCEDURE — 97530 THERAPEUTIC ACTIVITIES: CPT

## 2024-02-02 PROCEDURE — 80048 BASIC METABOLIC PNL TOTAL CA: CPT | Performed by: INTERNAL MEDICINE

## 2024-02-02 RX ADMIN — FAMOTIDINE 20 MG: 20 TABLET, FILM COATED ORAL at 21:07

## 2024-02-02 RX ADMIN — INSULIN LISPRO 8 UNITS: 100 INJECTION, SOLUTION INTRAVENOUS; SUBCUTANEOUS at 12:22

## 2024-02-02 RX ADMIN — Medication 3 ML: at 21:09

## 2024-02-02 RX ADMIN — ZONISAMIDE 200 MG: 100 CAPSULE ORAL at 21:09

## 2024-02-02 RX ADMIN — SODIUM CHLORIDE 4 MILLION UNITS: 9 INJECTION, SOLUTION INTRAVENOUS at 10:24

## 2024-02-02 RX ADMIN — CITALOPRAM 10 MG: 20 TABLET, FILM COATED ORAL at 08:40

## 2024-02-02 RX ADMIN — ENOXAPARIN SODIUM 40 MG: 100 INJECTION SUBCUTANEOUS at 08:39

## 2024-02-02 RX ADMIN — Medication 3 ML: at 08:40

## 2024-02-02 RX ADMIN — SODIUM CHLORIDE 4 MILLION UNITS: 9 INJECTION, SOLUTION INTRAVENOUS at 04:46

## 2024-02-02 RX ADMIN — DONEPEZIL HYDROCHLORIDE 10 MG: 10 TABLET, FILM COATED ORAL at 21:07

## 2024-02-02 RX ADMIN — INSULIN GLARGINE 17 UNITS: 100 INJECTION, SOLUTION SUBCUTANEOUS at 08:39

## 2024-02-02 RX ADMIN — SODIUM CHLORIDE 4 MILLION UNITS: 9 INJECTION, SOLUTION INTRAVENOUS at 22:20

## 2024-02-02 RX ADMIN — SODIUM CHLORIDE 4 MILLION UNITS: 9 INJECTION, SOLUTION INTRAVENOUS at 16:44

## 2024-02-02 RX ADMIN — INSULIN LISPRO 3 UNITS: 100 INJECTION, SOLUTION INTRAVENOUS; SUBCUTANEOUS at 16:44

## 2024-02-02 RX ADMIN — PRAVASTATIN SODIUM 40 MG: 40 TABLET ORAL at 16:44

## 2024-02-02 RX ADMIN — FAMOTIDINE 20 MG: 20 TABLET, FILM COATED ORAL at 08:39

## 2024-02-02 RX ADMIN — MEMANTINE HYDROCHLORIDE 10 MG: 10 TABLET, FILM COATED ORAL at 08:39

## 2024-02-02 NOTE — THERAPY TREATMENT NOTE
Patient Name: Di Macario  : 1940    MRN: [id number]                              Today's Date: 2024       Admit Date: 2024    Visit Dx:     ICD-10-CM ICD-9-CM   1. Diabetic foot infection  E11.628 250.80    L08.9 686.9   2. Uncontrolled type 2 diabetes mellitus with hyperglycemia  E11.65 250.02   3. Osteomyelitis of great toe  M86.9 730.27   4. Toe osteomyelitis, left  M86.9 730.27     Patient Active Problem List   Diagnosis    Essential hypertension    Vitamin D deficiency    Hyperuricemia    Slow transit constipation    Chronic pain of left knee    At high risk for falls    Peripheral neuropathy    Uncontrolled type 2 diabetes mellitus with hyperglycemia    Walker as ambulation aid    Bradycardia, sinus    Shortness of breath    Leg swelling    Hyperlipidemia    Presence of cardiac pacemaker    Dementia without behavioral disturbance    Aspiration pneumonia due to vomitus    Aspiration pneumonia of both lungs due to gastric secretions, unspecified part of lung    Weakness    Thrombocytopenia    Oropharyngeal dysphagia    Immobility syndrome    Type 2 diabetes mellitus with hyperglycemia, with long-term current use of insulin    Paroxysmal atrial fibrillation    Pure hypercholesterolemia    Toe osteomyelitis, left     Past Medical History:   Diagnosis Date    Dementia     states better with medication     Diabetes mellitus     Essential hypertension 2016    Hyperlipidemia 2021    Lung consolidation 2022    Peripheral neuropathy 2018    Type 2 diabetes mellitus with hyperglycemia, with long-term current use of insulin 2022    Vitamin D deficiency      Past Surgical History:   Procedure Laterality Date    AMPUTATION DIGIT Left 2024    Procedure: PARTIAL HALLUX AMPUTATION LEFT FOOT;  Surgeon: Nas Gonzales DPM;  Location: [facility];  Service: Podiatry;  Laterality: Left;    ANKLE SURGERY Right     BLADDER SURGERY      Prolapsed    BREAST BIOPSY       CARDIAC ELECTROPHYSIOLOGY PROCEDURE N/A 03/05/2021    Procedure: Pacemaker DC new [place];  Surgeon: Madelin Ferguson MD;  Location: [facility] CATH INVASIVE LOCATION;  Service: Cardiology;  Laterality: N/A;    ENDOSCOPY N/A 06/06/2022    Procedure: ESOPHAGOGASTRODUODENOSCOPY with biopsy, balloon dilation;  Surgeon: Lo Benjamin MD;  Location: [facility] ENDOSCOPY;  Service: Gastroenterology;  Laterality: N/A;  esophageal stricture, hiatal hernia, gastritis    HYSTERECTOMY        General Information       Row Name 02/02/24 0925          Physical Therapy Time and Intention    Document Type therapy note (daily note)  -LW     Mode of Treatment individual therapy;physical therapy  -       Row Name 02/02/24 0925          General Information    Patient Profile Reviewed yes  -LW     Existing Precautions/Restrictions fall;weight bearing  WBAT post op shoe  -       Row Name 02/02/24 0925          Cognition    Orientation Status (Cognition) oriented x 3  -       Row Name 02/02/24 0925          Safety Issues, Functional Mobility    Impairments Affecting Function (Mobility) balance;strength;endurance/activity tolerance;pain  -               User Key  (r) = Recorded By, (t) = Taken By, (c) = Cosigned By      Initials Name Provider Type    LW Lo Monteiro PT Physical Therapist                   Mobility       Row Name 02/02/24 0926          Bed Mobility    Comment, (Bed Mobility) NT, UIC  -       Row Name 02/02/24 0926          Sit-Stand Transfer    Sit-Stand [place] (Transfers) contact guard;minimum assist (75% patient effort);verbal cues  -LW     Assistive Device (Sit-Stand Transfers) walker, front-wheeled  -     Comment, (Sit-Stand Transfer) Alexia from toilet with use of grab bar & rwx, CGA from chair in room  -       Row Name 02/02/24 0926          Gait/Stairs (Locomotion)    [place] Level (Gait) contact guard;standby assist;verbal cues  -     Assistive Device (Gait) walker, front-wheeled  -      Distance in Feet (Gait) 200'  -LW     Deviations/Abnormal Patterns (Gait) antalgic;stride length decreased  -LW     Bilateral Gait Deviations forward flexed posture;heel strike decreased  -LW               User Key  (r) = Recorded By, (t) = Taken By, (c) = Cosigned By      Initials Name Provider Type    Lo Box PT Physical Therapist                   Obj/Interventions       Row Name 02/02/24 0927          Balance    Static Sitting Balance modified independence  -LW     Dynamic Sitting Balance modified independence  -LW     Position, Sitting Balance sitting in chair  -LW     Sit to Stand Dynamic Balance contact guard;minimal assist  -LW     Static Standing Balance standby assist;contact guard  -LW     Dynamic Standing Balance standby assist;contact guard  -LW     Position/Device Used, Standing Balance walker, front-wheeled  -LW     Balance Interventions sitting;standing;sit to stand;static;dynamic  -LW     Comment, Balance SBA/CGA for standing balacne while completing toileting hygeine & washing hands  -LW               User Key  (r) = Recorded By, (t) = Taken By, (c) = Cosigned By      Initials Name Provider Type    Lo Box PT Physical Therapist                   Goals/Plan    No documentation.                  Clinical Impression       Row Name 02/02/24 0928          Pain    Pretreatment Pain Rating 0/10 - no pain  -LW     Posttreatment Pain Rating 0/10 - no pain  -LW       Row Name 02/02/24 0928          Plan of Care Review    Plan of Care Reviewed With patient  -LW     Progress improving  -LW     Outcome Evaluation Pt was agreeable to working with PT and was [facility] on arrival. She stood with CGA and ambulated to the . She required Alexia to stand from the commode and maitnained standing baalance wih SBA/CGA while completing toileting hygeine. She then ambulated 200' with SBA/CGA and rwx. Her gait was slow but steady with no LOB. Pt will benefit from continued skilled PT addressing limitations in  functional mobility to maxize safety and independence.  -       Row Name 02/02/24 0928          Positioning and Restraints    Pre-Treatment Position sitting in chair/recliner  -LW     Post Treatment Position chair  -LW     In Chair reclined;call light within reach;encouraged to call for assist;exit alarm on  -LW               User Key  (r) = Recorded By, (t) = Taken By, (c) = Cosigned By      Initials Name Provider Type    Lo Box PT Physical Therapist                   Outcome Measures       Row Name 02/02/24 0930          How much help from another person do you currently need...    Turning from your back to your side while in flat bed without using bedrails? 4  -LW     Moving from lying on back to sitting on the side of a flat bed without bedrails? 3  -LW     Moving to and from a bed to a chair (including a wheelchair)? 3  -LW     Standing up from a chair using your arms (e.g., wheelchair, bedside chair)? 3  -LW     Climbing 3-5 steps with a railing? 2  -LW     To walk in hospital room? 3  -LW     AM-PAC 6 Clicks Score (PT) 18  -LW     Highest Level of Mobility Goal 6 --> Walk 10 steps or more  -       Row Name 02/02/24 0930          Functional Assessment    Outcome Measure Options AM-PAC 6 Clicks Basic Mobility (PT)  -LW               User Key  (r) = Recorded By, (t) = Taken By, (c) = Cosigned By      Initials Name Provider Type    Lo Box PT Physical Therapist                                 Physical Therapy Education       Title: PT OT SLP Therapies (Done)       Topic: Physical Therapy (Done)       Point: Mobility training (Done)       Learning Progress Summary             Patient Acceptance, E, VU,NR by DILLON at 2/2/2024 0930    Acceptance, E, VU,NR by GAVIN at 2/1/2024 1122    Acceptance, E,TB, VU by MT at 2/1/2024 0446    Acceptance, E, VU by JAMES at 1/31/2024 1458                         Point: Home exercise program (Done)       Learning Progress Summary             Patient Acceptance, E,  VU,NR by LW at 2/2/2024 0930    Acceptance, E, VU,NR by DJ at 2/1/2024 1122    Acceptance, E,TB, VU by MT at 2/1/2024 0446    Acceptance, E, VU by  at 1/31/2024 1458                         Point: Body mechanics (Done)       Learning Progress Summary             Patient Acceptance, E, VU,NR by LW at 2/2/2024 0930    Acceptance, E, VU,NR by DJ at 2/1/2024 1122    Acceptance, E,TB, VU by MT at 2/1/2024 0446    Acceptance, E, VU by SM at 1/31/2024 1458                         Point: Precautions (Done)       Learning Progress Summary             Patient Acceptance, E, VU,NR by LW at 2/2/2024 0930    Acceptance, E, VU,NR by  at 2/1/2024 1122    Acceptance, E,TB, VU by MT at 2/1/2024 0446    Acceptance, E, VU by  at 1/31/2024 1458                                         User Key       Initials Effective Dates Name Provider Type Discipline    MT 06/16/21 -  Dali Rasheed, RN Registered Nurse Nurse     10/25/19 -  Janice Matson, PT Physical Therapist PT     05/02/22 -  Karyn Staton, JOY Physical Therapist PT     05/08/23 -  Lo Monteiro, JOY Physical Therapist PT                  PT Recommendation and Plan     Plan of Care Reviewed With: patient  Progress: improving  Outcome Evaluation: Pt was agreeable to working with PT and was [facility] on arrival. She stood with CGA and ambulated to the . She required Alexia to stand from the commode and maitnained standing baalance wih SBA/CGA while completing toileting hygeine. She then ambulated 200' with SBA/CGA and rwx. Her gait was slow but steady with no LOB. Pt will benefit from continued skilled PT addressing limitations in functional mobility to maxize safety and independence.     Time Calculation:         PT Charges       Row Name 02/02/24 0930             Time Calculation    Start Time 0853  -LW      Stop Time 0919  -LW      Time Calculation (min) 26 min  -LW      PT Received On 02/02/24  -LW      PT - Next Appointment 02/05/24  -LW         Time Calculation- PT     Total Timed Code Minutes- PT 26 minute(s)  -LW         Timed Charges    99239 - PT Therapeutic Activity Minutes 26  -LW         Total Minutes    Timed Charges Total Minutes 26  -LW       Total Minutes 26  -LW                User Key  (r) = Recorded By, (t) = Taken By, (c) = Cosigned By      Initials Name Provider Type    Lo Box PT Physical Therapist                  Therapy Charges for Today       Code Description Service Date Service Provider Modifiers Qty    87857535018 HC PT THERAPEUTIC ACT EA 15 MIN 2/2/2024 Lo Monteiro, JOY GP 2            PT G-Codes  Outcome Measure Options: AM-PAC 6 Clicks Basic Mobility (PT)  AM-PAC 6 Clicks Score (PT): 18  AM-PAC 6 Clicks Score (OT): 18       Lo Monteiro PT  2/2/2024

## 2024-02-02 NOTE — PLAN OF CARE
Goal Outcome Evaluation:  Plan of Care Reviewed With: patient        Progress: improving  Outcome Evaluation: VSS, up in chair, up to bathroom with assist x1 and walker, no c/o of pain, abx given, possible home tomorrow with home health, worked with PT  Falls precautions maintained

## 2024-02-02 NOTE — PROGRESS NOTES
" LOS: 5 days     Name: Di Macario  Age: 83 y.o.  Sex: female  :  1940  MRN: [id number]         Primary Care Physician: Lily Yepez PA-C    Subjective   Subjective  No new complaints or acute overnight events.  Denies fevers or chills.    Objective   Vital Signs  Temp:  [96.7 °F (35.9 °C)-99.2 °F (37.3 °C)] 97.8 °F (36.6 °C)  Heart Rate:  [61-65] 61  Resp:  [17-18] 17  BP: ()/(55-67) 99/55  Body mass index is 23.95 kg/m².    Objective:  General Appearance:  Comfortable and in no acute distress.    Vital signs: (most recent): Blood pressure 99/55, pulse 61, temperature 97.8 °F (36.6 °C), temperature source Oral, resp. rate 17, height 157.5 cm (62\"), weight 59.4 kg (130 lb 15.3 oz), SpO2 97%.    Lungs:  Normal effort and normal respiratory rate.  She is not in respiratory distress.  There are decreased breath sounds.    Heart: Normal rate.  Regular rhythm.    Abdomen: Abdomen is soft.  Bowel sounds are normal.   There is no abdominal tenderness.     Extremities: There is no dependent edema or local swelling.  (Left foot is dressed and wrapped)  Neurological: Patient is alert and oriented to person, place and time.    Skin:  Warm and dry.                Results Review:       I reviewed the patient's new clinical results.    Results from last 7 days   Lab Units 24  0524  04524  0549 24  0554 24  0610 24   WBC 10*3/mm3 11.13* 11.37* 8.00 11.88* 8.30 9.97   HEMOGLOBIN g/dL 9.6* 10.7* 11.1* 12.4 9.9* 11.2*   PLATELETS 10*3/mm3 261 257 252 268 245 243     Results from last 7 days   Lab Units 24  0524  04524  0549 24  0554 24  0610 24   SODIUM mmol/L 140 139 137 136 138 136   POTASSIUM mmol/L 4.0 3.8 4.0 4.2 3.9 4.5   CHLORIDE mmol/L 104 104 102 102 102 100   CO2 mmol/L 25.4 25.0 23.6 20.0* 28.0 25.0   BUN mg/dL 21 19 12 14 15 18   CREATININE mg/dL 0.74 0.81 0.60 0.78 0.54* 0.71   CALCIUM mg/dL 9.2 9.5 9.0 " 9.7 9.1 9.6   GLUCOSE mg/dL 127* 180* 152* 131* 181* 369*                 Scheduled Meds:   amLODIPine, 2.5 mg, Oral, Daily  citalopram, 10 mg, Oral, Daily  donepezil, 10 mg, Oral, Nightly  enoxaparin, 40 mg, Subcutaneous, Q24H  famotidine, 20 mg, Oral, BID  insulin glargine, 17 Units, Subcutaneous, Daily  insulin lispro, 3-14 Units, Subcutaneous, 4x Daily AC & at Bedtime  melatonin, 1 mg, Oral, Nightly  memantine, 10 mg, Oral, Daily  penicillin g (potassium), 4 Million Units, Intravenous, Q6H  pravastatin, 40 mg, Oral, Q PM  sodium chloride, 3 mL, Intravenous, Q12H  zonisamide, 200 mg, Oral, Nightly      PRN Meds:     acetaminophen    dextrose    dextrose    glucagon (human recombinant)    influenza vaccine    lidocaine    Morphine    [COMPLETED] Insert Peripheral IV **AND** sodium chloride    sodium chloride  Continuous Infusions:  lactated ringers, 9 mL/hr, Last Rate: 9 mL/hr (01/30/24 1540)        Assessment & Plan   Active Hospital Problems    Diagnosis  POA    **Toe osteomyelitis, left [M86.9]  Yes    Paroxysmal atrial fibrillation [I48.0]  Yes    Oropharyngeal dysphagia [R13.12]  Yes    Dementia without behavioral disturbance [F03.90]  Yes    Presence of cardiac pacemaker [Z95.0]  Yes    Hyperlipidemia [E78.5]  Yes    Uncontrolled type 2 diabetes mellitus with hyperglycemia [E11.65]  Yes      Resolved Hospital Problems   No resolved problems to display.       Assessment & Plan    Left great toe wound  -X-ray concerning for left great toe osteomyelitis  -POD 3 from partial toe amputation  -Antibiotics initiated postoperatively and will await culture results.  ID following.  Continue penicillin with plans to switch to oral upon discharge.     Diabetes type 2, last A1c 12.5%  -Recheck A1c 11.4  -Fasting glucose 127.  Continue current insulin dosage  -Was previously prescribed short acting insulin but patient is not taking at home   -Daughter at the bedside tells me that patient has been managing her own insulin  at home with blood sugars averaging in the 300s.     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  Rate controlled.     Dysphagia-tolerating regular diet     I discussed the patient's findings and my recommendations with patient     VTE Prophylaxis -start Lovenox  Code Status - Limited code (no CPR, no intubation).     Discussed with daughter at the bedside who requests SNF placement.  She can be discharged once this is arranged.      Expected Discharge Date: 2/4/2024; Expected Discharge Time:      Suresh Wang MD  [facility]  02/02/24  08:45 EST

## 2024-02-02 NOTE — DISCHARGE PLACEMENT REQUEST
"Reji Macario (83 y.o. Female)       Date of Birth   1940    Social Security Number       Address   [address] [place] [address]    Home Phone   [phone]    MRN   [id number]       Hinduism   Worship    Marital Status                               Admission Date   1/28/24    Admission Type   Emergency    Admitting Provider   Perez Barron MD    Attending Provider   Suresh Wang MD    Department, Room/Bed   [address], 98/[address]       Discharge Date       Discharge Disposition       Discharge Destination                                 Attending Provider: Suresh Wang MD    Allergies: No Known Allergies    Isolation: None   Infection: None   Code Status: No CPR    Ht: 157.5 cm (62\")   Wt: 59.4 kg (130 lb 15.3 oz)    Admission Cmt: None   Principal Problem: Toe osteomyelitis, left [M86.9]                   Active Insurance as of 1/28/2024       Primary Coverage       Payor Plan Insurance Group Employer/Plan Group    MEDICARE MEDICARE A & B        Payor Plan Address Payor Plan Phone Number Payor Plan Fax Number Effective Dates    [address] [phone]  5/1/2004 - None Entered    [facility] [id number]         Subscriber Name Subscriber Birth Date Member ID       REJI MACARIO 1940 [id number]               Secondary Coverage       Payor Plan Insurance Group Employer/Plan Group     FOR LIFE  FOR LIFE  SUP         Payor Plan Address Payor Plan Phone Number Payor Plan Fax Number Effective Dates    [address] [phone]  2/1/2018 - None Entered    [facility] [address]         Subscriber Name Subscriber Birth Date Member ID       REJI MACARIO 1940 [id number]                     Emergency Contacts        (Rel.) Home Phone Work Phone Mobile Phone    THOMASROLANDALANDENPHIL (Daughter) [phone] -- [phone]    DANISH MACARIO (Daughter) [phone] -- [phone]    " AMBER MACARIO (Daughter) [phone] -- [phone]    eleno Macario (Son) -- [phone]

## 2024-02-02 NOTE — PLAN OF CARE
Goal Outcome Evaluation:  Plan of Care Reviewed With: patient        Progress: improving  Outcome Evaluation: VSS.  denies pain.  Left foot dressing CDI.  wears a surgical shoe  when OOB.  Up with assist using a walker.  blood sugar monitoring.  labs ordered this am.  takes meds whole and mixed with applesauce.

## 2024-02-02 NOTE — CONSULTS
"Nutrition Services    Patient Name:  Di Macario  YOB: 1940  MRN: [id number]  Admit Date:  1/28/2024    Assessment Date:  02/02/24    Summary: Nutrition follow up.S/P Partial hallux amputation, left foot on 1/30. Po documented at % of meals. States she is drinking the Naga (mixing in water) but not a huge fan of her lunch today. Labs, meds, skin reviwed, Encouraged continued good nutrition.    REC:  Encourage po  Continue Naga BID (B/D) mixed w/ lwater to help w/ wound healing    RD to follow nutrition needs    CLINICAL NUTRITION ASSESSMENT      Reason for Assessment Follow-up Protocol     Diagnosis/Problem   Left toe nonhealing wound, osteomyelitis, uncontrolled T2DM, dysphagia     Anthropometrics        Current Height  Current Weight  BMI kg/m2 Height: 157.5 cm (62\")  Weight: 59.4 kg (130 lb 15.3 oz) (01/28/24 2230)  Body mass index is 23.95 kg/m².   Adjusted BMI (if applicable)    BMI Category Normal/Healthy (18.4 - 24.9)   Ideal Body Weight (IBW) 110#   Usual Body Weight (UBW) 137#   Weight Trend Loss   --  Labs       Pertinent Labs    Results from last 7 days   Lab Units 02/02/24  0527 02/01/24  0451 01/31/24  0549 01/29/24  0610 01/28/24  1917   SODIUM mmol/L 140 139 137   < > 136   POTASSIUM mmol/L 4.0 3.8 4.0   < > 4.5   CHLORIDE mmol/L 104 104 102   < > 100   CO2 mmol/L 25.4 25.0 23.6   < > 25.0   BUN mg/dL 21 19 12   < > 18   CREATININE mg/dL 0.74 0.81 0.60   < > 0.71   CALCIUM mg/dL 9.2 9.5 9.0   < > 9.6   BILIRUBIN mg/dL  --   --   --   --  0.2   ALK PHOS U/L  --   --   --   --  148*   ALT (SGPT) U/L  --   --   --   --  8   AST (SGOT) U/L  --   --   --   --  9   GLUCOSE mg/dL 127* 180* 152*   < > 369*    < > = values in this interval not displayed.     Results from last 7 days   Lab Units 02/02/24  0527 01/29/24  0610 01/28/24  1917   HEMOGLOBIN g/dL 9.6*   < > 11.2*   HEMATOCRIT % 30.0*   < > 35.7   WBC 10*3/mm3 11.13*   < > 9.97   ALBUMIN g/dL  --   --  3.7    < > = " values in this interval not displayed.     Results from last 7 days   Lab Units 02/02/24  0527 02/01/24  0451 01/31/24  0549 01/30/24  0554 01/29/24  0610   PLATELETS 10*3/mm3 261 257 252 268 245     COVID19   Date Value Ref Range Status   07/28/2022 Not Detected Not Detected - Ref. Range Final     Lab Results   Component Value Date    HGBA1C 11.40 (H) 01/29/2024          Medications           Scheduled Medications citalopram, 10 mg, Oral, Daily  donepezil, 10 mg, Oral, Nightly  enoxaparin, 40 mg, Subcutaneous, Q24H  famotidine, 20 mg, Oral, BID  insulin glargine, 17 Units, Subcutaneous, Daily  insulin lispro, 3-14 Units, Subcutaneous, 4x Daily AC & at Bedtime  melatonin, 1 mg, Oral, Nightly  memantine, 10 mg, Oral, Daily  penicillin g (potassium), 4 Million Units, Intravenous, Q6H  pravastatin, 40 mg, Oral, Q PM  sodium chloride, 3 mL, Intravenous, Q12H  zonisamide, 200 mg, Oral, Nightly       Infusions lactated ringers, 9 mL/hr, Last Rate: 9 mL/hr (01/30/24 1540)       PRN Medications   acetaminophen    dextrose    dextrose    glucagon (human recombinant)    influenza vaccine    lidocaine    Morphine    [COMPLETED] Insert Peripheral IV **AND** sodium chloride    sodium chloride     Physical Findings          General Findings alert, oriented   Oral/Mouth Cavity WDL   Edema  not assessed   Gastrointestinal non-distended , last bowel movement: 2/1   Skin  surgical incision: toe   Tubes/Drains/Lines none   NFPE Not indicated at this time   --  Current Nutrition Orders & Evaluation of Intake       Oral Nutrition     Food Allergies NKFA   Current PO Diet Diet: Diabetic Diets; Consistent Carbohydrate; Texture: Regular Texture (IDDSI 7); Fluid Consistency: Thin (IDDSI 0)   Supplement Naga, BID   PO Evaluation     % PO Intake %    Factors Affecting Intake: No factors at this time   --  PES STATEMENT / NUTRITION DIAGNOSIS      Nutrition Dx Problem  Problem: Limited Adherence to Diet Modifications   Etiology: Medical  Diagnosis - dysphagia    Signs/Symptoms: PO intake and Report/Observation pt refusing puree food     NUTRITION INTERVENTION / PLAN OF CARE      Intervention Goal(s) Meet estimated needs, Establish PO intake, Accepts oral nutrition supplement, No significant weight loss, and PO intake goal %: %         RD Intervention/Action Encourage intake, Continue to monitor, and Care plan reviewed   --      Prescription/Orders:       PO Diet       Supplements       Enteral Nutrition       Parenteral Nutrition    New Prescription Ordered? Continue same per protocol, No changes at this time   --      Monitor/Evaluation Per protocol, PO intake, Supplement intake, Pertinent labs, Skin status, Swallow function   Discharge Plan/Needs Pending clinical course   --    RD to follow per protocol.      Electronically signed by:  Maggi Mckeon, LEORA  02/02/24 13:34 EST

## 2024-02-02 NOTE — TELEPHONE ENCOUNTER
Caller: GIULAINA RUIZ    Relationship: Home Health    Best call back number: [phone]     What orders are you requesting (i.e. lab or imaging): VERBAL ORDER FOR NURSING AND PT    In what timeframe would the patient need to come in:     Where will you receive your lab/imaging services:     Additional notes: PLEASE ADVISE

## 2024-02-02 NOTE — PROGRESS NOTES
Continued Stay Note  [facility]     Patient Name: Di Macario  MRN: [id number]  Today's Date: 2/2/2024    Admit Date: 1/28/2024    Plan: Home with [facility] (referral in [facility]/Pending)   Discharge Plan       Row Name 02/02/24 1420       Plan    Plan Home with [facility] (referral in [facility]/Pending)    Plan Comments Spoke to dgt Blossom via phone at [phone] to discuss DC plan. Discussed patient is ambulating 240ft and PT is recommending her to go home with home health. She stated she would need to talk with her sister and they would get back with CCP. Received call from fidencio Wall at [phone] and suzieian discussed PT recommendations. Agreeable to a Home Health referral. Mae did stated that they assist with patient and that she has [facility] that come 3d/wk, Stated they have an appointment with [facility] tomorrrow at 4pm. Stated Margareth with [facility] has been assisting them in poss placement. Mae aware of poss DC tomorrow. Referral placed in [facility] for [facility].                   Discharge Codes    No documentation.                 Expected Discharge Date and Time       Expected Discharge Date Expected Discharge Time    Feb 3, 2024               Shani Ramirez RN

## 2024-02-02 NOTE — PLAN OF CARE
Goal Outcome Evaluation:  Plan of Care Reviewed With: patient        Progress: improving  Outcome Evaluation: Pt was agreeable to working with PT and was UIC on arrival. She stood with CGA and ambulated to the BR. She required Alexia to stand from the commode and maitnained standing baalance wih SBA/CGA while completing toileting hygeine. She then ambulated 200' with SBA/CGA and rwx. Her gait was slow but steady with no LOB. Pt will benefit from continued skilled PT addressing limitations in functional mobility to maxize safety and independence.

## 2024-02-03 LAB
GLUCOSE BLDC GLUCOMTR-MCNC: 211 MG/DL (ref 70–130)
GLUCOSE BLDC GLUCOMTR-MCNC: 215 MG/DL (ref 70–130)
GLUCOSE BLDC GLUCOMTR-MCNC: 295 MG/DL (ref 70–130)
GLUCOSE BLDC GLUCOMTR-MCNC: 83 MG/DL (ref 70–130)
GLUCOSE BLDC GLUCOMTR-MCNC: 89 MG/DL (ref 70–130)

## 2024-02-03 PROCEDURE — 25010000002 ENOXAPARIN PER 10 MG: Performed by: INTERNAL MEDICINE

## 2024-02-03 PROCEDURE — 25010000002 PENICILLIN G POTASSIUM PER 600000 UNITS: Performed by: INTERNAL MEDICINE

## 2024-02-03 PROCEDURE — 63710000001 INSULIN GLARGINE PER 5 UNITS: Performed by: INTERNAL MEDICINE

## 2024-02-03 PROCEDURE — 82948 REAGENT STRIP/BLOOD GLUCOSE: CPT

## 2024-02-03 PROCEDURE — 63710000001 INSULIN LISPRO (HUMAN) PER 5 UNITS: Performed by: INTERNAL MEDICINE

## 2024-02-03 RX ADMIN — ENOXAPARIN SODIUM 40 MG: 100 INJECTION SUBCUTANEOUS at 10:18

## 2024-02-03 RX ADMIN — PRAVASTATIN SODIUM 40 MG: 40 TABLET ORAL at 16:16

## 2024-02-03 RX ADMIN — Medication 3 ML: at 10:18

## 2024-02-03 RX ADMIN — SODIUM CHLORIDE 4 MILLION UNITS: 9 INJECTION, SOLUTION INTRAVENOUS at 04:00

## 2024-02-03 RX ADMIN — INSULIN GLARGINE 10 UNITS: 100 INJECTION, SOLUTION SUBCUTANEOUS at 12:31

## 2024-02-03 RX ADMIN — INSULIN LISPRO 8 UNITS: 100 INJECTION, SOLUTION INTRAVENOUS; SUBCUTANEOUS at 12:31

## 2024-02-03 RX ADMIN — Medication 1 MG: at 20:02

## 2024-02-03 RX ADMIN — ACETAMINOPHEN 325MG 650 MG: 325 TABLET ORAL at 10:17

## 2024-02-03 RX ADMIN — INSULIN LISPRO 5 UNITS: 100 INJECTION, SOLUTION INTRAVENOUS; SUBCUTANEOUS at 18:38

## 2024-02-03 RX ADMIN — DONEPEZIL HYDROCHLORIDE 10 MG: 10 TABLET, FILM COATED ORAL at 20:02

## 2024-02-03 RX ADMIN — SODIUM CHLORIDE 4 MILLION UNITS: 9 INJECTION, SOLUTION INTRAVENOUS at 22:13

## 2024-02-03 RX ADMIN — SODIUM CHLORIDE 4 MILLION UNITS: 9 INJECTION, SOLUTION INTRAVENOUS at 16:15

## 2024-02-03 RX ADMIN — SODIUM CHLORIDE 4 MILLION UNITS: 9 INJECTION, SOLUTION INTRAVENOUS at 10:18

## 2024-02-03 RX ADMIN — ZONISAMIDE 200 MG: 100 CAPSULE ORAL at 20:02

## 2024-02-03 RX ADMIN — FAMOTIDINE 20 MG: 20 TABLET, FILM COATED ORAL at 20:02

## 2024-02-03 RX ADMIN — CITALOPRAM 10 MG: 20 TABLET, FILM COATED ORAL at 10:17

## 2024-02-03 RX ADMIN — FAMOTIDINE 20 MG: 20 TABLET, FILM COATED ORAL at 10:17

## 2024-02-03 RX ADMIN — MEMANTINE HYDROCHLORIDE 10 MG: 10 TABLET, FILM COATED ORAL at 10:17

## 2024-02-03 RX ADMIN — Medication 3 ML: at 20:02

## 2024-02-03 NOTE — PLAN OF CARE
Goal Outcome Evaluation:  Plan of Care Reviewed With: patient        Progress: improving  Outcome Evaluation: VSS. left foot dressing CDT.  Has slept good thru the night.  IV antibiotics given.  denies pain.  Possible home today with Home Health.

## 2024-02-03 NOTE — PLAN OF CARE
Goal Outcome Evaluation:              Outcome Evaluation: VSS. IV at KVO for abx. IV abx given. Left foot dressing CDI. Up in chair. Fall precautions maintained. Home tomorrow if sugars are stable. Meds in applesauce.

## 2024-02-03 NOTE — PROGRESS NOTES
" LOS: 6 days     Name: Di Macario  Age: 83 y.o.  Sex: female  :  1940  MRN: [id number]         Primary Care Physician: Lily Yepez PA-C    Subjective   Subjective  No new complaints or acute overnight events.    Objective   Vital Signs  Temp:  [97 °F (36.1 °C)-97.5 °F (36.4 °C)] 97.5 °F (36.4 °C)  Heart Rate:  [60-61] 60  Resp:  [16-18] 16  BP: (112-118)/(63-73) 114/63  Body mass index is 23.95 kg/m².    Objective:  General Appearance:  Comfortable and in no acute distress.    Vital signs: (most recent): Blood pressure 114/63, pulse 60, temperature 97.5 °F (36.4 °C), temperature source Oral, resp. rate 16, height 157.5 cm (62\"), weight 59.4 kg (130 lb 15.3 oz), SpO2 97%.    Lungs:  Normal effort and normal respiratory rate.  She is not in respiratory distress.  There are decreased breath sounds.    Heart: Normal rate.  Regular rhythm.    Abdomen: Abdomen is soft.  Bowel sounds are normal.   There is no abdominal tenderness.     Extremities: There is no dependent edema or local swelling.  (Left foot is dressed and wrapped)  Neurological: Patient is alert and oriented to person, place and time.    Skin:  Warm and dry.                Results Review:       I reviewed the patient's new clinical results.    Results from last 7 days   Lab Units 24  0524  0549 24  0554 24  0610 24   WBC 10*3/mm3 11.13* 11.37* 8.00 11.88* 8.30 9.97   HEMOGLOBIN g/dL 9.6* 10.7* 11.1* 12.4 9.9* 11.2*   PLATELETS 10*3/mm3 261 257 252 268 245 243     Results from last 7 days   Lab Units 24  0524  0549 24  0554 24  0610 24   SODIUM mmol/L 140 139 137 136 138 136   POTASSIUM mmol/L 4.0 3.8 4.0 4.2 3.9 4.5   CHLORIDE mmol/L 104 104 102 102 102 100   CO2 mmol/L 25.4 25.0 23.6 20.0* 28.0 25.0   BUN mg/dL 21 19 12 14 15 18   CREATININE mg/dL 0.74 0.81 0.60 0.78 0.54* 0.71   CALCIUM mg/dL 9.2 9.5 9.0 9.7 9.1 9.6   GLUCOSE mg/dL " 127* 180* 152* 131* 181* 369*                 Scheduled Meds:   citalopram, 10 mg, Oral, Daily  donepezil, 10 mg, Oral, Nightly  enoxaparin, 40 mg, Subcutaneous, Q24H  famotidine, 20 mg, Oral, BID  insulin glargine, 10 Units, Subcutaneous, Daily  insulin lispro, 3-14 Units, Subcutaneous, 4x Daily AC & at Bedtime  melatonin, 1 mg, Oral, Nightly  memantine, 10 mg, Oral, Daily  penicillin g (potassium), 4 Million Units, Intravenous, Q6H  pravastatin, 40 mg, Oral, Q PM  sodium chloride, 3 mL, Intravenous, Q12H  zonisamide, 200 mg, Oral, Nightly      PRN Meds:     acetaminophen    dextrose    dextrose    glucagon (human recombinant)    influenza vaccine    lidocaine    Morphine    [COMPLETED] Insert Peripheral IV **AND** sodium chloride    sodium chloride  Continuous Infusions:  lactated ringers, 9 mL/hr, Last Rate: 9 mL/hr (01/30/24 1540)        Assessment & Plan   Active Hospital Problems    Diagnosis  POA    **Toe osteomyelitis, left [M86.9]  Yes    Paroxysmal atrial fibrillation [I48.0]  Yes    Oropharyngeal dysphagia [R13.12]  Yes    Dementia without behavioral disturbance [F03.90]  Yes    Presence of cardiac pacemaker [Z95.0]  Yes    Hyperlipidemia [E78.5]  Yes    Uncontrolled type 2 diabetes mellitus with hyperglycemia [E11.65]  Yes      Resolved Hospital Problems   No resolved problems to display.       Assessment & Plan    Left great toe wound  -X-ray concerning for left great toe osteomyelitis  -POD 4 from partial toe amputation  -Antibiotic plan finalized by ID     Diabetes type 2, last A1c 12.5%  -Recheck A1c 11.4  -Fasting glucose 80 this morning.  I have lowered her dose of Lantus and we will observe today.  -Was previously prescribed short acting insulin but patient is not taking at home   -Daughter at the bedside tells me that patient has been managing her own insulin at home with blood sugars averaging in the 300s.     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  Rate controlled.     Dysphagia-tolerating regular diet     I discussed the patient's findings and my recommendations with patient     VTE Prophylaxis -start Lovenox  Code Status - Limited code (no CPR, no intubation).     Patient does not qualify for SNF.  She will return home with family.  Would like to observe her blood sugars today to ensure she is not becoming hypoglycemic which would inevitably bring her back into the hospital.  If blood sugars stable today and overnight into tomorrow morning then she can be discharged home tomorrow.      Expected Discharge Date: 2/3/2024; Expected Discharge Time:      Suresh Wang MD  [facility]  02/03/24  10:04 EST

## 2024-02-04 ENCOUNTER — HOME HEALTH ADMISSION (OUTPATIENT)
Dept: HOME HEALTH SERVICES | Facility: HOME HEALTHCARE | Age: 84
End: 2024-02-04
Payer: MEDICARE

## 2024-02-04 ENCOUNTER — READMISSION MANAGEMENT (OUTPATIENT)
Dept: CALL CENTER | Facility: HOSPITAL | Age: 84
End: 2024-02-04
Payer: MEDICARE

## 2024-02-04 ENCOUNTER — DOCUMENTATION (OUTPATIENT)
Dept: HOME HEALTH SERVICES | Facility: HOME HEALTHCARE | Age: 84
End: 2024-02-04
Payer: MEDICARE

## 2024-02-04 VITALS
SYSTOLIC BLOOD PRESSURE: 141 MMHG | DIASTOLIC BLOOD PRESSURE: 70 MMHG | BODY MASS INDEX: 24.1 KG/M2 | RESPIRATION RATE: 16 BRPM | WEIGHT: 130.95 LBS | TEMPERATURE: 98.5 F | HEIGHT: 62 IN | OXYGEN SATURATION: 98 % | HEART RATE: 61 BPM

## 2024-02-04 LAB
BACTERIA SPEC ANAEROBE CULT: NORMAL
GLUCOSE BLDC GLUCOMTR-MCNC: 151 MG/DL (ref 70–130)
GLUCOSE BLDC GLUCOMTR-MCNC: 85 MG/DL (ref 70–130)

## 2024-02-04 PROCEDURE — G0008 ADMIN INFLUENZA VIRUS VAC: HCPCS | Performed by: PODIATRIST

## 2024-02-04 PROCEDURE — 90662 IIV NO PRSV INCREASED AG IM: CPT | Performed by: PODIATRIST

## 2024-02-04 PROCEDURE — 25010000002 ENOXAPARIN PER 10 MG: Performed by: INTERNAL MEDICINE

## 2024-02-04 PROCEDURE — 63710000001 INSULIN GLARGINE PER 5 UNITS: Performed by: INTERNAL MEDICINE

## 2024-02-04 PROCEDURE — 82948 REAGENT STRIP/BLOOD GLUCOSE: CPT

## 2024-02-04 PROCEDURE — 25010000002 PENICILLIN G POTASSIUM PER 600000 UNITS: Performed by: INTERNAL MEDICINE

## 2024-02-04 PROCEDURE — 25010000002 INFLUENZA VAC HIGH-DOSE QUAD 0.7 ML SUSPENSION PREFILLED SYRINGE: Performed by: PODIATRIST

## 2024-02-04 PROCEDURE — 63710000001 INSULIN LISPRO (HUMAN) PER 5 UNITS: Performed by: INTERNAL MEDICINE

## 2024-02-04 RX ORDER — PENICILLIN V POTASSIUM 500 MG/1
500 TABLET ORAL 4 TIMES DAILY
Qty: 12 TABLET | Refills: 0 | Status: SHIPPED | OUTPATIENT
Start: 2024-02-04 | End: 2024-02-07

## 2024-02-04 RX ADMIN — FAMOTIDINE 20 MG: 20 TABLET, FILM COATED ORAL at 08:27

## 2024-02-04 RX ADMIN — MEMANTINE HYDROCHLORIDE 10 MG: 10 TABLET, FILM COATED ORAL at 08:27

## 2024-02-04 RX ADMIN — INSULIN LISPRO 3 UNITS: 100 INJECTION, SOLUTION INTRAVENOUS; SUBCUTANEOUS at 12:52

## 2024-02-04 RX ADMIN — SODIUM CHLORIDE 4 MILLION UNITS: 9 INJECTION, SOLUTION INTRAVENOUS at 04:24

## 2024-02-04 RX ADMIN — CITALOPRAM 10 MG: 20 TABLET, FILM COATED ORAL at 08:27

## 2024-02-04 RX ADMIN — SODIUM CHLORIDE 4 MILLION UNITS: 9 INJECTION, SOLUTION INTRAVENOUS at 10:28

## 2024-02-04 RX ADMIN — INSULIN GLARGINE 10 UNITS: 100 INJECTION, SOLUTION SUBCUTANEOUS at 12:53

## 2024-02-04 RX ADMIN — INFLUENZA A VIRUS A/VICTORIA/4897/2022 IVR-238 (H1N1) ANTIGEN (FORMALDEHYDE INACTIVATED), INFLUENZA A VIRUS A/DARWIN/9/2021 SAN-010 (H3N2) ANTIGEN (FORMALDEHYDE INACTIVATED), INFLUENZA B VIRUS B/PHUKET/3073/2013 ANTIGEN (FORMALDEHYDE INACTIVATED), AND INFLUENZA B VIRUS B/MICHIGAN/01/2021 ANTIGEN (FORMALDEHYDE INACTIVATED) 0.7 ML: 60; 60; 60; 60 INJECTION, SUSPENSION INTRAMUSCULAR at 15:06

## 2024-02-04 RX ADMIN — ENOXAPARIN SODIUM 40 MG: 100 INJECTION SUBCUTANEOUS at 08:27

## 2024-02-04 NOTE — PROGRESS NOTES
[facility]. Patient agreeable and denies other HH services. PCP and contact information confirmed. VO received from John Yepez to [facility] for [facility]. Please call daughter Mae to schedule HH visits, [phone].

## 2024-02-04 NOTE — OUTREACH NOTE
Prep Survey      Flowsheet Row Responses   [facility] patient discharged from? [place]   Is LACE score < 7 ? No   Eligibility [facility]   Date of Admission 01/28/24   Date of Discharge 02/04/24   Discharge Disposition Home-Health Care [facility]   Discharge diagnosis *Toe osteomyelitis   Does the patient have one of the following disease processes/diagnoses(primary or secondary)? Other   Does the patient have Home health ordered? Yes   What is the Home health agency?  [facility]   Is there a DME ordered? No   Prep survey completed? Yes            FLORENCE FENG - Registered Nurse

## 2024-02-04 NOTE — PLAN OF CARE
Goal Outcome Evaluation: Patient a&o, vss, blood sugars monitored closely. IV kvo for abx. Meds taken whole in applesauce. Pt up w/ assist and a walker. Pt slept well overnight. Plan of care will continue.

## 2024-02-04 NOTE — DISCHARGE SUMMARY
PHYSICIAN DISCHARGE SUMMARY                                                                        [facility]    Patient Identification:  Name: Di Macario  Age: 83 y.o.  Sex: female  :  1940  MRN: [id number]  Primary Care Physician: Lily Yepez PA-C    Admit date: 2024  Discharge date and time:2024  Discharged Condition: good    Discharge Diagnoses:  Active Hospital Problems    Diagnosis  POA    **Toe osteomyelitis, left [M86.9]  Yes    Paroxysmal atrial fibrillation [I48.0]  Yes    Oropharyngeal dysphagia [R13.12]  Yes    Dementia without behavioral disturbance [F03.90]  Yes    Presence of cardiac pacemaker [Z95.0]  Yes    Hyperlipidemia [E78.5]  Yes    Uncontrolled type 2 diabetes mellitus with hyperglycemia [E11.65]  Yes      Resolved Hospital Problems   No resolved problems to display.          PMHX:   Past Medical History:   Diagnosis Date    Dementia     states better with medication     Diabetes mellitus     Essential hypertension 2016    Hyperlipidemia 2021    Lung consolidation 2022    Peripheral neuropathy 2018    Type 2 diabetes mellitus with hyperglycemia, with long-term current use of insulin 2022    Vitamin D deficiency      PSHX:   Past Surgical History:   Procedure Laterality Date    AMPUTATION DIGIT Left 2024    Procedure: PARTIAL HALLUX AMPUTATION LEFT FOOT;  Surgeon: Nas Gonzales DPM;  Location: [facility] OR;  Service: Podiatry;  Laterality: Left;    ANKLE SURGERY Right     BLADDER SURGERY      Prolapsed    BREAST BIOPSY      CARDIAC ELECTROPHYSIOLOGY PROCEDURE N/A 2021    Procedure: Pacemaker DC new [place];  Surgeon: Madelin Ferguson MD;  Location: [facility] INVASIVE LOCATION;  Service: Cardiology;  Laterality: N/A;    ENDOSCOPY N/A 2022    Procedure: ESOPHAGOGASTRODUODENOSCOPY with biopsy, balloon dilation;  Surgeon: Cassidy  Lo LUI MD;  Location: [facility] ENDOSCOPY;  Service: Gastroenterology;  Laterality: N/A;  esophageal stricture, hiatal hernia, gastritis    HYSTERECTOMY         Hospital Course: Di Macario  is a 83 y.o. with a past medical history of dementia, uncontrolled diabetes type 2, hypertension, history of symptomatic bradycardia/sinus pauses status post pacemaker, paroxysmal atrial fibrillation not on anticoagulation presenting with left great toe wound.  Patient unable to give any kind of timeline for how long this has been there.  However, daughter saw this wound today and brought her to the hospital for further evaluation.  Patient sees her podiatrist every 3 months for diabetic footcare.  The patient was admitted to the hospital and seen by podiatry and infectious disease.  Patient had partial amputation of toe by podiatry and was treated with antibiotics by infectious disease.  She will finish a few more days of oral antibiotics and follow-up with her primary care in 1 week for ongoing care.  She will continue taking some insulin at home.  She will have home health services.  She will also follow-up with her podiatrist.    Consults:     Consults       Date and Time Order Name Status Description    1/28/2024  9:06 PM Inpatient Infectious Diseases Consult Completed     1/28/2024  8:27 PM Inpatient Podiatry Consult Completed     1/28/2024  7:51 PM [facility] (on-call MD unless specified) Details            Results from last 7 days   Lab Units 02/02/24  0527   WBC 10*3/mm3 11.13*   HEMOGLOBIN g/dL 9.6*   HEMATOCRIT % 30.0*   PLATELETS 10*3/mm3 261     Results from last 7 days   Lab Units 02/02/24  0527   SODIUM mmol/L 140   POTASSIUM mmol/L 4.0   CHLORIDE mmol/L 104   CO2 mmol/L 25.4   BUN mg/dL 21   CREATININE mg/dL 0.74   GLUCOSE mg/dL 127*   CALCIUM mg/dL 9.2     Significant Diagnostic Studies:   WBC   Date Value Ref Range Status   02/02/2024 11.13 (H) 3.40 - 10.80 10*3/mm3 Final     Hemoglobin   Date Value Ref Range  "Status   02/02/2024 9.6 (L) 12.0 - 15.9 g/dL Final     Hematocrit   Date Value Ref Range Status   02/02/2024 30.0 (L) 34.0 - 46.6 % Final     Platelets   Date Value Ref Range Status   02/02/2024 261 140 - 450 10*3/mm3 Final     Sodium   Date Value Ref Range Status   02/02/2024 140 136 - 145 mmol/L Final     Potassium   Date Value Ref Range Status   02/02/2024 4.0 3.5 - 5.2 mmol/L Final     Chloride   Date Value Ref Range Status   02/02/2024 104 98 - 107 mmol/L Final     CO2   Date Value Ref Range Status   02/02/2024 25.4 22.0 - 29.0 mmol/L Final     BUN   Date Value Ref Range Status   02/02/2024 21 8 - 23 mg/dL Final     Creatinine   Date Value Ref Range Status   02/02/2024 0.74 0.57 - 1.00 mg/dL Final     Glucose   Date Value Ref Range Status   02/02/2024 127 (H) 65 - 99 mg/dL Final     Calcium   Date Value Ref Range Status   02/02/2024 9.2 8.6 - 10.5 mg/dL Final     No results found for: \"AST\", \"ALT\", \"ALKPHOS\"  No results found for: \"APTT\", \"INR\"  No results found for: \"COLORU\", \"CLARITYU\", \"SPECGRAV\", \"PHUR\", \"PROTEINUR\", \"GLUCOSEU\", \"KETONESU\", \"BLOODU\", \"NITRITE\", \"LEUKOCYTESUR\", \"BILIRUBINUR\", \"UROBILINOGEN\", \"RBCUA\", \"WBCUA\", \"BACTERIA\", \"UACOMMENT\"  No results found for: \"TROPONINT\", \"TROPONINI\", \"BNP\"  No components found for: \"HGBA1C;2\"  No components found for: \"TSH;2\"  Imaging Results (All)       Procedure Component Value Units Date/Time    XR Foot 3+ View Left [[id number]] Collected: 01/30/24 1704     Updated: 01/30/24 1708    Narrative:      XR FOOT 3+ VW LEFT-     INDICATIONS: Postoperative evaluation     TECHNIQUE: 3 VIEWS OF THE LEFT FOOT     COMPARISON: 1/28/2024     FINDINGS:     Amputation of the first distal phalanx is noted with small surgical soft  tissue gas in this region. No acute fracture, erosion, or dislocation is  identified. The bones are diffusely osteopenic. Mild to moderate  calcaneal spurring is apparent.       Impression:         Postsurgical changes.           This report was " finalized on 1/30/2024 5:05 PM by Dr. Uche Geiger M.D on Workstation: [id number]       XR Toe 2+ View Left [[id number]] Collected: 01/28/24 1937     Updated: 01/28/24 2116    Narrative:      LEFT GREAT TOE: 3 VIEWS     HISTORY: Wound check.     COMPARISON: None.     FINDINGS: There is cortical bone loss at the tuft of the 1st distal  phalanx with mild lucency suspicious for osteomyelitis of the tuft of  the 1st distal phalanx. There is overlying soft tissue wound/ulcer and  there is generalized soft tissue swelling.       Impression:      Soft tissue wound/ulcer at the tip of the great toe with  underlying cortical loss and bone lucency suspicious for osteomyelitis.  Soft tissue swelling.     This report was finalized on 1/28/2024 9:13 PM by Dr. Jonathan Park M.D on Workstation: [id number]             Lab Results (last 7 days)       Procedure Component Value Units Date/Time    AFB Culture - Tissue, Toe, Left [[id number]] Collected: 01/30/24 1604    Specimen: Tissue from Toe, Left Updated: 02/04/24 1233     AFB Stain No acid fast bacilli seen on concentrated smear    POC Glucose Once [[id number]]  (Abnormal) Collected: 02/04/24 1109    Specimen: Blood Updated: 02/04/24 1121     Glucose 151 mg/dL     Anaerobic Culture - Tissue, Toe, Left [[id number]]  (Normal) Collected: 01/30/24 1604    Specimen: Tissue from Toe, Left Updated: 02/04/24 0954     Anaerobic Culture No anaerobes isolated at 5 days    POC Glucose Once [[id number]]  (Normal) Collected: 02/04/24 0625    Specimen: Blood Updated: 02/04/24 0934     Glucose 85 mg/dL     POC Glucose Once [[id number]]  (Normal) Collected: 02/03/24 2047    Specimen: Blood Updated: 02/03/24 2048     Glucose 89 mg/dL     POC Glucose Once [[id number]]  (Abnormal) Collected: 02/03/24 1700    Specimen: Blood Updated: 02/03/24 1701     Glucose 215 mg/dL     POC Glucose Once [[id number]]  (Abnormal) Collected: 02/03/24 1657    Specimen: Blood Updated: 02/03/24 1659     Glucose 211 mg/dL      POC Glucose Once [[id number]]  (Abnormal) Collected: 02/03/24 1108    Specimen: Blood Updated: 02/03/24 1111     Glucose 295 mg/dL     POC Glucose Once [[id number]]  (Normal) Collected: 02/03/24 0559    Specimen: Blood Updated: 02/03/24 0601     Glucose 83 mg/dL     POC Glucose Once [[id number]]  (Normal) Collected: 02/02/24 2114    Specimen: Blood Updated: 02/02/24 2116     Glucose 78 mg/dL     POC Glucose Once [[id number]]  (Abnormal) Collected: 02/02/24 1616    Specimen: Blood Updated: 02/02/24 1618     Glucose 168 mg/dL     POC Glucose Once [[id number]]  (Abnormal) Collected: 02/02/24 1120    Specimen: Blood Updated: 02/02/24 1122     Glucose 297 mg/dL     POC Glucose Once [[id number]]  (Abnormal) Collected: 02/02/24 0645    Specimen: Blood Updated: 02/02/24 0645     Glucose 133 mg/dL     Basic Metabolic Panel [[id number]]  (Abnormal) Collected: 02/02/24 0527    Specimen: Blood Updated: 02/02/24 0626     Glucose 127 mg/dL      BUN 21 mg/dL      Creatinine 0.74 mg/dL      Sodium 140 mmol/L      Potassium 4.0 mmol/L      Chloride 104 mmol/L      CO2 25.4 mmol/L      Calcium 9.2 mg/dL      BUN/Creatinine Ratio 28.4     Anion Gap 10.6 mmol/L      eGFR 79.9 mL/min/1.73     Narrative:      GFR Normal >60  Chronic Kidney Disease <60  Kidney Failure <15    The GFR formula is only valid for adults with stable renal function between ages 18 and 70.    CBC (No Diff) [[id number]]  (Abnormal) Collected: 02/02/24 0527    Specimen: Blood Updated: 02/02/24 0607     WBC 11.13 10*3/mm3      RBC 3.51 10*6/mm3      Hemoglobin 9.6 g/dL      Hematocrit 30.0 %      MCV 85.5 fL      MCH 27.4 pg      MCHC 32.0 g/dL      RDW 12.7 %      RDW-SD 39.0 fl      MPV 11.1 fL      Platelets 261 10*3/mm3     POC Glucose Once [[id number]]  (Normal) Collected: 02/01/24 2100    Specimen: Blood Updated: 02/01/24 2101     Glucose 129 mg/dL     POC Glucose Once [[id number]]  (Abnormal) Collected: 02/01/24 1630    Specimen: Blood Updated: 02/01/24 1635      "Glucose 165 mg/dL     Tissue Pathology Exam [[id number]] Collected: 01/30/24 1605    Specimen: Tissue from Toe, Left Updated: 02/01/24 1543     Case Report --     Surgical Pathology Report                         Case: [id number]                                  Authorizing Provider:  Nas Gonzales DPM      Collected:           01/30/2024 04:05 PM          Ordering Location:     [facility]  Received:            01/30/2024 04:22 PM                                 MAIN OR                                                                      Pathologist:           Yony Reed MD                                                          Specimen:    Toe, Left, left partial hallux amputation. formalin                                         Final Diagnosis --     1.  Bone, left toe partial hallux, amputation:   A.  Trabecular bone with sclerosis and degenerative features.   B.  Associated fibroconnective tissue with fat necrosis and degenerative features.   C.  No significant inflammation or evidence of neoplasm.       Gross Description --     1. Toe, Left.  Received in 2 separate containers, 1 filled with formalin in one fresh, both labeled \"left partial hallux amputation \"  Is a 1.5 x 1.5 x 0.5 cm aggregate of tan-yellow trabecular bone fragments which is entirely submitted following decalcification as 1A.    jap/uso/[facility]      POC Glucose Once [[id number]]  (Abnormal) Collected: 02/01/24 1158    Specimen: Blood Updated: 02/01/24 1207     Glucose 406 mg/dL     Tissue / Bone Culture - Tissue, Toe, Left [[id number]]  (Abnormal) Collected: 01/30/24 1604    Specimen: Tissue from Toe, Left Updated: 02/01/24 0720     Tissue Culture Scant growth (1+) Streptococcus agalactiae (Group B)     Comment:   This organism is considered to be universally susceptible to penicillin.  No further antibiotic testing will be performed. If Clindamycin or Erythromycin is the drug of choice, notify the laboratory within 7 days " to request susceptibility testing.         Rare Normal Skin Mary     Gram Stain No WBCs seen      No organisms seen    POC Glucose Once [[id number]]  (Abnormal) Collected: 02/01/24 0617    Specimen: Blood Updated: 02/01/24 0619     Glucose 179 mg/dL     Basic Metabolic Panel [[id number]]  (Abnormal) Collected: 02/01/24 0451    Specimen: Blood Updated: 02/01/24 0600     Glucose 180 mg/dL      BUN 19 mg/dL      Creatinine 0.81 mg/dL      Sodium 139 mmol/L      Potassium 3.8 mmol/L      Chloride 104 mmol/L      CO2 25.0 mmol/L      Calcium 9.5 mg/dL      BUN/Creatinine Ratio 23.5     Anion Gap 10.0 mmol/L      eGFR 71.7 mL/min/1.73     Narrative:      GFR Normal >60  Chronic Kidney Disease <60  Kidney Failure <15    The GFR formula is only valid for adults with stable renal function between ages 18 and 70.    CBC (No Diff) [[id number]]  (Abnormal) Collected: 02/01/24 0451    Specimen: Blood Updated: 02/01/24 0538     WBC 11.37 10*3/mm3      RBC 3.82 10*6/mm3      Hemoglobin 10.7 g/dL      Hematocrit 33.6 %      MCV 88.0 fL      MCH 28.0 pg      MCHC 31.8 g/dL      RDW 12.9 %      RDW-SD 41.5 fl      MPV 11.2 fL      Platelets 257 10*3/mm3     POC Glucose Once [[id number]]  (Abnormal) Collected: 01/31/24 2159    Specimen: Blood Updated: 01/31/24 2201     Glucose 293 mg/dL     POC Glucose Once [[id number]]  (Abnormal) Collected: 01/31/24 1615    Specimen: Blood Updated: 01/31/24 1616     Glucose 369 mg/dL     POC Glucose Once [[id number]]  (Abnormal) Collected: 01/31/24 1139    Specimen: Blood Updated: 01/31/24 1141     Glucose 332 mg/dL     Basic Metabolic Panel [[id number]]  (Abnormal) Collected: 01/31/24 0549    Specimen: Blood Updated: 01/31/24 0640     Glucose 152 mg/dL      BUN 12 mg/dL      Creatinine 0.60 mg/dL      Sodium 137 mmol/L      Potassium 4.0 mmol/L      Chloride 102 mmol/L      CO2 23.6 mmol/L      Calcium 9.0 mg/dL      BUN/Creatinine Ratio 20.0     Anion Gap 11.4 mmol/L      eGFR 88.6 mL/min/1.73      Narrative:      GFR Normal >60  Chronic Kidney Disease <60  Kidney Failure <15    The GFR formula is only valid for adults with stable renal function between ages 18 and 70.    CBC (No Diff) [[id number]]  (Abnormal) Collected: 01/31/24 0549    Specimen: Blood Updated: 01/31/24 0632     WBC 8.00 10*3/mm3      RBC 3.96 10*6/mm3      Hemoglobin 11.1 g/dL      Hematocrit 35.3 %      MCV 89.1 fL      MCH 28.0 pg      MCHC 31.4 g/dL      RDW 13.0 %      RDW-SD 42.5 fl      MPV 11.2 fL      Platelets 252 10*3/mm3     POC Glucose Once [[id number]]  (Abnormal) Collected: 01/31/24 0616    Specimen: Blood Updated: 01/31/24 0617     Glucose 140 mg/dL     POC Glucose Once [[id number]]  (Abnormal) Collected: 01/30/24 2156    Specimen: Blood Updated: 01/30/24 2158     Glucose 376 mg/dL     POC Glucose Once [[id number]]  (Abnormal) Collected: 01/30/24 1641    Specimen: Blood Updated: 01/30/24 1643     Glucose 132 mg/dL     Fungus Culture - Tissue, Toe, Left [[id number]] Collected: 01/30/24 1604    Specimen: Tissue from Toe, Left Updated: 01/30/24 1616    POC Glucose Once [[id number]]  (Abnormal) Collected: 01/30/24 1436    Specimen: Blood Updated: 01/30/24 1437     Glucose 148 mg/dL     POC Glucose Once [[id number]]  (Abnormal) Collected: 01/30/24 1138    Specimen: Blood Updated: 01/30/24 1145     Glucose 150 mg/dL     Basic Metabolic Panel [[id number]]  (Abnormal) Collected: 01/30/24 0554    Specimen: Blood Updated: 01/30/24 0645     Glucose 131 mg/dL      BUN 14 mg/dL      Creatinine 0.78 mg/dL      Sodium 136 mmol/L      Potassium 4.2 mmol/L      Comment: Slight hemolysis detected by analyzer. Result may be falsely elevated.        Chloride 102 mmol/L      CO2 20.0 mmol/L      Calcium 9.7 mg/dL      BUN/Creatinine Ratio 17.9     Anion Gap 14.0 mmol/L      eGFR 75.0 mL/min/1.73     Narrative:      GFR Normal >60  Chronic Kidney Disease <60  Kidney Failure <15    The GFR formula is only valid for adults with stable renal function between  ages 18 and 70.    CBC (No Diff) [[id number]]  (Abnormal) Collected: 01/30/24 0554    Specimen: Blood Updated: 01/30/24 0615     WBC 11.88 10*3/mm3      RBC 4.38 10*6/mm3      Hemoglobin 12.4 g/dL      Hematocrit 39.0 %      MCV 89.0 fL      MCH 28.3 pg      MCHC 31.8 g/dL      RDW 13.3 %      RDW-SD 43.9 fl      MPV 11.6 fL      Platelets 268 10*3/mm3     POC Glucose Once [[id number]]  (Normal) Collected: 01/30/24 0611    Specimen: Blood Updated: 01/30/24 0613     Glucose 125 mg/dL     POC Glucose Once [[id number]]  (Abnormal) Collected: 01/29/24 2206    Specimen: Blood Updated: 01/29/24 2207     Glucose 378 mg/dL     POC Glucose Once [[id number]]  (Normal) Collected: 01/29/24 1607    Specimen: Blood Updated: 01/29/24 1609     Glucose 112 mg/dL     POC Glucose Once [[id number]]  (Abnormal) Collected: 01/29/24 1059    Specimen: Blood Updated: 01/29/24 1101     Glucose 169 mg/dL     Basic Metabolic Panel [[id number]]  (Abnormal) Collected: 01/29/24 0610    Specimen: Blood Updated: 01/29/24 0729     Glucose 181 mg/dL      BUN 15 mg/dL      Creatinine 0.54 mg/dL      Sodium 138 mmol/L      Potassium 3.9 mmol/L      Chloride 102 mmol/L      CO2 28.0 mmol/L      Calcium 9.1 mg/dL      BUN/Creatinine Ratio 27.8     Anion Gap 8.0 mmol/L      eGFR 90.9 mL/min/1.73     Narrative:      GFR Normal >60  Chronic Kidney Disease <60  Kidney Failure <15    The GFR formula is only valid for adults with stable renal function between ages 18 and 70.    CBC (No Diff) [[id number]]  (Abnormal) Collected: 01/29/24 0610    Specimen: Blood Updated: 01/29/24 0702     WBC 8.30 10*3/mm3      RBC 3.58 10*6/mm3      Hemoglobin 9.9 g/dL      Hematocrit 31.3 %      MCV 87.4 fL      MCH 27.7 pg      MCHC 31.6 g/dL      RDW 12.8 %      RDW-SD 40.5 fl      MPV 11.3 fL      Platelets 245 10*3/mm3     Hemoglobin A1c [[id number]]  (Abnormal) Collected: 01/29/24 0610    Specimen: Blood Updated: 01/29/24 0656     Hemoglobin A1C 11.40 %     Narrative:       "Hemoglobin A1C Ranges:    Increased Risk for Diabetes  5.7% to 6.4%  Diabetes                     >= 6.5%  Diabetic Goal                < 7.0%    POC Glucose Once [[id number]]  (Abnormal) Collected: 01/29/24 0619    Specimen: Blood Updated: 01/29/24 0620     Glucose 201 mg/dL     POC Glucose Once [[id number]]  (Abnormal) Collected: 01/28/24 2239    Specimen: Blood Updated: 01/28/24 2240     Glucose 295 mg/dL     Procalcitonin [[id number]]  (Normal) Collected: 01/28/24 1917    Specimen: Blood Updated: 01/28/24 1956     Procalcitonin 0.08 ng/mL     Narrative:      As a Marker for Sepsis (Non-Neonates):    1. <0.5 ng/mL represents a low risk of severe sepsis and/or septic shock.  2. >2 ng/mL represents a high risk of severe sepsis and/or septic shock.    As a Marker for Lower Respiratory Tract Infections that require antibiotic therapy:    PCT on Admission    Antibiotic Therapy       6-12 Hrs later    >0.5                Strongly Recommended  >0.25 - <0.5        Recommended   0.1 - 0.25          Discouraged              Remeasure/reassess PCT  <0.1                Strongly Discouraged     Remeasure/reassess PCT    As 28 day mortality risk marker: \"Change in Procalcitonin Result\" (>80% or <=80%) if Day 0 (or Day 1) and Day 4 values are available. Refer to http://www.[employer]-pct-calculator.com    Change in PCT <=80%  A decrease of PCT levels below or equal to 80% defines a positive change in PCT test result representing a higher risk for 28-day all-cause mortality of patients diagnosed with severe sepsis for septic shock.    Change in PCT >80%  A decrease of PCT levels of more than 80% defines a negative change in PCT result representing a lower risk for 28-day all-cause mortality of patients diagnosed with severe sepsis or septic shock.       Comprehensive Metabolic Panel [[id number]]  (Abnormal) Collected: 01/28/24 1917    Specimen: Blood Updated: 01/28/24 1949     Glucose 369 mg/dL      BUN 18 mg/dL      Creatinine 0.71 " mg/dL      Sodium 136 mmol/L      Potassium 4.5 mmol/L      Comment: Slight hemolysis detected by analyzer. Result may be falsely elevated.        Chloride 100 mmol/L      CO2 25.0 mmol/L      Calcium 9.6 mg/dL      Total Protein 7.1 g/dL      Albumin 3.7 g/dL      ALT (SGPT) 8 U/L      AST (SGOT) 9 U/L      Alkaline Phosphatase 148 U/L      Total Bilirubin 0.2 mg/dL      Globulin 3.4 gm/dL      A/G Ratio 1.1 g/dL      BUN/Creatinine Ratio 25.4     Anion Gap 11.0 mmol/L      eGFR 84.0 mL/min/1.73     Narrative:      GFR Normal >60  Chronic Kidney Disease <60  Kidney Failure <15    The GFR formula is only valid for adults with stable renal function between ages 18 and 70.    Lactic Acid, Plasma [[id number]]  (Normal) Collected: 01/28/24 1917    Specimen: Blood Updated: 01/28/24 1946     Lactate 1.3 mmol/L     CBC & Differential [[id number]]  (Abnormal) Collected: 01/28/24 1917    Specimen: Blood Updated: 01/28/24 1930    Narrative:      The following orders were created for panel order CBC & Differential.  Procedure                               Abnormality         Status                     ---------                               -----------         ------                     CBC Auto Differential[[id number]]        Abnormal            Final result                 Please view results for these tests on the individual orders.    CBC Auto Differential [[id number]]  (Abnormal) Collected: 01/28/24 1917    Specimen: Blood Updated: 01/28/24 1930     WBC 9.97 10*3/mm3      RBC 3.98 10*6/mm3      Hemoglobin 11.2 g/dL      Hematocrit 35.7 %      MCV 89.7 fL      MCH 28.1 pg      MCHC 31.4 g/dL      RDW 12.8 %      RDW-SD 42.2 fl      MPV 11.1 fL      Platelets 243 10*3/mm3      Neutrophil % 69.8 %      Lymphocyte % 22.5 %      Monocyte % 6.4 %      Eosinophil % 0.5 %      Basophil % 0.5 %      Immature Grans % 0.3 %      Neutrophils, Absolute 6.96 10*3/mm3      Lymphocytes, Absolute 2.24 10*3/mm3      Monocytes, Absolute 0.64  "10*3/mm3      Eosinophils, Absolute 0.05 10*3/mm3      Basophils, Absolute 0.05 10*3/mm3      Immature Grans, Absolute 0.03 10*3/mm3      nRBC 0.0 /100 WBC           /70 (BP Location: Left arm, Patient Position: Lying)   Pulse 61   Temp 98.5 °F (36.9 °C) (Oral)   Resp 16   Ht 157.5 cm (62\")   Wt 59.4 kg (130 lb 15.3 oz)   SpO2 98%   BMI 23.95 kg/m²     Discharge Exam:  General Appearance:    Alert, cooperative, no distress                          Head:    Normocephalic, without obvious abnormality, atraumatic                          Eyes:                            Throat:   Lips, tongue, gums normal                          Neck:   Supple, symmetrical, trachea midline, no JVD                        Lungs:     Clear to auscultation bilaterally, respirations unlabored                Chest Wall:    No tenderness or deformity                        Heart:    Regular rate and rhythm, S1 and S2 normal, no murmur,no  Rub  or gallop                  Abdomen:     Soft, non-tender, bowel sounds active, no masses, no  organomegaly                  Extremities:   Extremities normal, dressing over wound on foot, no cyanosis or edema                             Skin:   Skin is warm and dry,  no rashes or palpable lesions                  Neurologic:   no focal deficits noted     Disposition:  Home with home health    Activity as tolerated    Diet as tolerated  Diet Order   Procedures    Diet: Diabetic Diets; Consistent Carbohydrate; Texture: Regular Texture (IDDSI 7); Fluid Consistency: Thin (IDDSI 0)       Patient Instructions:      Discharge Medications        New Medications        Instructions Start Date   penicillin v potassium 500 MG tablet  Commonly known as: VEETID   500 mg, Oral, 4 Times Daily             Continue These Medications        Instructions Start Date   acetaminophen 325 MG tablet  Commonly known as: TYLENOL   650 mg, Oral, Every 6 Hours PRN, PRN      amLODIPine 5 MG tablet  Commonly known as: " NORVASC   2.5 mg, Oral, Daily      BD Pen Needle Norma U/F 32G X 4 MM misc  Generic drug: Insulin Pen Needle   Use once a day      citalopram 10 MG tablet  Commonly known as: CeleXA   10 mg, Oral, Daily      Dexcom G6 Sensor   Dipsense Dexcom G6 Sensors, to replace every 10 days, 3 sensors per 30 day period ([facility]C #[address]-07). Check 6 times a day. Dx: E 11.65 sent to supply store      donepezil 10 MG tablet  Commonly known as: ARICEPT   10 mg, Oral, Nightly      famotidine 20 MG tablet  Commonly known as: PEPCID   20 mg, Oral, 2 Times Daily      freestyle lancets   1 each, Other, 4 Times Daily, Use as instructed      FREESTYLE LITE test strip  Generic drug: glucose blood   Bid e11.65      Gvoke HypoPen 2-Pack 1 MG/0.2ML solution auto-injector  Generic drug: Glucagon   1 mg, Subcutaneous, As Needed      Lantus SoloStar 100 UNIT/ML injection pen  Generic drug: Insulin Glargine   10 Units, Subcutaneous, Daily      melatonin 1 MG tablet   1 mg, Oral, Nightly      memantine 10 MG tablet  Commonly known as: NAMENDA   10 mg, Oral, 2 Times Daily      NovoLOG FlexPen 100 UNIT/ML solution pen-injector sc pen  Generic drug: insulin aspart   8 Units, Subcutaneous, Daily, Inject 3 units with breakfast /2 units with lunch and /3 units at dinner      pravastatin 40 MG tablet  Commonly known as: PRAVACHOL   40 mg, Oral, Every Evening      Trulicity 0.75 MG/0.5ML solution pen-injector  Generic drug: Dulaglutide   0.75 mg, Subcutaneous, Weekly      Zonegran 100 MG capsule  Generic drug: zonisamide   200 mg, Oral, Nightly             Future Appointments   Date Time Provider Department Center   3/21/2024 10:15 AM [facility] DEVICE CHECK [facility]     Additional Instructions for the Follow-ups that You Need to Schedule       Ambulatory Referral to Home Health (Hospital)   As directed      Face to Face Visit Date: 2/4/2024   Follow-up provider for Plan of Care?: I treated the patient in an acute care facility and will  not continue treatment after discharge.   Follow-up provider: LILY GALLEGOS [[id number]]   Reason/Clinical Findings: weak   Describe mobility limitations that make leaving home difficult: weakness   Nursing/Therapeutic Services Requested: Skilled Nursing Physical Therapy   Skilled nursing orders: Wound care dressing/changes   PT orders: Therapeutic exercise   Frequency: 1 Week 1               Follow-up Information       Lily Gallegos PA-C Follow up in 1 week(s).    Specialties: Physician Assistant, Internal Medicine, Family Medicine  Contact information:  [facility] [place] [id number] [phone]                           Discharge Order (From admission, onward)       Start     Ordered    02/04/24 1356  Discharge patient  Once        Expected Discharge Date: 02/04/24   [place] [facility] [place]   Physician of Record for Attribution - Please select from Treatment Team: JOSE M WILDER [[id number]]   Review needed by CMO to determine Physician of Record: No      Question Answer Comment   Physician of Record for Attribution - Please select from Treatment Team JOSE M WILDER    Review needed by CMO to determine Physician of Record No        02/04/24 1415                    Total time spent discharging patient including evaluation,post hospitalization follow up,  medication and post hospitalization instructions and education total time exceeds 30 minutes.    Signed:  Jose M Wilder MD  2/4/2024  14:19 EST

## 2024-02-05 ENCOUNTER — TRANSITIONAL CARE MANAGEMENT TELEPHONE ENCOUNTER (OUTPATIENT)
Dept: CALL CENTER | Facility: HOSPITAL | Age: 84
End: 2024-02-05
Payer: MEDICARE

## 2024-02-05 ENCOUNTER — HOME CARE VISIT (OUTPATIENT)
Dept: HOME HEALTH SERVICES | Facility: HOME HEALTHCARE | Age: 84
End: 2024-02-05
Payer: MEDICARE

## 2024-02-05 VITALS
RESPIRATION RATE: 18 BRPM | HEART RATE: 87 BPM | SYSTOLIC BLOOD PRESSURE: 122 MMHG | OXYGEN SATURATION: 99 % | DIASTOLIC BLOOD PRESSURE: 60 MMHG | TEMPERATURE: 98.6 F

## 2024-02-05 DIAGNOSIS — I10 ESSENTIAL HYPERTENSION: ICD-10-CM

## 2024-02-05 PROCEDURE — G0299 HHS/HOSPICE OF RN EA 15 MIN: HCPCS

## 2024-02-05 RX ORDER — AMLODIPINE BESYLATE 5 MG/1
2.5 TABLET ORAL DAILY
Qty: 90 TABLET | Refills: 3 | Status: SHIPPED | OUTPATIENT
Start: 2024-02-05

## 2024-02-05 NOTE — PROGRESS NOTES
Case Management Discharge Note      Final Note: Discharged home with family assist and [facility]. Kathie/[facility] aware. Shani Ramirez, RN         Selected Continued Care - Discharged on 2/4/2024 Admission date: 1/28/2024 - Discharge disposition: Home-Health Care [facility] [place]          Home Medical Care Coordination complete.      Service Provider Selected Services Address Phone Fax Patient Preferred    [facility] [address] [address] [phone] --                 Transportation Services  Private: Car    Final Discharge Disposition Code: 06 - home with home health care

## 2024-02-05 NOTE — Clinical Note
Hello, I am verifying that you will be signing off on Home Health skilled nursing orders.   Thank you   Yohana VALDEZ   [phone]

## 2024-02-05 NOTE — OUTREACH NOTE
Call Center TCM Note      Flowsheet Row Responses   [facility] patient discharged from? [place]   Does the patient have one of the following disease processes/diagnoses(primary or secondary)? Other   TCM attempt successful? Yes   Call start time 1232   Call end time 1234   Discharge diagnosis *Toe osteomyelitis   Meds reviewed with patient/caregiver? Yes   Is the patient having any side effects they believe may be caused by any medication additions or changes? No   Does the patient have all medications ordered at discharge? Yes   Is the patient taking all medications as directed (includes completed medication regime)? Yes   Does the patient have an appointment with their PCP within 7-14 days of discharge? Yes   What is the Home health agency?  [facility]   Has home health visited the patient within 72 hours of discharge? Call prior to 72 hours   Psychosocial issues? No   Did the patient receive a copy of their discharge instructions? Yes   Nursing interventions Reviewed instructions with patient   What is the patient's perception of their health status since discharge? Improving   Is the patient/caregiver able to teach back signs and symptoms related to disease process for when to call PCP? Yes   Is the patient/caregiver able to teach back signs and symptoms related to disease process for when to call 911? Yes   Is the patient/caregiver able to teach back the hierarchy of who to call/visit for symptoms/problems? PCP, Specialist, Home health nurse, Urgent Care, ED, 911 Yes   If the patient is a current smoker, are they able to teach back resources for cessation? Not a smoker   TCM call completed? Yes   Call end time 1234   Would this patient benefit from a Referral to [facility] Social Work? No   Is the patient interested in additional calls from an ambulatory ? No            Aura Murguia LPN    2/5/2024, 12:34 EST

## 2024-02-06 ENCOUNTER — OFFICE VISIT (OUTPATIENT)
Dept: INTERNAL MEDICINE | Facility: CLINIC | Age: 84
End: 2024-02-06
Payer: MEDICARE

## 2024-02-06 VITALS — WEIGHT: 131 LBS | BODY MASS INDEX: 24.11 KG/M2 | TEMPERATURE: 98.5 F | HEIGHT: 62 IN

## 2024-02-06 DIAGNOSIS — Z91.81 AT HIGH RISK FOR FALLS: Primary | ICD-10-CM

## 2024-02-06 DIAGNOSIS — F03.90 DEMENTIA WITHOUT BEHAVIORAL DISTURBANCE: ICD-10-CM

## 2024-02-06 DIAGNOSIS — I10 ESSENTIAL HYPERTENSION: ICD-10-CM

## 2024-02-06 DIAGNOSIS — M86.9 TOE OSTEOMYELITIS, LEFT: ICD-10-CM

## 2024-02-06 DIAGNOSIS — E11.65 TYPE 2 DIABETES MELLITUS WITH HYPERGLYCEMIA, WITH LONG-TERM CURRENT USE OF INSULIN: ICD-10-CM

## 2024-02-06 DIAGNOSIS — Z79.4 TYPE 2 DIABETES MELLITUS WITH HYPERGLYCEMIA, WITH LONG-TERM CURRENT USE OF INSULIN: ICD-10-CM

## 2024-02-06 LAB
FUNGUS WND CULT: NORMAL
MYCOBACTERIUM SPEC CULT: NORMAL
NIGHT BLUE STAIN TISS: NORMAL

## 2024-02-06 NOTE — PROGRESS NOTES
Transitional Care Follow Up Visit  Subjective     Di MCKINLEY Renaldo is a 83 y.o. female who presents for a transitional care management visit.    Within 48 business hours after discharge our office contacted her via telephone to coordinate her care and needs.      I reviewed and discussed the details of that call along with the discharge summary, hospital problems, inpatient lab results, inpatient diagnostic studies, and consultation reports with Di.     Current outpatient and discharge medications have been reconciled for the patient.  Reviewed by: Lily Yepez PA-C          2/4/2024     3:59 PM   Date of TCM Phone Call   [facility]   Date of Admission 1/28/2024   Date of Discharge 2/4/2024   Discharge Disposition [place]Health Care [facility] [place]     Risk for Readmission (LACE) Score: 14 (2/4/2024  6:00 AM)      History of Present Illness   Course During Hospital Stay:  Pt is here today for hospital fup with 2 of her daughters. She was taken to the ED at [facility] on 1/28 diagnosed with osteomyelitis and admitted. Unsure timeline of infection due to her dementia. She was seen and evaluated by ID and podiatry. She underwent a partial left great toe amputation and was treated with IV abx. She was discharged with 7 days of Penicillin. During her hospitalization her sugars were very high. There is concern that she is not actually administering her insulin appropriately daily with her dementia. She has senior helpers to remind her, but often family thinks she tells them she has already given it when in actuality she has not. She had mild leukocytosis during her hospitalization, her blood cultures were negative, lactic acid and procalcitonin were normal. She is to fup with her podiatrist in 1 week. She met mobility goals and did not qualify for rehab. Her daughter's are very concerned about her staying home alone and with her diabetes being so poorly controlled her not healing appropriately. They  would like to place her in assisted living until her infection and foot is fully healed. Bone cultures grew Strep B. She reports she is not in a great deal of pain. She is ambulating with a walker. Denies fever, chills, SOA, chest pain.      The following portions of the patient's history were reviewed and updated as appropriate: allergies, current medications, past family history, past medical history, past social history, past surgical history, and problem list.    Review of Systems    See HPI    Objective   Physical Exam  Vitals reviewed.   Constitutional:       Appearance: Normal appearance.   HENT:      Head: Normocephalic and atraumatic.   Cardiovascular:      Rate and Rhythm: Normal rate and regular rhythm.      Heart sounds: Normal heart sounds.   Pulmonary:      Effort: Pulmonary effort is normal.      Breath sounds: Normal breath sounds.   Neurological:      Mental Status: She is alert.         Assessment & Plan   Diagnoses and all orders for this visit:    1. At high risk for falls (Primary)    2. Dementia without behavioral disturbance    3. Essential hypertension    4. Type 2 diabetes mellitus with hyperglycemia, with long-term current use of insulin    5. Toe osteomyelitis, left    Long discussion with Di today regarding prognosis for healing. She is a former nurse. She is agreeable to going to an assisted living facility to help monitor her sugars more closely and make sure insulin is being administered appropriately. We discussed today that her uncontrolled diabetes is going to prevent well healing infection and slow progress. This is the greatest risk to non healing for her. She will fup with her podiatrist in 1 week. Reviewed all hospital records.     I spent 30 minutes caring for Di on this date of service. This time includes time spent by me in the following activities: preparing for the visit, reviewing tests, obtaining and/or reviewing a separately obtained history, performing a  medically appropriate examination and/or evaluation, counseling and educating the patient/family/caregiver, documenting information in the medical record, and care coordination

## 2024-02-06 NOTE — HOME HEALTH
"SOC Note:  83 year old lives at home with daughters at night, and Senior care helpers during the day, patient is ambulatory with walker and post op shoe. hx of dementia, at baseline for SOC visit, alert and oriented to place and situation. Daughter reports that she admin her own insulin and refuses to take all insulin. Patient has non removable dressing to left foot, and follows up with surgery on 2/13/24.     Home Health ordered for: disciplines: SN, PT, OT     Reason for Hosp/Primary Dx/Co-morbidities: Diabetic foot infection    Focus of Care: wound care to left foot    Patient's goal(s):\"to have wound heal\"     Current Functional status/mobility/DME: glucometer, BP cuff, o2 prob, shower chair, post op shoe, walker     HB status/Living Arrangements: lives at home with daughters that come in and out at night, and care helpers senior during the day     Skin Integrity/wound status: wound to left foot non able to assess     Code Status: DNR    Fall Risk/Safety concerns: using walker in the home and wearing post op shoe with non removable dressing     Medication issues/Concerns: Patient is not taking Novalog as RX, since being home her BG have been in the 300's, patient has been educated on taking insulin more frequent. daughter states \"she refuses us to give the insulin to her\"       Plan for next visit: CP assessment, edema to left leg above non removable dressing."

## 2024-02-07 PROBLEM — J69.0 ASPIRATION PNEUMONIA DUE TO VOMITUS: Status: RESOLVED | Noted: 2022-06-02 | Resolved: 2024-02-07

## 2024-02-07 PROBLEM — R06.02 SHORTNESS OF BREATH: Status: RESOLVED | Noted: 2021-01-13 | Resolved: 2024-02-07

## 2024-02-07 PROBLEM — J69.0: Status: RESOLVED | Noted: 2022-07-24 | Resolved: 2024-02-07

## 2024-02-13 LAB
FUNGUS WND CULT: NORMAL
MYCOBACTERIUM SPEC CULT: NORMAL
NIGHT BLUE STAIN TISS: NORMAL

## 2024-02-14 ENCOUNTER — READMISSION MANAGEMENT (OUTPATIENT)
Dept: CALL CENTER | Facility: HOSPITAL | Age: 84
End: 2024-02-14
Payer: MEDICARE

## 2024-02-15 ENCOUNTER — HOME CARE VISIT (OUTPATIENT)
Dept: HOME HEALTH SERVICES | Facility: HOME HEALTHCARE | Age: 84
End: 2024-02-15
Payer: MEDICARE

## 2024-02-15 PROCEDURE — G0300 HHS/HOSPICE OF LPN EA 15 MIN: HCPCS

## 2024-02-16 VITALS
HEART RATE: 61 BPM | SYSTOLIC BLOOD PRESSURE: 132 MMHG | OXYGEN SATURATION: 98 % | RESPIRATION RATE: 18 BRPM | DIASTOLIC BLOOD PRESSURE: 62 MMHG | TEMPERATURE: 98.2 F

## 2024-02-19 ENCOUNTER — HOME CARE VISIT (OUTPATIENT)
Dept: HOME HEALTH SERVICES | Facility: HOME HEALTHCARE | Age: 84
End: 2024-02-19
Payer: MEDICARE

## 2024-02-19 ENCOUNTER — TELEPHONE (OUTPATIENT)
Dept: ENDOCRINOLOGY | Age: 84
End: 2024-02-19
Payer: MEDICARE

## 2024-02-19 VITALS
HEART RATE: 67 BPM | TEMPERATURE: 98.4 F | RESPIRATION RATE: 18 BRPM | SYSTOLIC BLOOD PRESSURE: 132 MMHG | OXYGEN SATURATION: 98 % | DIASTOLIC BLOOD PRESSURE: 80 MMHG

## 2024-02-19 DIAGNOSIS — E11.65 TYPE 2 DIABETES MELLITUS WITH HYPERGLYCEMIA, WITH LONG-TERM CURRENT USE OF INSULIN: ICD-10-CM

## 2024-02-19 DIAGNOSIS — Z79.4 TYPE 2 DIABETES MELLITUS WITH HYPERGLYCEMIA, WITH LONG-TERM CURRENT USE OF INSULIN: ICD-10-CM

## 2024-02-19 PROCEDURE — G0299 HHS/HOSPICE OF RN EA 15 MIN: HCPCS

## 2024-02-19 RX ORDER — INSULIN ASPART 100 [IU]/ML
2-3 INJECTION, SOLUTION INTRAVENOUS; SUBCUTANEOUS
Qty: 9 ML | Refills: 0 | Status: SHIPPED | OUTPATIENT
Start: 2024-02-19

## 2024-02-19 NOTE — Clinical Note
Patient is out of Novolog, wound you be able to send a new RX to pharmacy.  Please and thank you   Yohana VALDEZ   [phone]

## 2024-02-19 NOTE — TELEPHONE ENCOUNTER
Olinda from [facility] called  patient had a toe amputated and takes her lantus but not her novolog 3x/day and family will not force pt to take insulin. States bs have been between 300s- 400s and would like to know what we could do.    Spoke with Carlotta, there is nothing we could do at this time, patient needs to take meal time insulin to help blood sugars go down.

## 2024-02-19 NOTE — HOME HEALTH
Routine Visit Note:  Wound care completed with pictures in chart, patient sees podiatrist tomorrow 2/20 for follow up visit, currently taking antibioics.     Patient/caregiver response: Patient is aware and knowledgeable about taking insulin, family and caregiver encourage her to be compliant.     Plan for next visit: wound assessment, I called PCP office to make them aware of BG are high 300's-400's, and that patient is non compliant with medications. Cookie DESAI notified.

## 2024-02-20 LAB
FUNGUS WND CULT: NORMAL
MYCOBACTERIUM SPEC CULT: NORMAL
NIGHT BLUE STAIN TISS: NORMAL

## 2024-02-26 ENCOUNTER — HOME CARE VISIT (OUTPATIENT)
Dept: HOME HEALTH SERVICES | Facility: HOME HEALTHCARE | Age: 84
End: 2024-02-26
Payer: MEDICARE

## 2024-02-26 PROCEDURE — G0300 HHS/HOSPICE OF LPN EA 15 MIN: HCPCS

## 2024-02-27 VITALS
SYSTOLIC BLOOD PRESSURE: 128 MMHG | RESPIRATION RATE: 18 BRPM | DIASTOLIC BLOOD PRESSURE: 58 MMHG | OXYGEN SATURATION: 98 % | HEART RATE: 78 BPM | TEMPERATURE: 97.8 F

## 2024-02-27 LAB
FUNGUS WND CULT: NORMAL
MYCOBACTERIUM SPEC CULT: NORMAL
NIGHT BLUE STAIN TISS: NORMAL

## 2024-02-27 NOTE — HOME HEALTH
Patients wound to her right great toe is healing well.  Wound measurement and PIC was obtained.  Wound is scabbed, no depth or other measurements noted.  See pic.  Patients BS is now around 150 and patient is now taking her meds and eating well.

## 2024-03-05 DIAGNOSIS — E11.65 TYPE 2 DIABETES MELLITUS WITH HYPERGLYCEMIA, WITH LONG-TERM CURRENT USE OF INSULIN: ICD-10-CM

## 2024-03-05 DIAGNOSIS — Z79.4 TYPE 2 DIABETES MELLITUS WITH HYPERGLYCEMIA, WITH LONG-TERM CURRENT USE OF INSULIN: ICD-10-CM

## 2024-03-05 LAB
MYCOBACTERIUM SPEC CULT: NORMAL
NIGHT BLUE STAIN TISS: NORMAL

## 2024-03-05 RX ORDER — PEN NEEDLE, DIABETIC 32GX 5/32"
NEEDLE, DISPOSABLE MISCELLANEOUS
Qty: 100 EACH | Refills: 3 | Status: SHIPPED | OUTPATIENT
Start: 2024-03-05

## 2024-03-05 NOTE — TELEPHONE ENCOUNTER
Rx Refill Note  Requested Prescriptions     Pending Prescriptions Disp Refills    Insulin Pen Needle (BD Pen Needle Norma U/F) 32G X 4 MM misc 100 each 3     Sig: Use once a day      Last office visit with prescribing clinician: 10/10/2023   Last telemedicine visit with prescribing clinician: Visit date not found   Next office visit with prescribing clinician: Visit date not found                         Would you like a call back once the refill request has been completed: [] Yes [] No    If the office needs to give you a call back, can they leave a voicemail: [] Yes [] No    Radha Mills  03/05/24, 11:32 EST

## 2024-03-06 ENCOUNTER — TELEPHONE (OUTPATIENT)
Dept: INTERNAL MEDICINE | Facility: CLINIC | Age: 84
End: 2024-03-06
Payer: MEDICARE

## 2024-03-06 ENCOUNTER — HOME CARE VISIT (OUTPATIENT)
Dept: HOME HEALTH SERVICES | Facility: HOME HEALTHCARE | Age: 84
End: 2024-03-06
Payer: MEDICARE

## 2024-03-06 VITALS
TEMPERATURE: 98.7 F | RESPIRATION RATE: 18 BRPM | HEART RATE: 67 BPM | DIASTOLIC BLOOD PRESSURE: 68 MMHG | OXYGEN SATURATION: 98 % | SYSTOLIC BLOOD PRESSURE: 122 MMHG

## 2024-03-06 DIAGNOSIS — E11.621 DIABETIC ULCER OF TOE OF LEFT FOOT ASSOCIATED WITH TYPE 2 DIABETES MELLITUS, WITH BONE INVOLVEMENT WITHOUT EVIDENCE OF NECROSIS: Primary | ICD-10-CM

## 2024-03-06 DIAGNOSIS — L97.526 DIABETIC ULCER OF TOE OF LEFT FOOT ASSOCIATED WITH TYPE 2 DIABETES MELLITUS, WITH BONE INVOLVEMENT WITHOUT EVIDENCE OF NECROSIS: Primary | ICD-10-CM

## 2024-03-06 PROCEDURE — G0299 HHS/HOSPICE OF RN EA 15 MIN: HCPCS

## 2024-03-06 NOTE — HOME HEALTH
Routine Visit Note:  patient is having surgery on monday 3/11/24. I have called daughter and made her aware of change in wound, patient daughter states she is having surgery on monday, and prefers patient to be admitted overnight to get her into a rehab, due to BG not controlled, and non compliance from patient.   MSW notified, and discussed options for patient and family,.     Skill/education provided: wound care assessment, BG teachings.     Plan for next visit: wound assessment, and follow up on surgery date and time.

## 2024-03-06 NOTE — TELEPHONE ENCOUNTER
Caller: AMBER MILLER    Relationship to patient: Emergency Contact    Best call back number: [phone]     Patient is needing: PATIENTS DAUGHTER STATES PATIENT IS SCHEDULED FOR SURGERY ON MONDAY 3/11/24 TO GET THE REST OF HER TOE REMOVED      PATIENTS DAUGHTER IS LOOKING FOR RECOMMENDATIONS FOR POST SURGERY REHAB     PATIENTS DAUGHTER STATES [facility] IS LOOKING AS WELL

## 2024-03-07 ENCOUNTER — PRE-ADMISSION TESTING (OUTPATIENT)
Dept: PREADMISSION TESTING | Facility: HOSPITAL | Age: 84
End: 2024-03-07
Payer: MEDICARE

## 2024-03-07 VITALS
BODY MASS INDEX: 24.55 KG/M2 | HEIGHT: 61 IN | RESPIRATION RATE: 18 BRPM | HEART RATE: 63 BPM | TEMPERATURE: 97.8 F | WEIGHT: 130 LBS | DIASTOLIC BLOOD PRESSURE: 66 MMHG | SYSTOLIC BLOOD PRESSURE: 167 MMHG | OXYGEN SATURATION: 98 %

## 2024-03-07 LAB
ANION GAP SERPL CALCULATED.3IONS-SCNC: 9 MMOL/L (ref 5–15)
BUN SERPL-MCNC: 22 MG/DL (ref 8–23)
BUN/CREAT SERPL: 27.5 (ref 7–25)
CALCIUM SPEC-SCNC: 9.8 MG/DL (ref 8.6–10.5)
CHLORIDE SERPL-SCNC: 104 MMOL/L (ref 98–107)
CO2 SERPL-SCNC: 29 MMOL/L (ref 22–29)
CREAT SERPL-MCNC: 0.8 MG/DL (ref 0.57–1)
DEPRECATED RDW RBC AUTO: 45.5 FL (ref 37–54)
EGFRCR SERPLBLD CKD-EPI 2021: 72.8 ML/MIN/1.73
ERYTHROCYTE [DISTWIDTH] IN BLOOD BY AUTOMATED COUNT: 14.2 % (ref 12.3–15.4)
GLUCOSE SERPL-MCNC: 231 MG/DL (ref 65–99)
HCT VFR BLD AUTO: 36.4 % (ref 34–46.6)
HGB BLD-MCNC: 11.3 G/DL (ref 12–15.9)
MCH RBC QN AUTO: 27.5 PG (ref 26.6–33)
MCHC RBC AUTO-ENTMCNC: 31 G/DL (ref 31.5–35.7)
MCV RBC AUTO: 88.6 FL (ref 79–97)
PLATELET # BLD AUTO: 230 10*3/MM3 (ref 140–450)
PMV BLD AUTO: 10.9 FL (ref 6–12)
POTASSIUM SERPL-SCNC: 4.9 MMOL/L (ref 3.5–5.2)
QT INTERVAL: 432 MS
QTC INTERVAL: 432 MS
RBC # BLD AUTO: 4.11 10*6/MM3 (ref 3.77–5.28)
SODIUM SERPL-SCNC: 142 MMOL/L (ref 136–145)
WBC NRBC COR # BLD AUTO: 6.84 10*3/MM3 (ref 3.4–10.8)

## 2024-03-07 PROCEDURE — 93005 ELECTROCARDIOGRAM TRACING: CPT

## 2024-03-07 PROCEDURE — 85027 COMPLETE CBC AUTOMATED: CPT

## 2024-03-07 PROCEDURE — 80048 BASIC METABOLIC PNL TOTAL CA: CPT

## 2024-03-07 PROCEDURE — 93010 ELECTROCARDIOGRAM REPORT: CPT | Performed by: INTERNAL MEDICINE

## 2024-03-07 PROCEDURE — 36415 COLL VENOUS BLD VENIPUNCTURE: CPT

## 2024-03-07 RX ORDER — ESCITALOPRAM OXALATE 5 MG/1
TABLET ORAL
COMMUNITY
Start: 2024-02-14

## 2024-03-07 RX ORDER — DULAGLUTIDE 0.75 MG/.5ML
INJECTION, SOLUTION SUBCUTANEOUS
COMMUNITY

## 2024-03-07 NOTE — DISCHARGE INSTRUCTIONS
ARRIVE DAY OF SURGERY AT  2:00 PM          Take the following medications the morning of surgery: AMLODIPINE & PEPCID      If you are on prescription narcotic pain medication to control your pain you may also take that medication the morning of surgery.    General Instructions:  Do not eat solid food AT LEAST 8 HOURS PRIOR TO ARRIVAL TIME.  You may drink clear liquids day of surgery but must stop at least one hour before your hospital arrival time.  It is beneficial for you to have a clear drink that contains carbohydrates the day of surgery.  We suggest a 12 to 20 ounce bottle of Gatorade or Powerade for non-diabetic patients or a 12 to 20 ounce bottle of G2 or Powerade Zero for diabetic patients. (Pediatric patients, are not advised to drink a 12 to 20 ounce carbohydrate drink)    Clear liquids are liquids you can see through.  Nothing red in color.     Plain water                               Sports drinks  Sodas                                   Gelatin (Jell-O)  Fruit juices without pulp such as white grape juice and apple juice  Popsicles that contain no fruit or yogurt  Tea or coffee (no cream or milk added)  Gatorade / Powerade  G2 / Powerade Zero    Infants may have breast milk up to four hours before surgery.  Infants drinking formula may drink formula up to six hours before surgery.   Patients who avoid smoking, chewing tobacco and alcohol for 4 weeks prior to surgery have a reduced risk of post-operative complications.  Quit smoking as many days before surgery as you can.  Do not smoke, use chewing tobacco or drink alcohol the day of surgery.   If applicable bring your C-PAP/ BI-PAP machine in with you to preop day of surgery.  Bring any papers given to you in the doctor’s office.  Wear clean comfortable clothes.  Do not wear contact lenses, false eyelashes or make-up.  Bring a case for your glasses.   Bring crutches or walker if applicable.  Remove all piercings.  Leave jewelry and any other valuables  at home.  Hair extensions with metal clips must be removed prior to surgery.  The Pre-Admission Testing nurse will instruct you to bring medications if unable to obtain an accurate list in Pre-Admission Testing.        Preventing a Surgical Site Infection:  For 2 to 3 days before surgery, avoid shaving with a razor because the razor can irritate skin and make it easier to develop an infection.    Any areas of open skin can increase the risk of a post-operative wound infection by allowing bacteria to enter and travel throughout the body.  Notify your surgeon if you have any skin wounds / rashes even if it is not near the expected surgical site.  The area will need assessed to determine if surgery should be delayed until it is healed.  The night prior to surgery shower using a fresh bar of anti-bacterial soap (such as Dial) and clean washcloth.  Sleep in a clean bed with clean clothing.  Do not allow pets to sleep with you.  Shower on the morning of surgery using a fresh bar of anti-bacterial soap (such as Dial) and clean washcloth.  Dry with a clean towel and dress in clean clothing.  Ask your surgeon if you will be receiving antibiotics prior to surgery.  Make sure you, your family, and all healthcare providers clean their hands with soap and water or an alcohol based hand  before caring for you or your wound.    Day of surgery:  Your arrival time is approximately two hours before your scheduled surgery time.  Upon arrival, a Pre-op nurse and Anesthesiologist will review your health history, obtain vital signs, and answer questions you may have.  The only belongings needed at this time will be a list of your home medications and if applicable your C-PAP/BI-PAP machine.  A Pre-op nurse will start an IV and you may receive medication in preparation for surgery, including something to help you relax.     Please be aware that surgery does come with discomfort.  We want to make every effort to control your  discomfort so please discuss any uncontrolled symptoms with your nurse.   Your doctor will most likely have prescribed pain medications.      If you are going home after surgery you will receive individualized written care instructions before being discharged.  A responsible adult must drive you to and from the hospital on the day of your surgery and ideally stay with you through the night.   .  Discharge prescriptions can be filled by the hospital pharmacy during regular pharmacy hours.  If you are having surgery late in the day/evening your prescription may be e-prescribed to your pharmacy.  Please verify your pharmacy hours or chose a 24 hour pharmacy to avoid not having access to your prescription because your pharmacy has closed for the day.    If you are staying overnight following surgery, you will be transported to your hospital room following the recovery period.  [facility] has all private rooms.    If you have any questions please call Pre-Admission Testing at [phone].  Deductibles and co-payments are collected on the day of service. Please be prepared to pay the required co-pay, deductible or deposit on the day of service as defined by your plan.    Call your surgeon immediately if you experience any of the following symptoms:  Sore Throat  Shortness of Breath or difficulty breathing  Cough  Chills  Body soreness or muscle pain  Headache  Fever  New loss of taste or smell  Do not arrive for your surgery ill.  Your procedure will need to be rescheduled to another time.  You will need to call your physician before the day of surgery to avoid any unnecessary exposure to hospital staff as well as other patients.

## 2024-03-11 ENCOUNTER — ANESTHESIA EVENT (OUTPATIENT)
Dept: PERIOP | Facility: HOSPITAL | Age: 84
End: 2024-03-11
Payer: MEDICARE

## 2024-03-11 ENCOUNTER — ANESTHESIA (OUTPATIENT)
Dept: PERIOP | Facility: HOSPITAL | Age: 84
End: 2024-03-11
Payer: MEDICARE

## 2024-03-11 ENCOUNTER — HOSPITAL ENCOUNTER (OUTPATIENT)
Facility: HOSPITAL | Age: 84
Setting detail: HOSPITAL OUTPATIENT SURGERY
Discharge: HOME | End: 2024-03-11
Attending: PODIATRIST | Admitting: PODIATRIST
Payer: MEDICARE

## 2024-03-11 ENCOUNTER — APPOINTMENT (OUTPATIENT)
Dept: GENERAL RADIOLOGY | Facility: HOSPITAL | Age: 84
End: 2024-03-11
Payer: MEDICARE

## 2024-03-11 VITALS
DIASTOLIC BLOOD PRESSURE: 49 MMHG | SYSTOLIC BLOOD PRESSURE: 121 MMHG | OXYGEN SATURATION: 100 % | TEMPERATURE: 97.5 F | HEART RATE: 64 BPM | RESPIRATION RATE: 18 BRPM

## 2024-03-11 DIAGNOSIS — L97.526 DIABETIC ULCER OF TOE OF LEFT FOOT ASSOCIATED WITH TYPE 2 DIABETES MELLITUS, WITH BONE INVOLVEMENT WITHOUT EVIDENCE OF NECROSIS: ICD-10-CM

## 2024-03-11 DIAGNOSIS — E11.621 DIABETIC ULCER OF TOE OF LEFT FOOT ASSOCIATED WITH TYPE 2 DIABETES MELLITUS, WITH BONE INVOLVEMENT WITHOUT EVIDENCE OF NECROSIS: ICD-10-CM

## 2024-03-11 LAB — GLUCOSE BLDC GLUCOMTR-MCNC: 219 MG/DL (ref 70–130)

## 2024-03-11 PROCEDURE — 88311 DECALCIFY TISSUE: CPT | Performed by: PODIATRIST

## 2024-03-11 PROCEDURE — 25010000002 PROPOFOL 10 MG/ML EMULSION: Performed by: ANESTHESIOLOGY

## 2024-03-11 PROCEDURE — 25010000002 PHENYLEPHRINE 10 MG/ML SOLUTION: Performed by: ANESTHESIOLOGY

## 2024-03-11 PROCEDURE — 88305 TISSUE EXAM BY PATHOLOGIST: CPT | Performed by: PODIATRIST

## 2024-03-11 PROCEDURE — 25010000002 BUPIVACAINE 0.5 % SOLUTION: Performed by: PODIATRIST

## 2024-03-11 PROCEDURE — 73630 X-RAY EXAM OF FOOT: CPT

## 2024-03-11 PROCEDURE — 87176 TISSUE HOMOGENIZATION CULTR: CPT | Performed by: PODIATRIST

## 2024-03-11 PROCEDURE — 87070 CULTURE OTHR SPECIMN AEROBIC: CPT | Performed by: PODIATRIST

## 2024-03-11 PROCEDURE — 25010000002 LIDOCAINE 1 % SOLUTION: Performed by: PODIATRIST

## 2024-03-11 PROCEDURE — 25810000003 LACTATED RINGERS PER 1000 ML: Performed by: ANESTHESIOLOGY

## 2024-03-11 PROCEDURE — 25010000002 CEFAZOLIN PER 500 MG: Performed by: PODIATRIST

## 2024-03-11 PROCEDURE — 87205 SMEAR GRAM STAIN: CPT | Performed by: PODIATRIST

## 2024-03-11 PROCEDURE — 87075 CULTR BACTERIA EXCEPT BLOOD: CPT | Performed by: PODIATRIST

## 2024-03-11 PROCEDURE — 87102 FUNGUS ISOLATION CULTURE: CPT | Performed by: PODIATRIST

## 2024-03-11 PROCEDURE — 82948 REAGENT STRIP/BLOOD GLUCOSE: CPT

## 2024-03-11 RX ORDER — HYDRALAZINE HYDROCHLORIDE 20 MG/ML
5 INJECTION INTRAMUSCULAR; INTRAVENOUS
Status: DISCONTINUED | OUTPATIENT
Start: 2024-03-11 | End: 2024-03-11 | Stop reason: HOSPADM

## 2024-03-11 RX ORDER — MIDAZOLAM HYDROCHLORIDE 1 MG/ML
0.5 INJECTION INTRAMUSCULAR; INTRAVENOUS
Status: DISCONTINUED | OUTPATIENT
Start: 2024-03-11 | End: 2024-03-11 | Stop reason: HOSPADM

## 2024-03-11 RX ORDER — PROMETHAZINE HYDROCHLORIDE 25 MG/1
25 TABLET ORAL ONCE AS NEEDED
Status: DISCONTINUED | OUTPATIENT
Start: 2024-03-11 | End: 2024-03-11 | Stop reason: HOSPADM

## 2024-03-11 RX ORDER — SODIUM CHLORIDE 0.9 % (FLUSH) 0.9 %
3 SYRINGE (ML) INJECTION EVERY 12 HOURS SCHEDULED
Status: DISCONTINUED | OUTPATIENT
Start: 2024-03-11 | End: 2024-03-11 | Stop reason: HOSPADM

## 2024-03-11 RX ORDER — HYDROMORPHONE HYDROCHLORIDE 1 MG/ML
0.5 INJECTION, SOLUTION INTRAMUSCULAR; INTRAVENOUS; SUBCUTANEOUS
Status: DISCONTINUED | OUTPATIENT
Start: 2024-03-11 | End: 2024-03-11 | Stop reason: HOSPADM

## 2024-03-11 RX ORDER — DROPERIDOL 2.5 MG/ML
0.62 INJECTION, SOLUTION INTRAMUSCULAR; INTRAVENOUS
Status: DISCONTINUED | OUTPATIENT
Start: 2024-03-11 | End: 2024-03-11 | Stop reason: HOSPADM

## 2024-03-11 RX ORDER — EPHEDRINE SULFATE 50 MG/ML
5 INJECTION, SOLUTION INTRAVENOUS ONCE AS NEEDED
Status: DISCONTINUED | OUTPATIENT
Start: 2024-03-11 | End: 2024-03-11 | Stop reason: HOSPADM

## 2024-03-11 RX ORDER — FENTANYL CITRATE 50 UG/ML
50 INJECTION, SOLUTION INTRAMUSCULAR; INTRAVENOUS
Status: DISCONTINUED | OUTPATIENT
Start: 2024-03-11 | End: 2024-03-11 | Stop reason: HOSPADM

## 2024-03-11 RX ORDER — IPRATROPIUM BROMIDE AND ALBUTEROL SULFATE 2.5; .5 MG/3ML; MG/3ML
3 SOLUTION RESPIRATORY (INHALATION) ONCE AS NEEDED
Status: DISCONTINUED | OUTPATIENT
Start: 2024-03-11 | End: 2024-03-11 | Stop reason: HOSPADM

## 2024-03-11 RX ORDER — OXYCODONE AND ACETAMINOPHEN 7.5; 325 MG/1; MG/1
1 TABLET ORAL EVERY 4 HOURS PRN
Status: DISCONTINUED | OUTPATIENT
Start: 2024-03-11 | End: 2024-03-11 | Stop reason: HOSPADM

## 2024-03-11 RX ORDER — DIPHENHYDRAMINE HYDROCHLORIDE 50 MG/ML
12.5 INJECTION INTRAMUSCULAR; INTRAVENOUS
Status: DISCONTINUED | OUTPATIENT
Start: 2024-03-11 | End: 2024-03-11 | Stop reason: HOSPADM

## 2024-03-11 RX ORDER — BUPIVACAINE HYDROCHLORIDE 5 MG/ML
INJECTION, SOLUTION PERINEURAL AS NEEDED
Status: DISCONTINUED | OUTPATIENT
Start: 2024-03-11 | End: 2024-03-11 | Stop reason: HOSPADM

## 2024-03-11 RX ORDER — NALOXONE HCL 0.4 MG/ML
0.2 VIAL (ML) INJECTION AS NEEDED
Status: DISCONTINUED | OUTPATIENT
Start: 2024-03-11 | End: 2024-03-11 | Stop reason: HOSPADM

## 2024-03-11 RX ORDER — FLUMAZENIL 0.1 MG/ML
0.2 INJECTION INTRAVENOUS AS NEEDED
Status: DISCONTINUED | OUTPATIENT
Start: 2024-03-11 | End: 2024-03-11 | Stop reason: HOSPADM

## 2024-03-11 RX ORDER — PROPOFOL 10 MG/ML
VIAL (ML) INTRAVENOUS AS NEEDED
Status: DISCONTINUED | OUTPATIENT
Start: 2024-03-11 | End: 2024-03-11 | Stop reason: SURG

## 2024-03-11 RX ORDER — LIDOCAINE HYDROCHLORIDE 10 MG/ML
0.5 INJECTION, SOLUTION INFILTRATION; PERINEURAL ONCE AS NEEDED
Status: DISCONTINUED | OUTPATIENT
Start: 2024-03-11 | End: 2024-03-11 | Stop reason: HOSPADM

## 2024-03-11 RX ORDER — HYDROCODONE BITARTRATE AND ACETAMINOPHEN 5; 325 MG/1; MG/1
1 TABLET ORAL ONCE AS NEEDED
Status: DISCONTINUED | OUTPATIENT
Start: 2024-03-11 | End: 2024-03-11 | Stop reason: HOSPADM

## 2024-03-11 RX ORDER — EPHEDRINE SULFATE 50 MG/ML
INJECTION, SOLUTION INTRAVENOUS AS NEEDED
Status: DISCONTINUED | OUTPATIENT
Start: 2024-03-11 | End: 2024-03-11 | Stop reason: SURG

## 2024-03-11 RX ORDER — PROMETHAZINE HYDROCHLORIDE 25 MG/1
25 SUPPOSITORY RECTAL ONCE AS NEEDED
Status: DISCONTINUED | OUTPATIENT
Start: 2024-03-11 | End: 2024-03-11 | Stop reason: HOSPADM

## 2024-03-11 RX ORDER — FAMOTIDINE 10 MG/ML
20 INJECTION, SOLUTION INTRAVENOUS ONCE
Status: COMPLETED | OUTPATIENT
Start: 2024-03-11 | End: 2024-03-11

## 2024-03-11 RX ORDER — LABETALOL HYDROCHLORIDE 5 MG/ML
5 INJECTION, SOLUTION INTRAVENOUS
Status: DISCONTINUED | OUTPATIENT
Start: 2024-03-11 | End: 2024-03-11 | Stop reason: HOSPADM

## 2024-03-11 RX ORDER — FENTANYL CITRATE 50 UG/ML
50 INJECTION, SOLUTION INTRAMUSCULAR; INTRAVENOUS ONCE AS NEEDED
Status: DISCONTINUED | OUTPATIENT
Start: 2024-03-11 | End: 2024-03-11 | Stop reason: HOSPADM

## 2024-03-11 RX ORDER — LIDOCAINE HYDROCHLORIDE 20 MG/ML
INJECTION, SOLUTION INFILTRATION; PERINEURAL AS NEEDED
Status: DISCONTINUED | OUTPATIENT
Start: 2024-03-11 | End: 2024-03-11 | Stop reason: SURG

## 2024-03-11 RX ORDER — SODIUM CHLORIDE 0.9 % (FLUSH) 0.9 %
3-10 SYRINGE (ML) INJECTION AS NEEDED
Status: DISCONTINUED | OUTPATIENT
Start: 2024-03-11 | End: 2024-03-11 | Stop reason: HOSPADM

## 2024-03-11 RX ORDER — PHENYLEPHRINE HYDROCHLORIDE 10 MG/ML
INJECTION INTRAVENOUS AS NEEDED
Status: DISCONTINUED | OUTPATIENT
Start: 2024-03-11 | End: 2024-03-11 | Stop reason: SURG

## 2024-03-11 RX ORDER — LIDOCAINE HYDROCHLORIDE 10 MG/ML
INJECTION, SOLUTION INFILTRATION; PERINEURAL AS NEEDED
Status: DISCONTINUED | OUTPATIENT
Start: 2024-03-11 | End: 2024-03-11 | Stop reason: HOSPADM

## 2024-03-11 RX ORDER — MAGNESIUM HYDROXIDE 1200 MG/15ML
LIQUID ORAL AS NEEDED
Status: DISCONTINUED | OUTPATIENT
Start: 2024-03-11 | End: 2024-03-11 | Stop reason: HOSPADM

## 2024-03-11 RX ORDER — ONDANSETRON 2 MG/ML
4 INJECTION INTRAMUSCULAR; INTRAVENOUS ONCE AS NEEDED
Status: DISCONTINUED | OUTPATIENT
Start: 2024-03-11 | End: 2024-03-11 | Stop reason: HOSPADM

## 2024-03-11 RX ORDER — SODIUM CHLORIDE, SODIUM LACTATE, POTASSIUM CHLORIDE, CALCIUM CHLORIDE 600; 310; 30; 20 MG/100ML; MG/100ML; MG/100ML; MG/100ML
9 INJECTION, SOLUTION INTRAVENOUS CONTINUOUS
Status: DISCONTINUED | OUTPATIENT
Start: 2024-03-11 | End: 2024-03-11 | Stop reason: HOSPADM

## 2024-03-11 RX ADMIN — EPHEDRINE SULFATE 10 MG: 50 INJECTION INTRAVENOUS at 16:36

## 2024-03-11 RX ADMIN — PROPOFOL 20 MG: 10 INJECTION, EMULSION INTRAVENOUS at 16:18

## 2024-03-11 RX ADMIN — PROPOFOL 50 MCG/KG/MIN: 10 INJECTION, EMULSION INTRAVENOUS at 16:14

## 2024-03-11 RX ADMIN — PROPOFOL 80 MG: 10 INJECTION, EMULSION INTRAVENOUS at 16:14

## 2024-03-11 RX ADMIN — EPHEDRINE SULFATE 10 MG: 50 INJECTION INTRAVENOUS at 16:24

## 2024-03-11 RX ADMIN — FAMOTIDINE 20 MG: 10 INJECTION INTRAVENOUS at 16:03

## 2024-03-11 RX ADMIN — SODIUM CHLORIDE, POTASSIUM CHLORIDE, SODIUM LACTATE AND CALCIUM CHLORIDE 9 ML/HR: 600; 310; 30; 20 INJECTION, SOLUTION INTRAVENOUS at 15:39

## 2024-03-11 RX ADMIN — LIDOCAINE HYDROCHLORIDE 40 MG: 20 INJECTION, SOLUTION INFILTRATION; PERINEURAL at 16:14

## 2024-03-11 RX ADMIN — PHENYLEPHRINE HYDROCHLORIDE 100 MCG: 10 INJECTION INTRAVENOUS at 16:36

## 2024-03-11 RX ADMIN — SODIUM CHLORIDE 2000 MG: 9 INJECTION, SOLUTION INTRAVENOUS at 16:03

## 2024-03-11 NOTE — PERIOPERATIVE NURSING NOTE
Alert, oriented and pleasant, in no acute distress. Full head to toe assessment not completed due to lack of time.

## 2024-03-11 NOTE — H&P
[facility]   PREOPERATIVE HISTORY AND PHYSICAL    Patient Name:Di Macario  : 1940  MRN: [id number]  Primary Care Physician: Lily Yepez PA-C  Date of admission: 3/11/2024    Subjective   Subjective     Chief Complaint: preoperative evaluation    History of Present Illness  Di Macario is a 83 y.o. female who presents for preoperative evaluation. She previosuly underwent hallux amputation at the [facility]. She has received local wound care and PO abx without alleviation of symtpoms and surgical wound dehiscence. 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4    Hyperlipidemia 2021    Lung consolidation 2022    Pacemaker     Peripheral neuropathy 2018    Toe ulcer due to DM     LEFT GREAT TOE    Type 2 diabetes mellitus with hyperglycemia, with long-term current use of insulin 2022    Vitamin D deficiency        Past Surgical History:   Procedure Laterality Date    AMPUTATION DIGIT Left 2024    Procedure: PARTIAL HALLUX AMPUTATION LEFT FOOT;  Surgeon: Nas Gonzales DPM;  Location: [facility] MAIN OR;  Service: Podiatry;  Laterality: Left;    ANKLE SURGERY Right     BLADDER SURGERY      Prolapsed    BREAST BIOPSY      CARDIAC ELECTROPHYSIOLOGY PROCEDURE N/A 2021    Procedure: Pacemaker DC new BOSTON;  Surgeon: Madelin Ferguson MD;  Location: [facility] CATH INVASIVE LOCATION;  Service: Cardiology;  Laterality: N/A;    ENDOSCOPY N/A 2022    Procedure: ESOPHAGOGASTRODUODENOSCOPY with biopsy, balloon dilation;  Surgeon: Lo Benjamin MD;  Location: [facility] ENDOSCOPY;  Service: Gastroenterology;  Laterality: N/A;  esophageal stricture, hiatal hernia, gastritis     HYSTERECTOMY      TONSILLECTOMY         Family History: Her family history includes Cancer in her father and mother; Hypertension in her father and mother; Obesity in her mother.     Social History: She  reports that she quit smoking about 56 years ago. Her smoking use included cigarettes. She started smoking about 51 years ago. She has never used smokeless tobacco. She reports that she does not currently use alcohol. She reports that she does not use drugs.    Home Medications:  Dulaglutide, Glucagon, Insulin Glargine, Insulin Pen Needle, acetaminophen, amLODIPine, donepezil, escitalopram, famotidine, freestyle, glucose blood, insulin aspart, melatonin, memantine, pravastatin, and zonisamide    Allergies:  She has No Known Allergies.    Objective    Objective     Vitals:    Temp:  [97.8 °F (36.6 °C)] 97.8 °F (36.6 °C)  Heart Rate:  [68] 68  Resp:  [18] 18  BP: (170)/(76) 170/76    Physical Exam    DP and PT pulses palpable  Sensation diminished to left forefoot  Dehiscence of the distal amputation site, left hallux. Exposed bone, positive probe. No purulence. Moderate periwound erythema.    Assessment & Plan   Assessment / Plan     Brief Patient Summary:  Di Macario is a 83 y.o. female who presents for preoperative evaluation.    Pre-Op Diagnosis Codes:     * Diabetic ulcer of toe of left foot associated with type 2 diabetes mellitus, with bone involvement without evidence of necrosis [E11.621, L97.526]    Active Hospital Problems:  Active Hospital Problems    Diagnosis     **Diabetic ulcer of toe of left foot associated with type 2 diabetes mellitus, with bone involvement without evidence of necrosis      Plan:   Procedure(s):  AMPUTATION DIGIT    To OR for hallux amputation, left foot    The risks, benefits, and alternatives of the procedure including but not limited to residual infection, surgical site dehiscence, soft tissue infection, more proximal amputation and risks of the anesthesia were discussed  in detail with the patient and questions were answered. No guarantees were made or implied. Informed consent was obtained.    Nas Gonzales III, DPM

## 2024-03-11 NOTE — ANESTHESIA POSTPROCEDURE EVALUATION
Patient: Di Macario    Procedure Summary       Date: 03/11/24 Room / Location: [facility] OR 09 / [facility] MAIN OR    Anesthesia Start: 1607 Anesthesia Stop: 1655    Procedure: AMPUTATION DIGIT (Left) Diagnosis:       Diabetic ulcer of toe of left foot associated with type 2 diabetes mellitus, with bone involvement without evidence of necrosis      (Diabetic ulcer of toe of left foot associated with type 2 diabetes mellitus, with bone involvement without evidence of necrosis [E11.621, L97.526])    Surgeons: Nas Gonzales DPM Provider: Sheng Soliz MD    Anesthesia Type: MAC ASA Status: 4            Anesthesia Type: MAC    Vitals  Vitals Value Taken Time   /55 03/11/24 1651   Temp 36.4 °C (97.5 °F) 03/11/24 1651   Pulse 61 03/11/24 1655   Resp 18 03/11/24 1651   SpO2 100 % 03/11/24 1655   Vitals shown include unfiled device data.        Post Anesthesia Care and Evaluation    Patient location during evaluation: PHASE II  Patient participation: complete - patient participated  Level of consciousness: awake and alert  Pain management: adequate    Airway patency: patent  Anesthetic complications: No anesthetic complications  PONV Status: none  Cardiovascular status: acceptable and hemodynamically stable  Respiratory status: acceptable, nonlabored ventilation and spontaneous ventilation  Hydration status: acceptable

## 2024-03-11 NOTE — ANESTHESIA PREPROCEDURE EVALUATION
Anesthesia Evaluation     Patient summary reviewed and Nursing notes reviewed   NPO Solid Status: > 8 hours  NPO Liquid Status: > 2 hours           Airway   Mallampati: III  TM distance: >3 FB  Neck ROM: full  Possible difficult intubation  Dental - normal exam     Pulmonary - normal exam    breath sounds clear to auscultation  (+) a smoker Former,  Cardiovascular     ECG reviewed  Rhythm: regular  Rate: normal    (+) pacemaker pacemaker, hypertension less than 2 medications, dysrhythmias Bradycardia, Paroxysmal Atrial Fib, hyperlipidemia    ROS comment: Pacemaker  PE comment: Atrial-paced    Neuro/Psych  (+) weakness, numbness, psychiatric history, dementia    ROS Comment: Peripheral neuropathy  GI/Hepatic/Renal/Endo    (+) GERD, diabetes mellitus type 2 poorly controlled using insulin    ROS Comment: Oropharyngeal dysphagia    Musculoskeletal         ROS comment: Immobility syndrome/diabetic left foot ulcer (left great toe)  Abdominal  - normal exam   Substance History      OB/GYN          Other   blood dyscrasia anemia,       Other Comment: Hgb 11.3      Phys Exam Other: Pacer left chest              Anesthesia Plan    ASA 4     MAC     (Propofol drip)  intravenous induction     Anesthetic plan, risks, benefits, and alternatives have been provided, discussed and informed consent has been obtained with: patient.      CODE STATUS:

## 2024-03-12 LAB
MYCOBACTERIUM SPEC CULT: NORMAL
NIGHT BLUE STAIN TISS: NORMAL

## 2024-03-13 ENCOUNTER — HOME CARE VISIT (OUTPATIENT)
Dept: HOME HEALTH SERVICES | Facility: HOME HEALTHCARE | Age: 84
End: 2024-03-13
Payer: MEDICARE

## 2024-03-13 VITALS
OXYGEN SATURATION: 97 % | RESPIRATION RATE: 17 BRPM | HEART RATE: 64 BPM | SYSTOLIC BLOOD PRESSURE: 130 MMHG | TEMPERATURE: 98.7 F | DIASTOLIC BLOOD PRESSURE: 60 MMHG

## 2024-03-13 PROCEDURE — G0299 HHS/HOSPICE OF RN EA 15 MIN: HCPCS

## 2024-03-13 NOTE — HOME HEALTH
Routine Visit Note:  Patient recent outpatient surgery, for left foot big toe removal. non removal dressing on left foot, and will follow up with surgeon on Tuesday.   Patient educated and patient demonstrated how she admin her insulin self, Patient alone in the home on arrival with care taker arrival during visit, patient is aware BG is high and that decreases wound healing.     Patient/caregiver response: Patient understands teaching, and caregiver in the room during visit.     Plan for next visit: wound assessment for new surgical wound

## 2024-03-14 LAB
BACTERIA SPEC AEROBE CULT: NORMAL
GRAM STN SPEC: NORMAL
GRAM STN SPEC: NORMAL
LAB AP CASE REPORT: NORMAL
PATH REPORT.FINAL DX SPEC: NORMAL
PATH REPORT.GROSS SPEC: NORMAL

## 2024-03-16 LAB — BACTERIA SPEC ANAEROBE CULT: NORMAL

## 2024-03-20 ENCOUNTER — HOME CARE VISIT (OUTPATIENT)
Dept: HOME HEALTH SERVICES | Facility: HOME HEALTHCARE | Age: 84
End: 2024-03-20
Payer: MEDICARE

## 2024-03-20 PROCEDURE — G0300 HHS/HOSPICE OF LPN EA 15 MIN: HCPCS

## 2024-03-21 VITALS
RESPIRATION RATE: 18 BRPM | OXYGEN SATURATION: 97 % | HEART RATE: 63 BPM | TEMPERATURE: 98.1 F | DIASTOLIC BLOOD PRESSURE: 68 MMHG | SYSTOLIC BLOOD PRESSURE: 140 MMHG

## 2024-03-21 NOTE — HOME HEALTH
Patient is a CP assess with recent left great toe amp.  Toe is still wrapped in surgical dressing and patient states that dressing is to remain in place until she is seen by the surgeon next week.  Patient showed SN that she is learning to give herself her insulin, which she did while SN was present.

## 2024-03-27 ENCOUNTER — HOME CARE VISIT (OUTPATIENT)
Dept: HOME HEALTH SERVICES | Facility: HOME HEALTHCARE | Age: 84
End: 2024-03-27
Payer: MEDICARE

## 2024-03-27 VITALS
RESPIRATION RATE: 18 BRPM | OXYGEN SATURATION: 98 % | SYSTOLIC BLOOD PRESSURE: 142 MMHG | TEMPERATURE: 97 F | HEART RATE: 61 BPM | DIASTOLIC BLOOD PRESSURE: 70 MMHG

## 2024-03-27 PROCEDURE — G0299 HHS/HOSPICE OF RN EA 15 MIN: HCPCS

## 2024-03-27 NOTE — HOME HEALTH
Routine Visit Note:  Ms. Macario seen today, able to tell date/situatin but seems confused at times and very distractable. Yesterday patient urinated on her bandage accidentally and daughter reports it had been saturated, she stated the gauze seemed dry so only coban was changed. SN changed dressing per Dr. Milligan orders in chart.   Vitals WNL, pt in no distress  some erethyma and warmth to foot--called Dr. Milligan office to notify.

## 2024-03-28 ENCOUNTER — PATIENT OUTREACH (OUTPATIENT)
Dept: CASE MANAGEMENT | Facility: OTHER | Age: 84
End: 2024-03-28
Payer: MEDICARE

## 2024-03-28 NOTE — OUTREACH NOTE
AMBULATORY CASE MANAGEMENT NOTE    Name and Relationship of Patient/Support Person: AMBER MILLER/Surrogate/Guardian    Adult Patient Profile  Questions/Answers      Flowsheet Row Most Recent Value   Symptoms/Conditions Managed at Home diabetes, type 2   Barriers to Managing Health medication, inability to prepare, understanding health advice   Diabetes Management Strategies other (see comments)  [supportive services at home]   Diabetes Self-Management Outcome 3 (uncertain)   Diabetes Comment Patient noncompliant with care. Daughters support patient and stay with her. Patient also has [facility]. Home health is coming daily for dressing changes.        Patient Outreach    Introduced self, explained ACM RN role and provided contact information. Spoke with patient's daughter regarding supportive services and needs. Patient enrolled into [facility] program. She has recently had her toe removed and is living alone with her daughters taking turns staying the night with her. Patient has [facility] staying with her during the day. Patient has home health visiting daily for dressing changes. Message to PCP with request for opinion on further home health services. Referral to social work to review assisted living placement process and other private pay options to support patient at home. Follow up scheduled in 2 weeks.     Education Documentation  No documentation found.        Carley HARPER  Ambulatory Case Management    3/28/2024, 16:39 EDT

## 2024-04-02 ENCOUNTER — PATIENT OUTREACH (OUTPATIENT)
Age: 84
End: 2024-04-02
Payer: MEDICARE

## 2024-04-02 NOTE — OUTREACH NOTE
Social Work Assessment  Questions/Answers      Flowsheet Row Most Recent Value   Referral Source nursing, outpatient staff, outpatient clinic   Reason for Consult community resources, other (see comments)  [in home care services, assisted living placement]   Preferred Language English   Advance Care Planning Reviewed present on chart, no concerns identified   People in Home child(max), adult   Current Living Arrangements home   Potentially Unsafe Housing Conditions none   In the past 12 months has the electric, gas, oil, or water company threatened to shut off services in your home? No   Primary Care Provided by self, child(max), homecare agency  [[facility]]   Provides Primary Care For no one   Quality of Family Relationships helpful, involved, supportive   Employment Status retired   Source of Income unable to assess   Application for Public Assistance not applied   Usual Activity Tolerance moderate   Current Activity Tolerance moderate   Major Change/Loss/Stressor caregiver strain   Transportation Concerns none          SDOH updated and reviewed with the patient during this program:  --     Employment: Not At Risk (4/2/2024)    Employment     Do you want help finding or keeping work or a job?: I do not need or want help      Financial Resource Strain: Low Risk  (4/2/2024)    Overall Financial Resource Strain (CARDIA)     Difficulty of Paying Living Expenses: Not hard at all      --     Food Insecurity: No Food Insecurity (4/2/2024)    Hunger Vital Sign     Worried About Running Out of Food in the Last Year: Never true     Ran Out of Food in the Last Year: Never true      --     Housing Stability: Low Risk  (4/2/2024)    Housing Stability Vital Sign     Unable to Pay for Housing in the Last Year: No     Number of Times Moved in the Last Year: 1     Homeless in the Last Year: No      --     Transportation Needs: No Transportation Needs (4/2/2024)    PRAPARE - Transportation     Lack of Transportation (Medical):  No     Lack of Transportation (Non-Medical): No      --     Utilities: Not At Risk (4/2/2024)    [facility]     Threatened with loss of utilities: No      Continuing Care   [facility]   [facility]    [address], [facility] [address]    Phone: [phone]     Patient Outreach    MSW outreach to patient's daughter Carolyn as requested per RN-ACM for community resources. Patient's daughter states that patient is living alone with assistance from [facility] caregivers. Patient has two daughters that alternate staying the evening with patient. Patient's daughter states that patient no longer needs assistance with wound care but discusses that patient needs assistance with insulin management. MSW discussed that home health would be the agency to assist with medications as many personal care agencies do not assist with medication management. Patient's daughter discussed that she feels assisted living would be more appropriate for patient and patient has the funds to go to an assisted living, but patient is not agreeable to move from home. MSW and patient discussed since patient has ability to make her own decisions and has refused assisted living that daughter cannot force patient to move from home.     MSW and patient's daughter discussed options for respite care, including increasing hours with [facility] or utilizing another private pay agency to provide a break to patient's daughters. Patient's daughter states they are happy with the care received from [facility] and declines additional agency information. Patient's daughter states that they also have family or friends that could assist with overnight care for patient as needed. Patient's daughter denies additional community resource information from MSW and will call back as needed.      Emily AAYLA -   Ambulatory Case Management    4/2/2024, 10:47 EDT

## 2024-04-03 ENCOUNTER — HOME CARE VISIT (OUTPATIENT)
Dept: HOME HEALTH SERVICES | Facility: HOME HEALTHCARE | Age: 84
End: 2024-04-03
Payer: MEDICARE

## 2024-04-03 VITALS
TEMPERATURE: 97.2 F | RESPIRATION RATE: 18 BRPM | SYSTOLIC BLOOD PRESSURE: 140 MMHG | DIASTOLIC BLOOD PRESSURE: 60 MMHG | HEART RATE: 87 BPM | OXYGEN SATURATION: 99 %

## 2024-04-03 PROCEDURE — G0299 HHS/HOSPICE OF RN EA 15 MIN: HCPCS

## 2024-04-03 NOTE — Clinical Note
Hi,     I am reaching out about Ms. Macario. I am filling in for her normal nurse with home health and have only seen her twice. I was supposed to discharge her today but I feel like thats not appropriate. Her blood sugar was maybe 370 when I saw her. Today her dexcom was reading high--she had forgotten it in her bedroom and had not taken her insulin.   She took it while I was there. I do not know what she came down to.     I would like to get approval for a recertification on home health for diabetes management/education and medication education--hopefully even if its only some of the time we can get her blood sugar more managed.   Are you okay with this?    Thank you,  Julia Hendrix.

## 2024-04-03 NOTE — HOME HEALTH
SN had planned an [facility] discharge today. However, last week when SN saw patient her blood sugar was over 370 and this week her dexcom would not read--just stated high. HH will keep patient for diabetes education and medication education. Hopefully with persistent education on illness patient will retain some information about blood sugar control and nutrition.       60 Day Summary  Home Health need continues for: Diabetes education/medication education managemet  Primary diagnoses/co-morbidities/recent procedures in past 60 days that impact current episode: Toe amputation, diabetes  Current level of functional ability: Ambulatory with walker  Homebound status and living arrangements: Lives at home, caregiver some days, daughter lives with her  Goals accomplished and/or measurable progress toward unmet goals in past 60 days: partially  Focus of care for next 60 days for each discipline ordered:  needed for uncontrolled diabetes  Skin integrity/wound status: Post operative toe amputation, wound healed  Estimated date when home care services will end 5/4/24  SDOH changes/barriers (i.e. Caregiver availability, social isolation, environment, income, transportation access, food insecurity etc.)  Need for MSW referral? N  Plan for next visit Diabetes education, take diabetes book and review appropriate nutrition, discuss with daughter, edcuate patient on appropriate blood glucose and medication administration

## 2024-04-08 ENCOUNTER — LAB REQUISITION (OUTPATIENT)
Dept: LAB | Facility: HOSPITAL | Age: 84
End: 2024-04-08
Payer: MEDICARE

## 2024-04-08 ENCOUNTER — HOME CARE VISIT (OUTPATIENT)
Dept: HOME HEALTH SERVICES | Facility: HOME HEALTHCARE | Age: 84
End: 2024-04-08
Payer: MEDICARE

## 2024-04-08 VITALS
RESPIRATION RATE: 18 BRPM | DIASTOLIC BLOOD PRESSURE: 74 MMHG | TEMPERATURE: 100 F | HEART RATE: 78 BPM | OXYGEN SATURATION: 97 % | SYSTOLIC BLOOD PRESSURE: 142 MMHG

## 2024-04-08 DIAGNOSIS — F03.90 DEMENTIA WITHOUT BEHAVIORAL DISTURBANCE: ICD-10-CM

## 2024-04-08 DIAGNOSIS — R31.9 HEMATURIA, UNSPECIFIED TYPE: Primary | ICD-10-CM

## 2024-04-08 DIAGNOSIS — E11.65 TYPE 2 DIABETES MELLITUS WITH HYPERGLYCEMIA: ICD-10-CM

## 2024-04-08 LAB
ALBUMIN SERPL-MCNC: 3.8 G/DL (ref 3.5–5.2)
ALBUMIN/GLOB SERPL: 1.1 G/DL
ALP SERPL-CCNC: 166 U/L (ref 39–117)
ALT SERPL W P-5'-P-CCNC: 7 U/L (ref 1–33)
ANION GAP SERPL CALCULATED.3IONS-SCNC: 11.7 MMOL/L (ref 5–15)
AST SERPL-CCNC: 11 U/L (ref 1–32)
BACTERIA UR QL AUTO: ABNORMAL /HPF
BILIRUB SERPL-MCNC: 0.2 MG/DL (ref 0–1.2)
BILIRUB UR QL STRIP: NEGATIVE
BUN SERPL-MCNC: 20 MG/DL (ref 8–23)
BUN/CREAT SERPL: 28.2 (ref 7–25)
CALCIUM SPEC-SCNC: 9.6 MG/DL (ref 8.6–10.5)
CHLORIDE SERPL-SCNC: 101 MMOL/L (ref 98–107)
CLARITY UR: ABNORMAL
CO2 SERPL-SCNC: 26.3 MMOL/L (ref 22–29)
COLOR UR: YELLOW
CREAT SERPL-MCNC: 0.71 MG/DL (ref 0.57–1)
DEPRECATED RDW RBC AUTO: 43.4 FL (ref 37–54)
EGFRCR SERPLBLD CKD-EPI 2021: 84 ML/MIN/1.73
ERYTHROCYTE [DISTWIDTH] IN BLOOD BY AUTOMATED COUNT: 13.3 % (ref 12.3–15.4)
FUNGUS WND CULT: NORMAL
GLOBULIN UR ELPH-MCNC: 3.4 GM/DL
GLUCOSE SERPL-MCNC: 408 MG/DL (ref 65–99)
GLUCOSE UR STRIP-MCNC: ABNORMAL MG/DL
HBA1C MFR BLD: 10.3 % (ref 4.8–5.6)
HCT VFR BLD AUTO: 38.3 % (ref 34–46.6)
HGB BLD-MCNC: 11.6 G/DL (ref 12–15.9)
HGB UR QL STRIP.AUTO: ABNORMAL
HYALINE CASTS UR QL AUTO: ABNORMAL /LPF
KETONES UR QL STRIP: NEGATIVE
LEUKOCYTE ESTERASE UR QL STRIP.AUTO: ABNORMAL
MCH RBC QN AUTO: 27 PG (ref 26.6–33)
MCHC RBC AUTO-ENTMCNC: 30.3 G/DL (ref 31.5–35.7)
MCV RBC AUTO: 89.1 FL (ref 79–97)
NITRITE UR QL STRIP: NEGATIVE
PH UR STRIP.AUTO: 5.5 [PH] (ref 5–8)
PLATELET # BLD AUTO: 229 10*3/MM3 (ref 140–450)
PMV BLD AUTO: 11.5 FL (ref 6–12)
POTASSIUM SERPL-SCNC: 3.8 MMOL/L (ref 3.5–5.2)
PROT SERPL-MCNC: 7.2 G/DL (ref 6–8.5)
PROT UR QL STRIP: ABNORMAL
RBC # BLD AUTO: 4.3 10*6/MM3 (ref 3.77–5.28)
RBC # UR STRIP: ABNORMAL /HPF
REF LAB TEST METHOD: ABNORMAL
SODIUM SERPL-SCNC: 139 MMOL/L (ref 136–145)
SP GR UR STRIP: 1.02 (ref 1–1.03)
SQUAMOUS #/AREA URNS HPF: ABNORMAL /HPF
UROBILINOGEN UR QL STRIP: ABNORMAL
WBC # UR STRIP: ABNORMAL /HPF
WBC NRBC COR # BLD AUTO: 6.36 10*3/MM3 (ref 3.4–10.8)

## 2024-04-08 PROCEDURE — G0299 HHS/HOSPICE OF RN EA 15 MIN: HCPCS

## 2024-04-08 PROCEDURE — 85027 COMPLETE CBC AUTOMATED: CPT | Performed by: PHYSICIAN ASSISTANT

## 2024-04-08 PROCEDURE — 81001 URINALYSIS AUTO W/SCOPE: CPT | Performed by: PHYSICIAN ASSISTANT

## 2024-04-08 PROCEDURE — 83036 HEMOGLOBIN GLYCOSYLATED A1C: CPT | Performed by: PHYSICIAN ASSISTANT

## 2024-04-08 PROCEDURE — 80053 COMPREHEN METABOLIC PANEL: CPT | Performed by: PHYSICIAN ASSISTANT

## 2024-04-08 NOTE — HOME HEALTH
Routine Visit Note:    Skill/education provided: labs, and UA orders by provider. patient family reports frequency and strong smell of urine in the home, patient has no complaints. VS stable with TEMP 100 on visit   Plan for next visit: CP assessment, follow up on labs

## 2024-04-10 ENCOUNTER — APPOINTMENT (OUTPATIENT)
Dept: GENERAL RADIOLOGY | Facility: HOSPITAL | Age: 84
End: 2024-04-10
Payer: MEDICARE

## 2024-04-10 ENCOUNTER — HOSPITAL ENCOUNTER (OUTPATIENT)
Facility: HOSPITAL | Age: 84
Setting detail: OBSERVATION
Discharge: HOME | End: 2024-04-11
Attending: EMERGENCY MEDICINE | Admitting: INTERNAL MEDICINE
Payer: MEDICARE

## 2024-04-10 ENCOUNTER — APPOINTMENT (OUTPATIENT)
Dept: CARDIOLOGY | Facility: HOSPITAL | Age: 84
End: 2024-04-10
Payer: MEDICARE

## 2024-04-10 ENCOUNTER — DOCUMENTATION (OUTPATIENT)
Dept: HOME HEALTH SERVICES | Facility: HOME HEALTHCARE | Age: 84
End: 2024-04-10
Payer: MEDICARE

## 2024-04-10 ENCOUNTER — HOME CARE VISIT (OUTPATIENT)
Dept: HOME HEALTH SERVICES | Facility: HOME HEALTHCARE | Age: 84
End: 2024-04-10
Payer: MEDICARE

## 2024-04-10 VITALS
HEART RATE: 67 BPM | TEMPERATURE: 99.3 F | SYSTOLIC BLOOD PRESSURE: 130 MMHG | OXYGEN SATURATION: 98 % | DIASTOLIC BLOOD PRESSURE: 58 MMHG | RESPIRATION RATE: 17 BRPM

## 2024-04-10 DIAGNOSIS — L97.429 DIABETIC ULCER OF LEFT HEEL ASSOCIATED WITH DIABETES MELLITUS DUE TO UNDERLYING CONDITION, UNSPECIFIED ULCER STAGE: ICD-10-CM

## 2024-04-10 DIAGNOSIS — L97.526 DIABETIC ULCER OF TOE OF LEFT FOOT ASSOCIATED WITH TYPE 2 DIABETES MELLITUS, WITH BONE INVOLVEMENT WITHOUT EVIDENCE OF NECROSIS: ICD-10-CM

## 2024-04-10 DIAGNOSIS — E16.2 HYPOGLYCEMIA: ICD-10-CM

## 2024-04-10 DIAGNOSIS — E08.621 DIABETIC ULCER OF LEFT HEEL ASSOCIATED WITH DIABETES MELLITUS DUE TO UNDERLYING CONDITION, UNSPECIFIED ULCER STAGE: ICD-10-CM

## 2024-04-10 DIAGNOSIS — L03.032 CELLULITIS OF SECOND TOE OF LEFT FOOT: ICD-10-CM

## 2024-04-10 DIAGNOSIS — M25.562 PAIN AND SWELLING OF LEFT KNEE: ICD-10-CM

## 2024-04-10 DIAGNOSIS — M25.462 PAIN AND SWELLING OF LEFT KNEE: ICD-10-CM

## 2024-04-10 DIAGNOSIS — N39.0 ACUTE UTI: Primary | ICD-10-CM

## 2024-04-10 DIAGNOSIS — E11.621 DIABETIC ULCER OF TOE OF LEFT FOOT ASSOCIATED WITH TYPE 2 DIABETES MELLITUS, WITH BONE INVOLVEMENT WITHOUT EVIDENCE OF NECROSIS: ICD-10-CM

## 2024-04-10 LAB
ALBUMIN SERPL-MCNC: 4.3 G/DL (ref 3.5–5.2)
ALBUMIN/GLOB SERPL: 1.1 G/DL
ALP SERPL-CCNC: 146 U/L (ref 39–117)
ALT SERPL W P-5'-P-CCNC: 11 U/L (ref 1–33)
ANION GAP SERPL CALCULATED.3IONS-SCNC: 8 MMOL/L (ref 5–15)
APTT PPP: 28 SECONDS (ref 22.7–35.4)
AST SERPL-CCNC: 10 U/L (ref 1–32)
BACTERIA UR QL AUTO: ABNORMAL /HPF
BASOPHILS # BLD AUTO: 0.04 10*3/MM3 (ref 0–0.2)
BASOPHILS NFR BLD AUTO: 0.5 % (ref 0–1.5)
BH CV LOWER VASCULAR LEFT COMMON FEMORAL AUGMENT: NORMAL
BH CV LOWER VASCULAR LEFT COMMON FEMORAL COMPETENT: NORMAL
BH CV LOWER VASCULAR LEFT COMMON FEMORAL COMPRESS: NORMAL
BH CV LOWER VASCULAR LEFT COMMON FEMORAL PHASIC: NORMAL
BH CV LOWER VASCULAR LEFT COMMON FEMORAL SPONT: NORMAL
BH CV LOWER VASCULAR LEFT DISTAL FEMORAL COMPRESS: NORMAL
BH CV LOWER VASCULAR LEFT GASTRONEMIUS COMPRESS: NORMAL
BH CV LOWER VASCULAR LEFT GREATER SAPH AK COMPRESS: NORMAL
BH CV LOWER VASCULAR LEFT GREATER SAPH BK COMPRESS: NORMAL
BH CV LOWER VASCULAR LEFT LESSER SAPH COMPRESS: NORMAL
BH CV LOWER VASCULAR LEFT MID FEMORAL AUGMENT: NORMAL
BH CV LOWER VASCULAR LEFT MID FEMORAL COMPETENT: NORMAL
BH CV LOWER VASCULAR LEFT MID FEMORAL COMPRESS: NORMAL
BH CV LOWER VASCULAR LEFT MID FEMORAL PHASIC: NORMAL
BH CV LOWER VASCULAR LEFT MID FEMORAL SPONT: NORMAL
BH CV LOWER VASCULAR LEFT PERONEAL COMPRESS: NORMAL
BH CV LOWER VASCULAR LEFT POPLITEAL AUGMENT: NORMAL
BH CV LOWER VASCULAR LEFT POPLITEAL COMPETENT: NORMAL
BH CV LOWER VASCULAR LEFT POPLITEAL COMPRESS: NORMAL
BH CV LOWER VASCULAR LEFT POPLITEAL PHASIC: NORMAL
BH CV LOWER VASCULAR LEFT POPLITEAL SPONT: NORMAL
BH CV LOWER VASCULAR LEFT POSTERIOR TIBIAL COMPRESS: NORMAL
BH CV LOWER VASCULAR LEFT PROFUNDA FEMORAL COMPRESS: NORMAL
BH CV LOWER VASCULAR LEFT PROXIMAL FEMORAL COMPRESS: NORMAL
BH CV LOWER VASCULAR LEFT SAPHENOFEMORAL JUNCTION COMPRESS: NORMAL
BH CV LOWER VASCULAR RIGHT COMMON FEMORAL AUGMENT: NORMAL
BH CV LOWER VASCULAR RIGHT COMMON FEMORAL COMPETENT: NORMAL
BH CV LOWER VASCULAR RIGHT COMMON FEMORAL COMPRESS: NORMAL
BH CV LOWER VASCULAR RIGHT COMMON FEMORAL PHASIC: NORMAL
BH CV LOWER VASCULAR RIGHT COMMON FEMORAL SPONT: NORMAL
BH CV VAS PRELIMINARY FINDINGS SCRIPTING: 1
BILIRUB SERPL-MCNC: 0.2 MG/DL (ref 0–1.2)
BILIRUB UR QL STRIP: NEGATIVE
BUN SERPL-MCNC: 18 MG/DL (ref 8–23)
BUN/CREAT SERPL: 20.7 (ref 7–25)
CALCIUM SPEC-SCNC: 10.2 MG/DL (ref 8.6–10.5)
CHLORIDE SERPL-SCNC: 102 MMOL/L (ref 98–107)
CLARITY UR: ABNORMAL
CO2 SERPL-SCNC: 32 MMOL/L (ref 22–29)
COLOR UR: YELLOW
CREAT SERPL-MCNC: 0.87 MG/DL (ref 0.57–1)
CRP SERPL-MCNC: 2.18 MG/DL (ref 0–0.5)
D-LACTATE SERPL-SCNC: 1.2 MMOL/L (ref 0.5–2)
DEPRECATED RDW RBC AUTO: 41.5 FL (ref 37–54)
EGFRCR SERPLBLD CKD-EPI 2021: 65.8 ML/MIN/1.73
EOSINOPHIL # BLD AUTO: 0.04 10*3/MM3 (ref 0–0.4)
EOSINOPHIL NFR BLD AUTO: 0.5 % (ref 0.3–6.2)
ERYTHROCYTE [DISTWIDTH] IN BLOOD BY AUTOMATED COUNT: 13.1 % (ref 12.3–15.4)
ERYTHROCYTE [SEDIMENTATION RATE] IN BLOOD: 29 MM/HR (ref 0–30)
GLOBULIN UR ELPH-MCNC: 4 GM/DL
GLUCOSE BLDC GLUCOMTR-MCNC: 137 MG/DL (ref 70–130)
GLUCOSE BLDC GLUCOMTR-MCNC: 167 MG/DL (ref 70–130)
GLUCOSE BLDC GLUCOMTR-MCNC: 400 MG/DL (ref 70–130)
GLUCOSE SERPL-MCNC: 59 MG/DL (ref 65–99)
GLUCOSE UR STRIP-MCNC: NEGATIVE MG/DL
HCT VFR BLD AUTO: 38.1 % (ref 34–46.6)
HGB BLD-MCNC: 11.7 G/DL (ref 12–15.9)
HGB UR QL STRIP.AUTO: NEGATIVE
HYALINE CASTS UR QL AUTO: ABNORMAL /LPF
IMM GRANULOCYTES # BLD AUTO: 0.02 10*3/MM3 (ref 0–0.05)
IMM GRANULOCYTES NFR BLD AUTO: 0.2 % (ref 0–0.5)
KETONES UR QL STRIP: NEGATIVE
LEUKOCYTE ESTERASE UR QL STRIP.AUTO: ABNORMAL
LYMPHOCYTES # BLD AUTO: 1.84 10*3/MM3 (ref 0.7–3.1)
LYMPHOCYTES NFR BLD AUTO: 22 % (ref 19.6–45.3)
MCH RBC QN AUTO: 26.4 PG (ref 26.6–33)
MCHC RBC AUTO-ENTMCNC: 30.7 G/DL (ref 31.5–35.7)
MCV RBC AUTO: 85.8 FL (ref 79–97)
MONOCYTES # BLD AUTO: 0.6 10*3/MM3 (ref 0.1–0.9)
MONOCYTES NFR BLD AUTO: 7.2 % (ref 5–12)
NEUTROPHILS NFR BLD AUTO: 5.82 10*3/MM3 (ref 1.7–7)
NEUTROPHILS NFR BLD AUTO: 69.6 % (ref 42.7–76)
NITRITE UR QL STRIP: NEGATIVE
NRBC BLD AUTO-RTO: 0 /100 WBC (ref 0–0.2)
PH UR STRIP.AUTO: 8 [PH] (ref 5–8)
PLATELET # BLD AUTO: 249 10*3/MM3 (ref 140–450)
PMV BLD AUTO: 11.4 FL (ref 6–12)
POTASSIUM SERPL-SCNC: 3.5 MMOL/L (ref 3.5–5.2)
PROCALCITONIN SERPL-MCNC: 0.08 NG/ML (ref 0–0.25)
PROT SERPL-MCNC: 8.3 G/DL (ref 6–8.5)
PROT UR QL STRIP: ABNORMAL
RBC # BLD AUTO: 4.44 10*6/MM3 (ref 3.77–5.28)
RBC # UR STRIP: ABNORMAL /HPF
REF LAB TEST METHOD: ABNORMAL
SODIUM SERPL-SCNC: 142 MMOL/L (ref 136–145)
SP GR UR STRIP: 1.02 (ref 1–1.03)
SQUAMOUS #/AREA URNS HPF: ABNORMAL /HPF
UROBILINOGEN UR QL STRIP: ABNORMAL
WBC # UR STRIP: ABNORMAL /HPF
WBC NRBC COR # BLD AUTO: 8.36 10*3/MM3 (ref 3.4–10.8)

## 2024-04-10 PROCEDURE — 25010000002 CEFTRIAXONE PER 250 MG: Performed by: EMERGENCY MEDICINE

## 2024-04-10 PROCEDURE — G0378 HOSPITAL OBSERVATION PER HR: HCPCS

## 2024-04-10 PROCEDURE — 82948 REAGENT STRIP/BLOOD GLUCOSE: CPT

## 2024-04-10 PROCEDURE — 81001 URINALYSIS AUTO W/SCOPE: CPT | Performed by: EMERGENCY MEDICINE

## 2024-04-10 PROCEDURE — 85025 COMPLETE CBC W/AUTO DIFF WBC: CPT | Performed by: EMERGENCY MEDICINE

## 2024-04-10 PROCEDURE — 84145 PROCALCITONIN (PCT): CPT | Performed by: EMERGENCY MEDICINE

## 2024-04-10 PROCEDURE — 63710000001 INSULIN LISPRO (HUMAN) PER 5 UNITS: Performed by: INTERNAL MEDICINE

## 2024-04-10 PROCEDURE — 96365 THER/PROPH/DIAG IV INF INIT: CPT

## 2024-04-10 PROCEDURE — 85652 RBC SED RATE AUTOMATED: CPT | Performed by: EMERGENCY MEDICINE

## 2024-04-10 PROCEDURE — 93971 EXTREMITY STUDY: CPT | Performed by: SURGERY

## 2024-04-10 PROCEDURE — 93971 EXTREMITY STUDY: CPT

## 2024-04-10 PROCEDURE — 99285 EMERGENCY DEPT VISIT HI MDM: CPT

## 2024-04-10 PROCEDURE — 25010000002 VANCOMYCIN HCL 1.25 G RECONSTITUTED SOLUTION 1 EACH VIAL: Performed by: EMERGENCY MEDICINE

## 2024-04-10 PROCEDURE — 73630 X-RAY EXAM OF FOOT: CPT

## 2024-04-10 PROCEDURE — 87077 CULTURE AEROBIC IDENTIFY: CPT | Performed by: EMERGENCY MEDICINE

## 2024-04-10 PROCEDURE — 73560 X-RAY EXAM OF KNEE 1 OR 2: CPT

## 2024-04-10 PROCEDURE — 83605 ASSAY OF LACTIC ACID: CPT | Performed by: EMERGENCY MEDICINE

## 2024-04-10 PROCEDURE — 87086 URINE CULTURE/COLONY COUNT: CPT | Performed by: EMERGENCY MEDICINE

## 2024-04-10 PROCEDURE — 25010000002 CEFTRIAXONE PER 250 MG: Performed by: INTERNAL MEDICINE

## 2024-04-10 PROCEDURE — 86140 C-REACTIVE PROTEIN: CPT | Performed by: EMERGENCY MEDICINE

## 2024-04-10 PROCEDURE — G0299 HHS/HOSPICE OF RN EA 15 MIN: HCPCS

## 2024-04-10 PROCEDURE — 96366 THER/PROPH/DIAG IV INF ADDON: CPT

## 2024-04-10 PROCEDURE — 87040 BLOOD CULTURE FOR BACTERIA: CPT | Performed by: EMERGENCY MEDICINE

## 2024-04-10 PROCEDURE — 25810000003 SODIUM CHLORIDE 0.9 % SOLUTION 250 ML FLEX CONT: Performed by: EMERGENCY MEDICINE

## 2024-04-10 PROCEDURE — 87186 SC STD MICRODIL/AGAR DIL: CPT | Performed by: EMERGENCY MEDICINE

## 2024-04-10 PROCEDURE — 36415 COLL VENOUS BLD VENIPUNCTURE: CPT | Performed by: EMERGENCY MEDICINE

## 2024-04-10 PROCEDURE — 80053 COMPREHEN METABOLIC PANEL: CPT | Performed by: EMERGENCY MEDICINE

## 2024-04-10 PROCEDURE — 85730 THROMBOPLASTIN TIME PARTIAL: CPT | Performed by: EMERGENCY MEDICINE

## 2024-04-10 RX ORDER — IBUPROFEN 600 MG/1
1 TABLET ORAL
Status: DISCONTINUED | OUTPATIENT
Start: 2024-04-10 | End: 2024-04-11 | Stop reason: HOSPADM

## 2024-04-10 RX ORDER — CHOLECALCIFEROL (VITAMIN D3) 125 MCG
5 CAPSULE ORAL NIGHTLY PRN
Status: DISCONTINUED | OUTPATIENT
Start: 2024-04-10 | End: 2024-04-11 | Stop reason: HOSPADM

## 2024-04-10 RX ORDER — SODIUM CHLORIDE 0.9 % (FLUSH) 0.9 %
10 SYRINGE (ML) INJECTION AS NEEDED
Status: DISCONTINUED | OUTPATIENT
Start: 2024-04-10 | End: 2024-04-11 | Stop reason: HOSPADM

## 2024-04-10 RX ORDER — NICOTINE POLACRILEX 4 MG
15 LOZENGE BUCCAL
Status: DISCONTINUED | OUTPATIENT
Start: 2024-04-10 | End: 2024-04-11 | Stop reason: HOSPADM

## 2024-04-10 RX ORDER — ACETAMINOPHEN 325 MG/1
650 TABLET ORAL EVERY 4 HOURS PRN
Status: DISCONTINUED | OUTPATIENT
Start: 2024-04-10 | End: 2024-04-11 | Stop reason: HOSPADM

## 2024-04-10 RX ORDER — BISACODYL 10 MG
10 SUPPOSITORY, RECTAL RECTAL DAILY PRN
Status: DISCONTINUED | OUTPATIENT
Start: 2024-04-10 | End: 2024-04-11 | Stop reason: HOSPADM

## 2024-04-10 RX ORDER — ACETAMINOPHEN 160 MG/5ML
650 SOLUTION ORAL EVERY 4 HOURS PRN
Status: DISCONTINUED | OUTPATIENT
Start: 2024-04-10 | End: 2024-04-11 | Stop reason: HOSPADM

## 2024-04-10 RX ORDER — POLYETHYLENE GLYCOL 3350 17 G/17G
17 POWDER, FOR SOLUTION ORAL DAILY PRN
Status: DISCONTINUED | OUTPATIENT
Start: 2024-04-10 | End: 2024-04-11 | Stop reason: HOSPADM

## 2024-04-10 RX ORDER — SODIUM CHLORIDE 9 MG/ML
40 INJECTION, SOLUTION INTRAVENOUS AS NEEDED
Status: DISCONTINUED | OUTPATIENT
Start: 2024-04-10 | End: 2024-04-11 | Stop reason: HOSPADM

## 2024-04-10 RX ORDER — CALCIUM CARBONATE 500 MG/1
2 TABLET, CHEWABLE ORAL 3 TIMES DAILY PRN
Status: DISCONTINUED | OUTPATIENT
Start: 2024-04-10 | End: 2024-04-11 | Stop reason: HOSPADM

## 2024-04-10 RX ORDER — NITROGLYCERIN 0.4 MG/1
0.4 TABLET SUBLINGUAL
Status: DISCONTINUED | OUTPATIENT
Start: 2024-04-10 | End: 2024-04-11 | Stop reason: HOSPADM

## 2024-04-10 RX ORDER — BISACODYL 5 MG/1
5 TABLET, DELAYED RELEASE ORAL DAILY PRN
Status: DISCONTINUED | OUTPATIENT
Start: 2024-04-10 | End: 2024-04-11 | Stop reason: HOSPADM

## 2024-04-10 RX ORDER — DEXTROSE MONOHYDRATE 25 G/50ML
25 INJECTION, SOLUTION INTRAVENOUS
Status: DISCONTINUED | OUTPATIENT
Start: 2024-04-10 | End: 2024-04-11 | Stop reason: HOSPADM

## 2024-04-10 RX ORDER — ONDANSETRON 4 MG/1
4 TABLET, ORALLY DISINTEGRATING ORAL EVERY 6 HOURS PRN
Status: DISCONTINUED | OUTPATIENT
Start: 2024-04-10 | End: 2024-04-11 | Stop reason: HOSPADM

## 2024-04-10 RX ORDER — ONDANSETRON 2 MG/ML
4 INJECTION INTRAMUSCULAR; INTRAVENOUS EVERY 6 HOURS PRN
Status: DISCONTINUED | OUTPATIENT
Start: 2024-04-10 | End: 2024-04-11 | Stop reason: HOSPADM

## 2024-04-10 RX ORDER — ENOXAPARIN SODIUM 100 MG/ML
40 INJECTION SUBCUTANEOUS DAILY
Status: DISCONTINUED | OUTPATIENT
Start: 2024-04-10 | End: 2024-04-11 | Stop reason: HOSPADM

## 2024-04-10 RX ORDER — INSULIN LISPRO 100 [IU]/ML
2-9 INJECTION, SOLUTION INTRAVENOUS; SUBCUTANEOUS
Status: DISCONTINUED | OUTPATIENT
Start: 2024-04-10 | End: 2024-04-11 | Stop reason: HOSPADM

## 2024-04-10 RX ORDER — ACETAMINOPHEN 650 MG/1
650 SUPPOSITORY RECTAL EVERY 4 HOURS PRN
Status: DISCONTINUED | OUTPATIENT
Start: 2024-04-10 | End: 2024-04-11 | Stop reason: HOSPADM

## 2024-04-10 RX ORDER — AMOXICILLIN 250 MG
2 CAPSULE ORAL 2 TIMES DAILY
Status: DISCONTINUED | OUTPATIENT
Start: 2024-04-10 | End: 2024-04-11 | Stop reason: HOSPADM

## 2024-04-10 RX ORDER — SODIUM CHLORIDE 0.9 % (FLUSH) 0.9 %
10 SYRINGE (ML) INJECTION EVERY 12 HOURS SCHEDULED
Status: DISCONTINUED | OUTPATIENT
Start: 2024-04-10 | End: 2024-04-11 | Stop reason: HOSPADM

## 2024-04-10 RX ADMIN — VANCOMYCIN HYDROCHLORIDE 1250 MG: 1.25 INJECTION, POWDER, LYOPHILIZED, FOR SOLUTION INTRAVENOUS at 18:03

## 2024-04-10 RX ADMIN — CEFTRIAXONE SODIUM 1000 MG: 1 INJECTION, POWDER, FOR SOLUTION INTRAMUSCULAR; INTRAVENOUS at 16:39

## 2024-04-10 RX ADMIN — CEFTRIAXONE SODIUM 1000 MG: 1 INJECTION, POWDER, FOR SOLUTION INTRAMUSCULAR; INTRAVENOUS at 17:31

## 2024-04-10 RX ADMIN — Medication 10 ML: at 20:08

## 2024-04-10 RX ADMIN — INSULIN LISPRO 8 UNITS: 100 INJECTION, SOLUTION INTRAVENOUS; SUBCUTANEOUS at 21:39

## 2024-04-10 NOTE — HOME HEALTH
Routine Visit Note:  Patient admin Novalog self on visit today, patient  on visit. No medications started for UA results, MD office called to reggie jimenez.     Skill/education provided: medication admin, reviewed results of blood and urine with patient, foot assessment     Plan for next visit: CP assessment, medication compliance, foot assessment      Patient going to the ER, per Cookie Roman office, with swelling to right foot, and leg, and uncontrolled DM. Daughter notified and will take patient

## 2024-04-10 NOTE — ED TRIAGE NOTES
Pt to ed from home with daughter via PV    Pt sent by Cookie Roman office. pt c/o  swelling to right foot, and leg, and uncontrolled DM.

## 2024-04-10 NOTE — H&P
Patient Name:  Di Macario  YOB: 1940  MRN:  [id number]  Admit Date:  4/10/2024  Patient Care Team:  Lily Yepez PA-C as PCP - General (Physician Assistant)  Madelin Ferguson MD as Consulting Physician (Cardiology)  Gigi Kaiser DO as Consulting Physician (Neurology)  Carley Alexandre, JOSÉ MIGUEL as Ambulatory  ([facility])  Emily Hunt MSW as  (Amb Case Mgmt) ([facility])      Subjective   History Present Illness     Chief Complaint   Patient presents with    Hyperglycemia    Leg Swelling       Ms. Macario is a 83 y.o. 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 She does not have pain from this and has not otherwise been feeling ill. She was noted also to have an abnormal urinalysis in the ER but she denies urinary symptoms.     Review of Systems   All other systems reviewed and are negative.       Personal History     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4    Hyperlipidemia 01/13/2021    Lung consolidation 06/01/2022    Pacemaker     Peripheral neuropathy 08/28/2018    Toe ulcer due to DM     LEFT GREAT TOE    Type 2 diabetes mellitus with hyperglycemia, with long-term current use of insulin 08/25/2022    Vitamin D deficiency      Past Surgical History:   Procedure Laterality Date    AMPUTATION DIGIT Left 01/30/2024    Procedure: PARTIAL HALLUX AMPUTATION LEFT FOOT;  Surgeon: Nas Gonzales DPM;  Location: [facility];  Service: Podiatry;  Laterality: Left;    AMPUTATION DIGIT Left 3/11/2024    Procedure: AMPUTATION DIGIT;  Surgeon:  Nas Gonzales DPM;  Location: [facility] MAIN OR;  Service: Podiatry;  Laterality: Left;    ANKLE SURGERY Right     BLADDER SURGERY      Prolapsed    BREAST BIOPSY      CARDIAC ELECTROPHYSIOLOGY PROCEDURE N/A 2021    Procedure: Pacemaker DC new BOSTON;  Surgeon: Madelin Ferguson MD;  Location: [facility] CATH INVASIVE LOCATION;  Service: Cardiology;  Laterality: N/A;    ENDOSCOPY N/A 2022    Procedure: ESOPHAGOGASTRODUODENOSCOPY with biopsy, balloon dilation;  Surgeon: Lo Benjamin MD;  Location: [facility] ENDOSCOPY;  Service: Gastroenterology;  Laterality: N/A;  esophageal stricture, hiatal hernia, gastritis    HYSTERECTOMY      TONSILLECTOMY       Family History   Problem Relation Age of Onset    Hypertension Mother     Cancer Mother         Stomach Cancer    Obesity Mother         her entire life over weight    Hypertension Father     Cancer Father         abdominal tumor    Malig Hyperthermia Neg Hx      Social History     Tobacco Use    Smoking status: Former     Types: Cigarettes     Start date:      Quit date:      Years since quittin.3    Smokeless tobacco: Never    Tobacco comments:     quit in     Vaping Use    Vaping status: Never Used   Substance Use Topics    Alcohol use: Not Currently     Comment: RARELY    Drug use: No     No current facility-administered medications on file prior to encounter.     Current Outpatient Medications on File Prior to Encounter   Medication Sig Dispense Refill    acetaminophen (TYLENOL) 325 MG tablet Take 2 tablets by mouth Every 6 (Six) Hours As Needed for Mild Pain or Moderate Pain. PRN  Indications: Pain      amLODIPine (NORVASC) 5 MG tablet TAKE ONE-HALF (1/2) TABLET DAILY 90 tablet 3    donepezil (ARICEPT) 10 MG tablet Take 1 tablet by mouth Every Night. 90 tablet 3    escitalopram (LEXAPRO) 5 MG tablet TAKE 1 TABLET BY MOUTH EVERY DAY. STOP CITALOPRAM      famotidine (PEPCID) 20 MG tablet Take 1 tablet by mouth 2 (Two) Times a Day.  Indications: Heartburn      glucose blood (FREESTYLE LITE) test strip Bid e11.65 200 each 3    insulin aspart (NovoLOG FlexPen) 100 UNIT/ML solution pen-injector sc pen Inject 2-3 Units under the skin into the appropriate area as directed 3 (Three) Times a Day With Meals. 9 mL 0    Insulin Glargine (Lantus SoloStar) 100 UNIT/ML injection pen Inject 10 Units under the skin into the appropriate area as directed Daily. 9 mL 1    Insulin Pen Needle (BD Pen Needle Norma U/F) 32G X 4 MM misc Use once a day 100 each 3    Lancets (freestyle) lancets 1 each by Other route 4 (Four) Times a Day. Use as instructed 400 each 1    melatonin 1 MG tablet Take 1 tablet by mouth Every Night. 30 tablet 1    memantine (NAMENDA) 10 MG tablet Take 1 tablet by mouth 2 (Two) Times a Day. Indications: Alzheimer's Disease      pravastatin (PRAVACHOL) 40 MG tablet TAKE 1 TABLET EVERY EVENING 90 tablet 3    Trulicity 0.75 MG/0.5ML solution pen-injector Subcutaneous for 84 Days      Zonegran 100 MG capsule Take 2 capsules by mouth Every Night.      Glucagon (Gvoke HypoPen 2-Pack) 1 MG/0.2ML solution auto-injector Inject 1 mg under the skin into the appropriate area as directed As Needed (hypoglycemia). 0.4 mL 1     No Known Allergies    Objective    Objective     Vital Signs  Temp:  [96.1 °F (35.6 °C)-99.3 °F (37.4 °C)] 97.7 °F (36.5 °C)  Heart Rate:  [60-96] 60  Resp:  [17-18] 18  BP: (130-149)/(58-68) 149/66  SpO2:  [96 %-98 %] 96 %  on   ;   Device (Oxygen Therapy): room air  Body mass index is 23.98 kg/m².    Physical Exam  Vitals and nursing note reviewed.   Constitutional:       General: She is not in acute distress.     Appearance: She is not toxic-appearing or diaphoretic.   HENT:      Head: Normocephalic and atraumatic.      Nose: Nose normal.      Mouth/Throat:      Mouth: Mucous membranes are moist.      Pharynx: Oropharynx is clear.   Eyes:      Conjunctiva/sclera: Conjunctivae normal.      Pupils: Pupils are equal, round, and  reactive to light.   Cardiovascular:      Rate and Rhythm: Normal rate and regular rhythm.      Pulses: Normal pulses.   Pulmonary:      Effort: Pulmonary effort is normal.      Breath sounds: Normal breath sounds.   Abdominal:      General: Bowel sounds are normal.      Palpations: Abdomen is soft.      Tenderness: There is no abdominal tenderness.   Musculoskeletal:         General: Deformity (s/p left great toe amputation) present. No swelling or tenderness.      Cervical back: Neck supple.   Skin:     General: Skin is warm and dry.      Capillary Refill: Capillary refill takes less than 2 seconds.      Findings: Erythema present.      Comments: Erythema of the left second toe with tiny superficial ulcer on the dorsum of the toe without drainage   Neurological:      General: No focal deficit present.      Mental Status: She is alert.   Psychiatric:         Mood and Affect: Mood normal.         Behavior: Behavior normal.       Results Review:  I reviewed the patient's new clinical results.  I reviewed the patient's new imaging results and agree with the interpretation.  I reviewed the patient's other test results and agree with the interpretation  I personally viewed and interpreted the patient's EKG/Telemetry data  Discussed with ED provider.    Lab Results (last 24 hours)       Procedure Component Value Units Date/Time    POC Glucose Once [[id number]]  (Abnormal) Collected: 04/10/24 1234    Specimen: Blood Updated: 04/10/24 1236     Glucose 167 mg/dL     CBC & Differential [[id number]]  (Abnormal) Collected: 04/10/24 1349    Specimen: Blood Updated: 04/10/24 1410    Narrative:      The following orders were created for panel order CBC & Differential.  Procedure                               Abnormality         Status                     ---------                               -----------         ------                     CBC Auto Differential[[id number]]        Abnormal            Final result              "    Please view results for these tests on the individual orders.    Comprehensive Metabolic Panel [[id number]]  (Abnormal) Collected: 04/10/24 1349    Specimen: Blood Updated: 04/10/24 1433     Glucose 59 mg/dL      BUN 18 mg/dL      Creatinine 0.87 mg/dL      Sodium 142 mmol/L      Potassium 3.5 mmol/L      Chloride 102 mmol/L      CO2 32.0 mmol/L      Calcium 10.2 mg/dL      Total Protein 8.3 g/dL      Albumin 4.3 g/dL      ALT (SGPT) 11 U/L      AST (SGOT) 10 U/L      Alkaline Phosphatase 146 U/L      Total Bilirubin 0.2 mg/dL      Globulin 4.0 gm/dL      A/G Ratio 1.1 g/dL      BUN/Creatinine Ratio 20.7     Anion Gap 8.0 mmol/L      eGFR 65.8 mL/min/1.73     Narrative:      GFR Normal >60  Chronic Kidney Disease <60  Kidney Failure <15    The GFR formula is only valid for adults with stable renal function between ages 18 and 70.    aPTT [[id number]]  (Normal) Collected: 04/10/24 1349    Specimen: Blood Updated: 04/10/24 1422     PTT 28.0 seconds     Lactic Acid, Plasma [[id number]]  (Normal) Collected: 04/10/24 1349    Specimen: Blood Updated: 04/10/24 1426     Lactate 1.2 mmol/L     Procalcitonin [[id number]]  (Normal) Collected: 04/10/24 1349    Specimen: Blood Updated: 04/10/24 1434     Procalcitonin 0.08 ng/mL     Narrative:      As a Marker for Sepsis (Non-Neonates):    1. <0.5 ng/mL represents a low risk of severe sepsis and/or septic shock.  2. >2 ng/mL represents a high risk of severe sepsis and/or septic shock.    As a Marker for Lower Respiratory Tract Infections that require antibiotic therapy:    PCT on Admission    Antibiotic Therapy       6-12 Hrs later    >0.5                Strongly Recommended  >0.25 - <0.5        Recommended   0.1 - 0.25          Discouraged              Remeasure/reassess PCT  <0.1                Strongly Discouraged     Remeasure/reassess PCT    As 28 day mortality risk marker: \"Change in Procalcitonin Result\" (>80% or <=80%) if Day 0 (or Day 1) and Day 4 values are available. " Refer to [email or web address]://[email or web address]-pct-[email or web address]    Change in PCT <=80%  A decrease of PCT levels below or equal to 80% defines a positive change in PCT test result representing a higher risk for 28-day all-cause mortality of patients diagnosed with severe sepsis for septic shock.    Change in PCT >80%  A decrease of PCT levels of more than 80% defines a negative change in PCT result representing a lower risk for 28-day all-cause mortality of patients diagnosed with severe sepsis or septic shock.       Sedimentation Rate [[id number]]  (Normal) Collected: 04/10/24 1349    Specimen: Blood Updated: 04/10/24 1412     Sed Rate 29 mm/hr     C-reactive Protein [[id number]]  (Abnormal) Collected: 04/10/24 1349    Specimen: Blood Updated: 04/10/24 1433     C-Reactive Protein 2.18 mg/dL     CBC Auto Differential [[id number]]  (Abnormal) Collected: 04/10/24 1349    Specimen: Blood Updated: 04/10/24 1410     WBC 8.36 10*3/mm3      RBC 4.44 10*6/mm3      Hemoglobin 11.7 g/dL      Hematocrit 38.1 %      MCV 85.8 fL      MCH 26.4 pg      MCHC 30.7 g/dL      RDW 13.1 %      RDW-SD 41.5 fl      MPV 11.4 fL      Platelets 249 10*3/mm3      Neutrophil % 69.6 %      Lymphocyte % 22.0 %      Monocyte % 7.2 %      Eosinophil % 0.5 %      Basophil % 0.5 %      Immature Grans % 0.2 %      Neutrophils, Absolute 5.82 10*3/mm3      Lymphocytes, Absolute 1.84 10*3/mm3      Monocytes, Absolute 0.60 10*3/mm3      Eosinophils, Absolute 0.04 10*3/mm3      Basophils, Absolute 0.04 10*3/mm3      Immature Grans, Absolute 0.02 10*3/mm3      nRBC 0.0 /100 WBC     Urinalysis With Microscopic If Indicated (No Culture) - Urine, Clean Catch [[id number]]  (Abnormal) Collected: 04/10/24 1353    Specimen: Urine, Clean Catch Updated: 04/10/24 1410     Color, UA Yellow     Appearance, UA Cloudy     pH, UA 8.0     Specific Gravity, UA 1.018     Glucose, UA Negative     Ketones, UA Negative     Bilirubin, UA Negative     Blood, UA Negative     Protein, UA Trace     Leuk  Esterase, UA Large (3+)     Nitrite, UA Negative     Urobilinogen, UA 0.2 E.U./dL    Urinalysis, Microscopic Only - Urine, Clean Catch [[id number]]  (Abnormal) Collected: 04/10/24 1353    Specimen: Urine, Clean Catch Updated: 04/10/24 1422     RBC, UA 0-2 /HPF      WBC, UA Too Numerous to Count /HPF      Bacteria, UA 4+ /HPF      Squamous Epithelial Cells, UA 3-6 /HPF      Hyaline Casts, UA 3-6 /LPF      Methodology Manual Light Microscopy    Urine Culture - Urine, Urine, Clean Catch [[id number]] Collected: 04/10/24 1353    Specimen: Urine, Clean Catch Updated: 04/10/24 1552    Blood Culture - Blood, Arm, Right [[id number]] Collected: 04/10/24 1611    Specimen: Blood from Arm, Right Updated: 04/10/24 1618    Blood Culture - Blood, Arm, Right [[id number]] Collected: 04/10/24 1623    Specimen: Blood from Arm, Right Updated: 04/10/24 1630            Imaging Results (Last 24 Hours)       Procedure Component Value Units Date/Time    XR Knee 1 or 2 View Left [[id number]] Collected: 04/10/24 1339     Updated: 04/10/24 1343    Narrative:      XR KNEE 1 OR 2 VW LEFT-4/10/2024     HISTORY: Left knee pain.     There is moderate medial tibiofemoral joint space narrowing. There are  mild hypertrophic changes in the knee. No fractures are seen. There is  soft tissue swelling of the knee.       Impression:      1. Mild to moderate degenerative arthritis of the left knee.  2. Soft tissue swelling of the knee most pronounced along the medial  aspect of the distal thigh.  3. No acute bony abnormality is seen.        This report was finalized on 4/10/2024 1:40 PM by Dr. Tray Cary M.D on Workstation: [id number]       XR Foot 3+ View Left [[id number]] Collected: 04/10/24 1337     Updated: 04/10/24 1342    Narrative:      XR FOOT 3+ VW LEFT-4/10/2024     HISTORY: Diabetic ulcer on back of heel. Redness and swelling in second  toe.           There has been amputation of the first toe.. No erosive changes of the  distal first  metatarsal is seen.     Toes 2 through 5 are difficult to assess as they are somewhat flexed.  The bones do appear demineralized with no definite fractures or bony  erosions seen.     Bones are demineralized.     There are calcaneal spurs. Soft tissue thinning is seen posterior to the  calcaneus with no definite evidence of calcaneal osteomyelitis.       Impression:      1. Demineralization of the bones.  2. No acute fractures or dislocations are seen.  3. Amputated first toe.  4. The toes 2 through 5 are somewhat difficult to evaluate due to  positioning but no definite fractures or evidence of osteomyelitis is  seen.  5. Calcaneal spurs.  6. No definite evidence of calcaneal osteomyelitis.  7. If further evaluation is clinically indicated follow-up MRI of the  foot may be helpful.     This report was finalized on 4/10/2024 1:39 PM by Dr. Tray Cary M.D on Workstation: [id number]               Results for orders placed in visit on 01/13/21    Adult Transthoracic Echo Complete W/ Cont if Necessary Per Protocol    Interpretation Summary  · Calculated left ventricular EF = 66.8% Estimated left ventricular EF was in agreement with the calculated left ventricular EF.  · Left ventricular diastolic function was normal.  · Calculated left ventricular EF = 66.8%  · There is no evidence of pericardial effusion. .      No orders to display        Assessment/Plan     Active Hospital Problems    Diagnosis  POA    **Cellulitis of second toe of left foot [L03.032]  Yes    Paroxysmal atrial fibrillation [I48.0]  Yes    Type 2 diabetes mellitus with hyperglycemia, with long-term current use of insulin [E11.65, Z79.4]  Not Applicable    Dementia without behavioral disturbance [F03.90]  Yes    Hyperlipidemia [E78.5]  Yes    Essential hypertension [I10]  Yes      Resolved Hospital Problems   No resolved problems to display.   Left Second Toe Cellulitis  - has very small superficial second toe ulcer, no evidence of purulence  or bone involvement  - will cover with vancomycin and ceftriaxone for now-can likely switch to PO abx in the next day or so  - provide local wound care    Type 2 DM  - had some hypoglycemia in the ER but this has resolved  - will hold lantus  - cover with ssi/hypoglycemia protocol    HTN  - BP acceptable  - continue norvasc    Dementia  - complicating all aspects of care  - will resume home aricept and memantine    I discussed the patient's findings and my recommendations with patient, nursing staff, and ED provider.    VTE Prophylaxis - Lovenox 40 mg SC daily.  Code Status - DNR.       Alexis Sotelo MD  [facility]  04/10/24  16:44 EDT

## 2024-04-10 NOTE — ED PROVIDER NOTES
EMERGENCY DEPARTMENT ENCOUNTER    Room Number:  26/26  Date seen:  4/10/2024  PCP: Lily Yepze PA-C  Historian: Patient      HPI:  Chief Complaint: Left leg swelling  Context: Di Macario is a 83 y.o. female who presents to the ED complaining of acute swelling to the left foot and leg with uncontrolled diabetes.  The patient has a history of dementia, uncontrolled diabetes, immobility syndrome, paroxysmal A-fib, and hypercholesterolemia.  The patient was seen by her home health nurse today was noted to have a new diabetic ulcer on the back of her left heel and new swelling to her left leg.  The nurse called the PCP who sent to the patient to the emergency room for evaluation.  Of note is the patient has lost her left great toe previously due to her diabetes.  Family states the patient recently had left knee pain but no known trauma.      PAST MEDICAL HISTORY  Active Ambulatory Problems     Diagnosis Date Noted    Essential hypertension 03/04/2016    Vitamin D deficiency 03/04/2016    Hyperuricemia 07/20/2016    Slow transit constipation 01/31/2017    Chronic pain of left knee 01/31/2017    At high risk for falls 08/10/2018    Peripheral neuropathy 08/28/2018    Uncontrolled type 2 diabetes mellitus with hyperglycemia 10/25/2018    Walker as ambulation aid 02/12/2019    Bradycardia, sinus 01/13/2021    Leg swelling 01/13/2021    Hyperlipidemia 01/13/2021    Presence of cardiac pacemaker 03/26/2021    Dementia without behavioral disturbance 06/01/2022    Weakness 07/24/2022    Thrombocytopenia 07/25/2022    Oropharyngeal dysphagia 07/28/2022    Immobility syndrome 07/28/2022    Type 2 diabetes mellitus with hyperglycemia, with long-term current use of insulin 08/25/2022    Paroxysmal atrial fibrillation 02/08/2023    Pure hypercholesterolemia 02/08/2023    Toe osteomyelitis, left 01/28/2024    Diabetic ulcer of toe of left foot associated with type 2 diabetes mellitus, with bone involvement without evidence  of necrosis 03/06/2024     Resolved Ambulatory Problems     Diagnosis Date Noted    Fall 02/03/2017    Medicare annual wellness visit, subsequent 08/16/2017    Alkaline phosphatase elevation 10/29/2018    Calcium blood increased 10/29/2018    Shortness of breath 01/13/2021    Humeral head fracture, right, closed, initial encounter 10/29/2021    Nail abnormalities 03/17/2022    Esophageal abnormality 06/01/2022    Aspiration pneumonia due to vomitus 06/02/2022    Aspiration pneumonia of both lungs due to gastric secretions, unspecified part of lung 07/24/2022     Past Medical History:   Diagnosis Date    Dementia     Diabetes mellitus     Dysphagia     GERD (gastroesophageal reflux disease)     History of poliomyelitis     Lung consolidation 06/01/2022    Pacemaker     Toe ulcer due to DM          REVIEW OF SYSTEMS  All systems reviewed and negative except for those discussed in HPI.       PAST SURGICAL HISTORY  Past Surgical History:   Procedure Laterality Date    AMPUTATION DIGIT Left 01/30/2024    Procedure: PARTIAL HALLUX AMPUTATION LEFT FOOT;  Surgeon: Nas Gonzales DPM;  Location: [facility] OR;  Service: Podiatry;  Laterality: Left;    AMPUTATION DIGIT Left 3/11/2024    Procedure: AMPUTATION DIGIT;  Surgeon: Nas Gonzales DPM;  Location: [facility] OR;  Service: Podiatry;  Laterality: Left;    ANKLE SURGERY Right     BLADDER SURGERY      Prolapsed    BREAST BIOPSY      CARDIAC ELECTROPHYSIOLOGY PROCEDURE N/A 03/05/2021    Procedure: Pacemaker DC new BOSTON;  Surgeon: Madelin Ferguson MD;  Location: [facility] CATH INVASIVE LOCATION;  Service: Cardiology;  Laterality: N/A;    ENDOSCOPY N/A 06/06/2022    Procedure: ESOPHAGOGASTRODUODENOSCOPY with biopsy, balloon dilation;  Surgeon: Lo Benjamin MD;  Location: [facility] ENDOSCOPY;  Service: Gastroenterology;  Laterality: N/A;  esophageal stricture, hiatal hernia, gastritis    HYSTERECTOMY      TONSILLECTOMY           FAMILY HISTORY  Family History    Problem Relation Age of Onset    Hypertension Mother     Cancer Mother         Stomach Cancer    Obesity Mother         her entire life over weight    Hypertension Father     Cancer Father         abdominal tumor    Malig Hyperthermia Neg Hx          SOCIAL HISTORY  Social History     Socioeconomic History    Marital status:    Tobacco Use    Smoking status: Former     Types: Cigarettes     Start date:      Quit date:      Years since quittin.3    Smokeless tobacco: Never    Tobacco comments:     quit in     Vaping Use    Vaping status: Never Used   Substance and Sexual Activity    Alcohol use: Not Currently     Comment: RARELY    Drug use: No    Sexual activity: Not Currently     Birth control/protection: Surgical, Post-menopausal     Comment: took the pill in the late  early s         ALLERGIES  Patient has no known allergies.      PHYSICAL EXAM  ED Triage Vitals   Temp Heart Rate Resp BP SpO2   04/10/24 1226 04/10/24 1226 04/10/24 1226 04/10/24 1235 04/10/24 1226   96.1 °F (35.6 °C) 96 18 139/68 97 %      Temp src Heart Rate Source Patient Position BP Location FiO2 (%)   04/10/24 1226 04/10/24 1226 04/10/24 1235 04/10/24 1235 --   Tympanic Monitor Lying Right arm        Physical Exam      GENERAL: 83-year-old female in no acute distress  HENT: NCAT: nares patent: Neck supple  EYES: no scleral icterus  CV: regular rhythm, normal rate  RESPIRATORY: normal effort  ABDOMEN: soft, NTND: Bowel sounds positive  MUSCULOSKELETAL: The patient has a previously amputated left great toe: Her left second toe has an abrasion with erythema and swelling: The patient does have an ulcer to the back of her left heel with no purulence and minimal erythema.  There is no erythema extending up the foot or leg.  However the patient does have 2+ edema on the left and 1+ edema on the right.  NEURO: alert with nonfocal neuro exam  PSYCH:  calm, cooperative  SKIN: warm, dry    Vital signs and nursing notes  reviewed.      LAB RESULTS  Recent Results (from the past 24 hour(s))   POC Glucose Once    Collection Time: 04/10/24 12:34 PM    Specimen: Blood   Result Value Ref Range    Glucose 167 (H) 70 - 130 mg/dL   Comprehensive Metabolic Panel    Collection Time: 04/10/24  1:49 PM    Specimen: Blood   Result Value Ref Range    Glucose 59 (L) 65 - 99 mg/dL    BUN 18 8 - 23 mg/dL    Creatinine 0.87 0.57 - 1.00 mg/dL    Sodium 142 136 - 145 mmol/L    Potassium 3.5 3.5 - 5.2 mmol/L    Chloride 102 98 - 107 mmol/L    CO2 32.0 (H) 22.0 - 29.0 mmol/L    Calcium 10.2 8.6 - 10.5 mg/dL    Total Protein 8.3 6.0 - 8.5 g/dL    Albumin 4.3 3.5 - 5.2 g/dL    ALT (SGPT) 11 1 - 33 U/L    AST (SGOT) 10 1 - 32 U/L    Alkaline Phosphatase 146 (H) 39 - 117 U/L    Total Bilirubin 0.2 0.0 - 1.2 mg/dL    Globulin 4.0 gm/dL    A/G Ratio 1.1 g/dL    BUN/Creatinine Ratio 20.7 7.0 - 25.0    Anion Gap 8.0 5.0 - 15.0 mmol/L    eGFR 65.8 >60.0 mL/min/1.73   aPTT    Collection Time: 04/10/24  1:49 PM    Specimen: Blood   Result Value Ref Range    PTT 28.0 22.7 - 35.4 seconds   Lactic Acid, Plasma    Collection Time: 04/10/24  1:49 PM    Specimen: Blood   Result Value Ref Range    Lactate 1.2 0.5 - 2.0 mmol/L   Procalcitonin    Collection Time: 04/10/24  1:49 PM    Specimen: Blood   Result Value Ref Range    Procalcitonin 0.08 0.00 - 0.25 ng/mL   Sedimentation Rate    Collection Time: 04/10/24  1:49 PM    Specimen: Blood   Result Value Ref Range    Sed Rate 29 0 - 30 mm/hr   C-reactive Protein    Collection Time: 04/10/24  1:49 PM    Specimen: Blood   Result Value Ref Range    C-Reactive Protein 2.18 (H) 0.00 - 0.50 mg/dL   CBC Auto Differential    Collection Time: 04/10/24  1:49 PM    Specimen: Blood   Result Value Ref Range    WBC 8.36 3.40 - 10.80 10*3/mm3    RBC 4.44 3.77 - 5.28 10*6/mm3    Hemoglobin 11.7 (L) 12.0 - 15.9 g/dL    Hematocrit 38.1 34.0 - 46.6 %    MCV 85.8 79.0 - 97.0 fL    MCH 26.4 (L) 26.6 - 33.0 pg    MCHC 30.7 (L) 31.5 - 35.7 g/dL     RDW 13.1 12.3 - 15.4 %    RDW-SD 41.5 37.0 - 54.0 fl    MPV 11.4 6.0 - 12.0 fL    Platelets 249 140 - 450 10*3/mm3    Neutrophil % 69.6 42.7 - 76.0 %    Lymphocyte % 22.0 19.6 - 45.3 %    Monocyte % 7.2 5.0 - 12.0 %    Eosinophil % 0.5 0.3 - 6.2 %    Basophil % 0.5 0.0 - 1.5 %    Immature Grans % 0.2 0.0 - 0.5 %    Neutrophils, Absolute 5.82 1.70 - 7.00 10*3/mm3    Lymphocytes, Absolute 1.84 0.70 - 3.10 10*3/mm3    Monocytes, Absolute 0.60 0.10 - 0.90 10*3/mm3    Eosinophils, Absolute 0.04 0.00 - 0.40 10*3/mm3    Basophils, Absolute 0.04 0.00 - 0.20 10*3/mm3    Immature Grans, Absolute 0.02 0.00 - 0.05 10*3/mm3    nRBC 0.0 0.0 - 0.2 /100 WBC   Urinalysis With Microscopic If Indicated (No Culture) - Urine, Clean Catch    Collection Time: 04/10/24  1:53 PM    Specimen: Urine, Clean Catch   Result Value Ref Range    Color, UA Yellow Yellow, Straw    Appearance, UA Cloudy (A) Clear    pH, UA 8.0 5.0 - 8.0    Specific Gravity, UA 1.018 1.005 - 1.030    Glucose, UA Negative Negative    Ketones, UA Negative Negative    Bilirubin, UA Negative Negative    Blood, UA Negative Negative    Protein, UA Trace (A) Negative    Leuk Esterase, UA Large (3+) (A) Negative    Nitrite, UA Negative Negative    Urobilinogen, UA 0.2 E.U./dL 0.2 - 1.0 E.U./dL   Urinalysis, Microscopic Only - Urine, Clean Catch    Collection Time: 04/10/24  1:53 PM    Specimen: Urine, Clean Catch   Result Value Ref Range    RBC, UA 0-2 None Seen, 0-2 /HPF    WBC, UA Too Numerous to Count (A) None Seen, 0-2 /HPF    Bacteria, UA 4+ (A) None Seen /HPF    Squamous Epithelial Cells, UA 3-6 (A) None Seen, 0-2 /HPF    Hyaline Casts, UA 3-6 None Seen /LPF    Methodology Manual Light Microscopy    Duplex Venous Lower Extremity - Left CAR    Collection Time: 04/10/24  2:40 PM   Result Value Ref Range    Right Common Femoral Spont Y     Right Common Femoral Competent Y     Right Common Femoral Phasic Y     Right Common Femoral Compress C     Right Common Femoral  Augment Y     Left Common Femoral Spont Y     Left Common Femoral Competent Y     Left Common Femoral Phasic Y     Left Common Femoral Compress C     Left Common Femoral Augment Y     Left Saphenofemoral Junction Compress C     Left Profunda Femoral Compress C     Left Proximal Femoral Compress C     Left Mid Femoral Spont Y     Left Mid Femoral Competent Y     Left Mid Femoral Phasic Y     Left Mid Femoral Compress C     Left Mid Femoral Augment Y     Left Distal Femoral Compress C     Left Popliteal Spont Y     Left Popliteal Competent Y     Left Popliteal Phasic Y     Left Popliteal Compress C     Left Popliteal Augment Y     Left Posterior Tibial Compress C     Left Peroneal Compress C     Left Gastronemius Compress C     Left Greater Saph AK Compress C     Left Greater Saph BK Compress C     Left Lesser Saph Compress C     BH CV VAS PRELIMINARY FINDINGS SCRIPTING 1.0    POC Glucose Once    Collection Time: 04/10/24  4:55 PM    Specimen: Blood   Result Value Ref Range    Glucose 137 (H) 70 - 130 mg/dL       Ordered the above labs and reviewed the results.        RADIOLOGY  Duplex Venous Lower Extremity - Left CAR    Result Date: 4/10/2024    Normal left lower extremity venous duplex scan.     XR Knee 1 or 2 View Left    Result Date: 4/10/2024  XR KNEE 1 OR 2 VW LEFT-4/10/2024  HISTORY: Left knee pain.  There is moderate medial tibiofemoral joint space narrowing. There are mild hypertrophic changes in the knee. No fractures are seen. There is soft tissue swelling of the knee.      1. Mild to moderate degenerative arthritis of the left knee. 2. Soft tissue swelling of the knee most pronounced along the medial aspect of the distal thigh. 3. No acute bony abnormality is seen.   This report was finalized on 4/10/2024 1:40 PM by Dr. Tray Cary M.D on Workstation: [id number]      XR Foot 3+ View Left    Result Date: 4/10/2024  XR FOOT 3+ VW LEFT-4/10/2024  HISTORY: Diabetic ulcer on back of heel. Redness and  swelling in second toe.    There has been amputation of the first toe.. No erosive changes of the distal first metatarsal is seen.  Toes 2 through 5 are difficult to assess as they are somewhat flexed. The bones do appear demineralized with no definite fractures or bony erosions seen.  Bones are demineralized.  There are calcaneal spurs. Soft tissue thinning is seen posterior to the calcaneus with no definite evidence of calcaneal osteomyelitis.      1. Demineralization of the bones. 2. No acute fractures or dislocations are seen. 3. Amputated first toe. 4. The toes 2 through 5 are somewhat difficult to evaluate due to positioning but no definite fractures or evidence of osteomyelitis is seen. 5. Calcaneal spurs. 6. No definite evidence of calcaneal osteomyelitis. 7. If further evaluation is clinically indicated follow-up MRI of the foot may be helpful.  This report was finalized on 4/10/2024 1:39 PM by Dr. Tray Cary M.D on Workstation: [id number]       Ordered the above noted radiological studies. Reviewed by me in PACS.            PROCEDURES  Procedures          MEDICATIONS GIVEN IN ER  Medications   sodium chloride 0.9 % flush 10 mL (has no administration in time range)   cefTRIAXone (ROCEPHIN) 1,000 mg in sodium chloride 0.9 % 100 mL MBP (1,000 mg Intravenous New Bag 4/10/24 1639)   Vancomycin HCl 1,250 mg in sodium chloride 0.9 % 250 mL VTB (has no administration in time range)   sodium chloride 0.9 % flush 10 mL (has no administration in time range)   sodium chloride 0.9 % flush 10 mL (has no administration in time range)   sodium chloride 0.9 % infusion 40 mL (has no administration in time range)   Enoxaparin Sodium (LOVENOX) syringe 40 mg (has no administration in time range)   nitroglycerin (NITROSTAT) SL tablet 0.4 mg (has no administration in time range)   sennosides-docusate (PERICOLACE) 8.6-50 MG per tablet 2 tablet (has no administration in time range)     And   polyethylene glycol (MIRALAX)  packet 17 g (has no administration in time range)     And   bisacodyl (DULCOLAX) EC tablet 5 mg (has no administration in time range)     And   bisacodyl (DULCOLAX) suppository 10 mg (has no administration in time range)   melatonin tablet 5 mg (has no administration in time range)   acetaminophen (TYLENOL) tablet 650 mg (has no administration in time range)     Or   acetaminophen (TYLENOL) 160 MG/5ML oral solution 650 mg (has no administration in time range)     Or   acetaminophen (TYLENOL) suppository 650 mg (has no administration in time range)   ondansetron ODT (ZOFRAN-ODT) disintegrating tablet 4 mg (has no administration in time range)     Or   ondansetron (ZOFRAN) injection 4 mg (has no administration in time range)   calcium carbonate (TUMS) chewable tablet 500 mg (200 mg elemental) (has no administration in time range)   dextrose (GLUTOSE) oral gel 15 g (has no administration in time range)   dextrose (D50W) (25 g/50 mL) IV injection 25 g (has no administration in time range)   glucagon (GLUCAGEN) injection 1 mg (has no administration in time range)   insulin lispro (HUMALOG/ADMELOG) injection 2-9 Units (has no administration in time range)   cefTRIAXone (ROCEPHIN) 2,000 mg in sodium chloride 0.9 % 100 mL MBP (has no administration in time range)   Pharmacy to dose vancomycin (has no administration in time range)             MEDICAL DECISION MAKING, PROGRESS, and CONSULTS    All labs have been independently reviewed by me.  All radiology studies have been reviewed by me and I have also reviewed the radiology report.   EKG's independently viewed and interpreted by me.  Discussion below represents my analysis of pertinent findings related to patient's condition, differential diagnosis, treatment plan and final disposition.      Additional sources:  - Discussed/ obtained information from independent historians: Patient's family is here who states that she has recently had left knee pain and saw the [facility]  nurse who sent her to the ER for evaluation today    - External (non-ED) record review: I reviewed the patient's last office visit with Dr. Gonzales from podiatry who saw her on March 29, 2024    - Chronic or social conditions impacting care: Patient lives at home by herself but has caregivers during the day and one of the daughter stays with her at night    - Shared decision making: After shared decision-making discussion to myself, the patient and her family we agree she needs to be admitted to the hospital for further evaluation and care      Orders placed during this visit:  Orders Placed This Encounter   Procedures    Blood Culture - Blood,    Blood Culture - Blood,    Urine Culture - Urine,    XR Knee 1 or 2 View Left    XR Foot 3+ View Left    Comprehensive Metabolic Panel    aPTT    Urinalysis With Microscopic If Indicated (No Culture) - Urine, Clean Catch    Lactic Acid, Plasma    Procalcitonin    Sedimentation Rate    C-reactive Protein    CBC Auto Differential    Urinalysis, Microscopic Only - Urine, Clean Catch    Basic Metabolic Panel    Diet: Diabetic; Consistent Carbohydrate; Fluid Consistency: Thin (IDDSI 0)    Monitor Blood Pressure    Continuous Pulse Oximetry    Vital Signs    Intake & Output    Weigh Patient    Oral Care    Maintain IV Access    Telemetry - Place Orders & Notify Provider of Results When Patient Experiences Acute Chest Pain, Dysrhythmia or Respiratory Distress    May Be Off Telemetry for Tests    Up With Assistance    Notify Provider (With Default Parameters)    Code Status and Medical Interventions:    [facility]HA (on-call MD unless specified) Details    POC Glucose Once    POC Glucose 4x Daily Before Meals & at Bedtime    POC Glucose Once    Insert Peripheral IV    Insert Peripheral IV    Initiate Observation Status    CBC & Differential    CBC & Differential         Differential diagnosis:  My differential diagnosis includes but is not limited to cellulitis, osteomyelitis, DVT, SVT or  knee injury      Independent interpretation of labs, radiology studies, and discussions with consultants:  ED Course as of 04/10/24 1658   Wed Apr 10, 2024   1419 I will check labs, Doppler and x-rays for further evaluation. [GP]   1541 The patient's Doppler showed no DVT. [GP]   1541 The patient does have an acute UTI with 4+ bacteria.  I will start her on Rocephin and send off a urine culture. [GP]   1541 The patient's white count, lactic acid, procalcitonin and sed rate are normal with a minimally elevated CRP. [GP]   1542 My independent interpretation the patient's left knee and left foot films are no fracture or dislocation. [GP]   1542 I believe the patient does have cellulitis of her left second toe and thus we will add vancomycin to the Rocephin [GP]   1542 I will admit her to the hospital for further evaluation and care. [GP]   1559 The patient's blood sugar is 59 will give her orange juice and food. [GP]   1601 I have discussed the above with the patient and her daughter and they understand and agree with the plan. [GP]   1618 I discussed the case with Dr. Sotelo from [facility].  He will admit her to the hospital for further evaluation and care for her cellulitis and diabetic foot ulcer [GP]      ED Course User Index  [GP] Jonathan Cameron MD               DIAGNOSIS  Final diagnoses:   Acute UTI   Cellulitis of second toe of left foot   Diabetic ulcer of left heel associated with diabetes mellitus due to underlying condition, unspecified ulcer stage   Pain and swelling of left knee   Hypoglycemia         DISPOSITION  ADMISSION    Discussed treatment plan and reason for admission with pt/family and admitting physician.  Pt/family voiced understanding of the plan for admission for further testing/treatment as needed.            Latest Documented Vital Signs:  As of 16:58 EDT  BP- 149/66 HR- 60 Temp- 97.7 °F (36.5 °C) (Oral) O2 sat- 96%--      --------------------  Please note that portions of this were completed  with a voice recognition program.       Note Disclaimer: At [facility], we believe that sharing information builds trust and better relationships. You are receiving this note because you are receiving care at [facility] or recently visited. It is possible you will see health information before a provider has talked with you about it. This kind of information can be easy to misunderstand. To help you fully understand what it means for your health, we urge you to discuss this note with your provider.             Jonathan Cameron MD  04/10/24 9013

## 2024-04-10 NOTE — ED NOTES
"Nursing report ED to floor  Di Macario  83 y.o.  female    HPI :  HPI (Adult)  Stated Reason for Visit: recent toe amputation, home health reports leg swelling. patient states her blood sugars have been in the 300s  History Obtained From: patient, family    Chief Complaint  Chief Complaint   Patient presents with    Hyperglycemia    Leg Swelling       Admitting doctor:   Alexis Sotelo MD    Admitting diagnosis:   The primary encounter diagnosis was Acute UTI. 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    Code status:   Current Code Status       Date Active Code Status Order ID Comments User Context       Prior            Allergies:   Patient has no known allergies.    Isolation:   No active isolations    Intake and Output  No intake or output data in the 24 hours ending 04/10/24 1632    Weight:       04/10/24  1237   Weight: 61.4 kg (135 lb 5.8 oz)       Most recent vitals:   Vitals:    04/10/24 1227 04/10/24 1235 04/10/24 1237 04/10/24 1544   BP:  139/68  149/66   BP Location:  Right arm     Patient Position:  Lying     Pulse:    60   Resp:       Temp: 97.7 °F (36.5 °C)      TempSrc: Oral      SpO2:    96%   Weight:   61.4 kg (135 lb 5.8 oz)    Height:   160 cm (63\")        Active LDAs/IV Access:   Lines, Drains & Airways       Active LDAs       Name Placement date Placement time Site Days    Peripheral IV 04/10/24 1349 Left Antecubital 04/10/24  1349  Antecubital  less than 1                    Labs (abnormal labs have a star):   Labs Reviewed   COMPREHENSIVE METABOLIC PANEL - Abnormal; Notable for the following components:       Result Value    Glucose 59 (*)     CO2 32.0 (*)     Alkaline Phosphatase 146 (*)     All other components within normal limits    Narrative:     GFR Normal >60  Chronic " "Kidney Disease <60  Kidney Failure <15    The GFR formula is only valid for adults with stable renal function between ages 18 and 70.   URINALYSIS W/ MICROSCOPIC IF INDICATED (NO CULTURE) - Abnormal; Notable for the following components:    Appearance, UA Cloudy (*)     Protein, UA Trace (*)     Leuk Esterase, UA Large (3+) (*)     All other components within normal limits   C-REACTIVE PROTEIN - Abnormal; Notable for the following components:    C-Reactive Protein 2.18 (*)     All other components within normal limits   CBC WITH AUTO DIFFERENTIAL - Abnormal; Notable for the following components:    Hemoglobin 11.7 (*)     MCH 26.4 (*)     MCHC 30.7 (*)     All other components within normal limits   URINALYSIS, MICROSCOPIC ONLY - Abnormal; Notable for the following components:    WBC, UA Too Numerous to Count (*)     Bacteria, UA 4+ (*)     Squamous Epithelial Cells, UA 3-6 (*)     All other components within normal limits   POCT GLUCOSE FINGERSTICK - Abnormal; Notable for the following components:    Glucose 167 (*)     All other components within normal limits   APTT - Normal   LACTIC ACID, PLASMA - Normal   PROCALCITONIN - Normal    Narrative:     As a Marker for Sepsis (Non-Neonates):    1. <0.5 ng/mL represents a low risk of severe sepsis and/or septic shock.  2. >2 ng/mL represents a high risk of severe sepsis and/or septic shock.    As a Marker for Lower Respiratory Tract Infections that require antibiotic therapy:    PCT on Admission    Antibiotic Therapy       6-12 Hrs later    >0.5                Strongly Recommended  >0.25 - <0.5        Recommended   0.1 - 0.25          Discouraged              Remeasure/reassess PCT  <0.1                Strongly Discouraged     Remeasure/reassess PCT    As 28 day mortality risk marker: \"Change in Procalcitonin Result\" (>80% or <=80%) if Day 0 (or Day 1) and Day 4 values are available. Refer to http://www.[employer]-pct-calculator.com    Change in PCT <=80%  A decrease of PCT " levels below or equal to 80% defines a positive change in PCT test result representing a higher risk for 28-day all-cause mortality of patients diagnosed with severe sepsis for septic shock.    Change in PCT >80%  A decrease of PCT levels of more than 80% defines a negative change in PCT result representing a lower risk for 28-day all-cause mortality of patients diagnosed with severe sepsis or septic shock.      SEDIMENTATION RATE - Normal   BLOOD CULTURE   BLOOD CULTURE   URINE CULTURE   CBC AND DIFFERENTIAL    Narrative:     The following orders were created for panel order CBC & Differential.  Procedure                               Abnormality         Status                     ---------                               -----------         ------                     CBC Auto Differential[[id number]]        Abnormal            Final result                 Please view results for these tests on the individual orders.       EKG:   No orders to display       Meds given in ED:   Medications   sodium chloride 0.9 % flush 10 mL (has no administration in time range)   cefTRIAXone (ROCEPHIN) 1,000 mg in sodium chloride 0.9 % 100 mL MBP (has no administration in time range)   Vancomycin HCl 1,250 mg in sodium chloride 0.9 % 250 mL VTB (has no administration in time range)       Imaging results:  XR Knee 1 or 2 View Left    Result Date: 4/10/2024  1. Mild to moderate degenerative arthritis of the left knee. 2. Soft tissue swelling of the knee most pronounced along the medial aspect of the distal thigh. 3. No acute bony abnormality is seen.   This report was finalized on 4/10/2024 1:40 PM by Dr. Tray Cary M.D on Workstation: [id number]      XR Foot 3+ View Left    Result Date: 4/10/2024  1. Demineralization of the bones. 2. No acute fractures or dislocations are seen. 3. Amputated first toe. 4. The toes 2 through 5 are somewhat difficult to evaluate due to positioning but no definite fractures or evidence of  osteomyelitis is seen. 5. Calcaneal spurs. 6. No definite evidence of calcaneal osteomyelitis. 7. If further evaluation is clinically indicated follow-up MRI of the foot may be helpful.  This report was finalized on 4/10/2024 1:39 PM by Dr. Tray Cary M.D on Workstation: [id number]       Ambulatory status:   - assist    Social issues:   Social History     Socioeconomic History    Marital status:    Tobacco Use    Smoking status: Former     Types: Cigarettes     Start date:      Quit date:      Years since quittin.3    Smokeless tobacco: Never    Tobacco comments:     quit in     Vaping Use    Vaping status: Never Used   Substance and Sexual Activity    Alcohol use: Not Currently     Comment: RARELY    Drug use: No    Sexual activity: Not Currently     Birth control/protection: Surgical, Post-menopausal     Comment: took the pill in the late  early 70s       Peripheral Neurovascular  Peripheral Neurovascular (Adult)  Peripheral Neurovascular WDL: .WDL except, neurovascular assessment lower  LLE Neurovascular Assessment  Temperature LLE: cool  Color LLE: no discoloration  Sensation LLE: numbness present, tingling present  RLE Neurovascular Assessment  Temperature RLE: cool  Color RLE: no discoloration  Sensation RLE: numbness present, tingling present    Neuro Cognitive  Neuro Cognitive (Adult)  Cognitive/Neuro/Behavioral WDL: WDL, orientation  Orientation: oriented x 4    Learning  Learning Assessment (Adult)  Learning Readiness and Ability: no barriers identified    Respiratory  Respiratory WDL  Respiratory WDL: WDL    Abdominal Pain       Pain Assessments  Pain (Adult)  (0-10) Pain Rating: Rest: 6  Pain Location: extremity  Pain Side/Orientation: bilateral, lower    NIH Stroke Scale       Lizbeth Arizmendi RN  04/10/24 16:32 EDT

## 2024-04-11 ENCOUNTER — READMISSION MANAGEMENT (OUTPATIENT)
Dept: CALL CENTER | Facility: HOSPITAL | Age: 84
End: 2024-04-11
Payer: MEDICARE

## 2024-04-11 VITALS
OXYGEN SATURATION: 96 % | SYSTOLIC BLOOD PRESSURE: 135 MMHG | RESPIRATION RATE: 18 BRPM | HEIGHT: 63 IN | DIASTOLIC BLOOD PRESSURE: 73 MMHG | BODY MASS INDEX: 23.71 KG/M2 | WEIGHT: 133.82 LBS | TEMPERATURE: 98.2 F | HEART RATE: 62 BPM

## 2024-04-11 LAB
ANION GAP SERPL CALCULATED.3IONS-SCNC: 10.4 MMOL/L (ref 5–15)
BASOPHILS # BLD AUTO: 0.03 10*3/MM3 (ref 0–0.2)
BASOPHILS NFR BLD AUTO: 0.4 % (ref 0–1.5)
BUN SERPL-MCNC: 17 MG/DL (ref 8–23)
BUN/CREAT SERPL: 30.9 (ref 7–25)
CALCIUM SPEC-SCNC: 9.1 MG/DL (ref 8.6–10.5)
CHLORIDE SERPL-SCNC: 104 MMOL/L (ref 98–107)
CO2 SERPL-SCNC: 23.6 MMOL/L (ref 22–29)
CREAT SERPL-MCNC: 0.55 MG/DL (ref 0.57–1)
DEPRECATED RDW RBC AUTO: 42.2 FL (ref 37–54)
EGFRCR SERPLBLD CKD-EPI 2021: 90.5 ML/MIN/1.73
EOSINOPHIL # BLD AUTO: 0.06 10*3/MM3 (ref 0–0.4)
EOSINOPHIL NFR BLD AUTO: 0.8 % (ref 0.3–6.2)
ERYTHROCYTE [DISTWIDTH] IN BLOOD BY AUTOMATED COUNT: 13.1 % (ref 12.3–15.4)
GLUCOSE BLDC GLUCOMTR-MCNC: 120 MG/DL (ref 70–130)
GLUCOSE BLDC GLUCOMTR-MCNC: 205 MG/DL (ref 70–130)
GLUCOSE BLDC GLUCOMTR-MCNC: 280 MG/DL (ref 70–130)
GLUCOSE SERPL-MCNC: 194 MG/DL (ref 65–99)
HCT VFR BLD AUTO: 35.4 % (ref 34–46.6)
HGB BLD-MCNC: 11.2 G/DL (ref 12–15.9)
IMM GRANULOCYTES # BLD AUTO: 0.03 10*3/MM3 (ref 0–0.05)
IMM GRANULOCYTES NFR BLD AUTO: 0.4 % (ref 0–0.5)
LYMPHOCYTES # BLD AUTO: 1.91 10*3/MM3 (ref 0.7–3.1)
LYMPHOCYTES NFR BLD AUTO: 26.3 % (ref 19.6–45.3)
MCH RBC QN AUTO: 27.7 PG (ref 26.6–33)
MCHC RBC AUTO-ENTMCNC: 31.6 G/DL (ref 31.5–35.7)
MCV RBC AUTO: 87.6 FL (ref 79–97)
MONOCYTES # BLD AUTO: 0.59 10*3/MM3 (ref 0.1–0.9)
MONOCYTES NFR BLD AUTO: 8.1 % (ref 5–12)
NEUTROPHILS NFR BLD AUTO: 4.63 10*3/MM3 (ref 1.7–7)
NEUTROPHILS NFR BLD AUTO: 64 % (ref 42.7–76)
NRBC BLD AUTO-RTO: 0 /100 WBC (ref 0–0.2)
PLATELET # BLD AUTO: 222 10*3/MM3 (ref 140–450)
PMV BLD AUTO: 11.8 FL (ref 6–12)
POTASSIUM SERPL-SCNC: 4 MMOL/L (ref 3.5–5.2)
RBC # BLD AUTO: 4.04 10*6/MM3 (ref 3.77–5.28)
SODIUM SERPL-SCNC: 138 MMOL/L (ref 136–145)
WBC NRBC COR # BLD AUTO: 7.25 10*3/MM3 (ref 3.4–10.8)

## 2024-04-11 PROCEDURE — G0378 HOSPITAL OBSERVATION PER HR: HCPCS

## 2024-04-11 PROCEDURE — 97166 OT EVAL MOD COMPLEX 45 MIN: CPT

## 2024-04-11 PROCEDURE — 82948 REAGENT STRIP/BLOOD GLUCOSE: CPT

## 2024-04-11 PROCEDURE — 80048 BASIC METABOLIC PNL TOTAL CA: CPT | Performed by: INTERNAL MEDICINE

## 2024-04-11 PROCEDURE — 96372 THER/PROPH/DIAG INJ SC/IM: CPT

## 2024-04-11 PROCEDURE — 97535 SELF CARE MNGMENT TRAINING: CPT

## 2024-04-11 PROCEDURE — 97162 PT EVAL MOD COMPLEX 30 MIN: CPT

## 2024-04-11 PROCEDURE — 85025 COMPLETE CBC W/AUTO DIFF WBC: CPT | Performed by: INTERNAL MEDICINE

## 2024-04-11 PROCEDURE — 25010000002 ENOXAPARIN PER 10 MG: Performed by: INTERNAL MEDICINE

## 2024-04-11 PROCEDURE — 63710000001 INSULIN LISPRO (HUMAN) PER 5 UNITS: Performed by: INTERNAL MEDICINE

## 2024-04-11 RX ORDER — AMLODIPINE BESYLATE 2.5 MG/1
2.5 TABLET ORAL DAILY
Status: DISCONTINUED | OUTPATIENT
Start: 2024-04-11 | End: 2024-04-11 | Stop reason: HOSPADM

## 2024-04-11 RX ORDER — DOXYCYCLINE HYCLATE 100 MG/1
100 CAPSULE ORAL 2 TIMES DAILY
Qty: 14 CAPSULE | Refills: 0 | Status: SHIPPED | OUTPATIENT
Start: 2024-04-11 | End: 2024-04-19

## 2024-04-11 RX ORDER — PRAVASTATIN SODIUM 40 MG
40 TABLET ORAL EVERY EVENING
Status: DISCONTINUED | OUTPATIENT
Start: 2024-04-11 | End: 2024-04-11 | Stop reason: HOSPADM

## 2024-04-11 RX ORDER — FAMOTIDINE 20 MG/1
20 TABLET, FILM COATED ORAL 2 TIMES DAILY
Status: DISCONTINUED | OUTPATIENT
Start: 2024-04-11 | End: 2024-04-11 | Stop reason: HOSPADM

## 2024-04-11 RX ORDER — MEMANTINE HYDROCHLORIDE 10 MG/1
10 TABLET ORAL 2 TIMES DAILY
Status: DISCONTINUED | OUTPATIENT
Start: 2024-04-11 | End: 2024-04-11 | Stop reason: HOSPADM

## 2024-04-11 RX ORDER — ESCITALOPRAM OXALATE 5 MG/1
5 TABLET ORAL DAILY
Status: DISCONTINUED | OUTPATIENT
Start: 2024-04-11 | End: 2024-04-11 | Stop reason: HOSPADM

## 2024-04-11 RX ORDER — DONEPEZIL HYDROCHLORIDE 10 MG/1
10 TABLET, FILM COATED ORAL NIGHTLY
Status: DISCONTINUED | OUTPATIENT
Start: 2024-04-11 | End: 2024-04-11 | Stop reason: HOSPADM

## 2024-04-11 RX ORDER — AMOXICILLIN AND CLAVULANATE POTASSIUM 875; 125 MG/1; MG/1
1 TABLET, FILM COATED ORAL 2 TIMES DAILY
Qty: 14 TABLET | Refills: 0 | Status: SHIPPED | OUTPATIENT
Start: 2024-04-11 | End: 2024-04-19

## 2024-04-11 RX ADMIN — INSULIN LISPRO 2 UNITS: 100 INJECTION, SOLUTION INTRAVENOUS; SUBCUTANEOUS at 08:57

## 2024-04-11 RX ADMIN — FAMOTIDINE 20 MG: 20 TABLET, FILM COATED ORAL at 08:57

## 2024-04-11 RX ADMIN — Medication 10 ML: at 08:58

## 2024-04-11 RX ADMIN — ENOXAPARIN SODIUM 40 MG: 100 INJECTION SUBCUTANEOUS at 08:57

## 2024-04-11 RX ADMIN — ESCITALOPRAM 5 MG: 5 TABLET, FILM COATED ORAL at 08:58

## 2024-04-11 RX ADMIN — DONEPEZIL HYDROCHLORIDE 10 MG: 10 TABLET, FILM COATED ORAL at 03:05

## 2024-04-11 RX ADMIN — INSULIN LISPRO 6 UNITS: 100 INJECTION, SOLUTION INTRAVENOUS; SUBCUTANEOUS at 13:31

## 2024-04-11 RX ADMIN — DOCUSATE SODIUM 50MG AND SENNOSIDES 8.6MG 2 TABLET: 8.6; 5 TABLET, FILM COATED ORAL at 08:57

## 2024-04-11 RX ADMIN — AMLODIPINE BESYLATE 2.5 MG: 2.5 TABLET ORAL at 08:58

## 2024-04-11 RX ADMIN — MEMANTINE HYDROCHLORIDE 10 MG: 10 TABLET, FILM COATED ORAL at 08:57

## 2024-04-11 NOTE — PLAN OF CARE
Goal Outcome Evaluation:  Plan of Care Reviewed With: patient, daughter           Outcome Evaluation: Pt admitted from  home with cellulitis of L 2nd toe, recent great toe amputation.  Dtr reprots pt lives alone but has caregivers 7hours/weekday, dtrs stay nights and weekends, pt uses walker, no recent falls.  Today pt jorje tos it up w/ SBA.  Stood and ambulated 15' w/ CGA using RW, disstance limited by toileting needs.  Recommend Dc to home with hoem PT, discussed w/ pt, dtr and RN.      Anticipated Discharge Disposition (PT): home with home health, home with 24/7 care

## 2024-04-11 NOTE — PLAN OF CARE
Goal Outcome Evaluation:  Plan of Care Reviewed With: patient, daughter        Progress: no change  Outcome Evaluation: 84 y/o F presented to [facility] for cellulities of 2nd toe and recent great toe amputation. Pt. lives alone however hired caregivers come 7 hours/weekday and family comes to stay on weekends and nights. Pt. utilizes a RW and has a walk in shower with chair and a standard commode. Today pt. was able to transiton to EOB CGA, min A for STS and CGA for func mob. Min-mod A for LBD with post op shoe for LLE. Min A for changing brief and s/u for groominf and hygiene. Pt. will require skilled OT services to address deficits with ADLS, strength and mobility. OT recommending dc to home with assist.      Anticipated Discharge Disposition (OT): home with assist

## 2024-04-11 NOTE — THERAPY EVALUATION
Patient Name: Di Macario  : 1940    MRN: [id number]                              Today's Date: 2024       Admit Date: 4/10/2024    Visit Dx:     ICD-10-CM ICD-9-CM   1. Acute UTI  N39.0 599.0   2. Cellulitis of second toe of left foot  L03.032 681.10   3. Diabetic ulcer of left heel associated with diabetes mellitus due to underlying condition, unspecified ulcer stage  E08.621 249.80    L97.429 707.14   4. Pain and swelling of left knee  M25.562 719.46    M25.462 719.06   5. Hypoglycemia  E16.2 251.2   6. Diabetic ulcer of toe of left foot associated with type 2 diabetes mellitus, with bone involvement without evidence of necrosis  E11.621 250.80    L97.526 707.15     Patient Active Problem List   Diagnosis    Essential hypertension    Vitamin D deficiency    Hyperuricemia    Slow transit constipation    Chronic pain of left knee    At high risk for falls    Peripheral neuropathy    Uncontrolled type 2 diabetes mellitus with hyperglycemia    Walker as ambulation aid    Bradycardia, sinus    Leg swelling    Hyperlipidemia    Presence of cardiac pacemaker    Dementia without behavioral disturbance    Weakness    Thrombocytopenia    Oropharyngeal dysphagia    Immobility syndrome    Type 2 diabetes mellitus with hyperglycemia, with long-term current use of insulin    Paroxysmal atrial fibrillation    Pure hypercholesterolemia    Toe osteomyelitis, left    Diabetic ulcer of toe of left foot associated with type 2 diabetes mellitus, with bone involvement without evidence of necrosis    Cellulitis of second toe of left foot     Past Medical History:   Diagnosis Date    Dementia     states better with medication     Diabetes mellitus     Dysphagia     Essential hypertension 2016    GERD (gastroesophageal reflux disease)     History of poliomyelitis     IN NECK - AGE 4    Hyperlipidemia 2021    Lung consolidation 2022    Pacemaker     Peripheral neuropathy 2018    Toe ulcer due to  DM     LEFT GREAT TOE    Type 2 diabetes mellitus with hyperglycemia, with long-term current use of insulin 08/25/2022    Vitamin D deficiency      Past Surgical History:   Procedure Laterality Date    AMPUTATION DIGIT Left 01/30/2024    Procedure: PARTIAL HALLUX AMPUTATION LEFT FOOT;  Surgeon: Nas Gonzales DPM;  Location: [facility] MAIN OR;  Service: Podiatry;  Laterality: Left;    AMPUTATION DIGIT Left 3/11/2024    Procedure: AMPUTATION DIGIT;  Surgeon: Nas Gonzales DPM;  Location: [facility] MAIN OR;  Service: Podiatry;  Laterality: Left;    ANKLE SURGERY Right     BLADDER SURGERY      Prolapsed    BREAST BIOPSY      CARDIAC ELECTROPHYSIOLOGY PROCEDURE N/A 03/05/2021    Procedure: Pacemaker DC new [place];  Surgeon: Madelin Ferguson MD;  Location: [facility] CATH INVASIVE LOCATION;  Service: Cardiology;  Laterality: N/A;    ENDOSCOPY N/A 06/06/2022    Procedure: ESOPHAGOGASTRODUODENOSCOPY with biopsy, balloon dilation;  Surgeon: Lo Benjamin MD;  Location: [facility] ENDOSCOPY;  Service: Gastroenterology;  Laterality: N/A;  esophageal stricture, hiatal hernia, gastritis    HYSTERECTOMY      TONSILLECTOMY        General Information       Row Name 04/11/24 1225          OT Time and Intention    Document Type evaluation  -AG     Mode of Treatment co-treatment;occupational therapy;physical therapy  cotx for safe pt handling and mobility progression  -AG       Row Name 04/11/24 1225          General Information    Patient Profile Reviewed yes  -AG     Prior Level of Function --  pt. has caregivers 7 hours/day 5 days/wk, 2 dtrs alternate staying night and weekends, post op shoe on L foot; Min A for bathing  -AG     Existing Precautions/Restrictions fall  -AG     Barriers to Rehab none identified  -AG       Row Name 04/11/24 1225          Living Environment    People in Home alone  -AG       Row Name 04/11/24 1225          Home Main Entrance    Number of Stairs, Main Entrance none  ramp  -AG       Row Name  04/11/24 1225          Cognition    Orientation Status (Cognition) oriented to;person;place  -AG       Row Name 04/11/24 1225          Safety Issues, Functional Mobility    Safety Issues Affecting Function (Mobility) problem-solving;safety precaution awareness;judgment  -AG     Impairments Affecting Function (Mobility) balance;strength;cognition;endurance/activity tolerance;pain  -AG     Cognitive Impairments, Mobility Safety/Performance awareness, need for assistance;insight into deficits/self-awareness;problem-solving/reasoning;judgment;safety precaution awareness  -AG               User Key  (r) = Recorded By, (t) = Taken By, (c) = Cosigned By      Initials Name Provider Type    AG Adeel Talbot OT Occupational Therapist                     Mobility/ADL's       Row Name 04/11/24 1226          Bed Mobility    Bed Mobility supine-sit  -AG     Supine-Sit [place] (Bed Mobility) standby assist  -AG     Sit-Supine [place] (Bed Mobility) not tested  -AG     Assistive Device (Bed Mobility) head of bed elevated;bed rails  -AG     Comment, (Bed Mobility) Pt UIC at end of session  -AG       Row Name 04/11/24 1226          Transfers    Transfers bed-chair transfer;sit-stand transfer;toilet transfer  -AG       Row Name 04/11/24 1226          Bed-Chair Transfer    Bed-Chair [place] (Transfers) contact guard;1 person assist  -AG     Assistive Device (Bed-Chair Transfers) walker, front-wheeled  -AG       Row Name 04/11/24 1226          Sit-Stand Transfer    Sit-Stand [place] (Transfers) contact guard  -AG     Assistive Device (Sit-Stand Transfers) walker, front-wheeled  -AG       Row Name 04/11/24 1226          Toilet Transfer    Type (Toilet Transfer) sit-stand  -AG     [place] Level (Toilet Transfer) contact guard  -AG     Assistive Device (Toilet Transfer) commode;grab bars/safety frame  -AG       Row Name 04/11/24 1226          Functional Mobility    Functional Mobility- Comment bed > toilet >  chair w RW and no LOB noted  -AG       Row Name 04/11/24 1226          Activities of Daily Living    BADL Assessment/Intervention upper body dressing;lower body dressing;grooming;toileting  -AG       Row Name 04/11/24 1226          Upper Body Dressing Assessment/Training    [place] Level (Upper Body Dressing) upper body dressing skills;don;front opening garment;minimum assist (75% patient effort)  -AG     Position (Upper Body Dressing) edge of bed sitting  -AG     Comment, (Upper Body Dressing) hospital gown  -AG       Row Name 04/11/24 1226          Lower Body Dressing Assessment/Training    [place] Level (Lower Body Dressing) lower body dressing skills;doff;don;pants/bottoms;shoes/slippers;moderate assist (50% patient effort);minimum assist (75% patient effort)  -AG     Position (Lower Body Dressing) edge of bed sitting  -AG     Comment, (Lower Body Dressing) post op shoe for LLE, standard shoe RLE  -AG       Row Name 04/11/24 1226          Grooming Assessment/Training    [place] Level (Grooming) grooming skills;wash face, hands;contact guard assist  -AG     Position (Grooming) sink side  -AG       Row Name 04/11/24 1226          Toileting Assessment/Training    [place] Level (Toileting) toileting skills;adjust/manage clothing;change pad/brief;perform perineal hygiene;contact guard assist;minimum assist (75% patient effort)  -AG     Assistive Devices (Toileting) commode  -AG     Position (Toileting) unsupported sitting  -AG               User Key  (r) = Recorded By, (t) = Taken By, (c) = Cosigned By      Initials Name Provider Type    AG Adeel Talbot OT Occupational Therapist                   Obj/Interventions       Row Name 04/11/24 1229          Sensory Assessment (Somatosensory)    Sensory Assessment (Somatosensory) sensation intact  -AG       Row Name 04/11/24 1229          Vision Assessment/Intervention    Visual Impairment/Limitations WFL  -AG       Row Name 04/11/24 1229           Range of Motion Comprehensive    General Range of Motion no range of motion deficits identified  -AG       Row Name 04/11/24 1229          Strength Comprehensive (MMT)    Comment, General Manual Muscle Testing (MMT) Assessment BUE MMT 4/5  -AG       Row Name 04/11/24 1229          Motor Skills    Motor Skills functional endurance  -AG     Functional Endurance fair  -AG       Row Name 04/11/24 1229          Balance    Balance Assessment sitting static balance;sitting dynamic balance;sit to stand dynamic balance;standing static balance;standing dynamic balance  -AG     Static Sitting Balance standby assist  -AG     Dynamic Sitting Balance standby assist  -AG     Position, Sitting Balance unsupported;sitting edge of bed  -AG     Sit to Stand Dynamic Balance minimal assist  -AG     Static Standing Balance contact guard  -AG     Dynamic Standing Balance contact guard  -AG     Position/Device Used, Standing Balance walker, front-wheeled  -AG     Balance Interventions sitting;standing;sit to stand;supported;static;dynamic;minimal challenge;occupation based/functional task  -AG     Comment, Balance seated on toilet ~10 mins completing ADLS  -AG               User Key  (r) = Recorded By, (t) = Taken By, (c) = Cosigned By      Initials Name Provider Type    AG Adeel Talbot OT Occupational Therapist                   Goals/Plan       Row Name 04/11/24 1234          Bed Mobility Goal 1 (OT)    Activity/Assistive Device (Bed Mobility Goal 1, OT) bed mobility activities, all  -AG     [place] Level/Cues Needed (Bed Mobility Goal 1, OT) modified independence  -AG     Time Frame (Bed Mobility Goal 1, OT) short term goal (STG);2 weeks  -AG     Progress/Outcomes (Bed Mobility Goal 1, OT) goal ongoing  -AG       Row Name 04/11/24 1234          Transfer Goal 1 (OT)    Activity/Assistive Device (Transfer Goal 1, OT) transfers, all  -AG     [place] Level/Cues Needed (Transfer Goal 1, OT) modified independence  -AG     Time  Frame (Transfer Goal 1, OT) short term goal (STG);2 weeks  -AG     Progress/Outcome (Transfer Goal 1, OT) goal ongoing  -AG       Row Name 04/11/24 1234          Dressing Goal 1 (OT)    Activity/Device (Dressing Goal 1, OT) dressing skills, all  -AG     [place]/Cues Needed (Dressing Goal 1, OT) modified independence  -AG     Time Frame (Dressing Goal 1, OT) short term goal (STG);2 weeks  -AG     Progress/Outcome (Dressing Goal 1, OT) goal ongoing  -AG       Row Name 04/11/24 1234          Toileting Goal 1 (OT)    Activity/Device (Toileting Goal 1, OT) toileting skills, all  -AG     [place] Level/Cues Needed (Toileting Goal 1, OT) modified independence  -AG     Time Frame (Toileting Goal 1, OT) short term goal (STG);2 weeks  -AG     Progress/Outcome (Toileting Goal 1, OT) goal ongoing  -AG       Row Name 04/11/24 1234          Grooming Goal 1 (OT)    Activity/Device (Grooming Goal 1, OT) grooming skills, all  -AG     [place] (Grooming Goal 1, OT) modified independence  -AG     Time Frame (Grooming Goal 1, OT) short term goal (STG);2 weeks  -AG     Progress/Outcome (Grooming Goal 1, OT) goal ongoing  -       Row Name 04/11/24 1234          Therapy Assessment/Plan (OT)    Planned Therapy Interventions (OT) activity tolerance training;functional balance retraining;occupation/activity based interventions;IADL retraining;adaptive equipment training;BADL retraining;neuromuscular control/coordination retraining;patient/caregiver education/training;transfer/mobility retraining;strengthening exercise;ROM/therapeutic exercise  -AG               User Key  (r) = Recorded By, (t) = Taken By, (c) = Cosigned By      Initials Name Provider Type    AG Adeel Talbot, OT Occupational Therapist                   Clinical Impression       Row Name 04/11/24 1230          Pain Assessment    Pretreatment Pain Rating 3/10  -AG     Posttreatment Pain Rating 3/10  -AG     Pain Location - Side/Orientation Bilateral  -AG      Pain Location lower  -AG     Pain Location - extremity  -AG     Pain Intervention(s) Medication (See MAR);Repositioned;Rest  -AG       Row Name 04/11/24 1230          Plan of Care Review    Plan of Care Reviewed With patient;daughter  -AG     Progress no change  -AG     Outcome Evaluation 84 y/o F presented to [facility] for cellulities of 2nd toe and recent great toe amputation. Pt. lives alone however hired caregivers come 7 hours/weekday and family comes to stay on weekends and nights. Pt. utilizes a RW and has a walk in shower with chair and a standard commode. Today pt. was able to transiton to EOB CGA, min A for STS and CGA for func mob. Min-mod A for LBD with post op shoe for LLE. Min A for changing brief and s/u for groominf and hygiene. Pt. will require skilled OT services to address deficits with ADLS, strength and mobility. OT recommending dc to home with assist.  -AG       Row Name 04/11/24 1230          Therapy Assessment/Plan (OT)    Rehab Potential (OT) good, to achieve stated therapy goals  -AG     Criteria for Skilled Therapeutic Interventions Met (OT) yes;meets criteria;skilled treatment is necessary  -AG     Therapy Frequency (OT) 5 times/wk  -AG       Row Name 04/11/24 1230          Therapy Plan Review/Discharge Plan (OT)    Anticipated Discharge Disposition (OT) home with assist  -AG       Row Name 04/11/24 1230          Vital Signs    O2 Delivery Pre Treatment room air  -AG     Pre Patient Position Supine  -AG     Intra Patient Position Standing  -AG     Post Patient Position Sitting  -AG       Row Name 04/11/24 1230          Positioning and Restraints    Pre-Treatment Position in bed  -AG     Post Treatment Position chair  -AG     In Chair notified nsg;reclined;call light within reach;encouraged to call for assist;exit alarm on  -AG               User Key  (r) = Recorded By, (t) = Taken By, (c) = Cosigned By      Initials Name Provider Type    Adeel Gutierrez, OT Occupational Therapist                    Outcome Measures       Row Name 04/11/24 1235          How much help from another is currently needed...    Putting on and taking off regular lower body clothing? 2  -AG     Bathing (including washing, rinsing, and drying) 2  -AG     Toileting (which includes using toilet bed pan or urinal) 3  -AG     Putting on and taking off regular upper body clothing 3  -AG     Taking care of personal grooming (such as brushing teeth) 3  -AG     Eating meals 4  -AG     AM-PAC 6 Clicks Score (OT) 17  -AG       Row Name 04/11/24 1143          How much help from another person do you currently need...    Turning from your back to your side while in flat bed without using bedrails? 4  -AR     Moving from lying on back to sitting on the side of a flat bed without bedrails? 3  -AR     Moving to and from a bed to a chair (including a wheelchair)? 3  -AR     Standing up from a chair using your arms (e.g., wheelchair, bedside chair)? 3  -AR     Climbing 3-5 steps with a railing? 2  -AR     To walk in hospital room? 3  -AR     AM-PAC 6 Clicks Score (PT) 18  -AR     Highest Level of Mobility Goal 6 --> Walk 10 steps or more  -AR       Row Name 04/11/24 1235 04/11/24 1143       Functional Assessment    Outcome Measure Options AM-PAC 6 Clicks Daily Activity (OT)  -AG AM-PAC 6 Clicks Basic Mobility (PT)  -AR              User Key  (r) = Recorded By, (t) = Taken By, (c) = Cosigned By      Initials Name Provider Type    Evelyne Thomas PT Physical Therapist    Adeel Gutierrez OT Occupational Therapist                    Occupational Therapy Education       Title: PT OT SLP Therapies (Done)       Topic: Occupational Therapy (Done)       Point: ADL training (Done)       Description:   Instruct learner(s) on proper safety adaptation and remediation techniques during self care or transfers.   Instruct in proper use of assistive devices.                  Learning Progress Summary             Patient Acceptance, E,TB, VU by OPAL  at 4/11/2024 1235                         Point: Home exercise program (Done)       Description:   Instruct learner(s) on appropriate technique for monitoring, assisting and/or progressing therapeutic exercises/activities.                  Learning Progress Summary             Patient Acceptance, E,TB, VU by  at 4/11/2024 1235                         Point: Precautions (Done)       Description:   Instruct learner(s) on prescribed precautions during self-care and functional transfers.                  Learning Progress Summary             Patient Acceptance, E,TB, VU by  at 4/11/2024 1235                         Point: Body mechanics (Done)       Description:   Instruct learner(s) on proper positioning and spine alignment during self-care, functional mobility activities and/or exercises.                  Learning Progress Summary             Patient Acceptance, E,TB, VU by  at 4/11/2024 1235                                         User Key       Initials Effective Dates Name Provider Type Discipline     01/23/24 -  Adeel Talbot OT Occupational Therapist OT                  OT Recommendation and Plan  Planned Therapy Interventions (OT): activity tolerance training, functional balance retraining, occupation/activity based interventions, IADL retraining, adaptive equipment training, BADL retraining, neuromuscular control/coordination retraining, patient/caregiver education/training, transfer/mobility retraining, strengthening exercise, ROM/therapeutic exercise  Therapy Frequency (OT): 5 times/wk  Plan of Care Review  Plan of Care Reviewed With: patient, daughter  Progress: no change  Outcome Evaluation: 82 y/o F presented to [facility] for cellulities of 2nd toe and recent great toe amputation. Pt. lives alone however hired caregivers come 7 hours/weekday and family comes to stay on weekends and nights. Pt. utilizes a RW and has a walk in shower with chair and a standard commode. Today pt. was able to  transiton to EOB CGA, min A for STS and CGA for func mob. Min-mod A for LBD with post op shoe for LLE. Min A for changing brief and s/u for groominf and hygiene. Pt. will require skilled OT services to address deficits with ADLS, strength and mobility. OT recommending dc to home with assist.     Time Calculation:   Evaluation Complexity (OT)  Review Occupational Profile/Medical/Therapy History Complexity: expanded/moderate complexity  Assessment, Occupational Performance/Identification of Deficit Complexity: 3-5 performance deficits  Clinical Decision Making Complexity (OT): detailed assessment/moderate complexity  Overall Complexity of Evaluation (OT): moderate complexity     Time Calculation- OT       Row Name 04/11/24 1236             Time Calculation- OT    OT Start Time 0855  -AG      OT Stop Time 0918  -AG      OT Time Calculation (min) 23 min  -AG      Total Timed Code Minutes- OT 16 minute(s)  -AG      OT Received On 04/11/24  -AG      OT - Next Appointment 04/12/24  -AG      OT Goal Re-Cert Due Date 04/25/24  -AG         Timed Charges    91758 - OT Self Care/Mgmt Minutes 16  -AG         Untimed Charges    OT Eval/Re-eval Minutes 7  -AG         Total Minutes    Timed Charges Total Minutes 16  -AG      Untimed Charges Total Minutes 7  -AG       Total Minutes 23  -AG                User Key  (r) = Recorded By, (t) = Taken By, (c) = Cosigned By      Initials Name Provider Type    AG Adeel Talbot OT Occupational Therapist                  Therapy Charges for Today       Code Description Service Date Service Provider Modifiers Qty    54599810479 HC OT SELF CARE/MGMT/TRAIN EA 15 MIN 4/11/2024 Adeel Talbot OT GO 1    88979407117 HC OT EVAL MOD COMPLEXITY 3 4/11/2024 Adeel Talbot OT GO 1                 Adeel Talbot OT  4/11/2024

## 2024-04-11 NOTE — PLAN OF CARE
Problem: Adult Inpatient Plan of Care  Goal: Patient-Specific Goal (Individualized)  Outcome: Ongoing, Progressing   Goal Outcome Evaluation:         A&Ox3, forgetful to situation.   A-paced on monitor. On RA  Denies pain to LLE, wounds ADELFO.   Ax1 w/ walker to BR.

## 2024-04-11 NOTE — DISCHARGE PLACEMENT REQUEST
"Reji Macario (83 y.o. Female)       Date of Birth   1940    Social Security Number       Address   [address] [place] [address]    Home Phone   [phone]    MRN   [id number]       [employer]   [place]    Marital Status                               Admission Date   4/10/24    Admission Type   Emergency    Admitting Provider   Alexis Sotelo MD    Attending Provider   Vernon Keene MD    Department, Room/Bed   [address], [address]       Discharge Date       Discharge Disposition       Discharge Destination                                 Attending Provider: Vernon Keene MD    Allergies: No Known Allergies    Isolation: None   Infection: None   Code Status: No CPR    Ht: 160 cm (63\")   Wt: 60.7 kg (133 lb 13.1 oz)    Admission Cmt: None   Principal Problem: Cellulitis of second toe of left foot [L03.032]                   Active Insurance as of 4/10/2024       Primary Coverage       Payor Plan Insurance Group Employer/Plan Group    MEDICARE MEDICARE A & B        Payor Plan Address Payor Plan Phone Number Payor Plan Fax Number Effective Dates    [address] [phone]  5/1/2004 - None Entered    [facility] [address]         Subscriber Name Subscriber Birth Date Member ID       REJI MACARIO 1940 [id number]               Secondary Coverage       Payor Plan Insurance Group Employer/Plan Group     FOR LIFE  FOR LIFE  SUP         Payor Plan Address Payor Plan Phone Number Payor Plan Fax Number Effective Dates    [address] [phone]  2/1/2018 - None Entered    [facility] [address]         Subscriber Name Subscriber Birth Date Member ID       REJI MACARIO 1940 [id number]                     Emergency Contacts        (Rel.) Home Phone Work Phone Mobile Phone    DANISH MACARIO (Daughter) [phone] -- [phone]    MARIZOLLIZETHPHIL (Daughter) [phone] -- [phone]    " AMBER MACARIO (Daughter) [phone] -- [phone]    eleno Macario (Son) -- [phone]

## 2024-04-11 NOTE — PROGRESS NOTES
"[facility] Clinical Pharmacy Services: Vancomycin Pharmacokinetic Initial Consult Note    Di Macario is a 83 y.o. female who is on day 1 of pharmacy to dose vancomycin.    Indication: Skin and Soft Tissue  Consulting Provider: Deepak  Planned Duration of Therapy: 7 days  Loading Dose Ordered or Given: 1250 mg on 4/10 at 1803  MRSA PCR performed: no  Culture/Source:   4/10 BloodCx - pending x2  4/10 UrineCx - pending  Target: -600 mg/L.hr   Pertinent Vanc Dosing History: n/a  Other Antimicrobials: Ceftriaxone    Vitals/Labs  Ht: 160 cm (63\"); Wt: 60.7 kg (133 lb 13.1 oz)  Temp Readings from Last 1 Encounters:   04/10/24 97.7 °F (36.5 °C) (Oral)    Estimated Creatinine Clearance: 46.1 mL/min (by C-G formula based on SCr of 0.87 mg/dL).        Results from last 7 days   Lab Units 04/10/24  1349 04/08/24  1000   CREATININE mg/dL 0.87 0.71   WBC 10*3/mm3 8.36 6.36     Assessment/Plan:    Vancomycin Dose:   1000 mg IV every  24  hours  Predictive AUC level for the dose ordered is 465 mg/L.hr, which is within the target of 400-600 mg/L.hr  Vanc Trough has been ordered for 4/12 at 1700     Pharmacy will follow patient's kidney function and will adjust doses and obtain levels as necessary. Thank you for involving pharmacy in this patient's care. Please contact pharmacy with any questions or concerns.                           Chitra Vazquez, [facility]  Clinical Pharmacist    "

## 2024-04-11 NOTE — DISCHARGE SUMMARY
Patient Name: Di Macario  : 1940  MRN: [id number]    Date of Admission: 4/10/2024  Date of Discharge:  2024  Primary Care Physician: Lily Yepez PA-C      Chief Complaint:   Hyperglycemia and Leg Swelling      Discharge Diagnoses     Active Hospital Problems    Diagnosis  POA    **Cellulitis of second toe of left foot [L03.032]  Yes    Paroxysmal atrial fibrillation [I48.0]  Yes    Type 2 diabetes mellitus with hyperglycemia, with long-term current use of insulin [E11.65, Z79.4]  Not Applicable    Dementia without behavioral disturbance [F03.90]  Yes    Hyperlipidemia [E78.5]  Yes    Essential hypertension [I10]  Yes      Resolved Hospital Problems   No resolved problems to display.        Hospital Course     Ms. Macario is a 83 y.o. 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 She does not have pain from this and has not otherwise been feeling ill. She was noted also to have an abnormal urinalysis in the ER but she denies urinary symptoms.  She was started on IV antibiotics with vancomycin and Rocephin and responded well.  By the following day there is no erythema noted.  She has a superficial ulcer with no blood drainage and no significant pain or discomfort.  The plan is to transition to oral antibiotics and treat for 7 more days.  The plan was discussed with the patient the bedside she is eager to go home today and denies new issues or complaints    Day of Discharge     Subjective:  Rested well overnight pain is controlled    Physical Exam:  Temp:  [97.7 °F (36.5 °C)-98.2 °F (36.8 °C)] 98.2 °F (36.8 °C)  Heart Rate:  [60-65] 62  Resp:  [16-18] 18  BP: (135-170)/(62-73) 135/73  Body mass index is 23.71 kg/m².  Physical Exam  Vitals reviewed.   Constitutional:       General: She is not in acute  distress.     Appearance: She is ill-appearing.   Cardiovascular:      Rate and Rhythm: Normal rate and regular rhythm.   Pulmonary:      Effort: No respiratory distress.      Breath sounds: Normal breath sounds.   Abdominal:      General: Bowel sounds are normal. There is no distension.      Palpations: Abdomen is soft.   Musculoskeletal:      Right lower leg: No edema.      Left lower leg: No edema.   Skin:     Findings: No erythema.      Comments: tiny superficial ulcer on the dorsum of the toe without drainage   Neurological:      Mental Status: She is alert and oriented to person, place, and time. Mental status is at baseline.         Consultants     Consult Orders (all) (From admission, onward)       Start     Ordered    04/10/24 1902  Inpatient Case Management  Consult  Once        Provider:  (Not yet assigned)    04/10/24 1902    04/10/24 1902  Inpatient Diabetes Educator Consult  Once        Provider:  (Not yet assigned)    04/10/24 1902    04/10/24 1545  [facility] (on-call MD unless specified) Details  Once        Specialty:  Hospitalist  Provider:  Alexis Sotelo MD    04/10/24 1544                  Procedures     * Surgery not found *    Imaging Results (All)       Procedure Component Value Units Date/Time    XR Knee 1 or 2 View Left [[id number]] Collected: 04/10/24 1339     Updated: 04/10/24 1343    Narrative:      XR KNEE 1 OR 2 VW LEFT-4/10/2024     HISTORY: Left knee pain.     There is moderate medial tibiofemoral joint space narrowing. There are  mild hypertrophic changes in the knee. No fractures are seen. There is  soft tissue swelling of the knee.       Impression:      1. Mild to moderate degenerative arthritis of the left knee.  2. Soft tissue swelling of the knee most pronounced along the medial  aspect of the distal thigh.  3. No acute bony abnormality is seen.        This report was finalized on 4/10/2024 1:40 PM by Dr. Tray Cary M.D on Workstation: [id number]        XR Foot 3+ View Left [[id number]] Collected: 04/10/24 1337     Updated: 04/10/24 1342    Narrative:      XR FOOT 3+ VW LEFT-4/10/2024     HISTORY: Diabetic ulcer on back of heel. Redness and swelling in second  toe.           There has been amputation of the first toe.. No erosive changes of the  distal first metatarsal is seen.     Toes 2 through 5 are difficult to assess as they are somewhat flexed.  The bones do appear demineralized with no definite fractures or bony  erosions seen.     Bones are demineralized.     There are calcaneal spurs. Soft tissue thinning is seen posterior to the  calcaneus with no definite evidence of calcaneal osteomyelitis.       Impression:      1. Demineralization of the bones.  2. No acute fractures or dislocations are seen.  3. Amputated first toe.  4. The toes 2 through 5 are somewhat difficult to evaluate due to  positioning but no definite fractures or evidence of osteomyelitis is  seen.  5. Calcaneal spurs.  6. No definite evidence of calcaneal osteomyelitis.  7. If further evaluation is clinically indicated follow-up MRI of the  foot may be helpful.     This report was finalized on 4/10/2024 1:39 PM by Dr. Tray Cary M.D on Workstation: [id number]             Results for orders placed during the hospital encounter of 04/10/24    Duplex Venous Lower Extremity - Left CAR    Interpretation Summary    Normal left lower extremity venous duplex scan.    Results for orders placed in visit on 01/13/21    Adult Transthoracic Echo Complete W/ Cont if Necessary Per Protocol    Interpretation Summary  · Calculated left ventricular EF = 66.8% Estimated left ventricular EF was in agreement with the calculated left ventricular EF.  · Left ventricular diastolic function was normal.  · Calculated left ventricular EF = 66.8%  · There is no evidence of pericardial effusion. .    Pertinent Labs     Results from last 7 days   Lab Units 04/11/24  0552 04/10/24  1349 04/08/24  1000   WBC  "10*3/mm3 7.25 8.36 6.36   HEMOGLOBIN g/dL 11.2* 11.7* 11.6*   PLATELETS 10*3/mm3 222 249 229     Results from last 7 days   Lab Units 04/11/24  0552 04/10/24  1349 04/08/24  1000   SODIUM mmol/L 138 142 139   POTASSIUM mmol/L 4.0 3.5 3.8   CHLORIDE mmol/L 104 102 101   CO2 mmol/L 23.6 32.0* 26.3   BUN mg/dL 17 18 20   CREATININE mg/dL 0.55* 0.87 0.71   GLUCOSE mg/dL 194* 59* 408*   EGFR mL/min/1.73 90.5 65.8 84.0     Results from last 7 days   Lab Units 04/10/24  1349 04/08/24  1000   ALBUMIN g/dL 4.3 3.8   BILIRUBIN mg/dL 0.2 0.2   ALK PHOS U/L 146* 166*   AST (SGOT) U/L 10 11   ALT (SGPT) U/L 11 7     Results from last 7 days   Lab Units 04/11/24  0552 04/10/24  1349 04/08/24  1000   CALCIUM mg/dL 9.1 10.2 9.6   ALBUMIN g/dL  --  4.3 3.8               Invalid input(s): \"LDLCALC\"  Results from last 7 days   Lab Units 04/10/24  1353   URINECX  >100,000 CFU/mL Gram Negative Bacilli*         Test Results Pending at Discharge     Pending Labs       Order Current Status    Blood Culture - Blood, Arm, Right In process    Blood Culture - Blood, Arm, Right In process    Urine Culture - Urine, Urine, Clean Catch Preliminary result            Discharge Details        Discharge Medications        New Medications        Instructions Start Date   amoxicillin-clavulanate 875-125 MG per tablet  Commonly known as: AUGMENTIN   1 tablet, Oral, 2 Times Daily      doxycycline 100 MG capsule  Commonly known as: VIBRAMYCIN   100 mg, Oral, 2 Times Daily             Continue These Medications        Instructions Start Date   acetaminophen 325 MG tablet  Commonly known as: TYLENOL   2 tablets, Oral, Every 6 Hours PRN, PRN      amLODIPine 5 MG tablet  Commonly known as: NORVASC   2.5 mg, Oral, Daily      BD Pen Needle Norma U/F 32G X 4 MM misc  Generic drug: Insulin Pen Needle   Use once a day      donepezil 10 MG tablet  Commonly known as: ARICEPT   10 mg, Oral, Nightly      escitalopram 5 MG tablet  Commonly known as: LEXAPRO   TAKE 1 " TABLET BY MOUTH EVERY DAY. STOP CITALOPRAM      famotidine 20 MG tablet  Commonly known as: PEPCID   1 tablet, Oral, 2 Times Daily      freestyle lancets   1 each, Other, 4 Times Daily, Use as instructed      FREESTYLE LITE test strip  Generic drug: glucose blood   Bid e11.65      Gvoke HypoPen 2-Pack 1 MG/0.2ML solution auto-injector  Generic drug: Glucagon   1 mg, Subcutaneous, As Needed      Lantus SoloStar 100 UNIT/ML injection pen  Generic drug: Insulin Glargine   10 Units, Subcutaneous, Daily      melatonin 1 MG tablet   1 mg, Oral, Nightly      memantine 10 MG tablet  Commonly known as: NAMENDA   1 tablet, Oral, 2 Times Daily      NovoLOG FlexPen 100 UNIT/ML solution pen-injector sc pen  Generic drug: insulin aspart   2-3 Units, Subcutaneous, 3 Times Daily With Meals      pravastatin 40 MG tablet  Commonly known as: PRAVACHOL   40 mg, Oral, Every Evening      Trulicity 0.75 MG/0.5ML solution pen-injector  Generic drug: Dulaglutide   Subcutaneous for 84 Days      Zonegran 100 MG capsule  Generic drug: zonisamide   200 mg, Oral, Nightly               No Known Allergies    Discharge Disposition:  Home or Self Care      Discharge Diet:  Diet Order   Procedures    Diet: Diabetic; Consistent Carbohydrate; Fluid Consistency: Thin (IDDSI 0)       Discharge Activity:   Activity Instructions       Activity as Tolerated              CODE STATUS:    Code Status and Medical Interventions:   Ordered at: 04/10/24 1638     Medical Intervention Limits:    NO intubation (DNI)    NO cardioversion     Code Status (Patient has no pulse and is not breathing):    No CPR (Do Not Attempt to Resuscitate)     Medical Interventions (Patient has pulse or is breathing):    Limited Support       Future Appointments   Date Time Provider Department Center   4/17/2024 To Be Determined Yohana Dyson RN  [facility] None   4/19/2024 10:00 AM [facility]  [facility]   4/24/2024 To Be Determined Carolyn Lawson LPN  [facility]  [facility] None   4/26/2024 11:30 AM Carlotta Mckeon APRN [facility]  [facility]   5[facility]1/2024 [facility]   5/8/2024 To Be Determined Yohana Dyson RN  [facility]  None     Additional Instructions for the Follow-ups that You Need to Schedule       Ambulatory Referral to Home Health   As directed      Face to Face Visit Date: 4/11/2024   Follow-up provider for Plan of Care?: I treated the patient in an acute care facility and will not continue treatment after discharge.   Follow-up provider: LILY GALLEGOS [[id number]]   Reason/Clinical Findings: weakness toe wound   Describe mobility limitations that make leaving home difficult: doesnt drive   Nursing/Therapeutic Services Requested: Skilled Nursing Physical Therapy   Skilled nursing orders: Other   Frequency: 1 Week 1        Ambulatory Referral to Wound Clinic   As directed      Discharge Follow-up with PCP   As directed       Currently Documented PCP:    Lily Gallegos PA-C    PCP Phone Number:    [phone]     Follow Up Details: 1-2 weeks               Contact information for follow-up providers       Lily Gallegos PA-C .    Specialties: Physician Assistant, Internal Medicine, Family Medicine  Why: 1-2 weeks  Contact information:  [address] [facility] [address]  [facility] [address]  [phone]               [facility] .    Specialty: Wound Care  Contact information:  [address] [facility] [address] [phone]                     Contact information for after-discharge care       Home Medical Care       [facility] .    Service: Home Health Services  Contact information:  [address] [facility] [address]  [facility] [address] [phone]                                   Additional Instructions for the Follow-ups that You Need to Schedule       Ambulatory Referral to Home Health   As directed      Face to Face Visit Date: 4/11/2024   Follow-up provider for  Plan of Care?: I treated the patient in an acute care facility and will not continue treatment after discharge.   Follow-up provider: LILY GALLEGOS [[id number]]   Reason/Clinical Findings: weakness toe wound   Describe mobility limitations that make leaving home difficult: doesnt drive   Nursing/Therapeutic Services Requested: Skilled Nursing Physical Therapy   Skilled nursing orders: Other   Frequency: 1 Week 1        Ambulatory Referral to Wound Clinic   As directed      Discharge Follow-up with PCP   As directed       Currently Documented PCP:    Lily Gallegos, PAKlausC    PCP Phone Number:    [phone]     Follow Up Details: 1-2 weeks            Time Spent on Discharge:  Greater than 30 minutes      Vernon Keene MD  [facility]  04/11/24  12:26 EDT

## 2024-04-11 NOTE — PROGRESS NOTES
"[facility] Clinical Pharmacy Services: Vancomycin Monitoring Note    Di Macario is a 83 y.o. female who is on day 2 of pharmacy to dose vancomycin for  left second toe cellulitis .    Previous Vancomycin Dose:   1000 mg IV every  24  hours  Updated Cultures and Sensitivities:   4/10 blood cultures in process  4/10  Urine culture - GNR (asymptomatic bacteriuria, vs UTI - on ceftriaxone 2000 mg iv every 24 hours for cellulitis)       Vitals/Labs  Ht: 160 cm (63\"); Wt: 60.7 kg (133 lb 13.1 oz)   Temp Readings from Last 1 Encounters:   04/11/24 98.2 °F (36.8 °C) (Oral)     Estimated Creatinine Clearance: 73 mL/min (A) (by C-G formula based on SCr of 0.55 mg/dL (L)).       Results from last 7 days   Lab Units 04/11/24  0552 04/10/24  1349 04/08/24  1000   CREATININE mg/dL 0.55* 0.87 0.71   WBC 10*3/mm3 7.25 8.36 6.36     Assessment/Plan    SCr decreased to 0.55,  Bayesian calculator now predicting a subtherapeutic AUC on previous regimen.  Change regimen to vancomycin 750 mg IV every 12 hours, trough 4/12.  Current Vancomycin Dose: 750 mg IV every  12  hours; provides a predicted  mg/L.hr   Next Level Date and Time: Vanc Trough on 4/12 at 1030  We will continue to monitor patient changes and renal function     Thank you for involving pharmacy in this patient's care. Please contact pharmacy with any questions or concerns.       Dylan Lucia III, [facility]  Clinical Pharmacist          "

## 2024-04-11 NOTE — THERAPY EVALUATION
Patient Name: Di Macario  : 1940    MRN: [id number]                              Today's Date: 2024       Admit Date: 4/10/2024    Visit Dx:     ICD-10-CM ICD-9-CM   1. Acute UTI  N39.0 599.0   2. Cellulitis of second toe of left foot  L03.032 681.10   3. Diabetic ulcer of left heel associated with diabetes mellitus due to underlying condition, unspecified ulcer stage  E08.621 249.80    L97.429 707.14   4. Pain and swelling of left knee  M25.562 719.46    M25.462 719.06   5. Hypoglycemia  E16.2 251.2   6. Diabetic ulcer of toe of left foot associated with type 2 diabetes mellitus, with bone involvement without evidence of necrosis  E11.621 250.80    L97.526 707.15     Patient Active Problem List   Diagnosis    Essential hypertension    Vitamin D deficiency    Hyperuricemia    Slow transit constipation    Chronic pain of left knee    At high risk for falls    Peripheral neuropathy    Uncontrolled type 2 diabetes mellitus with hyperglycemia    Walker as ambulation aid    Bradycardia, sinus    Leg swelling    Hyperlipidemia    Presence of cardiac pacemaker    Dementia without behavioral disturbance    Weakness    Thrombocytopenia    Oropharyngeal dysphagia    Immobility syndrome    Type 2 diabetes mellitus with hyperglycemia, with long-term current use of insulin    Paroxysmal atrial fibrillation    Pure hypercholesterolemia    Toe osteomyelitis, left    Diabetic ulcer of toe of left foot associated with type 2 diabetes mellitus, with bone involvement without evidence of necrosis    Cellulitis of second toe of left foot     Past Medical History:   Diagnosis Date    Dementia     states better with medication     Diabetes mellitus     Dysphagia     Essential hypertension 2016    GERD (gastroesophageal reflux disease)     History of poliomyelitis     IN NECK - AGE 4    Hyperlipidemia 2021    Lung consolidation 2022    Pacemaker     Peripheral neuropathy 2018    Toe ulcer due to  DM     LEFT GREAT TOE    Type 2 diabetes mellitus with hyperglycemia, with long-term current use of insulin 08/25/2022    Vitamin D deficiency      Past Surgical History:   Procedure Laterality Date    AMPUTATION DIGIT Left 01/30/2024    Procedure: PARTIAL HALLUX AMPUTATION LEFT FOOT;  Surgeon: Nas Gonzales DPM;  Location: [facility] MAIN OR;  Service: Podiatry;  Laterality: Left;    AMPUTATION DIGIT Left 3/11/2024    Procedure: AMPUTATION DIGIT;  Surgeon: Nas Gonzales DPM;  Location: [facility] MAIN OR;  Service: Podiatry;  Laterality: Left;    ANKLE SURGERY Right     BLADDER SURGERY      Prolapsed    BREAST BIOPSY      CARDIAC ELECTROPHYSIOLOGY PROCEDURE N/A 03/05/2021    Procedure: Pacemaker DC new [place];  Surgeon: Madelin Ferguson MD;  Location: [facility] CATH INVASIVE LOCATION;  Service: Cardiology;  Laterality: N/A;    ENDOSCOPY N/A 06/06/2022    Procedure: ESOPHAGOGASTRODUODENOSCOPY with biopsy, balloon dilation;  Surgeon: Lo Benjamin MD;  Location: [facility] ENDOSCOPY;  Service: Gastroenterology;  Laterality: N/A;  esophageal stricture, hiatal hernia, gastritis    HYSTERECTOMY      TONSILLECTOMY        General Information       Row Name 04/11/24 1139          Physical Therapy Time and Intention    Document Type evaluation  -AR     Mode of Treatment physical therapy  -AR       Row Name 04/11/24 1139          General Information    Patient Profile Reviewed yes  -AR     Prior Level of Function min assist:  uses walker, no falls, caregiers 7  hours/day 5 days/wk, 2 dtrs alternate staying night and weekends, post op shoe on L foot  -AR     Existing Precautions/Restrictions fall  -AR     Barriers to Rehab none identified  -AR       Row Name 04/11/24 1139          Living Environment    People in Home alone  -AR       Row Name 04/11/24 1139          Home Main Entrance    Number of Stairs, Main Entrance none  ramp  -AR       Row Name 04/11/24 1139          Cognition    Orientation Status (Cognition)  oriented to;person;place  -AR       Row Name 04/11/24 1139          Safety Issues, Functional Mobility    Impairments Affecting Function (Mobility) balance;strength;cognition;endurance/activity tolerance;pain  -AR               User Key  (r) = Recorded By, (t) = Taken By, (c) = Cosigned By      Initials Name Provider Type    Evelyne Thomas, PT Physical Therapist                   Mobility       Row Name 04/11/24 1140          Bed Mobility    Bed Mobility supine-sit;sit-supine  -AR     Supine-Sit [place] (Bed Mobility) standby assist  -AR     Sit-Supine [place] (Bed Mobility) not tested  -AR     Assistive Device (Bed Mobility) head of bed elevated;bed rails  -AR       Row Name 04/11/24 1140          Sit-Stand Transfer    Sit-Stand [place] (Transfers) contact guard  -AR     Assistive Device (Sit-Stand Transfers) walker, front-wheeled  -AR       Row Name 04/11/24 1140          Gait/Stairs (Locomotion)    [place] Level (Gait) contact guard  -AR     Assistive Device (Gait) walker, front-wheeled  -AR     Distance in Feet (Gait) 15  -AR     Deviations/Abnormal Patterns (Gait) festinating/shuffling;jh decreased  -AR     Bilateral Gait Deviations forward flexed posture;heel strike decreased  -AR               User Key  (r) = Recorded By, (t) = Taken By, (c) = Cosigned By      Initials Name Provider Type    Evelyne Thomas PT Physical Therapist                   Obj/Interventions       Row Name 04/11/24 1140          Range of Motion Comprehensive    Comment, General Range of Motion B LE WFL  -AR       Row Name 04/11/24 1140          Strength Comprehensive (MMT)    Comment, General Manual Muscle Testing (MMT) Assessment B LE 4/5  -AR       Row Name 04/11/24 1140          Balance    Balance Assessment standing dynamic balance  -AR     Dynamic Standing Balance contact guard  -AR     Position/Device Used, Standing Balance walker, rolling  -AR               User Key  (r) = Recorded By, (t) =  Taken By, (c) = Cosigned By      Initials Name Provider Type    AR Evelyne Acosta, PT Physical Therapist                   Goals/Plan       Row Name 04/11/24 1142          Bed Mobility Goal 1 (PT)    Activity/Assistive Device (Bed Mobility Goal 1, PT) bed mobility activities, all  -AR     [place] Level/Cues Needed (Bed Mobility Goal 1, PT) modified independence  -AR     Time Frame (Bed Mobility Goal 1, PT) 1 week  -AR       Row Name 04/11/24 1142          Transfer Goal 1 (PT)    Activity/Assistive Device (Transfer Goal 1, PT) sit-to-stand/stand-to-sit;bed-to-chair/chair-to-bed;walker, rolling  -AR     [place] Level/Cues Needed (Transfer Goal 1, PT) standby assist  -AR     Time Frame (Transfer Goal 1, PT) 1 week  -AR       Row Name 04/11/24 1142          Gait Training Goal 1 (PT)    Activity/Assistive Device (Gait Training Goal 1, PT) gait (walking locomotion);walker, rolling  -AR     [place] Level (Gait Training Goal 1, PT) standby assist  -AR     Distance (Gait Training Goal 1, PT) 150  -AR     Time Frame (Gait Training Goal 1, PT) 1 week  -AR       Row Name 04/11/24 1142          Therapy Assessment/Plan (PT)    Planned Therapy Interventions (PT) balance training;bed mobility training;gait training;home exercise program;patient/family education;strengthening;transfer training  -AR               User Key  (r) = Recorded By, (t) = Taken By, (c) = Cosigned By      Initials Name Provider Type    AR Evelyne Acosta, PT Physical Therapist                   Clinical Impression       Row Name 04/11/24 1140          Pain    Pretreatment Pain Rating 3/10  -AR     Posttreatment Pain Rating 3/10  -AR     Pain Location - Side/Orientation Bilateral  -AR     Pain Location lower  -AR     Pain Location - extremity  -AR     Pain Intervention(s) Medication (See MAR);Repositioned  -AR       Row Name 04/11/24 1140          Plan of Care Review    Plan of Care Reviewed With patient;daughter  -AR     Outcome Evaluation  Pt admitted from  home with cellulitis of L 2nd toe, recent great toe amputation.  Dtr reprots pt lives alone but has caregivers 7hours/weekday, dtrs stay nights and weekends, pt uses walker, no recent falls.  Today pt jorje tos it up w/ SBA.  Stood and ambulated 15' w/ CGA using RW, disstance limited by toileting needs.  Recommend Dc to home with hoem PT, discussed w/ pt, dtr and RN.  -AR       Row Name 04/11/24 1140          Therapy Assessment/Plan (PT)    Rehab Potential (PT) good, to achieve stated therapy goals  -AR     Criteria for Skilled Interventions Met (PT) yes  -AR     Therapy Frequency (PT) 5 times/wk  -AR       Row Name 04/11/24 1140          Vital Signs    O2 Delivery Pre Treatment room air  -AR       Row Name 04/11/24 1140          Positioning and Restraints    Pre-Treatment Position in bed  -AR     Post Treatment Position bathroom  -AR     Bathroom notified nsg;sitting;call light within reach;with OT  -AR               User Key  (r) = Recorded By, (t) = Taken By, (c) = Cosigned By      Initials Name Provider Type    AR Evelyne Acosta, PT Physical Therapist                   Outcome Measures       Row Name 04/11/24 1143          How much help from another person do you currently need...    Turning from your back to your side while in flat bed without using bedrails? 4  -AR     Moving from lying on back to sitting on the side of a flat bed without bedrails? 3  -AR     Moving to and from a bed to a chair (including a wheelchair)? 3  -AR     Standing up from a chair using your arms (e.g., wheelchair, bedside chair)? 3  -AR     Climbing 3-5 steps with a railing? 2  -AR     To walk in hospital room? 3  -AR     AM-PAC 6 Clicks Score (PT) 18  -AR     Highest Level of Mobility Goal 6 --> Walk 10 steps or more  -AR       Row Name 04/11/24 1143          Functional Assessment    Outcome Measure Options AM-PAC 6 Clicks Basic Mobility (PT)  -AR               User Key  (r) = Recorded By, (t) = Taken By, (c) =  Cosigned By      Initials Name Provider Type    AR Evelyne Acosta, PT Physical Therapist                                 Physical Therapy Education       Title: PT OT SLP Therapies (Done)       Topic: Physical Therapy (Done)       Point: Mobility training (Done)       Learning Progress Summary             Patient Acceptance, E, VU by AR at 4/11/2024 1143   Family Acceptance, E, VU by AR at 4/11/2024 1143                         Point: Home exercise program (Done)       Learning Progress Summary             Patient Acceptance, E, VU by AR at 4/11/2024 1143   Family Acceptance, E, VU by AR at 4/11/2024 1143                         Point: Body mechanics (Done)       Learning Progress Summary             Patient Acceptance, E, VU by AR at 4/11/2024 1143   Family Acceptance, E, VU by AR at 4/11/2024 1143                         Point: Precautions (Done)       Learning Progress Summary             Patient Acceptance, E, VU by AR at 4/11/2024 1143   Family Acceptance, E, VU by AR at 4/11/2024 1143                                         User Key       Initials Effective Dates Name Provider Type Discipline    AR 06/16/21 -  Evelyne Acosta PT Physical Therapist PT                  PT Recommendation and Plan  Planned Therapy Interventions (PT): balance training, bed mobility training, gait training, home exercise program, patient/family education, strengthening, transfer training  Plan of Care Reviewed With: patient, daughter  Outcome Evaluation: Pt admitted from  home with cellulitis of L 2nd toe, recent great toe amputation.  Dtr reprots pt lives alone but has caregivers 7hours/weekday, dtrs stay nights and weekends, pt uses walker, no recent falls.  Today pt jorje tos it up w/ SBA.  Stood and ambulated 15' w/ CGA using RW, disstance limited by toileting needs.  Recommend Dc to home with hoem PT, discussed w/ pt, dtr and RN.     Time Calculation:         PT Charges       Row Name 04/11/24 1138             Time  Calculation    Start Time 0845  -AR      Stop Time 0910  -AR      Time Calculation (min) 25 min  -AR      PT Received On 04/11/24  -AR      PT - Next Appointment 04/12/24  -AR      PT Goal Re-Cert Due Date 04/18/24  -AR                User Key  (r) = Recorded By, (t) = Taken By, (c) = Cosigned By      Initials Name Provider Type    Evelyne Thomas, PT Physical Therapist                  Therapy Charges for Today       Code Description Service Date Service Provider Modifiers Qty    [id number] HC PT EVAL MOD COMPLEXITY 4 4/11/2024 Evelyne Acosta, PT GP 1            PT G-Codes  Outcome Measure Options: AM-PAC 6 Clicks Basic Mobility (PT)  AM-PAC 6 Clicks Score (PT): 18  PT Discharge Summary  Anticipated Discharge Disposition (PT): home with home health, home with 24/7 care    Evelyne Acosta, JOY  4/11/2024

## 2024-04-11 NOTE — CASE MANAGEMENT/SOCIAL WORK
Discharge Planning Assessment  [facility]     Patient Name: Di Macario  MRN: [id number]  Today's Date: 4/11/2024    Admit Date: 4/10/2024    Plan: home with [facility] and [facility]   Discharge Needs Assessment       Row Name 04/11/24 0833       Living Environment    People in Home alone    Current Living Arrangements home    Potentially Unsafe Housing Conditions none    In the past 12 months has the electric, gas, oil, or water company threatened to shut off services in your home? No    Primary Care Provided by self    Provides Primary Care For no one    Family Caregiver if Needed child(max), adult    Quality of Family Relationships helpful;involved    Able to Return to Prior Arrangements yes       Resource/Environmental Concerns    Resource/Environmental Concerns none       Transportation Needs    In the past 12 months, has lack of transportation kept you from medical appointments or from getting medications? no    In the past 12 months, has lack of transportation kept you from meetings, work, or from getting things needed for daily living? No       Food Insecurity    Within the past 12 months, you worried that your food would run out before you got the money to buy more. Never true    Within the past 12 months, the food you bought just didn't last and you didn't have money to get more. Never true       Transition Planning    Patient/Family Anticipates Transition to home;home with help/services    Patient/Family Anticipated Services at Transition home health care    Transportation Anticipated family or friend will provide       Discharge Needs Assessment    Readmission Within the Last 30 Days no previous admission in last 30 days    Current Outpatient/Agency/Support Group homecare agency    Equipment Currently Used at Home walker, rolling    Concerns to be Addressed discharge planning    Anticipated Changes Related to Illness none    Equipment Needed After Discharge none    Outpatient/Agency/Support Group  Needs homecare agency    Discharge Facility/Level of Care Needs home with home health    Provided Post Acute Provider List? Yes    Post Acute Provider List Home Health    Delivered To Patient    Method of Delivery In person                   Discharge Plan       Row Name 04/11/24 0837       Plan    Plan home with BH and [facility]    Patient/Family in Agreement with Plan yes    Plan Comments Spoke with pt bedside. Daughter Carley also present. Confirmed facesheet correct. Explained role of CCP. Pt reports she lives alone and has private pay caregivers through [facility] 5 days a week. Her daughter Mae and Carley check on pt often and take her to appointments and provide groceries and all needed services. Pt reports her other daughter Blossom lives in [place]. Pt has no SNF history. She has used [facility] in past and agreeable to referral. Pts PCP is GISELLE Ross and agreeable to meds to beds. pt reports she plans home with family assist and HH. CCP to follow for needs.                  Continued Care and Services - Admitted Since 4/10/2024       Home Medical Care       Service Provider Request Status Selected Services Address Phone Fax Patient Preferred     [facility] Pending - Request Sent N/A [address] [address] [phone] --                  Selected Continued Care - Episodes Includes continued care and service providers with selected services from the active episodes listed below      Ambulatory Social Work Case Management Episode start date: 3/28/2024      [facility]       Service Provider Selected Services Address Phone Fax Patient Preferred    [facility] [address] [facility] [address] [phone] -- --                      High Risk Care Management Episode start date: 3/18/2024   There are no active outsourced providers for this episode.                 Selected Continued Care - Prior Encounters Includes  continued care and service providers with selected services from prior encounters from 1/11/2024 to 4/11/2024      Discharged on 2/4/2024 Admission date: 1/28/2024 - Discharge disposition: Home-Health Care Svc      Home Medical Care       Service Provider Selected Services Address Phone Fax Patient Preferred    [facility] [address] [id number] [phone] --                             Demographic Summary    No documentation.                  Functional Status    No documentation.                  Psychosocial    No documentation.                  Abuse/Neglect    No documentation.                  Legal    No documentation.                  Substance Abuse    No documentation.                  Patient Forms    No documentation.                     MARTHA Palomino

## 2024-04-11 NOTE — DISCHARGE PLACEMENT REQUEST
"Reji Macario (83 y.o. Female)       Date of Birth   1940    Social Security Number       Address   [address] [place] [address]    Home Phone   [phone]    MRN   [id number]       [employer]   Faith    Marital Status                               Admission Date   4/10/24    Admission Type   Emergency    Admitting Provider   Alexis Sotelo MD    Attending Provider   Vernon Keene MD    Department, Room/Bed   [address], [address]1       Discharge Date       Discharge Disposition       Discharge Destination                                 Attending Provider: Vernon Keene MD    Allergies: No Known Allergies    Isolation: None   Infection: None   Code Status: No CPR    Ht: 160 cm (63\")   Wt: 60.7 kg (133 lb 13.1 oz)    Admission Cmt: None   Principal Problem: Cellulitis of second toe of left foot [L03.032]                   Active Insurance as of 4/10/2024       Primary Coverage       Payor Plan Insurance Group Employer/Plan Group    MEDICARE MEDICARE A & B        Payor Plan Address Payor Plan Phone Number Payor Plan Fax Number Effective Dates    [address] [phone]  5/1/2004 - None Entered    [facility] [id number]         Subscriber Name Subscriber Birth Date Member ID       REJI MACARIO 1940 [id number]               Secondary Coverage       Payor Plan Insurance Group Employer/Plan Group     FOR LIFE  FOR LIFE  SUP         Payor Plan Address Payor Plan Phone Number Payor Plan Fax Number Effective Dates    [address] [phone]  2/1/2018 - None Entered    [facility] [address]         Subscriber Name Subscriber Birth Date Member ID       REJI MACARIO 1940 [id number]                     Emergency Contacts        (Rel.) Home Phone Work Phone Mobile Phone    DANISH MACARIO (Daughter) [phone] -- [phone]    MARIZOLLIZETHPHIL (Daughter) [phone] -- [phone]    " AMBER MACARIO (Daughter) [phone] -- [phone]    eleno Macario (Son) -- [phone]

## 2024-04-11 NOTE — OUTREACH NOTE
Prep Survey      Flowsheet Row Responses   Buddhism facility patient discharged from? [facility]   Is LACE score < 7 ? No   Eligibility [facility]   Date of Admission 04/10/24   Date of Discharge 04/11/24   Discharge Disposition Home or Self Care   Discharge diagnosis Cellulitis of second toe of left foot-Dementia   Does the patient have one of the following disease processes/diagnoses(primary or secondary)? Other   Does the patient have Home health ordered? Yes   What is the Home health agency?  [facility]   Is there a DME ordered? No   Prep survey completed? Yes            ANNABELLE ÓLPEZ - Registered Nurse

## 2024-04-12 ENCOUNTER — TELEPHONE (OUTPATIENT)
Dept: ENDOCRINOLOGY | Age: 84
End: 2024-04-12
Payer: MEDICARE

## 2024-04-12 ENCOUNTER — TRANSITIONAL CARE MANAGEMENT TELEPHONE ENCOUNTER (OUTPATIENT)
Dept: CALL CENTER | Facility: HOSPITAL | Age: 84
End: 2024-04-12
Payer: MEDICARE

## 2024-04-12 DIAGNOSIS — Z79.4 TYPE 2 DIABETES MELLITUS WITH HYPERGLYCEMIA, WITH LONG-TERM CURRENT USE OF INSULIN: Primary | ICD-10-CM

## 2024-04-12 DIAGNOSIS — E11.65 TYPE 2 DIABETES MELLITUS WITH HYPERGLYCEMIA, WITH LONG-TERM CURRENT USE OF INSULIN: Primary | ICD-10-CM

## 2024-04-12 LAB — BACTERIA SPEC AEROBE CULT: ABNORMAL

## 2024-04-12 NOTE — CASE MANAGEMENT/SOCIAL WORK
Case Management Discharge Note      Final Note: home no needs    Provided Post Acute Provider List?: Yes  Post Acute Provider List: Home Health  Delivered To: Patient  Method of Delivery: In person    Selected Continued Care - Discharged on 4/11/2024 Admission date: 4/10/2024 - Discharge disposition: Home or Self Care      Destination    No services have been selected for the patient.                Durable Medical Equipment    No services have been selected for the patient.                Dialysis/Infusion    No services have been selected for the patient.                Home Medical Care Coordination complete.      Service Provider Selected Services Address Phone Fax Patient Preferred     [facility] [address] [address] [phone] --              Therapy    No services have been selected for the patient.                Community Resources    No services have been selected for the patient.                Community & DME    No services have been selected for the patient.                    Selected Continued Care - Episodes Includes continued care and service providers with selected services from the active episodes listed below      Ambulatory Social Work Case Management Episode start date: 3/28/2024      Community & DME       Service Provider Selected Services Address Phone Fax Patient Preferred    [facility] [address] [place], [place] [id number] [phone] -- --                      High Risk Care Management Episode start date: 3/18/2024   There are no active outsourced providers for this episode.                 Selected Continued Care - Prior Encounters Includes continued care and service providers with selected services from prior encounters from 1/11/2024 to 4/11/2024      Discharged on 2/4/2024 Admission date: 1/28/2024 - Discharge disposition: Home-Health Care Svc      Home Medical Care       Service  Provider Selected Services Address Phone Fax Patient Preferred    [facility] [address] [address] [phone] --                          Transportation Services  Private: Car    Final Discharge Disposition Code: 01 - home or self-care

## 2024-04-12 NOTE — OUTREACH NOTE
Call Center TCM Note      Flowsheet Row Responses   [facility] patient discharged from? [place]   Does the patient have one of the following disease processes/diagnoses(primary or secondary)? Other   TCM attempt successful? Yes   Call start time 1108   Call end time 1121   Is patient permission given to speak with other caregiver? Yes   Person spoke with today (if not patient) and relationship Carolyn-daughter.   Meds reviewed with patient/caregiver? Yes   Is the patient having any side effects they believe may be caused by any medication additions or changes? No   Does the patient have all medications ordered at discharge? Yes   Is the patient taking all medications as directed (includes completed medication regime)? Yes   Comments PCP hospital f/u appt scheduled for 04/19/24. [facility] will make appt with wound care. Endocrinology appt 04/26/24.   Does the patient have an appointment with their PCP within 7-14 days of discharge? Yes   Has home health visited the patient within 72 hours of discharge? Call prior to 72 hours   Psychosocial issues? No   Psychosocial comments Patient has dementia, and has caregivers 5 days per week with strong family support.   Did the patient receive a copy of their discharge instructions? Yes   Nursing interventions Reviewed instructions with patient   What is the patient's perception of their health status since discharge? Improving   Is the patient/caregiver able to teach back signs and symptoms related to disease process for when to call PCP? Yes   Is the patient/caregiver able to teach back signs and symptoms related to disease process for when to call 911? Yes   Is the patient/caregiver able to teach back the hierarchy of who to call/visit for symptoms/problems? PCP, Specialist, Home health nurse, Urgent Care, ED, 911 Yes   If the patient is a current smoker, are they able to teach back resources for cessation? Not a smoker   Additional teach back comments Reviewed s/s of  infection.   TCM call completed? Yes   Wrap up additional comments Daughter states patient slept well last night, improving. States unsure if should change bandage on toe, and treatment plan for toe. Plans to schedule appt with wound care. Will route message to case management for f/u on toe tx. HH not scheduled to visit until 04/15/24. Reviewed s/s of infection. Denies any other needs today. TCM complete.   Call end time 1121   Would this patient benefit from a Referral to [facility] Social Work? No   Is the patient interested in additional calls from an ambulatory ? No            Kala Deras, JOSÉ MIGUEL    4/12/2024, 11:22 EDT

## 2024-04-15 ENCOUNTER — HOME CARE VISIT (OUTPATIENT)
Dept: HOME HEALTH SERVICES | Facility: HOME HEALTHCARE | Age: 84
End: 2024-04-15
Payer: MEDICARE

## 2024-04-15 VITALS
RESPIRATION RATE: 17 BRPM | HEART RATE: 71 BPM | DIASTOLIC BLOOD PRESSURE: 60 MMHG | OXYGEN SATURATION: 95 % | SYSTOLIC BLOOD PRESSURE: 110 MMHG | TEMPERATURE: 97.6 F

## 2024-04-15 LAB
BACTERIA SPEC AEROBE CULT: NORMAL
BACTERIA SPEC AEROBE CULT: NORMAL

## 2024-04-15 PROCEDURE — G0151 HHCP-SERV OF PT,EA 15 MIN: HCPCS

## 2024-04-15 NOTE — HOME HEALTH
Routine Visit Note:    Skill/education provided: Ther ex, gait/transfer training, fall education.    Patient/caregiver response: Well, client and caregiver repeat instructions back to therapist    Plan for next visit: Ther ex, gait/transfer training    Other pertinent info: None

## 2024-04-16 NOTE — HOME HEALTH
Client is homebound due to needing assist to ambulate, to perfrom ADLs, and to perform transfers secondary to unsteadiness and decrease functional strength. PT will focus on aftercare following recent ED visit due to cellulitis in second toe on left foot.

## 2024-04-17 ENCOUNTER — OFFICE VISIT (OUTPATIENT)
Dept: WOUND CARE | Facility: HOSPITAL | Age: 84
End: 2024-04-17
Payer: MEDICARE

## 2024-04-19 ENCOUNTER — OFFICE VISIT (OUTPATIENT)
Dept: INTERNAL MEDICINE | Facility: CLINIC | Age: 84
End: 2024-04-19
Payer: MEDICARE

## 2024-04-19 ENCOUNTER — PATIENT OUTREACH (OUTPATIENT)
Dept: CASE MANAGEMENT | Facility: OTHER | Age: 84
End: 2024-04-19
Payer: MEDICARE

## 2024-04-19 VITALS
DIASTOLIC BLOOD PRESSURE: 74 MMHG | TEMPERATURE: 98.1 F | WEIGHT: 131 LBS | SYSTOLIC BLOOD PRESSURE: 126 MMHG | HEIGHT: 63 IN | BODY MASS INDEX: 23.21 KG/M2

## 2024-04-19 DIAGNOSIS — L03.032 CELLULITIS OF SECOND TOE OF LEFT FOOT: ICD-10-CM

## 2024-04-19 DIAGNOSIS — Z79.4 TYPE 2 DIABETES MELLITUS WITH HYPERGLYCEMIA, WITH LONG-TERM CURRENT USE OF INSULIN: Primary | ICD-10-CM

## 2024-04-19 DIAGNOSIS — E11.65 TYPE 2 DIABETES MELLITUS WITH HYPERGLYCEMIA, WITH LONG-TERM CURRENT USE OF INSULIN: Primary | ICD-10-CM

## 2024-04-19 NOTE — PROGRESS NOTES
Transitional Care Follow Up Visit  Subjective     Di MCKINLEY Renaldo is a 83 y.o. female who presents for a transitional care management visit.    Within 48 business hours after discharge our office contacted her via telephone to coordinate her care and needs.      I reviewed and discussed the details of that call along with the discharge summary, hospital problems, inpatient lab results, inpatient diagnostic studies, and consultation reports with Di.     Current outpatient and discharge medications have been reconciled for the patient.  Reviewed by: Lily Yepez PA-C          4/11/2024     6:32 PM   Date of TCM Phone Call   [facility]   Date of Admission 4/10/2024   Date of Discharge 4/11/2024   Discharge Disposition Home or Self Care     Risk for Readmission (LACE) Score: 8 (4/11/2024  6:00 AM)      History of Present Illness   Course During Hospital Stay:  Pt is here today for hospital fup. She was admitted with hyperglycemia and leg swelling. She had left second toe erythema that was noticed by her home health nurse. She also had abnormal UA in the ED. She was treated with IV Van and Rocephin. She was transitioned to oral for 7 days. She has finished the Augmentin and Doxy. She had a negative doppler while in the hospital. Nurse checked her foot yesterday and there were no signs of infection.      The following portions of the patient's history were reviewed and updated as appropriate: allergies, current medications, past family history, past medical history, past social history, past surgical history, and problem list.    Review of Systems    See HPI    Objective   Physical Exam  Vitals reviewed.   Constitutional:       Appearance: Normal appearance.   HENT:      Head: Normocephalic and atraumatic.   Cardiovascular:      Rate and Rhythm: Normal rate and regular rhythm.      Heart sounds: Normal heart sounds.   Pulmonary:      Effort: Pulmonary effort is normal.      Breath sounds: Normal  breath sounds.   Neurological:      Mental Status: She is alert.     Declines exam on her foot    Assessment & Plan   Diagnoses and all orders for this visit:    1. Type 2 diabetes mellitus with hyperglycemia, with long-term current use of insulin (Primary)    2. Cellulitis of second toe of left foot    Reviewed hospital notes, labs and imaging. She is having nurses check it daily. Stressed importance of taking her insulin in preventing infections and aiding in healing of them.

## 2024-04-19 NOTE — OUTREACH NOTE
AMBULATORY CASE MANAGEMENT NOTE    Names and Relationships of Patient/Support Persons: Caller: AMBER MILLER; Relationship: Emergency Contact -     Patient Outreach    Introduced self, explained ACM RN role and provided contact information. Spoke with patient's daughter regarding health and wellness. Patient is doing much better after UTI treatment in hospital. She is also following up with wound care for ulcers. No other needs at this time. Follow up review scheduled in 1 month. Patient's family to reach out for any concerns or needs to ACM.     Education Documentation  No documentation found.        Carley HARPER  Ambulatory Case Management    4/19/2024, 13:17 EDT

## 2024-04-20 ENCOUNTER — HOME CARE VISIT (OUTPATIENT)
Dept: HOME HEALTH SERVICES | Facility: HOME HEALTHCARE | Age: 84
End: 2024-04-20
Payer: MEDICARE

## 2024-04-23 ENCOUNTER — HOME CARE VISIT (OUTPATIENT)
Dept: HOME HEALTH SERVICES | Facility: HOME HEALTHCARE | Age: 84
End: 2024-04-23
Payer: MEDICARE

## 2024-04-23 PROCEDURE — G0151 HHCP-SERV OF PT,EA 15 MIN: HCPCS

## 2024-04-24 ENCOUNTER — HOME CARE VISIT (OUTPATIENT)
Dept: HOME HEALTH SERVICES | Facility: HOME HEALTHCARE | Age: 84
End: 2024-04-24
Payer: MEDICARE

## 2024-04-24 VITALS
OXYGEN SATURATION: 99 % | DIASTOLIC BLOOD PRESSURE: 55 MMHG | HEART RATE: 58 BPM | RESPIRATION RATE: 17 BRPM | SYSTOLIC BLOOD PRESSURE: 110 MMHG | TEMPERATURE: 98.1 F

## 2024-04-24 VITALS
DIASTOLIC BLOOD PRESSURE: 68 MMHG | OXYGEN SATURATION: 100 % | TEMPERATURE: 96.9 F | RESPIRATION RATE: 18 BRPM | SYSTOLIC BLOOD PRESSURE: 136 MMHG | HEART RATE: 84 BPM

## 2024-04-24 PROCEDURE — G0300 HHS/HOSPICE OF LPN EA 15 MIN: HCPCS

## 2024-04-26 ENCOUNTER — OFFICE VISIT (OUTPATIENT)
Dept: ENDOCRINOLOGY | Age: 84
End: 2024-04-26
Payer: MEDICARE

## 2024-04-26 VITALS
DIASTOLIC BLOOD PRESSURE: 72 MMHG | OXYGEN SATURATION: 100 % | HEIGHT: 63 IN | WEIGHT: 130.2 LBS | SYSTOLIC BLOOD PRESSURE: 114 MMHG | BODY MASS INDEX: 23.07 KG/M2 | HEART RATE: 52 BPM | TEMPERATURE: 96.9 F

## 2024-04-26 DIAGNOSIS — E78.00 PURE HYPERCHOLESTEROLEMIA: ICD-10-CM

## 2024-04-26 DIAGNOSIS — I10 ESSENTIAL HYPERTENSION: ICD-10-CM

## 2024-04-26 DIAGNOSIS — E11.65 TYPE 2 DIABETES MELLITUS WITH HYPERGLYCEMIA, WITH LONG-TERM CURRENT USE OF INSULIN: Primary | ICD-10-CM

## 2024-04-26 DIAGNOSIS — Z79.4 TYPE 2 DIABETES MELLITUS WITH HYPERGLYCEMIA, WITH LONG-TERM CURRENT USE OF INSULIN: Primary | ICD-10-CM

## 2024-04-26 NOTE — PROGRESS NOTES
Chief Complaint  Chief Complaint   Patient presents with    Diabetes     Type 2: Pt Dexcom is attached, is up to date on eye exam, no hx of retinopathy or neuropathy. Pt states that her hand is numb.        Subjective          History of Present Illness    Di Macario 83 y.o. presents for a follow-up evaluation for type 2 DM     She has been diabetic since 1990s     Pt is on the following medications for their DM: Lantus 10 units daily and Novolog 6 units after breakfast    Trulicity at 0.75 mg weekly - not taking consistently        08/25/22 -Had a talk elio patient and daughter about the risk of tight glycemic control/hypoglycemia in pts that are older with dementia.  Goal of morning -130 and during the day time below 200.           Pt complains of occasional constipation and left hand pain    Weight loss of 7 lbs since last visit.    Pt does not have a history of DM retinopathy.  Last eye exam was Summer 2024     Pt does have nephropathy.  Patient is not currently taking ACE/ARB     Pt denies neuropathy.  History of left great toe amputation  Hospitalized for diabetic foot ulcer 04/24     Pt denies a history of CAD or CVA    Last A1C in 04/24 was 10.3    Last  microalbumin in 02/23 was positive          Blood Sugars    Blood glucoses are checked via Dexcom    Fasting blood glucoses: mid to high 100s    Pre-meal blood glucoses: high 100s- - 300s    Pt has rare episodes of hypoglycemia.          Sensor Data    Time in range 17%  High 22%  Very high 59%  Low <1%  Very low 0%      Average Glucose - 275 mg/dL      Sensor Wear - 86 %              Hyperlipidemia     Pt is currently taking  pravastatin 40 mg HS     Last lipid panel in 10/23 showed Total 162, HDL 85, LDL 57 and Triglycerides 114            Hypertension    Current regimen includes amlodipine 2.5 mg daily           Other History         Pt has dementia and dysphagia - she is on nectar thick liquids     She was has been admitted several times to  "the hospital for aspiration PNA.            I have reviewed the patient's allergies, medicines, past medical hx, family hx and social hx.    Objective   Vital Signs:   /72   Pulse 52   Temp 96.9 °F (36.1 °C) (Temporal)   Ht 160 cm (62.99\")   Wt 59.1 kg (130 lb 3.2 oz)   SpO2 100%   BMI 23.07 kg/m²       Physical Exam   Physical Exam  Constitutional:       General: She is not in acute distress.     Appearance: Normal appearance. She is not diaphoretic.   HENT:      Head: Normocephalic and atraumatic.   Eyes:      General:         Right eye: No discharge.         Left eye: No discharge.   Skin:     General: Skin is warm and dry.   Neurological:      Mental Status: She is alert.   Psychiatric:         Mood and Affect: Mood normal.         Behavior: Behavior normal.                    Results Review:   Hemoglobin A1C   Date Value Ref Range Status   04/08/2024 10.30 (H) 4.80 - 5.60 % Final     Triglycerides   Date Value Ref Range Status   10/03/2023 114 0 - 150 mg/dL Final     HDL Cholesterol   Date Value Ref Range Status   10/03/2023 85 (H) 40 - 60 mg/dL Final     LDL Chol Calc (NIH)   Date Value Ref Range Status   10/03/2023 57 0 - 100 mg/dL Final     VLDL Cholesterol Bryan   Date Value Ref Range Status   10/03/2023 20 5 - 40 mg/dL Final         Assessment and Plan {CC Problem List  Visit Diagnosis  ROS  Review (Popup)  Health Maintenance  Quality  BestPractice  Medications  SmartSets  SnapShot Encounters  Media :23  Diagnoses and all orders for this visit:    1. Type 2 diabetes mellitus with hyperglycemia, with long-term current use of insulin (Primary)  -     Hemoglobin A1c; Future  -     Comprehensive Metabolic Panel; Future  -     Microalbumin / Creatinine Urine Ratio - Urine, Clean Catch; Future    A1c is high at 10.3%  Continue with Lantus 10 units daily  Change Novolog 6 units before breakfast and 6 units before dinner  Restart with Trulicity at 0.75 mg weekly - will help wit post prandial " highs and may decrease amount of Novolog needed  Advised to follow up with PCP about hand pain      2. Pure hypercholesterolemia  -     Comprehensive Metabolic Panel; Future  -     Lipid Panel; Future    Continue with statin  Check labs prior to next visit       3. Essential hypertension  -     Comprehensive Metabolic Panel; Future    Stable  Continue with current medication regimen  Defer management to PCP         No refills needed at this time      RTC in 3-4 months with me or Dr. Stanley, labs prior      Follow Up     Patient was given instructions and counseling regarding her condition or for health maintenance advice. Please see specific information pulled into the AVS if appropriate.                Carlotta Mckeon, APRN  04/26/24

## 2024-04-26 NOTE — HOME HEALTH
Routine Visit Note:    Skill/education provided: Ther ex, gait/transfer training    Patient/caregiver response: Well, repeats instructions back to therapist    Plan for next visit: Assess unmet goals and DC client.    Other pertinent info: None

## 2024-04-26 NOTE — PATIENT INSTRUCTIONS
Continue with Lantus 10 units daily  Change Novolog 6 units before breakfast and 6 units before dinner  Restart with Trulicity at 0.75 mg weekly

## 2024-04-30 ENCOUNTER — HOME CARE VISIT (OUTPATIENT)
Dept: HOME HEALTH SERVICES | Facility: HOME HEALTHCARE | Age: 84
End: 2024-04-30
Payer: MEDICARE

## 2024-04-30 VITALS
SYSTOLIC BLOOD PRESSURE: 110 MMHG | DIASTOLIC BLOOD PRESSURE: 60 MMHG | TEMPERATURE: 97.3 F | RESPIRATION RATE: 18 BRPM | HEART RATE: 67 BPM | OXYGEN SATURATION: 98 %

## 2024-04-30 DIAGNOSIS — E11.65 UNCONTROLLED TYPE 2 DIABETES MELLITUS WITH HYPERGLYCEMIA: ICD-10-CM

## 2024-04-30 PROCEDURE — G0151 HHCP-SERV OF PT,EA 15 MIN: HCPCS

## 2024-04-30 RX ORDER — INSULIN GLARGINE 100 [IU]/ML
10 INJECTION, SOLUTION SUBCUTANEOUS DAILY
Qty: 15 ML | Refills: 1 | Status: SHIPPED | OUTPATIENT
Start: 2024-04-30

## 2024-04-30 NOTE — TELEPHONE ENCOUNTER
Rx Refill Note  Requested Prescriptions     Pending Prescriptions Disp Refills    Lantus SoloStar 100 UNIT/ML injection pen [Pharmacy Med Name: LANTUS SOLOSTAR PEN 3ML 5'S 100U/ML] 15 mL 2     Sig: INJECT 10 UNITS UNDER THE SKIN INTO THE APPROPRIATE AREA AS DIRECTED DAILY      Last office visit with prescribing clinician: 10/10/2023   Last telemedicine visit with prescribing clinician: Visit date not found   Next office visit with prescribing clinician: Visit date not found                         Would you like a call back once the refill request has been completed: [] Yes [] No    If the office needs to give you a call back, can they leave a voicemail: [] Yes [] No    Celia Mills MA  04/30/24, 07:38 EDT

## 2024-04-30 NOTE — Clinical Note
This is to notify your office of home physical therapy discharge from services effective [id number] with goals met as patient returning to prior level of function; home nursing continues.

## 2024-04-30 NOTE — HOME HEALTH
"Subjective: Per paid caregiver: \"She can get up and go to bathroom by herself with walker. She's getting back to independent level with walker.\"    Falls reported: none    Medication changes: none"

## 2024-05-01 ENCOUNTER — LAB REQUISITION (OUTPATIENT)
Dept: LAB | Facility: HOSPITAL | Age: 84
End: 2024-05-01
Payer: MEDICARE

## 2024-05-01 ENCOUNTER — HOSPITAL ENCOUNTER (OUTPATIENT)
Dept: GENERAL RADIOLOGY | Facility: HOSPITAL | Age: 84
Discharge: HOME | End: 2024-05-01
Payer: MEDICARE

## 2024-05-01 ENCOUNTER — OFFICE VISIT (OUTPATIENT)
Dept: WOUND CARE | Facility: HOSPITAL | Age: 84
End: 2024-05-01
Payer: MEDICARE

## 2024-05-01 DIAGNOSIS — M79.672 LEFT FOOT PAIN: ICD-10-CM

## 2024-05-01 DIAGNOSIS — M25.562 LEFT KNEE PAIN, UNSPECIFIED CHRONICITY: ICD-10-CM

## 2024-05-01 DIAGNOSIS — M79.662 PAIN IN LEFT LOWER LEG: ICD-10-CM

## 2024-05-01 DIAGNOSIS — M79.672 PAIN IN LEFT FOOT: ICD-10-CM

## 2024-05-01 PROCEDURE — 73560 X-RAY EXAM OF KNEE 1 OR 2: CPT

## 2024-05-01 PROCEDURE — 73630 X-RAY EXAM OF FOOT: CPT

## 2024-05-01 PROCEDURE — 73590 X-RAY EXAM OF LOWER LEG: CPT

## 2024-05-01 PROCEDURE — 87205 SMEAR GRAM STAIN: CPT | Performed by: NURSE PRACTITIONER

## 2024-05-01 PROCEDURE — 87075 CULTR BACTERIA EXCEPT BLOOD: CPT | Performed by: NURSE PRACTITIONER

## 2024-05-01 PROCEDURE — G0463 HOSPITAL OUTPT CLINIC VISIT: HCPCS

## 2024-05-01 PROCEDURE — 87070 CULTURE OTHR SPECIMN AEROBIC: CPT | Performed by: NURSE PRACTITIONER

## 2024-05-02 ENCOUNTER — HOME CARE VISIT (OUTPATIENT)
Dept: HOME HEALTH SERVICES | Facility: HOME HEALTHCARE | Age: 84
End: 2024-05-02
Payer: MEDICARE

## 2024-05-02 VITALS
TEMPERATURE: 98 F | OXYGEN SATURATION: 85 % | HEART RATE: 73 BPM | RESPIRATION RATE: 18 BRPM | DIASTOLIC BLOOD PRESSURE: 74 MMHG | SYSTOLIC BLOOD PRESSURE: 124 MMHG

## 2024-05-02 PROCEDURE — G0300 HHS/HOSPICE OF LPN EA 15 MIN: HCPCS

## 2024-05-03 NOTE — HOME HEALTH
Patient remains to have wound to her left heel which she is seeing the wound care center.  Wound was measured and PIC obtained.  Wound care provided as ordered.

## 2024-05-04 LAB
BACTERIA SPEC AEROBE CULT: NORMAL
GRAM STN SPEC: NORMAL
GRAM STN SPEC: NORMAL

## 2024-05-06 LAB — BACTERIA SPEC ANAEROBE CULT: NORMAL

## 2024-05-08 ENCOUNTER — TRANSCRIBE ORDERS (OUTPATIENT)
Dept: ADMINISTRATIVE | Facility: HOSPITAL | Age: 84
End: 2024-05-08
Payer: MEDICARE

## 2024-05-08 ENCOUNTER — OFFICE VISIT (OUTPATIENT)
Dept: WOUND CARE | Facility: HOSPITAL | Age: 84
End: 2024-05-08
Payer: MEDICARE

## 2024-05-08 DIAGNOSIS — M79.662 PAIN OF LEFT LOWER LEG: Primary | ICD-10-CM

## 2024-05-09 ENCOUNTER — HOME CARE VISIT (OUTPATIENT)
Dept: HOME HEALTH SERVICES | Facility: HOME HEALTHCARE | Age: 84
End: 2024-05-09
Payer: MEDICARE

## 2024-05-09 VITALS
SYSTOLIC BLOOD PRESSURE: 90 MMHG | HEART RATE: 78 BPM | TEMPERATURE: 98.3 F | OXYGEN SATURATION: 98 % | DIASTOLIC BLOOD PRESSURE: 60 MMHG | RESPIRATION RATE: 17 BRPM

## 2024-05-09 PROCEDURE — G0299 HHS/HOSPICE OF RN EA 15 MIN: HCPCS

## 2024-05-13 ENCOUNTER — HOME CARE VISIT (OUTPATIENT)
Dept: HOME HEALTH SERVICES | Facility: HOME HEALTHCARE | Age: 84
End: 2024-05-13
Payer: MEDICARE

## 2024-05-13 PROCEDURE — G0300 HHS/HOSPICE OF LPN EA 15 MIN: HCPCS

## 2024-05-14 VITALS
RESPIRATION RATE: 18 BRPM | HEART RATE: 64 BPM | OXYGEN SATURATION: 98 % | DIASTOLIC BLOOD PRESSURE: 68 MMHG | SYSTOLIC BLOOD PRESSURE: 126 MMHG | TEMPERATURE: 97.6 F

## 2024-05-15 NOTE — HOME HEALTH
Patient has wound to her left heel which we are treating with betadine wet to dry with hyderfera blue and wrapped with gauze.  She also has wound to her 2nd toe which we are treating with honey and hyderfera blue.  Patient now has a cont, BS monitor.  SN to follow up and make sure patient is using correctly and giving herself the needed insulin to keep BS in check. English

## 2024-05-16 ENCOUNTER — HOME CARE VISIT (OUTPATIENT)
Dept: HOME HEALTH SERVICES | Facility: HOME HEALTHCARE | Age: 84
End: 2024-05-16
Payer: MEDICARE

## 2024-05-16 PROCEDURE — G0300 HHS/HOSPICE OF LPN EA 15 MIN: HCPCS

## 2024-05-17 ENCOUNTER — OFFICE VISIT (OUTPATIENT)
Dept: WOUND CARE | Facility: HOSPITAL | Age: 84
End: 2024-05-17
Payer: MEDICARE

## 2024-05-17 VITALS
SYSTOLIC BLOOD PRESSURE: 130 MMHG | RESPIRATION RATE: 18 BRPM | DIASTOLIC BLOOD PRESSURE: 72 MMHG | TEMPERATURE: 97.1 F | HEART RATE: 61 BPM | OXYGEN SATURATION: 97 %

## 2024-05-17 NOTE — HOME HEALTH
Patient has wound on her left heel.  Wound care provided as ordered.  The wound on her 2nd toe on her left foot is also treated as ordered.  Patient now has a cont, BGM and does check her BS and adminster her own insulin.  She has a caregiver that stays with the patinet.

## 2024-05-20 ENCOUNTER — HOME CARE VISIT (OUTPATIENT)
Dept: HOME HEALTH SERVICES | Facility: HOME HEALTHCARE | Age: 84
End: 2024-05-20
Payer: MEDICARE

## 2024-05-20 PROCEDURE — G0300 HHS/HOSPICE OF LPN EA 15 MIN: HCPCS

## 2024-05-21 VITALS
SYSTOLIC BLOOD PRESSURE: 142 MMHG | HEART RATE: 71 BPM | RESPIRATION RATE: 18 BRPM | TEMPERATURE: 97.1 F | DIASTOLIC BLOOD PRESSURE: 82 MMHG | OXYGEN SATURATION: 97 %

## 2024-05-21 NOTE — HOME HEALTH
Wound care provided as ordered.  Measurement and PIC were obtained.  Patients family states she was started on Santyl for her heel wound,  SN will check with office to see if any change in ordere came in,

## 2024-05-23 ENCOUNTER — HOME CARE VISIT (OUTPATIENT)
Dept: HOME HEALTH SERVICES | Facility: HOME HEALTHCARE | Age: 84
End: 2024-05-23
Payer: MEDICARE

## 2024-05-23 PROCEDURE — G0300 HHS/HOSPICE OF LPN EA 15 MIN: HCPCS

## 2024-05-24 VITALS
DIASTOLIC BLOOD PRESSURE: 74 MMHG | TEMPERATURE: 97.1 F | HEART RATE: 88 BPM | SYSTOLIC BLOOD PRESSURE: 128 MMHG | OXYGEN SATURATION: 97 % | RESPIRATION RATE: 18 BRPM

## 2024-05-24 NOTE — HOME HEALTH
Patient has wound to her left 2nd toe which now appears to be healing scab in place.  She also has a wound to her left heel. We are cleaning with normal saline then applying betading wet to dry dressing.  Patient's family states that the wound care dr has changed to santyl which has not been picked up by the family.  Patient was sitting with leg elevated when SN arrived.  Wound care provided as ordered.    Plan for next visit- Check to see if santyl is in the home and provided wound care as ordered.  Continue to teach on the importance of eating a healthy diet with her diabetes.

## 2024-05-28 ENCOUNTER — HOME CARE VISIT (OUTPATIENT)
Dept: HOME HEALTH SERVICES | Facility: HOME HEALTHCARE | Age: 84
End: 2024-05-28
Payer: MEDICARE

## 2024-05-28 PROCEDURE — G0300 HHS/HOSPICE OF LPN EA 15 MIN: HCPCS

## 2024-05-29 ENCOUNTER — OFFICE VISIT (OUTPATIENT)
Dept: WOUND CARE | Facility: HOSPITAL | Age: 84
End: 2024-05-29
Payer: MEDICARE

## 2024-05-29 VITALS
HEART RATE: 79 BPM | OXYGEN SATURATION: 96 % | DIASTOLIC BLOOD PRESSURE: 74 MMHG | TEMPERATURE: 98.1 F | SYSTOLIC BLOOD PRESSURE: 132 MMHG | RESPIRATION RATE: 18 BRPM

## 2024-05-29 PROCEDURE — G0463 HOSPITAL OUTPT CLINIC VISIT: HCPCS

## 2024-05-29 NOTE — HOME HEALTH
Patient has wound to her left heel which is necrotic.  Betadine dressing was changed as ordered.  Family has not picked up her santyl as of the visit today.  Patient denies any new issues.  Measurement and PIC was obtained today.    Plan for next visit- SN to do CP assess and wound care as ordered.  Please check to see if Bridget is in the home.

## 2024-05-30 ENCOUNTER — HOME CARE VISIT (OUTPATIENT)
Dept: HOME HEALTH SERVICES | Facility: HOME HEALTHCARE | Age: 84
End: 2024-05-30
Payer: MEDICARE

## 2024-05-30 PROCEDURE — G0300 HHS/HOSPICE OF LPN EA 15 MIN: HCPCS

## 2024-06-03 ENCOUNTER — HOME CARE VISIT (OUTPATIENT)
Dept: HOME HEALTH SERVICES | Facility: HOME HEALTHCARE | Age: 84
End: 2024-06-03
Payer: MEDICARE

## 2024-06-03 VITALS
RESPIRATION RATE: 18 BRPM | OXYGEN SATURATION: 98 % | HEART RATE: 67 BPM | DIASTOLIC BLOOD PRESSURE: 62 MMHG | SYSTOLIC BLOOD PRESSURE: 130 MMHG | TEMPERATURE: 98.3 F

## 2024-06-03 PROCEDURE — G0299 HHS/HOSPICE OF RN EA 15 MIN: HCPCS

## 2024-06-03 NOTE — HOME HEALTH
60 Day Summary  Home Health need continues for: wound care, medication complications.   Primary diagnoses/co-morbidities/recent procedures in past 60 days that impact current episode: wound not healing to right calculus.   Current level of functional ability: walks with a walker   Homebound status and living arrangements: lives at home alone with private caretakers in the home aids, not able to manage medications   Goals accomplished and/or measurable progress toward unmet goals in past 60 days:   Focus of care for next 60 days for each discipline ordered: SN  Skin integrity/wound status: wounds to heal and 2nd toe   Estimated date when home care services will end: plan for 8 weeks   SDOH changes/barriers (i.e. Caregiver availability, social isolation, environment, income, transportation access, food insecurity etc.) reminders and compliance with medications   Need for MSW referral? Yes   Plan for next visit: cp assessment, wound care, follow up from wound care clinic on new order, BG monitor check.

## 2024-06-05 ENCOUNTER — HOME CARE VISIT (OUTPATIENT)
Dept: HOME HEALTH SERVICES | Facility: HOME HEALTHCARE | Age: 84
End: 2024-06-05
Payer: MEDICARE

## 2024-06-05 PROCEDURE — G0155 HHCP-SVS OF CSW,EA 15 MIN: HCPCS

## 2024-06-05 NOTE — HOME HEALTH
MARTHA belle on this date with patient. Patient has daily assistance from [employer] during days with daughters assisting in evenings/overnights. Patient not interested in other living options at this time. Patient reports desire for foot to heal however often there are issues with diabetes management. Addressed patient's depression and lack of motivation. Attempted to focus on finding things that bring patient increased ryley or sense of contentment. Requests DNR status change and unsure of advanced directives. Communication with JOSÉ MIGUEL Prakash and daughter Carley.

## 2024-06-06 ENCOUNTER — HOME CARE VISIT (OUTPATIENT)
Dept: HOME HEALTH SERVICES | Facility: HOME HEALTHCARE | Age: 84
End: 2024-06-06
Payer: MEDICARE

## 2024-06-11 ENCOUNTER — HOME CARE VISIT (OUTPATIENT)
Dept: HOME HEALTH SERVICES | Facility: HOME HEALTHCARE | Age: 84
End: 2024-06-11
Payer: MEDICARE

## 2024-06-11 VITALS
DIASTOLIC BLOOD PRESSURE: 74 MMHG | TEMPERATURE: 97.1 F | HEART RATE: 77 BPM | SYSTOLIC BLOOD PRESSURE: 130 MMHG | OXYGEN SATURATION: 96 % | RESPIRATION RATE: 18 BRPM

## 2024-06-11 PROCEDURE — G0300 HHS/HOSPICE OF LPN EA 15 MIN: HCPCS

## 2024-06-12 ENCOUNTER — HOSPITAL ENCOUNTER (OUTPATIENT)
Dept: MRI IMAGING | Facility: HOSPITAL | Age: 84
Discharge: HOME | End: 2024-06-12
Admitting: NURSE PRACTITIONER
Payer: MEDICARE

## 2024-06-12 VITALS
BODY MASS INDEX: 23.21 KG/M2 | TEMPERATURE: 97.8 F | SYSTOLIC BLOOD PRESSURE: 160 MMHG | HEIGHT: 63 IN | WEIGHT: 131 LBS | OXYGEN SATURATION: 99 % | DIASTOLIC BLOOD PRESSURE: 66 MMHG | RESPIRATION RATE: 18 BRPM | HEART RATE: 61 BPM

## 2024-06-12 DIAGNOSIS — M79.662 PAIN OF LEFT LOWER LEG: ICD-10-CM

## 2024-06-12 LAB — CREAT BLDA-MCNC: 0.5 MG/DL (ref 0.6–1.3)

## 2024-06-12 PROCEDURE — A9577 INJ MULTIHANCE: HCPCS | Performed by: NURSE PRACTITIONER

## 2024-06-12 PROCEDURE — 82565 ASSAY OF CREATININE: CPT

## 2024-06-12 PROCEDURE — 0 GADOBENATE DIMEGLUMINE 529 MG/ML SOLUTION: Performed by: NURSE PRACTITIONER

## 2024-06-12 PROCEDURE — 73720 MRI LWR EXTREMITY W/O&W/DYE: CPT

## 2024-06-12 RX ADMIN — GADOBENATE DIMEGLUMINE 12 ML: 529 INJECTION, SOLUTION INTRAVENOUS at 13:58

## 2024-06-12 NOTE — NURSING NOTE
Patient arrived in Radiology Triage with daughter to have pacemaker placed in safe scan mode for MRI.

## 2024-06-13 NOTE — HOME HEALTH
CP assess was WNL and VS are stable at this time.  Patient denies any pain r/t wound.  No new medications are noted.  Wound is starting to shred the necrotic scab at this time.  Santyl is now being used on the wound bed.  Wound care provided as ordered.  Patient tolerated well with no adverse reaction noted.

## 2024-06-14 ENCOUNTER — HOME CARE VISIT (OUTPATIENT)
Dept: HOME HEALTH SERVICES | Facility: HOME HEALTHCARE | Age: 84
End: 2024-06-14
Payer: MEDICARE

## 2024-06-14 VITALS
RESPIRATION RATE: 18 BRPM | OXYGEN SATURATION: 98 % | TEMPERATURE: 98.5 F | HEART RATE: 67 BPM | DIASTOLIC BLOOD PRESSURE: 60 MMHG | SYSTOLIC BLOOD PRESSURE: 120 MMHG

## 2024-06-14 PROCEDURE — G0299 HHS/HOSPICE OF RN EA 15 MIN: HCPCS

## 2024-06-14 NOTE — HOME HEALTH
Routine Visit Note:  ON MY ARRIVAL PATIENT BG MONITOR BEEPING AT OVER 400 BG PATIENT NEEDED HELP WITH MEDICATION REMINDER AND TO TAKE HER PILLS     Skill/education provided: WOUND ASSESSMENT, COMPLIANCE WITH CAM BOOT, EDUCATION TO [facility] [employer] IN THE HOME ON MEDICATION COMPLIANCE     Patient/caregiver response: PATIENT AND AID UNDERSTANDS TEACHINGS     Other pertinent info: CP ASSESSMENT, WOUND ASSESSMENT, MEDICATION COMPLIANCE
no

## 2024-06-18 ENCOUNTER — HOME CARE VISIT (OUTPATIENT)
Dept: HOME HEALTH SERVICES | Facility: HOME HEALTHCARE | Age: 84
End: 2024-06-18
Payer: MEDICARE

## 2024-06-18 VITALS
RESPIRATION RATE: 18 BRPM | OXYGEN SATURATION: 95 % | DIASTOLIC BLOOD PRESSURE: 74 MMHG | SYSTOLIC BLOOD PRESSURE: 128 MMHG | TEMPERATURE: 98.1 F | HEART RATE: 77 BPM

## 2024-06-18 PROCEDURE — G0300 HHS/HOSPICE OF LPN EA 15 MIN: HCPCS

## 2024-06-21 ENCOUNTER — HOME CARE VISIT (OUTPATIENT)
Dept: HOME HEALTH SERVICES | Facility: HOME HEALTHCARE | Age: 84
End: 2024-06-21
Payer: MEDICARE

## 2024-06-21 ENCOUNTER — OFFICE VISIT (OUTPATIENT)
Dept: WOUND CARE | Facility: HOSPITAL | Age: 84
End: 2024-06-21
Payer: MEDICARE

## 2024-06-21 ENCOUNTER — LAB REQUISITION (OUTPATIENT)
Dept: LAB | Facility: HOSPITAL | Age: 84
End: 2024-06-21
Payer: MEDICARE

## 2024-06-21 DIAGNOSIS — M86.172 OTHER ACUTE OSTEOMYELITIS, LEFT ANKLE AND FOOT: ICD-10-CM

## 2024-06-21 PROCEDURE — 87015 SPECIMEN INFECT AGNT CONCNTJ: CPT | Performed by: NURSE PRACTITIONER

## 2024-06-21 PROCEDURE — 87070 CULTURE OTHR SPECIMN AEROBIC: CPT | Performed by: NURSE PRACTITIONER

## 2024-06-21 PROCEDURE — 87205 SMEAR GRAM STAIN: CPT

## 2024-06-21 PROCEDURE — 87075 CULTR BACTERIA EXCEPT BLOOD: CPT

## 2024-06-21 PROCEDURE — 87205 SMEAR GRAM STAIN: CPT | Performed by: NURSE PRACTITIONER

## 2024-06-21 PROCEDURE — 87070 CULTURE OTHR SPECIMN AEROBIC: CPT

## 2024-06-21 PROCEDURE — 87015 SPECIMEN INFECT AGNT CONCNTJ: CPT

## 2024-06-21 PROCEDURE — 87075 CULTR BACTERIA EXCEPT BLOOD: CPT | Performed by: NURSE PRACTITIONER

## 2024-06-23 LAB
BACTERIA SPEC AEROBE CULT: NORMAL
GRAM STN SPEC: NORMAL
GRAM STN SPEC: NORMAL

## 2024-06-25 ENCOUNTER — HOME CARE VISIT (OUTPATIENT)
Dept: HOME HEALTH SERVICES | Facility: HOME HEALTHCARE | Age: 84
End: 2024-06-25
Payer: MEDICARE

## 2024-06-25 VITALS
RESPIRATION RATE: 18 BRPM | SYSTOLIC BLOOD PRESSURE: 130 MMHG | HEART RATE: 79 BPM | DIASTOLIC BLOOD PRESSURE: 84 MMHG | TEMPERATURE: 98.1 F | OXYGEN SATURATION: 95 %

## 2024-06-25 PROCEDURE — G0300 HHS/HOSPICE OF LPN EA 15 MIN: HCPCS

## 2024-06-26 LAB — BACTERIA SPEC ANAEROBE CULT: NORMAL

## 2024-06-28 ENCOUNTER — HOME CARE VISIT (OUTPATIENT)
Dept: HOME HEALTH SERVICES | Facility: HOME HEALTHCARE | Age: 84
End: 2024-06-28
Payer: MEDICARE

## 2024-06-28 VITALS
SYSTOLIC BLOOD PRESSURE: 132 MMHG | HEART RATE: 82 BPM | RESPIRATION RATE: 18 BRPM | TEMPERATURE: 97.4 F | DIASTOLIC BLOOD PRESSURE: 84 MMHG | OXYGEN SATURATION: 96 %

## 2024-06-28 PROCEDURE — G0300 HHS/HOSPICE OF LPN EA 15 MIN: HCPCS

## 2024-07-02 ENCOUNTER — HOME CARE VISIT (OUTPATIENT)
Dept: HOME HEALTH SERVICES | Facility: HOME HEALTHCARE | Age: 84
End: 2024-07-02
Payer: MEDICARE

## 2024-07-02 ENCOUNTER — LAB REQUISITION (OUTPATIENT)
Dept: LAB | Facility: HOSPITAL | Age: 84
End: 2024-07-02
Payer: MEDICARE

## 2024-07-02 DIAGNOSIS — L97.521 NON-PRESSURE CHRONIC ULCER OF OTHER PART OF LEFT FOOT LIMITED TO BREAKDOWN OF SKIN: ICD-10-CM

## 2024-07-02 LAB
CRP SERPL-MCNC: 6.21 MG/DL (ref 0–0.5)
ERYTHROCYTE [SEDIMENTATION RATE] IN BLOOD: 93 MM/HR (ref 0–30)

## 2024-07-02 PROCEDURE — G0300 HHS/HOSPICE OF LPN EA 15 MIN: HCPCS

## 2024-07-02 PROCEDURE — 86140 C-REACTIVE PROTEIN: CPT | Performed by: NURSE PRACTITIONER

## 2024-07-02 PROCEDURE — 85652 RBC SED RATE AUTOMATED: CPT | Performed by: NURSE PRACTITIONER

## 2024-07-03 VITALS
DIASTOLIC BLOOD PRESSURE: 64 MMHG | TEMPERATURE: 96.9 F | SYSTOLIC BLOOD PRESSURE: 122 MMHG | HEART RATE: 77 BPM | OXYGEN SATURATION: 97 % | RESPIRATION RATE: 18 BRPM

## 2024-07-05 ENCOUNTER — HOME CARE VISIT (OUTPATIENT)
Dept: HOME HEALTH SERVICES | Facility: HOME HEALTHCARE | Age: 84
End: 2024-07-05
Payer: MEDICARE

## 2024-07-09 ENCOUNTER — HOME CARE VISIT (OUTPATIENT)
Dept: HOME HEALTH SERVICES | Facility: HOME HEALTHCARE | Age: 84
End: 2024-07-09
Payer: MEDICARE

## 2024-07-09 PROCEDURE — G0300 HHS/HOSPICE OF LPN EA 15 MIN: HCPCS

## 2024-07-10 ENCOUNTER — OFFICE VISIT (OUTPATIENT)
Dept: WOUND CARE | Facility: HOSPITAL | Age: 84
End: 2024-07-10
Payer: MEDICARE

## 2024-07-10 VITALS
OXYGEN SATURATION: 93 % | RESPIRATION RATE: 18 BRPM | SYSTOLIC BLOOD PRESSURE: 134 MMHG | HEART RATE: 73 BPM | DIASTOLIC BLOOD PRESSURE: 74 MMHG | TEMPERATURE: 97.8 F

## 2024-07-11 ENCOUNTER — HOME CARE VISIT (OUTPATIENT)
Dept: HOME HEALTH SERVICES | Facility: HOME HEALTHCARE | Age: 84
End: 2024-07-11
Payer: MEDICARE

## 2024-07-12 ENCOUNTER — HOME CARE VISIT (OUTPATIENT)
Dept: HOME HEALTH SERVICES | Facility: HOME HEALTHCARE | Age: 84
End: 2024-07-12
Payer: MEDICARE

## 2024-07-12 VITALS
TEMPERATURE: 98.4 F | HEART RATE: 98 BPM | SYSTOLIC BLOOD PRESSURE: 150 MMHG | DIASTOLIC BLOOD PRESSURE: 62 MMHG | OXYGEN SATURATION: 98 % | RESPIRATION RATE: 18 BRPM

## 2024-07-12 PROCEDURE — G0299 HHS/HOSPICE OF RN EA 15 MIN: HCPCS

## 2024-07-17 ENCOUNTER — HOME CARE VISIT (OUTPATIENT)
Dept: HOME HEALTH SERVICES | Facility: HOME HEALTHCARE | Age: 84
End: 2024-07-17
Payer: MEDICARE

## 2024-07-17 PROCEDURE — G0300 HHS/HOSPICE OF LPN EA 15 MIN: HCPCS

## 2024-07-18 VITALS
SYSTOLIC BLOOD PRESSURE: 138 MMHG | RESPIRATION RATE: 18 BRPM | HEART RATE: 78 BPM | OXYGEN SATURATION: 93 % | DIASTOLIC BLOOD PRESSURE: 80 MMHG | TEMPERATURE: 97.6 F

## 2024-07-19 ENCOUNTER — HOME CARE VISIT (OUTPATIENT)
Dept: HOME HEALTH SERVICES | Facility: HOME HEALTHCARE | Age: 84
End: 2024-07-19
Payer: MEDICARE

## 2024-07-19 PROCEDURE — G0299 HHS/HOSPICE OF RN EA 15 MIN: HCPCS

## 2024-07-20 VITALS
OXYGEN SATURATION: 98 % | RESPIRATION RATE: 16 BRPM | HEART RATE: 66 BPM | SYSTOLIC BLOOD PRESSURE: 110 MMHG | TEMPERATURE: 98.5 F | DIASTOLIC BLOOD PRESSURE: 52 MMHG

## 2024-07-22 ENCOUNTER — PATIENT ROUNDING (BHMG ONLY) (OUTPATIENT)
Dept: INFECTIOUS DISEASES | Facility: CLINIC | Age: 84
End: 2024-07-22
Payer: MEDICARE

## 2024-07-22 ENCOUNTER — HOME CARE VISIT (OUTPATIENT)
Dept: HOME HEALTH SERVICES | Facility: HOME HEALTHCARE | Age: 84
End: 2024-07-22
Payer: MEDICARE

## 2024-07-22 ENCOUNTER — OFFICE VISIT (OUTPATIENT)
Dept: INFECTIOUS DISEASES | Facility: CLINIC | Age: 84
End: 2024-07-22
Payer: MEDICARE

## 2024-07-22 VITALS
DIASTOLIC BLOOD PRESSURE: 66 MMHG | HEART RATE: 63 BPM | TEMPERATURE: 97.1 F | WEIGHT: 128.2 LBS | SYSTOLIC BLOOD PRESSURE: 128 MMHG | BODY MASS INDEX: 22.71 KG/M2 | RESPIRATION RATE: 20 BRPM

## 2024-07-22 DIAGNOSIS — M86.672 OTHER CHRONIC OSTEOMYELITIS OF LEFT FOOT: Primary | ICD-10-CM

## 2024-07-22 NOTE — PROGRESS NOTES
Referring Provider: Lidia Severino APRN  [address] [place] [address]  Reason for clinic visits: Follow up with concerns for a L foot infection     HPI: Di Macario is a 84 y.o. female who was last seen by me in the hospital in February. At that time she had L great toe osteomyelitis and underwent amputation on January 30. OR cx grew group B strep and the pt completed a 7 day course of postoperative abx (all infected bone was resected). Unfortunately her wound did not heal and she required additional resection in March. OR cx came back negative. Her wound healed but she developed an uncler on her 2nd L digit and a pressure wound on her L heel. She was referred to wound care and continues to follow with podiatry. She had superficial wound cx done in May and June which were negative. She had an MRI of the L foot done in June with some bone marrow edema but no definitive evidence of osteomyelitis. She denies any F/C/NS. Her L digit looks a lot better with less swelling and erythema per her daughters.     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4    Hyperlipidemia 01/13/2021    Lung consolidation 06/01/2022    Pacemaker     Peripheral neuropathy 08/28/2018    Toe ulcer due to DM     LEFT GREAT TOE    Type 2 diabetes mellitus with hyperglycemia, with long-term current use of insulin 08/25/2022    Vitamin D deficiency        Past Surgical History:   Procedure Laterality Date    AMPUTATION DIGIT Left 01/30/2024    Procedure: PARTIAL HALLUX AMPUTATION LEFT FOOT;  Surgeon: Nas Goznales DPM;  Location: [facility] OR;  Service: Podiatry;  Laterality: Left;    AMPUTATION DIGIT Left 3/11/2024    Procedure: AMPUTATION DIGIT;  Surgeon: Nas Gonzales DPM;  Location: [facility] OR;  Service:  Podiatry;  Laterality: Left;    ANKLE SURGERY Right     BLADDER SURGERY      Prolapsed    BREAST BIOPSY      CARDIAC ELECTROPHYSIOLOGY PROCEDURE N/A 03/05/2021    Procedure: Pacemaker DC new BOSTON;  Surgeon: Madelin Ferguson MD;  Location: [facility] CATH INVASIVE LOCATION;  Service: Cardiology;  Laterality: N/A;    ENDOSCOPY N/A 06/06/2022    Procedure: ESOPHAGOGASTRODUODENOSCOPY with biopsy, balloon dilation;  Surgeon: Lo Benjamin MD;  Location: [facility] ENDOSCOPY;  Service: Gastroenterology;  Laterality: N/A;  esophageal stricture, hiatal hernia, gastritis    HYSTERECTOMY      TONSILLECTOMY         Social History   reports that she quit smoking about 56 years ago. Her smoking use included cigarettes. She started smoking about 51 years ago. She has never used smokeless tobacco. She reports that she does not currently use alcohol. She reports that she does not use drugs.    Family History  family history includes Cancer in her father and mother; Hypertension in her father and mother; Obesity in her mother.    No Known Allergies    The medication list has been reviewed and updated.     Review of Systems  Pertinent items are noted in HPI, all other systems reviewed and negative    Vital Signs   Temp:  [97.1 °F (36.2 °C)] 97.1 °F (36.2 °C)  Heart Rate:  [63] 63  Resp:  [20] 20  BP: (128)/(66) 128/66    Physical Exam:   General: In no acute distress   Respiratory: Breathing comfortably on RA  Musculoskeletal: L foot heel wound without erythema or purulence. L 2nd toe with mild swelling and erythema no purulence from the scabbed wound   Neurological: Alert and oriented, moving all 4 ext   Psychiatric: Normal mood and affect     Lab Results   Component Value Date    WBC 7.25 04/11/2024    HGB 11.2 (L) 04/11/2024    HCT 35.4 04/11/2024    MCV 87.6 04/11/2024     04/11/2024       Lab Results   Component Value Date    GLUCOSE 194 (H) 04/11/2024    BUN 17 04/11/2024    CREATININE 0.50 (L) 06/12/2024     EGFRIFNONA 71 12/06/2021    EGFRIFAFRI 82 12/06/2021    BCR 30.9 (H) 04/11/2024    CO2 23.6 04/11/2024    CALCIUM 9.1 04/11/2024    PROTENTOTREF 6.9 10/03/2023    ALBUMIN 4.3 04/10/2024    LABIL2 1.7 10/03/2023    AST 10 04/10/2024    ALT 11 04/10/2024       Lab Results   Component Value Date    SEDRATE 93 (H) 07/02/2024       Lab Results   Component Value Date    CRP 6.21 (H) 07/02/2024     6/21 L foot wound neg  5/1 L foot wound cx neg  3/11 OR L wound cx neg   1/30 OR cx group B strep    6/12 MRI L foot Hindfoot soft tissue wound/ulcer with underlying inflammatory soft tissue and cellulitis. Minimal bone marrow edema and enhancement within the calcaneal tuberosity centered at the base of an Achilles insertional spur due to minimal early osteomyelitis or reactive edema. No evidence for septic arthritis or infectious tenosynovitis.    Assessment:  This is a 84 y.o. female who presents to clinic today with concerns for L foot infection.  The patient underwent left great toe amputation in January followed by additional amputation in March.  That area has healed well but unfortunately she has developed a pressure wound on her left heel as well as a wound on her second left toe.  Today physical exam is not consistent with infection.  MRI of the left foot is not convincing for osteomyelitis.  Superficial wound cultures have been negative.  At this time I do not see any indications for antimicrobial treatment.  The patient should continue to follow-up with wound care clinic for aggressive wound care and podiatry.  I asked the patient's daughters to call the infectious disease clinic with any signs or symptoms concerning for infection such as increased swelling erythema purulent drainage fevers etc.    Return to Infectious Disease clinic as needed    Time: More than 50% of time spent in counseling and coordination of care:  Total face-to-face/floor time 45 min.  Time spent in counseling 45 min. Counseling included the  following topics: See assessment above

## 2024-07-22 NOTE — PROGRESS NOTES
July 22, 2024          Done in office            We're always looking for ways to make our patients' experiences even better. Do you have recommendations on ways we may improve?  no    Overall were you satisfied with your first visit to our practice? yes

## 2024-07-23 ENCOUNTER — HOME CARE VISIT (OUTPATIENT)
Dept: HOME HEALTH SERVICES | Facility: HOME HEALTHCARE | Age: 84
End: 2024-07-23
Payer: MEDICARE

## 2024-07-23 PROBLEM — M86.372 CHRONIC MULTIFOCAL OSTEOMYELITIS OF LEFT FOOT: Status: ACTIVE | Noted: 2024-07-23

## 2024-07-23 PROCEDURE — G0300 HHS/HOSPICE OF LPN EA 15 MIN: HCPCS

## 2024-07-24 ENCOUNTER — OFFICE VISIT (OUTPATIENT)
Dept: WOUND CARE | Facility: HOSPITAL | Age: 84
End: 2024-07-24
Payer: MEDICARE

## 2024-07-24 VITALS
OXYGEN SATURATION: 99 % | SYSTOLIC BLOOD PRESSURE: 140 MMHG | DIASTOLIC BLOOD PRESSURE: 74 MMHG | RESPIRATION RATE: 18 BRPM | HEART RATE: 75 BPM | TEMPERATURE: 97.1 F

## 2024-07-26 ENCOUNTER — HOME CARE VISIT (OUTPATIENT)
Dept: HOME HEALTH SERVICES | Facility: HOME HEALTHCARE | Age: 84
End: 2024-07-26
Payer: MEDICARE

## 2024-07-26 PROCEDURE — G0300 HHS/HOSPICE OF LPN EA 15 MIN: HCPCS

## 2024-07-29 VITALS
DIASTOLIC BLOOD PRESSURE: 72 MMHG | SYSTOLIC BLOOD PRESSURE: 132 MMHG | TEMPERATURE: 97.1 F | RESPIRATION RATE: 18 BRPM | HEART RATE: 77 BPM | OXYGEN SATURATION: 96 %

## 2024-07-30 ENCOUNTER — HOME CARE VISIT (OUTPATIENT)
Dept: HOME HEALTH SERVICES | Facility: HOME HEALTHCARE | Age: 84
End: 2024-07-30
Payer: MEDICARE

## 2024-07-30 PROCEDURE — G0300 HHS/HOSPICE OF LPN EA 15 MIN: HCPCS

## 2024-07-31 VITALS
SYSTOLIC BLOOD PRESSURE: 130 MMHG | TEMPERATURE: 97.3 F | RESPIRATION RATE: 18 BRPM | DIASTOLIC BLOOD PRESSURE: 74 MMHG | OXYGEN SATURATION: 96 % | HEART RATE: 82 BPM

## 2024-08-02 ENCOUNTER — HOME CARE VISIT (OUTPATIENT)
Dept: HOME HEALTH SERVICES | Facility: HOME HEALTHCARE | Age: 84
End: 2024-08-02
Payer: MEDICARE

## 2024-08-02 VITALS
TEMPERATURE: 98 F | SYSTOLIC BLOOD PRESSURE: 118 MMHG | RESPIRATION RATE: 18 BRPM | HEART RATE: 67 BPM | DIASTOLIC BLOOD PRESSURE: 80 MMHG | OXYGEN SATURATION: 98 %

## 2024-08-02 PROCEDURE — G0299 HHS/HOSPICE OF RN EA 15 MIN: HCPCS

## 2024-08-02 NOTE — Clinical Note
Hello, I am verifying that you will be signing off on Home Health skilled nursing orders.   Thank you   Yohana VALDEZ   [phone]

## 2024-08-02 NOTE — HOME HEALTH
60 Day Summary  Home Health need continues for: wound care to diabetic ulcer left foot   Primary diagnoses/co-morbidities/recent procedures in past 60 days that impact current episode: diabetic ulcer unhealed   Current level of functional ability: walker and cam boot  Homebound status and living arrangements: lives at home alone with paid caregivers from outside agency   Goals accomplished and/or measurable progress toward unmet goals in past 60 days: goals not met   Focus of care for next 60 days for each discipline ordered: SN wound care   Skin integrity/wound status: WOUND (SEEING WOUND [facility])   Estimated date when home care services will end: 60 DAYS
Acute Inpatient Rehab

## 2024-08-07 ENCOUNTER — HOME CARE VISIT (OUTPATIENT)
Dept: HOME HEALTH SERVICES | Facility: HOME HEALTHCARE | Age: 84
End: 2024-08-07
Payer: MEDICARE

## 2024-08-07 PROCEDURE — G0300 HHS/HOSPICE OF LPN EA 15 MIN: HCPCS

## 2024-08-08 VITALS
SYSTOLIC BLOOD PRESSURE: 132 MMHG | RESPIRATION RATE: 18 BRPM | DIASTOLIC BLOOD PRESSURE: 82 MMHG | TEMPERATURE: 98.1 F | HEART RATE: 78 BPM | OXYGEN SATURATION: 96 %

## 2024-08-14 ENCOUNTER — OFFICE VISIT (OUTPATIENT)
Dept: WOUND CARE | Facility: HOSPITAL | Age: 84
End: 2024-08-14
Payer: MEDICARE

## 2024-08-15 ENCOUNTER — HOME CARE VISIT (OUTPATIENT)
Dept: HOME HEALTH SERVICES | Facility: HOME HEALTHCARE | Age: 84
End: 2024-08-15
Payer: MEDICARE

## 2024-08-15 VITALS
SYSTOLIC BLOOD PRESSURE: 134 MMHG | HEART RATE: 79 BPM | TEMPERATURE: 98.4 F | RESPIRATION RATE: 18 BRPM | OXYGEN SATURATION: 98 % | DIASTOLIC BLOOD PRESSURE: 86 MMHG

## 2024-08-15 PROCEDURE — G0299 HHS/HOSPICE OF RN EA 15 MIN: HCPCS

## 2024-08-20 DIAGNOSIS — E11.65 TYPE 2 DIABETES MELLITUS WITH HYPERGLYCEMIA, WITH LONG-TERM CURRENT USE OF INSULIN: ICD-10-CM

## 2024-08-20 DIAGNOSIS — E78.00 PURE HYPERCHOLESTEROLEMIA: ICD-10-CM

## 2024-08-20 DIAGNOSIS — I10 ESSENTIAL HYPERTENSION: ICD-10-CM

## 2024-08-20 DIAGNOSIS — Z79.4 TYPE 2 DIABETES MELLITUS WITH HYPERGLYCEMIA, WITH LONG-TERM CURRENT USE OF INSULIN: ICD-10-CM

## 2024-08-21 ENCOUNTER — HOME CARE VISIT (OUTPATIENT)
Dept: HOME HEALTH SERVICES | Facility: HOME HEALTHCARE | Age: 84
End: 2024-08-21
Payer: MEDICARE

## 2024-08-21 VITALS
DIASTOLIC BLOOD PRESSURE: 80 MMHG | RESPIRATION RATE: 18 BRPM | HEART RATE: 58 BPM | SYSTOLIC BLOOD PRESSURE: 128 MMHG | OXYGEN SATURATION: 98 %

## 2024-08-21 LAB
ALBUMIN SERPL-MCNC: 4 G/DL (ref 3.5–5.2)
ALBUMIN/CREAT UR: 44 MG/G CREAT (ref 0–29)
ALBUMIN/GLOB SERPL: 1.1 G/DL
ALP SERPL-CCNC: 131 U/L (ref 39–117)
ALT SERPL-CCNC: 6 U/L (ref 1–33)
AST SERPL-CCNC: 9 U/L (ref 1–32)
BILIRUB SERPL-MCNC: <0.2 MG/DL (ref 0–1.2)
BUN SERPL-MCNC: 15 MG/DL (ref 8–23)
BUN/CREAT SERPL: 21.1 (ref 7–25)
CALCIUM SERPL-MCNC: 10.1 MG/DL (ref 8.6–10.5)
CHLORIDE SERPL-SCNC: 101 MMOL/L (ref 98–107)
CHOLEST SERPL-MCNC: 147 MG/DL (ref 0–200)
CO2 SERPL-SCNC: 28.3 MMOL/L (ref 22–29)
CREAT SERPL-MCNC: 0.71 MG/DL (ref 0.57–1)
CREAT UR-MCNC: 120.6 MG/DL
EGFRCR SERPLBLD CKD-EPI 2021: 83.4 ML/MIN/1.73
GLOBULIN SER CALC-MCNC: 3.6 GM/DL
GLUCOSE SERPL-MCNC: 241 MG/DL (ref 65–99)
HBA1C MFR BLD: 11.2 % (ref 4.8–5.6)
HDLC SERPL-MCNC: 66 MG/DL (ref 40–60)
IMP & REVIEW OF LAB RESULTS: NORMAL
LDLC SERPL CALC-MCNC: 60 MG/DL (ref 0–100)
MICROALBUMIN UR-MCNC: 52.9 UG/ML
POTASSIUM SERPL-SCNC: 4.3 MMOL/L (ref 3.5–5.2)
PROT SERPL-MCNC: 7.6 G/DL (ref 6–8.5)
SODIUM SERPL-SCNC: 141 MMOL/L (ref 136–145)
TRIGL SERPL-MCNC: 120 MG/DL (ref 0–150)
VLDLC SERPL CALC-MCNC: 21 MG/DL (ref 5–40)

## 2024-08-21 PROCEDURE — G0299 HHS/HOSPICE OF RN EA 15 MIN: HCPCS

## 2024-08-27 ENCOUNTER — OFFICE VISIT (OUTPATIENT)
Dept: ENDOCRINOLOGY | Age: 84
End: 2024-08-27
Payer: MEDICARE

## 2024-08-27 VITALS
WEIGHT: 124.2 LBS | TEMPERATURE: 97.1 F | OXYGEN SATURATION: 97 % | SYSTOLIC BLOOD PRESSURE: 122 MMHG | BODY MASS INDEX: 22.01 KG/M2 | HEART RATE: 71 BPM | DIASTOLIC BLOOD PRESSURE: 70 MMHG | HEIGHT: 63 IN

## 2024-08-27 DIAGNOSIS — Z79.4 TYPE 2 DIABETES MELLITUS WITH HYPERGLYCEMIA, WITH LONG-TERM CURRENT USE OF INSULIN: ICD-10-CM

## 2024-08-27 DIAGNOSIS — E11.65 TYPE 2 DIABETES MELLITUS WITH HYPERGLYCEMIA, WITH LONG-TERM CURRENT USE OF INSULIN: ICD-10-CM

## 2024-08-27 DIAGNOSIS — E11.65 TYPE 2 DIABETES MELLITUS WITH HYPERGLYCEMIA, WITH LONG-TERM CURRENT USE OF INSULIN: Primary | ICD-10-CM

## 2024-08-27 DIAGNOSIS — E78.00 PURE HYPERCHOLESTEROLEMIA: ICD-10-CM

## 2024-08-27 DIAGNOSIS — Z79.4 TYPE 2 DIABETES MELLITUS WITH HYPERGLYCEMIA, WITH LONG-TERM CURRENT USE OF INSULIN: Primary | ICD-10-CM

## 2024-08-27 DIAGNOSIS — I10 ESSENTIAL HYPERTENSION: ICD-10-CM

## 2024-08-27 RX ORDER — ACYCLOVIR 400 MG/1
TABLET ORAL
COMMUNITY

## 2024-08-27 RX ORDER — GLUCAGON INJECTION, SOLUTION 1 MG/.2ML
1 INJECTION, SOLUTION SUBCUTANEOUS AS NEEDED
Qty: 0.4 ML | Refills: 1 | Status: SHIPPED | OUTPATIENT
Start: 2024-08-27

## 2024-08-27 NOTE — PROGRESS NOTES
Chief Complaint  Chief Complaint   Patient presents with    Diabetes     Type 2: Pt Dexcom is attached, is not up to date on eye exam, no hx of retinopathy or neuropathy.        Subjective          History of Present Illness    Di Macario 84 y.o. presents for a follow-up for Type 2 Diabetes    Pt's daughter accompanied them during today's visit.     She was diagnosed with diabetes in 1990s    Pt is on the following medications for their diabetes: Lantus 10 units daily     Novolog unknown dose - not taking consistently   Trulicity at 0.75 mg weekly - unsure if taking         08/25/22 -Had talk elio patient and daughter about the risk of tight glycemic control/hypoglycemia in pts that are older with dementia.  Goal of morning -130 and during the day time below 200.       Metformin stopped due to nausea/vomiting  Glipizide          Denies constipation and numbness and tingling in feet    Weight loss of 6 lbs since last visit.    Pt denies a history of diabetic retinopathy.  Last eye exam was 2024    Pt does have nephropathy.  Patient is not currently taking ACE/ARB     Pt denies neuropathy.  History of left great toe amputation  Hospitalized for diabetic foot ulcer 04/24  Wound left heel - seeing wound care (08/24)     Pt denies a history of CAD or CVA    Last A1C in 08/24 was 11.20    Last microalbumin in 08/24 was positive          Blood Sugars    Blood glucoses are checked via Dexcom.    Fasting blood glucoses: 60s- 300s    Pre-meal blood glucoses: 60s- 400s    Pt has episodes of hypoglycemia.        Sensor Data    Time in range 9%  High 14%  Very high 76%  Low 1%  Very low <1%      Average Glucose - 313 mg/dL      Sensor Wear - 86 %          Pt had episode of hypoglycemia 08/24 where EMS was called.          Hyperlipidemia     Pt is currently taking pravastatin 40 mg HS     Last lipid panel in 08/24 showed Total 147, HDL 66, LDL 60 and Triglycerides 120            Hypertension    Current regimen  "includes amlodipine 2.5 mg daily               Other History         Pt has dementia and dysphagia - she is on nectar thick liquids     She was has been admitted several times to the hospital for aspiration PNA.            I have reviewed the patient's allergies, medicines, past medical hx, family hx and social hx.    Objective   Vital Signs:   /70   Pulse 71   Temp 97.1 °F (36.2 °C) (Temporal)   Ht 160 cm (62.99\")   Wt 56.3 kg (124 lb 3.2 oz)   SpO2 97%   BMI 22.01 kg/m²       Physical Exam   Physical Exam  Constitutional:       General: She is not in acute distress.     Appearance: Normal appearance. She is not diaphoretic.   HENT:      Head: Normocephalic and atraumatic.   Eyes:      General:         Right eye: No discharge.         Left eye: No discharge.   Skin:     General: Skin is warm and dry.   Neurological:      Mental Status: She is alert.   Psychiatric:         Mood and Affect: Mood normal.         Behavior: Behavior normal.                    Results Review:   Hemoglobin A1C   Date Value Ref Range Status   08/20/2024 11.20 (H) 4.80 - 5.60 % Final     Comment:     Hemoglobin A1C Ranges:  Increased Risk for Diabetes  5.7% to 6.4%  Diabetes                     >= 6.5%  Diabetic Goal                < 7.0%     04/08/2024 10.30 (H) 4.80 - 5.60 % Final     Triglycerides   Date Value Ref Range Status   08/20/2024 120 0 - 150 mg/dL Final     HDL Cholesterol   Date Value Ref Range Status   08/20/2024 66 (H) 40 - 60 mg/dL Final     LDL Chol Calc (NIH)   Date Value Ref Range Status   08/20/2024 60 0 - 100 mg/dL Final     VLDL Cholesterol Bryan   Date Value Ref Range Status   08/20/2024 21 5 - 40 mg/dL Final         Assessment and Plan {CC Problem List  Visit Diagnosis  ROS  Review (Popup)  Health Maintenance  Quality  BestPractice  Medications  SmartSets  SnapShot Encounters  Media :23  Diagnoses and all orders for this visit:    1. Type 2 diabetes mellitus with hyperglycemia, with long-term " current use of insulin (Primary)  -     Hemoglobin A1c; Future  -     Comprehensive Metabolic Panel; Future    A1c is high at 11.2%  Pt's daughter(s) is concerned about pt's increase in dementia and her ability to give herself her insulin - recent incident that resulted in hypoglycemia (42) and EMS being called  After looking at Dexcom report, looks like pt is not taking medication consistently - days were she is consistently 300 plus (not taking anything) and then all of a sudden she has hypoglycemia from taking insulin (most likely Novolog).  Unsure of doses that pt is taking as she is giving insulin to herself.  Advised that we need to make sure she is taking her medications consistently and monitor the dose that she is taking.  Continue with Lantus 10 units daily - doesn't matter the time of day as long as it is around the same time of day each day.  Check to see if taking Trulicity 0.75 mg weekly - need to be taking consistently for at least 4 weeks and if blood sugars remain high then can increase to 1.5 mg weekly.  Discussed that we will need to monitor for side effects - mostly constipation, decrease appetite and/or a lot of weight loss.  Hold off on Novolog at this time - advise to move where pt can not get a hold of it to take it.  I would like to try to continue with Lantus and Trulicity, triturating up to max dose or tolerated max dose, and avoiding Novolog if at all possible.  Advised that we may have to start back on Novolog, but will see how things goes once we confirm that she is taking medications consistently and how her BGs respond.  Pt's daughter stated that they are looking into getting someone to be with pt during the day - this may help with making sure medications are taken consistently.  A1c goal of around 8.5% without hypoglycemia  Asked pt's daughter to keep me advised on how things are going and how her blood sugars are running.       2. Pure hypercholesterolemia  -     Comprehensive  Metabolic Panel; Future    Lipid panel is good.    Continue with statin.       3. Essential hypertension  -     Comprehensive Metabolic Panel; Future    Stable  Continue with current medication regimen  Defer management to PCP           RTC in 3 months with me, labs prior      Follow Up     Patient was given instructions and counseling regarding her condition or for health maintenance advice. Please see specific information pulled into the AVS if appropriate.                Carlotta Mckeon, IAIN  08/27/24

## 2024-08-27 NOTE — TELEPHONE ENCOUNTER
Rx Refill Note  Requested Prescriptions     Pending Prescriptions Disp Refills    Glucagon (Gvoke HypoPen 2-Pack) 1 MG/0.2ML solution auto-injector 0.4 mL 1     Sig: Inject 1 mg under the skin into the appropriate area as directed As Needed (hypoglycemia). Indications: Disorder with Low Blood Sugar      Last office visit with prescribing clinician: 8/27/2024   Last telemedicine visit with prescribing clinician: Visit date not found   Next office visit with prescribing clinician: 11/26/2024                         Would you like a call back once the refill request has been completed: [] Yes [] No    If the office needs to give you a call back, can they leave a voicemail: [] Yes [] No    Radha Mills  08/27/24, 14:38 EDT

## 2024-08-27 NOTE — PATIENT INSTRUCTIONS
Continue with Lantus 10 units daily  Check to see if taking Trulicity 0.75 mg weekly consistently for at least 4 weeks and if blood sugars remain high then can increase to 1.5 mg weekly  Hold off on Novolog at this time

## 2024-08-27 NOTE — TELEPHONE ENCOUNTER
Incoming Refill Request      Medication requested (name and dose): glucagon     [facility]     Additional details provided by patient:     Best call back number:     Does the patient have less than a 3 day supply:  [] Yes  [] No    Varghese Grant Rep  08/27/24, 14:35 EDT

## 2024-08-28 ENCOUNTER — HOME CARE VISIT (OUTPATIENT)
Dept: HOME HEALTH SERVICES | Facility: HOME HEALTHCARE | Age: 84
End: 2024-08-28
Payer: MEDICARE

## 2024-08-28 PROCEDURE — G0300 HHS/HOSPICE OF LPN EA 15 MIN: HCPCS

## 2024-08-29 VITALS
TEMPERATURE: 97.8 F | SYSTOLIC BLOOD PRESSURE: 130 MMHG | OXYGEN SATURATION: 95 % | DIASTOLIC BLOOD PRESSURE: 74 MMHG | HEART RATE: 88 BPM | RESPIRATION RATE: 18 BRPM

## 2024-09-04 ENCOUNTER — OFFICE VISIT (OUTPATIENT)
Dept: WOUND CARE | Facility: HOSPITAL | Age: 84
End: 2024-09-04
Payer: MEDICARE

## 2024-09-04 ENCOUNTER — LAB REQUISITION (OUTPATIENT)
Dept: LAB | Facility: HOSPITAL | Age: 84
End: 2024-09-04
Payer: MEDICARE

## 2024-09-04 ENCOUNTER — HOME CARE VISIT (OUTPATIENT)
Dept: HOME HEALTH SERVICES | Facility: HOME HEALTHCARE | Age: 84
End: 2024-09-04
Payer: MEDICARE

## 2024-09-04 DIAGNOSIS — M86.172 OTHER ACUTE OSTEOMYELITIS, LEFT ANKLE AND FOOT: ICD-10-CM

## 2024-09-04 PROCEDURE — 87147 CULTURE TYPE IMMUNOLOGIC: CPT | Performed by: NURSE PRACTITIONER

## 2024-09-04 PROCEDURE — 87075 CULTR BACTERIA EXCEPT BLOOD: CPT | Performed by: NURSE PRACTITIONER

## 2024-09-04 PROCEDURE — 87205 SMEAR GRAM STAIN: CPT | Performed by: NURSE PRACTITIONER

## 2024-09-04 PROCEDURE — 87070 CULTURE OTHR SPECIMN AEROBIC: CPT | Performed by: NURSE PRACTITIONER

## 2024-09-04 PROCEDURE — 87186 SC STD MICRODIL/AGAR DIL: CPT | Performed by: NURSE PRACTITIONER

## 2024-09-06 LAB
BACTERIA SPEC AEROBE CULT: ABNORMAL
GRAM STN SPEC: ABNORMAL
GRAM STN SPEC: ABNORMAL

## 2024-09-09 ENCOUNTER — TELEPHONE (OUTPATIENT)
Dept: INFECTIOUS DISEASES | Facility: CLINIC | Age: 84
End: 2024-09-09
Payer: MEDICARE

## 2024-09-09 LAB — BACTERIA SPEC ANAEROBE CULT: NORMAL

## 2024-09-09 NOTE — TELEPHONE ENCOUNTER
----- Message from [facility] [employer] sent at 9/7/2024  2:12 PM EDT -----  Regarding: [facility], [place] Wound care questions   Contact: [phone]  Greetings we need to find out if the wound care Team, Lidia Severino can be contacted via [employer]? She’s not listed, but the appointments are listed. We missed a phone call about pharmacy information and it’s clearly in Di Macario’s [facility] chart but they couldn’t access it.

## 2024-09-09 NOTE — TELEPHONE ENCOUNTER
ATTEMPTED TO CALL PATIENT BACK REGARDING QUESTION FOR WOUND CARE NO ANSWER. PATIENT SHOULD BE ADVISE EACH OFFICE IS DIFFERENT SOME PROVIDERS ARE ON EPIC FOR MESSAGES AND SOME ARE NOT. IF SHE HAS QUESTIONS FOR THE WOUND CARE PROVIDER SHE SHOULD CALL THE OFFICE DIRECTLY.

## 2024-09-10 ENCOUNTER — HOME CARE VISIT (OUTPATIENT)
Dept: HOME HEALTH SERVICES | Facility: HOME HEALTHCARE | Age: 84
End: 2024-09-10
Payer: MEDICARE

## 2024-09-11 ENCOUNTER — OFFICE VISIT (OUTPATIENT)
Dept: WOUND CARE | Facility: HOSPITAL | Age: 84
End: 2024-09-11
Payer: MEDICARE

## 2024-09-11 ENCOUNTER — HOME CARE VISIT (OUTPATIENT)
Dept: HOME HEALTH SERVICES | Facility: HOME HEALTHCARE | Age: 84
End: 2024-09-11
Payer: MEDICARE

## 2024-09-11 VITALS
OXYGEN SATURATION: 98 % | RESPIRATION RATE: 17 BRPM | TEMPERATURE: 97.3 F | DIASTOLIC BLOOD PRESSURE: 72 MMHG | SYSTOLIC BLOOD PRESSURE: 140 MMHG | HEART RATE: 67 BPM

## 2024-09-11 PROCEDURE — G0299 HHS/HOSPICE OF RN EA 15 MIN: HCPCS

## 2024-09-18 ENCOUNTER — OFFICE VISIT (OUTPATIENT)
Dept: WOUND CARE | Facility: HOSPITAL | Age: 84
End: 2024-09-18
Payer: MEDICARE

## 2024-09-18 PROBLEM — J96.02 ACUTE RESPIRATORY FAILURE WITH HYPERCAPNIA: Status: ACTIVE | Noted: 2024-09-18

## 2024-09-18 NOTE — ED PROVIDER NOTES
EMERGENCY DEPARTMENT ENCOUNTER    Room Number:  I389/1  Date seen:  9/18/2024  PCP: Lily Yepez PA-C  Historian: Patient and EMS      HPI:  Chief Complaint: Acute respiratory distress with hypoxia  A complete HPI/ROS/PMH/PSH/SH/FH are unobtainable due to: Acute respiratory distress  Context: Di Macario is a 84 y.o. female who presents to the ED via EMS from home for acute respiratory distress.  EMS states that the patient called her daughter for shortness of breath.  The daughter then called EMS.  EMS arrived on scene and found the patient in respiratory distress with oxygen saturations in the low 80s.  The daughter then arrived and states the patient does have a history of aspiration pneumonia in the past.  She also states she has diabetes and heart disease.  EMS gave the patient a DuoNeb en route and placed the patient on BiPAP for increased work of breathing.  On arrival to the ER the patient has a wet cough and is still tachypneic but is pulling at the BiPAP mask.      PAST MEDICAL HISTORY  Active Ambulatory Problems     Diagnosis Date Noted    Essential hypertension 03/04/2016    Vitamin D deficiency 03/04/2016    Hyperuricemia 07/20/2016    Slow transit constipation 01/31/2017    Chronic pain of left knee 01/31/2017    At high risk for falls 08/10/2018    Peripheral neuropathy 08/28/2018    Uncontrolled type 2 diabetes mellitus with hyperglycemia 10/25/2018    Walker as ambulation aid 02/12/2019    Bradycardia, sinus 01/13/2021    Leg swelling 01/13/2021    Hyperlipidemia 01/13/2021    Presence of cardiac pacemaker 03/26/2021    Dementia without behavioral disturbance 06/01/2022    Weakness 07/24/2022    Thrombocytopenia 07/25/2022    Oropharyngeal dysphagia 07/28/2022    Immobility syndrome 07/28/2022    Type 2 diabetes mellitus with hyperglycemia, with long-term current use of insulin 08/25/2022    Paroxysmal atrial fibrillation 02/08/2023    Pure hypercholesterolemia 02/08/2023    Toe  osteomyelitis, left 01/28/2024    Diabetic ulcer of toe of left foot associated with type 2 diabetes mellitus, with bone involvement without evidence of necrosis 03/06/2024    Cellulitis of second toe of left foot 04/10/2024    Chronic multifocal osteomyelitis of left foot 07/23/2024     Resolved Ambulatory Problems     Diagnosis Date Noted    Fall 02/03/2017    Medicare annual wellness visit, subsequent 08/16/2017    Alkaline phosphatase elevation 10/29/2018    Calcium blood increased 10/29/2018    Shortness of breath 01/13/2021    Humeral head fracture, right, closed, initial encounter 10/29/2021    Nail abnormalities 03/17/2022    Esophageal abnormality 06/01/2022    Aspiration pneumonia due to vomitus 06/02/2022    Aspiration pneumonia of both lungs due to gastric secretions, unspecified part of lung 07/24/2022     Past Medical History:   Diagnosis Date    Dementia     Diabetes mellitus     Dysphagia     GERD (gastroesophageal reflux disease)     History of poliomyelitis     Lung consolidation 06/01/2022    Pacemaker     Toe ulcer due to DM          REVIEW OF SYSTEMS  All systems reviewed and negative except for those discussed in HPI.       PAST SURGICAL HISTORY  Past Surgical History:   Procedure Laterality Date    AMPUTATION DIGIT Left 01/30/2024    Procedure: PARTIAL HALLUX AMPUTATION LEFT FOOT;  Surgeon: Nas Gonzales DPM;  Location: [facility]  Service: Podiatry;  Laterality: Left;    AMPUTATION DIGIT Left 3/11/2024    Procedure: AMPUTATION DIGIT;  Surgeon: Nas Gonzales DPM;  Location: [facility] OR;  Service: Podiatry;  Laterality: Left;    ANKLE SURGERY Right     BLADDER SURGERY      Prolapsed    BREAST BIOPSY      CARDIAC ELECTROPHYSIOLOGY PROCEDURE N/A 03/05/2021    Procedure: Pacemaker DC new BOSTON;  Surgeon: Madelin Ferguson MD;  Location: [facility] INVASIVE LOCATION;  Service: Cardiology;  Laterality: N/A;    ENDOSCOPY N/A 06/06/2022    Procedure: ESOPHAGOGASTRODUODENOSCOPY  with biopsy, balloon dilation;  Surgeon: Lo Benjamin MD;  Location: [facility] ENDOSCOPY;  Service: Gastroenterology;  Laterality: N/A;  esophageal stricture, hiatal hernia, gastritis    HYSTERECTOMY      TONSILLECTOMY           FAMILY HISTORY  Family History   Problem Relation Age of Onset    Hypertension Mother     Cancer Mother         Stomach Cancer    Obesity Mother         her entire life over weight    Hypertension Father     Cancer Father         abdominal tumor    Malig Hyperthermia Neg Hx          SOCIAL HISTORY  Social History     Socioeconomic History    Marital status:    Tobacco Use    Smoking status: Former     Types: Cigarettes     Start date: 1973     Quit date:      Years since quittin.7    Smokeless tobacco: Never    Tobacco comments:     quit in  never a pack a day or even a pack a month smoker   Vaping Use    Vaping status: Never Used   Substance and Sexual Activity    Alcohol use: Not Currently     Comment: RARELY    Drug use: No    Sexual activity: Not Currently     Birth control/protection: Post-menopausal, Surgical     Comment: took the pill in the late  early 70s         ALLERGIES  Patient has no known allergies.      PHYSICAL EXAM  ED Triage Vitals [24]   Temp Heart Rate Resp BP SpO2   -- 68 26 (!) 189/98 --      Temp src Heart Rate Source Patient Position BP Location FiO2 (%)   -- Monitor Sitting Left arm --       Physical Exam      GENERAL: 84-year-old female in acute respiratory distress  HENT: NCAT: nares patent: Neck supple  EYES: no scleral icterus  CV: regular rhythm, normal rate  RESPIRATORY: Moderate respiratory distress with wet cough and rhonchi in all lung fields with tachypnea and mild retractions  ABDOMEN: soft, NTND: Bowel sounds positive  MUSCULOSKELETAL: no deformity  NEURO: alert with nonfocal neuro exam  PSYCH:  calm, cooperative  SKIN: warm, dry    Vital signs and nursing notes reviewed.      LAB RESULTS  Recent Results (from  the past 24 hour(s))   ECG 12 Lead Dyspnea    Collection Time: 09/18/24  8:03 PM   Result Value Ref Range    QT Interval 372 ms    QTC Interval 455 ms   Respiratory Panel PCR w/COVID-19(SARS-CoV-2) KINGA/FLIP/ASHA/PAD/COR/SISI In-House, NP Swab in UTM/VTM, 2 HR TAT - Swab, Nasopharynx    Collection Time: 09/18/24  8:05 PM    Specimen: Nasopharynx; Swab   Result Value Ref Range    ADENOVIRUS, PCR Not Detected Not Detected    Coronavirus 229E Not Detected Not Detected    Coronavirus HKU1 Not Detected Not Detected    Coronavirus NL63 Not Detected Not Detected    Coronavirus OC43 Not Detected Not Detected    COVID19 Detected (C) Not Detected - Ref. Range    Human Metapneumovirus Not Detected Not Detected    Human Rhinovirus/Enterovirus Not Detected Not Detected    Influenza A PCR Not Detected Not Detected    Influenza B PCR Not Detected Not Detected    Parainfluenza Virus 1 Not Detected Not Detected    Parainfluenza Virus 2 Not Detected Not Detected    Parainfluenza Virus 3 Not Detected Not Detected    Parainfluenza Virus 4 Not Detected Not Detected    RSV, PCR Not Detected Not Detected    Bordetella pertussis pcr Not Detected Not Detected    Bordetella parapertussis PCR Not Detected Not Detected    Chlamydophila pneumoniae PCR Not Detected Not Detected    Mycoplasma pneumo by PCR Not Detected Not Detected   Blood Gas, Arterial -    Collection Time: 09/18/24  8:15 PM    Specimen: Arterial Blood   Result Value Ref Range    Site Left Radial     Khari's Test Positive     pH, Arterial 7.297 (C) 7.350 - 7.450 pH units    pCO2, Arterial 62.5 (C) 35.0 - 45.0 mm Hg    pO2, Arterial 277.2 (H) 80.0 - 100.0 mm Hg    HCO3, Arterial 30.6 (H) 22.0 - 28.0 mmol/L    Base Excess, Arterial 2.3 (H) 0.0 - 2.0 mmol/L    O2 Saturation, Arterial 99.8 (H) 92.0 - 98.5 %    CO2 Content >32.45 (H) 23 - 27 mmol/L    Barometric Pressure for Blood Gas 747.3000 mmHg    Modality HFNC     Flow Rate 6.0000 lpm    Rate 20 Breaths/minute    Notified Who DONNA  York     Read Back Yes     Notified Time      Hemodilution No     Device Comment %    Comprehensive Metabolic Panel    Collection Time: 09/18/24  8:36 PM    Specimen: Arm, Right; Blood   Result Value Ref Range    Glucose 496 (C) 65 - 99 mg/dL    BUN 21 8 - 23 mg/dL    Creatinine 0.70 0.57 - 1.00 mg/dL    Sodium 137 136 - 145 mmol/L    Potassium 3.9 3.5 - 5.2 mmol/L    Chloride 96 (L) 98 - 107 mmol/L    CO2 27.0 22.0 - 29.0 mmol/L    Calcium 10.1 8.6 - 10.5 mg/dL    Total Protein 7.8 6.0 - 8.5 g/dL    Albumin 3.8 3.5 - 5.2 g/dL    ALT (SGPT) 9 1 - 33 U/L    AST (SGOT) 15 1 - 32 U/L    Alkaline Phosphatase 147 (H) 39 - 117 U/L    Total Bilirubin 0.3 0.0 - 1.2 mg/dL    Globulin 4.0 gm/dL    A/G Ratio 1.0 g/dL    BUN/Creatinine Ratio 30.0 (H) 7.0 - 25.0    Anion Gap 14.0 5.0 - 15.0 mmol/L    eGFR 84.9 >60.0 mL/min/1.73   BNP    Collection Time: 09/18/24  8:36 PM    Specimen: Arm, Right; Blood   Result Value Ref Range    proBNP 313.0 0.0 - 1,800.0 pg/mL   High Sensitivity Troponin T    Collection Time: 09/18/24  8:36 PM    Specimen: Arm, Right; Blood   Result Value Ref Range    HS Troponin T 36 (H) <14 ng/L   Lactic Acid, Plasma    Collection Time: 09/18/24  8:36 PM    Specimen: Arm, Right; Blood   Result Value Ref Range    Lactate 2.5 (C) 0.5 - 2.0 mmol/L   Procalcitonin    Collection Time: 09/18/24  8:36 PM    Specimen: Arm, Right; Blood   Result Value Ref Range    Procalcitonin 0.09 0.00 - 0.25 ng/mL   CBC Auto Differential    Collection Time: 09/18/24  8:36 PM    Specimen: Arm, Right; Blood   Result Value Ref Range    WBC 11.55 (H) 3.40 - 10.80 10*3/mm3    RBC 4.49 3.77 - 5.28 10*6/mm3    Hemoglobin 11.6 (L) 12.0 - 15.9 g/dL    Hematocrit 39.6 34.0 - 46.6 %    MCV 88.2 79.0 - 97.0 fL    MCH 25.8 (L) 26.6 - 33.0 pg    MCHC 29.3 (L) 31.5 - 35.7 g/dL    RDW 13.8 12.3 - 15.4 %    RDW-SD 44.2 37.0 - 54.0 fl    MPV 11.5 6.0 - 12.0 fL    Platelets 289 140 - 450 10*3/mm3    Neutrophil % 68.4 42.7 - 76.0 %     Lymphocyte % 24.1 19.6 - 45.3 %    Monocyte % 6.0 5.0 - 12.0 %    Eosinophil % 0.4 0.3 - 6.2 %    Basophil % 0.7 0.0 - 1.5 %    Immature Grans % 0.4 0.0 - 0.5 %    Neutrophils, Absolute 7.90 (H) 1.70 - 7.00 10*3/mm3    Lymphocytes, Absolute 2.78 0.70 - 3.10 10*3/mm3    Monocytes, Absolute 0.69 0.10 - 0.90 10*3/mm3    Eosinophils, Absolute 0.05 0.00 - 0.40 10*3/mm3    Basophils, Absolute 0.08 0.00 - 0.20 10*3/mm3    Immature Grans, Absolute 0.05 0.00 - 0.05 10*3/mm3    nRBC 0.0 0.0 - 0.2 /100 WBC   Blood Gas, Arterial -    Collection Time: 09/18/24 10:55 PM    Specimen: Arterial Blood   Result Value Ref Range    Site Left Radial     Khari's Test Positive     pH, Arterial 7.272 (C) 7.350 - 7.450 pH units    pCO2, Arterial 64.1 (C) 35.0 - 45.0 mm Hg    pO2, Arterial 156.5 (H) 80.0 - 100.0 mm Hg    HCO3, Arterial 29.6 (H) 22.0 - 28.0 mmol/L    Base Excess, Arterial 1.0 0.0 - 2.0 mmol/L    O2 Saturation, Arterial 99.0 (H) 92.0 - 98.5 %    A-a DO2 0.7 mmHg    CO2 Content 31.5 (H) 23 - 27 mmol/L    Barometric Pressure for Blood Gas 746.8000 mmHg    Modality BiPap     FIO2 40 %    Set [facility] Resp Rate 20     Rate 28 Breaths/minute    PIP 15 cmH2O    Notified Who Gael Gaspar     Read Back Yes     Notified Time      Hemodilution No     Inspiratory Time 1     Device Comment MODE: AVAPS VT: 400 RATE: 20 ITIME: 1.00 RISE: 2     Device Comment 2 EPAP: 9 MIN: 10 MAX: 15  O2 40%     PO2/FIO2 391 0 - 500       Ordered the above labs and reviewed the results.        RADIOLOGY  XR Chest 1 View    Result Date: 9/18/2024  XR CHEST 1 VW-  HISTORY: Female who is 84 years-old, short of breath  TECHNIQUE: Frontal view of the chest  COMPARISON: 9/21/2022, 7/24/2022  FINDINGS: The patient is rotated towards the left. The heart size appears normal. Left-sided pacemaker and cardiac leads are seen. Aorta is calcified. Pulmonary vasculature is unremarkable. No focal pulmonary consolidation, pleural effusion, or pneumothorax. Chronic proximal  right humeral deformity is noted. Old granulomatous disease is present. No acute osseous process.      No focal pulmonary consolidation. Follow-up as clinical indications persist.  This report was finalized on 9/18/2024 8:21 PM by Dr. Uche Geiger M.D on Workstation: [id number]       Ordered the above noted radiological studies. Reviewed by me in PACS.            PROCEDURES  Critical Care    Performed by: Jonathan Cameron MD  Authorized by: Jonathan Cameron MD    Critical care provider statement:     Critical care time (minutes):  41    Critical care time was exclusive of:  Separately billable procedures and treating other patients    Critical care was necessary to treat or prevent imminent or life-threatening deterioration of the following conditions:  Respiratory failure    Critical care was time spent personally by me on the following activities:  Development of treatment plan with patient or surrogate, discussions with consultants, discussions with primary provider, evaluation of patient's response to treatment, examination of patient, obtaining history from patient or surrogate, ordering and performing treatments and interventions, ordering and review of radiographic studies, ordering and review of laboratory studies, pulse oximetry, re-evaluation of patient's condition and review of old charts    I assumed direction of critical care for this patient from another provider in my specialty: no      Care discussed with: admitting provider              MEDICATIONS GIVEN IN ER  Medications   piperacillin-tazobactam (ZOSYN) 3.375 g IVPB in 100 mL NS MBP (CD) (has no administration in time range)   dexAMETHasone (DECADRON) tablet 6 mg (has no administration in time range)     Or   dexAMETHasone (DECADRON) injection 6 mg (has no administration in time range)   Pharmacy Consult - Remdesivir for Severe COVID-19 (Within 7 days of symptom onset) (has no administration in time range)   nitroglycerin (NITROSTAT) SL tablet  0.4 mg (has no administration in time range)   sodium chloride 0.9 % flush 10 mL (has no administration in time range)   sodium chloride 0.9 % flush 10 mL (has no administration in time range)   sodium chloride 0.9 % infusion 40 mL (has no administration in time range)   sennosides-docusate (PERICOLACE) 8.6-50 MG per tablet 2 tablet (has no administration in time range)     And   polyethylene glycol (MIRALAX) packet 17 g (has no administration in time range)     And   bisacodyl (DULCOLAX) EC tablet 5 mg (has no administration in time range)     And   bisacodyl (DULCOLAX) suppository 10 mg (has no administration in time range)   Enoxaparin Sodium (LOVENOX) syringe 40 mg (has no administration in time range)   sodium chloride 0.9 % flush 10 mL (has no administration in time range)   sodium chloride 0.9 % flush 10 mL (has no administration in time range)   sodium chloride 0.9 % infusion 40 mL (has no administration in time range)   dextrose (GLUTOSE) oral gel 15 g (has no administration in time range)   dextrose (D50W) (25 g/50 mL) IV injection 10-50 mL (has no administration in time range)   glucagon (GLUCAGEN) injection 1 mg (has no administration in time range)   insulin regular 1 unit/mL in 0.9% sodium chloride (Glucommander) (has no administration in time range)   Potassium Replacement - Follow Nurse / BPA Driven Protocol (has no administration in time range)   remdesivir 200 mg in sodium chloride 0.9 % 290 mL IVPB (powder vial) (has no administration in time range)     Followed by   remdesivir 100 mg in sodium chloride 0.9 % 250 mL IVPB (powder vial) (has no administration in time range)   methylPREDNISolone sodium succinate (SOLU-Medrol) injection 125 mg (125 mg Intravenous Given 9/18/24 2017)   albuterol (PROVENTIL) nebulizer solution 0.083% 2.5 mg/3mL (2.5 mg Nebulization Given 9/18/24 2000)   LORazepam (ATIVAN) injection 0.5 mg (0.5 mg Intravenous Given 9/18/24 2050)   insulin regular (humuLIN R,novoLIN R)  injection 10 Units (10 Units Intravenous Given 9/18/24 9020)   piperacillin-tazobactam (ZOSYN) 3.375 g IVPB in 100 mL NS MBP (CD) (3.375 g Intravenous New Bag 9/18/24 2252)             MEDICAL DECISION MAKING, PROGRESS, and CONSULTS    All labs have been independently reviewed by me.  All radiology studies have been reviewed by me and I have also reviewed the radiology report.   EKG's independently viewed and interpreted by me.  Discussion below represents my analysis of pertinent findings related to patient's condition, differential diagnosis, treatment plan and final disposition.      Additional sources:  - Discussed/ obtained information from independent historians: The patient's daughters are here who states that she has a history of dysphagia and is supposed to be on a thickened diet but she does not abide by this.    - External (non-ED) record review: I reviewed the patient's last admission to the hospital from January 28 through February 4 of this year where she was admitted for osteomyelitis of her toe, A-fib, pacemaker, diabetes and dementia.    - Chronic or social conditions impacting care: Patient is at home alone    - Shared decision making: After shared decision-making discussion we agree she needs to be admitted to the ICU for further evaluation and care      Orders placed during this visit:  Orders Placed This Encounter   Procedures    Critical Care    Respiratory Panel PCR w/COVID-19(SARS-CoV-2) [facility]A/FLIP/ASHA/PAD/COR/SISI In-House, NP Swab in UTM/VTM, 2 HR TAT - Swab, Nasopharynx    Blood Culture - Blood,    Blood Culture - Blood,    XR Chest 1 View    Comprehensive Metabolic Panel    Blood Gas, Arterial -    BNP    High Sensitivity Troponin T    Lactic Acid, Plasma    Procalcitonin    CBC Auto Differential    STAT Lactic Acid, Reflex    High Sensitivity Troponin T 2Hr    Comprehensive Metabolic Panel    Basic Metabolic Panel    CBC Auto Differential    CBC Auto Differential    Blood Gas, Arterial -     NPO Diet NPO Type: Strict NPO    Vital Signs Every Hour and Per Hospital Policy Based on Patient Condition    Telemetry - Place Orders & Notify Provider of Results When Patient Experiences Acute Chest Pain, Dysrhythmia or Respiratory Distress    Continuous Pulse Oximetry    Height & Weight    Daily Weights    Intake & Output    Oral Care - Patient Not on NPPV & Not Intubated    Target Arousal Level RASS 0 to -2    Use Mobility Guidelines for Advancement of Activity    Saline Lock & Maintain IV Access    Prior to Initiating Glucommander™, Ensure All Prior Insulin Orders Are Discontinued    Do Not Start Insulin Infusion if Potassium < 3.3    Use a Dedicated Line for Insulin Infusion (If Possible).  May Use a Carrier Fluid of NS at KVO Rate if Insulin Rate is Insufficient to Maintain IV Patency.  Prime IV Line With Insulin Infusion    Glucommander Must Be Discontinued if Insulin Infusion is Discontinued.  If Insulin Infusion is Restarted, Previous Glucommander Settings Must Be Discontinued and Re-Entered From New Order    Utilize the Start Meal Feature / Meal Bolus Feature in Glucommander if Patient Starts a Diet or Bolus Tube Feedings    If Insulin Infusion is Paused - Follow Glucommander Instructions    Notify Provider - Insulin Infusion    RN to Release PRN POC Glucose Orders Per Glucommander    RN to Order STAT Glucose For Any POC Glucose <10 or >600    Code Status and Medical Interventions: CPR (Attempt to Resuscitate); Full Support    Pulmonology (on-call MD unless specified)    Patient is on Glucommander    NIPPV (CPAP or BIPAP)    POC Glucose PRN    ECG 12 Lead Dyspnea    Insert Peripheral IV    Insert Peripheral IV x2    Inpatient Admission    CBC & Differential    CBC & Differential         Differential diagnosis:  My differential diagnosis includes but is not limited to pneumonia, congestive heart failure, pulmonary embolism, pleural effusion, acute coronary syndrome, chronic obstructive pulmonary disease  exacerbation, or pneumothorax.      Independent interpretation of labs, radiology studies, and discussions with consultants:  ED Course as of 09/18/24 2328   Wed Sep 18, 2024   2001 I will give her Solu-Medrol and a DuoNeb and obtain an ABG.  We will place her on nasal cannula keep her sats in the 90s.  I will obtain EKG, chest x-ray, labs and RVP for further evaluation. [GP]   2005 EKG    EKG time: 2003  Rhythm/Rate: Normal sinus rhythm at 90  Peaked T waves with mild J-point elevation in V2 and V3  Non-Specific ST-T changes    Changed compared to prior on 3/7/2024    Interpreted Contemporaneously by me.  Independently viewed by me     [GP]   2020 The patient's ABG does show hypercapnic respiratory failure or place her back on BiPAP [GP]   2111 The patient is COVID-positive  [GP]   2203 On repeat examination the patient is tolerating her BiPAP.  Her daughters are here now who states that she has had a cold for the last few days and are worried she may have been choking for the last few days as well.  They know that she is a brittle diabetic.  I had a discussion with the patient and the daughters and they state that if she failed BiPAP that they would want her to be intubated.  Thus I will consult the ICU. [GP]   2223 I discussed the case with Dr. Braun from the ICU.  He will admit her with her COVID and respiratory failure.  We will go ahead and cover for aspiration. [GP]      ED Course User Index  [GP] Jonathan Cameron MD               DIAGNOSIS  Final diagnoses:   Acute hypercapnic respiratory failure   COVID-19   Hyperglycemia   Elevated troponin   History of aspiration pneumonia         DISPOSITION  ADMISSION    Discussed treatment plan and reason for admission with pt/family and admitting physician.  Pt/family voiced understanding of the plan for admission for further testing/treatment as needed.            Latest Documented Vital Signs:  As of 23:28 EDT  BP- 128/80 HR- 81 Temp- 97.1 °F (36.2 °C) (Tympanic)  O2 sat- 100%--      --------------------  Please note that portions of this were completed with a voice recognition program.       Note Disclaimer: At [facility], we believe that sharing information builds trust and better relationships. You are receiving this note because you are receiving care at [facility] or recently visited. It is possible you will see health information before a provider has talked with you about it. This kind of information can be easy to misunderstand. To help you fully understand what it means for your health, we urge you to discuss this note with your provider.             Jonathan Cameron MD  09/18/24 7294

## 2024-09-19 ENCOUNTER — DOCUMENTATION (OUTPATIENT)
Dept: HOME HEALTH SERVICES | Facility: HOME HEALTHCARE | Age: 84
End: 2024-09-19
Payer: MEDICARE

## 2024-09-19 PROBLEM — U07.1 COVID-19 VIRUS INFECTION: Status: ACTIVE | Noted: 2024-01-01

## 2024-09-19 NOTE — THERAPY EVALUATION
Acute Care - Speech Language Pathology   Swallow Initial Evaluation [facility]     Patient Name: Di Macario  : 1940  MRN: [id number]  Today's Date: 2024               Admit Date: 2024    Visit Dx:     ICD-10-CM ICD-9-CM   1. Acute hypercapnic respiratory failure  J96.02 518.81   2. COVID-19  U07.1 079.89   3. Hyperglycemia  R73.9 790.29   4. Elevated troponin  R79.89 790.6   5. History of aspiration pneumonia  Z87.01 V12.61     Patient Active Problem List   Diagnosis    Essential hypertension    Vitamin D deficiency    Hyperuricemia    Slow transit constipation    Chronic pain of left knee    At high risk for falls    Peripheral neuropathy    Uncontrolled type 2 diabetes mellitus with hyperglycemia    Walker as ambulation aid    Bradycardia, sinus    Leg swelling    Hyperlipidemia    Presence of cardiac pacemaker    Dementia without behavioral disturbance    Weakness    Thrombocytopenia    Oropharyngeal dysphagia    Immobility syndrome    Type 2 diabetes mellitus with hyperglycemia, with long-term current use of insulin    Paroxysmal atrial fibrillation    Pure hypercholesterolemia    Toe osteomyelitis, left    Diabetic ulcer of toe of left foot associated with type 2 diabetes mellitus, with bone involvement without evidence of necrosis    Cellulitis of second toe of left foot    Chronic multifocal osteomyelitis of left foot    Acute respiratory failure with hypercapnia     Past Medical History:   Diagnosis Date    Dementia     states better with medication     Diabetes mellitus     Dysphagia     Essential hypertension 2016    GERD (gastroesophageal reflux disease)     History of poliomyelitis     IN NECK - AGE 4    Hyperlipidemia 2021    Lung consolidation 2022    Pacemaker     Peripheral neuropathy 2018    Toe ulcer due to DM     LEFT GREAT TOE    Type 2 diabetes mellitus with hyperglycemia, with long-term current use of insulin 2022    Vitamin D  deficiency      Past Surgical History:   Procedure Laterality Date    AMPUTATION DIGIT Left 01/30/2024    Procedure: PARTIAL HALLUX AMPUTATION LEFT FOOT;  Surgeon: Nas Gonzales DPM;  Location: [facility] MAIN OR;  Service: Podiatry;  Laterality: Left;    AMPUTATION DIGIT Left 3/11/2024    Procedure: AMPUTATION DIGIT;  Surgeon: Nas Gonzales DPM;  Location: [facility] MAIN OR;  Service: Podiatry;  Laterality: Left;    ANKLE SURGERY Right     BLADDER SURGERY      Prolapsed    BREAST BIOPSY      CARDIAC ELECTROPHYSIOLOGY PROCEDURE N/A 03/05/2021    Procedure: Pacemaker DC new [place];  Surgeon: Madelin Ferguson MD;  Location: [facility] CATH INVASIVE LOCATION;  Service: Cardiology;  Laterality: N/A;    ENDOSCOPY N/A 06/06/2022    Procedure: ESOPHAGOGASTRODUODENOSCOPY with biopsy, balloon dilation;  Surgeon: Lo Benjamin MD;  Location: [facility] ENDOSCOPY;  Service: Gastroenterology;  Laterality: N/A;  esophageal stricture, hiatal hernia, gastritis    HYSTERECTOMY      TONSILLECTOMY         SLP Recommendation and Plan  SLP Swallowing Diagnosis: suspected pharyngeal dysphagia (09/19/24 1200)  SLP Diet Recommendation: NPO (09/19/24 1200)     SLP Rec. for Method of Medication Administration: meds via alternate route (09/19/24 1200)     Monitor for Signs of Aspiration: yes, notify SLP if any concerns (09/19/24 1200)  Recommended Diagnostics: reassess via clinical swallow evaluation (09/19/24 1200)           Therapy Frequency (Swallow): PRN (09/19/24 1200)  Predicted Duration Therapy Intervention (Days): until discharge (09/19/24 1200)  Oral Care Recommendations: Oral Care BID/PRN (09/19/24 1200)                                        Plan of Care Reviewed With: patient  Outcome Evaluation: Patient seen for clinical swallow assessment.      SWALLOW EVALUATION (Last 72 Hours)       SLP Adult Swallow Evaluation       Row Name 09/19/24 1200                   Rehab Evaluation    Document Type evaluation  -        Patient  Effort adequate  -           General Information    Patient Profile Reviewed yes  -SH        Pertinent History Of Current Problem COVID, post polio syndrome, known dysphagia  -SH        Current Method of Nutrition NPO  -SH        Precautions/Limitations, Vision WFL;for purposes of eval  -SH        Precautions/Limitations, Hearing WFL;for purposes of eval  -SH        Prior Level of Function-Communication cognitive-linguistic impairment  -        Prior Level of Function-Swallowing other (see comments)  NPO, patient consumes regular diet  -        Plans/Goals Discussed with patient;family;agreed upon  -        Barriers to Rehab medically complex;previous functional deficit  -SH           Pain    Additional Documentation Pain Scale: FACES Pre/Post-Treatment (Group)  -SH           Pain Scale: FACES Pre/Post-Treatment    Pain: FACES Scale, Pretreatment 2-->hurts little bit  -           Oral Musculature and Cranial Nerve Assessment    Oral Motor General Assessment generalized oral motor weakness;vocal impairment  -           Clinical Swallow Eval    Clinical Swallow Evaluation Summary Patient seen for clinical swallow assessment. Previous VFSS images reviewed. Care discussed with CAROLIN Gudino, over the phone. Pt has paperwork for DNR, but pt agreed to intubation over night, therefore code status changed per daughter. Daughter reports the patient is beginning the palliative process as her care has become more challenging for the family. Pt has been non compliant with recs from Speech therapy since discharge from rehab in 2022, but has been able to avoid complications from aspiration. Pt now with COVID. On 8 Ls HFNC. Wet voice on secretions and difficulty following oral motor commands. Ice presented. Pt able to manipulate ice and trigger multiple swallows, but wet voice noted. Frequent coughing/throat clearing also present. Pt unable to clear wet quality. PO is not appropriate at this time. POA was open  to temporary cortrak placement, but patient did not refuse or agree to temporary feeding tube. It's difficult to determine if patient's dysphagia is acutely worse d/t COVID vs a progression of her known moderate-severe oropharyngeal impairment. SLP recs NPO, suspect speech therapy will continue to recommend NPO as patient improves and patient will need to decide on diet for quality of life. Agreed with palliative consult. Discussed care with RN and MD.  -           SLP Evaluation Clinical Impression    SLP Swallowing Diagnosis suspected pharyngeal dysphagia  -           Recommendations    Therapy Frequency (Swallow) PRN  -        Predicted Duration Therapy Intervention (Days) until discharge  -        SLP Diet Recommendation NPO  -        Recommended Diagnostics reassess via clinical swallow evaluation  -        Oral Care Recommendations Oral Care BID/PRN  -[facility] Rec. for Method of Medication Administration meds via alternate route  -        Monitor for Signs of Aspiration yes;notify SLP if any concerns  -                  User Key  (r) = Recorded By, (t) = Taken By, (c) = Cosigned By      Initials Name Effective Dates     Mayela Barnett SLP 01/05/24 -                     EDUCATION  The patient has been educated in the following areas:   Dysphagia (Swallowing Impairment).                Time Calculation:    Time Calculation- SLP       Row Name 09/19/24 1231             Time Calculation- [facility]    SLP Start Time 1100  -      SLP Received On 09/19/24  -                User Key  (r) = Recorded By, (t) = Taken By, (c) = Cosigned By      Initials Name Provider Type    Mayela Esparza SLP Speech and Language Pathologist                    Therapy Charges for Today       Code Description Service Date Service Provider Modifiers Qty    [id number] HC ST EVAL ORAL PHARYNG SWALLOW 5 9/19/2024 Mayela Barnett SLP GN 1                 JACQUELINE Ratliff  9/19/2024

## 2024-09-19 NOTE — NURSING NOTE
CWCN: We are seeing patient at the request of the floor nurse regarding skin issue on left heel and left 2nd Toe. Patient is alert, with history of Diabetes Mellitus and multiple co-morbidities, and is being followed up in wound care clinic. Upon assessment we could see scab present on the 2nd Toe, Betadine was ordered.  In addition, the dressing on the rt heel says not to remove it. In the last note from [facility], Collagen and Hidrofera Blue, ABD pad, Kerlix wrap were ordered. Message was sent to Lidia VELAZQUEZ from [facility], to clarify if we are continuing with the same order and how often we could change it.  N will follow up tomorrow.

## 2024-09-19 NOTE — PROGRESS NOTES
[facility]  Clinical Pharmacy Department     Remdesivir Review Note  Does patient qualify for nirmatrelvir/ritonavir (Paxlovid)?: no   If no, please check one of the following rationale(s):   [] Patient is outside 5 days from symptom onset window   [] Patient has a drug-drug interaction that cannot be managed while admitted (I.e. can't dose adjust or hold during Paxlovid therapy)  [x] Patient qualifies for a 5-day treatment course of Remdesivir (requires supplemental oxygen or SpO2 ? 94% on room air)      Di Macario is a 84 y.o. female with confirmed COVID-19 infection on day 1 of hospitalization.      Consulting Provider:  Dr. Braun  Date of Confirmed SARS-CoV-2: 9/18  Date of Symptom Onset: <7 days  Other Antimicrobials:         Allergies  Allergies as of 09/18/2024    (No Known Allergies)       Microbiology:  Microbiology Results (last 10 days)       Procedure Component Value - Date/Time    Respiratory Panel PCR w/COVID-19(SARS-CoV-2) KINGA/FLIP/ASHA/PAD/COR/SISI In-House, NP Swab in UTM/VTM, 2 HR TAT - Swab, Nasopharynx [[id number]]  (Abnormal) Collected: 09/18/24 2005    Lab Status: Final result Specimen: Swab from Nasopharynx Updated: 09/18/24 2111     ADENOVIRUS, PCR Not Detected     Coronavirus 229E Not Detected     Coronavirus HKU1 Not Detected     Coronavirus NL63 Not Detected     Coronavirus OC43 Not Detected     COVID19 Detected     Human Metapneumovirus Not Detected     Human Rhinovirus/Enterovirus Not Detected     Influenza A PCR Not Detected     Influenza B PCR Not Detected     Parainfluenza Virus 1 Not Detected     Parainfluenza Virus 2 Not Detected     Parainfluenza Virus 3 Not Detected     Parainfluenza Virus 4 Not Detected     RSV, PCR Not Detected     Bordetella pertussis pcr Not Detected     Bordetella parapertussis PCR Not Detected     Chlamydophila pneumoniae PCR Not Detected     Mycoplasma pneumo by PCR Not Detected    Narrative:      In the setting of a positive respiratory panel with  "a viral infection PLUS a negative procalcitonin without other underlying concern for bacterial infection, consider observing off antibiotics or discontinuation of antibiotics and continue supportive care. If the respiratory panel is positive for atypical bacterial infection (Bordetella pertussis, Chlamydophila pneumoniae, or Mycoplasma pneumoniae), consider antibiotic de-escalation to target atypical bacterial infection.            Vitals/Labs/I&O  Visit Vitals  /80   Pulse 89   Temp 97.1 °F (36.2 °C) (Tympanic)   Resp 26   Ht 157.5 cm (62\")   Wt 61.7 kg (136 lb 0.4 oz)   SpO2 96%   BMI 24.88 kg/m²       Results from last 7 days   Lab Units 09/18/24 2036   WBC 10*3/mm3 11.55*     Results from last 7 days   Lab Units 09/18/24 2036   PROCALCITONIN ng/mL 0.09     Results from last 7 days   Lab Units 09/18/24 2036   AST (SGOT) U/L 15      Results from last 7 days   Lab Units 09/18/24 2036   ALT (SGPT) U/L 9       Estimated Creatinine Clearance: 50.7 mL/min (by C-G formula based on SCr of 0.7 mg/dL).  Results from last 7 days   Lab Units 09/18/24 2036   BUN mg/dL 21   CREATININE mg/dL 0.70     Intake & Output (last 3 days)       None            Assessment/Plan:    Patient meets the following inclusion/exclusion criteria:  Patient is hospitalized with confirmed COVID-19 infection (and accompanying symptoms)  Patient meets one of the two below criteria:  Patient is requiring an increase in baseline supplemental oxygen requirements OR SpO2 </= 94% on room air secondary to COVID-19 infection OR  Baseline and daily LFTs and Scr have been ordered prior to remdesivir initiation  ALT is not ? 10 times the upper limit of normal  Patient is not on concomitant hydroxychloroquine or chloroquine  Patient does not require invasive mechanical ventilation (IMV) or ECMO  Patient is within 10 days from symptom onset (for criteria 2a) or within 7 days of symptom onset (for criteria 2b)    Remdesivir 200 mg IV x 1 dose, followed " by 100 mg IV q24h for 4 days or until hospital discharge (whichever comes first) has been ordered.    Thank you for involving pharmacy in this patient's care. Please contact pharmacy with any questions or concerns.                           Lubna Marks, PharmD  Clinical Pharmacist

## 2024-09-19 NOTE — ACP (ADVANCE CARE PLANNING)
Met with the patient at the bedside and discussed with her daughter and power of  Carolyn on the phone.  Patient has been a DNR for a long time.  I do not think last night the patient quite knew what she was agreeing to when she said she would be intubated nor do I necessarily think she was capable of really understanding the question and the ramifications.  Her daughter agrees with this.  When I discussed with the patient today she is adamant she would not want to be on the ventilator.  We discussed her current quality of life and her daughter says that the patient's not real happy with things.  She is told her daughters multiple times in the past should rather be unhealthy and die before she ends up in a nursing home and wants to be at home and does not want to leave her house.  However, things have been getting increasingly difficult for the family.  The dementia is been increasingly harder to manage.  She also has quite a bit of health problems.  She has not been compliant with her medications and has been hiding pills and sometimes takes too much insulin or not enough insulin or does not take her insulin at all.  They are currently under palliative care.  It is unclear whether at that process.  At this point she is in the hospital with acute respiratory failure secondary to COVID and aspiration pneumonia.  She has significant oropharyngeal dysphagia and has had this in the past on multiple admissions.  Has been recommended in the past that she be n.p.o. and consider alternative routes for feedings but the patient has been always been fairly resistant to that in the past.  She remains resistant to a feeding tube either in her nose or in her stomach and does not want artificial nutrition.  Discussed with the daughter and the patient regarding palliative feeds.  The patient during my evaluation asked multiple times if she could have some water.  I did review the speech therapy notes and she did not do very  well at her bedside swallow.  I agree this is risky but the patient and family are agreeable to the risk of worsening pneumonia and hypoxia if it keeps the patient happy.  They ultimately want her quality life to be as best that it can for as long as it can.  They do see and understand that things are likely moving in a quicker direction.  I do not think it is a stretch at all at this point to think that she is probably in her final stages.  I did bring up hospice at this point and they have discussed it is a family in the past but have held off as they did not think she was in her last 6 months up until now.  They are interested in discussing hospice support at discharge.  Will continue to follow along and assess ongoing goals of care as her hospitalization progresses.  Discussed with the nurse on the phone and Dr. Chamorro.    Electronically signed by Vernon Keene MD, 09/19/24, 3:59 PM EDT.

## 2024-09-19 NOTE — CONSULTS
[facility]  Cardiology Consult Note    Patient Identification:  Name: Di Macario  Age: 84 y.o.  Sex: female  :  1940  MRN: [id number]             Requesting Physician: Lidia TIMMONS    Reason for Consultation / Chief Complaint: elevated troponin, dyspnea    History of Present Illness:   This is a 84 year old female who is well known with our service with hypertension, SSS s/p pacemaker, hyperlipidemia, diabetes mellitus. She presented to [facility] ER with shortness of breath. Work up in ER without focal pulmonary consolidation. Respiratory panel positive for COVID, Initial HS troponin 36, repeat 239, , cr wnl, hyperglycemic with glucose 496, lactate 2.5, WBC 11.55, she was maintained on Bipap, given iv steroids, antibiotic, insulin, nebulizer and admitted to ICU for further management.     Echo 2021 EF 66.8%, normal LV function.      Stress test  was a normal myocardial perfusion study with no evidence of ischemia.    Comorbid cardiac risk factors: Hypertension, hyperlipidemia, diabetes mellitus    Past Medical History:  Past Medical History:   Diagnosis Date    Dementia     states better with medication     Diabetes mellitus     Dysphagia     Essential hypertension 2016    GERD (gastroesophageal reflux disease)     History of poliomyelitis     IN NECK - AGE 4    Hyperlipidemia 2021    Lung consolidation 2022    Pacemaker     Peripheral neuropathy 2018    Toe ulcer due to DM     LEFT GREAT TOE    Type 2 diabetes mellitus with hyperglycemia, with long-term current use of insulin 2022    Vitamin D deficiency      Past Surgical History:  Past Surgical History:   Procedure Laterality Date    AMPUTATION DIGIT Left 2024    Procedure: PARTIAL HALLUX AMPUTATION LEFT FOOT;  Surgeon: Nas Gonzales DPM;  Location: [facility];  Service: Podiatry;  Laterality: Left;    AMPUTATION DIGIT Left 3/11/2024    Procedure: AMPUTATION DIGIT;   Surgeon: Nas Gonzales DPM;  Location: [facility] MAIN OR;  Service: Podiatry;  Laterality: Left;    ANKLE SURGERY Right     BLADDER SURGERY      Prolapsed    BREAST BIOPSY      CARDIAC ELECTROPHYSIOLOGY PROCEDURE N/A 03/05/2021    Procedure: Pacemaker DC new BOSTON;  Surgeon: Madelin Ferguson MD;  Location: [facility] CATH INVASIVE LOCATION;  Service: Cardiology;  Laterality: N/A;    ENDOSCOPY N/A 06/06/2022    Procedure: ESOPHAGOGASTRODUODENOSCOPY with biopsy, balloon dilation;  Surgeon: Lo Benjamin MD;  Location: [facility] ENDOSCOPY;  Service: Gastroenterology;  Laterality: N/A;  esophageal stricture, hiatal hernia, gastritis    HYSTERECTOMY      TONSILLECTOMY        Allergies:  No Known Allergies  Home Meds:  Medications Prior to Admission   Medication Sig Dispense Refill Last Dose    amLODIPine (NORVASC) 5 MG tablet TAKE ONE-HALF (1/2) TABLET DAILY 90 tablet 3     collagenase 250 UNIT/GM ointment Apply 1 Application topically to the appropriate area as directed Daily.       donepezil (ARICEPT) 10 MG tablet Take 1 tablet by mouth Every Night. 90 tablet 3     escitalopram (LEXAPRO) 5 MG tablet Take 1 tablet by mouth Daily.       famotidine (PEPCID) 20 MG tablet Take 1 tablet by mouth 2 (Two) Times a Day. Indications: Heartburn       Insulin Glargine (Lantus SoloStar) 100 UNIT/ML injection pen INJECT 10 UNITS UNDER THE SKIN INTO THE APPROPRIATE AREA AS DIRECTED DAILY 15 mL 1     melatonin 1 MG tablet Take 1 tablet by mouth Every Night. 30 tablet 1     memantine (NAMENDA) 10 MG tablet Take 1 tablet by mouth 2 (Two) Times a Day. Indications: Alzheimer's Disease       pravastatin (PRAVACHOL) 40 MG tablet TAKE 1 TABLET EVERY EVENING 90 tablet 3     Zonegran 100 MG capsule Take 2 capsules by mouth Every Night.       insulin aspart (NovoLOG FlexPen) 100 UNIT/ML solution pen-injector sc pen Inject 2-3 Units under the skin into the appropriate area as directed 3 (Three) Times a Day With Meals. 9 mL 0      Current  Meds:   Current Facility-Administered Medications   Medication Dose Route Frequency Provider Last Rate Last Admin    sennosides-docusate (PERICOLACE) 8.6-50 MG per tablet 2 tablet  2 tablet Oral BID Mauricio Braun MD        And    polyethylene glycol (MIRALAX) packet 17 g  17 g Oral Daily PRN Mauricio Braun MD        And    bisacodyl (DULCOLAX) EC tablet 5 mg  5 mg Oral Daily PRN Mauricio Braun MD        And    bisacodyl (DULCOLAX) suppository 10 mg  10 mg Rectal Daily PRN Mauricio Braun MD        dexAMETHasone (DECADRON) tablet 6 mg  6 mg Oral Daily Mauricio Braun MD        Or    dexAMETHasone (DECADRON) injection 6 mg  6 mg Intravenous Daily Mauricio Braun MD        dextrose (D50W) (25 g/50 mL) IV injection 10-50 mL  10-50 mL Intravenous Q15 Min PRN Mauricio Braun MD        dextrose (GLUTOSE) oral gel 15 g  15 g Oral Q15 Min PRN Mauricio Braun MD        Enoxaparin Sodium (LOVENOX) syringe 40 mg  40 mg Subcutaneous Daily Mauricio Braun MD        glucagon (GLUCAGEN) injection 1 mg  1 mg Intramuscular Q15 Min PRN Mauricio Braun MD        insulin regular 1 unit/mL in 0.9% sodium chloride (Glucommander)  0-100 Units/hr Intravenous Titrated Mauricio Braun MD 2.8 mL/hr at 09/19/24 0708 2.8 Units/hr at 09/19/24 0708    ipratropium-albuterol (DUO-NEB) nebulizer solution 3 mL  3 mL Nebulization 4x Daily - RT Lidia Esteban APRN        nitroglycerin (NITROSTAT) SL tablet 0.4 mg  0.4 mg Sublingual Q5 Min PRN Mauricio Braun MD        Pharmacy Consult - Remdesivir for Severe COVID-19 (Within 7 days of symptom onset)   Does not apply Continuous PRN Mauricio Braun MD        piperacillin-tazobactam (ZOSYN) 3.375 g IVPB in 100 mL NS MBP (CD)  3.375 g Intravenous Q8H Mauricio Braun MD   3.375 g at 09/19/24 0511    Potassium Replacement - Follow Nurse / BPA Driven Protocol   Does not apply PRN Mauricio Braun MD        [START ON 9/20/2024] remdesivir 100 mg in sodium chloride 0.9 % 250 mL IVPB (powder vial)  100 mg  "Intravenous Q24H Mauricio Braun MD        sodium chloride 0.9 % flush 10 mL  10 mL Intravenous Q12H Mauricio Braun MD   10 mL at 24 0042    sodium chloride 0.9 % flush 10 mL  10 mL Intravenous PRN Mauricio Braun MD        sodium chloride 0.9 % flush 10 mL  10 mL Intravenous Q12H Mauricio Braun MD   10 mL at 24 0041    sodium chloride 0.9 % flush 10 mL  10 mL Intravenous PRN Mauricio Braun MD        sodium chloride 0.9 % infusion 40 mL  40 mL Intravenous PRN Mauricio Braun MD        sodium chloride 0.9 % infusion 40 mL  40 mL Intravenous PRN Mauricio Braun MD           Social History:   Social History     Tobacco Use    Smoking status: Former     Types: Cigarettes     Start date: 1973     Quit date:      Years since quittin.7    Smokeless tobacco: Never    Tobacco comments:     quit in  never a pack a day or even a pack a month smoker   Substance Use Topics    Alcohol use: Not Currently     Comment: RARELY      Family History:  Family History   Problem Relation Age of Onset    Hypertension Mother     Cancer Mother         Stomach Cancer    Obesity Mother         her entire life over weight    Hypertension Father     Cancer Father         abdominal tumor    Malig Hyperthermia Neg Hx         Review of Systems  Constitutional: No wt loss, fever   Gastrointestinal: No nausea , abdominal pain  Behavioral/Psych: No insomnia or anxiety   Cardiovascular ----positive for shortness of breath. All other systems reviewed and are negative          BP 98/55 (BP Location: Right arm, Patient Position: Lying)   Pulse 82   Temp 98 °F (36.7 °C) (Oral)   Resp 20   Ht 160 cm (63\")   Wt 57.9 kg (127 lb 10.3 oz)   SpO2 94%   BMI 22.61 kg/m²   General appearance: No acute changes   Neck: Trachea midline; NECK, supple, no thyromegaly or lymphadenopathy   Lungs: Normal size and shape, normal breath sounds, equal distribution of air, no rales and rhonchi   CV: S1-S2 regular, no murmurs, no rub, no " gallop   Abdomen: Soft, nontender; no masses , no abnormal abdominal sounds   Extremities: No deformity , normal color , no peripheral edema   Skin: Normal temperature, turgor and texture; no rash, ulcers          Cardiographics  ECG:               Echocardiogram:   2021  Interpretation Summary    Calculated left ventricular EF = 66.8% Estimated left ventricular EF was in agreement with the calculated left ventricular EF.  Left ventricular diastolic function was normal.  Calculated left ventricular EF = 66.8%  There is no evidence of pericardial effusion. .     2021 stress test  Interpretation Summary       Findings consistent with a normal ECG stress test.  Myocardial perfusion imaging indicates a normal myocardial perfusion study with no evidence of ischemia.  Impressions are consistent with a low risk study.      Imaging  Chest X-ray:   IMPRESSION:  No focal pulmonary consolidation. Follow-up as clinical  indications persist.     This report was finalized on 9/18/2024 8:21 PM by Dr. Uche Geiger M.D on Workstation: [id number]  Lab Review   Results from last 7 days   Lab Units 09/19/24  0617 09/19/24  0127 09/18/24 2036   HSTROP T ng/L 232* 239* 36*         Results from last 7 days   Lab Units 09/19/24  0617   SODIUM mmol/L 141   POTASSIUM mmol/L 4.2   BUN mg/dL 18   CREATININE mg/dL 0.55*   CALCIUM mg/dL 9.7     @[id number]@  Results from last 7 days   Lab Units 09/19/24  0617 09/19/24  0127 09/18/24 2036   WBC 10*3/mm3 13.02* 16.50* 11.55*   HEMOGLOBIN g/dL 10.7* 10.7* 11.6*   HEMATOCRIT % 34.0 35.1 39.6   PLATELETS 10*3/mm3 260 273 289             Assessment:  Acute hypercapnic respiratory failure  Suspected aspiration pneumonia  COVID-19 infection  Uncontrolled diabetes mellitus  Elevated troponin  SSS s/p pacemaker  Hypertension  Hyperlipidemia  Dementia      Recommendations / Plan:   This is an 84-year-old female who is known with our service admitted for acute respiratory failure.  She is maintained  on BiPAP.  Found to be COVID 19 positive, Decadron and remdesivir per primary.  Initiated on insulin drip for management of hyperglycemia.  Lactate 2.5 in ER, 4.4 early this morning, repeat 2.7.  Initial high-sensitivity troponin was slightly above reference 36 repeat this morning increased 239 with a delta of 203.  Add low dose aspirin. Increase lovenox to ACS dosing. She denies chest pain. Trend ecg and troponin.  Hold off on bb, bp soft, may be able to add if bp improves. Check echocardiogram. Discussed at length with daughters carolyn COE, arianne, and sigrid, Carolyn COE and arianne do not want to proceed with a heart cath at this time, and are opting for medical management, and felt they may reconsider at a later time if they feel like she is getting stronger and respiratory status improves. Discussed with Dr Ferguson.     Yen Bentley, APRN  9/19/2024, 09:04 EDT  84-year-old female appears to have acute coronary syndrome with myocardial infarction considering the patient's age at this point patient is being treated conservatively had a long discussion with the family    MD SHAUNA Song/Transcription:   Dictated utilizing Dragon dictation

## 2024-09-19 NOTE — PLAN OF CARE
Goal Outcome Evaluation:  Plan of Care Reviewed With: patient           Outcome Evaluation: Patient seen for clinical swallow assessment. Previous VFSS images reviewed. Care discussed with CAROLIN Gudino, over the phone. Pt has paperwork for DNR, but pt agreed to intubation over night, therefore code status changed per daughter. Daughter reports the patient is beginning the palliative process as her care has become more challenging for the family. Pt has been non compliant with recs from Speech therapy since discharge from rehab in 2022, but has been able to avoid complications from aspiration. Pt now with COVID. On 8 Ls HFNC. Wet voice on secretions and difficulty following oral motor commands. Ice presented. Pt able to manipulate ice and trigger multiple swallows, but wet voice noted. Frequent coughing/throat clearing also present. Pt unable to clear wet quality. PO is not appropriate at this time. POA was open to temporary cortrak placement, but patient did not refuse or agree to temporary feeding tube. It's difficult to determine if patient's dysphagia is acutely worse d/t COVID vs a progression of her known moderate-severe oropharyngeal impairment. SLP recs NPO, suspect speech therapy will continue to recommend NPO as patient improves and patient will need to decide on diet for quality of life. Agreed with palliative consult. Discussed care with RN and MD.

## 2024-09-19 NOTE — PLAN OF CARE
Goal Outcome Evaluation:      VSS, patient off bipap on 5L [place], ABG improved, palliative care talked with patient and family and changed code status to DNR/DNI.  Patient failed swallow screen but given a diet for palliative feeds with education given about potential aspiration.  No acute events this shift, will continue to monitor.

## 2024-09-19 NOTE — CONSULTS
"    CONSULT NOTE    INTERNAL MEDICINE   [facility]     Referring Provider: Dr Chamorro  Reason for Consultation:  Oropharyngeal dysphagia and Acute respiratory failure; Assess overall prognossi  Chief complaint:  \"I want some water\"    Subjective .     History of present illness: This is an 84-year-old lady with a history of polio as a child with chronic oropharyngeal issues, type 2 diabetes poorly controlled, dementia, peripheral neuropathy, previous pacemaker placement, diabetic foot ulcers, previous amputations of toes, and overall clinical and cognitive decline over the last 12 months who presents to the hospital with shortness of breath and is found to have COVID-19 and acute hypoxic respiratory failure and aspiration pneumonia.  She is in significant respiratory distress in the emergency room and intubation was discussed.  Last night acknowledged to be okay with short-term intubation.  She complains last night Nnamdi was whether he be titrated back Nidadavolu a cannula.  She has had oropharyngeal dysphagia for quite some time.  Going back quite some time she has had issues with oropharyngeal dysphagia and has been evaluated multiple times by speech therapy here.  She has been placed on modified diet in the past and while in the hospital she remains compliant at home she is usually not very compliant with a diet.  She also struggles with compliance with her medications partly due to her underlying dementia but also related to her lack of interest.  I been consulted to evaluate the patient's oropharyngeal dysphagia and goals of care with acute respiratory failure and hypoxia here in the hospital.  History comes from both the patient and her daughter    Review of Systems  Unreliable due to delirium    Past Medical History:   Diagnosis Date    Dementia     states better with medication     Diabetes mellitus     Dysphagia     Essential hypertension 03/04/2016    GERD (gastroesophageal reflux " disease)     History of poliomyelitis     IN NECK - AGE 4    Hyperlipidemia 2021    Lung consolidation 2022    Pacemaker     Peripheral neuropathy 2018    Toe ulcer due to DM     LEFT GREAT TOE    Type 2 diabetes mellitus with hyperglycemia, with long-term current use of insulin 2022    Vitamin D deficiency      Past Surgical History:   Procedure Laterality Date    AMPUTATION DIGIT Left 2024    Procedure: PARTIAL HALLUX AMPUTATION LEFT FOOT;  Surgeon: Nas Gonzales DPM;  Location: [facility] OR;  Service: Podiatry;  Laterality: Left;    AMPUTATION DIGIT Left 3/11/2024    Procedure: AMPUTATION DIGIT;  Surgeon: Nas Gonzales DPM;  Location: [facility] MAIN OR;  Service: Podiatry;  Laterality: Left;    ANKLE SURGERY Right     BLADDER SURGERY      Prolapsed    BREAST BIOPSY      CARDIAC ELECTROPHYSIOLOGY PROCEDURE N/A 2021    Procedure: Pacemaker DC new BOSTON;  Surgeon: Madelin Ferguson MD;  Location: [facility] CATH INVASIVE LOCATION;  Service: Cardiology;  Laterality: N/A;    ENDOSCOPY N/A 2022    Procedure: ESOPHAGOGASTRODUODENOSCOPY with biopsy, balloon dilation;  Surgeon: Lo Benjamin MD;  Location: [facility] ENDOSCOPY;  Service: Gastroenterology;  Laterality: N/A;  esophageal stricture, hiatal hernia, gastritis    HYSTERECTOMY      TONSILLECTOMY       Family History   Problem Relation Age of Onset    Hypertension Mother     Cancer Mother         Stomach Cancer    Obesity Mother         her entire life over weight    Hypertension Father     Cancer Father         abdominal tumor    Malig Hyperthermia Neg Hx      Social History     Tobacco Use    Smoking status: Former     Types: Cigarettes     Start date: 1973     Quit date:      Years since quittin.7    Smokeless tobacco: Never    Tobacco comments:     quit in  never a pack a day or even a pack a month smoker   Vaping Use    Vaping status: Never Used   Substance Use Topics    Alcohol use: Not  Currently     Comment: RARELY    Drug use: No     Medications Prior to Admission   Medication Sig Dispense Refill Last Dose    amLODIPine (NORVASC) 5 MG tablet TAKE ONE-HALF (1/2) TABLET DAILY 90 tablet 3     collagenase 250 UNIT/GM ointment Apply 1 Application topically to the appropriate area as directed Daily.       donepezil (ARICEPT) 10 MG tablet Take 1 tablet by mouth Every Night. 90 tablet 3     escitalopram (LEXAPRO) 5 MG tablet Take 1 tablet by mouth Daily.       famotidine (PEPCID) 20 MG tablet Take 1 tablet by mouth 2 (Two) Times a Day. Indications: Heartburn       Insulin Glargine (Lantus SoloStar) 100 UNIT/ML injection pen INJECT 10 UNITS UNDER THE SKIN INTO THE APPROPRIATE AREA AS DIRECTED DAILY 15 mL 1     melatonin 1 MG tablet Take 1 tablet by mouth Every Night. 30 tablet 1     memantine (NAMENDA) 10 MG tablet Take 1 tablet by mouth 2 (Two) Times a Day. Indications: Alzheimer's Disease       pravastatin (PRAVACHOL) 40 MG tablet TAKE 1 TABLET EVERY EVENING 90 tablet 3     Zonegran 100 MG capsule Take 2 capsules by mouth Every Night.       insulin aspart (NovoLOG FlexPen) 100 UNIT/ML solution pen-injector sc pen Inject 2-3 Units under the skin into the appropriate area as directed 3 (Three) Times a Day With Meals. 9 mL 0      aspirin, 81 mg, Oral, Daily   Or  aspirin, 300 mg, Rectal, Daily  dexAMETHasone, 6 mg, Oral, Daily   Or  dexAMETHasone, 6 mg, Intravenous, Daily  enoxaparin, 40 mg, Subcutaneous, Daily  insulin glargine, 10 Units, Subcutaneous, Daily  insulin regular, 2-7 Units, Subcutaneous, Q6H  ipratropium-albuterol, 3 mL, Nebulization, 4x Daily - RT  piperacillin-tazobactam, 3.375 g, Intravenous, Q8H  [START ON 9/20/2024] remdesivir, 100 mg, Intravenous, Q24H  senna-docusate sodium, 2 tablet, Oral, BID  sodium chloride, 10 mL, Intravenous, Q12H  sodium chloride, 10 mL, Intravenous, Q12H      Allergies:   Patient has no known allergies.    Objective     Vital Signs   Temp:  [97.1 °F (36.2  "°C)-98 °F (36.7 °C)] 97.6 °F (36.4 °C)  Heart Rate:  [59-89] 67  Resp:  [24-26] 24  BP: ()/() 112/76    Intake/Output Summary (Last 24 hours) at 9/19/2024 1555  Last data filed at 9/19/2024 0612  Gross per 24 hour   Intake 722 ml   Output --   Net 722 ml     Flowsheet Rows      Flowsheet Row First Filed Value   Admission Height 157.5 cm (62\") Documented at 09/18/2024 1957   Admission Weight 61.7 kg (136 lb 0.4 oz) Documented at 09/18/2024 2000            Physical Exam:  She is frail chronic ill-appearing pale does not look good.  She is tachypneic with prolonged expiratory phase and continues to clear her throat chronically through our conversation.  She appears to have trouble controlling her secretions  She has crackles anteriorly      Results Review:   I reviewed the patient's new clinical results.  I reviewed the patient's new imaging results and agree with the interpretation.  I reviewed the patient's other test results and agree with the interpretation  I personally viewed and interpreted the patient's EKG/Telemetry data    Results from last 7 days   Lab Units 09/19/24  0617   WBC 10*3/mm3 13.02*   HEMOGLOBIN g/dL 10.7*   HEMATOCRIT % 34.0   PLATELETS 10*3/mm3 260     Results from last 7 days   Lab Units 09/19/24  1239 09/19/24  0617   SODIUM mmol/L  --  141   POTASSIUM mmol/L 5.0 4.2   CHLORIDE mmol/L  --  104   CO2 mmol/L  --  27.2   BUN mg/dL  --  18   CREATININE mg/dL  --  0.55*   GLUCOSE mg/dL  --  160*   CALCIUM mg/dL  --  9.7     Results from last 7 days   Lab Units 09/19/24  0617   ALK PHOS U/L 114   BILIRUBIN mg/dL <0.2   ALT (SGPT) U/L 9   AST (SGOT) U/L 13         Results from last 7 days   Lab Units 09/19/24  0617 09/19/24  0127 09/18/24 2036   HSTROP T ng/L 232* 239* 36*         Assessment & Plan       Acute respiratory failure with hypercapnia    Essential hypertension    Peripheral neuropathy    Hyperlipidemia    Dementia without behavioral disturbance    Weakness    Oropharyngeal " dysphagia    Type 2 diabetes mellitus with hyperglycemia, with long-term current use of insulin    Paroxysmal atrial fibrillation    COVID-19 virus infection    This is an 84-year-old lady with a history of multiple medical comorbidities including but not limited to hypertension poorly controlled type 2 diabetes hyperlipidemia atrial fibrillation and previous polio infection as a child with chronic pharyngeal muscle this function who presents to the hospital with shortness of breath and trouble breathing was found to have COVID 19 and aspiration pneumonia with significant respiratory failure with hypoxia  -Discussed with the patient at the bedside her current condition.  She is she is having difficulty  -Discussed her current n.p.o. status and patient would really like to drink some water.  He is denying other new issues or complaints at the present moment.  -She still short of breath and coughing and remains on antibiotics.  Will work with pulmonary to improve pulmonary hygiene and clearance.  Unfortunately her cough is fairly weak and I think she is actually having trouble clearing her own secretions at the present time.  -Given her underlying dementia and issues in the outpatient setting.  Her overall prognosis is rather poor.  While I think she may stabilize and be able to discharge from the hospital I do think she is probably palliative care and hospice appropriate.  -Please see additional documentation regarding advance care plan and discussion with daughter.    I discussed the patients findings and my recommendations with patient, family, and nursing staff    Thank you very much for allowing us to participate in your patient's care.    Vernon Keene MD  09/19/24  15:55 EDT    Time:  45 mins

## 2024-09-19 NOTE — PLAN OF CARE
Goal Outcome Evaluation:   New admission sob hypoxia covid positive BIPAP 02sat 100% orient x4 ABX given, steroids. Insulin drip infusing for glucose over 400 by am have improved labs VSS

## 2024-09-19 NOTE — PROGRESS NOTES
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 Chronic dysphagia/dementia  Patient unable unable to provide meaningful subjective  She is currently off of NIPPV  She has a weak, wet cough    Vital Sign Min/Max for last 24 hours  Temp  Min: 97.1 °F (36.2 °C)  Max: 98 °F (36.7 °C)   BP  Min: 92/57  Max: 189/98   Pulse  Min: 60  Max: 89   Resp  Min: 24  Max: 26   SpO2  Min: 96 %  Max: 100 %   Flow (L/min)  Min: 6  Max: 10   Weight  Min: 55.3 kg (121 lb 14.6 oz)  Max: 61.7 kg (136 lb 0.4 oz)   722/o?  Appears ill, on nippv  perrl, eomi, no icterus,  mmm, no jvd, trachea midline, neck supple,  chest decreased coarse ae bilaterally, no crackles, no wheezes,   rrr,   soft, nt, nd +bs,  no c/c/e  Skin warm, dry, no rashes    Labs: 9/19: reviewed:  Glucose 160  Bun 18  Cr 0.55  Bicarb 27  Wbc 13  Hgb 10.7  Plts 260    A/P:  AHRF and acute hypercapnic respiratory failure -- doing better, wean oxygen as able, repeat gas  Aspirations pneumonia -- patient with known dysphagia, well know to speech, has been recommended to NPO multiple times in the past, continues to take diet. Speech very familiar with patient, do no suspect her dysphagia will improve, suspect she will continue to eat and have ongoing episodes of pneumonia  COVID 19infections -- continue current  Dysphagia -- as above  Dementia  DMI -- ssi  Gerd  Anemia    Longterm prognosis is poor, will ask palliative care to see    Patient is new to me today    D/w RN and with speech therapy, discussed on multidisciplinary rounds

## 2024-09-19 NOTE — H&P
H&P NOTE    Patient Identification:  Di Macario  84 y.o.  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  Daughters at bedside.  They stated that patient has history of aspiration pneumonia in the past and does not follow dietary precautions.  She was noted to have low oxygen sats in the 80s and in acute respite distress per EMS.  In the emergency room patient was placed on noninvasive ventilation for increased work of breathing.  They also report that patient had a wet cough.  Currently on noninvasive ventilation 40% FiO2 and tolerating well.  Unable to get any meaningful history from the patient due to her current medical condition.  She was also noted to be COVID-positive.  Daughter reports that she has been vaccinated against COVID in the past.  At baseline does not have any history of any chronic lung disease such as asthma or COPD.  She is a non-smoker.      Review of Systems  As above and limited with patient's current condition  Past Medical History:  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4    Hyperlipidemia 01/13/2021    Lung consolidation 06/01/2022    Pacemaker     Peripheral neuropathy 08/28/2018    Toe ulcer due to DM     LEFT GREAT TOE    Type 2 diabetes mellitus with hyperglycemia, with long-term current use of insulin 08/25/2022    Vitamin D deficiency        Past Surgical History:  Past Surgical History:   Procedure Laterality Date    AMPUTATION DIGIT Left 01/30/2024    Procedure: PARTIAL HALLUX AMPUTATION LEFT FOOT;  Surgeon: Nas Gonzales DPM;   "Location: [facility] MAIN OR;  Service: Podiatry;  Laterality: Left;    AMPUTATION DIGIT Left 3/11/2024    Procedure: AMPUTATION DIGIT;  Surgeon: Nas Gonzales DPM;  Location: [facility] MAIN OR;  Service: Podiatry;  Laterality: Left;    ANKLE SURGERY Right     BLADDER SURGERY      Prolapsed    BREAST BIOPSY      CARDIAC ELECTROPHYSIOLOGY PROCEDURE N/A 2021    Procedure: Pacemaker DC new BOSTON;  Surgeon: Madelin Ferguson MD;  Location: [facility] CATH INVASIVE LOCATION;  Service: Cardiology;  Laterality: N/A;    ENDOSCOPY N/A 2022    Procedure: ESOPHAGOGASTRODUODENOSCOPY with biopsy, balloon dilation;  Surgeon: Lo Benjamin MD;  Location: [facility] ENDOSCOPY;  Service: Gastroenterology;  Laterality: N/A;  esophageal stricture, hiatal hernia, gastritis    HYSTERECTOMY      TONSILLECTOMY          Home Meds:  (Not in a hospital admission)      Allergies:  No Known Allergies    Social History:   Social History     Socioeconomic History    Marital status:    Tobacco Use    Smoking status: Former     Types: Cigarettes     Start date: 1973     Quit date:      Years since quittin.7    Smokeless tobacco: Never    Tobacco comments:     quit in  never a pack a day or even a pack a month smoker   Vaping Use    Vaping status: Never Used   Substance and Sexual Activity    Alcohol use: Not Currently     Comment: RARELY    Drug use: No    Sexual activity: Not Currently     Birth control/protection: Post-menopausal, Surgical     Comment: took the pill in the late  early 70s       Family History:  Family History   Problem Relation Age of Onset    Hypertension Mother     Cancer Mother         Stomach Cancer    Obesity Mother         her entire life over weight    Hypertension Father     Cancer Father         abdominal tumor    Malig Hyperthermia Neg Hx        Physical Exam:  /79   Pulse 89   Temp 97.1 °F (36.2 °C) (Tympanic)   Resp 26   Ht 157.5 cm (62\")   Wt 61.7 kg (136 lb 0.4 " oz)   SpO2 100%   BMI 24.88 kg/m²  Body mass index is 24.88 kg/m². 100% 61.7 kg (136 lb 0.4 oz)  Physical Exam  Patient is examined using the personal protective equipment as per guidelines from infection control for this particular patient as enacted.  Hand hygiene was performed before and after patient interaction.  Elderly female on noninvasive ventilation tolerating well  Eyes normal conjunctivae and pupils reactive to light  ENT normocephalic atraumatic  Neck midline trachea no thyromegaly  Chest  diminished breath sounds with wheezing bilaterally  CVS regular rate and rhythm no lower extremity edema  Abdomen soft nontender no hepatosplenomegaly  CNS intact normal sensory exam  Skin no rashes no nodules  Psych confused  Musculoskeletal no cyanosis no clubbing normal range of motion        LABS:  Lab Results   Component Value Date    CALCIUM 10.1 09/18/2024     Results from last 7 days   Lab Units 09/18/24 2036   SODIUM mmol/L 137   POTASSIUM mmol/L 3.9   CHLORIDE mmol/L 96*   CO2 mmol/L 27.0   BUN mg/dL 21   CREATININE mg/dL 0.70   GLUCOSE mg/dL 496*   CALCIUM mg/dL 10.1   WBC 10*3/mm3 11.55*   HEMOGLOBIN g/dL 11.6*   PLATELETS 10*3/mm3 289   ALT (SGPT) U/L 9   AST (SGOT) U/L 15   PROBNP pg/mL 313.0   PROCALCITONIN ng/mL 0.09     Lab Results   Component Value Date    TROPONINT 36 (H) 09/18/2024     Results from last 7 days   Lab Units 09/18/24 2036   HSTROP T ng/L 36*         Results from last 7 days   Lab Units 09/18/24 2036   PROCALCITONIN ng/mL 0.09   LACTATE mmol/L 2.5*     Results from last 7 days   Lab Units 09/18/24 2015   PH, ARTERIAL pH units 7.297*   PCO2, ARTERIAL mm Hg 62.5*   PO2 ART mm Hg 277.2*   O2 SATURATION ART % 99.8*   FLOW RATE lpm 6.0000   MODALITY  HFNC     Results from last 7 days   Lab Units 09/18/24 2005   ADENOVIRUS DETECTION BY PCR  Not Detected   CORONAVIRUS 229E  Not Detected   CORONAVIRUS HKU1  Not Detected   CORONAVIRUS NL63  Not Detected   CORONAVIRUS OC43  Not Detected    HUMAN METAPNEUMOVIRUS  Not Detected   HUMAN RHINOVIRUS/ENTEROVIRUS  Not Detected   INFLUENZA B PCR  Not Detected   PARAINFLUENZA 1  Not Detected   PARAINFLUENZA VIRUS 2  Not Detected   PARAINFLUENZA VIRUS 3  Not Detected   PARAINFLUENZA VIRUS 4  Not Detected   BORDETELLA PERTUSSIS PCR  Not Detected   BORDETELLA PARAPERTUSSIS PCR  Not Detected   CHLAMYDOPHILA PNEUMONIAE PCR  Not Detected   MYCOPLAMA PNEUMO PCR  Not Detected   RSV, PCR  Not Detected             Lab Results   Component Value Date    TSH 2.330 10/03/2023     Estimated Creatinine Clearance: 50.7 mL/min (by C-G formula based on SCr of 0.7 mg/dL).         Imaging: I personally visualized the images of scans/x-rays performed within last 3 days.      Assessment:  Acute respiratory failure with hypercapnia  Suspect aspiration pneumonia  COVID-19 infection  Dysphagia  Uncontrolled diabetes mellitus  Dementia  GERD        Recommendations:  At this time we have elderly female with known history of dysphagia suspect current episode due to possible aspiration.  Continue noninvasive ventilation wean as tolerated in the morning  Zosyn to cover for aspiration pneumonia  COVID-19 infection chest x-ray negative however patient hypoxic and concerns for possible pneumonia.  Will initiate Decadron 6 mg IV daily and remdesivir.  She will require reevaluation for her dysphagia  Blood glucose exceedingly high.  Will require insulin drip 1 10-1 42 manage blood glucose  Keep patient n.p.o. for now  ICU core measures      Critical care time 35 minutes          Mauricio Braun MD  9/18/2024  22:39 EDT      Much of this encounter note is an electronic transcription/translation of spoken language to printed text using Dragon Software.

## 2024-09-20 ENCOUNTER — HOME CARE VISIT (OUTPATIENT)
Dept: HOME HEALTH SERVICES | Facility: HOME HEALTHCARE | Age: 84
End: 2024-09-20
Payer: MEDICARE

## 2024-09-20 NOTE — PROGRESS NOTES
[facility]    [phone]   LOS: 2 days     Name: Di Macario  Age/Sex: 84 y.o. female  :  1940        PCP: Lily Yepez PA-C  Chief Complaint   Patient presents with    Shortness of Breath      Subjective   Doing okay this morning.  Down to 3 L on the oxygen.  Patient denies new issues or complaints daughter is present at the bedside.  General: No Fever or Chills, Cardiac: No Chest Pain or Palpitations, GI: No Nausea, Vomiting, or Diarrhea, and Other: No bleeding    aspirin, 81 mg, Oral, Daily   Or  aspirin, 300 mg, Rectal, Daily  dexAMETHasone, 6 mg, Oral, Daily   Or  dexAMETHasone, 6 mg, Intravenous, Daily  enoxaparin, 40 mg, Subcutaneous, Daily  insulin glargine, 10 Units, Subcutaneous, Daily  insulin regular, 2-7 Units, Subcutaneous, Q6H  ipratropium-albuterol, 3 mL, Nebulization, 4x Daily - RT  piperacillin-tazobactam, 3.375 g, Intravenous, Q8H  remdesivir, 100 mg, Intravenous, Q24H  senna-docusate sodium, 2 tablet, Oral, BID  sodium chloride, 10 mL, Intravenous, Q12H      Pharmacy Consult - Remdesivir,   Pharmacy to Dose enoxaparin (LOVENOX),         Objective   Vital Signs  Temp:  [97 °F (36.1 °C)-97.7 °F (36.5 °C)] 97.2 °F (36.2 °C)  Heart Rate:  [59-95] 85  Resp:  [22-24] 22  BP: ()/() 132/81  Body mass index is 22.61 kg/m².    Intake/Output Summary (Last 24 hours) at 2024 0853  Last data filed at 2024 0530  Gross per 24 hour   Intake 308.87 ml   Output 800 ml   Net -491.13 ml       Physical Exam  Vitals reviewed.   Constitutional:       Appearance: She is ill-appearing.   Cardiovascular:      Rate and Rhythm: Normal rate and regular rhythm.   Pulmonary:      Effort: Pulmonary effort is normal. No respiratory distress.      Breath sounds: Wheezing and rales present.   Neurological:      Mental Status: She is alert. Mental status is at baseline. She is disoriented.      Comments: She is a little bit agitated she is picking at things in the bed and  pulling at her oxygen lines and tubes.           Results Review:       I reviewed the patient's new clinical results.  Results from last 7 days   Lab Units 09/20/24  0437 09/19/24  0617 09/19/24 0127 09/18/24 2036   WBC 10*3/mm3 22.81* 13.02* 16.50* 11.55*   HEMOGLOBIN g/dL 10.0* 10.7* 10.7* 11.6*   PLATELETS 10*3/mm3 286 260 273 289     Results from last 7 days   Lab Units 09/20/24  0437 09/19/24  1239 09/19/24 0617 09/19/24 0127 09/18/24 2036   SODIUM mmol/L 143  --  141 137 137   POTASSIUM mmol/L 3.8 5.0 4.2 3.4* 3.9   CHLORIDE mmol/L 108*  --  104 98 96*   CO2 mmol/L 24.2  --  27.2 25.7 27.0   BUN mg/dL 17  --  18 19 21   CREATININE mg/dL 0.57  --  0.55* 0.65 0.70   CALCIUM mg/dL 9.4  --  9.7 9.7 10.1   Estimated Creatinine Clearance: 66 mL/min (by C-G formula based on SCr of 0.57 mg/dL).  Lab Results   Component Value Date    HGBA1C 11.20 (H) 08/20/2024    HGBA1C 10.30 (H) 04/08/2024    HGBA1C 11.40 (H) 01/29/2024     Glucose   Date/Time Value Ref Range Status   09/20/2024 0610 133 (H) 70 - 130 mg/dL Final   09/20/2024 0022 266 (H) 70 - 130 mg/dL Final   09/19/2024 1755 277 (H) 70 - 130 mg/dL Final   09/19/2024 1158 155 (H) 70 - 130 mg/dL Final   09/19/2024 1041 90 70 - 130 mg/dL Final   09/19/2024 0930 71 70 - 130 mg/dL Final   09/19/2024 0706 149 (H) 70 - 130 mg/dL Final   09/19/2024 0612 154 (H) 70 - 130 mg/dL Final         Assessment & Plan   Active Hospital Problems    Diagnosis  POA    **Acute respiratory failure with hypercapnia [J96.02]  Yes    COVID-19 virus infection [U07.1]  Yes    Paroxysmal atrial fibrillation [I48.0]  Yes    Type 2 diabetes mellitus with hyperglycemia, with long-term current use of insulin [E11.65, Z79.4]  Not Applicable    Oropharyngeal dysphagia [R13.12]  Yes    Weakness [R53.1]  Yes    Dementia without behavioral disturbance [F03.90]  Yes    Hyperlipidemia [E78.5]  Yes    Peripheral neuropathy [G62.9]  Yes    Essential hypertension [I10]  Yes      Resolved Hospital  Problems   No resolved problems to display.       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 intake.  Try to give meds in applesauce this morning and unable to do so..  We do have her on puréed diet and pudding thick liquids.  -Her white count has shot up to 22 but I think some of this is related to steroids.  She had no fevers overnight her oxygenation is actually improved from 8 L to 3 L today.  -Currently actively treating her underlying COVID-19 with Remdesivir.  Will continue this to complete treatment  -I do feel like she is probably developed an NSTEMI secondary to demand from underlying infection and hypoxia.  An echocardiogram was completed yesterday that does show depressed ejection fraction between 36 and 40%.  She does have some left ventricular hypertrophy but no diastolic dysfunction was noted and right ventricular function is stable.  She also has moderate aortic valve stenosis  -She does not manage her type 2 diabetes very well in the outpatient setting.  She is pretty noncompliant actually.  This morning her fasting blood sugar is 133.  Will continue to monitor this for now.  -From a dimension delirium standpoint things remain stable.  -She is on pharmacologic DVT prophylaxis  -DNR; please see advance care planning documentation from 9/19 regarding possibilities with home with hospice versus skilled nursing and transition to assisted living with hospice.  Will carry on further conversations as the hospitalization progresses.    --> A is happy to assume care  outside the ICU once stabilized, discussed with Dr. Rollins and plan to transfer to [address] [place] [address] for ongoing goals of care discussions and monitoring.  Plans for no escalation of therapy at this time.        Disposition  Expected discharge date/ time has not been documented.       Vernon Keene MD  [facility]  09/20/24  08:53 EDT

## 2024-09-20 NOTE — CASE MANAGEMENT/SOCIAL WORK
Discharge Planning Assessment  [facility]     Patient Name: Di Macario  MRN: [id number]  Today's Date: 9/20/2024    Admit Date: 9/18/2024    Plan: Pending hospial course & further discussion GOC: anticipate home with hospice vs SNF & transition to assisted living with hospice   Discharge Needs Assessment    No documentation.                  Discharge Plan       Row Name 09/20/24 1031       Plan    Plan Pending hospial course & further discussion GOC: anticipate home with hospice vs SNF & transition to assisted living with hospice    Plan Comments Pt discussed in multidisciplinary rounds. Clinicals reviewed. CCP notes orders for pt to downgrade to med/surg level of care, move out of ICU, & to transfer to 4P. CCP unable to complete screen yet; CCP on 4P to now follow. CCP notes family met w/ palliative care team & considering hospice. DC plan pending hospitalization course & further decision re GOC; anticipate home with hospice vs SNF & transition to assisted living with hospice.           Jazmyne Sinha RN

## 2024-09-20 NOTE — PROGRESS NOTES
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 Chronic dysphagia/dementia  Patient unable unable to provide meaningful subjective  She is currently off of NIPPV and off oxygen, family in room, I just spoke with Dr. Keene  Patient unable to give meaninful subjective but denies complaints.    Vital Sign Min/Max for last 24 hours  Temp  Min: 97 °F (36.1 °C)  Max: 97.7 °F (36.5 °C)   BP  Min: 79/60  Max: 146/133   Pulse  Min: 60  Max: 95   Resp  Min: 22  Max: 24   SpO2  Min: 72 %  Max: 100 %   Flow (L/min)  Min: 3  Max: 8   Weight  Min: 54.9 kg (121 lb)  Max: 57.9 kg (127 lb 10.3 oz)   308/800  Appears ill, alert, interactive some, but she is rather pre occupied with her nasal cannula  perrl, eomi, normal sclera,  mmm, no jvd, trachea midline, neck supple,  chest decreased  ae bilaterally, no crackles, no wheezes,   rrr,   soft, nt, nd +bs,  no c/c/e  Skin warm, dry, no rashes    Labs: 9/20: reviewed:  Trop 196  7.43/43/57  Glucose 130  Bun 17  Cr 0.5  Bicarb 24  Wbc 22  Hgb 10  Plts 286    A/P:  AHRF and acute hypercapnic respiratory failure -- doing better, wean oxygen as able, hypercapnia resolved on repeat gas  Aspirations pneumonia -- patient with known dysphagia, well know to speech, has been recommended to NPO multiple times in the past, continues to take diet. Speech very familiar with patient, do no suspect her dysphagia will improve, suspect she will continue to eat and have ongoing episodes of pneumonia  COVID 19infections -- continue current  Dysphagia -- as above  Dementia  DMI -- ssi  Gerd  Anemia    Given patient's hypercapnia resolved and she is on room air and transitioning over to more focus on comfort, will see prn out of unit    Discussed with Dr. Keene and with family at bedside.

## 2024-09-20 NOTE — NURSING NOTE
CWCN: We are seeing patient for follow-up of left heel injury. Patient alert, daughter at bedside states Lidia NP ordered only top dressing change every 3 days, (Hydrofera Blue) and  keep the Versatel dressing on until next Wednesday. Hydrofera Blue was changed, ABD pad and Kerlix used.  She was seen by palliative care nurse and her Daughter Mae states family is leaning towards palliative care but would like to wait until more family can come in the weekend.  Wound care order have been implemented into Epic.  Please re-consult for any additional needs.

## 2024-09-20 NOTE — PLAN OF CARE
Goal Outcome Evaluation:  Plan of Care Reviewed With: daughter, family        Progress: no change  Outcome Evaluation: Pt alert but confused at baseline r/t underlying dementia. IV antibiotics given as ordered. Pt severely dysphagic. Educated family present at bedside risks of continued aspiration. Family verbalizes understanding. Pt does not complain of pain/SOA/anxiety. Family requested  to visit for annointing of the sick,  has been paged and is contacting [facility]. Pt and family voice no other needs at this time.

## 2024-09-20 NOTE — PROGRESS NOTES
[facility]  Cardiology Progress Note    Patient Identification:  Name: Di Macario  Age: 84 y.o.  Sex: female  :  1940  MRN: [id number]                 Follow Up / Chief Complaint: Follow-up for elevated troponin    Interval History: Resting in bed, daughter Carolyn COE at bedside, patient confused trying to get out of bed.  She denies any chest pain or shortness of breath.        Subjective: No chest pain      Objective:    Past Medical History:  Past Medical History:   Diagnosis Date    Dementia     states better with medication     Diabetes mellitus     Dysphagia     Essential hypertension 2016    GERD (gastroesophageal reflux disease)     History of poliomyelitis     IN NECK - AGE 4    Hyperlipidemia 2021    Lung consolidation 2022    Pacemaker     Peripheral neuropathy 2018    Toe ulcer due to DM     LEFT GREAT TOE    Type 2 diabetes mellitus with hyperglycemia, with long-term current use of insulin 2022    Vitamin D deficiency      Past Surgical History:  Past Surgical History:   Procedure Laterality Date    AMPUTATION DIGIT Left 2024    Procedure: PARTIAL HALLUX AMPUTATION LEFT FOOT;  Surgeon: Nas Gonzales DPM;  Location: [facility] OR;  Service: Podiatry;  Laterality: Left;    AMPUTATION DIGIT Left 3/11/2024    Procedure: AMPUTATION DIGIT;  Surgeon: Nas Gonzales DPM;  Location: [facility] OR;  Service: Podiatry;  Laterality: Left;    ANKLE SURGERY Right     BLADDER SURGERY      Prolapsed    BREAST BIOPSY      CARDIAC ELECTROPHYSIOLOGY PROCEDURE N/A 2021    Procedure: Pacemaker DC new BOSTON;  Surgeon: Madelin Ferguson MD;  Location: [facility] CATH INVASIVE LOCATION;  Service: Cardiology;  Laterality: N/A;    ENDOSCOPY N/A 2022    Procedure: ESOPHAGOGASTRODUODENOSCOPY with biopsy, balloon dilation;  Surgeon: Lo Benjamin MD;  Location: [facility] ENDOSCOPY;  Service: Gastroenterology;  Laterality: N/A;  esophageal  "stricture, hiatal hernia, gastritis    HYSTERECTOMY      TONSILLECTOMY          Social History:   Social History     Tobacco Use    Smoking status: Former     Types: Cigarettes     Start date: 1973     Quit date:      Years since quittin.7    Smokeless tobacco: Never    Tobacco comments:     quit in  never a pack a day or even a pack a month smoker   Substance Use Topics    Alcohol use: Not Currently     Comment: RARELY      Family History:  Family History   Problem Relation Age of Onset    Hypertension Mother     Cancer Mother         Stomach Cancer    Obesity Mother         her entire life over weight    Hypertension Father     Cancer Father         abdominal tumor    Malig Hyperthermia Neg Hx           Allergies:  No Known Allergies  Scheduled Meds:  aspirin, 81 mg, Daily   Or  aspirin, 300 mg, Daily  dexAMETHasone, 6 mg, Daily   Or  dexAMETHasone, 6 mg, Daily  enoxaparin, 40 mg, Daily  insulin glargine, 10 Units, Daily  insulin regular, 2-7 Units, Q6H  ipratropium-albuterol, 3 mL, 4x Daily - RT  piperacillin-tazobactam, 3.375 g, Q8H  remdesivir, 100 mg, Q24H  senna-docusate sodium, 2 tablet, BID  sodium chloride, 10 mL, Q12H            INTAKE AND OUTPUT:    Intake/Output Summary (Last 24 hours) at 2024 1002  Last data filed at 2024 0530  Gross per 24 hour   Intake 308.87 ml   Output 800 ml   Net -491.13 ml       ROS  Constitutional: Awake and alert, no fever. No nosebleeds  Abdomen           no abdominal pain   Cardiac              no chest pain  Pulmonary          no shortness of breath      BP 98/55 (BP Location: Right arm, Patient Position: Lying)   Pulse 82   Temp 98 °F (36.7 °C) (Oral)   Resp 20   Ht 160 cm (63\")   Wt 57.9 kg (127 lb 10.3 oz)   SpO2 94%   BMI 22.61 kg/m²   General appearance: No acute changes   Neck: Trachea midline; NECK, supple, no thyromegaly or lymphadenopathy   Lungs: Normal size and shape, normal breath sounds, equal distribution of air, no rales or " rhonchi   CV: S1-S2 regular, no murmurs, no rub, no gallop   Abdomen: Soft, nontender; no masses , no abnormal abdominal sounds   Extremities: No deformity , normal color , no peripheral edema   Skin: Normal temperature, turgor and texture; no rash, ulcers        Cardiographics    ECG:   Twave inversion new when compared to previous          Echocardiogram:     Lab Review   Results from last 7 days   Lab Units 09/20/24  0649 09/20/24  0437 09/19/24  0617   HSTROP T ng/L 196* 205* 232*         Results from last 7 days   Lab Units 09/20/24  0437   SODIUM mmol/L 143   POTASSIUM mmol/L 3.8   BUN mg/dL 17   CREATININE mg/dL 0.57   CALCIUM mg/dL 9.4     @[id number]@  Results from last 7 days   Lab Units 09/20/24  0437 09/19/24  0617 09/19/24  0127   WBC 10*3/mm3 22.81* 13.02* 16.50*   HEMOGLOBIN g/dL 10.0* 10.7* 10.7*   HEMATOCRIT % 33.2* 34.0 35.1   PLATELETS 10*3/mm3 286 260 273             Assessment:  1.  Acute hypercapnic respiratory failure  2.  Suspected aspiration pneumonia  3.  COVID-19 infection  4.  Uncontrolled diabetes mellitus  5.  Elevated troponin  6.  SSS s/p pacemaker  7.  Hypertension  8.  Hyperlipidemia  9.  Dementia      Plan:  BP and heart rate are stable  Troponin is trending down-she denies any chest pain  Echo EF 36 to 40%, LVH, hypokinetic LV wall segments, moderate AV stenosis.-Discussed heart catheterization/situation at length yesterday evening with daughters and again this morning with POA Carolyn and uncle Zachary per poa request, they wish to treat medically and do not want to pursue cardiac catheterization. There is ongoing discussion of goals of care/hospice with plans to transfer to [facility].   Continue aspirin, conservative management.     Discussed with Dr Ferguson, patient transferred to , cardiology will sign off considering family is moving towards hospice. Please call with any questions or concerns.       )9/20/2024  Yen Bentley, IAIN  Clinically no angina pectoris or  "congestive heart failure, long discussion with the family no invasive workup medical management only  Madelin Ferguson MD      EMR Dragon/Transcription:   \"Dictated utilizing Dragon dictation\".     "

## 2024-09-20 NOTE — CONSULTS
Purpose of the visit was to evaluate for: goals of care/advanced care planning and support for patient/family. Spoke with MD and RN as well as patient and family and discussed palliative care, goals of care, care options, resuscitation status, [facility] scattered bed status, and discharge options.      Assessment:  Patient is palliative care appropriate for inpatient care given (list diagnosis/symptoms):  84 year old female seen in ICU today, admitted with respiratory failure and COVID infection, aspiration pneumonia possible NSTEMI. PMH includes DRM, afib, dysphagia. SLP recommends NPO. Per family request RN did try some applesauce this morning and patient choked pretty significantly. Patient is alert and able to participate in conversation but not fully oriented to situation. She is slightly restless and picking at sheets and tubes, easily redirected. She states she wants to go home. Mobility and nutrition significantly limited by acute and chronic illness. PPS 20%    Recommendations/Plan: Daughter Mae states family is leaning towards palliative care but would like to wait until more family can come in the weekend. She is aware of option for transfer to inpatient palliative care unit as well as hospice services. Palliative care team to follow for ongoing support.     Other Comments: Spoke with patient and her daughter Carolyn Resendez) at bedside. They had discussed goals of care and treatment options yesterday with Dr. Keene. Daughter has good understanding of situation, does not want aggressive intervention performed to prolong her mother's life and has declined heart cath that was offered. We did talk about inpatient palliative care unit, answered all questions and provided support. Mae wants to wait on more family and speak with Dr. Keene before making decision. Advised of ongoing availability. Updated RN Bret.     Patient has durable power of  on file which designates her daughters  Carolyn Cerrato as healthcare surrogate.

## 2024-09-21 NOTE — CONSULTS
Nutrition Services    Patient Name:  Di Macario  YOB: 1940  MRN: [id number]  Admit Date:  9/18/2024    Assessment Date:  09/21/24    Summary: Nurse admission screen    Nutrition assessment initiated per nurse admission screen. 84yoF admitted with acute respiratory failure with hypercapnia, aspiration pneumonia, and COVID-19. Hx of dementia. DM2, dysphagia, HTN. Pt admitted to ICU then transferred to . Currently on puree and pudding thick diet. Noted pt not compliant with diet per previous admissions. Spoke to family in the room. Pt not eating at all r/t increased secretions and not tolerating diet. Will order nutrition supplement to support PO intake. No significant wt loss noted per chart review.    Labs: K 3.2, glu 137/151/143  Meds: reviewed.    Plan/recs:  Magic cups TID   Encourage PO intake as tolerated    RD to follow nutrition needs    CLINICAL NUTRITION ASSESSMENT      Reason for Assessment Nurse Admission Screen     Diagnosis/Problem   Acute respiratory failure with hypercapnia, aspiration pneumonia, and COVID-19   Medical/Surgical History 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4    Hyperlipidemia 01/13/2021    Lung consolidation 06/01/2022    Pacemaker     Peripheral neuropathy 08/28/2018    Toe ulcer due to DM     LEFT GREAT TOE    Type 2 diabetes mellitus with hyperglycemia, with long-term current use of insulin 08/25/2022    Vitamin D deficiency        Past Surgical History:   Procedure Laterality Date    AMPUTATION DIGIT Left 01/30/2024    Procedure: PARTIAL HALLUX AMPUTATION LEFT FOOT;  Surgeon: Nas Gonzales DPM;  Location: [facility];  Service: Podiatry;  Laterality: Left;    AMPUTATION DIGIT Left 3/11/2024    Procedure: AMPUTATION DIGIT;  Surgeon: Janet  "PAYTON Lovell;  Location: [facility] MAIN OR;  Service: Podiatry;  Laterality: Left;    ANKLE SURGERY Right     BLADDER SURGERY      Prolapsed    BREAST BIOPSY      CARDIAC ELECTROPHYSIOLOGY PROCEDURE N/A 03/05/2021    Procedure: Pacemaker DC new BOSTON;  Surgeon: Madelin Ferguson MD;  Location: [facility] CATH INVASIVE LOCATION;  Service: Cardiology;  Laterality: N/A;    ENDOSCOPY N/A 06/06/2022    Procedure: ESOPHAGOGASTRODUODENOSCOPY with biopsy, balloon dilation;  Surgeon: Lo Benjamin MD;  Location: [facility] ENDOSCOPY;  Service: Gastroenterology;  Laterality: N/A;  esophageal stricture, hiatal hernia, gastritis    HYSTERECTOMY      TONSILLECTOMY          Anthropometrics        Current Height  Current Weight  BMI kg/m2 Height: 160 cm (63\")  Weight: 57.9 kg (127 lb 10.3 oz) (09/20/24 0530)  Body mass index is 22.61 kg/m².   Adjusted BMI (if applicable)    BMI Category Normal/Healthy (18.4 - 24.9)   Ideal Body Weight (IBW) 115#   Usual Body Weight (UBW) 130s   Weight Trend Loss   Weight History Wt Readings from Last 30 Encounters:   09/20/24 0530 57.9 kg (127 lb 10.3 oz)   09/19/24 1135 54.9 kg (121 lb)   09/19/24 0431 55.3 kg (121 lb 14.6 oz)   09/18/24 2334 55.3 kg (121 lb 14.6 oz)   09/18/24 2000 61.7 kg (136 lb 0.4 oz)   08/27/24 1048 56.3 kg (124 lb 3.2 oz)   07/22/24 1008 58.2 kg (128 lb 3.2 oz)   06/12/24 1244 59.4 kg (131 lb)   04/26/24 1110 59.1 kg (130 lb 3.2 oz)   04/19/24 1028 59.4 kg (131 lb)   04/10/24 1837 60.7 kg (133 lb 13.1 oz)   04/10/24 1237 61.4 kg (135 lb 5.8 oz)   03/07/24 0736 59 kg (130 lb)   02/06/24 1258 59.4 kg (131 lb)   01/28/24 2230 59.4 kg (130 lb 15.3 oz)   10/10/23 1242 62.4 kg (137 lb 9.6 oz)   09/21/23 1046 61.2 kg (135 lb)   07/10/23 1426 62.6 kg (138 lb)   04/24/23 1254 63.5 kg (140 lb)   03/03/23 1401 64.3 kg (141 lb 12.8 oz)   02/16/23 1001 62.3 kg (137 lb 6.4 oz)   02/08/23 1520 62.6 kg (138 lb)   12/30/22 1349 66.5 kg (146 lb 9.6 oz)   11/28/22 1428 66 kg (145 lb 6.4 oz) "   10/17/22 1251 64.4 kg (142 lb)   09/29/22 1027 65.3 kg (144 lb)   09/18/22 0600 62.6 kg (138 lb)   08/25/22 1402 63 kg (138 lb 12.8 oz)   08/15/22 1037 61.7 kg (136 lb)   07/28/22 2112 58.7 kg (129 lb 6.4 oz)   07/28/22 0500 64 kg (141 lb 1.5 oz)   07/27/22 0333 63.3 kg (139 lb 8.8 oz)   07/26/22 0514 60.8 kg (134 lb 0.6 oz)   07/25/22 0248 59.9 kg (132 lb 0.9 oz)   07/24/22 2356 59.4 kg (130 lb 15.3 oz)   07/12/22 1321 60.4 kg (133 lb 3.2 oz)   07/06/22 1019 59 kg (130 lb)   06/09/22 0516 65.4 kg (144 lb 3.2 oz)   06/08/22 0539 65.8 kg (145 lb)   06/07/22 0144 64.5 kg (142 lb 4.8 oz)   06/06/22 0105 67.2 kg (148 lb 3.2 oz)   06/05/22 0558 67.6 kg (149 lb)   06/04/22 0615 66.2 kg (146 lb)   06/03/22 0513 66.3 kg (146 lb 3.2 oz)   06/02/22 0319 65.2 kg (143 lb 11.8 oz)   06/01/22 0557 65.8 kg (145 lb)   04/07/22 1134 66.1 kg (145 lb 12.8 oz)          Labs       Pertinent Labs    Results from last 7 days   Lab Units 09/21/24  0716 09/20/24  0437 09/19/24  1239 09/19/24  0617   SODIUM mmol/L 145 143  --  141   POTASSIUM mmol/L 3.2* 3.8 5.0 4.2   CHLORIDE mmol/L 107 108*  --  104   CO2 mmol/L 27.0 24.2  --  27.2   BUN mg/dL 17 17  --  18   CREATININE mg/dL 0.54* 0.57  --  0.55*   CALCIUM mg/dL 9.7 9.4  --  9.7   BILIRUBIN mg/dL 0.2 0.2  --  <0.2   ALK PHOS U/L 105 93  --  114   ALT (SGPT) U/L 7 7  --  9   AST (SGOT) U/L 14 15  --  13   GLUCOSE mg/dL 151* 130*  --  160*     Results from last 7 days   Lab Units 09/21/24  0716 09/20/24  0437   HEMOGLOBIN g/dL  --  10.0*   HEMATOCRIT %  --  33.2*   WBC 10*3/mm3  --  22.81*   ALBUMIN g/dL 3.1* 3.0*     Results from last 7 days   Lab Units 09/20/24  0437 09/19/24  0617 09/19/24  0127 09/18/24  2036   PLATELETS 10*3/mm3 286 260 273 289     COVID19   Date Value Ref Range Status   09/18/2024 Detected (C) Not Detected - Ref. Range Final     Lab Results   Component Value Date    HGBA1C 11.20 (H) 08/20/2024          Medications           Scheduled Medications aspirin, 81 mg,  Oral, Daily  dexAMETHasone, 6 mg, Oral, Daily   Or  dexAMETHasone, 6 mg, Intravenous, Daily  guaiFENesin, 1,200 mg, Oral, Q12H  insulin glargine, 5 Units, Subcutaneous, Daily  ipratropium-albuterol, 3 mL, Nebulization, 4x Daily - RT  piperacillin-tazobactam, 3.375 g, Intravenous, Q8H  remdesivir, 100 mg, Intravenous, Q24H  senna-docusate sodium, 2 tablet, Oral, BID  sodium chloride, 10 mL, Intravenous, Q12H       Infusions Pharmacy Consult - Remdesivir,        PRN Medications   senna-docusate sodium **AND** polyethylene glycol **AND** bisacodyl **AND** bisacodyl    dextrose    dextrose    glucagon (human recombinant)    nitroglycerin    Pharmacy Consult - Remdesivir    sodium chloride    sodium chloride     Physical Findings          General Findings appeared asleep, confused, on oxygen therapy   Oral/Mouth Cavity tooth or teeth missing   Edema  not assessed   Gastrointestinal WDL, last bowel movement: 9/19   Skin  location of wound: left upper second toe, left posterior heel diabetic ulcer   Tubes/Drains/Lines none   NFPE Not indicated at this time   --  Current Nutrition Orders & Evaluation of Intake       Oral Nutrition     Food Allergies NKFA   Current PO Diet Diet: Regular/House, Diabetic; Consistent Carbohydrate; Texture: Pureed (NDD 1); Fluid Consistency: Pudding Thick   Supplement n/a   PO Evaluation     % PO Intake 0%    Factors Affecting Intake: altered mental status, swallow impairment   --  PES STATEMENT / NUTRITION DIAGNOSIS      Nutrition Dx Problem  Problem: Inadequate Oral Intake  Etiology: Factors Affecting Nutrition - swallow impairment    Signs/Symptoms: Report of Minimal PO Intake and PO Diet Not Tolerated     NUTRITION INTERVENTION / PLAN OF CARE      Intervention Goal(s) Establish goals of care, Reduce/improve symptoms, Meet estimated needs, Disease management/therapy, Establish PO intake, Accepts oral nutrition supplement, No significant weight loss, and PO intake goal %: 50%         RD  Intervention/Action Encourage intake, Continue to monitor, Care plan reviewed, and Recommend/order: magic cups   --      Prescription/Orders:       PO Diet       Supplements Magic cups TID      Enteral Nutrition       Parenteral Nutrition    New Prescription Ordered? Yes   --      Monitor/Evaluation Per protocol   Discharge Plan/Needs Pending clinical course   --    RD to follow per protocol.      Electronically signed by:  Maryan Cantrell RD  09/21/24 08:38 EDT

## 2024-09-21 NOTE — SIGNIFICANT NOTE
Hold per NSG Farzana. Pt not medically appropriate and family considering pallative care. Will follow up as schedule permits.

## 2024-09-21 NOTE — CONSULTS
Assessed patient at bedside. Patient asleep.     Met with Daughters in family room. They have good understanding of patient's clinical condition. Discussed focusing on comfort and QOL. Stopping IV antibiotics. Patient had 1 dose of IV ativan and Morphine and has been sleeping since per nursing    Explanation of services provided with focus on inpatient hospice. Answered all questions, discussed medications, symptom management needs, and goals of care. [facility] information provided and encouraged to call with any needs.    [facility] will follow up daily remotely for inpatient elgibility. Updated Farzana RN. Please call with any questions, concerns, or change in patient status. Thank you for allowing us the opportunity to participate in the care of this patient/family.    Dr. Kallie Kim recommended attempting SL morphine and if patient unable to tolerate can make a case for inpatient services.       Lo Rodrigez RN, BSN, CHPN  Referral and Admission Coordinator  [facility]  [phone]

## 2024-09-21 NOTE — PROGRESS NOTES
Name: Di Macario ADMIT: 2024   : 1940  PCP: Lily Yepez PA-C    MRN: [id number] LOS: 3 days   AGE/SEX: 84 y.o. female  ROOM: [facility]     Subjective   Subjective   Patient resting in bed.  Appears restless.  Has pulled off her oxygen.  Daughter, Carolyn at bedside.  Patient denies any shortness of breath or any pain.  She is pleasantly confused.    Review of Systems  As above     Objective   Objective   Vital Signs  Temp:  [97.6 °F (36.4 °C)-99 °F (37.2 °C)] 99 °F (37.2 °C)  Heart Rate:  [] 102  Resp:  [16-28] 28  BP: ()/(55-76) 128/76  SpO2:  [90 %-98 %] 90 %  on   ;   Device (Oxygen Therapy): room air  Body mass index is 22.61 kg/m².  Physical Exam  Constitutional:       General: She is not in acute distress.     Appearance: She is ill-appearing.   Cardiovascular:      Rate and Rhythm: Normal rate and regular rhythm. Tachycardia present.   Pulmonary:      Effort: Pulmonary effort is normal. No respiratory distress.   Abdominal:      General: Abdomen is flat. There is no distension.      Tenderness: There is no abdominal tenderness.   Musculoskeletal:         General: No swelling or deformity. Normal range of motion.   Skin:     General: Skin is warm and dry.   Neurological:      General: No focal deficit present.      Mental Status: She is alert. Mental status is at baseline.         Results Review     I reviewed the patient's new clinical results.  Results from last 7 days   Lab Units 24  0437 24  0617 24  0127 246   WBC 10*3/mm3 22.81* 13.02* 16.50* 11.55*   HEMOGLOBIN g/dL 10.0* 10.7* 10.7* 11.6*   PLATELETS 10*3/mm3 286 260 273 289     Results from last 7 days   Lab Units 24  0716 24  0437 24  1239 24  0617 24  0127   SODIUM mmol/L 145 143  --  141 137   POTASSIUM mmol/L 3.2* 3.8 5.0 4.2 3.4*   CHLORIDE mmol/L 107 108*  --  104 98   CO2 mmol/L 27.0 24.2  --  27.2 25.7   BUN mg/dL 17 17  --  18 19   CREATININE mg/dL  0.54* 0.57  --  0.55* 0.65   GLUCOSE mg/dL 151* 130*  --  160* 382*   Estimated Creatinine Clearance: 69.6 mL/min (A) (by C-G formula based on SCr of 0.54 mg/dL (L)).  Results from last 7 days   Lab Units 09/21/24  0716 09/20/24  0437 09/19/24  0617 09/18/24  2036   ALBUMIN g/dL 3.1* 3.0* 3.4* 3.8   BILIRUBIN mg/dL 0.2 0.2 <0.2 0.3   ALK PHOS U/L 105 93 114 147*   AST (SGOT) U/L 14 15 13 15   ALT (SGPT) U/L 7 7 9 9     Results from last 7 days   Lab Units 09/21/24  0716 09/20/24  0437 09/19/24  0617 09/19/24  0127 09/18/24  2036   CALCIUM mg/dL 9.7 9.4 9.7 9.7 10.1   ALBUMIN g/dL 3.1* 3.0* 3.4*  --  3.8     Results from last 7 days   Lab Units 09/19/24  1239 09/19/24  0617 09/19/24  0415 09/19/24  0127 09/18/24 2036   PROCALCITONIN ng/mL  --   --   --   --  0.09   LACTATE mmol/L 1.5 2.7* 3.8* 4.4* 2.5*     COVID19   Date Value Ref Range Status   09/18/2024 Detected (C) Not Detected - Ref. Range Final   07/28/2022 Not Detected Not Detected - Ref. Range Final     Glucose   Date/Time Value Ref Range Status   09/21/2024 0758 143 (H) 70 - 130 mg/dL Final   09/21/2024 0610 137 (H) 70 - 130 mg/dL Final   09/20/2024 2032 142 (H) 70 - 130 mg/dL Final   09/20/2024 1707 206 (H) 70 - 130 mg/dL Final   09/20/2024 1531 183 (H) 70 - 130 mg/dL Final   09/20/2024 1213 187 (H) 70 - 130 mg/dL Final   09/20/2024 0610 133 (H) 70 - 130 mg/dL Final       Adult Transthoracic Echo Complete W/ Cont if Necessary Per Protocol    Left ventricular ejection fraction appears to be 36 - 40%.    Left ventricular wall thickness is consistent with hypertrophy.   Sigmoid-shaped ventricular septum is present.    The following left ventricular wall segments are hypokinetic: apical   anterior, mid anterolateral, apical lateral, apical inferior, apical   septal, mid inferoseptal and apex hypokinetic.    Left ventricular diastolic function was normal.    Moderate aortic valve stenosis is present.    Estimated right ventricular systolic pressure from  tricuspid   regurgitation is normal (<35 mmHg).    I reviewed the patient's daily medications.  Scheduled Medications  aspirin, 81 mg, Oral, Daily  dexAMETHasone, 6 mg, Oral, Daily   Or  dexAMETHasone, 6 mg, Intravenous, Daily  guaiFENesin, 1,200 mg, Oral, Q12H  insulin glargine, 5 Units, Subcutaneous, Daily  ipratropium-albuterol, 3 mL, Nebulization, 4x Daily - RT  piperacillin-tazobactam, 3.375 g, Intravenous, Q8H  remdesivir, 100 mg, Intravenous, Q24H  senna-docusate sodium, 2 tablet, Oral, BID  sodium chloride, 10 mL, Intravenous, Q12H    Infusions  Pharmacy Consult - Remdesivir,     Diet  Diet: Regular/House, Diabetic; Consistent Carbohydrate; Texture: Pureed (NDD 1); Fluid Consistency: Pudding Thick         I have personally reviewed:  [x]  Laboratory   [x]  Microbiology   [x]  Radiology   []  EKG/Telemetry   [x]  Cardiology/Vascular   []  Pathology   [x]  Records     Assessment/Plan     Active Hospital Problems    Diagnosis  POA    **Acute respiratory failure with hypercapnia [J96.02]  Yes    COVID-19 virus infection [U07.1]  Yes    Paroxysmal atrial fibrillation [I48.0]  Yes    Type 2 diabetes mellitus with hyperglycemia, with long-term current use of insulin [E11.65, Z79.4]  Not Applicable    Oropharyngeal dysphagia [R13.12]  Yes    Weakness [R53.1]  Yes    Dementia without behavioral disturbance [F03.90]  Yes    Hyperlipidemia [E78.5]  Yes    Peripheral neuropathy [G62.9]  Yes    Essential hypertension [I10]  Yes      Resolved Hospital Problems   No resolved problems to display.       84 y.o. female admitted with Acute respiratory failure with hypercapnia.    Advance care planning-discussed with daughter, Carolyn/CAROLIN.  And patient's other daughter Saundra.  At this time as patient continues to decline plan for full comfort measures.  Will discontinue lab draws and glucose monitoring.  Will continue low-dose Lantus while on steroids for COVID-19.  Would like to finish course of remdesivir and Zosyn.  Did  discuss that after this round of antibiotics would not recommend further antibiotics as she is going to continue to aspirate with her dysphagia.  Discussed risk and benefits of fluids and end-of-life at this time we will hold off on any IV fluids.  Will place Arriaga catheter for comfort care.  Palliative care order set has been placed.  Have discussed with RN.  Hospice has been consulted.  Initially patient's family wanted to go home with hospice but will see how patient does over the next few days and see what her requirements are especially since she is not able to really tolerate oral medications with her dysphagia.  Time spent advance care plannin minutes.    Other medical problems prior to transition to comfort care    Acute hypoxic and hypercapnic respiratory failure  COVID-19 infection  Aspiration pneumonia  Dysphagia  Dementia  -Hypercapnia has resolved.  Continue oxygen as needed  -Finished course of dexamethasone and remdesivir as well as Zosyn.    Diabetes type 2  -Lower Lantus to 5 units.  Continue while on dexamethasone.  Will discontinue after steroids have been stopped.    NSTEMI  -Cardiology had seen previously.  At this time patient is family would like to do conservative measures.        Discussed with patient, family, and nursing staff.  Anticipate discharge  TBD  next week.    Expected discharge date/ time has not been documented.      Perez Barron MD  [facility]  24  09:25 EDT

## 2024-09-21 NOTE — PLAN OF CARE
Goal Outcome Evaluation:  Plan of Care Reviewed With: patient, family        Progress: no change  Outcome Evaluation: VSS. Pt medicated with 2mg of morphine and 0.5mg of ativan for pain and discomfort. pt tolerated well. family at bedside. hospice to follow up tomorrow for HSB eligibility. No needs at this time.

## 2024-09-21 NOTE — PLAN OF CARE
Goal Outcome Evaluation:              Outcome Evaluation: Scheduled zosyn and remdesivir as ordered. Turns and incontinence care provided. Safety measures observed. Developing secretions through the night that she was unable to clear. Tonsil tip suction perfermed multiple times. Breathing labored at times. Started night on room air, has required O2 @2L this morning. HR elevated this morning. Increased restlessness noted. Daughter at bedside through the night. Will monitor for needs.

## 2024-09-22 NOTE — PLAN OF CARE
Goal Outcome Evaluation:  Plan of Care Reviewed With: patient, daughter        Progress: declining  Outcome Evaluation: PPS 10%. Medicated with ativan and morphine prior to turns, had to increase both after first dose this AM. Did not tolerate SL well overnight. Discussed GOC with daughter Mae (POA) and she states goal is for comfort, requested not to check glucose or give insulin. Plan is to finish antibiotic course per MD note. No oral intake today. F/C placed for comfort after discussing with Mae. Will continue to monitor for comfort.

## 2024-09-22 NOTE — PLAN OF CARE
Goal Outcome Evaluation:  Plan of Care Reviewed With: patient   Patient has rested well this shift. She arouses to pain. 10 mg of sublingual morphine and 1 mg of sublingual ativan given for symptom management. Isolation for Covid continued. Iv antibiotics. Family remain at the bedside.

## 2024-09-22 NOTE — PROGRESS NOTES
Name: Di Macario ADMIT: 2024   : 1940  PCP: Lily Yepez PA-C    MRN: [id number] LOS: 4 days   AGE/SEX: 84 y.o. female  ROOM: [facility]     Subjective   Subjective   Resting comfortably in bed.  She is pleasantly confused.  Daughters, Saundra and Carolyn at bedside.  Discussed with RN and she attempted simple sublingual medication overnight and had some difficulty swallowing it/coughing afterwards.         Objective   Objective   Vital Signs  Temp:  [97.2 °F (36.2 °C)] 97.2 °F (36.2 °C)  Heart Rate:  [78] 78  Resp:  [16] 16  BP: (107)/(62) 107/62  SpO2:  [92 %] 92 %  on  Flow (L/min):  [3] 3;   Device (Oxygen Therapy): nasal cannula  Body mass index is 22.61 kg/m².  Physical Exam  Constitutional:       General: She is not in acute distress.     Appearance: She is ill-appearing.   Cardiovascular:      Rate and Rhythm: Normal rate and regular rhythm. Tachycardia present.   Pulmonary:      Effort: Pulmonary effort is normal. No respiratory distress.   Abdominal:      General: Abdomen is flat. There is no distension.      Tenderness: There is no abdominal tenderness.   Musculoskeletal:         General: No swelling or deformity. Normal range of motion.   Skin:     General: Skin is warm and dry.   Neurological:      General: No focal deficit present.      Mental Status: She is alert. Mental status is at baseline.         Results Review     I reviewed the patient's new clinical results.  Results from last 7 days   Lab Units 24  0437 24  0617 24  0127 246   WBC 10*3/mm3 22.81* 13.02* 16.50* 11.55*   HEMOGLOBIN g/dL 10.0* 10.7* 10.7* 11.6*   PLATELETS 10*3/mm3 286 260 273 289     Results from last 7 days   Lab Units 24  0716 24  0437 24  1239 24  0617 24  0127   SODIUM mmol/L 145 143  --  141 137   POTASSIUM mmol/L 3.2* 3.8 5.0 4.2 3.4*   CHLORIDE mmol/L 107 108*  --  104 98   CO2 mmol/L 27.0 24.2  --  27.2 25.7   BUN mg/dL 17 17  --  18 19    CREATININE mg/dL 0.54* 0.57  --  0.55* 0.65   GLUCOSE mg/dL 151* 130*  --  160* 382*   Estimated Creatinine Clearance: 69.6 mL/min (A) (by C-G formula based on SCr of 0.54 mg/dL (L)).  Results from last 7 days   Lab Units 09/21/24  0716 09/20/24  0437 09/19/24  0617 09/18/24  2036   ALBUMIN g/dL 3.1* 3.0* 3.4* 3.8   BILIRUBIN mg/dL 0.2 0.2 <0.2 0.3   ALK PHOS U/L 105 93 114 147*   AST (SGOT) U/L 14 15 13 15   ALT (SGPT) U/L 7 7 9 9     Results from last 7 days   Lab Units 09/21/24  0716 09/20/24  0437 09/19/24  0617 09/19/24  0127 09/18/24  2036   CALCIUM mg/dL 9.7 9.4 9.7 9.7 10.1   ALBUMIN g/dL 3.1* 3.0* 3.4*  --  3.8     Results from last 7 days   Lab Units 09/19/24  1239 09/19/24  0617 09/19/24  0415 09/19/24  0127 09/18/24  2036   PROCALCITONIN ng/mL  --   --   --   --  0.09   LACTATE mmol/L 1.5 2.7* 3.8* 4.4* 2.5*     COVID19   Date Value Ref Range Status   09/18/2024 Detected (C) Not Detected - Ref. Range Final   07/28/2022 Not Detected Not Detected - Ref. Range Final     Glucose   Date/Time Value Ref Range Status   09/21/2024 1215 135 (H) 70 - 130 mg/dL Final   09/21/2024 0758 143 (H) 70 - 130 mg/dL Final   09/21/2024 0610 137 (H) 70 - 130 mg/dL Final   09/20/2024 2032 142 (H) 70 - 130 mg/dL Final   09/20/2024 1707 206 (H) 70 - 130 mg/dL Final   09/20/2024 1531 183 (H) 70 - 130 mg/dL Final   09/20/2024 1213 187 (H) 70 - 130 mg/dL Final       Adult Transthoracic Echo Complete W/ Cont if Necessary Per Protocol    Left ventricular ejection fraction appears to be 36 - 40%.    Left ventricular wall thickness is consistent with hypertrophy.   Sigmoid-shaped ventricular septum is present.    The following left ventricular wall segments are hypokinetic: apical   anterior, mid anterolateral, apical lateral, apical inferior, apical   septal, mid inferoseptal and apex hypokinetic.    Left ventricular diastolic function was normal.    Moderate aortic valve stenosis is present.    Estimated right ventricular systolic  pressure from tricuspid   regurgitation is normal (<35 mmHg).    I reviewed the patient's daily medications.  Scheduled Medications  aspirin, 81 mg, Oral, Daily  dexAMETHasone, 6 mg, Oral, Daily   Or  dexAMETHasone, 6 mg, Intravenous, Daily  guaiFENesin, 1,200 mg, Oral, Q12H  insulin glargine, 5 Units, Subcutaneous, Daily  piperacillin-tazobactam, 3.375 g, Intravenous, Q8H  senna-docusate sodium, 2 tablet, Oral, BID  sodium chloride, 10 mL, Intravenous, Q12H    Infusions     Diet  Diet: Regular/House, Diabetic; Consistent Carbohydrate; Texture: Pureed (NDD 1); Fluid Consistency: Pudding Thick         I have personally reviewed:  [x]  Laboratory   [x]  Microbiology   [x]  Radiology   []  EKG/Telemetry   [x]  Cardiology/Vascular   []  Pathology   [x]  Records     Assessment/Plan     Active Hospital Problems    Diagnosis  POA    **Acute respiratory failure with hypercapnia [J96.02]  Yes    COVID-19 virus infection [U07.1]  Yes    Paroxysmal atrial fibrillation [I48.0]  Yes    Type 2 diabetes mellitus with hyperglycemia, with long-term current use of insulin [E11.65, Z79.4]  Not Applicable    Oropharyngeal dysphagia [R13.12]  Yes    Weakness [R53.1]  Yes    Dementia without behavioral disturbance [F03.90]  Yes    Hyperlipidemia [E78.5]  Yes    Peripheral neuropathy [G62.9]  Yes    Essential hypertension [I10]  Yes      Resolved Hospital Problems   No resolved problems to display.       84 y.o. female admitted with Acute respiratory failure with hypercapnia.    Comfort care status-continue comfort focused care.  Attempted sublingual medications and it did not go well.  Will stick to IV medications at this time.  Hospice following.  Antibiotics to finish tomorrow evening.    Other medical problems prior to transition to comfort care    Acute hypoxic and hypercapnic respiratory failure  COVID-19 infection  Aspiration pneumonia  Dysphagia  Dementia  -Hypercapnia has resolved.  Continue oxygen as needed  -Finished course of  dexamethasone and remdesivir as well as Zosyn.    Diabetes type 2  -Lower Lantus to 5 units.  Continue while on dexamethasone.  Will discontinue after steroids have been stopped.    NSTEMI  -Cardiology had seen previously.  At this time patient is family would like to do conservative measures.        Discussed with patient, family, and nursing staff.  Anticipate discharge  TBD  next week.    Expected discharge date/ time has not been documented.      Perez Barron MD  [facility]  09/22/24  13:14 EDT

## 2024-09-22 NOTE — SIGNIFICANT NOTE
Per NSG Stacey, pt not medically stable and pt/family has decided on Palliative/[facility] comfort measures. PT will sign off.

## 2024-09-23 PROBLEM — G31.1 SENILE DEGENERATION OF BRAIN: Status: ACTIVE | Noted: 2024-01-01

## 2024-09-23 NOTE — PLAN OF CARE
Problem: Adult Inpatient Plan of Care  Goal: Absence of Hospital-Acquired Illness or Injury  Intervention: Identify and Manage Fall Risk  Recent Flowsheet Documentation  Taken 9/22/2024 2014 by Doni Hewitt, RN  Safety Promotion/Fall Prevention:   activity supervised   assistive device/personal items within reach   clutter free environment maintained   safety round/check completed   Goal Outcome Evaluation:

## 2024-09-23 NOTE — CONSULTS
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 Unrelated dx: AFIB (I48.0) DM2 (E11.65) polyneuropathy (G62.9) HTN (I10)  Symptom Management: Anxiety/Dyspnea/Pain    Met with family including daughter/POA Carolyn Macario along with siblings Carley, Blossom, and Zachary, in family waiting area per family request Explanation of services provided with a focus on HSB ([facility] Scattered Bed). Answered all questions, discussed medications, symptom management needs, and goals of care. [facility]. [facility] consent forms completed and signed by Carolyn COE. [facility] information provided and encouraged to reach out with any needs.    Benefit change completed and copy left with Meenakshi Mendoza CCP. [facility] daily visits will begin tomorrow 9/24/24.    Please call with any questions, concerns, or change in patient status. Thank you for allowing us the opportunity to participate in the care of this patient/family.    Lay Harley, RN, BSN  [facility] [place] [facility] Admissions  [phone]

## 2024-09-23 NOTE — PLAN OF CARE
Goal Outcome Evaluation:           Progress: declining  Outcome Evaluation: PPS 10%. Medicated with ativan and morphine prior to turns. Pt has appeared comfortable this shift. F/C in place and draining. Flipped to HSB today. Shanice at bedside. Will cont with comfort care.

## 2024-09-23 NOTE — PROGRESS NOTES
Discharge Planning Assessment  [facility]     Patient Name: Di Macario  MRN: [id number]  Today's Date: 9/23/2024    Admit Date: 9/18/2024    Plan: Pending hospial course & further discussion GOC: anticipate home with hospice vs SNF & transition to assisted living with hospice   Discharge Needs Assessment    No documentation.                  Discharge Plan       Row Name 09/23/24 1321       Plan    Plan Comments The patient transferred to [facility] from ICU on 9/20/24. The patient is palliative. [facility] to evaluate. HUMBERTO Mendoza, RN, CCP    Final Discharge Disposition Code 51 - hospice medical facility    [facility]. HUMBERTO Mendoza Rn, CCP.                  Continued Care and Services - Admitted Since 9/18/2024       Destination Coordination complete.      Service Provider Request Status Selected Services Address Phone Fax Patient Preferred    [facility]  Selected Inpatient Hosp[facility] [address] [place] [address] [phone] --                  Expected Discharge Date and Time       Expected Discharge Date Expected Discharge Time    Sep 23, 2024            Demographic Summary    No documentation.                  Functional Status    No documentation.                  Psychosocial    No documentation.                  Abuse/Neglect    No documentation.                  Legal    No documentation.                  Substance Abuse    No documentation.                  Patient Forms    No documentation.                     Meenakshi Mendoza, RN

## 2024-09-23 NOTE — H&P
Patient Name:  Di Macario  YOB: 1940  MRN:  [id number]  Admit Date:  9/18/2024  Patient Care Team:  Lily Yepez PA-C as PCP - General (Physician Assistant)  Madelin Ferguson MD as Consulting Physician (Cardiology)  Gigi Kaiser DO as Consulting Physician (Neurology)      Subjective   History Present Illness     Chief Complaint   Patient presents with   • Shortness of Breath         History of Present Illness  Ms. Macario is a 84 y.o. female with a history of dementia, diabetes type 2 presenting with acute hypoxic and hypercapnic respiratory from COVID-19 infection and aspiration pneumonia.  Goals of care discussions were held and patient was transition to comfort care measures only.  Patient did have an NSTEMI and cardiology evaluated but at that time it was decided to do medical management only since patient was transitioning to end-of-life care.  Patient received remdesivir and Zosyn.  All known comfort focused medications have been discontinued.  No further antibiotics.         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 4   • Hyperlipidemia 01/13/2021   • Lung consolidation 06/01/2022   • Pacemaker    • Peripheral neuropathy 08/28/2018   • Toe ulcer due to DM     LEFT GREAT TOE   • Type 2 diabetes mellitus with hyperglycemia, with long-term current use of insulin 08/25/2022   • Vitamin D deficiency      Past Surgical History:   Procedure Laterality Date   • AMPUTATION DIGIT Left 01/30/2024    Procedure: PARTIAL HALLUX AMPUTATION LEFT FOOT;  Surgeon: Nas Gonzales DPM;  Location: [facility];  Service: Podiatry;  Laterality: Left;   • AMPUTATION DIGIT Left 3/11/2024    Procedure: AMPUTATION DIGIT;  Surgeon: Nas Gonzales DPM;  Location:  [facility] MAIN OR;  Service: Podiatry;  Laterality: Left;   • ANKLE SURGERY Right    • BLADDER SURGERY      Prolapsed   • BREAST BIOPSY     • CARDIAC ELECTROPHYSIOLOGY PROCEDURE N/A 2021    Procedure: Pacemaker DC new BOSTON;  Surgeon: Madelin Ferguson MD;  Location: [facility] CATH INVASIVE LOCATION;  Service: Cardiology;  Laterality: N/A;   • ENDOSCOPY N/A 2022    Procedure: ESOPHAGOGASTRODUODENOSCOPY with biopsy, balloon dilation;  Surgeon: Lo Benjamin MD;  Location: [facility] ENDOSCOPY;  Service: Gastroenterology;  Laterality: N/A;  esophageal stricture, hiatal hernia, gastritis   • HYSTERECTOMY     • TONSILLECTOMY       Family History   Problem Relation Age of Onset   • Hypertension Mother    • Cancer Mother         Stomach Cancer   • Obesity Mother         her entire life over weight   • Hypertension Father    • Cancer Father         abdominal tumor   • Malig Hyperthermia Neg Hx      Social History     Tobacco Use   • Smoking status: Former     Types: Cigarettes     Start date: 1973     Quit date:      Years since quittin.7   • Smokeless tobacco: Never   • Tobacco comments:     quit in  never a pack a day or even a pack a month smoker   Vaping Use   • Vaping status: Never Used   Substance Use Topics   • Alcohol use: Not Currently     Comment: RARELY   • Drug use: No     No current facility-administered medications on file prior to encounter.     Current Outpatient Medications on File Prior to Encounter   Medication Sig Dispense Refill   • amLODIPine (NORVASC) 5 MG tablet TAKE ONE-HALF (1/2) TABLET DAILY 90 tablet 3   • collagenase 250 UNIT/GM ointment Apply 1 Application topically to the appropriate area as directed Daily.     • donepezil (ARICEPT) 10 MG tablet Take 1 tablet by mouth Every Night. 90 tablet 3   • escitalopram (LEXAPRO) 5 MG tablet Take 1 tablet by mouth Daily.     • famotidine (PEPCID) 20 MG tablet Take 1 tablet by mouth 2 (Two) Times a Day. Indications:  Heartburn     • Insulin Glargine (Lantus SoloStar) 100 UNIT/ML injection pen INJECT 10 UNITS UNDER THE SKIN INTO THE APPROPRIATE AREA AS DIRECTED DAILY 15 mL 1   • melatonin 1 MG tablet Take 1 tablet by mouth Every Night. 30 tablet 1   • memantine (NAMENDA) 10 MG tablet Take 1 tablet by mouth 2 (Two) Times a Day. Indications: Alzheimer's Disease     • pravastatin (PRAVACHOL) 40 MG tablet TAKE 1 TABLET EVERY EVENING 90 tablet 3   • Zonegran 100 MG capsule Take 2 capsules by mouth Every Night.     • insulin aspart (NovoLOG FlexPen) 100 UNIT/ML solution pen-injector sc pen Inject 2-3 Units under the skin into the appropriate area as directed 3 (Three) Times a Day With Meals. 9 mL 0     No Known Allergies    Objective    Objective     Vital Signs  Temp:  [98.8 °F (37.1 °C)-99.9 °F (37.7 °C)] 98.8 °F (37.1 °C)  Heart Rate:  [86-88] 86  Resp:  [16] 16  BP: ()/(50-62) 95/50  SpO2:  [91 %] 91 %  on  Flow (L/min):  [3] 3;   Device (Oxygen Therapy): room air;nasal cannula  Body mass index is 22.61 kg/m².    Physical Exam  Constitutional:       General: She is not in acute distress.     Appearance: She is ill-appearing.   Cardiovascular:      Rate and Rhythm: Normal rate and regular rhythm.   Pulmonary:      Effort: Pulmonary effort is normal. No respiratory distress.      Comments: Shallow breathing  Musculoskeletal:         General: No swelling or deformity. Normal range of motion.   Skin:     General: Skin is warm and dry.   Neurological:      Mental Status: She is alert. She is disoriented.      Comments: Nonverbal   Results Review:  I reviewed the patient's new clinical results.  I reviewed the patient's new imaging results and agree with the interpretation.  I reviewed the patient's other test results and agree with the interpretation  I personally viewed and interpreted the patient's EKG/Telemetry data  Discussed with ED provider.    Lab Results (last 24 hours)       ** No results found for the last 24 hours. **             Imaging Results (Last 24 Hours)       ** No results found for the last 24 hours. **            Results for orders placed during the hospital encounter of 09/18/24    Adult Transthoracic Echo Complete W/ Cont if Necessary Per Protocol    Interpretation Summary  •  Left ventricular ejection fraction appears to be 36 - 40%.  •  Left ventricular wall thickness is consistent with hypertrophy. Sigmoid-shaped ventricular septum is present.  •  The following left ventricular wall segments are hypokinetic: apical anterior, mid anterolateral, apical lateral, apical inferior, apical septal, mid inferoseptal and apex hypokinetic.  •  Left ventricular diastolic function was normal.  •  Moderate aortic valve stenosis is present.  •  Estimated right ventricular systolic pressure from tricuspid regurgitation is normal (<35 mmHg).      ECG 12 Lead Dyspnea   Final Result   HEART RATE=73  bpm   RR Ukadqyyh=508  ms   FL Deoxzrsn=381  ms   P Horizontal Axis=  deg   P Front Axis=72  deg   QRSD Interval=97  ms   QT Qcaialwj=202  ms   ADrB=586  ms   QRS Axis=34  deg   T Wave Axis=251  deg   - ABNORMAL ECG -   Sinus rhythm   Probable  anterior infarct, age indeterminate   Abnormal T, probable ischemia, widespread .....T &lt;-0.50mV, ant/lat/inf,    new   Lateral  leads are also involved   Prolonged QT interval   Electronically Signed By: Madelin Ferguson ([facility]) 2024-09-20 13:02:11   Date and Time of Study:2024-09-20 03:55:48      ECG 12 Lead Other; elevated troponin   Final Result   HEART RATE=59  bpm   RR Cdzxrrbx=4017  ms   FL Yxxqkoer=562  ms   P Horizontal Axis=71  deg   P Front Axis=36  deg   QRSD Interval=97  ms   QT Qjzsonjf=396  ms   JSjT=479  ms   QRS Axis=33  deg   T Wave Axis=60  deg   - ABNORMAL ECG -   Pacemaker spikes or artifacts   Sinus rhythm   Anterior  infarct, old   No change from previous tracing   Electronically Signed By: Madelin Ferguson ([facility]) 2024-09-19 09:23:25   Date and Time of  Study:2024-09-19 03:57:50      ECG 12 Lead Dyspnea   Final Result   HEART RATE=90  bpm   RR Xkpeltsm=656  ms   WV Iacfpwln=595  ms   P Horizontal Axis=-53  deg   P Front Axis=-59  deg   QRSD Interval=99  ms   QT Gteavzge=094  ms   VXyF=198  ms   QRS Axis=-39  deg   T Wave Axis=23  deg   - ABNORMAL ECG -   Sinus or ectopic atrial rhythm   Atrial premature complex   Prolonged WV interval   Inferior  infarct, old   HYPER ACUTE T WAVES ANT LEADS NEW COMPARE TO OLD EKG   Electronically Signed By: Madelin Ferguson ([facility]) 2024-09-19 09:24:39   Date and Time of Study:2024-09-18 20:03:30           Assessment/Plan     Active Hospital Problems    Diagnosis  POA   • **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     Comfort care status-continue comfort focused care.  Has required IV Ativan and IV morphine.  Discontinue oral medications and insulin.  No further antibiotics, remdesivir, steroids.  Arriaga catheter for end-of-life care.  Have discussed with hospice.    Prognosis: Hours to days     Other medical problems prior to transition to comfort care     Acute hypoxic and hypercapnic respiratory failure  COVID-19 infection  Aspiration pneumonia  Dysphagia  Dementia  Diabetes type 2  NSTEMI        I discussed the patient's findings and my recommendations with patient, family, nursing staff, and consulting provider.         Perez Barron MD  [facility]  09/23/24  13:23 EDT

## 2024-09-23 NOTE — DISCHARGE SUMMARY
Patient Name: Di Macario  : 1940  MRN: [id number]    Date of Admission: 2024  Date of Discharge:  2024  Primary Care Physician: Lily Yepez PA-C      Chief Complaint:   Shortness of Breath      Discharge Diagnoses     Active Hospital Problems    Diagnosis  POA    **Acute respiratory failure with hypercapnia [J96.02]  Yes    COVID-19 virus infection [U07.1]  Yes    Paroxysmal atrial fibrillation [I48.0]  Yes    Type 2 diabetes mellitus with hyperglycemia, with long-term current use of insulin [E11.65, Z79.4]  Not Applicable    Oropharyngeal dysphagia [R13.12]  Yes    Weakness [R53.1]  Yes    Dementia without behavioral disturbance [F03.90]  Yes    Hyperlipidemia [E78.5]  Yes    Peripheral neuropathy [G62.9]  Yes    Essential hypertension [I10]  Yes      Resolved Hospital Problems   No resolved problems to display.        Hospital Course     Ms. Macario is a 84 y.o. female with a history of dementia, diabetes type 2 presenting with acute hypoxic and hypercapnic respiratory from COVID-19 infection and aspiration pneumonia.  Goals of care discussions were held and patient was transition to comfort care measures only.  Patient did have an NSTEMI and cardiology evaluated but at that time it was decided to do medical management only since patient was transitioning to end-of-life care.  Patient received remdesivir and Zosyn.  All known comfort focused medications have been discontinued.  No further antibiotics.    At the time of discharge patient was told to take all medications as prescribed, keep all follow-up appointments, and call their doctor or return to the hospital with any worsening or concerning symptoms.    Day of Discharge     Subjective:  No acute events overnight.  Patient resting comfortably in bed. Nonverbal.  Son is at bedside.      Physical Exam:  Temp:  [98.8 °F (37.1 °C)-99.9 °F (37.7 °C)] 98.8 °F (37.1 °C)  Heart Rate:  [86-88] 86  Resp:  [16] 16  BP: ()/(50-62)  95/50  Body mass index is 22.61 kg/m².  Physical Exam  Constitutional:       General: She is not in acute distress.     Appearance: She is ill-appearing.   Cardiovascular:      Rate and Rhythm: Normal rate and regular rhythm.   Pulmonary:      Effort: Pulmonary effort is normal. No respiratory distress.      Comments: Shallow breathing  Musculoskeletal:         General: No swelling or deformity. Normal range of motion.   Skin:     General: Skin is warm and dry.   Neurological:      Mental Status: She is alert. She is disoriented.      Comments: Nonverbal         Consultants     Consult Orders (all) (From admission, onward)       Start     Ordered    09/21/24 0813  Inpatient Hospice / [facility] Consult  Once        Specialty:  Hospice and Palliative Medicine  Provider:  (Not yet assigned)    09/21/24 0812    09/19/24 1552  Inpatient Palliative Care MD Consult  Once        Specialty:  Hospice and Palliative Medicine  Provider:  Vernon Keene MD    09/19/24 1553    09/19/24 1058  Inpatient Palliative Care Team Consult  Once        Provider:  (Not yet assigned)    09/19/24 1057    09/19/24 0649  Inpatient Diabetes Educator Consult  Once,   Status:  Canceled        Provider:  (Not yet assigned)    09/19/24 0648    09/19/24 0648  Inpatient Nutrition Consult  Once        Provider:  (Not yet assigned)    09/19/24 0648    09/19/24 0241  Inpatient Cardiology Consult  Once        Specialty:  Cardiology  Provider:  Madelin Ferguson MD    09/19/24 0240    09/18/24 2207  Pulmonology (on-call MD unless specified)  Once        Specialty:  Pulmonary Disease  Provider:  Mauricio Braun MD    09/18/24 2206    Signed and Held  Pulmonology (on-call MD unless specified)  Once        Specialty:  Pulmonary Disease  Provider:  (Not yet assigned)    Signed and Held                  Procedures     Imaging Results (All)       Procedure Component Value Units Date/Time    XR Chest 1 View [[id number]] Collected: 09/18/24 2018      Updated: 09/18/24 2024    Narrative:      XR CHEST 1 VW-     HISTORY: Female who is 84 years-old, short of breath     TECHNIQUE: Frontal view of the chest     COMPARISON: 9/21/2022, 7/24/2022     FINDINGS: The patient is rotated towards the left. The heart size  appears normal. Left-sided pacemaker and cardiac leads are seen. Aorta  is calcified. Pulmonary vasculature is unremarkable. No focal pulmonary  consolidation, pleural effusion, or pneumothorax. Chronic proximal right  humeral deformity is noted. Old granulomatous disease is present. No  acute osseous process.       Impression:      No focal pulmonary consolidation. Follow-up as clinical  indications persist.     This report was finalized on 9/18/2024 8:21 PM by Dr. Uche Geiger M.D on Workstation: [id number]               Pertinent Labs     Results from last 7 days   Lab Units 09/20/24  0437 09/19/24 0617 09/19/24 0127 09/18/24 2036   WBC 10*3/mm3 22.81* 13.02* 16.50* 11.55*   HEMOGLOBIN g/dL 10.0* 10.7* 10.7* 11.6*   PLATELETS 10*3/mm3 286 260 273 289     Results from last 7 days   Lab Units 09/21/24  0716 09/20/24  0437 09/19/24  1239 09/19/24 0617 09/19/24 0127   SODIUM mmol/L 145 143  --  141 137   POTASSIUM mmol/L 3.2* 3.8 5.0 4.2 3.4*   CHLORIDE mmol/L 107 108*  --  104 98   CO2 mmol/L 27.0 24.2  --  27.2 25.7   BUN mg/dL 17 17  --  18 19   CREATININE mg/dL 0.54* 0.57  --  0.55* 0.65   GLUCOSE mg/dL 151* 130*  --  160* 382*   Estimated Creatinine Clearance: 69.6 mL/min (A) (by C-G formula based on SCr of 0.54 mg/dL (L)).  Results from last 7 days   Lab Units 09/21/24  0716 09/20/24  0437 09/19/24  0617 09/18/24 2036   ALBUMIN g/dL 3.1* 3.0* 3.4* 3.8   BILIRUBIN mg/dL 0.2 0.2 <0.2 0.3   ALK PHOS U/L 105 93 114 147*   AST (SGOT) U/L 14 15 13 15   ALT (SGPT) U/L 7 7 9 9     Results from last 7 days   Lab Units 09/21/24  0716 09/20/24  0437 09/19/24  0617 09/19/24  0127 09/18/24  2036   CALCIUM mg/dL 9.7 9.4 9.7 9.7 10.1   ALBUMIN g/dL 3.1* 3.0*  "3.4*  --  3.8       Results from last 7 days   Lab Units 09/20/24  0649 09/20/24  0437 09/19/24  0617 09/19/24  0127 09/18/24 2036   HSTROP T ng/L 196* 205* 232* 239* 36*   PROBNP pg/mL  --   --   --   --  313.0           Invalid input(s): \"LDLCALC\"  Results from last 7 days   Lab Units 09/18/24 2050 09/18/24 2036   BLOODCX  No growth at 4 days No growth at 4 days       Test Results Pending at Discharge     Pending Labs       Order Current Status    Blood Culture - Blood, Arm, Left Preliminary result    Blood Culture - Blood, Arm, Right Preliminary result            Discharge Details        Discharge Medications        ASK your doctor about these medications        Instructions Start Date   amLODIPine 5 MG tablet  Commonly known as: NORVASC   2.5 mg, Oral, Daily      collagenase 250 UNIT/GM ointment   1 Application, Topical, Daily      donepezil 10 MG tablet  Commonly known as: ARICEPT   10 mg, Oral, Nightly      escitalopram 5 MG tablet  Commonly known as: LEXAPRO   Take 1 tablet by mouth Daily.      famotidine 20 MG tablet  Commonly known as: PEPCID   1 tablet, Oral, 2 Times Daily      Lantus SoloStar 100 UNIT/ML injection pen  Generic drug: Insulin Glargine   10 Units, Subcutaneous, Daily      melatonin 1 MG tablet   1 mg, Oral, Nightly      memantine 10 MG tablet  Commonly known as: NAMENDA   1 tablet, Oral, 2 Times Daily      NovoLOG FlexPen 100 UNIT/ML solution pen-injector sc pen  Generic drug: insulin aspart   2-3 Units, Subcutaneous, 3 Times Daily With Meals      pravastatin 40 MG tablet  Commonly known as: PRAVACHOL   40 mg, Oral, Every Evening      Zonegran 100 MG capsule  Generic drug: zonisamide   200 mg, Oral, Nightly               No Known Allergies      Discharge Disposition:  Hospice/Medical Facility ([facility])    Discharge Diet:  Diet Order   Procedures    Diet: Regular/House, Diabetic; Consistent Carbohydrate; Texture: Pureed (NDD 1); Fluid Consistency: Pudding Thick "       Discharge Activity:   As tolerated    CODE STATUS:    Code Status and Medical Interventions: No CPR (Do Not Attempt to Resuscitate); Comfort Measures   Ordered at: 09/21/24 0924     Level Of Support Discussed With:    Health Care Surrogate     Code Status (Patient has no pulse and is not breathing):    No CPR (Do Not Attempt to Resuscitate)     Medical Interventions (Patient has pulse or is breathing):    Comfort Measures       Future Appointments   Date Time Provider Department Center   11/7/2024 10:00 AM [facility] ECHO CART [facility]   11/7/2024 11:15 AM [facility] SPT [facility] DEVICE CHECK [facility]   11/7/2024 12:00 PM Madelin Ferguson MD [facility]   11/19/2024  1:00 PM [facility]  [facility]   11/26/2024  2:00 PM Carlotta Mckeon APRN [facility]  [facility]      Follow-up Information       Lily Yepez, PA-C .    Specialties: Physician Assistant, Internal Medicine, Family Medicine  Contact information:  [address] [address] [phone]                             Time Spent on Discharge:  Greater than 30 minutes      Perez Barron MD  [facility]  09/23/24  13:19 EDT

## 2024-09-23 NOTE — PROGRESS NOTES
Case Management Discharge Note      Final Note: admitted to a [facility] scattered bed on 9/23/24. HUMBERTO Mendoza Rn, CCP.         Selected Continued Care - Admitted Since 9/18/2024       Destination Coordination complete.      Service Provider Selected Services Address Phone Fax Patient Preferred    [facility] [address], [facility] [id number] [phone] --              Durable Medical Equipment    No services have been selected for the patient.                Dialysis/Infusion    No services have been selected for the patient.                Home Medical Care    No services have been selected for the patient.                Therapy    No services have been selected for the patient.                Community Resources    No services have been selected for the patient.                Community & DME    No services have been selected for the patient.                         Final Discharge Disposition Code: 51 - hospice medical facility

## 2024-09-24 PROBLEM — Z51.5 END OF LIFE CARE: Status: ACTIVE | Noted: 2024-01-01

## 2024-09-24 NOTE — PROGRESS NOTES
Name: Di Macario ADMIT: 2024   : 1940  PCP: Lily Yepez PA-C    MRN: [id number] LOS: 1 days   AGE/SEX: 84 y.o. female  ROOM: [facility]     Subjective   Subjective   Resting comfortably bed.  Daughter at bedside.    Review of Systems  As above     Objective   Objective   Vital Signs  Temp:  [100.3 °F (37.9 °C)-100.4 °F (38 °C)] 100.4 °F (38 °C)  Heart Rate:  [78-89] 78  Resp:  [16] 16  BP: (81-88)/(42-43) 81/43  SpO2:  [90 %-92 %] 90 %  on  Flow (L/min):  [2] 2;   Device (Oxygen Therapy): nasal cannula;open oxygen mask  There is no height or weight on file to calculate BMI.  Physical Exam  Constitutional:       General: She is not in acute distress.     Appearance: She is ill-appearing (chronically).      Comments: Elderly, frail.   Pulmonary:      Effort: Pulmonary effort is normal. No respiratory distress.      Comments: Shallow breathing.  Skin:     General: Skin is warm and dry.   Neurological:      Mental Status: She is alert. She is disoriented.      Comments: Nonverbal         Results Review     I reviewed the patient's new clinical results.  Results from last 7 days   Lab Units 24  0437 24  06247 246   WBC 10*3/mm3 22.81* 13.02* 16.50* 11.55*   HEMOGLOBIN g/dL 10.0* 10.7* 10.7* 11.6*   PLATELETS 10*3/mm3 286 260 273 289     Results from last 7 days   Lab Units 24  0716 24  0437 24  1239 24  0617 24  0127   SODIUM mmol/L 145 143  --  141 137   POTASSIUM mmol/L 3.2* 3.8 5.0 4.2 3.4*   CHLORIDE mmol/L 107 108*  --  104 98   CO2 mmol/L 27.0 24.2  --  27.2 25.7   BUN mg/dL 17 17  --  18 19   CREATININE mg/dL 0.54* 0.57  --  0.55* 0.65   GLUCOSE mg/dL 151* 130*  --  160* 382*   Estimated Creatinine Clearance: 69.6 mL/min (A) (by C-G formula based on SCr of 0.54 mg/dL (L)).  Results from last 7 days   Lab Units 24  0716 24  0437 24  0617 24  2036   ALBUMIN g/dL 3.1* 3.0* 3.4* 3.8   BILIRUBIN mg/dL 0.2  0.2 <0.2 0.3   ALK PHOS U/L 105 93 114 147*   AST (SGOT) U/L 14 15 13 15   ALT (SGPT) U/L 7 7 9 9     Results from last 7 days   Lab Units 09/21/24  0716 09/20/24  0437 09/19/24  0617 09/19/24  0127 09/18/24  2036   CALCIUM mg/dL 9.7 9.4 9.7 9.7 10.1   ALBUMIN g/dL 3.1* 3.0* 3.4*  --  3.8     Results from last 7 days   Lab Units 09/19/24  1239 09/19/24  0617 09/19/24  0415 09/19/24  0127 09/18/24 2036   PROCALCITONIN ng/mL  --   --   --   --  0.09   LACTATE mmol/L 1.5 2.7* 3.8* 4.4* 2.5*     COVID19   Date Value Ref Range Status   09/18/2024 Detected (C) Not Detected - Ref. Range Final   07/28/2022 Not Detected Not Detected - Ref. Range Final     Glucose   Date/Time Value Ref Range Status   09/21/2024 1215 135 (H) 70 - 130 mg/dL Final       Adult Transthoracic Echo Complete W/ Cont if Necessary Per Protocol    Left ventricular ejection fraction appears to be 36 - 40%.    Left ventricular wall thickness is consistent with hypertrophy.   Sigmoid-shaped ventricular septum is present.    The following left ventricular wall segments are hypokinetic: apical   anterior, mid anterolateral, apical lateral, apical inferior, apical   septal, mid inferoseptal and apex hypokinetic.    Left ventricular diastolic function was normal.    Moderate aortic valve stenosis is present.    Estimated right ventricular systolic pressure from tricuspid   regurgitation is normal (<35 mmHg).    I reviewed the patient's daily medications.  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       84 y.o. female admitted with Senile degeneration of brain.    Comfort care status-continue comfort focused care.  Has required IV Ativan and IV morphine for comfort in the last 24 hours.    Arriaga catheter for end-of-life care.     Prognosis: Hours to days     Other medical problems prior to transition to comfort care     Acute hypoxic and hypercapnic respiratory failure  COVID-19 infection  Aspiration pneumonia  Dysphagia  Dementia  Diabetes type 2  NSTEMI      Expected Discharge Date: 9/28/2024; Expected Discharge Time:       Perez Barron MD  [facility]  09/24/24  12:12 EDT

## 2024-09-24 NOTE — PROGRESS NOTES
SB team SW met with patient and family to explain role of SB team SW and assess for needs. Patient was lying in her hospital bed, unresponsive with no signs of pain or discomfort. Patient's two daughters and a son were at the bedside. Patient is currently GIP at [facility] and we are managing her pain. SW sat with family to provide supportive presence.   Patient is currently a 10% PPS and is unresponsive, therefore SW unable to complete PHQ9. Patient has five adult children that live are all involved in patient's care. Patient was living at home with paid caregivers prior to this hospital stay. Family would like for patient to remain in a SB for EOL care due to her imminent prognosis.  SW educated on bereavement services provided by [facility] and family voiced understanding. Discussed  planning and family has selected [facility]  Home . SW will visit on a weekly and PRN basis to offer EOL support and assist with needs.

## 2024-09-24 NOTE — PROGRESS NOTES
[facility] Visit Report    Di Macario  [id number]  9/24/2024    Patient is a 85yo female with a primary [facility] diagnosis of Senile Degeneration of the Brain. Admitted to [facility] for [facility] for EOL care and symptom management of pain, dyspnea and anxiety. COVID-19    PPS: 10%     VS: 81/43, 76, 90%, 100.4(A), 20    Medications in 24 hours:  - 4mg of IV Morphine x6  - 2mg of IV Ativan x6    Recommendations:  Continue to monitor for signs of decline and provide comfort measures. Contact [facility] at [phone] with any questions or concerns and at TOD.     Assessment:  Patient is laying in bed on his left side in a recovery position. No audible congestion during the visit. Patient's color is pale and ashen. Respirations are shallow and even. Patient is warm to touch. Arriaga is present. Urine is lisa with diminishing output. Patient is showing no signs of distress or discomfort. Patient is unresponsive with a PPS of 10%.    Collaboration:  Spoke with pts family at the bedside with condition update and support provided. Training provided regarding assessment findings, disease progression, symptom management, recommendations and ELOS. Family v/u of pts condition and all questions were answered. Collaborated with RN Andra GAITAN about pts condition and recommendations.     Disposition:  Patient meets GIP criteria d/t frequent administration and continued titration of IV medications to achieve and maintain symptom management. Patient appears to be unsafe for transport at this time, requiring increasing symptom management and frequent RN assessment. If patient were to stabilize she would likely require LTC placement d/t increased daily care needs/return home with family and [facility] support. Will continue [facility] RN visits to monitor for changes, assess needs and provide support.        Dexter Moon, JOSÉ MIGUEL

## 2024-09-24 NOTE — PLAN OF CARE
Goal Outcome Evaluation:  Plan of Care Reviewed With: patient        Progress: declining  Outcome Evaluation: Medicated with ativan and morphine with turns. Appears to be comfortable. Family at bedside.

## 2024-09-24 NOTE — PLAN OF CARE
Goal Outcome Evaluation:                   Patient is 84 year old female admitted as [facility] patient for senile degeneration of the brain. PPS is 10%. Family is at bedside and have been instructed on the signs and symptoms of the end of life. Patient is receiving pre medication about every 4 hours for turns. At this time the patient has had 3 x ativan 2mg iv and 3 x morphine 4mg iv. Family at bedside.

## 2024-09-24 NOTE — PROGRESS NOTES
Pt was unresponsive and appeared comfortable.  Pt has 5 adult children.  Two daughters and a son were present.  Pt is Alev[employer] and has received sacraments of the sick.  Pt's daughters and son expressed sadness yet acceptance with pt's prognosis and POC.   facilitated life review as children affirmed their love for pt and  provided a prayer of closure and blessing with children at bed-side.

## 2024-09-24 NOTE — PROGRESS NOTES
"Enter Query Response Below      Query Response: Type 2 MI due to demand from underlying infection and hypoxia              If applicable, please update the problem list.       Patient: Di Macario        : 1940  Account: [id number]           Admit Date:         How to Respond to this query:       a. Click New Note     b. Answer query within the yellow box.                c. Update the Problem List, if applicable.      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  Clinical Indicators: Presented on  with acute respiratory distress. Progress note () states, \"I do feel like she is probably developed an NSTEMI secondary to demand from underlying infection and hypoxia.\" The progress notes dated ( and ) list, \"NSTEMI\" under active hospital problems. The discharge summary reads, \"Patient did have an NSTEMI and cardiology evaluated but at that time it was decided to do medical management only since patient was transitioning to end-of-life care.\" HS Troponin 36 (), 239 ( @ 0127), 232 ( @ 0617), 205 ( @ 0437), 196 ( @ 0649).   Treatment: Cardiology consult, transition to comfort care.    Please clarify the type of NSTEMI the patient was treated/monitored for:    Type 2 MI due to demand from underlying infection and hypoxia  Other- specify______    By submitting this query, we are merely seeking further clarification of documentation to accurately reflect all conditions that you are monitoring, evaluating, treating or that extend the hospitalization or utilize additional resources of care. Please utilize your independent clinical judgment when addressing the question(s) above.     This query and your response, once completed, will be entered into the legal medical " record.    Sincerely,  Carin Torres RN  Clinical Documentation Integrity Program

## 2024-09-25 NOTE — CONSULTS
Visit with patient's daughter outside the room. Daughter says she is managing well for the time being. She is aware that chaplains remain available for continued care and support.

## 2024-09-25 NOTE — PLAN OF CARE
Goal Outcome Evaluation:  Plan of Care Reviewed With: patient, family        Progress: declining  Outcome Evaluation: Pt not alert or oriented. bedbound. Pre-med PRN Ativan and Morphine every 4hrs. Covid precautions in place. Family at bedside and very supportive. F/c to bsd, min urinary output noted. New periph site placed after lft forearm occluded. Nsg to cont with plan of care.

## 2024-09-25 NOTE — NURSING NOTE
[facility] Visit Report    Di Macario  [id number]  9/25/2024    Admission R/T [facility] Dx: YES    Reason for [facility] Admission: SENILE DEGENERATION OF THE BRAIN    Symptom  Management: PAIN, ANXIETY AND SOA    Nursing/Medication Recommendations: NONE AT THIS TIME    Psychosocial Issues and Recommendations:N/A    Spiritual Concerns and Recommendations: N/A    [facility] Discharge Plans:  INCOMPLETE    Review of Visit:  FOCUSED VISIT FOR SB TEAM, COLLAB WITH EVERETT MENENDEZ AND WAS ABLE TO PRINT OUT A RELEASE OF INFO FORM AND DTR AMBER SIGNED. CHRISTINE HAS SENT EMAIL TO [employer] WITH REQUIRED INFO REQUESTED BY FAMILY. PT IS IN ISOLATION, DONNED ALL APPROPRIATE PPE. MET WITH MULTIPLE FAMILY AT BEDSIDE. REVIEWED EOL AND S/M. FAMILY FEELS PT IS CLOSE TO PASSING. SHE APPEARS COMFORTABLE. NO SIGNS OF DISTRESS OR DISCOMFORT. LUNGS ARE COARSE AND DIMINISHED. FC TO BSD WITH YASIR COLORED URINE. PT'S BREATHING PATTERN IS SLIGHTLY IRREGULAR. SKIN IS WARM TO TOUCH. PT HAD FEVER THIS MORNING. IV MEDS IN THE LAST 24HRS FOR S/M OF PAIN, ANXIETY AND SOA: MS 4MG PRN X6, LORAZEPAM 0.5MG PRN X1 AND THEN INCREASED TO LORAZEPAM 2MG PRN X4. PLAN TO REMAIN AT [facility] FOR EOL BUT SHOULD SHE STABILIZE SHE WOULD LIKELY RETURN HOME WITH FAMILY AND HIRED CG WITH HOSPICE SERVICES. MEETS GIP D/T ESCALATING DOSING NEEDS FOR MORE OPTIMAL S/M. NURSE TO VISIT EVERY 1-2 DAYS.       SUKHDEV ISRAEL RN   SCATTERED BED TEAM  [phone]

## 2024-09-25 NOTE — PLAN OF CARE
Goal Outcome Evaluation:  Plan of Care Reviewed With: son        Progress: declining  Outcome Evaluation: Appears comfortable at rest. Medicated with ativan and morphine every 4 hours prior to turns. Will continue to monitor.

## 2024-09-25 NOTE — PROGRESS NOTES
Name: Di Macario ADMIT: 2024   : 1940  PCP: Lily Yepez PA-C    MRN: [id number] LOS: 2 days   AGE/SEX: 84 y.o. female  ROOM: [facility]     Subjective   Subjective   Resting comfortably bed.  Multiple family members at bedside.  Patient's breathing has become more shallow.    Review of Systems  As above     Objective   Objective   Vital Signs  Temp:  [100.1 °F (37.8 °C)-100.6 °F (38.1 °C)] 100.6 °F (38.1 °C)  Heart Rate:  [78-83] 78  Resp:  [14-16] 14  BP: (79-84)/(48-49) 79/49  SpO2:  [92 %-94 %] 92 %  on  Flow (L/min):  [2] 2;   Device (Oxygen Therapy): nasal cannula  There is no height or weight on file to calculate BMI.  Physical Exam  Constitutional:       General: She is not in acute distress.     Appearance: She is ill-appearing (chronically).      Comments: Elderly, frail.   Pulmonary:      Effort: Pulmonary effort is normal. No respiratory distress.      Comments: Shallow breathing.  Skin:     General: Skin is warm and dry.   Neurological:      Mental Status: She is alert. She is disoriented.      Comments: Nonverbal         Results Review     I reviewed the patient's new clinical results.  Results from last 7 days   Lab Units 24  0437 24  0617 24  0127 24   WBC 10*3/mm3 22.81* 13.02* 16.50* 11.55*   HEMOGLOBIN g/dL 10.0* 10.7* 10.7* 11.6*   PLATELETS 10*3/mm3 286 260 273 289     Results from last 7 days   Lab Units 24  0716 24  0437 24  1239 24  0617 24  0127   SODIUM mmol/L 145 143  --  141 137   POTASSIUM mmol/L 3.2* 3.8 5.0 4.2 3.4*   CHLORIDE mmol/L 107 108*  --  104 98   CO2 mmol/L 27.0 24.2  --  27.2 25.7   BUN mg/dL 17 17  --  18 19   CREATININE mg/dL 0.54* 0.57  --  0.55* 0.65   GLUCOSE mg/dL 151* 130*  --  160* 382*   Estimated Creatinine Clearance: 69.6 mL/min (A) (by C-G formula based on SCr of 0.54 mg/dL (L)).  Results from last 7 days   Lab Units 24  0716 24  0437 24  0617 24  3066  "  ALBUMIN g/dL 3.1* 3.0* 3.4* 3.8   BILIRUBIN mg/dL 0.2 0.2 <0.2 0.3   ALK PHOS U/L 105 93 114 147*   AST (SGOT) U/L 14 15 13 15   ALT (SGPT) U/L 7 7 9 9     Results from last 7 days   Lab Units 09/21/24  0716 09/20/24  0437 09/19/24  0617 09/19/24  0127 09/18/24  2036   CALCIUM mg/dL 9.7 9.4 9.7 9.7 10.1   ALBUMIN g/dL 3.1* 3.0* 3.4*  --  3.8     Results from last 7 days   Lab Units 09/19/24  1239 09/19/24  0617 09/19/24  0415 09/19/24  0127 09/18/24 2036   PROCALCITONIN ng/mL  --   --   --   --  0.09   LACTATE mmol/L 1.5 2.7* 3.8* 4.4* 2.5*     COVID19   Date Value Ref Range Status   09/18/2024 Detected (C) Not Detected - Ref. Range Final   07/28/2022 Not Detected Not Detected - Ref. Range Final     No results found for: \"HGBA1C\", \"POCGLU\"      Adult Transthoracic Echo Complete W/ Cont if Necessary Per Protocol    Left ventricular ejection fraction appears to be 36 - 40%.    Left ventricular wall thickness is consistent with hypertrophy.   Sigmoid-shaped ventricular septum is present.    The following left ventricular wall segments are hypokinetic: apical   anterior, mid anterolateral, apical lateral, apical inferior, apical   septal, mid inferoseptal and apex hypokinetic.    Left ventricular diastolic function was normal.    Moderate aortic valve stenosis is present.    Estimated right ventricular systolic pressure from tricuspid   regurgitation is normal (<35 mmHg).    I reviewed the patient's daily medications.  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       84 y.o. female admitted with Senile degeneration of brain.    Comfort care status-continue comfort focused care.  Has required IV Ativan and IV morphine for comfort in the last 24 hours.    Arriaga catheter for end-of-life care.     Prognosis: Hours to days     Other medical problems prior to transition to comfort care     Acute hypoxic and hypercapnic respiratory failure  COVID-19 infection  Aspiration pneumonia  Dysphagia  Dementia  Diabetes type 2  NSTEMI      Expected Discharge Date: 9/28/2024; Expected Discharge Time:  3:00 PM      Perez Barron MD  [facility]  09/25/24  10:00 EDT

## 2024-09-26 NOTE — DISCHARGE SUMMARY
Name: Di Macario  :  1940  MRN: [id number]         Primary Care Physician: Lily Yepez PA-C      Date of Admission:  2024  Date and Time of Death:  2024 at 7:30 AM    Principle Cause of Death:     Senile degeneration of brain    Secondary Diagnoses:     Hyperlipidemia    Oropharyngeal dysphagia    Immobility syndrome    COVID-19 virus infection    End of life care        Hospital Course  Patient was a 84 y.o. female who presented to [facility] with hypoxia because of COVID-19 and aspiration pneumonia.  Goals of care discussions were held and patient was transition to comfort care measures only. Please see the admitting history and physical for further details. Patient passed away peacefully.      Perez Barron MD  24  19:50 EDT

## 2024-09-26 NOTE — PROGRESS NOTES
Case Management Discharge Note      Final Note: The patient  on 24 @ 07:30. BOrlin Mendoza RN, CCP         Selected Continued Care - Discharged on 2024 Admission date: 2024 - Discharge disposition:       Destination Coordination complete.      Service Provider Selected Services Address Phone Fax Patient Preferred    [facility] [address], [place] [id number] [phone] --              Durable Medical Equipment    No services have been selected for the patient.                Dialysis/Infusion    No services have been selected for the patient.                Home Medical Care    No services have been selected for the patient.                Therapy    No services have been selected for the patient.                Community Resources    No services have been selected for the patient.                Community & DME    No services have been selected for the patient.                    Selected Continued Care - Prior Encounters Includes continued care and service providers with selected services from prior encounters from 2024 to 2024      Discharged on 2024 Admission date: 2024 - Discharge disposition: Swing Bed w/Planned Readmission      Destination       Service Provider Selected Services Address Phone Fax Patient Preferred    [facility] [address], [facility] [id number] [phone] --                               Final Discharge Disposition Code: 41 -  in medical facility

## 2024-09-26 NOTE — PLAN OF CARE
Goal Outcome Evaluation:  Plan of Care Reviewed With: patient        Progress: declining  Outcome Evaluation: Appears comfortable at rest. Medicated every 4 hours prior to turns with ativan and morphine. Family at bedside. Will continue palliative care.

## (undated) DEVICE — INTRO SHEATH PRELUDE SNAP .038 6F 13CM W/SDPRT

## (undated) DEVICE — DEV INFL ALLIANCE2 SYS

## (undated) DEVICE — CANN O2 ETCO2 FITS ALL CONN CO2 SMPL A/ 7IN DISP LF

## (undated) DEVICE — GLV SURG SENSICARE PI MIC PF SZ7 LF STRL

## (undated) DEVICE — STOCKINETTE,IMPERVIOUS,12X48,STERILE: Brand: MEDLINE

## (undated) DEVICE — BLD SAW SAG THN 9X0.38X25MM

## (undated) DEVICE — FRCP BX RADJAW4 NDL 2.8 240CM LG OG BX40

## (undated) DEVICE — NDL HYPO PRECISIONGLIDE REG 25G 1 1/2

## (undated) DEVICE — Device

## (undated) DEVICE — SOL ISO/ALC 70PCT 4OZ

## (undated) DEVICE — LOU PACE DEFIB: Brand: MEDLINE INDUSTRIES, INC.

## (undated) DEVICE — SUT VIC 2/0 CT2 27IN J269H

## (undated) DEVICE — SUT ETHLN 3/0 PS1 18IN 1663H

## (undated) DEVICE — SPNG GZ WOVN 4X4IN 12PLY 10/BX STRL

## (undated) DEVICE — TBG PENCL TELESCP MEGADYNE SMOKE EVAC 10FT

## (undated) DEVICE — TRAP FLD MINIVAC MEGADYNE 100ML

## (undated) DEVICE — PADDING,UNDERCAST,COTTON, 3X4YD STERILE: Brand: MEDLINE

## (undated) DEVICE — SUREFIT, DUAL DISPERSIVE ELECTRODE, CONTACT QUALITY MONITOR: Brand: SUREFIT

## (undated) DEVICE — BNDG ELAS ELITE V/CLOSE 4IN 5YD LF STRL

## (undated) DEVICE — BNDG,ELSTC,MATRIX,STRL,4"X5YD,LF,HOOK&LP: Brand: MEDLINE

## (undated) DEVICE — PK ORTHO MINOR TOWER 40

## (undated) DEVICE — KT ORCA ORCAPOD DISP STRL

## (undated) DEVICE — APPL CHLORAPREP HI/LITE 26ML ORNG

## (undated) DEVICE — ESOPHAGEAL BALLOON DILATATION CATHETER: Brand: CRE FIXED WIRE

## (undated) DEVICE — SKIN PREP TRAY W/CHG: Brand: MEDLINE INDUSTRIES, INC.

## (undated) DEVICE — SENSR O2 OXIMAX FNGR A/ 18IN NONSTR

## (undated) DEVICE — PENCL E/S HNDSWCH ROCKR CB

## (undated) DEVICE — BNDG GZ SOF FORM 4IN STRL

## (undated) DEVICE — PAD UNDERCAST WYTEX 3IN 4YD LF STRL

## (undated) DEVICE — STCKNT IMPERV 12IN STRL

## (undated) DEVICE — DRSNG GZ PETROLTM XEROFORM CURAD 1X8IN STRL

## (undated) DEVICE — BNDG ELAS CO-FLEX SLF ADHR 4IN5YD LF STRL

## (undated) DEVICE — DRESSING,GAUZE,XEROFORM,CURAD,1"X8",ST: Brand: CURAD

## (undated) DEVICE — LN SMPL CO2 SHTRM SD STREAM W/M LUER

## (undated) DEVICE — ANTIBACTERIAL UNDYED BRAIDED (POLYGLACTIN 910), SYNTHETIC ABSORBABLE SUTURE: Brand: COATED VICRYL

## (undated) DEVICE — SUT VIC 3/0 SH 27IN UD VCP416H

## (undated) DEVICE — DRAPE,U/ SHT,SPLIT,PLAS,STERIL: Brand: MEDLINE

## (undated) DEVICE — DRP SURG U/DRP IMPERV 54X76IN STRL

## (undated) DEVICE — SPNG LAP 18X18IN LF STRL PK/5

## (undated) DEVICE — BANDAGE,GAUZE,CONFORMING,4"X75",STRL,LF: Brand: MEDLINE

## (undated) DEVICE — GLV SURG PREMIERPRO ORTHO LTX PF SZ7 BRN

## (undated) DEVICE — PRECISION THIN (9.0 X 0.38 X 25.0MM)

## (undated) DEVICE — GLV SURG SIGNATURE ESSENTIAL PF LTX SZ7.5

## (undated) DEVICE — GLV SURG SENSICARE PI LF PF 7.5 GRN STRL

## (undated) DEVICE — TUBING, SUCTION, 1/4" X 10', STRAIGHT: Brand: MEDLINE

## (undated) DEVICE — BITEBLOCK OMNI BLOC

## (undated) DEVICE — ADAPT CLN BIOGUARD AIR/H2O DISP